# Patient Record
Sex: FEMALE | Race: WHITE | NOT HISPANIC OR LATINO | Employment: OTHER | URBAN - METROPOLITAN AREA
[De-identification: names, ages, dates, MRNs, and addresses within clinical notes are randomized per-mention and may not be internally consistent; named-entity substitution may affect disease eponyms.]

---

## 2017-01-11 ENCOUNTER — HOSPITAL ENCOUNTER (OUTPATIENT)
Dept: PHYSICAL THERAPY | Facility: CLINIC | Age: 72
Setting detail: THERAPIES SERIES
End: 2017-01-11
Attending: INTERNAL MEDICINE
Payer: COMMERCIAL

## 2017-01-11 PROCEDURE — 97110 THERAPEUTIC EXERCISES: CPT | Mod: GP | Performed by: PHYSICAL THERAPIST

## 2017-01-11 PROCEDURE — 40000360 ZZHC STATISTIC PT CANCER REHAB VISIT: Performed by: PHYSICAL THERAPIST

## 2017-02-01 ENCOUNTER — HOSPITAL ENCOUNTER (OUTPATIENT)
Dept: PHYSICAL THERAPY | Facility: CLINIC | Age: 72
Setting detail: THERAPIES SERIES
End: 2017-02-01
Attending: INTERNAL MEDICINE
Payer: COMMERCIAL

## 2017-02-01 PROCEDURE — G8979 MOBILITY GOAL STATUS: HCPCS | Mod: GP,CI | Performed by: PHYSICAL THERAPIST

## 2017-02-01 PROCEDURE — G8980 MOBILITY D/C STATUS: HCPCS | Mod: GP,CI | Performed by: PHYSICAL THERAPIST

## 2017-02-01 PROCEDURE — 40000360 ZZHC STATISTIC PT CANCER REHAB VISIT: Performed by: PHYSICAL THERAPIST

## 2017-02-01 PROCEDURE — 97110 THERAPEUTIC EXERCISES: CPT | Mod: GP | Performed by: PHYSICAL THERAPIST

## 2017-02-01 NOTE — PROGRESS NOTES
OUTPATIENT PHYSICAL THERAPY PROGRESS NOTE/ DISCHARGE SUMMARY    02/01/17 1000   Signing Clinician's Name / Credentials   Signing clinician's name / credentials Cintia Desailola, PT [id number]   Session Number   Session Number 4 MC/ medica   PT Medicare Only G-code   G-code Mobility: Walking & Moving Around   Mobility: Walking & Moving Around   Mobility Goal,  (eval/re-eval, every progress note, & discharge) CI: 1-19% impairment   Mobility Discharge Status,  (discharge) CI: 1-19% impairment   Discharge Mobility Modifier Rationale professional judgement based on increased overall walking tolerance but needs occasional breaks   Ortho Goal 1   Goal Description 1.  Pt FACIT score will improve to 32.   2/1/17 44 MET   Target Date 01/23/17   Ortho Goal 2   Goal Description 2.  Pt will be able to walk X 30 min w/ minimal fatigue.  2/1/17  mild fatigue  MET   Target Date 01/23/17   Ortho Goal 3   Goal Description 3.  Independent and consistent w/HEP.  2/1/17 less consistent w/ vacation otherwise has been MET   Target Date 01/23/17   Subjective Report   Subjective Report Pt states her energy is improving but not back to normal levels.  Pt is getting done what she needs to get done just may need to take more breaks.  Pt recently on vacation--walked about [place] mile [place] the beach.   Intermittent chest pain    Objective Measures   Objective Measures Objective Measure 2   Objective Measures   Objective Measure Vitals before ex:   BP   160/84,  HR 68,.  End of session:  143/ 83,  HR 66.    Details AROM L shoulder  flex 156*,  *, ER 66*   Objective Measure FACIT : 44   Therapeutic Procedure/exercise   Patient Response improving endurance   Treatment Detail Scifit seat 3 elevated L2 2'fwd/ 2' retro.  Wall flex and abd  X 5.  Pec stretch in doorframe.    Sit to stand X 15   (HR after 75).  Standing marching 2# X 20 B w/o UE support, hip abd R LE 2# X 15,  L 1# X 15 w/ UE support ( R LE fatigues w/ kicking L LE).    hip ext R  2# X 15, L 1# X 15 w/ UE support.         2# biceps X 20 B.  Shoulder ext 2# X 15 B .   1# triceps x 15 B.  shoulder flex 1/2# X 25 B.  1/2# scaption X 20 B.  Pt completed FACIT   Plan   Home program ex as above.    Plan  Pt to cont on own w/ HEP.  Discharge from physical therapy.   Comments   Comments  Pt has met goals     RECERTIFICATION    Chika Hurd  1945    Session Number: 4 / medica since start of care.    Reasons for Continuing Treatment:   To finalize home program    Frequency/Duration  1visit today.    Recertification Period  1/23/17 - 2/1/17    Physician Signature:    Date:    X_______________________________________________________    Physician Name: Dr. Arreola     I certify the need for these services furnished under this plan of treatment and while under my care. Physician co-signature of this document indicates review and certification of the therapy plan.  This signature may be written on paper, or electronically signed within EPIC.

## 2017-02-28 DIAGNOSIS — D05.12 DUCTAL CARCINOMA IN SITU (DCIS) OF LEFT BREAST: ICD-10-CM

## 2017-02-28 LAB
ALBUMIN SERPL-MCNC: 3.4 G/DL (ref 3.4–5)
ALP SERPL-CCNC: 88 U/L (ref 40–150)
ALT SERPL W P-5'-P-CCNC: 31 U/L (ref 0–50)
AST SERPL W P-5'-P-CCNC: 18 U/L (ref 0–45)
BILIRUB DIRECT SERPL-MCNC: <0.1 MG/DL (ref 0–0.2)
BILIRUB SERPL-MCNC: 0.4 MG/DL (ref 0.2–1.3)
PROT SERPL-MCNC: 7 G/DL (ref 6.8–8.8)

## 2017-02-28 PROCEDURE — 36415 COLL VENOUS BLD VENIPUNCTURE: CPT | Performed by: INTERNAL MEDICINE

## 2017-02-28 PROCEDURE — 80076 HEPATIC FUNCTION PANEL: CPT | Performed by: INTERNAL MEDICINE

## 2017-03-02 ENCOUNTER — ONCOLOGY VISIT (OUTPATIENT)
Dept: ONCOLOGY | Facility: CLINIC | Age: 72
End: 2017-03-02
Attending: INTERNAL MEDICINE
Payer: COMMERCIAL

## 2017-03-02 VITALS
SYSTOLIC BLOOD PRESSURE: 155 MMHG | WEIGHT: 164.7 LBS | TEMPERATURE: 98.3 F | RESPIRATION RATE: 16 BRPM | DIASTOLIC BLOOD PRESSURE: 78 MMHG | BODY MASS INDEX: 31.1 KG/M2 | HEART RATE: 60 BPM | OXYGEN SATURATION: 98 % | HEIGHT: 61 IN

## 2017-03-02 DIAGNOSIS — N64.4 BREAST PAIN, LEFT: Primary | ICD-10-CM

## 2017-03-02 DIAGNOSIS — D05.12 DUCTAL CARCINOMA IN SITU (DCIS) OF LEFT BREAST: ICD-10-CM

## 2017-03-02 DIAGNOSIS — R06.02 SOB (SHORTNESS OF BREATH): ICD-10-CM

## 2017-03-02 PROCEDURE — 99211 OFF/OP EST MAY X REQ PHY/QHP: CPT

## 2017-03-02 PROCEDURE — 99214 OFFICE O/P EST MOD 30 MIN: CPT | Performed by: INTERNAL MEDICINE

## 2017-03-02 RX ORDER — ANASTROZOLE 1 MG/1
1 TABLET ORAL DAILY
Qty: 90 TABLET | Refills: 3 | Status: SHIPPED | OUTPATIENT
Start: 2017-03-02 | End: 2017-11-01

## 2017-03-02 RX ORDER — NEOMYCIN SULFATE, POLYMYXIN B SULFATE, AND DEXAMETHASONE 3.5; 10000; 1 MG/G; [USP'U]/G; MG/G
OINTMENT OPHTHALMIC
COMMUNITY
Start: 2017-01-02 | End: 2017-09-11

## 2017-03-02 ASSESSMENT — PAIN SCALES - GENERAL: PAINLEVEL: SEVERE PAIN (7)

## 2017-03-02 NOTE — PATIENT INSTRUCTIONS
We would like to see you back in 6 months with labs prior. Your mammogram is also due in July. When you are in need of a refill, please call your pharmacy and they will send us a request.  Copy of appointments, and after visit summary (AVS) given to patient.  If you have any questions please call Cintia Rojas RN, BSN, OCN Oncology Hematology  [facility] [phone]. For questions after business hours, or on holidays/weekends, please call our after hours Nurse Triage line [phone]. Thank you.

## 2017-03-02 NOTE — PROGRESS NOTES
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      HISTORY OF PRESENT ILLNESS:  Chika Hurd is a 71-year-old female that has been compliant to routine mammogram in 07/2016.  Identified new microcalcification 0.4 cm area on the left breast 12-1 o'clock position 1.7 cm from the nipple.  This is a change from her prior 2015 mammogram.      Stereotactic biopsy was done 07/22/2016, identified high grade DCIS with comedonecrosis and microcalcifications and intermediate grade with solid and cribriform pattern.  No evidence of invasive carcinoma.  No evidence of angiolymphatic invasion.  % positive, DE 60% to 70% positive.      She bruised significantly after the biopsy with decreased range of motion of left shoulder.  She gradually recovered but still has significant amount of bruising left in the inferior part of the left breast.    She had lumpectomy with Dr. Murry 9/2016 found 1.4 cm grade II DCIS, had also fibrocystic changes.     She had RT finished in 11/2016. She started Arimidex after.      PAST MEDICAL HISTORY:     1.  Back pain.     2.  GERD.     3.  Cataracts.   4.  Senile hearing loss.     5.  Hyperlipidemia.      MEDICATIONS:  Reviewed in Epic system.      SOCIAL HISTORY:  She lives in [place].  Denies smoking, denies alcohol abuse.      FAMILY HISTORY:  Positive for maternal aunt who had breast cancer in her 50s.  She lived up to 90s and 2 maternal cousins also had breast cancer.  They are doing well with that.  No other family history of cancer.      REVIEW OF SYSTEMS:   Still has episodic left breast pain, and sob when not exerting herself.        PHYSICAL EXAMINATION:     VITALS:  Blood pressure 155/78, pulse (P) 60, temperature 98.3  F (36.8  C), temperature source Tympanic, resp. rate 16, height 1.543 m (5' 0.75\"), weight 74.7 kg (164 lb 11.2 oz), SpO2 98 %, not currently " breastfeeding.    GENERAL APPEARANCE:  Elderly lady, looks younger than her stated age, not in acute distress.   HEENT: The patient is normocephalic, atraumatic. Pupils are equally reactive to light.  Sclerae are anicteric.  Moist oral mucosa.  Negative pharynx.  No oral thrush.   NECK:  Supple.  No jugular venous distention.  Thyroid is not palpable.   LYMPH NODES:  Superficial lymphadenopathy is not appreciable in the bilateral cervical, supraclavicular, axillary or inguinal areas.   CARDIOVASCULAR:  S1, S2 regular with no murmurs or gallops.  No carotid or abdominal bruits.   PULMONARY:  Lungs are clear to auscultation and percussion bilaterally.  There is no wheezing or rhonchi.   GASTROINTESTINAL:  Abdomen is soft, nontender.  No hepatosplenomegaly.  No signs of ascites.  No mass appreciable.   MUSCULOSKELETAL/EXTREMITIES:  No edema.  No cyanotic changes.  No signs of joint deformity.  No lymphedema.   NEUROLOGIC:  Cranial nerves II-XII are grossly intact.  Sensation intact.  Muscle strength and muscle tone symmetrical, 5/5 throughout.   BACK:  No spinal or paraspinal tenderness.  No CVA tenderness.   SKIN:  No petechiae.  No rash.  No signs of cellulitis.   BREASTS:  Bilateral breast exam review:  Well bruised left medial and lower part of the left breast.  No sensation defect.  No mass appreciable.         CURRENT LABORATORY DATA:   2/2017 LFT nl.     Current Image  Dexa 12/2016: no osteopenia    OLD DATA REVIEW WITH SUMMARY:    lumpectomy 9/2016 found 1.4 cm grade II DCIS, had also fibrocystic changes.     Left breast stereotactic biopsy 7/2016 from 12-1 o'clock position, is 1.7 cm from the nipple, 0.4 cm area of microcalcification -   high grade DCIS with comedonecrosis and microcalcifications and intermediate grade with solid and cribriform pattern. No evidence of invasive carcinoma.  No evidence of angiolymphatic invasion.  % positive, OK 60% to 70% positive.      Summer 2016:  INR/PTT/fibrinogen/PFS: all nl.     mammogram in 07/2016.  Identified new microcalcification 0.4 cm area on the left breast 12-1 o'clock position 1.7 cm from the nipple.  This is a change from her prior 2015 mammogram.         ASSESSMENT AND PLAN:     1. 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   She had lumpectomy with Dr. Murry 9/2016 found 1.4 cm grade II DCIS, had also fibrocystic changes.   She had RT finished in 11/2016. She is started on Arimidex after.     She is tolerating it ok. MA due in July, 6 months f/u with lab.    2. Left breast pain. Advice to try primrose oil.     3. Episodic resting SOB. This does not happen when she exert herself. Could be related to her relative low HR. She needs to record her HR when she has the episode.

## 2017-03-02 NOTE — NURSING NOTE
"Chika Hurd is a 71 year old female who presents for:  Chief Complaint   Patient presents with     Oncology Clinic Visit     3 month recheck Breast CA, review labs        Initial Vitals:  /78 (BP Location: Right arm, Patient Position: Chair, Cuff Size: Adult Large)  Pulse 69  Temp 98.3  F (36.8  C) (Tympanic)  Resp 16  Ht 1.543 m (5' 0.75\")  Wt 74.7 kg (164 lb 11.2 oz)  SpO2 98%  Breastfeeding? No  BMI 31.38 kg/m2 Estimated body mass index is 31.38 kg/(m^2) as calculated from the following:    Height as of this encounter: 1.543 m (5' 0.75\").    Weight as of this encounter: 74.7 kg (164 lb 11.2 oz).. Body surface area is 1.79 meters squared. BP completed using cuff size: regular  Severe Pain (7) No LMP recorded. Patient is postmenopausal. Allergies and medications reviewed.     Medications: Medication refills  needed today.  Pharmacy name entered into [facility]: [id number] [place] [address]    Comments:3 month recheck Breast CA, review labs.     7  minutes for nursing intake (face to face time)   Rosario Burch CMA        "

## 2017-03-02 NOTE — MR AVS SNAPSHOT
After Visit Summary   3/2/2017    Chika Hurd    MRN: [id number]           Patient Information     Date Of Birth          1945        Visit Information        Provider Department      3/2/2017 8:30 AM Katie Arreola MD [facility]        Today's Diagnoses     Breast pain, left    -  1    Ductal carcinoma in situ (DCIS) of left breast        SOB (shortness of breath)           Follow-ups after your visit        Your next 10 appointments already scheduled     Sep 06, 2017  8:00 AM CDT   LAB with CL LAB   [facility] ([facility])    [address] [facility] [address]   [phone]           Patient must bring picture ID.  Patient should be prepared to give a urine specimen  Please do not eat 10-12 hours before your appointment if you are coming in fasting for labs on lipids, cholesterol, or glucose (sugar).  Pregnant women should follow their Care Team instructions. Water with medications is okay. Do not drink coffee or other fluids.   If you have concerns about taking  your medications, please ask at office or if scheduling via [employer], send a message by clicking on Secure Messaging, Message Your Care Team.            Sep 11, 2017 10:00 AM CDT   Return Visit with Katie Arreola MD   [facility] ([facility])    [facility]  [address] [address] [phone]              Future tests that were ordered for you today     Open Future Orders        Priority Expected Expires Ordered    Hepatic panel Routine 9/1/2017 3/2/2018 3/2/2017    MA Screen Bilateral w/Richi Routine 7/1/2017 3/2/2018 3/2/2017            Who to contact     If you have questions or need follow up information about today's clinic visit or your schedule please contact [facility] directly at [phone].  Normal or non-critical lab and imaging results will be communicated to you by MyChart, letter or  "phone within 4 business days after the clinic has received the results. If you do not hear from us within 7 days, please contact the clinic through Hasht[employer] or phone. If you have a critical or abnormal lab result, we will notify you by phone as soon as possible.  Submit refill requests through Hashtrack or call your pharmacy and they will forward the refill request to us. Please allow 3 business days for your refill to be completed.          Additional Information About Your Visit        [employer] Information     [employer] gives you secure access to your electronic health record. If you see a primary care provider, you can also send messages to your care team and make appointments. If you have questions, please call your primary care clinic.  If you do not have a primary care provider, please call [phone] and they will assist you.        Care EveryWhere ID     This is your Care EveryWhere ID. This could be used by other organizations to access your [place] medical records  [id number]        Your Vitals Were     Pulse Temperature Respirations Height Pulse Oximetry Breastfeeding?    60 98.3  F (36.8  C) (Tympanic) 16 1.543 m (5' 0.75\") 98% No    BMI (Body Mass Index)                   31.38 kg/m2            Blood Pressure from Last 3 Encounters:   03/02/17 155/78   12/30/16 130/84   11/29/16 149/84    Weight from Last 3 Encounters:   03/02/17 74.7 kg (164 lb 11.2 oz)   11/29/16 73.8 kg (162 lb 9.6 oz)   11/23/16 73.8 kg (162 lb 9.6 oz)                 Where to get your medicines      These medications were sent to [facility] [id number] [place] [facility] - [address] [address]     Phone:  [phone]     anastrozole 1 MG tablet          Primary Care Provider Office Phone # Fax #    Chana Copeland [phone]       [facility] [address] [facility] [address]        Thank you!     Thank you for choosing [facility]  for your care. " Our goal is always to provide you with excellent care. Hearing back from our patients is one way we can continue to improve our services. Please take a few minutes to complete the written survey that you may receive in the mail after your visit with us. Thank you!             Your Updated Medication List - Protect others around you: Learn how to safely use, store and throw away your medicines at www.disposemymeds.org.          This list is accurate as of: 3/2/17  9:17 AM.  Always use your most recent med list.                   Brand Name Dispense Instructions for use    ADVIL 200 MG capsule   Generic drug:  ibuprofen      Take 2 tablets by mouth every 8 hours as needed       anastrozole 1 MG tablet    ARIMIDEX    90 tablet    Take 1 tablet (1 mg) by mouth daily       ASPIRIN PO      Take 81 mg by mouth       atorvastatin 10 MG tablet    LIPITOR    90 tablet    Take 1 tablet (10 mg) by mouth daily       CALCIUM + D PO      1 TABLET DAILY       co-enzyme Q-10 100 MG Caps capsule      Take 2 capsules by mouth daily. Takes a total of 200 mg daily.       famotidine 20 MG tablet    PEPCID    180 tablet    Take 1 tablet (20 mg) by mouth 2 times daily       MULTIVITAMIN PO      1 daily       neomycin-polymyxin-dexamethasone [id number] Oint ophthalmic ointment    MAXITROL     Reported on 3/2/2017       OMEGA-3 FISH OIL PO          REFRESH DRY EYE THERAPY OP      Apply 1-2 drops to eye as needed       VITAMIN C PO      Take 500 mg by mouth daily

## 2017-03-30 DIAGNOSIS — K21.9 GASTROESOPHAGEAL REFLUX DISEASE WITHOUT ESOPHAGITIS: ICD-10-CM

## 2017-03-30 RX ORDER — FAMOTIDINE 20 MG/1
20 TABLET, FILM COATED ORAL 2 TIMES DAILY
Qty: 180 TABLET | Refills: 1 | Status: SHIPPED | OUTPATIENT
Start: 2017-03-30 | End: 2017-09-23

## 2017-03-30 NOTE — TELEPHONE ENCOUNTER
Famotidine      Last Written Prescription Date: 10/11/2016  Last Fill Quantity: 180,  # refills: 1   Last Office Visit with G, [facility] or [facility] prescribing provider: 09/12/2016    Aric RUSSO)

## 2017-04-24 ENCOUNTER — OFFICE VISIT (OUTPATIENT)
Dept: FAMILY MEDICINE | Facility: CLINIC | Age: 72
End: 2017-04-24
Payer: COMMERCIAL

## 2017-04-24 VITALS
OXYGEN SATURATION: 98 % | SYSTOLIC BLOOD PRESSURE: 150 MMHG | DIASTOLIC BLOOD PRESSURE: 76 MMHG | BODY MASS INDEX: 31.13 KG/M2 | WEIGHT: 163.4 LBS | HEART RATE: 74 BPM

## 2017-04-24 DIAGNOSIS — L57.0 ACTINIC KERATOSIS: ICD-10-CM

## 2017-04-24 DIAGNOSIS — G56.22 LESION OF LEFT ULNAR NERVE: Primary | ICD-10-CM

## 2017-04-24 PROCEDURE — 17110 DESTRUCTION B9 LES UP TO 14: CPT | Performed by: FAMILY MEDICINE

## 2017-04-24 PROCEDURE — 99213 OFFICE O/P EST LOW 20 MIN: CPT | Mod: 25 | Performed by: FAMILY MEDICINE

## 2017-04-24 NOTE — NURSING NOTE
"Chief Complaint   Patient presents with     Lesion     hard bump on left side of face/hairline that won't go away.      Bradycardia     oncologist advised her to come in to the clinic due to low pulse.      Cold Extremity     cold/numbness/tingling started last week in left arm. History: she had left breast procedure last september.        Initial /81 (BP Location: Right arm, Patient Position: Chair, Cuff Size: Adult Regular)  Pulse 74  Wt 163 lb 6.4 oz (74.1 kg)  SpO2 98%  BMI 31.13 kg/m2 Estimated body mass index is 31.13 kg/(m^2) as calculated from the following:    Height as of 3/2/17: 5' 0.75\" (1.543 m).    Weight as of this encounter: 163 lb 6.4 oz (74.1 kg).  Medication Reconciliation: complete   Michelle Abbott CMA    "

## 2017-04-24 NOTE — PROGRESS NOTES
SUBJECTIVE:                                                    Chika Hurd is a 71 year old female who presents to clinic today for the following health issues:    Chief Complaint   Patient presents with     Lesion     hard bump on left side of face/hairline that won't go away.      Bradycardia     oncologist advised her to come in to the clinic due to low pulse.      Cold Extremity     cold/numbness/tingling started last week in left arm. History: she had left breast procedure last september.      When Chika saw her oncologist in March, her pulse was in the 60-69 range, and Dr. Arreola recommended she see her PCP. At the time she reported episodic resting shortness of breath but denied dyspena on exertion. She denies weakness or lightheadedness. Her general exam with Dr. Arreola was unremarkable.    She is concerned over a rough skin lesion on her left temple at the hairline.    She also reports one week of numbness and tingling in her left arm, sometimes pain, with paresthesias extending to her little finger. She has not noted any arm weakness.  She does admit that she leans on her left elbow when reading in bed.    OBJECTIVE: /76 (BP Location: Right arm, Patient Position: Chair, Cuff Size: Adult Regular)  Pulse 74  Wt 163 lb 6.4 oz (74.1 kg)  SpO2 98%  BMI 31.13 kg/m2    Vital Signs Pulse   10/12/2016 62   10/18/2016 64   11/2/2016 70   11/9/2016 76   11/16/2016 69   11/23/2016 67   11/29/2016 69   11/29/2016 96   12/30/2016 64   3/2/2017 60   4/24/2017 74     Pulse over the past six months averages in the mid-60s.  She is generally asymptomatic with this. No treatment or further evaluation indicated.    There is a small rough lesion on the left temple with a small scab that Chika states will slough off and then reform. There has been no bleeding. This appears to be a keratosis, though I am not sure if it is actinic or keratotic.  She agrees to a trial of liquid nitrogen, and this was treated with a  freeze/thaw/freeze. She was told to anticipate blistering, scabbing, and sloughing of the scab.  If  The lesion persists, then she will see a dermatologist.    Examination of the left arm shows normal flexion and extension of wrist and elbow.   strength is equal; she has full flexion and extension of the fingers.  She does have reduced sensation to light touch and monofilament on the little finger; this is aggravated by direct pressure over the ulnar nerve at the elbow.    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 She thinks her symptoms are starting to get better, and generally over time this will be self-resolving if she can avoid pressure on the nerve. I also recommended a bulky wrapping over the elbow at night to prevent full flexion and stretching for any extended time.  Recheck if not continuing to improve.    Chana Copeland md

## 2017-04-24 NOTE — PATIENT INSTRUCTIONS
Thank you for choosing [facility].  You may be receiving a survey in the mail from [facility] regarding your visit today.  Please take a few minutes to complete and return the survey to let us know how we are doing.  Our Clinic hours are:  Mondays    7:20 am - 7 pm  Tues -  Fri  7:20 am - 5 pm  Clinic Phone: [phone]  The clinic lab opens at 7:30 am Mon - Fri and appointments are required.  [facility]. [phone]  Monday-Thursday 8 am - 7pm  Tues/Wed/Fri 8 am - 5:30 pm

## 2017-04-24 NOTE — MR AVS SNAPSHOT
After Visit Summary   4/24/2017    Chika Hurd    MRN: [id number]           Patient Information     Date Of Birth          1945        Visit Information        Provider Department      4/24/2017 8:20 AM Chana Copeland MD [facility]        Today's Diagnoses     Actinic keratosis    -  1    Lesion of left ulnar nerve          Care Instructions    Thank you for choosing [facility].  You may be receiving a survey in the mail from [facility] regarding your visit today.  Please take a few minutes to complete and return the survey to let us know how we are doing.  Our Clinic hours are:  Mondays    7:20 am - 7 pm  Tues -  Fri  7:20 am - 5 pm  Clinic Phone: [phone]  The clinic lab opens at 7:30 am Mon - Fri and appointments are required.  [facility]  Ph. [phone]  Monday-Thursday 8 am - 7pm  Tues/Wed/Fri 8 am - 5:30 pm           Follow-ups after your visit        Your next 10 appointments already scheduled     Jul 20, 2017 10:30 AM CDT   MA SCREENING BILATERAL W/ ARTEMIO with WYMA2   [facility] ([facility])    [address] [address] [phone]           Do not use any powder, lotion or deodorant under your arms or on your breast. If you do, we will ask you to remove it before your exam.  Wear comfortable, two-piece clothing.  If you have any allergies, tell your care team.  Bring any previous mammograms from other facilities or have them mailed to the breast center.            Sep 06, 2017  8:00 AM CDT   LAB with CL LAB   [facility]    [address]  [facility] [address]   [phone]           Patient must bring picture ID.  Patient should be prepared to give a urine specimen  Please do not eat 10-12 hours before your appointment if you are coming in fasting for labs on lipids, cholesterol, or glucose (sugar).  Pregnant women  should follow their Care Team instructions. Water with medications is okay. Do not drink coffee or other fluids.   If you have concerns about taking  your medications, please ask at office or if scheduling via [facility], send a message by clicking on Secure Messaging, Message Your Care Team.            Sep 11, 2017 10:00 AM CDT   Return Visit with Katie Arreola MD   [facility] ([facility])    [facility]  [address]  [facility] [address] [phone]              Who to contact     If you have questions or need follow up information about today's clinic visit or your schedule please contact [facility] directly at [phone].  Normal or non-critical lab and imaging results will be communicated to you by [employer], letter or phone within 4 business days after the clinic has received the results. If you do not hear from us within 7 days, please contact the clinic through [employer] or phone. If you have a critical or abnormal lab result, we will notify you by phone as soon as possible.  Submit refill requests through [facility] or call your pharmacy and they will forward the refill request to us. Please allow 3 business days for your refill to be completed.          Additional Information About Your Visit        [facility] Information     [facility] gives you secure access to your electronic health record. If you see a primary care provider, you can also send messages to your care team and make appointments. If you have questions, please call your primary care clinic.  If you do not have a primary care provider, please call [phone] and they will assist you.        Care EveryWhere ID     This is your Care EveryWhere ID. This could be used by other organizations to access your [place] medical records  [id number]        Your Vitals Were     Pulse Pulse Oximetry BMI (Body Mass Index)             74 98% 31.13 kg/m2          Blood Pressure from Last 3  Encounters:   04/24/17 150/76   03/02/17 155/78   12/30/16 130/84    Weight from Last 3 Encounters:   04/24/17 163 lb 6.4 oz (74.1 kg)   03/02/17 164 lb 11.2 oz (74.7 kg)   11/29/16 162 lb 9.6 oz (73.8 kg)              Today, you had the following     No orders found for display       Primary Care Provider Office Phone # Fax #    Chana Angela Cpoeland [phone]       [facility] [address] [facility] [address]        Thank you!     Thank you for choosing [facility]  for your care. Our goal is always to provide you with excellent care. Hearing back from our patients is one way we can continue to improve our services. Please take a few minutes to complete the written survey that you may receive in the mail after your visit with us. Thank you!             Your Updated Medication List - Protect others around you: Learn how to safely use, store and throw away your medicines at www.disposemymeds.org.          This list is accurate as of: 4/24/17  9:31 AM.  Always use your most recent med list.                   Brand Name Dispense Instructions for use    ADVIL 200 MG capsule   Generic drug:  ibuprofen      Take 2 tablets by mouth every 8 hours as needed       anastrozole 1 MG tablet    ARIMIDEX    90 tablet    Take 1 tablet (1 mg) by mouth daily       ASPIRIN PO      Take 81 mg by mouth       atorvastatin 10 MG tablet    LIPITOR    90 tablet    Take 1 tablet (10 mg) by mouth daily       CALCIUM + D PO      1 TABLET DAILY       co-enzyme Q-10 100 MG Caps capsule      Take 2 capsules by mouth daily. Takes a total of 200 mg daily.       famotidine 20 MG tablet    PEPCID    180 tablet    Take 1 tablet (20 mg) by mouth 2 times daily       MULTIVITAMIN PO      1 daily       neomycin-polymyxin-dexamethasone 3.5-05258-4.1 Oint ophthalmic ointment    MAXITROL     Reported on 3/2/2017       OMEGA-3 FISH OIL PO          REFRESH DRY EYE THERAPY OP      Apply 1-2 drops to eye as  needed       VITAMIN C PO      Take 500 mg by mouth daily

## 2017-05-03 ENCOUNTER — OFFICE VISIT (OUTPATIENT)
Dept: AUDIOLOGY | Facility: CLINIC | Age: 72
End: 2017-05-03
Payer: COMMERCIAL

## 2017-05-03 DIAGNOSIS — H90.3 SENSORINEURAL HEARING LOSS, BILATERAL: Primary | ICD-10-CM

## 2017-05-03 PROCEDURE — V5267 HEARING AID SUP/ACCESS/DEV: HCPCS | Performed by: AUDIOLOGIST

## 2017-05-03 NOTE — PROGRESS NOTES
72 year old female comes in with her 2007 [employer] Indigo Moxi 13 left hearing aid reporting it is not working. She reports she was using the hearing aids part time.    Replaced left #2xS  and medium dome. Verified hearing aid functionality.  See chart in the hearing aid room.     Long discussion on age of hearing aids and that they are too old for repair.     Recommend return to clinic for hearing assessment and hearing aid consultation.     Stephanie RATLIFF-MELINDA, #[id number]

## 2017-05-03 NOTE — MR AVS SNAPSHOT
After Visit Summary   5/3/2017    Chika Hurd    MRN: [id number]           Patient Information     Date Of Birth          1945        Visit Information        Provider Department      5/3/2017 10:30 AM Stephanie Pitts, [facility]        Today's Diagnoses     SENSONRL HEAR LOSS,BILAT    -  1       Follow-ups after your visit        Your next 10 appointments already scheduled     Jul 20, 2017 10:30 AM CDT   MA SCREENING BILATERAL W/ ARTEMIO with [address] [facility] ([facility])    [address]  [facility] [address]   [phone]           Do not use any powder, lotion or deodorant under your arms or on your breast. If you do, we will ask you to remove it before your exam.  Wear comfortable, two-piece clothing.  If you have any allergies, tell your care team.  Bring any previous mammograms from other facilities or have them mailed to the breast center.            Sep 06, 2017  8:00 AM CDT   LAB with CL LAB   [facility]    [address] [facility] [address]   [phone]           Patient must bring picture ID.  Patient should be prepared to give a urine specimen  Please do not eat 10-12 hours before your appointment if you are coming in fasting for labs on lipids, cholesterol, or glucose (sugar).  Pregnant women should follow their Care Team instructions. Water with medications is okay. Do not drink coffee or other fluids.   If you have concerns about taking  your medications, please ask at office or if scheduling via [employer], send a message by clicking on Secure Messaging, Message Your Care Team.            Sep 11, 2017 10:00 AM CDT   Return Visit with Katie Arreola MD   [facility] ([facility])    [facility]  [address]  [facility] [address]   [phone]              Who to contact     If you have questions or need  follow up information about today's clinic visit or your schedule please contact [facility] directly at [phone].  Normal or non-critical lab and imaging results will be communicated to you by MyChart, letter or phone within 4 business days after the clinic has received the results. If you do not hear from us within 7 days, please contact the clinic through [employer] or phone. If you have a critical or abnormal lab result, we will notify you by phone as soon as possible.  Submit refill requests through [employer] or call your pharmacy and they will forward the refill request to us. Please allow 3 business days for your refill to be completed.          Additional Information About Your Visit        [employer] Information     [employer] gives you secure access to your electronic health record. If you see a primary care provider, you can also send messages to your care team and make appointments. If you have questions, please call your primary care clinic.  If you do not have a primary care provider, please call [phone] and they will assist you.        Care EveryWhere ID     This is your Care EveryWhere ID. This could be used by other organizations to access your [place] medical records  [id number]         Blood Pressure from Last 3 Encounters:   04/24/17 150/76   03/02/17 155/78   12/30/16 130/84    Weight from Last 3 Encounters:   04/24/17 163 lb 6.4 oz (74.1 kg)   03/02/17 164 lb 11.2 oz (74.7 kg)   11/29/16 162 lb 9.6 oz (73.8 kg)              We Performed the Following     HEARING AID SUPPLY        Primary Care Provider Office Phone # Fax #    Chana Copeland [phone]       [facility] [address] [address]        Thank you!     Thank you for choosing [facility]  for your care. Our goal is always to provide you with excellent care. Hearing back from our patients is one way we can continue to improve our services. Please take a few  minutes to complete the written survey that you may receive in the mail after your visit with us. Thank you!             Your Updated Medication List - Protect others around you: Learn how to safely use, store and throw away your medicines at www.disposemymeds.org.          This list is accurate as of: 5/3/17 11:36 AM.  Always use your most recent med list.                   Brand Name Dispense Instructions for use    ADVIL 200 MG capsule   Generic drug:  ibuprofen      Take 2 tablets by mouth every 8 hours as needed       anastrozole 1 MG tablet    ARIMIDEX    90 tablet    Take 1 tablet (1 mg) by mouth daily       ASPIRIN PO      Take 81 mg by mouth       atorvastatin 10 MG tablet    LIPITOR    90 tablet    Take 1 tablet (10 mg) by mouth daily       CALCIUM + D PO      1 TABLET DAILY       co-enzyme Q-10 100 MG Caps capsule      Take 2 capsules by mouth daily. Takes a total of 200 mg daily.       famotidine 20 MG tablet    PEPCID    180 tablet    Take 1 tablet (20 mg) by mouth 2 times daily       MULTIVITAMIN PO      1 daily       neomycin-polymyxin-dexamethasone 3.5-34347-6.1 Oint ophthalmic ointment    MAXITROL     Reported on 3/2/2017       OMEGA-3 FISH OIL PO          REFRESH DRY EYE THERAPY OP      Apply 1-2 drops to eye as needed       VITAMIN C PO      Take 500 mg by mouth daily

## 2017-05-19 ENCOUNTER — TELEPHONE (OUTPATIENT)
Dept: ONCOLOGY | Facility: CLINIC | Age: 72
End: 2017-05-19

## 2017-05-19 NOTE — TELEPHONE ENCOUNTER
Chika called in with the following symptoms more noticeable in the past couple of weeks.   Joint Pain, bilateral leg pain, back aches- Mothers Day and yesterday she had right eye brow swelling, She is having some visual disturbance in her right eye. She described it as having a flash bulb go off leaving her the vision blurred/clouding over. She had a recent eye exam and was told she had cataracts which she will be getting removed in July.   She is not sleeping well- but exhausted, feels like when she is driving she can fall asleep. Also she has had sharp shooting pains going up the back of her neck into her head.   Had an episode this past week of confusion lasting several minutes where she could not recall what she was doing.   She was in to see her primary on 4/24/2017 as recommended to have her pulse evaluated and provider felt everything was fine.   She feels this is related to taking her anastrozole and would like to stop it to see if symptoms resolve.   I advised her I would reach out to  to see what her recommendations are and call her back at [phone].     Susan

## 2017-05-19 NOTE — TELEPHONE ENCOUNTER
Patient notified to hold anastrozole for  2 weeks and call with update per Dr Arreola's recommendation.   She verbalizes understanding and will call accordingly.     Susan

## 2017-05-22 ENCOUNTER — DOCUMENTATION ONLY (OUTPATIENT)
Dept: OTHER | Facility: CLINIC | Age: 72
End: 2017-05-22

## 2017-05-22 NOTE — PROGRESS NOTES
OPHTHALMOLOGY CONSULT NOTE    Date:  May 22, 2017    Patient:  Chika Hurd  :  1945    Referring Provider:    Chana Copeland Paul    Reason for Consult:    Sudden vision loss OD.  Patient reports that about 10 days ago, she had sudden loss of vision OD which lasted for 1/2 hour and then cleared.  She did not seek medical attention then.  Four days ago patient noticed sudden vision loss OD.  The peripheral vision was effected more than central.  This time symptoms did not clear.  Patient still did not seek medical attention until today.  Over the past 3 days, patient has noted that the inferior visual field has improved.  She denies any eye pain.  No ocular trauma.    Exam:  Documented in medical record.  Visual acuity 20/40 OD and 20/30 OS.  Quiet anterior segment bilaterally with early NS cataract ou.  Fundus examination on the left is normal.  The fundus examination shows retinal ischemia / edema along superior and inferior areas.  The cilioretinal area is spared with no cystoid macular edema present.  There is a plaque located in the first bifurcation of the central retinal artery.    Impression:    1. 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  Patient is fortunate to have cilioretinal artery sparing, limiting her macular edema and preserving her central visual acuity.  There is marked peripheral retinal ischemia inferior greater than superior.    Plan:    1. No treatment options at this time.  Prognosis guarded.  Likely will remain stable with permanent impairment of peripheral visual fields OD.  Possible that some improvement may occur over next 3 months.  2. Letter to primary clinic.  Stroke prophylaxis is critical issue.  Reasonable to evaluate for carotid / cardiac embolic sources.  3. [facility] follow up in 1 month.      Thank you for allowing me to participate in the care of your patient.        Michael Zuleta MD  [facility]  [phone]

## 2017-05-22 NOTE — TELEPHONE ENCOUNTER
"Call received from [facility], Dr. Zuleta's office with an update.  Patient was evaluated today by Dr. Zuleta and pt was found to have Central Retina Artery Occlusion.  It is recommended patient be seen this week by Dr. Arreola and patient's PCP Dr. Copeland.  Patient is declining to be seen by Dr. Arreola this week \"she's already aware I'm having issues\".  Patient is scheduled to be seen by Dr. Ibanez tomorrow 05.23.17 as Dr. Copeland is out of office until next week.  "

## 2017-05-23 ENCOUNTER — OFFICE VISIT (OUTPATIENT)
Dept: FAMILY MEDICINE | Facility: CLINIC | Age: 72
End: 2017-05-23
Payer: COMMERCIAL

## 2017-05-23 VITALS
HEART RATE: 76 BPM | DIASTOLIC BLOOD PRESSURE: 72 MMHG | BODY MASS INDEX: 30.81 KG/M2 | WEIGHT: 163.2 LBS | SYSTOLIC BLOOD PRESSURE: 126 MMHG | HEIGHT: 61 IN

## 2017-05-23 DIAGNOSIS — H34.11 CENTRAL RETINAL ARTERY OCCLUSION, RIGHT: Primary | ICD-10-CM

## 2017-05-23 PROCEDURE — 99214 OFFICE O/P EST MOD 30 MIN: CPT | Performed by: FAMILY MEDICINE

## 2017-05-23 NOTE — PROGRESS NOTES
"Subjective:  Chika Hurd is a 72 year old female   Chief Complaint   Patient presents with     Patient Request     pt. is here today for follow up on right eye per Dr. Zuleta, ophthalmology (blood clot) behind eye. pt. can't see out of the right eye. pt. had seen Dr. Lynn and then referred to see surgeon.      Dr Zuleta had sent me a copy of his visit note. He diagnosed her with a central retinal artery occlusion which fortunately has spared some of her central vision. He wanted her to see me for evaluation of her stroke risk and minimizing risk factors, also to look for possible sources of emboli. She has never had any cardiac murmurs or known heart lesions. She states that over 10 years ago she had a carotid artery screening at one of the screenings done at [facility] and was told she had mild disease.        Encounter Diagnosis   Name Primary?     Central retinal artery occlusion, right Yes       ROS:other than noted above, general, HEENT, respiratory, cardiac, gastrointestinal systems are negative    Medical, surgical, social, and family histories, medications and allergies reviewed and updated.    Objective:  Exam:/72 (BP Location: Right arm, Patient Position: Chair, Cuff Size: Adult Regular)  Pulse 76  Ht 5' 0.5\" (1.537 m)  Wt 163 lb 3.2 oz (74 kg)  BMI 31.35 kg/m2     GENERAL APPEARANCE ADULT: Alert, no acute distress  EYES: PERRL, EOM normal, conjunctiva and lids normal  NECK: No adenopathy,masses or thyromegaly, no carotid bruits  RESP: lungs clear to auscultation   CV: normal rate, regular rhythm, no murmur or gallop  MS: extremities normal, no peripheral edema      ASSESSMENT:  1. Central retinal artery occlusion, right      Her blood pressure is well controlled as well as her lipids and she is taking an aspirin daily as well as a statin. Will look for possible embolic sources with the studies indicated below    PLAN:  Orders Placed This Encounter     US Carotid Bilateral   Cardiac echo " to look for cardiac sources of emboli

## 2017-05-23 NOTE — NURSING NOTE
"Chief Complaint   Patient presents with     Patient Request     pt. is here today for follow up on right eye per surgeron  (blood clot) behind eye. pt. can't see out of the right eye. pt. had seen Dr. Lynn and then referred to see surgeon.        Initial /72 (BP Location: Right arm, Patient Position: Chair, Cuff Size: Adult Regular)  Pulse 76  Ht 5' 0.5\" (1.537 m)  Wt 163 lb 3.2 oz (74 kg)  BMI 31.35 kg/m2 Estimated body mass index is 31.35 kg/(m^2) as calculated from the following:    Height as of this encounter: 5' 0.5\" (1.537 m).    Weight as of this encounter: 163 lb 3.2 oz (74 kg).  Medication Reconciliation: complete     Jaelyn Wisdom, CMA      "

## 2017-05-23 NOTE — MR AVS SNAPSHOT
After Visit Summary   5/23/2017    Chika Hurd    MRN: [id number]           Patient Information     Date Of Birth          1945        Visit Information        Provider Department      5/23/2017 2:00 PM Post, SYED Doe MD [facility]        Today's Diagnoses     Central retinal artery occlusion, right    -  1       Follow-ups after your visit        Your next 10 appointments already scheduled     Jul 20, 2017 10:30 AM CDT   MA SCREENING BILATERAL W/ ARTEMIO with WYMA2   [facility] ([facility])    [address] [place]  [facility] [address]   [phone]           Do not use any powder, lotion or deodorant under your arms or on your breast. If you do, we will ask you to remove it before your exam.  Wear comfortable, two-piece clothing.  If you have any allergies, tell your care team.  Bring any previous mammograms from other facilities or have them mailed to the breast center.            Sep 06, 2017  8:00 AM CDT   LAB with CL LAB   [facility] ([facility])    [address]  [facility] [address]   [phone]           Patient must bring picture ID.  Patient should be prepared to give a urine specimen  Please do not eat 10-12 hours before your appointment if you are coming in fasting for labs on lipids, cholesterol, or glucose (sugar).  Pregnant women should follow their Care Team instructions. Water with medications is okay. Do not drink coffee or other fluids.   If you have concerns about taking  your medications, please ask at office or if scheduling via Literablyt, send a message by clicking on Secure Messaging, Message Your Care Team.            Sep 11, 2017 10:00 AM CDT   Return Visit with Katie Arreola MD   [facility] ([facility])    [facility]  [address]  [facility] [address]   [phone]              Future tests that were ordered for  "you today     Open Future Orders        Priority Expected Expires Ordered    Echocardiogram Complete Routine  5/23/2018 5/23/2017    US Carotid Bilateral Routine  5/23/2018 5/23/2017            Who to contact     If you have questions or need follow up information about today's clinic visit or your schedule please contact [facility] directly at [phone].  Normal or non-critical lab and imaging results will be communicated to you by MyChart, letter or phone within 4 business days after the clinic has received the results. If you do not hear from us within 7 days, please contact the clinic through Qwilrhart or phone. If you have a critical or abnormal lab result, we will notify you by phone as soon as possible.  Submit refill requests through [employer] or call your pharmacy and they will forward the refill request to us. Please allow 3 business days for your refill to be completed.          Additional Information About Your Visit        Qwilrhart Information     [employer] gives you secure access to your electronic health record. If you see a primary care provider, you can also send messages to your care team and make appointments. If you have questions, please call your primary care clinic.  If you do not have a primary care provider, please call [phone] and they will assist you.        Care EveryWhere ID     This is your Care EveryWhere ID. This could be used by other organizations to access your [place] medical records  [id number]        Your Vitals Were     Pulse Height BMI (Body Mass Index)             76 5' 0.5\" (1.537 m) 31.35 kg/m2          Blood Pressure from Last 3 Encounters:   05/23/17 126/72   04/24/17 150/76   03/02/17 155/78    Weight from Last 3 Encounters:   05/23/17 163 lb 3.2 oz (74 kg)   04/24/17 163 lb 6.4 oz (74.1 kg)   03/02/17 164 lb 11.2 oz (74.7 kg)               Primary Care Provider Office Phone # Fax #    Chana Copeland [phone]       [place] [facility] [address]  [facility] [address]        Thank you!     Thank you for choosing [facility]  for your care. Our goal is always to provide you with excellent care. Hearing back from our patients is one way we can continue to improve our services. Please take a few minutes to complete the written survey that you may receive in the mail after your visit with us. Thank you!             Your Updated Medication List - Protect others around you: Learn how to safely use, store and throw away your medicines at www.disposemymeds.org.          This list is accurate as of: 5/23/17  2:40 PM.  Always use your most recent med list.                   Brand Name Dispense Instructions for use    ADVIL 200 MG capsule   Generic drug:  ibuprofen      Take 2 tablets by mouth every 8 hours as needed       anastrozole 1 MG tablet    ARIMIDEX    90 tablet    Take 1 tablet (1 mg) by mouth daily       ASPIRIN PO      Take 81 mg by mouth       atorvastatin 10 MG tablet    LIPITOR    90 tablet    Take 1 tablet (10 mg) by mouth daily       CALCIUM + D PO      1 TABLET DAILY       co-enzyme Q-10 100 MG Caps capsule      Take 2 capsules by mouth daily. Takes a total of 200 mg daily.       famotidine 20 MG tablet    PEPCID    180 tablet    Take 1 tablet (20 mg) by mouth 2 times daily       MULTIVITAMIN PO      1 daily       neomycin-polymyxin-dexamethasone 3.5-20363-3.1 Oint ophthalmic ointment    MAXITROL     Reported on 5/23/2017       OMEGA-3 FISH OIL PO          REFRESH DRY EYE THERAPY OP      Apply 1-2 drops to eye as needed Reported on 5/23/2017       VITAMIN C PO      Take 500 mg by mouth daily

## 2017-05-30 ENCOUNTER — HOSPITAL ENCOUNTER (OUTPATIENT)
Dept: CARDIOLOGY | Facility: CLINIC | Age: 72
Discharge: HOME | End: 2017-05-30
Attending: FAMILY MEDICINE | Admitting: FAMILY MEDICINE
Payer: COMMERCIAL

## 2017-05-30 ENCOUNTER — HOSPITAL ENCOUNTER (OUTPATIENT)
Dept: ULTRASOUND IMAGING | Facility: CLINIC | Age: 72
End: 2017-05-30
Attending: FAMILY MEDICINE
Payer: COMMERCIAL

## 2017-05-30 DIAGNOSIS — H34.11 CENTRAL RETINAL ARTERY OCCLUSION, RIGHT: ICD-10-CM

## 2017-05-30 PROCEDURE — 93880 EXTRACRANIAL BILAT STUDY: CPT

## 2017-05-30 PROCEDURE — 25500064 ZZH RX 255 OP 636: Performed by: FAMILY MEDICINE

## 2017-05-30 PROCEDURE — 93306 TTE W/DOPPLER COMPLETE: CPT | Mod: 26 | Performed by: INTERNAL MEDICINE

## 2017-05-30 PROCEDURE — 40000264 ECHO BUBBLE STUDY W/LUMASON

## 2017-05-30 RX ADMIN — SULFUR HEXAFLUORIDE 5 ML: KIT at 09:37

## 2017-05-30 NOTE — PROGRESS NOTES
Chika,  The carotid Doppler study did not show severe narrowing. However on the right side there was a moderate heterogeneous plaque in the carotid artery, and the radiologist felt that this could likely be a source for your retinal artery occlusion. I put in a referral for you to see a vascular surgeon about this and you should be getting a call from the vascular surgery  office within a day or 2. I would like you to get an opinion on whether this is something that should be treated surgically      Nader Ibanez MD

## 2017-05-30 NOTE — PROGRESS NOTES
Chika,  The cardiac echo test did not indicate any source of embolism from the heart that may have caused your retina problem.      Nader Ibanez MD

## 2017-05-31 ENCOUNTER — TELEPHONE (OUTPATIENT)
Dept: OTHER | Facility: CLINIC | Age: 72
End: 2017-05-31

## 2017-05-31 DIAGNOSIS — H34.9 RETINAL ARTERY OCCLUSION: ICD-10-CM

## 2017-05-31 DIAGNOSIS — I65.29 CAROTID ARTERY STENOSIS: Primary | ICD-10-CM

## 2017-05-31 NOTE — TELEPHONE ENCOUNTER
Pt referred to [facility] by  for moderate heterogenous plaque in the carotid artery and retinal artery occlusion.    5/30/17 carotid artery US:  IMPRESSION:    1. Less than 50% diameter stenosis of the right ICA relative to the  distal ICA diameter. There is moderate heterogeneous plaque in the  proximal right internal carotid artery. This is most likely the source  of the central retinal artery occlusion.  2. Less than 50% diameter stenosis of the left ICA relative to the  distal ICA diameter.    Pt needs to be scheduled for CTA head/neck and consult with Vascular Surgery.  Will route to scheduling to coordinate an appointment ASAP.    Rachel Aguilar RN BSN

## 2017-06-01 ENCOUNTER — HOSPITAL ENCOUNTER (OUTPATIENT)
Dept: CT IMAGING | Facility: CLINIC | Age: 72
Discharge: HOME | End: 2017-06-01
Attending: SURGERY | Admitting: SURGERY
Payer: COMMERCIAL

## 2017-06-01 DIAGNOSIS — H34.9 RETINAL ARTERY OCCLUSION: ICD-10-CM

## 2017-06-01 DIAGNOSIS — I65.29 CAROTID ARTERY STENOSIS: ICD-10-CM

## 2017-06-01 LAB
CREAT BLD-MCNC: 0.7 MG/DL (ref 0.52–1.04)
GFR SERPL CREATININE-BSD FRML MDRD: 83 ML/MIN/1.7M2

## 2017-06-01 PROCEDURE — 82565 ASSAY OF CREATININE: CPT

## 2017-06-01 PROCEDURE — 25000125 ZZHC RX 250: Performed by: SURGERY

## 2017-06-01 PROCEDURE — 70498 CT ANGIOGRAPHY NECK: CPT

## 2017-06-01 PROCEDURE — 25000128 H RX IP 250 OP 636: Performed by: SURGERY

## 2017-06-01 RX ORDER — IOPAMIDOL 755 MG/ML
70 INJECTION, SOLUTION INTRAVASCULAR ONCE
Status: COMPLETED | OUTPATIENT
Start: 2017-06-01 | End: 2017-06-01

## 2017-06-01 RX ADMIN — SODIUM CHLORIDE 100 ML: 9 INJECTION, SOLUTION INTRAVENOUS at 09:20

## 2017-06-01 RX ADMIN — IOPAMIDOL 70 ML: 755 INJECTION, SOLUTION INTRAVENOUS at 09:20

## 2017-06-02 ENCOUNTER — TELEPHONE (OUTPATIENT)
Dept: OTHER | Facility: CLINIC | Age: 72
End: 2017-06-02

## 2017-06-02 ENCOUNTER — OFFICE VISIT (OUTPATIENT)
Dept: OTHER | Facility: CLINIC | Age: 72
End: 2017-06-02
Attending: SURGERY
Payer: COMMERCIAL

## 2017-06-02 VITALS
SYSTOLIC BLOOD PRESSURE: 160 MMHG | WEIGHT: 159 LBS | HEART RATE: 65 BPM | DIASTOLIC BLOOD PRESSURE: 82 MMHG | HEIGHT: 62 IN | BODY MASS INDEX: 29.26 KG/M2

## 2017-06-02 DIAGNOSIS — H34.11 CENTRAL RETINAL ARTERY OCCLUSION OF RIGHT EYE: Primary | ICD-10-CM

## 2017-06-02 DIAGNOSIS — H34.11 CENTRAL ARTERY OCCLUSION OF RETINA, RIGHT: Primary | ICD-10-CM

## 2017-06-02 PROCEDURE — 99211 OFF/OP EST MAY X REQ PHY/QHP: CPT

## 2017-06-02 PROCEDURE — 99244 OFF/OP CNSLTJ NEW/EST MOD 40: CPT | Mod: ZP | Performed by: SURGERY

## 2017-06-02 RX ORDER — ATORVASTATIN CALCIUM 10 MG/1
40 TABLET, FILM COATED ORAL DAILY
Qty: 90 TABLET | Refills: 3 | Status: SHIPPED | OUTPATIENT
Start: 2017-06-02 | End: 2017-06-02

## 2017-06-02 RX ORDER — CLOPIDOGREL BISULFATE 75 MG/1
75 TABLET ORAL DAILY
Qty: 90 TABLET | Refills: 3 | Status: SHIPPED | OUTPATIENT
Start: 2017-06-02 | End: 2018-05-17

## 2017-06-02 RX ORDER — ATORVASTATIN CALCIUM 40 MG/1
40 TABLET, FILM COATED ORAL DAILY
Qty: 30 TABLET | Refills: 3 | Status: SHIPPED | OUTPATIENT
Start: 2017-06-02 | End: 2017-12-28

## 2017-06-02 NOTE — LETTER
[facility] [place] [address]  [place] [address]  Phone: [phone]  Fax: [phone]      2017    RE:  Chika Hurd-:  [birth year]    Presenting complaint: Right central retinal artery occlusion.     History of presenting illness: Mrs. Hurd is a right-hand dominant 72-year-old female.  She had recently suffered from right central retinal artery occlusion.  She underwent further workup.  She underwent carotid duplex sonography as well as cardiac echo.  She was found to have less than 50% stenosis of the right internal carotid artery.  However there was heterogenous plaque at the carotid bulb.     Past medical history: She has hyper lipidemia, gastroesophageal reflux disease and a history of breast cancer.     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  She has also undergone a tubal ligation.  Reportedly she had pinning of the right hip in the late s.     Social history: She quit smoking in .     Review of systems: As noted in history of presenting illness.  She has regained most of the function of the right eye.  Otherwise his review of systems is negative.     On examination: She appears comfortable.  She does not appear to be in any acute distress.  She is pleasant and cooperative.  Vital signs are reviewed.  Her blood pressure is somewhat elevated.  She tells me this is due to the anxiety of her clinic visit.  HEENT: Head is atraumatic, normocephalic and mucosa are pink.  Eyes: Extraocular motions are intact.  Sclerae are anicteric.  Mental: She is alert and oriented.  Her judgment and insight are excellent.  Lymphatic: No supraclavicular or cervical adenopathy is noted.  Cardiac: S1 plus S2 +0.  Regular rate and rhythm.  Respiratory: Clear to auscultation.  No accessory muscles are being used.  Abdomen: Abdomen is soft and nontender.  No hepatosplenomegaly is appreciated.  Vascular: No carotid bruits  are noted.  Radial pulses are 2+.  Extremities: No clubbing, cyanosis, or edema or deformity is noted.     Imaging data: I have reviewed the imaging studies myself including the pictures.  By ultrasonography there is no evidence of any significant stenosis in either carotid arteries.  On CT angiogram there is soft plaque at the right carotid bulb.  The degree of stenosis is less than 50%.  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  This is 5.7 mm in maximum length.     Diagnoses: Central retinal artery occlusion, right.     Plan: I explained the findings to Mrs. Hurd.  She has less than 50% stenosis of the right carotid bulb.  I would not recommend surgical intervention at this time.  However we can escalate her medical management.  We will increase the atorvastatin to 40 mg daily.  We'll also start Plavix 75 mg daily.  I will see her back in 3 months to see if there have been any recurrent episodes.  If, with intense medical management there are continued symptoms then we will have to proceed with surgery for plaque excision.    Leeroy Pascal MD

## 2017-06-02 NOTE — MR AVS SNAPSHOT
After Visit Summary   6/2/2017    Chika Hurd    MRN: [id number]           Patient Information     Date Of Birth          1945        Visit Information        Provider Department      6/2/2017 9:15 AM Leeroy Pascal MD [facility]      Today's Diagnoses     Central artery occlusion of retina, right    -  1       Follow-ups after your visit        Your next 10 appointments already scheduled     Jul 20, 2017 10:30 AM CDT   MA SCREENING BILATERAL W/ ARTEMIO with WYMA2   [facility] ([facility])    [address] [facility] [address]   [phone]           Do not use any powder, lotion or deodorant under your arms or on your breast. If you do, we will ask you to remove it before your exam.  Wear comfortable, two-piece clothing.  If you have any allergies, tell your care team.  Bring any previous mammograms from other facilities or have them mailed to the breast center.            Sep 05, 2017  9:15 AM CDT   Return Visit with Leeroy Pascal MD   [facility] ([facility])    [address] [facility] [address]   [phone]            Sep 06, 2017  8:00 AM CDT   LAB with CL LAB   [facility] ([facility])    [address]  [facility] [address]   [phone]           Patient must bring picture ID.  Patient should be prepared to give a urine specimen  Please do not eat 10-12 hours before your appointment if you are coming in fasting for labs on lipids, cholesterol, or glucose (sugar).  Pregnant women should follow their Care Team instructions. Water with medications is okay. Do not drink coffee or other fluids.   If you have concerns about taking  your medications, please ask at office or if scheduling via [employer], send a message by clicking on Secure Messaging, Message Your Care Team.            Sep  "11, 2017 10:00 AM CDT   Return Visit with Katie Arreola MD   [facility] ([facility])    [facility]  [address]  [facility] [address] [phone]              Who to contact     If you have questions or need follow up information about today's clinic visit or your schedule please contact [facility] [place] directly at [phone].  Normal or non-critical lab and imaging results will be communicated to you by Talaentiahart, letter or phone within 4 business days after the clinic has received the results. If you do not hear from us within 7 days, please contact the clinic through Beem[employer] or phone. If you have a critical or abnormal lab result, we will notify you by phone as soon as possible.  Submit refill requests through [employer] or call your pharmacy and they will forward the refill request to us. Please allow 3 business days for your refill to be completed.          Additional Information About Your Visit        [employer] Information     [employer] gives you secure access to your electronic health record. If you see a primary care provider, you can also send messages to your care team and make appointments. If you have questions, please call your primary care clinic.  If you do not have a primary care provider, please call [phone] and they will assist you.        Care EveryWhere ID     This is your Care EveryWhere ID. This could be used by other organizations to access your [place] medical records  [id number]        Your Vitals Were     Pulse Height Breastfeeding? BMI (Body Mass Index)          65 5' 1.5\" (1.562 m) No 29.56 kg/m2         Blood Pressure from Last 3 Encounters:   06/02/17 160/82   05/23/17 126/72   04/24/17 150/76    Weight from Last 3 Encounters:   06/02/17 159 lb (72.1 kg)   05/23/17 163 lb 3.2 oz (74 kg)   04/24/17 163 lb 6.4 oz (74.1 kg)              Today, you had the following     No orders found for display       "   Today's Medication Changes          These changes are accurate as of: 6/2/17 11:59 PM.  If you have any questions, ask your nurse or doctor.               Start taking these medicines.        Dose/Directions    clopidogrel 75 MG tablet   Commonly known as:  PLAVIX   Used for:  Central artery occlusion of retina, right   Started by:  Leeroy Pascal MD        Dose:  75 mg   Take 1 tablet (75 mg) by mouth daily   Quantity:  90 tablet   Refills:  3         These medicines have changed or have updated prescriptions.        Dose/Directions    * atorvastatin 10 MG tablet   Commonly known as:  LIPITOR   This may have changed:  Another medication with the same name was added. Make sure you understand how and when to take each.   Used for:  Hyperlipidemia LDL goal <130   Changed by:  Chana Copeland MD        Dose:  10 mg   Take 1 tablet (10 mg) by mouth daily   Quantity:  90 tablet   Refills:  3       * atorvastatin 40 MG tablet   Commonly known as:  LIPITOR   This may have changed:  You were already taking a medication with the same name, and this prescription was added. Make sure you understand how and when to take each.   Used for:  Central retinal artery occlusion of right eye   Changed by:  Leeroy Pascal MD        Dose:  40 mg   Take 1 tablet (40 mg) by mouth daily   Quantity:  30 tablet   Refills:  3       * Notice:  This list has 2 medication(s) that are the same as other medications prescribed for you. Read the directions carefully, and ask your doctor or other care provider to review them with you.         Where to get your medicines      These medications were sent to [place] [address] [place] [facility] [address] [address]     Phone:  [phone]     atorvastatin 40 MG tablet    clopidogrel 75 MG tablet                Primary Care Provider Office Phone # Fax #    Chana Copeland [phone]       [place] [address]  [address]  [facility] [address]        Thank you!     Thank you for choosing [facility] [place]  for your care. Our goal is always to provide you with excellent care. Hearing back from our patients is one way we can continue to improve our services. Please take a few minutes to complete the written survey that you may receive in the mail after your visit with us. Thank you!             Your Updated Medication List - Protect others around you: Learn how to safely use, store and throw away your medicines at www.disposemymeds.org.          This list is accurate as of: 6/2/17 11:59 PM.  Always use your most recent med list.                   Brand Name Dispense Instructions for use    ADVIL 200 MG capsule   Generic drug:  ibuprofen      Take 2 tablets by mouth every 8 hours as needed       anastrozole 1 MG tablet    ARIMIDEX    90 tablet    Take 1 tablet (1 mg) by mouth daily       ASPIRIN PO      Take 81 mg by mouth       * atorvastatin 10 MG tablet    LIPITOR    90 tablet    Take 1 tablet (10 mg) by mouth daily       * atorvastatin 40 MG tablet    LIPITOR    30 tablet    Take 1 tablet (40 mg) by mouth daily       CALCIUM + D PO      1 TABLET DAILY       clopidogrel 75 MG tablet    PLAVIX    90 tablet    Take 1 tablet (75 mg) by mouth daily       co-enzyme Q-10 100 MG Caps capsule      Take 2 capsules by mouth daily. Takes a total of 200 mg daily.       famotidine 20 MG tablet    PEPCID    180 tablet    Take 1 tablet (20 mg) by mouth 2 times daily       MULTIVITAMIN PO      1 daily       neomycin-polymyxin-dexamethasone 3.5-36654-0.1 Oint ophthalmic ointment    MAXITROL     Reported on 5/23/2017       OMEGA-3 FISH OIL PO          REFRESH DRY EYE THERAPY OP      Apply 1-2 drops to eye as needed Reported on 5/23/2017       VITAMIN C PO      Take 500 mg by mouth daily       * Notice:  This list has 2 medication(s) that are the same as other medications prescribed for you. Read the directions carefully,  and ask your doctor or other care provider to review them with you.

## 2017-06-02 NOTE — TELEPHONE ENCOUNTER
[facility] called and stated that pt has an order for Atorvastatin 10mg Take 4 tabs (40mg) by mouth daily and the insurance will not cover the meds. They were wondering if it could be changed to Atorvastatin 40mg Take 1 tab by mouth daily.     Shruthi Kessler RN, BSN.

## 2017-06-02 NOTE — NURSING NOTE
"Chief Complaint   Patient presents with     Consult     New consult for heterogenous plaque right carotid artery, retinal artery occlusion. Med recs and CTA head/neck in EPIC. PVP complete       Initial /67 (BP Location: Left arm, Patient Position: Chair, Cuff Size: Adult Large)  Pulse 65  Ht 5' 1.5\" (1.562 m)  Wt 159 lb (72.1 kg)  Breastfeeding? No  BMI 29.56 kg/m2 Estimated body mass index is 29.56 kg/(m^2) as calculated from the following:    Height as of this encounter: 5' 1.5\" (1.562 m).    Weight as of this encounter: 159 lb (72.1 kg).  Medication Reconciliation: complete     Face to face nursing time: 8 minutes    Elsy Hudson MA      "

## 2017-06-05 NOTE — TELEPHONE ENCOUNTER
Received a call from Pt stating that she has decided to stop taking her arimdex based on her recent dx of right eye occlusion. Pt states that she is aware that this arimdex may not be related at all to this issue but states that she is just not comfortable taking it in case it could somehow be related. Pt wondering if she should have her mammograms twice per year instead since she'd decided not to take the AI.     Informed pt that there are other AI's that she could try in place of the arimidex and informed her that typically insurance companies will not cover more then a yearly mammogram but that sometimes Dr. Arreola will alternate a bilateral breast MRI with the annual Mammogram if indicated. Pt states that she is not interested in pursing any anti-hormonal medications at this time and that she is scheduled for her yearly mammogram in July with a f/u with Dr. Arreola in September.     Per Pt she will call for her results if she has not heard from us in July and will wait to further discuss imaging recommendations with Dr. Arreola at her follow up appt since she has decided not to continue the arimidex.

## 2017-06-05 NOTE — PROGRESS NOTES
Presenting complaint: Right central retinal artery occlusion.    History of presenting illness: Mrs. Hurd is a right-hand dominant 72-year-old female.  She had recently suffered from right central retinal artery occlusion.  She underwent further workup.  She underwent carotid duplex sonography as well as cardiac echo.  She was found to have less than 50% stenosis of the right internal carotid artery.  However there was heterogenous plaque at the carotid bulb.    Past medical history: She has hyper lipidemia, gastroesophageal reflux disease and a history of breast cancer.    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  She has also undergone a tubal ligation.  Reportedly she had pinning of the right hip in the late 1950s.    Social history: She quit smoking in 2005.    Review of systems: As noted in history of presenting illness.  She has regained most of the function of the right eye.  Otherwise his review of systems is negative.    On examination: She appears comfortable.  She does not appear to be in any acute distress.  She is pleasant and cooperative.  Vital signs are reviewed.  Her blood pressure is somewhat elevated.  She tells me this is due to the anxiety of her clinic visit.  HEENT: Head is atraumatic, normocephalic and mucosa are pink.  Eyes: Extraocular motions are intact.  Sclerae are anicteric.  Mental: She is alert and oriented.  Her judgment and insight are excellent.  Lymphatic: No supraclavicular or cervical adenopathy is noted.  Cardiac: S1 plus S2 +0.  Regular rate and rhythm.  Respiratory: Clear to auscultation.  No accessory muscles are being used.  Abdomen: Abdomen is soft and nontender.  No hepatosplenomegaly is appreciated.  Vascular: No carotid bruits are noted.  Radial pulses are 2+.  Extremities: No clubbing, cyanosis, or edema or deformity is noted.    Imaging data: I have reviewed the imaging studies myself including the pictures.  By  ultrasonography there is no evidence of any significant stenosis in either carotid arteries.  On CT angiogram there is soft plaque at the right carotid bulb.  The degree of stenosis is less than 50%.  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  This is 5.7 mm in maximum length.    Diagnoses: Central retinal artery occlusion, right.    Plan: I explained the findings to Mrs. Hurd.  She has less than 50% stenosis of the right carotid bulb.  I would not recommend surgical intervention at this time.  However we can escalate her medical management.  We will increase the atorvastatin to 40 mg daily.  We'll also start Plavix 75 mg daily.  I will see her back in 3 months to see if there have been any recurrent episodes.  If, with intense medical management there are continued symptoms then we will have to proceed with surgery for plaque excision.

## 2017-07-20 ENCOUNTER — HOSPITAL ENCOUNTER (OUTPATIENT)
Dept: MAMMOGRAPHY | Facility: CLINIC | Age: 72
Discharge: HOME | End: 2017-07-20
Attending: INTERNAL MEDICINE | Admitting: INTERNAL MEDICINE
Payer: COMMERCIAL

## 2017-07-20 DIAGNOSIS — D05.12 DUCTAL CARCINOMA IN SITU (DCIS) OF LEFT BREAST: ICD-10-CM

## 2017-07-20 PROCEDURE — G0202 SCR MAMMO BI INCL CAD: HCPCS

## 2017-09-05 ENCOUNTER — OFFICE VISIT (OUTPATIENT)
Dept: VASCULAR SURGERY | Facility: CLINIC | Age: 72
End: 2017-09-05
Payer: COMMERCIAL

## 2017-09-05 VITALS — SYSTOLIC BLOOD PRESSURE: 162 MMHG | RESPIRATION RATE: 16 BRPM | DIASTOLIC BLOOD PRESSURE: 80 MMHG | HEART RATE: 69 BPM

## 2017-09-05 DIAGNOSIS — H34.11 CENTRAL ARTERY OCCLUSION OF RETINA, RIGHT: Primary | ICD-10-CM

## 2017-09-05 PROCEDURE — 99212 OFFICE O/P EST SF 10 MIN: CPT | Performed by: SURGERY

## 2017-09-05 RX ORDER — ATORVASTATIN CALCIUM 10 MG/1
40 TABLET, FILM COATED ORAL DAILY
Qty: 90 TABLET | Refills: 3 | Status: SHIPPED | OUTPATIENT
Start: 2017-09-05 | End: 2017-09-11 | Stop reason: DRUGHIGH

## 2017-09-05 NOTE — PROGRESS NOTES
I have seen Mrs. Hurd previously for right central retinal artery occlusion and CTA of head and neck that showed 40-50% stenosis of the right carotid bulb. I had opted for optimization of medical therapy. Mrs. Hurd has not had any more visual disturbance episodes. No TIAs or amaurosis fugax. She has been diagnosed with cataracts and is planning on surgery for the same. She has been on Plavix 75 mg daily and Lipitor 40 mg daily. She is having some muscles aches with the increased dose of lipitor. I have asked her to start taking 30 mg lipitor daily. We will continue with medical therapy and I will see her back in one year for follow up.

## 2017-09-05 NOTE — LETTER
[facility] [place] [address]  [place] [address]  Phone: [phone]  Fax: [phone]      2017    RE:  Chika Hurd-:  49    I have seen Mrs. Hurd previously for right central retinal artery occlusion and CTA of head and neck that showed 40-50% stenosis of the right carotid bulb. I had opted for optimization of medical therapy. Mrs. Hurd has not had any more visual disturbance episodes. No TIAs or amaurosis fugax. She has been diagnosed with cataracts and is planning on surgery for the same. She has been on Plavix 75 mg daily and Lipitor 40 mg daily. She is having some muscles aches with the increased dose of lipitor. I have asked her to start taking 30 mg lipitor daily. We will continue with medical therapy and I will see her back in one year for follow up.     Leeroy Pascal MD

## 2017-09-05 NOTE — NURSING NOTE
"Chief Complaint   Patient presents with     RECHECK       Initial /80 (BP Location: Right arm, Patient Position: Chair, Cuff Size: Adult Regular)  Pulse 69  Resp 16 Estimated body mass index is 29.56 kg/(m^2) as calculated from the following:    Height as of 6/2/17: 1.562 m (5' 1.5\").    Weight as of 6/2/17: 72.1 kg (159 lb).  Medication Reconciliation: complete.   michael early LPN      "

## 2017-09-05 NOTE — MR AVS SNAPSHOT
After Visit Summary   9/5/2017    Chika Hurd    MRN: [id number]           Patient Information     Date Of Birth          1945        Visit Information        Provider Department      9/5/2017 9:15 AM Leeroy Pascal MD [facility]      Today's Diagnoses     Central artery occlusion of retina, right    -  1       Follow-ups after your visit        Your next 10 appointments already scheduled     Sep 06, 2017  8:00 AM CDT   LAB with [facility] LAB   [facility]    [address]  [facility] [address]   [phone]           Patient must bring picture ID. Patient should be prepared to give a urine specimen  Please do not eat 10-12 hours before your appointment if you are coming in fasting for labs on lipids, cholesterol, or glucose (sugar). Pregnant women should follow their Care Team instructions. Water with medications is okay. Do not drink coffee or other fluids. If you have concerns about taking  your medications, please ask at office or if scheduling via Boutir, send a message by clicking on Secure Messaging, Message Your Care Team.            Sep 11, 2017 10:00 AM CDT   Return Visit with Eliot Crane MD   [facility])    [facility]  [address]  [facility] [id number] [phone]              Who to contact     If you have questions or need follow up information about today's clinic visit or your schedule please contact [facility] directly at [phone].  Normal or non-critical lab and imaging results will be communicated to you by MyChart, letter or phone within 4 business days after the clinic has received the results. If you do not hear from us within 7 days, please contact the clinic through MyChart or phone. If you have a critical or abnormal lab result, we will notify you by  phone as soon as possible.  Submit refill requests through [employer] or call your pharmacy and they will forward the refill request to us. Please allow 3 business days for your refill to be completed.          Additional Information About Your Visit        [email or web address] Information     [employer] gives you secure access to your electronic health record. If you see a primary care provider, you can also send messages to your care team and make appointments. If you have questions, please call your primary care clinic.  If you do not have a primary care provider, please call [phone] and they will assist you.        Care EveryWhere ID     This is your Care EveryWhere ID. This could be used by other organizations to access your [facility] medical records  [id number]        Your Vitals Were     Pulse Respirations                69 16           Blood Pressure from Last 3 Encounters:   09/05/17 162/80   06/02/17 160/82   05/23/17 126/72    Weight from Last 3 Encounters:   06/02/17 159 lb (72.1 kg)   05/23/17 163 lb 3.2 oz (74 kg)   04/24/17 163 lb 6.4 oz (74.1 kg)              Today, you had the following     No orders found for display         Today's Medication Changes          These changes are accurate as of: 9/5/17 10:11 AM.  If you have any questions, ask your nurse or doctor.               These medicines have changed or have updated prescriptions.        Dose/Directions    * atorvastatin 10 MG tablet   Commonly known as:  LIPITOR   This may have changed:  Another medication with the same name was added. Make sure you understand how and when to take each.   Used for:  Hyperlipidemia LDL goal <130   Changed by:  Chana Copeland MD        Dose:  10 mg   Take 1 tablet (10 mg) by mouth daily   Quantity:  90 tablet   Refills:  3       * atorvastatin 40 MG tablet   Commonly known as:  LIPITOR   This may have changed:  Another medication with the same name was added. Make sure you understand how and when to take each.   Used  for:  Central retinal artery occlusion of right eye   Changed by:  Leeroy Pascal MD        Dose:  40 mg   Take 1 tablet (40 mg) by mouth daily   Quantity:  30 tablet   Refills:  3       * atorvastatin 10 MG tablet   Commonly known as:  LIPITOR   This may have changed:  You were already taking a medication with the same name, and this prescription was added. Make sure you understand how and when to take each.   Used for:  Central artery occlusion of retina, right   Changed by:  Leeroy Pascal MD        Dose:  40 mg   Take 4 tablets (40 mg) by mouth daily   Quantity:  90 tablet   Refills:  3       * Notice:  This list has 3 medication(s) that are the same as other medications prescribed for you. Read the directions carefully, and ask your doctor or other care provider to review them with you.         Where to get your medicines      These medications were sent to [place] [address] [place] [facility] [address] [address]     Phone:  [phone]     atorvastatin 10 MG tablet                Primary Care Provider Office Phone # Fax #    Chana Angela Copeland [phone] [address] [facility] [address]        Equal Access to Services     BROOKE VAN AH: Hadii nalini martinezo Solizet, waaxda luqadaha, qaybta kaalmada ademilo, jay real. So [facility] [phone].    ATENCIÓN: Si habla español, tiene a beckett disposición servicios gratuitos de asistencia lingüística. Dejah al [phone].    We comply with applicable federal civil rights laws and [place] laws. We do not discriminate on the basis of race, color, national origin, age, disability sex, sexual orientation or gender identity.            Thank you!     Thank you for choosing [facility]  for your care. Our goal is always to provide you with excellent care. Hearing back from our patients is one way we can continue to improve our services.  Please take a few minutes to complete the written survey that you may receive in the mail after your visit with us. Thank you!             Your Updated Medication List - Protect others around you: Learn how to safely use, store and throw away your medicines at www.disposemymeds.org.          This list is accurate as of: 9/5/17 10:11 AM.  Always use your most recent med list.                   Brand Name Dispense Instructions for use Diagnosis    ADVIL 200 MG capsule   Generic drug:  ibuprofen      Take 2 tablets by mouth every 8 hours as needed        anastrozole 1 MG tablet    ARIMIDEX    90 tablet    Take 1 tablet (1 mg) by mouth daily    Ductal carcinoma in situ (DCIS) of left breast       ASPIRIN PO      Take 81 mg by mouth        * atorvastatin 10 MG tablet    LIPITOR    90 tablet    Take 1 tablet (10 mg) by mouth daily    Hyperlipidemia LDL goal <130       * atorvastatin 40 MG tablet    LIPITOR    30 tablet    Take 1 tablet (40 mg) by mouth daily    Central retinal artery occlusion of right eye       * atorvastatin 10 MG tablet    LIPITOR    90 tablet    Take 4 tablets (40 mg) by mouth daily    Central artery occlusion of retina, right       CALCIUM + D PO      1 TABLET DAILY        clopidogrel 75 MG tablet    PLAVIX    90 tablet    Take 1 tablet (75 mg) by mouth daily    Central artery occlusion of retina, right       co-enzyme Q-10 100 MG Caps capsule      Take 2 capsules by mouth daily. Takes a total of 200 mg daily.        famotidine 20 MG tablet    PEPCID    180 tablet    Take 1 tablet (20 mg) by mouth 2 times daily    Gastroesophageal reflux disease without esophagitis       MULTIVITAMIN PO      1 daily        neomycin-polymyxin-dexamethasone 3.5-17906-4.1 Oint ophthalmic ointment    MAXITROL     Reported on 5/23/2017        OMEGA-3 FISH OIL PO           REFRESH DRY EYE THERAPY OP      Apply 1-2 drops to eye as needed Reported on 5/23/2017        VITAMIN C PO      Take 500 mg by mouth daily        *  Notice:  This list has 3 medication(s) that are the same as other medications prescribed for you. Read the directions carefully, and ask your doctor or other care provider to review them with you.

## 2017-09-06 DIAGNOSIS — D05.12 DUCTAL CARCINOMA IN SITU (DCIS) OF LEFT BREAST: ICD-10-CM

## 2017-09-06 DIAGNOSIS — E78.5 HYPERLIPIDEMIA LDL GOAL <130: ICD-10-CM

## 2017-09-06 LAB
ALBUMIN SERPL-MCNC: 3.6 G/DL (ref 3.4–5)
ALP SERPL-CCNC: 107 U/L (ref 40–150)
ALT SERPL W P-5'-P-CCNC: 36 U/L (ref 0–50)
AST SERPL W P-5'-P-CCNC: 21 U/L (ref 0–45)
BILIRUB DIRECT SERPL-MCNC: 0.1 MG/DL (ref 0–0.2)
BILIRUB SERPL-MCNC: 0.4 MG/DL (ref 0.2–1.3)
PROT SERPL-MCNC: 7 G/DL (ref 6.8–8.8)

## 2017-09-06 PROCEDURE — 80061 LIPID PANEL: CPT | Performed by: SURGERY

## 2017-09-06 PROCEDURE — 80076 HEPATIC FUNCTION PANEL: CPT | Performed by: INTERNAL MEDICINE

## 2017-09-06 PROCEDURE — 36415 COLL VENOUS BLD VENIPUNCTURE: CPT | Performed by: INTERNAL MEDICINE

## 2017-09-07 DIAGNOSIS — E78.5 HYPERLIPIDEMIA LDL GOAL <130: Primary | ICD-10-CM

## 2017-09-07 LAB
CHOLEST SERPL-MCNC: 138 MG/DL
HDLC SERPL-MCNC: 51 MG/DL
LDLC SERPL CALC-MCNC: 76 MG/DL
NONHDLC SERPL-MCNC: 87 MG/DL
TRIGL SERPL-MCNC: 56 MG/DL

## 2017-09-11 ENCOUNTER — ONCOLOGY VISIT (OUTPATIENT)
Dept: ONCOLOGY | Facility: CLINIC | Age: 72
End: 2017-09-11
Attending: INTERNAL MEDICINE
Payer: COMMERCIAL

## 2017-09-11 VITALS
WEIGHT: 162 LBS | DIASTOLIC BLOOD PRESSURE: 72 MMHG | HEART RATE: 74 BPM | TEMPERATURE: 98.9 F | HEIGHT: 60 IN | OXYGEN SATURATION: 96 % | SYSTOLIC BLOOD PRESSURE: 142 MMHG | RESPIRATION RATE: 18 BRPM | BODY MASS INDEX: 31.8 KG/M2

## 2017-09-11 DIAGNOSIS — K21.9 GASTROESOPHAGEAL REFLUX DISEASE WITHOUT ESOPHAGITIS: ICD-10-CM

## 2017-09-11 DIAGNOSIS — D05.12 DUCTAL CARCINOMA IN SITU (DCIS) OF LEFT BREAST: Primary | ICD-10-CM

## 2017-09-11 DIAGNOSIS — E78.5 HYPERLIPIDEMIA LDL GOAL <130: ICD-10-CM

## 2017-09-11 PROCEDURE — 99212 OFFICE O/P EST SF 10 MIN: CPT

## 2017-09-11 PROCEDURE — 99214 OFFICE O/P EST MOD 30 MIN: CPT | Performed by: INTERNAL MEDICINE

## 2017-09-11 ASSESSMENT — PAIN SCALES - GENERAL: PAINLEVEL: EXTREME PAIN (9)

## 2017-09-11 NOTE — PROGRESS NOTES
Hematology/ Oncology Follow-up Visit:  Sep 11, 2017    Reason for Visit:   Chief Complaint   Patient presents with     Oncology Clinic Visit     6 month recheck Breast CA, review labs       Oncologic History:    Chika Hurd has been compliant to routine mammogram in 07/2016.  Identified new microcalcification 0.4 cm area on the left breast 12-1 o'clock position 1.7 cm from the nipple.  This is a change from her prior 2015 mammogram.  Stereotactic biopsy was done 07/22/2016, identified high grade DCIS with comedonecrosis and microcalcifications and intermediate grade with solid and cribriform pattern.  No evidence of invasive carcinoma.  No evidence of angiolymphatic invasion.  % positive, CO 60% to 70% positive.  She bruised significantly after the biopsy with decreased range of motion of left shoulder.  She gradually recovered but still has significant amount of bruising left in the inferior part of the left breast.  She had lumpectomy with Dr. Murry 9/2016 found 1.4 cm grade II DCIS, had also fibrocystic changes. She had RT finished in 11/2016. She started Arimidex after.     Interval History:  Patient is here today for follow-up. In May 2017 she stopped Arimidex after she had what seems like central retinal artery occlusion. She has a workup including Doppler of carotid arteries. She also started on Plavix and aspirin as well as Lipitor was increased to 40 mg daily. She was getting increasing muscle and joint aches and pains are from Arimidex. The most recent mammogram in July shows no abnormalities.    Review Of Systems:  Constitutional: Negative for fever, chills, and night sweats.  Skin: negative.  Eyes: negative.  Ears/Nose/Throat: negative.  Respiratory: No shortness of breath, dyspnea on exertion, cough, or hemoptysis.  Cardiovascular: negative.  Gastrointestinal: negative.  Genitourinary: negative.  Musculoskeletal: negative.  Neurologic: negative.  Psychiatric:  "negative.  Hematologic/Lymphatic/Immunologic: negative.  Endocrine: negative.    All other ROS negative unless mentioned in interval history.    Past medical, social, surgical, and family histories reviewed.    Allergies:  Allergies as of 09/11/2017 - Bertin as Reviewed 09/11/2017   Allergen Reaction Noted     Cortisone Swelling 02/02/2006       Current Medications:  Current Outpatient Prescriptions   Medication Sig Dispense Refill     atorvastatin (LIPITOR) 10 MG tablet Take 4 tablets (40 mg) by mouth daily 90 tablet 3     clopidogrel (PLAVIX) 75 MG tablet Take 1 tablet (75 mg) by mouth daily 90 tablet 3     atorvastatin (LIPITOR) 40 MG tablet Take 1 tablet (40 mg) by mouth daily 30 tablet 3     famotidine (PEPCID) 20 MG tablet Take 1 tablet (20 mg) by mouth 2 times daily 180 tablet 1     neomycin-polymyxin-dexamethasone (MAXITROL) 3.5-28509-4.1 OINT ophthalmic ointment Reported on 5/23/2017       anastrozole (ARIMIDEX) 1 MG tablet Take 1 tablet (1 mg) by mouth daily (Patient not taking: Reported on 9/5/2017) 90 tablet 3     Glycerin-Polysorbate 80 (REFRESH DRY EYE THERAPY OP) Apply 1-2 drops to eye as needed Reported on 5/23/2017       ibuprofen (ADVIL) 200 MG capsule Take 2 tablets by mouth every 8 hours as needed       ASPIRIN PO Take 81 mg by mouth       Omega-3 Fatty Acids (OMEGA-3 FISH OIL PO)        atorvastatin (LIPITOR) 10 MG tablet Take 1 tablet (10 mg) by mouth daily (Patient not taking: Reported on 9/5/2017) 90 tablet 3     Ascorbic Acid (VITAMIN C PO) Take 500 mg by mouth daily       co-enzyme Q-10 (CO Q10) 100 MG CAPS Take 2 capsules by mouth daily. Takes a total of 200 mg daily.  0     MULTIVITAMIN OR 1 daily       CALCIUM + D OR 1 TABLET DAILY          Physical Exam:  /72 (BP Location: Right arm, Patient Position: Sitting, Cuff Size: Adult Regular)  Pulse 74  Temp 98.9  F (37.2  C) (Tympanic)  Resp 18  Ht 1.53 m (5' 0.25\")  Wt 73.5 kg (162 lb)  SpO2 96%  Breastfeeding? No  BMI 31.38 " "kg/m2  Wt Readings from Last 12 Encounters:   09/11/17 73.5 kg (162 lb)   06/02/17 72.1 kg (159 lb)   05/23/17 74 kg (163 lb 3.2 oz)   04/24/17 74.1 kg (163 lb 6.4 oz)   03/02/17 74.7 kg (164 lb 11.2 oz)   11/29/16 73.8 kg (162 lb 9.6 oz)   11/23/16 73.8 kg (162 lb 9.6 oz)   11/16/16 73.6 kg (162 lb 3.2 oz)   11/09/16 73.8 kg (162 lb 9.6 oz)   11/02/16 73.5 kg (162 lb)   10/18/16 73.9 kg (163 lb)   10/12/16 73.9 kg (163 lb)     ECOG performance status: 0  GENERAL APPEARANCE: Healthy, alert and in no acute distress.  HEENT: Sclerae anicteric. PERRLA. Oropharynx without ulcers, lesions, or thrush.  NECK: Supple. No asymmetry or masses.  LYMPHATICS: No palpable cervical, supraclavicular, axillary, or inguinal lymphadenopathy.  RESP: Lungs clear to auscultation bilaterally without rales, rhonchi or wheezes.  BREAST: There is no dominant masses or axillary adenopathy bilaterally. \"CARDIOVASCULAR: Regular rate and rhythm. Normal S1, S2; no S3 or S4. No murmur, gallop, or rub.  ABDOMEN: Soft, nontender. Bowel sounds normal. No palpable organomegaly or masses.  MUSCULOSKELETAL: Extremities without gross deformities noted. No edema of bilateral lower extremities.  SKIN: No suspicious lesions or rashes.  NEURO: Alert and oriented x 3. Cranial nerves II-XII grossly intact.  PSYCHIATRIC: Mentation and affect appear normal.    Laboratory/Imaging Studies:  Orders Only on 09/06/2017   Component Date Value Ref Range Status     Bilirubin Direct 09/06/2017 0.1  0.0 - 0.2 mg/dL Final     Bilirubin Total 09/06/2017 0.4  0.2 - 1.3 mg/dL Final     Albumin 09/06/2017 3.6  3.4 - 5.0 g/dL Final     Protein Total 09/06/2017 7.0  6.8 - 8.8 g/dL Final     Alkaline Phosphatase 09/06/2017 107  40 - 150 U/L Final     ALT 09/06/2017 36  0 - 50 U/L Final     AST 09/06/2017 21  0 - 45 U/L Final     Cholesterol 09/06/2017 138  <200 mg/dL Final     Triglycerides 09/06/2017 56  <150 mg/dL Final     HDL Cholesterol 09/06/2017 51  >49 mg/dL Final     LDL " Cholesterol Calculated 09/06/2017 76  <100 mg/dL Final    Desirable:       <100 mg/dl     Non HDL Cholesterol 09/06/2017 87  <130 mg/dL Final          Assessment and plan:    (D05.12) Ductal carcinoma in situ (DCIS) of left breast  (primary encounter diagnosis)  We had a long discussion today about benefits and risk off adjuvant hormonal therapy in DCIS. I agree that the risk of thromboembolic events could be linked to endocrine therapy. After a long discussion, we decided not to continue with Arimidex. We discussed follow-up plan with mammograms. She is due for her next mammogram in July 2018. The patient will return back in 6 months for follow-up visit.    (E78.5) Hyperlipidemia LDL goal <130  Patient will continue on Lipitor 40 mg daily.    (K21.9) Gastroesophageal reflux disease without esophagitis  Patient currently on Pepcid 20 mg daily.    Central retinal artery occlusion.   The patient currently on Plavix and aspirin.    The patient is ready to learn, no apparent learning barriers were identified.  Diagnosis and treatment plans were explained to the patient. The patient expressed understanding of the content. The patient asked appropriate questions. The patient questions were answered to her satisfaction.    Chart documentation with Dragon Voice recognition Software. Although reviewed after completion, some words and grammatical errors may remain.

## 2017-09-11 NOTE — NURSING NOTE
"Oncology Rooming Note    September 11, 2017 10:14 AM   Chika Hurd is a 72 year old female who presents for:    Chief Complaint   Patient presents with     Oncology Clinic Visit     6 month recheck Breast CA, review labs     Initial Vitals: /72 (BP Location: Right arm, Patient Position: Sitting, Cuff Size: Adult Regular)  Pulse 74  Temp 98.9  F (37.2  C) (Tympanic)  Resp 18  Ht 1.53 m (5' 0.25\")  Wt 73.5 kg (162 lb)  SpO2 96%  Breastfeeding? No  BMI 31.38 kg/m2 Estimated body mass index is 31.38 kg/(m^2) as calculated from the following:    Height as of this encounter: 1.53 m (5' 0.25\").    Weight as of this encounter: 73.5 kg (162 lb). Body surface area is 1.77 meters squared.  Extreme Pain (9) Comment: LEGS, LOW BACK    No LMP recorded. Patient is postmenopausal.  Allergies reviewed: Yes  Medications reviewed: Yes    Medications: Medication refills not needed today.  Pharmacy name entered into [facility]: [facility] [id number] [place] [address]    Clinical concerns: 6 month recheck Breast CA, review labs.,    1. Patient reports she discontinued he Anastrozole in May 2017 due to muscle pain.     16 minutes for nursing intake (face to face time)     Rosario Burch CMA              "

## 2017-09-11 NOTE — PATIENT INSTRUCTIONS
We would like to see you back in 6 months for a follow up appointment with labs prior. When you are in need of a refill, please call your pharmacy and they will send us a request.  Copy of appointments, and after visit summary (AVS) given to patient.  If you have any questions please call Mayte Dugan RN, BSN Oncology Hematology  Formerly named [facility] [phone]. For questions after business hours, or on holidays/weekends, please call our after hours Nurse Triage line [phone]. Thank you.

## 2017-09-11 NOTE — MR AVS SNAPSHOT
After Visit Summary   9/11/2017    Chika Hurd    MRN: [id number]           Patient Information     Date Of Birth          1945        Visit Information        Provider Department      9/11/2017 10:00 AM Eliot Crane MD [facility] ONCOLOGY      Today's Diagnoses     Ductal carcinoma in situ (DCIS) of left breast    -  1    Hyperlipidemia LDL goal <130        Gastroesophageal reflux disease without esophagitis          Care Instructions    We would like to see you back in 6 months for a follow up appointment with labs prior. When you are in need of a refill, please call your pharmacy and they will send us a request.  Copy of appointments, and after visit summary (AVS) given to patient.  If you have any questions please call Mayte Dugan RN, BSN Oncology Hematology  [facility] [phone]. For questions after business hours, or on holidays/weekends, please call our after hours Nurse Triage line [phone]. Thank you.             Follow-ups after your visit        Follow-up notes from your care team     Return in about 6 months (around 3/11/2018) for Blood work before next appointment.      Your next 10 appointments already scheduled     Mar 06, 2018  8:00 AM CST   LAB with CL LAB   [facility] ([facility])    [address] [address] [phone]           Patient must bring picture ID. Patient should be prepared to give a urine specimen  Please do not eat 10-12 hours before your appointment if you are coming in fasting for labs on lipids, cholesterol, or glucose (sugar). Pregnant women should follow their Care Team instructions. Water with medications is okay. Do not drink coffee or other fluids. If you have concerns about taking  your medications, please ask at office or if scheduling via Gymtrack, send a message by clicking on Secure Messaging, Message Your Care Team.     "        Mar 08, 2018  9:45 AM CST   Return Visit with Katie Arreola MD   [facility] ([facility])    [facility]  [address]  [facility] [address] [phone]              Future tests that were ordered for you today     Open Future Orders        Priority Expected Expires Ordered    CBC with platelets differential Routine 2/11/2018 9/11/2018 9/11/2017    Comprehensive metabolic panel Routine 2/11/2018 9/11/2018 9/11/2017            Who to contact     If you have questions or need follow up information about today's clinic visit or your schedule please contact [facility] directly at [phone].  Normal or non-critical lab and imaging results will be communicated to you by Daiohart, letter or phone within 4 business days after the clinic has received the results. If you do not hear from us within 7 days, please contact the clinic through GamyTecht or phone. If you have a critical or abnormal lab result, we will notify you by phone as soon as possible.  Submit refill requests through Kontagent or call your pharmacy and they will forward the refill request to us. Please allow 3 business days for your refill to be completed.          Additional Information About Your Visit        DaiohariGen6 Information     Kontagent gives you secure access to your electronic health record. If you see a primary care provider, you can also send messages to your care team and make appointments. If you have questions, please call your primary care clinic.  If you do not have a primary care provider, please call [phone] and they will assist you.        Care EveryWhere ID     This is your Care EveryWhere ID. This could be used by other organizations to access your [facility] medical records  [id number]        Your Vitals Were     Pulse Temperature Respirations Height Pulse Oximetry Breastfeeding?    74 98.9  F (37.2  C) (Tympanic) 18 1.53 m (5' 0.25\") 96% No    BMI (Body Mass " Index)                   31.38 kg/m2            Blood Pressure from Last 3 Encounters:   09/11/17 142/72   09/05/17 162/80   06/02/17 160/82    Weight from Last 3 Encounters:   09/11/17 73.5 kg (162 lb)   06/02/17 72.1 kg (159 lb)   05/23/17 74 kg (163 lb 3.2 oz)                 Today's Medication Changes          These changes are accurate as of: 9/11/17 10:39 AM.  If you have any questions, ask your nurse or doctor.               These medicines have changed or have updated prescriptions.        Dose/Directions    atorvastatin 40 MG tablet   Commonly known as:  LIPITOR   This may have changed:  Another medication with the same name was removed. Continue taking this medication, and follow the directions you see here.   Used for:  Central retinal artery occlusion of right eye   Changed by:  Leeroy Pascal MD        Dose:  40 mg   Take 1 tablet (40 mg) by mouth daily   Quantity:  30 tablet   Refills:  3                Primary Care Provider Office Phone # Fax #    Chana Angela Copeland [phone] [address] [address]        Equal Access to Services     [facility] AH: Hadii nalini pittman Solizet, waaxda lubozena, qaybta kaaljennifer biggs, jay packer . So [facility] [place] [phone].    ATENCIÓN: Si habla español, tiene a beckett disposición servicios gratuitos de asistencia lingüística. [facility] [phone].    We comply with applicable federal civil rights laws and [place] laws. We do not discriminate on the basis of race, color, national origin, age, disability sex, sexual orientation or gender identity.            Thank you!     Thank you for choosing [facility]  for your care. Our goal is always to provide you with excellent care. Hearing back from our patients is one way we can continue to improve our services. Please take a few minutes to complete the written survey that you may receive in the mail after your visit with us. Thank  you!             Your Updated Medication List - Protect others around you: Learn how to safely use, store and throw away your medicines at www.disposemymeds.org.          This list is accurate as of: 9/11/17 10:39 AM.  Always use your most recent med list.                   Brand Name Dispense Instructions for use Diagnosis    anastrozole 1 MG tablet    ARIMIDEX    90 tablet    Take 1 tablet (1 mg) by mouth daily    Ductal carcinoma in situ (DCIS) of left breast       ASPIRIN PO      Take 81 mg by mouth        atorvastatin 40 MG tablet    LIPITOR    30 tablet    Take 1 tablet (40 mg) by mouth daily    Central retinal artery occlusion of right eye       CALCIUM + D PO      1 TABLET DAILY        clopidogrel 75 MG tablet    PLAVIX    90 tablet    Take 1 tablet (75 mg) by mouth daily    Central artery occlusion of retina, right       co-enzyme Q-10 100 MG Caps capsule      Take 2 capsules by mouth daily. Takes a total of 200 mg daily.        famotidine 20 MG tablet    PEPCID    180 tablet    Take 1 tablet (20 mg) by mouth 2 times daily    Gastroesophageal reflux disease without esophagitis       MULTIVITAMIN PO      1 daily        OMEGA-3 FISH OIL PO           REFRESH DRY EYE THERAPY OP      Apply 1-2 drops to eye as needed Reported on 5/23/2017        TYLENOL PO      Take 325 mg by mouth every 4 hours as needed for mild pain or fever        VITAMIN C PO      Take 500 mg by mouth daily

## 2017-09-15 DIAGNOSIS — H34.11 CENTRAL RETINAL ARTERY OCCLUSION OF RIGHT EYE: ICD-10-CM

## 2017-09-15 RX ORDER — ATORVASTATIN CALCIUM 40 MG/1
40 TABLET, FILM COATED ORAL DAILY
Qty: 30 TABLET | Refills: 3 | Status: CANCELLED | OUTPATIENT
Start: 2017-09-15

## 2017-09-23 DIAGNOSIS — K21.9 GASTROESOPHAGEAL REFLUX DISEASE WITHOUT ESOPHAGITIS: ICD-10-CM

## 2017-09-24 NOTE — TELEPHONE ENCOUNTER
Famotidine      Last Written Prescription Date: 06/24/17  Last Fill Quantity: 180,  # refills: 1   Last Office Visit with [facility], [facility] or [facility] prescribing provider: 05/23/17

## 2017-09-25 RX ORDER — FAMOTIDINE 20 MG/1
20 TABLET, FILM COATED ORAL 2 TIMES DAILY
Qty: 180 TABLET | Refills: 1 | Status: SHIPPED | OUTPATIENT
Start: 2017-09-25 | End: 2018-03-22

## 2017-10-02 ENCOUNTER — TELEPHONE (OUTPATIENT)
Dept: PEDIATRICS | Facility: CLINIC | Age: 72
End: 2017-10-02

## 2017-10-02 ENCOUNTER — HOSPITAL ENCOUNTER (EMERGENCY)
Facility: CLINIC | Age: 72
Discharge: HOME | End: 2017-10-02
Attending: NURSE PRACTITIONER | Admitting: NURSE PRACTITIONER
Payer: COMMERCIAL

## 2017-10-02 VITALS — RESPIRATION RATE: 18 BRPM | HEART RATE: 94 BPM | OXYGEN SATURATION: 99 % | TEMPERATURE: 97.8 F

## 2017-10-02 DIAGNOSIS — S61.211A LACERATION OF LEFT INDEX FINGER WITHOUT FOREIGN BODY WITHOUT DAMAGE TO NAIL, INITIAL ENCOUNTER: ICD-10-CM

## 2017-10-02 PROCEDURE — 99213 OFFICE O/P EST LOW 20 MIN: CPT

## 2017-10-02 PROCEDURE — 12001 RPR S/N/AX/GEN/TRNK 2.5CM/<: CPT | Performed by: NURSE PRACTITIONER

## 2017-10-02 PROCEDURE — 12001 RPR S/N/AX/GEN/TRNK 2.5CM/<: CPT

## 2017-10-02 PROCEDURE — 27210282 ZZH ADHESIVE DERMABOND SKIN

## 2017-10-02 PROCEDURE — 99213 OFFICE O/P EST LOW 20 MIN: CPT | Mod: 25 | Performed by: NURSE PRACTITIONER

## 2017-10-02 NOTE — ED PROVIDER NOTES
History     Chief Complaint   Patient presents with     Laceration     left iundex finger is on a blood thinner onset yesterday     HPI  Chika Hurd is a 72 year old female who preseents with left index finger laceration.  Pt states was cutting squash and cut her finger yesterday.  She states she is on blood thinners and has been unable to stop the bleeding.  She has replaced the bandaid three times since yesterday since the laceration occurred.  Pt states she has minimal pain.  Pt is on Plavix.  Last tetanus update was 2015.    I have reviewed the Medications, Allergies, Past Medical and Surgical History, and Social History in the Epic system.    Patient Active Problem List   Diagnosis     Hyperlipidemia LDL goal <130     SENSONRL HEAR LOSS,BILAT     GERD (gastroesophageal reflux disease)     Cataract right eye     Advanced directives, counseling/discussion     Ductal carcinoma in situ (DCIS) of left breast     Central retinal artery occlusion, right     Past Surgical History:   Procedure Laterality Date     ENT SURGERY      dental implants 03/12 started     FLEXIBLE SIGMOIDOSCOPY  04/03     LUMPECTOMY BREAST WITH SEED LOCALIZATION Left 9/21/2016    Procedure: LUMPECTOMY BREAST WITH SEED LOCALIZATION;  Surgeon: Russel Murry MD;  Location: [facility] OR     SURGICAL HISTORY OF -   1959    Right Hip Pinning     SURGICAL HISTORY OF -   1983    Tubal Ligation       Review of Systems  10 point ROS of systems including Constitutional, Eyes, Respiratory, Cardiovascular, Gastroenterology, Genitourinary, Integumentary, Muscularskeletal, Psychiatric were all negative except for pertinent positives noted in my HPI.    Physical Exam   Pulse: 94  Heart Rate: 94  Temp: 97.8  F (36.6  C)  Resp: 18  SpO2: 99 %  Physical Exam   Constitutional: She appears well-developed and well-nourished. No distress.   Cardiovascular: Normal rate, regular rhythm and normal heart sounds.  Exam reveals no gallop and no friction rub.    No murmur  heard.  Pulmonary/Chest: Effort normal and breath sounds normal. No respiratory distress. She has no wheezes. She has no rales. She exhibits no tenderness.   Skin: Skin is warm. Laceration (left index finger palmar laceration from DIP joint to tip there is a small 0.8 cm laceration with  the shape of the number  7 upside down.  Edges well approximated without tendon involvement, bony involvement and is neurovascularly intact.  ) noted. She is not diaphoretic.   Nursing note and vitals reviewed.      ED Course     ED Course     Procedures   [facility] Procedure Note        Laceration Repair:    Performed by: Alayna Diaz  Authorized by: Alayna Diaz  Consent given by: Patient who states understanding of the procedure being performed after discussing the risks, benefits and alternatives.    Preparation: Patient was prepped and draped in usual sterile fashion.  Irrigation solution: saline    Body area:left index finger  Laceration length: 0.8 cm  Contamination: The wound is not contaminated.  Foreign bodies:none  Tendon involvement: none  Anesthesia: none  Local anesthetic: none  Anesthetic total: NA    Debridement: none  Skin closure: Closed with Wound adhesive  Technique: Wound adhesive  Approximation: close  Approximation difficulty: simple    Patient tolerance: Patient tolerated the procedure well with no immediate complications.    Labs Ordered and Resulted from Time of ED Arrival Up to the Time of Departure from the ED - No data to display    Assessments & Plan (with Medical Decision Making)     I have reviewed the nursing notes.    I have reviewed the findings, diagnosis, plan and need for follow up with the patient.  Chika Hurd is a 72 year old female who preseents with left index finger laceration.  Pt states was cutting squash and cut her finger yesterday.  She states she is on blood thinners and has been unable to stop the bleeding.  She has replaced the bandaid three times since yesterday  since the laceration occurred.  Pt states she has minimal pain.  Pt is on Plavix.  Last tetanus update was 2015.  Wound adhesive applied without difficulty.  Reviewed symptoms of infection and reviewed wound adhesive and healing and instructions.  Pt denies questions and will return if there are concerns.  DDX: laceration with debris, tendon involvement, complicated, simple  Discharge Medication List as of 10/2/2017 12:48 PM          Final diagnoses:   Laceration of left index finger without foreign body without damage to nail, initial encounter       10/2/2017   [facility] EMERGENCY DEPARTMENT     Alayna Diaz, APRN CNP  10/02/17 0234

## 2017-10-02 NOTE — DISCHARGE INSTRUCTIONS
Return if evidence of infection  Laceration, Extremity: Skin Glue  A laceration is a cut through the skin. You have a laceration that has been closed with skin glue. This is used on cuts that have smooth edges and are not infected.   You may need a tetanus shot. This is given if you have no record of a shot, and the object that caused the cut may lead to tetanus.  Home Care    Your healthcare provider may prescribe an antibiotic. This is to help prevent infection. Follow all instructions for taking this medicine. Take the medicine every day until it is gone or you are told to stop. You should not have any left over.    The healthcare provider may prescribe medicines for pain. Follow instructions for taking them.    Follow the healthcare provider s instructions on how to care for the cut.    No bandage is needed. Skin glue peels off on its own within 5 to 10 days. Most skin wounds heal within 10 days.    Keep the wound clean and dry. You may shower or bathe as usual, but do not use soaps, lotions, or ointments on the wound area. Do not scrub the wound. After bathing, pat the wound dry with a soft towel.    Do not scratch, rub, or pick at the film. Do not place tape directly over the film.    Do not apply liquids (such as peroxide), ointments, or creams to the wound while the skin glue is in place.    Avoid activities that may reinjure your wound.    Avoid activities that cause heavy sweating. Protect the wound from sunlight.    Most skin wounds heal without problems. However, an infection sometimes occurs despite proper treatment. Therefore, watch for the signs of infection listed below.  Follow-up care  Follow up as directed with your healthcare provider or our staff.  When to seek medical advice  Call your health care provider right away if any of these occur:    Wound bleeding not controlled by direct pressure    Signs of infection, including increasing pain in the wound, increasing wound redness or swelling, or  pus or bad odor coming from the wound    Fever of 100.4 F (38. C) or higher or as directed by your healthcare provider    Wound edges re-open    Wound changes colors    Numbness around the wound     Decreased movement around the injured area  Date Last Reviewed: 6/14/2015 2000-2017 [employer]. [address] [place] [address]. All rights reserved. This information is not intended as a substitute for professional medical care. Always follow your healthcare professional's instructions.

## 2017-10-02 NOTE — ED AVS SNAPSHOT
[facility] Emergency Department    [address]    [facility] [address]    Phone:  [phone]    Fax:  [phone]                                       Chika Hurd   MRN: [id number]    Department:  [facility] Emergency Department   Date of Visit:  10/2/2017           Patient Information     Date Of Birth          1945        Your diagnoses for this visit were:     Laceration of left index finger without foreign body without damage to nail, initial encounter        You were seen by Alayna Diaz APRN CNP.      Follow-up Information     Follow up with Chana Copeland MD.    Specialty:  Family Practice    Why:  If symptoms worsen, As needed    Contact information:    [address] [facility] [address]  [phone]          Discharge Instructions         Return if evidence of infection  Laceration, Extremity: Skin Glue  A laceration is a cut through the skin. You have a laceration that has been closed with skin glue. This is used on cuts that have smooth edges and are not infected.   You may need a tetanus shot. This is given if you have no record of a shot, and the object that caused the cut may lead to tetanus.  Home Care    Your healthcare provider may prescribe an antibiotic. This is to help prevent infection. Follow all instructions for taking this medicine. Take the medicine every day until it is gone or you are told to stop. You should not have any left over.    The healthcare provider may prescribe medicines for pain. Follow instructions for taking them.    Follow the healthcare provider s instructions on how to care for the cut.    No bandage is needed. Skin glue peels off on its own within 5 to 10 days. Most skin wounds heal within 10 days.    Keep the wound clean and dry. You may shower or bathe as usual, but do not use soaps, lotions, or ointments on the wound area. Do not scrub the wound. After bathing, pat the wound dry with a soft towel.    Do not scratch,  rub, or pick at the film. Do not place tape directly over the film.    Do not apply liquids (such as peroxide), ointments, or creams to the wound while the skin glue is in place.    Avoid activities that may reinjure your wound.    Avoid activities that cause heavy sweating. Protect the wound from sunlight.    Most skin wounds heal without problems. However, an infection sometimes occurs despite proper treatment. Therefore, watch for the signs of infection listed below.  Follow-up care  Follow up as directed with your healthcare provider or our staff.  When to seek medical advice  Call your health care provider right away if any of these occur:    Wound bleeding not controlled by direct pressure    Signs of infection, including increasing pain in the wound, increasing wound redness or swelling, or pus or bad odor coming from the wound    Fever of 100.4 F (38. C) or higher or as directed by your healthcare provider    Wound edges re-open    Wound changes colors    Numbness around the wound     Decreased movement around the injured area  Date Last Reviewed: 6/14/2015 2000-2017 [employer]. [address] [place] [address]. All rights reserved. This information is not intended as a substitute for professional medical care. Always follow your healthcare professional's instructions.          Future Appointments        Provider Department Dept Phone Center    3/6/2018 8:00 AM [facility] [phone] [facility]    3/9/2018 11:00 AM Eliot Crane MD [facility] [phone] [facility]      24 Hour Appointment Hotline       To make an appointment at any [facility], call [phone] ([phone]). If you don't have a family doctor or clinic, we will help you find one. [facility] are conveniently located to serve the needs of you and your family.             Review of your medicines      Our records show that you are taking the medicines listed  below. If these are incorrect, please call your family doctor or clinic.        Dose / Directions Last dose taken    anastrozole 1 MG tablet   Commonly known as:  ARIMIDEX   Dose:  1 mg   Quantity:  90 tablet        Take 1 tablet (1 mg) by mouth daily   Refills:  3        ASPIRIN PO   Dose:  81 mg        Take 81 mg by mouth   Refills:  0        atorvastatin 40 MG tablet   Commonly known as:  LIPITOR   Dose:  40 mg   Quantity:  30 tablet        Take 1 tablet (40 mg) by mouth daily   Refills:  3        CALCIUM + D PO        1 TABLET DAILY   Refills:  0        clopidogrel 75 MG tablet   Commonly known as:  PLAVIX   Dose:  75 mg   Quantity:  90 tablet        Take 1 tablet (75 mg) by mouth daily   Refills:  3        co-enzyme Q-10 100 MG Caps capsule   Dose:  200 mg        Take 2 capsules by mouth daily. Takes a total of 200 mg daily.   Refills:  0        famotidine 20 MG tablet   Commonly known as:  PEPCID   Dose:  20 mg   Quantity:  180 tablet        Take 1 tablet (20 mg) by mouth 2 times daily Establish Care with new PCP.   Refills:  1        MULTIVITAMIN PO        1 daily   Refills:  0        OMEGA-3 FISH OIL PO        Refills:  0        REFRESH DRY EYE THERAPY OP   Dose:  1-2 drop        Apply 1-2 drops to eye as needed Reported on 5/23/2017   Refills:  0        TYLENOL PO   Dose:  325 mg        Take 325 mg by mouth every 4 hours as needed for mild pain or fever   Refills:  0        VITAMIN C PO   Dose:  500 mg        Take 500 mg by mouth daily   Refills:  0                Orders Needing Specimen Collection     None      Pending Results     No orders found from 9/30/2017 to 10/3/2017.            Pending Culture Results     No orders found from 9/30/2017 to 10/3/2017.            Pending Results Instructions     If you had any lab results that were not finalized at the time of your Discharge, you can call the ED Lab Result RN at [phone]. You will be contacted by this team for any positive Lab results or changes  in treatment. The nurses are available 7 days a week from 10A to 6:30P.  You can leave a message 24 hours per day and they will return your call.        Test Results From Your Hospital Stay               Thank you for choosing [facility]       Thank you for choosing [facility] for your care. Our goal is always to provide you with excellent care. Hearing back from our patients is one way we can continue to improve our services. Please take a few minutes to complete the written survey that you may receive in the mail after you visit with us. Thank you!        PhillyharSequenom Information     [email or web address] gives you secure access to your electronic health record. If you see a primary care provider, you can also send messages to your care team and make appointments. If you have questions, please call your primary care clinic.  If you do not have a primary care provider, please call [phone] and they will assist you.        Care EveryWhere ID     This is your Care EveryWhere ID. This could be used by other organizations to access your [facility] medical records  [id number]        Equal Access to Services     JASSI VAN : Sunday Reddy, wamartin saab, qaelissa biggs, jay real. So [facility] [phone].    ATENCIÓN: Si habla español, tiene a beckett disposición servicios gratuitos de asistencia lingüística. Llame al [phone].    We comply with applicable federal civil rights laws and [place] laws. We do not discriminate on the basis of race, color, national origin, age, disability, sex, sexual orientation, or gender identity.            After Visit Summary       This is your record. Keep this with you and show to your community pharmacist(s) and doctor(s) at your next visit.

## 2017-10-02 NOTE — TELEPHONE ENCOUNTER
Patient called stating that she cut finger yesterday at 3pm; she states that she can not stop the bleeding, she is concern about blood thinners.        [facility]  Station

## 2017-10-02 NOTE — TELEPHONE ENCOUNTER
S-(situation): finger lac    B-(background): cut her finger yesterday while cutting squash    A-(assessment): cut finger, on blood thinner. Can't get the bleeding to stop. Put band-aid on it. But looked at it around 6 pm and it was still bleeding so bandaged it again for over night. index finger in middle of the finger pad. She hasn't looked to see if it is still bleeding but suspects it is.     R-(recommendations): advised she needs to go to the ED for possible suture repair. Advised we don't repair wounds in the clinic. She agrees with plan

## 2017-10-31 NOTE — H&P
[facility]  TOTAL EYE CARE  [address]  [facility] [address]  [phone]  Dept: [phone]    OPHTHALMOLOGY PRE-OPERATIVE  HISTORY AND PHYSICAL    DATE OF H/P:  10/31/2017    DATE OF SURGERY:  2017  PROCEDURE:  Procedure(s):  PHACOEMULSIFICATION WITH STANDARD INTRAOCULAR LENS IMPLANT, Right Eye  LENS IMPLANT:  ZCB00 +21.0  REFRACTIVE GOAL:  PL Sph  SURGEON:  Michael Zuleta MD    ANESTHESIA:  TOPICAL / MAC    OR CASE REQUIREMENTS:    DEMOGRAPHICS:  Demographic Information on Chika Hurd:    Chika Hurd  Gender: female  : 1945  [address]  [facility] [address]  [phone] (home) [phone] (work)    Medical Record: [id number]  Social Security Number: [id number]  Pharmacy: [facility] [address] [place] [address]  Primary Care Provider: Benny Castillo    Parent's names are: Data Unavailable (mother) and Data Unavailable (father).    Insurance: Payor: MEDICA / Plan: MEDICA CHOICE / Product Type: Indemnity /     OCULAR HISTORY:  Cataracts, CRAO OD with scotoma.    HISTORIES:  Past Medical History:   Diagnosis Date     DCIS (ductal carcinoma in situ) of breast      GERD (gastroesophageal reflux disease)      Hyperlipidemia        Past Surgical History:   Procedure Laterality Date     ENT SURGERY      dental implants  started     FLEXIBLE SIGMOIDOSCOPY       LUMPECTOMY BREAST WITH SEED LOCALIZATION Left 2016    Procedure: LUMPECTOMY BREAST WITH SEED LOCALIZATION;  Surgeon: Russel Murry MD;  Location:  OR     SURGICAL HISTORY OF -       Right Hip Pinning     SURGICAL HISTORY OF -       Tubal Ligation       Family History   Problem Relation Age of Onset     HEART DISEASE Mother      HEART DISEASE Father      Circulatory Father      main artery aneursym     HEART DISEASE Sister      sleep problems     Breast Cancer Maternal Aunt      diagnosed in 60's, surviving in 's       Social History   Substance  Use Topics     Smoking status: Former Smoker     Types: Cigarettes     Quit date: 1/12/2005     Smokeless tobacco: Never Used     Alcohol use Yes      Comment: rarely       MEDICATIONS:  No current facility-administered medications for this encounter.      Current Outpatient Prescriptions   Medication Sig     famotidine (PEPCID) 20 MG tablet Take 1 tablet (20 mg) by mouth 2 times daily Establish Care with new PCP.     Acetaminophen (TYLENOL PO) Take 325 mg by mouth every 4 hours as needed for mild pain or fever     clopidogrel (PLAVIX) 75 MG tablet Take 1 tablet (75 mg) by mouth daily     atorvastatin (LIPITOR) 40 MG tablet Take 1 tablet (40 mg) by mouth daily     anastrozole (ARIMIDEX) 1 MG tablet Take 1 tablet (1 mg) by mouth daily (Patient not taking: Reported on 9/5/2017)     Glycerin-Polysorbate 80 (REFRESH DRY EYE THERAPY OP) Apply 1-2 drops to eye as needed Reported on 5/23/2017     ASPIRIN PO Take 81 mg by mouth     Omega-3 Fatty Acids (OMEGA-3 FISH OIL PO)      Ascorbic Acid (VITAMIN C PO) Take 500 mg by mouth daily     co-enzyme Q-10 (CO Q10) 100 MG CAPS Take 2 capsules by mouth daily. Takes a total of 200 mg daily.     MULTIVITAMIN OR 1 daily     CALCIUM + D OR 1 TABLET DAILY       ALLERGIES:     Allergies   Allergen Reactions     Cortisone Swelling     Cortisone Cream       PERTINENT SYSTEMS REVIEW:    1. Yes: CRAO OD - on plavix - Do you have a history of heart attack, stroke, stent, bypass or surgery on an artery in the head, neck, heart or legs?  2. No - Do you ever have any pain or discomfort in your chest?  3. No - Do you have a history of  Heart Failure?  4. No - Are you troubled by shortness of breath when walking: On the level, up a slight hill or at night?  5. No - Do you currently have a cold, bronchitis or other respiratory infection?  6. No - Do you have a cough, shortness of breath or wheezing?  7. No - Do you sometimes get pains in the calves of your legs when you walk?  8. No - Do you or  anyone in your family have previous history of blood clots?  9. No - Do you or does anyone in your family have a serious bleeding problem such as prolonged bleeding following surgeries or cuts?  10. No - Have you ever had problems with anemia or been told to take iron pills?  11. No - Have you had any abnormal blood loss such as black, tarry or bloody stools, or abnormal vaginal bleeding?  12. No - Have you ever had a blood transfusion?  13. No - Have you or any of your relatives ever had problems with anesthesia?  14. No - Do you have sleep apnea, excessive snoring or daytime drowsiness?  15. No - Do you have any prosthetic heart valves?  16. No - Do you have prosthetic joints?    EXAMINATION:  Vitals were reviewed                       Vison:  Va, right - 20/40; left - 20/40   BAT, right - 20/100, left - 20/80  HEENT:  Cataract, otherwise unremarkable.  LUNGS:  Clear  CV:  Regular rate and rhythm without murmur  ABD:  Soft and nontender  NEURO:  Alert and nonfocal    IMPRESSION:  Patient cleared for ophthalmic surgery.  Low risk with monitored, light sedation.  I have assessed the patient's DVT risk, and no additional orders necessary.    PLAN:  Procedure(s):  PHACOEMULSIFICATION WITH STANDARD INTRAOCULAR LENS IMPLANT, Right Eye      Michael Zuleta MD

## 2017-11-02 ENCOUNTER — ANESTHESIA EVENT (OUTPATIENT)
Dept: SURGERY | Facility: CLINIC | Age: 72
End: 2017-11-02
Payer: COMMERCIAL

## 2017-11-02 ASSESSMENT — LIFESTYLE VARIABLES: TOBACCO_USE: 1

## 2017-11-06 ENCOUNTER — ANESTHESIA (OUTPATIENT)
Dept: SURGERY | Facility: CLINIC | Age: 72
End: 2017-11-06
Payer: COMMERCIAL

## 2017-11-06 ENCOUNTER — SURGERY (OUTPATIENT)
Age: 72
End: 2017-11-06

## 2017-11-06 ENCOUNTER — HOSPITAL ENCOUNTER (OUTPATIENT)
Facility: CLINIC | Age: 72
Discharge: HOME | End: 2017-11-06
Attending: OPHTHALMOLOGY | Admitting: OPHTHALMOLOGY
Payer: COMMERCIAL

## 2017-11-06 VITALS
OXYGEN SATURATION: 99 % | RESPIRATION RATE: 16 BRPM | SYSTOLIC BLOOD PRESSURE: 144 MMHG | BODY MASS INDEX: 31.8 KG/M2 | DIASTOLIC BLOOD PRESSURE: 54 MMHG | WEIGHT: 162 LBS | TEMPERATURE: 98.1 F | HEIGHT: 60 IN

## 2017-11-06 PROCEDURE — 25000128 H RX IP 250 OP 636: Performed by: OPHTHALMOLOGY

## 2017-11-06 PROCEDURE — 25000128 H RX IP 250 OP 636: Performed by: NURSE ANESTHETIST, CERTIFIED REGISTERED

## 2017-11-06 PROCEDURE — 37000012 ZZH ANESTHESIA CATARACT PACKAGE: Performed by: OPHTHALMOLOGY

## 2017-11-06 PROCEDURE — 25000125 ZZHC RX 250: Performed by: OPHTHALMOLOGY

## 2017-11-06 PROCEDURE — V2632 POST CHMBR INTRAOCULAR LENS: HCPCS | Performed by: OPHTHALMOLOGY

## 2017-11-06 PROCEDURE — 36000025 ZZH CATARACT SURGICAL PACKAGE: Performed by: OPHTHALMOLOGY

## 2017-11-06 PROCEDURE — 71000022 ZZH RECOVERY CATRACT PACKAGE: Performed by: OPHTHALMOLOGY

## 2017-11-06 DEVICE — EYE IMP IOL AMO PCL TECNIS ZCB00 21.0: Type: IMPLANTABLE DEVICE | Site: EYE | Status: FUNCTIONAL

## 2017-11-06 RX ORDER — CYCLOPENTOLATE HYDROCHLORIDE 10 MG/ML
1 SOLUTION/ DROPS OPHTHALMIC
Status: COMPLETED | OUTPATIENT
Start: 2017-11-06 | End: 2017-11-06

## 2017-11-06 RX ORDER — ONDANSETRON 2 MG/ML
4 INJECTION INTRAMUSCULAR; INTRAVENOUS EVERY 30 MIN PRN
Status: DISCONTINUED | OUTPATIENT
Start: 2017-11-06 | End: 2017-11-06 | Stop reason: HOSPADM

## 2017-11-06 RX ORDER — TROPICAMIDE 10 MG/ML
1 SOLUTION/ DROPS OPHTHALMIC
Status: COMPLETED | OUTPATIENT
Start: 2017-11-06 | End: 2017-11-06

## 2017-11-06 RX ORDER — ONDANSETRON 4 MG/1
4 TABLET, ORALLY DISINTEGRATING ORAL EVERY 30 MIN PRN
Status: DISCONTINUED | OUTPATIENT
Start: 2017-11-06 | End: 2017-11-06 | Stop reason: HOSPADM

## 2017-11-06 RX ORDER — PROPARACAINE HYDROCHLORIDE 5 MG/ML
SOLUTION/ DROPS OPHTHALMIC PRN
Status: DISCONTINUED | OUTPATIENT
Start: 2017-11-06 | End: 2017-11-06 | Stop reason: HOSPADM

## 2017-11-06 RX ORDER — LIDOCAINE 40 MG/G
CREAM TOPICAL
Status: DISCONTINUED | OUTPATIENT
Start: 2017-11-06 | End: 2017-11-06 | Stop reason: HOSPADM

## 2017-11-06 RX ORDER — SODIUM CHLORIDE, SODIUM LACTATE, POTASSIUM CHLORIDE, CALCIUM CHLORIDE 600; 310; 30; 20 MG/100ML; MG/100ML; MG/100ML; MG/100ML
INJECTION, SOLUTION INTRAVENOUS CONTINUOUS
Status: DISCONTINUED | OUTPATIENT
Start: 2017-11-06 | End: 2017-11-06 | Stop reason: HOSPADM

## 2017-11-06 RX ORDER — PHENYLEPHRINE HYDROCHLORIDE 25 MG/ML
1 SOLUTION/ DROPS OPHTHALMIC
Status: COMPLETED | OUTPATIENT
Start: 2017-11-06 | End: 2017-11-06

## 2017-11-06 RX ORDER — BALANCED SALT SOLUTION 6.4; .75; .48; .3; 3.9; 1.7 MG/ML; MG/ML; MG/ML; MG/ML; MG/ML; MG/ML
SOLUTION OPHTHALMIC PRN
Status: DISCONTINUED | OUTPATIENT
Start: 2017-11-06 | End: 2017-11-06 | Stop reason: HOSPADM

## 2017-11-06 RX ORDER — FENTANYL CITRATE 50 UG/ML
25-50 INJECTION, SOLUTION INTRAMUSCULAR; INTRAVENOUS
Status: DISCONTINUED | OUTPATIENT
Start: 2017-11-06 | End: 2017-11-06 | Stop reason: HOSPADM

## 2017-11-06 RX ORDER — ALBUTEROL SULFATE 0.83 MG/ML
2.5 SOLUTION RESPIRATORY (INHALATION) EVERY 4 HOURS PRN
Status: CANCELLED | OUTPATIENT
Start: 2017-11-06

## 2017-11-06 RX ORDER — FENTANYL CITRATE 50 UG/ML
25-50 INJECTION, SOLUTION INTRAMUSCULAR; INTRAVENOUS
Status: CANCELLED | OUTPATIENT
Start: 2017-11-06

## 2017-11-06 RX ORDER — HYDROMORPHONE HYDROCHLORIDE 1 MG/ML
.3-.5 INJECTION, SOLUTION INTRAMUSCULAR; INTRAVENOUS; SUBCUTANEOUS EVERY 10 MIN PRN
Status: DISCONTINUED | OUTPATIENT
Start: 2017-11-06 | End: 2017-11-06 | Stop reason: HOSPADM

## 2017-11-06 RX ORDER — HYDRALAZINE HYDROCHLORIDE 20 MG/ML
2.5-5 INJECTION INTRAMUSCULAR; INTRAVENOUS EVERY 10 MIN PRN
Status: CANCELLED | OUTPATIENT
Start: 2017-11-06

## 2017-11-06 RX ORDER — NALOXONE HYDROCHLORIDE 0.4 MG/ML
.1-.4 INJECTION, SOLUTION INTRAMUSCULAR; INTRAVENOUS; SUBCUTANEOUS
Status: DISCONTINUED | OUTPATIENT
Start: 2017-11-06 | End: 2017-11-06 | Stop reason: HOSPADM

## 2017-11-06 RX ORDER — MEPERIDINE HYDROCHLORIDE 25 MG/ML
12.5 INJECTION INTRAMUSCULAR; INTRAVENOUS; SUBCUTANEOUS
Status: DISCONTINUED | OUTPATIENT
Start: 2017-11-06 | End: 2017-11-06 | Stop reason: HOSPADM

## 2017-11-06 RX ADMIN — PROPARACAINE HYDROCHLORIDE 2 DROP: 5 SOLUTION/ DROPS OPHTHALMIC at 09:29

## 2017-11-06 RX ADMIN — TROPICAMIDE 1 DROP: 10 SOLUTION/ DROPS OPHTHALMIC at 08:02

## 2017-11-06 RX ADMIN — CYCLOPENTOLATE HYDROCHLORIDE 1 DROP: 10 SOLUTION/ DROPS OPHTHALMIC at 08:01

## 2017-11-06 RX ADMIN — TROPICAMIDE 1 DROP: 10 SOLUTION/ DROPS OPHTHALMIC at 08:18

## 2017-11-06 RX ADMIN — PHENYLEPHRINE HYDROCHLORIDE 1 DROP: 25 SOLUTION/ DROPS OPHTHALMIC at 08:00

## 2017-11-06 RX ADMIN — LIDOCAINE HYDROCHLORIDE 1 TUBE: 20 JELLY TOPICAL at 09:29

## 2017-11-06 RX ADMIN — BALANCED SALT SOLUTION 19 ML: 6.4; .75; .48; .3; 3.9; 1.7 SOLUTION OPHTHALMIC at 09:28

## 2017-11-06 RX ADMIN — CYCLOPENTOLATE HYDROCHLORIDE 1 DROP: 10 SOLUTION/ DROPS OPHTHALMIC at 08:12

## 2017-11-06 RX ADMIN — SODIUM CHLORIDE, POTASSIUM CHLORIDE, SODIUM LACTATE AND CALCIUM CHLORIDE: 600; 310; 30; 20 INJECTION, SOLUTION INTRAVENOUS at 08:17

## 2017-11-06 RX ADMIN — EPINEPHRINE 0.5 ML: 1 INJECTION, SOLUTION INTRAMUSCULAR; SUBCUTANEOUS at 09:28

## 2017-11-06 RX ADMIN — CYCLOPENTOLATE HYDROCHLORIDE 1 DROP: 10 SOLUTION/ DROPS OPHTHALMIC at 08:18

## 2017-11-06 RX ADMIN — TROPICAMIDE 1 DROP: 10 SOLUTION/ DROPS OPHTHALMIC at 08:13

## 2017-11-06 RX ADMIN — PHENYLEPHRINE HYDROCHLORIDE 1 DROP: 25 SOLUTION/ DROPS OPHTHALMIC at 08:18

## 2017-11-06 RX ADMIN — MIDAZOLAM HYDROCHLORIDE 2 MG: 1 INJECTION, SOLUTION INTRAMUSCULAR; INTRAVENOUS at 09:19

## 2017-11-06 RX ADMIN — PHENYLEPHRINE HYDROCHLORIDE 1 DROP: 25 SOLUTION/ DROPS OPHTHALMIC at 08:11

## 2017-11-06 NOTE — ANESTHESIA POSTPROCEDURE EVALUATION
Patient: Chika Hurd    Procedure(s):  Right cataract removal with implant - Wound Class: I-Clean    Diagnosis:Right cataract  Diagnosis Additional Information: No value filed.    Anesthesia Type:  MAC    Note:  Anesthesia Post Evaluation    Patient location during evaluation: Bedside  Patient participation: Able to fully participate in evaluation  Pain management: adequate  Respiratory status: acceptable  Hydration status: stable  PONV: none             Last vitals:  Vitals:    11/06/17 0756   BP: 144/61   Resp: 16   Temp: 36.9  C (98.4  F)   SpO2: 95%         Electronically Signed By: IDALIA Kong CRNA  November 6, 2017  9:38 AM

## 2017-11-06 NOTE — ANESTHESIA CARE TRANSFER NOTE
Patient: Chika Hurd    Procedure(s):  Right cataract removal with implant - Wound Class: I-Clean    Diagnosis: Right cataract  Diagnosis Additional Information: No value filed.    Anesthesia Type:   MAC     Note:  Airway :Room Air  Patient transferred to:Phase II  Handoff Report: Identifed the Patient, Identified the Reponsible Provider, Reviewed the pertinent medical history, Discussed the surgical course, Reviewed Intra-OP anesthesia mangement and issues during anesthesia, Set expectations for post-procedure period and Allowed opportunity for questions and acknowledgement of understanding      Vitals: (Last set prior to Anesthesia Care Transfer)    CRNA VITALS  11/6/2017 0906 - 11/6/2017 0937      11/6/2017             Pulse: 59    SpO2: 100 %                Electronically Signed By: IDALIA Kong CRNA  November 6, 2017  9:37 AM

## 2017-11-06 NOTE — OP NOTE
OPHTHALMOLOGY OPERATIVE NOTE    PATIENT: Chika Hurd  DATE OF SURGERY: 11/6/2017  PREOPERATIVE DIAGNOSIS:  Senile Nuclear Cataract, Right eye  POSTOPERATIVE DIAGNOSIS:  Senile Nuclear Cataract, Right eye  OPERATIVE PROCEDURE:  Phacoemulsification with placement of intraocular lens  SURGEON:  Michael Zuleta MD  ANESTHESIA:  Topical / MAC  EBL:  None  SPECIMENS:  None  COMPLICATIONS:  None    PROCEDURE:  The patient was brought to the operating room at [facility].  The right eye was prepped and draped in the usual fashion for cataract surgery.  A wire lid speculum was inserted.  A super sharp blade was used to make a paracentesis at the 11 O'clock position.  The super sharp blade was used to make a partial thickness temporal groove, which was 3 mm in length.  0.8 mL of non-preserved epi-Shugarcaine was injected into the anterior chamber.  Viscoelastic was used to inflate the anterior chamber through a cannula.  A 2.5 mm microkeratome was used to make a temporal clear corneal incision in a two-plane fashion.  A cystotome needle and forceps were used to make a capsulorrhexis.  Hydrodissection and hydrodelineation were performed with Balance Salt Solution.  The lens was then phacoemulsified and removed without complications.  The cortical material was removed with bimanual irrigation and aspiration.  The capsular bag was filled with viscoelastic.  A posterior chamber intraocular lens, preselected and recorded, was folded and inserted into the capsular bag.  The viscoelastic was removed with the irrigation and aspiration tip.  Balanced Salt Solution was used to refill the anterior chamber.  The wounds were checked for water tightness and required no suture.  The wire lid speculum was removed.  The patient's right eye was cleaned and a drop of each post-operative drop was placed, followed by a amato shield.  The patient tolerated the procedure well, and there were no  complications.      Michael Zuleta MD

## 2017-11-21 NOTE — H&P
[facility]  TOTAL EYE CARE  [address]  [facility] [address]  [phone]  Dept: [phone]    OPHTHALMOLOGY PRE-OPERATIVE  HISTORY AND PHYSICAL    DATE OF H/P:  2017    DATE OF SURGERY:  2017  PROCEDURE:  Procedure(s):  PHACOEMULSIFICATION WITH STANDARD INTRAOCULAR LENS IMPLANT, Left Eye  LENS IMPLANT:  ZCB00 +21.0  REFRACTIVE GOAL:  PL Sph  SURGEON:  Michael Zuleta MD    ANESTHESIA:  TOPICAL / MAC    OR CASE REQUIREMENTS:    DEMOGRAPHICS:  Demographic Information on Chika Hurd:    Chika Hurd  Gender: female  : 1945  [address]  [facility] [address]  [phone] (home) [phone] (work)    Medical Record: [id number]  Social Security Number: [id number]  Pharmacy: [facility] [address] [place] [address]  Primary Care Provider: Benny Castillo    Parent's names are: Data Unavailable (mother) and Data Unavailable (father).    Insurance: Payor: MEDICA / Plan: MEDICA CHOICE / Product Type: Indemnity /     OCULAR HISTORY:  Cataracts, s/p IOL OD, CRAO OD.    HISTORIES:  Past Medical History:   Diagnosis Date     DCIS (ductal carcinoma in situ) of breast      GERD (gastroesophageal reflux disease)      Hyperlipidemia        Past Surgical History:   Procedure Laterality Date     ENT SURGERY      dental implants  started     FLEXIBLE SIGMOIDOSCOPY       LUMPECTOMY BREAST WITH SEED LOCALIZATION Left 2016    Procedure: LUMPECTOMY BREAST WITH SEED LOCALIZATION;  Surgeon: Russel Murry MD;  Location:  OR     PHACOEMULSIFICATION WITH STANDARD INTRAOCULAR LENS IMPLANT Right 2017    Procedure: PHACOEMULSIFICATION WITH STANDARD INTRAOCULAR LENS IMPLANT;  Right cataract removal with implant;  Surgeon: Michael Zuleta MD;  Location: [place] OR     SURGICAL HISTORY OF -       Right Hip Pinning     SURGICAL HISTORY OF -       Tubal Ligation       Family History   Problem Relation Age of Onset      HEART DISEASE Mother      HEART DISEASE Father      Circulatory Father      main artery aneursym     HEART DISEASE Sister      sleep problems     Breast Cancer Maternal Aunt      diagnosed in 60's, surviving in 90's       Social History   Substance Use Topics     Smoking status: Former Smoker     Types: Cigarettes     Quit date: 1/12/2005     Smokeless tobacco: Never Used     Alcohol use Yes      Comment: rarely       MEDICATIONS:  No current facility-administered medications for this encounter.      Current Outpatient Prescriptions   Medication Sig     famotidine (PEPCID) 20 MG tablet Take 1 tablet (20 mg) by mouth 2 times daily Establish Care with new PCP.     Acetaminophen (TYLENOL PO) Take 325 mg by mouth every 4 hours as needed for mild pain or fever     clopidogrel (PLAVIX) 75 MG tablet Take 1 tablet (75 mg) by mouth daily     atorvastatin (LIPITOR) 40 MG tablet Take 1 tablet (40 mg) by mouth daily     Glycerin-Polysorbate 80 (REFRESH DRY EYE THERAPY OP) Apply 1-2 drops to eye as needed Reported on 5/23/2017     ASPIRIN PO Take 81 mg by mouth     Omega-3 Fatty Acids (OMEGA-3 FISH OIL PO)      Ascorbic Acid (VITAMIN C PO) Take 500 mg by mouth daily     co-enzyme Q-10 (CO Q10) 100 MG CAPS Take 2 capsules by mouth daily. Takes a total of 200 mg daily.     MULTIVITAMIN OR 1 daily     CALCIUM + D OR 1 TABLET DAILY       ALLERGIES:     Allergies   Allergen Reactions     Cortisone Swelling     Cortisone Cream       PERTINENT SYSTEMS REVIEW:    1. No - Do you have a history of heart attack, stroke, stent, bypass or surgery on an artery in the head, neck, heart or legs?  2. No - Do you ever have any pain or discomfort in your chest?  3. No - Do you have a history of  Heart Failure?  4. No - Are you troubled by shortness of breath when walking: On the level, up a slight hill or at night?  5. No - Do you currently have a cold, bronchitis or other respiratory infection?  6. No - Do you have a cough, shortness of breath or  wheezing?  7. No - Do you sometimes get pains in the calves of your legs when you walk?  8. No - Do you or anyone in your family have previous history of blood clots?  9. No - Do you or does anyone in your family have a serious bleeding problem such as prolonged bleeding following surgeries or cuts?  10. No - Have you ever had problems with anemia or been told to take iron pills?  11. No - Have you had any abnormal blood loss such as black, tarry or bloody stools, or abnormal vaginal bleeding?  12. No - Have you ever had a blood transfusion?  13. No - Have you or any of your relatives ever had problems with anesthesia?  14. No - Do you have sleep apnea, excessive snoring or daytime drowsiness?  15. No - Do you have any prosthetic heart valves?  16. No - Do you have prosthetic joints?    EXAMINATION:  Vitals were reviewed                       Vison:  Va, left - 20/40   BAT, left - 20/80  HEENT:  Cataract, otherwise unremarkable.  LUNGS:  Clear  CV:  Regular rate and rhythm without murmur  ABD:  Soft and nontender  NEURO:  Alert and nonfocal    IMPRESSION:  Patient cleared for ophthalmic surgery.  Low risk with monitored, light sedation.  I have assessed the patient's DVT risk, and no additional orders necessary.    PLAN:  Procedure(s):  PHACOEMULSIFICATION WITH STANDARD INTRAOCULAR LENS IMPLANT, Left Eye      Michael Zuleta MD

## 2017-11-24 ENCOUNTER — ANESTHESIA EVENT (OUTPATIENT)
Dept: SURGERY | Facility: CLINIC | Age: 72
End: 2017-11-24
Payer: COMMERCIAL

## 2017-11-24 ASSESSMENT — LIFESTYLE VARIABLES: TOBACCO_USE: 1

## 2017-11-24 NOTE — ANESTHESIA PREPROCEDURE EVALUATION
Anesthesia Evaluation     . Pt has had prior anesthetic. Type: General and MAC    No history of anesthetic complications          ROS/MED HX    ENT/Pulmonary:     (+)tobacco use, Past use , . .    Neurologic: Comment: Central retinal artery occlusion, right  Bilateral hearing loss - neg neurologic ROS     Cardiovascular:     (+) Dyslipidemia, ----. Taking blood thinners : . . . :. . Previous cardiac testing Echodate:[id number]:Interpretation Summary     Technically difficult imaging.  A cardiac source of embolus was not identiifed.  The left ventricle is normal in structure, function and size.  The visual ejection fraction is estimated at 60-65%.  The right ventricle is normal in structure, function and size.  Doppler interrogation does not demonstrate signifcant stenosis or  insufficiency involving cardiac valves.     No old studies for comparison.date: results:ECG reviewed date:6-27-13 results:Sinus Rhythm   WITHIN NORMAL LIMITS date: results:          METS/Exercise Tolerance:  >4 METS   Hematologic:  - neg hematologic  ROS       Musculoskeletal:         GI/Hepatic:     (+) GERD       Renal/Genitourinary:  - ROS Renal section negative       Endo:  - neg endo ROS       Psychiatric:  - neg psychiatric ROS       Infectious Disease:  - neg infectious disease ROS       Malignancy:   (+) Malignancy History of Breast          Other: Comment: cataract   - neg other ROS                 Physical Exam  Normal systems: cardiovascular, pulmonary and dental    Airway   Mallampati: II  TM distance: >3 FB  Neck ROM: full    Dental     Cardiovascular       Pulmonary                         Anesthesia Plan      History & Physical Review  History and physical reviewed and following examination; no interval change.    ASA Status:  2 .    NPO Status:  > 8 hours    Plan for MAC Reason for MAC:  Procedure to face, neck, head or breast         Postoperative Care  Postoperative pain management:  Oral pain medications.       Consents  Anesthetic plan, risks, benefits and alternatives discussed with:  Patient..                          .

## 2017-11-27 ENCOUNTER — ANESTHESIA (OUTPATIENT)
Dept: SURGERY | Facility: CLINIC | Age: 72
End: 2017-11-27
Payer: COMMERCIAL

## 2017-11-27 ENCOUNTER — SURGERY (OUTPATIENT)
Age: 72
End: 2017-11-27

## 2017-11-27 ENCOUNTER — HOSPITAL ENCOUNTER (OUTPATIENT)
Facility: CLINIC | Age: 72
Discharge: HOME | End: 2017-11-27
Attending: OPHTHALMOLOGY | Admitting: OPHTHALMOLOGY
Payer: COMMERCIAL

## 2017-11-27 VITALS
RESPIRATION RATE: 16 BRPM | SYSTOLIC BLOOD PRESSURE: 130 MMHG | OXYGEN SATURATION: 97 % | DIASTOLIC BLOOD PRESSURE: 58 MMHG | HEIGHT: 60 IN | WEIGHT: 160 LBS | BODY MASS INDEX: 31.41 KG/M2 | TEMPERATURE: 97.9 F

## 2017-11-27 PROCEDURE — V2632 POST CHMBR INTRAOCULAR LENS: HCPCS | Performed by: OPHTHALMOLOGY

## 2017-11-27 PROCEDURE — 36000025 ZZH CATARACT SURGICAL PACKAGE: Performed by: OPHTHALMOLOGY

## 2017-11-27 PROCEDURE — 71000022 ZZH RECOVERY CATRACT PACKAGE: Performed by: OPHTHALMOLOGY

## 2017-11-27 PROCEDURE — 25000125 ZZHC RX 250: Performed by: NURSE ANESTHETIST, CERTIFIED REGISTERED

## 2017-11-27 PROCEDURE — 25000128 H RX IP 250 OP 636: Performed by: NURSE ANESTHETIST, CERTIFIED REGISTERED

## 2017-11-27 PROCEDURE — 25000128 H RX IP 250 OP 636: Performed by: OPHTHALMOLOGY

## 2017-11-27 PROCEDURE — 25000125 ZZHC RX 250: Performed by: OPHTHALMOLOGY

## 2017-11-27 PROCEDURE — 37000012 ZZH ANESTHESIA CATARACT PACKAGE: Performed by: OPHTHALMOLOGY

## 2017-11-27 DEVICE — EYE IMP IOL AMO PCL TECNIS ZCB00 21.0: Type: IMPLANTABLE DEVICE | Site: EYE | Status: FUNCTIONAL

## 2017-11-27 RX ORDER — PROPARACAINE HYDROCHLORIDE 5 MG/ML
SOLUTION/ DROPS OPHTHALMIC PRN
Status: DISCONTINUED | OUTPATIENT
Start: 2017-11-27 | End: 2017-11-27 | Stop reason: HOSPADM

## 2017-11-27 RX ORDER — PHENYLEPHRINE HYDROCHLORIDE 25 MG/ML
1 SOLUTION/ DROPS OPHTHALMIC
Status: COMPLETED | OUTPATIENT
Start: 2017-11-27 | End: 2017-11-27

## 2017-11-27 RX ORDER — SODIUM CHLORIDE, SODIUM LACTATE, POTASSIUM CHLORIDE, CALCIUM CHLORIDE 600; 310; 30; 20 MG/100ML; MG/100ML; MG/100ML; MG/100ML
INJECTION, SOLUTION INTRAVENOUS CONTINUOUS
Status: DISCONTINUED | OUTPATIENT
Start: 2017-11-27 | End: 2017-11-27 | Stop reason: HOSPADM

## 2017-11-27 RX ORDER — LIDOCAINE 40 MG/G
CREAM TOPICAL
Status: DISCONTINUED | OUTPATIENT
Start: 2017-11-27 | End: 2017-11-27 | Stop reason: HOSPADM

## 2017-11-27 RX ORDER — TROPICAMIDE 10 MG/ML
1 SOLUTION/ DROPS OPHTHALMIC
Status: COMPLETED | OUTPATIENT
Start: 2017-11-27 | End: 2017-11-27

## 2017-11-27 RX ORDER — CYCLOPENTOLATE HYDROCHLORIDE 10 MG/ML
1 SOLUTION/ DROPS OPHTHALMIC
Status: COMPLETED | OUTPATIENT
Start: 2017-11-27 | End: 2017-11-27

## 2017-11-27 RX ORDER — BALANCED SALT SOLUTION 6.4; .75; .48; .3; 3.9; 1.7 MG/ML; MG/ML; MG/ML; MG/ML; MG/ML; MG/ML
SOLUTION OPHTHALMIC PRN
Status: DISCONTINUED | OUTPATIENT
Start: 2017-11-27 | End: 2017-11-27 | Stop reason: HOSPADM

## 2017-11-27 RX ADMIN — TROPICAMIDE 1 DROP: 10 SOLUTION/ DROPS OPHTHALMIC at 11:10

## 2017-11-27 RX ADMIN — MIDAZOLAM HYDROCHLORIDE 2 MG: 1 INJECTION, SOLUTION INTRAMUSCULAR; INTRAVENOUS at 11:14

## 2017-11-27 RX ADMIN — LIDOCAINE HYDROCHLORIDE 1 ML: 10 INJECTION, SOLUTION EPIDURAL; INFILTRATION; INTRACAUDAL; PERINEURAL at 10:56

## 2017-11-27 RX ADMIN — TROPICAMIDE 1 DROP: 10 SOLUTION/ DROPS OPHTHALMIC at 10:57

## 2017-11-27 RX ADMIN — CYCLOPENTOLATE HYDROCHLORIDE 1 DROP: 10 SOLUTION OPHTHALMIC at 11:09

## 2017-11-27 RX ADMIN — BALANCED SALT SOLUTION 250 ML: 6.4; .75; .48; .3; 3.9; 1.7 SOLUTION OPHTHALMIC at 11:28

## 2017-11-27 RX ADMIN — LIDOCAINE HYDROCHLORIDE 1 TUBE: 20 JELLY TOPICAL at 11:28

## 2017-11-27 RX ADMIN — Medication 1.4 ML GIVEN: at 11:29

## 2017-11-27 RX ADMIN — PROPARACAINE HYDROCHLORIDE 2 DROP: 5 SOLUTION/ DROPS OPHTHALMIC at 11:28

## 2017-11-27 RX ADMIN — EPINEPHRINE 0.9 ML: 1 INJECTION, SOLUTION INTRAMUSCULAR; SUBCUTANEOUS at 11:28

## 2017-11-27 RX ADMIN — SODIUM CHLORIDE, POTASSIUM CHLORIDE, SODIUM LACTATE AND CALCIUM CHLORIDE: 600; 310; 30; 20 INJECTION, SOLUTION INTRAVENOUS at 10:55

## 2017-11-27 RX ADMIN — TROPICAMIDE 1 DROP: 10 SOLUTION/ DROPS OPHTHALMIC at 11:04

## 2017-11-27 RX ADMIN — CYCLOPENTOLATE HYDROCHLORIDE 1 DROP: 10 SOLUTION OPHTHALMIC at 11:03

## 2017-11-27 RX ADMIN — PHENYLEPHRINE HYDROCHLORIDE 1 DROP: 25 SOLUTION/ DROPS OPHTHALMIC at 11:09

## 2017-11-27 RX ADMIN — CYCLOPENTOLATE HYDROCHLORIDE 1 DROP: 10 SOLUTION OPHTHALMIC at 10:56

## 2017-11-27 RX ADMIN — PHENYLEPHRINE HYDROCHLORIDE 1 DROP: 25 SOLUTION/ DROPS OPHTHALMIC at 10:57

## 2017-11-27 RX ADMIN — PHENYLEPHRINE HYDROCHLORIDE 1 DROP: 25 SOLUTION/ DROPS OPHTHALMIC at 11:03

## 2017-11-27 NOTE — ANESTHESIA CARE TRANSFER NOTE
Patient: Chkia Hurd    Procedure(s):  Left cataract removal with implant - Wound Class: I-Clean    Diagnosis: Left cataract  Diagnosis Additional Information: No value filed.    Anesthesia Type:   MAC     Note:  Airway :Room Air  Patient transferred to:Phase II  Handoff Report: Identifed the Patient, Identified the Reponsible Provider, Reviewed the pertinent medical history, Discussed the surgical course, Reviewed Intra-OP anesthesia mangement and issues during anesthesia, Set expectations for post-procedure period and Allowed opportunity for questions and acknowledgement of understanding      Vitals: (Last set prior to Anesthesia Care Transfer)    CRNA VITALS  11/27/2017 1100 - 11/27/2017 1132      11/27/2017             Pulse: 67    SpO2: 97 %                Electronically Signed By: IDALIA Guillermo CRNA  November 27, 2017  11:32 AM

## 2017-11-27 NOTE — ANESTHESIA POSTPROCEDURE EVALUATION
Patient: Chika Hurd    Procedure(s):  Left cataract removal with implant - Wound Class: I-Clean    Diagnosis:Left cataract  Diagnosis Additional Information: No value filed.    Anesthesia Type:  MAC    Note:  Anesthesia Post Evaluation    Patient location during evaluation: Bedside  Patient participation: Able to fully participate in evaluation  Level of consciousness: awake and alert  Pain management: adequate  Airway patency: patent  Cardiovascular status: acceptable  Respiratory status: acceptable  Hydration status: acceptable  PONV: none     Anesthetic complications: None          Last vitals:  Vitals:    11/27/17 1040   Resp: (P) 18   Temp: (P) 36.6  C (97.9  F)   SpO2: 98%         Electronically Signed By: IDALIA Guillermo CRNA  November 27, 2017  11:33 AM

## 2017-11-27 NOTE — OP NOTE
CATARACT OPERATIVE NOTE    PATIENT: Chika Hurd  DATE OF SURGERY: 11/27/2017  PREOPERATIVE DIAGNOSIS:  Senile Nuclear Cataract, Left eye  POSTOPERATIVE DIAGNOSIS:  Senile Nuclear Cataract, Left eye  OPERATIVE PROCEDURE:  Phacoemulsification and placement of intraocular lens  SURGEON:  Michael Zuleta MD  ANESTHESIA:  Topical / MAC  EBL:  None  SPECIMENS:  None  COMPLICATIONS:  None    PROCEDURE:  The patient was brought to the operating room at [facility].  The left eye was prepped and draped in the usual fashion for cataract surgery.  A wire lid speculum was inserted.  A super sharp blade was used to make a paracentesis at the 5 O'clock position.  The super sharp blade was used to make a partial thickness temporal groove, which was 3 mm in length.  0.8 mL of non-preserved epi-Shugarcaine was injected into the anterior chamber.  Viscoelastic was used to inflate the anterior chamber through a cannula.  A 2.5 mm microkeratome was used to make a temporal clear corneal incision in a two-plane fashion.  A cystotome needle and forceps were used to make a capsulorrhexis.  Hydrodissection and hydrodelineation were performed with Balance Salt Solution.  The lens was then phacoemulsified and removed without complications.  The cortical material was removed with bimanual irrigation and aspiration.  The capsular bag was filled with viscoelastic.  A posterior chamber intraocular lens, preselected and recorded, was folded and inserted into the capsular bag.  The viscoelastic was removed with the irrigation and aspiration tip.  Balanced Salt Solution was used to refill the anterior chamber.  The wounds were checked for water tightness and required no suture.  The wire lid speculum was removed.  The patient's left eye was cleaned and a drop of each post-operative drop was placed, followed by a amato shield.  The patient tolerated the procedure well, and there were no complications.      Michael HOPPER  MD [place]

## 2017-12-22 NOTE — TELEPHONE ENCOUNTER
Received refill request from [facility] pharmacy for pt's atorvastatin.  Faxed request back to pharmacy asking that they route to pt's PCP.    Rachel Aguilar, MEKAN, RN

## 2017-12-28 ENCOUNTER — TELEPHONE (OUTPATIENT)
Dept: FAMILY MEDICINE | Facility: CLINIC | Age: 72
End: 2017-12-28

## 2017-12-28 DIAGNOSIS — H34.11 CENTRAL RETINAL ARTERY OCCLUSION OF RIGHT EYE: ICD-10-CM

## 2017-12-28 RX ORDER — ATORVASTATIN CALCIUM 40 MG/1
40 TABLET, FILM COATED ORAL DAILY
Qty: 30 TABLET | Refills: 0 | Status: SHIPPED | OUTPATIENT
Start: 2017-12-28 | End: 2018-01-23

## 2017-12-28 NOTE — TELEPHONE ENCOUNTER
Reason for Call:  Medication or medication refill:    Do you use a [facility]?  Name of the pharmacy and phone number for the current request:  [facility] [phone]    Name of the medication requested: Atorvastatin - Pt is hard to follow.  She states that she needs a refill and she got the last Rx from Cardiology.  She wants her new PCP, Dr. Castillo to refill this med, however she's never seen him.  I encouraged her to make an appt with Dr. Castillo, to establish care and get the refill and she declined to make an appt, stating that she is leaving [facility] for 2 weeks tomorrow and wants us to refill med.  Please call patient and advise.        Other request:     Can we leave a detailed message on this number? YES    Phone number patient can be reached at: Home number on file [phone] (home)    Best Time: any    Call taken on 12/28/2017 at 10:10 AM by Vaishnavi Zhou

## 2017-12-28 NOTE — TELEPHONE ENCOUNTER
Patient requested atorvastatin refill from cardiology - they referred her to her PCP:        Received refill request from [facility] pharmacy for pt's atorvastatin.  Faxed request back to pharmacy asking that they route to pt's PCP.     Rachel Aguilar, MEKAN, RN        Patient's PCP was Dr. Copeland.  She plans to establish care with Dr. Castillo, although has not been seen by him yet.  Dr. Ibanez - you have seen her in May for central retinal artery inclusion.  Would you be willing to do #30 and then have her schedule appt to establish care with Dr. Castillo?    Med and pharm ready.    Routing to provider.  Donna HERNANDEZ RN

## 2018-01-23 DIAGNOSIS — H34.11 CENTRAL RETINAL ARTERY OCCLUSION OF RIGHT EYE: ICD-10-CM

## 2018-01-23 NOTE — TELEPHONE ENCOUNTER
"Requested Prescriptions   Pending Prescriptions Disp Refills     atorvastatin (LIPITOR) 40 MG tablet [Pharmacy Med Name: ATORVASTATIN 40 MG TABLET]  Last Written Prescription Date:  12/28/2017  Last Fill Quantity: 30,  # refills: 0   Last Office Visit with PHILIPPE, KARLENE or [facility] prescribing provider:  05/23/2017   Future Office Visit:    Next 5 appointments (look out 90 days)     Mar 09, 2018 11:00 AM CST   Return Visit with Eliot Crane MD   [facility] ([facility])    [facility]  [address]  [facility] [address]   [phone]                  30 tablet 0     Sig: TAKE 1 TABLET (40 MG) BY MOUTH DAILY    Statins Protocol Passed    1/23/2018  9:38 AM       Passed - LDL on file in past 12 months    Recent Labs   Lab Test  09/06/17   0850   LDL  76            Passed - No abnormal creatine kinase in past 12 months    No lab results found.         Passed - Recent or future visit with authorizing provider    Patient had office visit in the last year or has a visit in the next 30 days with authorizing provider.  See \"Patient Info\" tab in inbasket, or \"Choose Columns\" in Meds & Orders section of the refill encounter.            Passed - Patient is age 18 or older       Passed - No active pregnancy on record       Passed - No positive pregnancy test in past 12 months        Aric LITTLE (R)    "

## 2018-01-29 RX ORDER — ATORVASTATIN CALCIUM 40 MG/1
40 TABLET, FILM COATED ORAL DAILY
Qty: 30 TABLET | Refills: 0 | Status: SHIPPED | OUTPATIENT
Start: 2018-01-29 | End: 2018-02-08

## 2018-02-08 ENCOUNTER — RADIANT APPOINTMENT (OUTPATIENT)
Dept: GENERAL RADIOLOGY | Facility: CLINIC | Age: 73
End: 2018-02-08
Attending: FAMILY MEDICINE
Payer: COMMERCIAL

## 2018-02-08 ENCOUNTER — OFFICE VISIT (OUTPATIENT)
Dept: FAMILY MEDICINE | Facility: CLINIC | Age: 73
End: 2018-02-08
Payer: COMMERCIAL

## 2018-02-08 DIAGNOSIS — H34.11 CENTRAL RETINAL ARTERY OCCLUSION OF RIGHT EYE: ICD-10-CM

## 2018-02-08 DIAGNOSIS — M25.552 HIP PAIN, LEFT: Primary | ICD-10-CM

## 2018-02-08 DIAGNOSIS — Z11.59 NEED FOR HEPATITIS C SCREENING TEST: ICD-10-CM

## 2018-02-08 DIAGNOSIS — M25.552 HIP PAIN, LEFT: ICD-10-CM

## 2018-02-08 PROCEDURE — 99214 OFFICE O/P EST MOD 30 MIN: CPT | Performed by: FAMILY MEDICINE

## 2018-02-08 PROCEDURE — 73502 X-RAY EXAM HIP UNI 2-3 VIEWS: CPT | Mod: FY

## 2018-02-08 RX ORDER — ATORVASTATIN CALCIUM 40 MG/1
TABLET, FILM COATED ORAL
Qty: 90 TABLET | Refills: 3 | Status: SHIPPED | OUTPATIENT
Start: 2018-02-08 | End: 2019-03-20

## 2018-02-08 ASSESSMENT — PAIN SCALES - GENERAL: PAINLEVEL: SEVERE PAIN (7)

## 2018-02-08 NOTE — MR AVS SNAPSHOT
After Visit Summary   2/8/2018    Chika Hurd    MRN: [id number]           Patient Information     Date Of Birth          1945        Visit Information        Provider Department      2/8/2018 8:00 AM Audrey Roy MD [facility]        Today's Diagnoses     Hip pain, left    -  1    Central retinal artery occlusion of right eye          Care Instructions          Thank you for choosing [facility].  You may be receiving a survey in the mail from [email or web address] regarding your visit today.  Please take a few minutes to complete and return the survey to let us know how we are doing.      Our Clinic hours are:  Mondays    7:20 am - 7 pm  Tues -  Fri  7:20 am - 5 pm    Clinic Phone: [phone]    The clinic lab opens at 7:30 am Mon - Fri and appointments are required.    [facility]. [phone]  Monday-Thursday 8 am - 7pm  Tues/Wed/Fri 8 am - 5:30 pm                 Follow-ups after your visit        Additional Services     PHYSICAL THERAPY REFERRAL       *This therapy referral will be filtered to a centralized scheduling office at [facility] and the patient will receive a call to schedule an appointment at a [place] location most convenient for them. *     [facility] provides Physical Therapy evaluation and treatment and many specialty services across the [place] system.  If requesting a specialty program, please choose from the list below.    If you have not heard from the scheduling office within 2 business days, please call [phone] for all locations, with the exception of [place], please call [phone].  Treatment: Evaluation & Treatment  Special Instructions/Modalities:   Special Programs: None    Please be aware that coverage of these services is subject to the terms and limitations of your health insurance plan.  Call member services at your health plan with any benefit or coverage  "questions.      **Note to Provider:  If you are referring outside of [place] for the therapy appointment, please list the name of the location in the \"special instructions\" above, print the referral and give to the patient to schedule the appointment.                  Your next 10 appointments already scheduled     Mar 06, 2018  8:00 AM CST   LAB with CL LAB   [facility] ([facility])    [address]  [facility] [address]   [phone]           Please do not eat 10-12 hours before your appointment if you are coming in fasting for labs on lipids, cholesterol, or glucose (sugar). This does not apply to pregnant women. Water, hot tea and black coffee (with nothing added) are okay. Do not drink other fluids, diet soda or chew gum.            Mar 09, 2018 11:00 AM CST   Return Visit with Eliot Crane MD   [facility] ([facility])    [facility]  [address]  [facility] [address] [phone]              Who to contact     If you have questions or need follow up information about today's clinic visit or your schedule please contact [facility] directly at [phone].  Normal or non-critical lab and imaging results will be communicated to you by [facility], letter or phone within 4 business days after the clinic has received the results. If you do not hear from us within 7 days, please contact the clinic through [facility] or phone. If you have a critical or abnormal lab result, we will notify you by phone as soon as possible.  Submit refill requests through [employer] or call your pharmacy and they will forward the refill request to us. Please allow 3 business days for your refill to be completed.          Additional Information About Your Visit        [employer] Information     [employer] gives you secure access to your electronic health record. If you see a primary care provider, you can also send " "messages to your care team and make appointments. If you have questions, please call your primary care clinic.  If you do not have a primary care provider, please call [phone] and they will assist you.        Care EveryWhere ID     This is your Care EveryWhere ID. This could be used by other organizations to access your [facility] medical records  [id number]        Your Vitals Were     Pulse Temperature Respirations Height Pulse Oximetry BMI (Body Mass Index)    68 97.7  F (36.5  C) (Tympanic) 16 5' 0.5\" (1.537 m) 95% 30.77 kg/m2       Blood Pressure from Last 3 Encounters:   02/08/18 150/80   11/27/17 130/58   11/06/17 144/54    Weight from Last 3 Encounters:   02/08/18 160 lb 3.2 oz (72.7 kg)   11/27/17 160 lb (72.6 kg)   11/06/17 162 lb (73.5 kg)              We Performed the Following     PHYSICAL THERAPY REFERRAL          Today's Medication Changes          These changes are accurate as of 2/8/18  9:08 AM.  If you have any questions, ask your nurse or doctor.               These medicines have changed or have updated prescriptions.        Dose/Directions    atorvastatin 40 MG tablet   Commonly known as:  LIPITOR   This may have changed:    - how much to take  - how to take this  - when to take this  - additional instructions   Used for:  Central retinal artery occlusion of right eye   Changed by:  Audrey Roy MD        Take 40mg one day and 20mg the next alternatingly.   Quantity:  90 tablet   Refills:  3            Where to get your medicines      These medications were sent to [place] [address] [place] [facility] - [address] [address]     Phone:  [phone]     atorvastatin 40 MG tablet                Primary Care Provider Office Phone # Fax #    Audrey Roy [phone] [address] [address]        Equal Access to Services     JASSI VAN AH: farrah Rodriguez, " giselle baer jaretjay pedraza frankyin hayaan adeeg kharash la'aan ah. So [facility] [phone].    ATENCIÓN: Si vivek fairchild, tiene a beckett disposición servicios gratuitos de asistencia lingüística. Dejah al [phone].    We comply with applicable federal civil rights laws and [place] laws. We do not discriminate on the basis of race, color, national origin, age, disability, sex, sexual orientation, or gender identity.            Thank you!     Thank you for choosing [facility]  for your care. Our goal is always to provide you with excellent care. Hearing back from our patients is one way we can continue to improve our services. Please take a few minutes to complete the written survey that you may receive in the mail after your visit with us. Thank you!             Your Updated Medication List - Protect others around you: Learn how to safely use, store and throw away your medicines at www.disposemymeds.org.          This list is accurate as of 2/8/18  9:08 AM.  Always use your most recent med list.                   Brand Name Dispense Instructions for use Diagnosis    ASPIRIN PO      Take 81 mg by mouth        atorvastatin 40 MG tablet    LIPITOR    90 tablet    Take 40mg one day and 20mg the next alternatingly.    Central retinal artery occlusion of right eye       CALCIUM + D PO      1 TABLET DAILY        clopidogrel 75 MG tablet    PLAVIX    90 tablet    Take 1 tablet (75 mg) by mouth daily    Central artery occlusion of retina, right       co-enzyme Q-10 100 MG Caps capsule      Take 2 capsules by mouth daily. Takes a total of 200 mg daily.        famotidine 20 MG tablet    PEPCID    180 tablet    Take 1 tablet (20 mg) by mouth 2 times daily Establish Care with new PCP.    Gastroesophageal reflux disease without esophagitis       MULTIVITAMIN PO      1 daily        OMEGA-3 FISH OIL PO           REFRESH DRY EYE THERAPY OP      Apply 1-2 drops to eye as needed Reported on 5/23/2017         TYLENOL PO      Take 325 mg by mouth every 4 hours as needed for mild pain or fever        VITAMIN C PO      Take 500 mg by mouth daily

## 2018-02-08 NOTE — PATIENT INSTRUCTIONS
Thank you for choosing [facility].  You may be receiving a survey in the mail from [facility] regarding your visit today.  Please take a few minutes to complete and return the survey to let us know how we are doing.      Our Clinic hours are:  Mondays    7:20 am - 7 pm  Tues -  Fri  7:20 am - 5 pm    Clinic Phone: [phone]    The clinic lab opens at 7:30 am Mon - Fri and appointments are required.    [facility]. [phone]  Monday-Thursday 8 am - 7pm  Tues/Wed/Fri 8 am - 5:30 pm

## 2018-02-08 NOTE — PROGRESS NOTES
"  SUBJECTIVE:   Chika Hurd is a 72 year old female who presents to clinic today for the following health issues:      Problem:   Chief Complaint   Patient presents with     Establish Care     medication follow up     Musculoskeletal Problem     Left hip painx 2 1/2 months.   No known injury. Has had physical therapy in the past for LBP with left sciatica but this feels different \"feels like it's in the ball and socket of the joint\".  Worse when she first stands. Walks with a limp that has come and gone depending on how much she does. Stairs are worst.     ADDITIONAL HPI: 72 year old female here for above issue.  She also has a history of central retinal artery occlusion of the right eye.  Is on Lipitor for vascular risk reduction.  Sees ophthalmology regularly.    ROS: 10 point review of systems negative except as per HPI.    PAST MEDICAL HISTORY:  Past Medical History:   Diagnosis Date     DCIS (ductal carcinoma in situ) of breast      GERD (gastroesophageal reflux disease)      Hyperlipidemia         ACTIVE MEDICAL PROBLEMS:  Patient Active Problem List   Diagnosis     Hyperlipidemia LDL goal <130     SENSONRL HEAR LOSS,BILAT     GERD (gastroesophageal reflux disease)     Cataract right eye     Advanced directives, counseling/discussion     Ductal carcinoma in situ (DCIS) of left breast     Central retinal artery occlusion, right        FAMILY HISTORY:  Family History   Problem Relation Age of Onset     HEART DISEASE Mother      HEART DISEASE Father      Circulatory Father      main artery aneursym     HEART DISEASE Sister      sleep problems     Breast Cancer Maternal Aunt      diagnosed in 60's, surviving in 90's       SOCIAL HISTORY:  Social History     Social History     Marital status:      Spouse name: N/A     Number of children: N/A     Years of education: N/A     Occupational History     Not on file.     Social History Main Topics     Smoking status: Former Smoker     Types: Cigarettes     Quit " "date: 1/12/2005     Smokeless tobacco: Never Used     Alcohol use Yes      Comment: rarely     Drug use: No     Sexual activity: No      Comment: refuses to answer     Other Topics Concern     Parent/Sibling W/ Cabg, Mi Or Angioplasty Before 65f 55m? No     Social History Narrative       MEDICATIONS:  Current Outpatient Prescriptions   Medication Sig Dispense Refill     atorvastatin (LIPITOR) 40 MG tablet Take 40mg one day and 20mg the next alternatingly. 90 tablet 3     famotidine (PEPCID) 20 MG tablet Take 1 tablet (20 mg) by mouth 2 times daily Establish Care with new PCP. 180 tablet 1     Acetaminophen (TYLENOL PO) Take 325 mg by mouth every 4 hours as needed for mild pain or fever       clopidogrel (PLAVIX) 75 MG tablet Take 1 tablet (75 mg) by mouth daily 90 tablet 3     Glycerin-Polysorbate 80 (REFRESH DRY EYE THERAPY OP) Apply 1-2 drops to eye as needed Reported on 5/23/2017       ASPIRIN PO Take 81 mg by mouth       Omega-3 Fatty Acids (OMEGA-3 FISH OIL PO)        Ascorbic Acid (VITAMIN C PO) Take 500 mg by mouth daily       co-enzyme Q-10 (CO Q10) 100 MG CAPS Take 2 capsules by mouth daily. Takes a total of 200 mg daily.  0     MULTIVITAMIN OR 1 daily       CALCIUM + D OR 1 TABLET DAILY         ALLERGIES:     Allergies   Allergen Reactions     Cortisone Swelling     Cortisone Cream       Problem list, Medication list, Allergies, and Medical/Social/Surgical histories reviewed in [facility] and updated as appropriate.    OBJECTIVE:                                                    VITALS: /80 (BP Location: Right arm, Cuff Size: Adult Regular)  Pulse 68  Temp 97.7  F (36.5  C) (Tympanic)  Resp 16  Ht 5' 0.5\" (1.537 m)  Wt 160 lb 3.2 oz (72.7 kg)  SpO2 95%  BMI 30.77 kg/m2 Body mass index is 30.77 kg/(m^2).  GENERAL: Pleasant, well appearing female.  HEENT: PERRL, EOMI, oropharynx clear.  NECK: supple, no thyromegaly or thyroid masses, no lymphadenopathy.  CV: RRR, no murmurs, rubs or gallops.  LUNGS: " Clear to auscultation bilaterally, normal effort.  ABDOMEN: Soft, non-distended, non-tender.  No hepatosplenomegaly or palpable masses.    SKIN: warm and dry without obvious rashes.   EXTREMITIES: No edema, normal pulses.   MUSCULOSKELETAL: Pain to palpation over the left greater trochanter.  Also mild pain with hip rom and restricted internal and external range of motion of left hip.    Left hip x-ray obtained. Read and interpreted by me.  Mild- moderate, diffuse degenerative changes.    ASSESSMENT/PLAN:                                                    1. Hip pain, left  We will do a trial of physical therapy. Follow-up if not improving or if worsening.   - XR Hip Left 2-3 Views; Future  - PHYSICAL THERAPY REFERRAL    2. Central retinal artery occlusion of right eye  No evidence of recurrence.  Continue on Lipitor for prevention of vascular disease.  - atorvastatin (LIPITOR) 40 MG tablet; Take 40mg one day and 20mg the next alternatingly.  Dispense: 90 tablet; Refill: 3  - Basic metabolic panel; Future    3. Need for hepatitis C screening test  - **Hepatitis C Screen Reflex to RNA FUTURE anytime; Future        WDL

## 2018-02-14 ENCOUNTER — HOSPITAL ENCOUNTER (OUTPATIENT)
Dept: PHYSICAL THERAPY | Facility: CLINIC | Age: 73
Setting detail: THERAPIES SERIES
End: 2018-02-14
Attending: FAMILY MEDICINE
Payer: COMMERCIAL

## 2018-02-14 PROCEDURE — 40000718 ZZHC STATISTIC PT DEPARTMENT ORTHO VISIT: Performed by: PHYSICAL THERAPIST

## 2018-02-14 PROCEDURE — 97161 PT EVAL LOW COMPLEX 20 MIN: CPT | Mod: GP | Performed by: PHYSICAL THERAPIST

## 2018-02-14 PROCEDURE — 97110 THERAPEUTIC EXERCISES: CPT | Mod: GP | Performed by: PHYSICAL THERAPIST

## 2018-02-14 NOTE — PROGRESS NOTES
02/14/18 0800   General Information   Type of Visit Initial OP Ortho PT Evaluation   Start of Care Date 02/14/18   Referring Physician Audrey Roy    Patient/Family Goals Statement Sleep, HH duties, WAlking, Up stairs > Down    Orders Evaluate and Treat   Date of Order 02/08/18   Insurance Type Other   Insurance Comments/Visits Authorized Auth'd [employer] - Medicare A Only   Medical Diagnosis L Hip Pain    Precautions/Limitations no known precautions/limitations   General Information Comments No meds, no injection. Takes tylenol which helps.    Body Part(s)   Body Part(s) Hip   Presentation and Etiology   Pertinent history of current problem (include personal factors and/or comorbidities that impact the POC) Was doing a lot to go on vacation, doing a lot of stairs and the next day could hardly walk.  Got better and after vacation it got worse again. Started in December 2017,    Impairments A. Pain;B. Decreased WB tolerance;F. Decreased strength and endurance;E. Decreased flexibility;D. Decreased ROM;H. Impaired gait   Functional Limitations perform required work activities;perform desired leisure / sports activities   Symptom Location Lateral L Hip, sometimes it goes down along lateral side of leg to lateral calf.    How/Where did it occur With repetition/overuse;From insidious onset   Onset date of current episode/exacerbation 02/08/18  (MD Visit order date. )   Chronicity Chronic   Pain rating (0-10 point scale) (3-10/10 )   Pain quality A. Sharp;C. Aching;F. Stabbing   Frequency of pain/symptoms C. With activity   Pain/symptoms are: Other   Pain symptoms comment Better in morning.    Pain/symptoms exacerbated by A. Sitting;B. Walking;G. Certain positions;I. Bending;M. Other   Pain exacerbation comment Up > Down stairs    Pain/symptoms eased by A. Sitting;C. Rest;F. Certain positions;G. Heat;I. OTC medication(s)   Progression of symptoms since onset: Improved   Current / Previous Interventions   Diagnostic  Tests: X-ray   X-ray Results Results   X-ray results Mild Deg changes of hip    Prior Level of Function   Functional Level Prior Comment Independent w/ ADL's.    Current Level of Function   Current Community Support Family/friend caregiver   Patient role/employment history A. Employed   Employment Comments Clerical - sitting.    Living environment [facility]   Fall Risk Screen   Fall screen completed by PT   Have you fallen 2 or more times in the past year? No   Have you fallen and had an injury in the past year? No   Timed Up and Go score (seconds) Walked quickly into dept.    Is patient a fall risk? No   System Outcome Measures   Outcome Measures (LEFS  42/80 )   Functional Scales   Activity 1 comment Sleeping 70% of night    Activity 2 comment A lot of difficulty w/ walking short or long distances.    Activity 3 comment P w/going Up>Down stairs.    Hip Objective Findings   Observation No acute distress.    Integumentary  Normal    Posture Normal    Gait/Locomotion B Trendelenberg.    Balance/Proprioception (Single Leg Stance) EO 10 Seconds B.    Pelvic Screen Normal    Hip/Knee Strength Comments P w/L Hip Abd 4+, Hip Flex R 4+, L 4; Knee Ext 4+ B, Hip Add 4+,    Hip Flexibility Comments Sidelying Abd Pful for L Hip Abd's/ Tight Hip Flex's B, ITBand B, Quad Femoris B.    Scour Test Negative    MAGALI Test Negative    FADIR Test Negative    Palpation Tender L Piriformis, L Greater trochanter.    Planned Therapy Interventions   Planned Therapy Interventions Comment Jt mobs, STM, Develop HEP for Flexibility, Strength, ROM, Suggested Oval pool at Y, Elliptical and other machines.    Planned Modality Interventions   Planned Modality Interventions Comments US   Clinical Impression   Criteria for Skilled Therapeutic Interventions Met yes, treatment indicated   PT Diagnosis Hip Weakness, Mm imbalance, Tightness around L Hip    Influenced by the following impairments Pain, Limited Strength, Flexibility, Mobility     Functional limitations due to impairments Mobility, Stairs, WAlking, Sleep disturbed   Clinical Presentation Evolving/Changing   Clinical Presentation Rationale LEFS, MMT, Flexibility, lPalpation    Clinical Decision Making (Complexity) Low complexity   Therapy Frequency 1 time/week   Predicted Duration of Therapy Intervention (days/wks) 6 weeks, then reassess, 7 visits.    Risk & Benefits of therapy have been explained Yes   Patient, Family & other staff in agreement with plan of care Yes   Clinical Impression Comments Hip Weakness, Mm imbalance, Tightness around L Hip    Education Assessment   Preferred Learning Style Listening;Demonstration;Pictures/video   Barriers to Learning Hearing  ([place] )   ORTHO GOALS   PT Ortho Eval Goals 1;2;3;4   Ortho Goal 1   Goal Identifier 1   Goal Description STG: Improve sleep to 90% of night    Target Date 03/23/18   Ortho Goal 2   Goal Identifier 2   Goal Description sTG:Pt will note less P w/ going up stairs and minimal difficulty.    Target Date 03/23/18   Ortho Goal 3   Goal Identifier 3   Goal Description STG: Pt will be able to walk 2 blocks w/ minimal difficulty    Target Date 03/23/18   Ortho Goal 4   Goal Identifier 4   Goal Description LTG: Pt will be independent w/ self cares for L hip, HEP to restore strength and mobility.    Target Date 04/06/18   Total Evaluation Time   Total Evaluation Time Eval x 55 (Eval x 35, Ex x 20)    Therapy Certification   Medical Diagnosis Medicare A Only, [employer]    Selin Vincent, PT, [facility] (#[id number])  [facility]           [phone]  Fax          [phone]  Appt #      [phone]

## 2018-02-15 VITALS
DIASTOLIC BLOOD PRESSURE: 80 MMHG | OXYGEN SATURATION: 95 % | TEMPERATURE: 97.7 F | WEIGHT: 160.2 LBS | BODY MASS INDEX: 30.25 KG/M2 | SYSTOLIC BLOOD PRESSURE: 149 MMHG | RESPIRATION RATE: 16 BRPM | HEART RATE: 68 BPM | HEIGHT: 61 IN

## 2018-03-01 DIAGNOSIS — H34.11 CENTRAL RETINAL ARTERY OCCLUSION OF RIGHT EYE: ICD-10-CM

## 2018-03-01 RX ORDER — ATORVASTATIN CALCIUM 40 MG/1
TABLET, FILM COATED ORAL
Qty: 30 TABLET | Refills: 0 | OUTPATIENT
Start: 2018-03-01

## 2018-03-05 ENCOUNTER — HOSPITAL ENCOUNTER (OUTPATIENT)
Dept: PHYSICAL THERAPY | Facility: CLINIC | Age: 73
Setting detail: THERAPIES SERIES
End: 2018-03-05
Attending: FAMILY MEDICINE
Payer: COMMERCIAL

## 2018-03-05 PROCEDURE — 97035 APP MDLTY 1+ULTRASOUND EA 15: CPT | Mod: GP | Performed by: PHYSICAL THERAPIST

## 2018-03-05 PROCEDURE — 97140 MANUAL THERAPY 1/> REGIONS: CPT | Mod: GP | Performed by: PHYSICAL THERAPIST

## 2018-03-05 PROCEDURE — 40000718 ZZHC STATISTIC PT DEPARTMENT ORTHO VISIT: Performed by: PHYSICAL THERAPIST

## 2018-03-07 ENCOUNTER — DOCUMENTATION ONLY (OUTPATIENT)
Dept: FAMILY MEDICINE | Facility: CLINIC | Age: 73
End: 2018-03-07

## 2018-03-07 DIAGNOSIS — Z11.59 NEED FOR HEPATITIS C SCREENING TEST: ICD-10-CM

## 2018-03-07 DIAGNOSIS — D05.12 DUCTAL CARCINOMA IN SITU (DCIS) OF LEFT BREAST: ICD-10-CM

## 2018-03-07 DIAGNOSIS — H34.11 CENTRAL RETINAL ARTERY OCCLUSION OF RIGHT EYE: ICD-10-CM

## 2018-03-07 LAB
ALBUMIN SERPL-MCNC: 3.6 G/DL (ref 3.4–5)
ALP SERPL-CCNC: 90 U/L (ref 40–150)
ALT SERPL W P-5'-P-CCNC: 35 U/L (ref 0–50)
ANION GAP SERPL CALCULATED.3IONS-SCNC: 5 MMOL/L (ref 3–14)
AST SERPL W P-5'-P-CCNC: 24 U/L (ref 0–45)
BASOPHILS # BLD AUTO: 0.1 10E9/L (ref 0–0.2)
BASOPHILS NFR BLD AUTO: 1 %
BILIRUB SERPL-MCNC: 0.5 MG/DL (ref 0.2–1.3)
BUN SERPL-MCNC: 18 MG/DL (ref 7–30)
CALCIUM SERPL-MCNC: 8.9 MG/DL (ref 8.5–10.1)
CHLORIDE SERPL-SCNC: 104 MMOL/L (ref 94–109)
CO2 SERPL-SCNC: 28 MMOL/L (ref 20–32)
CREAT SERPL-MCNC: 0.62 MG/DL (ref 0.52–1.04)
DIFFERENTIAL METHOD BLD: NORMAL
EOSINOPHIL # BLD AUTO: 0.3 10E9/L (ref 0–0.7)
EOSINOPHIL NFR BLD AUTO: 5.3 %
ERYTHROCYTE [DISTWIDTH] IN BLOOD BY AUTOMATED COUNT: 13.7 % (ref 10–15)
GFR SERPL CREATININE-BSD FRML MDRD: >90 ML/MIN/1.7M2
GLUCOSE SERPL-MCNC: 85 MG/DL (ref 70–99)
HCT VFR BLD AUTO: 42.9 % (ref 35–47)
HGB BLD-MCNC: 13.6 G/DL (ref 11.7–15.7)
LYMPHOCYTES # BLD AUTO: 1.5 10E9/L (ref 0.8–5.3)
LYMPHOCYTES NFR BLD AUTO: 24.5 %
MCH RBC QN AUTO: 29.7 PG (ref 26.5–33)
MCHC RBC AUTO-ENTMCNC: 31.7 G/DL (ref 31.5–36.5)
MCV RBC AUTO: 94 FL (ref 78–100)
MONOCYTES # BLD AUTO: 0.8 10E9/L (ref 0–1.3)
MONOCYTES NFR BLD AUTO: 13.5 %
NEUTROPHILS # BLD AUTO: 3.4 10E9/L (ref 1.6–8.3)
NEUTROPHILS NFR BLD AUTO: 55.7 %
PLATELET # BLD AUTO: 276 10E9/L (ref 150–450)
POTASSIUM SERPL-SCNC: 4.2 MMOL/L (ref 3.4–5.3)
PROT SERPL-MCNC: 7 G/DL (ref 6.8–8.8)
RBC # BLD AUTO: 4.58 10E12/L (ref 3.8–5.2)
SODIUM SERPL-SCNC: 137 MMOL/L (ref 133–144)
WBC # BLD AUTO: 6.2 10E9/L (ref 4–11)

## 2018-03-07 PROCEDURE — 85025 COMPLETE CBC W/AUTO DIFF WBC: CPT | Performed by: INTERNAL MEDICINE

## 2018-03-07 PROCEDURE — 80053 COMPREHEN METABOLIC PANEL: CPT | Performed by: INTERNAL MEDICINE

## 2018-03-07 PROCEDURE — 36415 COLL VENOUS BLD VENIPUNCTURE: CPT | Performed by: INTERNAL MEDICINE

## 2018-03-07 PROCEDURE — 86803 HEPATITIS C AB TEST: CPT | Performed by: INTERNAL MEDICINE

## 2018-03-07 NOTE — PROGRESS NOTES
Pt was in for lab work today.  She thought that she needed a lipid panel.  If ok, please order.    Thank you

## 2018-03-08 ENCOUNTER — HOSPITAL ENCOUNTER (EMERGENCY)
Facility: CLINIC | Age: 73
Discharge: HOME | End: 2018-03-08
Attending: FAMILY MEDICINE | Admitting: FAMILY MEDICINE
Payer: COMMERCIAL

## 2018-03-08 VITALS
RESPIRATION RATE: 18 BRPM | HEART RATE: 72 BPM | DIASTOLIC BLOOD PRESSURE: 69 MMHG | OXYGEN SATURATION: 96 % | TEMPERATURE: 98.2 F | SYSTOLIC BLOOD PRESSURE: 179 MMHG

## 2018-03-08 DIAGNOSIS — N30.00 ACUTE CYSTITIS WITHOUT HEMATURIA: ICD-10-CM

## 2018-03-08 LAB
ALBUMIN UR-MCNC: 100 MG/DL
APPEARANCE UR: ABNORMAL
BACTERIA #/AREA URNS HPF: ABNORMAL /HPF
BILIRUB UR QL STRIP: NEGATIVE
COLOR UR AUTO: ABNORMAL
GLUCOSE UR STRIP-MCNC: NEGATIVE MG/DL
HCV AB SERPL QL IA: NONREACTIVE
HGB UR QL STRIP: ABNORMAL
KETONES UR STRIP-MCNC: NEGATIVE MG/DL
LEUKOCYTE ESTERASE UR QL STRIP: ABNORMAL
MUCOUS THREADS #/AREA URNS LPF: PRESENT /LPF
NITRATE UR QL: NEGATIVE
PH UR STRIP: 7 PH (ref 5–7)
RBC #/AREA URNS AUTO: 4 /HPF (ref 0–2)
SOURCE: ABNORMAL
SP GR UR STRIP: 1 (ref 1–1.03)
SQUAMOUS #/AREA URNS AUTO: <1 /HPF (ref 0–1)
UROBILINOGEN UR STRIP-MCNC: 0 MG/DL (ref 0–2)
WBC #/AREA URNS AUTO: 97 /HPF (ref 0–5)
WBC CLUMPS #/AREA URNS HPF: PRESENT /HPF

## 2018-03-08 PROCEDURE — 99284 EMERGENCY DEPT VISIT MOD MDM: CPT | Mod: Z6 | Performed by: FAMILY MEDICINE

## 2018-03-08 PROCEDURE — 99283 EMERGENCY DEPT VISIT LOW MDM: CPT | Performed by: FAMILY MEDICINE

## 2018-03-08 PROCEDURE — 87088 URINE BACTERIA CULTURE: CPT | Performed by: FAMILY MEDICINE

## 2018-03-08 PROCEDURE — 87086 URINE CULTURE/COLONY COUNT: CPT | Performed by: FAMILY MEDICINE

## 2018-03-08 PROCEDURE — 81001 URINALYSIS AUTO W/SCOPE: CPT | Performed by: FAMILY MEDICINE

## 2018-03-08 PROCEDURE — 87186 SC STD MICRODIL/AGAR DIL: CPT | Performed by: FAMILY MEDICINE

## 2018-03-08 RX ORDER — NITROFURANTOIN 25; 75 MG/1; MG/1
100 CAPSULE ORAL 2 TIMES DAILY
Qty: 14 CAPSULE | Refills: 0 | Status: SHIPPED | OUTPATIENT
Start: 2018-03-08 | End: 2018-09-13

## 2018-03-08 NOTE — ED NOTES
"Pt here with concerns for hematuria and a possible UTI. She has a back ache that started 2 days ago with urinary frequency and hematuria that started this morning. Pt states she passes a clot this morning in her urine as well. Pt takes plavix due to a partially obstructed artery. She has a full feeling in her lower abdomen stating \"I feel bloated all the time\".  "

## 2018-03-08 NOTE — ED AVS SNAPSHOT
[facility] Emergency Department    [address] [address]    Phone:  [phone]    Fax:  [phone]                                       Chika Hurd   MRN: [id number]    Department:  [facility] Emergency Department   Date of Visit:  3/8/2018           After Visit Summary Signature Page     I have received my discharge instructions, and my questions have been answered. I have discussed any challenges I see with this plan with the nurse or doctor.    ..........................................................................................................................................  Patient/Patient Representative Signature      ..........................................................................................................................................  Patient Representative Print Name and Relationship to Patient    ..................................................               ................................................  Date                                            Time    ..........................................................................................................................................  Reviewed by Signature/Title    ...................................................              ..............................................  Date                                                            Time

## 2018-03-08 NOTE — ED AVS SNAPSHOT
[facility] Emergency Department    [address] [address]    Phone:  [phone]    Fax:  [phone]                                       Chika Hurd   MRN: [id number]    Department:  [facility] Emergency Department   Date of Visit:  3/8/2018           Patient Information     Date Of Birth          1945        Your diagnoses for this visit were:     Acute cystitis without hematuria        You were seen by Pablo Vuong MD.        Discharge Instructions       Take Macrodantin 100 mg twice daily for 7 days.  Call Dr. Vuong on Saturday or Sunday for urine culture results, [phone], after 9 AM on Saturday, after 12 on Sunday  Return to be seen if you develop fevers, flank pain, vomiting, significant increase in bleeding, or other new concerning symptoms.      Future Appointments        Provider Department Dept Phone Center    3/9/2018 11:00 AM Eliot Crane MD [facility] [phone] [facility]    3/14/2018 9:30 AM Selin Vincent PT [facility] Physical Therapy [phone] [facility]      24 Hour Appointment Hotline       To make an appointment at any [facility] call [phone] ([phone]). If you don't have a family doctor or clinic, we will help you find one. [facility] are conveniently located to serve the needs of you and your family.             Review of your medicines      START taking        Dose / Directions Last dose taken    nitroFURantoin (macrocrystal-monohydrate) 100 MG capsule   Commonly known as:  MACROBID   Dose:  100 mg   Quantity:  14 capsule        Take 1 capsule (100 mg) by mouth 2 times daily   Refills:  0          Our records show that you are taking the medicines listed below. If these are incorrect, please call your family doctor or clinic.        Dose / Directions Last dose taken    ASPIRIN PO   Dose:  81 mg        Take 81 mg by mouth daily   Refills:  0        atorvastatin 40 MG tablet   Commonly  known as:  LIPITOR   Quantity:  90 tablet        Take 40mg one day and 20mg the next alternatingly.   Refills:  3        CALCIUM + D PO        1 TABLET DAILY   Refills:  0        clopidogrel 75 MG tablet   Commonly known as:  PLAVIX   Dose:  75 mg   Quantity:  90 tablet        Take 1 tablet (75 mg) by mouth daily   Refills:  3        co-enzyme Q-10 100 MG Caps capsule   Dose:  200 mg        Take 2 capsules by mouth daily. Takes a total of 200 mg daily.   Refills:  0        famotidine 20 MG tablet   Commonly known as:  PEPCID   Dose:  20 mg   Quantity:  180 tablet        Take 1 tablet (20 mg) by mouth 2 times daily Establish Care with new PCP.   Refills:  1        MULTIVITAMIN PO        1 daily   Refills:  0        OMEGA-3 FISH OIL PO   Dose:  1 g        Take 1 g by mouth daily   Refills:  0        REFRESH DRY EYE THERAPY OP   Dose:  1-2 drop        Apply 1-2 drops to eye as needed Reported on 5/23/2017   Refills:  0        TYLENOL PO   Dose:  500 mg        Take 500 mg by mouth every 6 hours as needed for mild pain or fever   Refills:  0        VITAMIN C PO   Dose:  500 mg        Take 500 mg by mouth daily   Refills:  0                Prescriptions were sent or printed at these locations (1 Prescription)                   [facility] [place], [place] - [address]   [address] [address]    Telephone:  [phone]   Fax:  [phone]   Hours:                  E-Prescribed (1 of 1)         nitroFURantoin, macrocrystal-monohydrate, (MACROBID) 100 MG capsule                Procedures and tests performed during your visit     UA reflex to Microscopic      Orders Needing Specimen Collection     None      Pending Results     Date and Time Order Name Status Description    3/7/2018 0804 HEPATITIS C SCREEN REFLEX TO HCV RNA QUANT AND GENOTYPE In process             Pending Culture Results     No orders found from 3/6/2018 to 3/9/2018.            Pending Results Instructions     If you had  any lab results that were not finalized at the time of your Discharge, you can call the ED Lab Result RN at [phone]. You will be contacted by this team for any positive Lab results or changes in treatment. The nurses are available 7 days a week from 10A to 6:30P.  You can leave a message 24 hours per day and they will return your call.        Test Results From Your Hospital Stay        3/8/2018 10:01 AM      Component Results     Component Value Ref Range & Units Status    Color Urine Maru  Final    Appearance Urine Slightly Cloudy  Final    Glucose Urine Negative NEG^Negative mg/dL Final    Bilirubin Urine Negative NEG^Negative Final    Ketones Urine Negative NEG^Negative mg/dL Final    Specific Gravity Urine 1.002 (L) 1.003 - 1.035 Final    Blood Urine Large (A) NEG^Negative Final    pH Urine 7.0 5.0 - 7.0 pH Final    Protein Albumin Urine 100 (A) NEG^Negative mg/dL Final    Urobilinogen mg/dL 0.0 0.0 - 2.0 mg/dL Final    Nitrite Urine Negative NEG^Negative Final    Leukocyte Esterase Urine Moderate (A) NEG^Negative Final    Source Midstream Urine  Final    RBC Urine 4 (H) 0 - 2 /HPF Final    WBC Urine 97 (H) 0 - 5 /HPF Final    WBC Clumps Present (A) NEG^Negative /HPF Final    Bacteria Urine Moderate (A) NEG^Negative /HPF Final    Squamous Epithelial /HPF Urine <1 0 - 1 /HPF Final    Mucous Urine Present (A) NEG^Negative /LPF Final                Thank you for choosing [facility]       Thank you for choosing [facility] for your care. Our goal is always to provide you with excellent care. Hearing back from our patients is one way we can continue to improve our services. Please take a few minutes to complete the written survey that you may receive in the mail after you visit with us. Thank you!        [employer] Information     [facility] [employer] gives you secure access to your electronic health record. If you see a primary care provider, you can also send messages to your care team and make appointments. If you have  questions, please call your primary care clinic.  If you do not have a primary care provider, please call [phone] and they will assist you.        Care EveryWhere ID     This is your Care EveryWhere ID. This could be used by other organizations to access your [place] medical records  [id number]        Equal Access to Services     JASSI VAN : Sunday Reddy, farrah saab, jay chaney. So [facility] [phone].    ATENCIÓN: Si habla español, tiene a beckett disposición servicios gratuitos de asistencia lingüística. Llame al [phone].    We comply with applicable federal civil rights laws and [place] laws. We do not discriminate on the basis of race, color, national origin, age, disability, sex, sexual orientation, or gender identity.            After Visit Summary       This is your record. Keep this with you and show to your community pharmacist(s) and doctor(s) at your next visit.

## 2018-03-08 NOTE — DISCHARGE INSTRUCTIONS
Take Macrodantin 100 mg twice daily for 7 days.  Call Dr. Vuong on Saturday or Sunday for urine culture results, [phone], after 9 AM on Saturday, after 12 on Sunday  Return to be seen if you develop fevers, flank pain, vomiting, significant increase in bleeding, or other new concerning symptoms.

## 2018-03-08 NOTE — ED PROVIDER NOTES
History     Chief Complaint   Patient presents with     Rule out Urinary Tract Infection     Hematuria     HPI  Chika Hurd is a 72 year old female, with a history of central retinal artery occlusion, ductal carcinoma in situ and hyperlipidemia, who presents with hematuria and increased urinary urgency since early this morning. She reports that she also passed a clot this morning and noticed blood throughout the whole stream. No burning, foul smelling discharge or dysuria. She has also developed low back pain over the last 2 days that is different from her chronic back and hip pain for which she sees Physical Therapy. She has had UTI's in the past, but not for a few years. She tried drinking cranberry juice and has taken tylenol for the back pain, but no other treatments. No fevers, vomiting, diarrhea, constipation, SOB, or chest pain. No changes in her vision and no trauma or falls. No changes in her mental status and she does not have a chronic indwelling catheter. She does not have a sulfa allergy that she knows of. She is on Plavix 75 mg and Aspirin 81 mg daily for her history of central retinal artery occlusion. She is on Atorvastatin 40 mg daily for hyperlipidemia.  The patient has no other concerns at this time.     Problem List:    Patient Active Problem List    Diagnosis Date Noted     Central retinal artery occlusion, right 05/23/2017     Priority: Medium     Ductal carcinoma in situ (DCIS) of left breast 07/26/2016     Priority: Medium     Advanced directives, counseling/discussion 01/15/2016     Priority: Medium     Patient does not have an Advance/Health Care Directive (HCD), declines information/referral.    Luana Muñiz  January 15, 2016         Cataract right eye 06/04/2015     Priority: Medium     GERD (gastroesophageal reflux disease) 05/19/2009     Priority: Medium     SENSONRL HEAR LOSS,BILAT 05/15/2007     Priority: Medium     Hyperlipidemia LDL goal <130 02/03/2006     Priority:  Medium     Did well with Lipitor but off formulary  Pravastatin caused stomach aches and myalgias  On Crestor until generic Lipitor became available          Past Medical History:    Past Medical History:   Diagnosis Date     DCIS (ductal carcinoma in situ) of breast      GERD (gastroesophageal reflux disease)      Hyperlipidemia        Past Surgical History:    Past Surgical History:   Procedure Laterality Date     ENT SURGERY      dental implants 03/12 started     FLEXIBLE SIGMOIDOSCOPY  04/03     LUMPECTOMY BREAST WITH SEED LOCALIZATION Left 9/21/2016    Procedure: LUMPECTOMY BREAST WITH SEED LOCALIZATION;  Surgeon: Russel Murry MD;  Location: [facility] OR     PHACOEMULSIFICATION WITH STANDARD INTRAOCULAR LENS IMPLANT Right 11/6/2017    Procedure: PHACOEMULSIFICATION WITH STANDARD INTRAOCULAR LENS IMPLANT;  Right cataract removal with implant;  Surgeon: Michael Zuleta MD;  Location: [facility] OR     PHACOEMULSIFICATION WITH STANDARD INTRAOCULAR LENS IMPLANT Left 11/27/2017    Procedure: PHACOEMULSIFICATION WITH STANDARD INTRAOCULAR LENS IMPLANT;  Left cataract removal with implant;  Surgeon: Michael Zuleta MD;  Location: [facility] OR     SURGICAL HISTORY OF -   1959    Right Hip Pinning     SURGICAL HISTORY OF -   1983    Tubal Ligation       Family History:    Family History   Problem Relation Age of Onset     HEART DISEASE Mother      HEART DISEASE Father      Circulatory Father      main artery aneursym     HEART DISEASE Sister      sleep problems     Breast Cancer Maternal Aunt      diagnosed in 60's, surviving in 90's       Social History:  Marital Status:   [2]  Social History   Substance Use Topics     Smoking status: Former Smoker     Types: Cigarettes     Quit date: 1/12/2005     Smokeless tobacco: Never Used     Alcohol use Yes      Comment: rarely        Medications:      nitroFURantoin, macrocrystal-monohydrate, (MACROBID) 100 MG capsule   atorvastatin (LIPITOR) 40 MG tablet   famotidine  (PEPCID) 20 MG tablet   Acetaminophen (TYLENOL PO)   clopidogrel (PLAVIX) 75 MG tablet   Glycerin-Polysorbate 80 (REFRESH DRY EYE THERAPY OP)   ASPIRIN PO   Omega-3 Fatty Acids (OMEGA-3 FISH OIL PO)   Ascorbic Acid (VITAMIN C PO)   co-enzyme Q-10 (CO Q10) 100 MG CAPS   MULTIVITAMIN OR   CALCIUM + D OR         Review of Systems   Constitutional: No fever, chills, malaise, weakness  Eyes: No blurry or change in vision from baseline  ENT: No episaxis  Respiratory: No shortness of breath, cough, hemoptysis  Cardiovascular: No chest pain, palpitations  Gastrointestinal: No nausea, vomiting, diarrhea, constipation, abdominal pain, or melena  Genitourinary: Positive for urinary urgency, increase in frequency, and hematuria. Negative for incontinence, dysuria, or abnormal vaginal discharge  Endocrine: No increased thirst  Skin: No rashes  Musculoskeletal: No muscle weakness or myalgia  Hematologic: No bruising, epistaxis, or hemoptysis. Patient is on Plavix and Aspirin.   Allergic: No known allergies  Neurologic: No headache, dizziness, focal weakness, or falls        Physical Exam   BP: 179/69  Pulse: 72  Temp: 98.2  F (36.8  C)  Resp: 18  SpO2: 96 %      Physical Exam   Constitutional: well appearing well nourished, in no distress, ambulating without difficulty  HEENT: Normocephalic, atraumatic, normal external eyes, conjunctiva clear, PERRLA, normal external ears, moist mucous membranes, no mucosal lesions, mucosa non-inflamed, no adenopathy  Cardiovascular: regular rate and rhythm, normal S1 and S2, no rubs, murmurs or gallops, peripheral pulses strong and equal bilaterally, no peripheral edema  Respiratory: lung sounds clear to auscultation and equal bilaterally, no wheezes, crackles, or rhonchi  GI: soft, non-distended abdomen, non-tender to palpation, normoactive bowel sounds  Skin: no rashes, dry  Musculoskeletal: ROM intact of spine and extremities, normal gait, No CVA tenderness  Neuro: alert and oriented to  person, time, and place, intact recent and remote memory, no focal neurologic abnormalities  Psych: appropriate mood and affect, judgement and insight, cooperative with exam      ED Course     ED Course     Procedures               Critical Care time:  none               Results for orders placed or performed during the hospital encounter of 03/08/18 (from the past 24 hour(s))   UA reflex to Microscopic   Result Value Ref Range    Color Urine Maru     Appearance Urine Slightly Cloudy     Glucose Urine Negative NEG^Negative mg/dL    Bilirubin Urine Negative NEG^Negative    Ketones Urine Negative NEG^Negative mg/dL    Specific Gravity Urine 1.002 (L) 1.003 - 1.035    Blood Urine Large (A) NEG^Negative    pH Urine 7.0 5.0 - 7.0 pH    Protein Albumin Urine 100 (A) NEG^Negative mg/dL    Urobilinogen mg/dL 0.0 0.0 - 2.0 mg/dL    Nitrite Urine Negative NEG^Negative    Leukocyte Esterase Urine Moderate (A) NEG^Negative    Source Midstream Urine     RBC Urine 4 (H) 0 - 2 /HPF    WBC Urine 97 (H) 0 - 5 /HPF    WBC Clumps Present (A) NEG^Negative /HPF    Bacteria Urine Moderate (A) NEG^Negative /HPF    Squamous Epithelial /HPF Urine <1 0 - 1 /HPF    Mucous Urine Present (A) NEG^Negative /LPF         Assessments & Plan (with Medical Decision Making)   Chika Hurd is a 72 year old female, with a history of central retinal artery occlusion, ductal carcinoma in situ and hyperlipidemia, who presents with hematuria and increased urinary urgency since early this morning. She has also had low back pain that started two days ago and feels different than her normal back and hip pain for which she sees PT. UA showed moderate leukocyte esterase, 4 RBC, 97 WBC, negative nitrite, mucus and moderate bacteria. Urinary culture pending. No CVA tenderness, or vomiting. Patient is afebrile with normal pulse. Her symptoms are most likely due to acute cystitis.  I have no concerns for pyelonephritis given lack of systemic symptoms, and flank  pain. Discussed that the urine culture will confirm the diagnosis. Will start Nitrofurantoin, but if her urinary culture is negative  then she will need to follow up with urology for further evaluation, possible cystoscopy. Answered questions to the patient's satisfaction. She is in agreement with the plan.  I advised her to return if she develops other evidence of bleeding including hemoptysis, hematemesis, epistaxis, hematochezia, melena.  I also advised her to return if she develops fevers, flank pain, nausea, vomiting.      I have reviewed the nursing notes.    I have reviewed the findings, diagnosis, plan and need for follow up with the patient.       Discharge Medication List as of 3/8/2018 10:36 AM      START taking these medications    Details   nitroFURantoin, macrocrystal-monohydrate, (MACROBID) 100 MG capsule Take 1 capsule (100 mg) by mouth 2 times daily, Disp-14 capsule, R-0, E-Prescribe             Final diagnoses:   Acute cystitis without hematuria     Brannon Oliver, MS3      3/8/2018   [facility] EMERGENCY DEPARTMENT     Pablo Vuong MD  03/08/18 1200

## 2018-03-09 ENCOUNTER — ONCOLOGY VISIT (OUTPATIENT)
Dept: ONCOLOGY | Facility: CLINIC | Age: 73
End: 2018-03-09
Attending: INTERNAL MEDICINE
Payer: COMMERCIAL

## 2018-03-09 ENCOUNTER — HOSPITAL ENCOUNTER (OUTPATIENT)
Dept: LAB | Facility: CLINIC | Age: 73
Discharge: HOME | End: 2018-03-09
Attending: INTERNAL MEDICINE | Admitting: INTERNAL MEDICINE
Payer: COMMERCIAL

## 2018-03-09 VITALS
HEIGHT: 61 IN | DIASTOLIC BLOOD PRESSURE: 92 MMHG | RESPIRATION RATE: 16 BRPM | TEMPERATURE: 98.7 F | BODY MASS INDEX: 30.72 KG/M2 | OXYGEN SATURATION: 98 % | SYSTOLIC BLOOD PRESSURE: 154 MMHG | WEIGHT: 162.7 LBS | HEART RATE: 69 BPM

## 2018-03-09 DIAGNOSIS — H34.11 CENTRAL RETINAL ARTERY OCCLUSION, RIGHT: ICD-10-CM

## 2018-03-09 DIAGNOSIS — K21.9 GASTROESOPHAGEAL REFLUX DISEASE WITHOUT ESOPHAGITIS: ICD-10-CM

## 2018-03-09 DIAGNOSIS — D05.12 DUCTAL CARCINOMA IN SITU (DCIS) OF LEFT BREAST: ICD-10-CM

## 2018-03-09 DIAGNOSIS — D05.12 DUCTAL CARCINOMA IN SITU (DCIS) OF LEFT BREAST: Primary | ICD-10-CM

## 2018-03-09 LAB — TSH SERPL DL<=0.005 MIU/L-ACNC: 0.51 MU/L (ref 0.4–4)

## 2018-03-09 PROCEDURE — 99214 OFFICE O/P EST MOD 30 MIN: CPT | Performed by: INTERNAL MEDICINE

## 2018-03-09 PROCEDURE — 84443 ASSAY THYROID STIM HORMONE: CPT | Performed by: INTERNAL MEDICINE

## 2018-03-09 PROCEDURE — G0463 HOSPITAL OUTPT CLINIC VISIT: HCPCS

## 2018-03-09 PROCEDURE — 36415 COLL VENOUS BLD VENIPUNCTURE: CPT | Performed by: INTERNAL MEDICINE

## 2018-03-09 ASSESSMENT — PAIN SCALES - GENERAL: PAINLEVEL: MILD PAIN (3)

## 2018-03-09 NOTE — NURSING NOTE
"Oncology Rooming Note    March 9, 2018 11:13 AM   Chika Hurd is a 72 year old female who presents for:    Chief Complaint   Patient presents with     Oncology Clinic Visit     6 month recheck Breast CA, review labs      Initial Vitals: BP (!) 154/92 (BP Location: Right arm, Patient Position: Sitting, Cuff Size: Adult Regular)  Pulse 69  Temp 98.7  F (37.1  C) (Tympanic)  Resp 16  Ht 1.537 m (5' 0.5\")  Wt 73.8 kg (162 lb 11.2 oz)  SpO2 98%  Breastfeeding? No  BMI 31.25 kg/m2 Estimated body mass index is 31.25 kg/(m^2) as calculated from the following:    Height as of this encounter: 1.537 m (5' 0.5\").    Weight as of this encounter: 73.8 kg (162 lb 11.2 oz). Body surface area is 1.77 meters squared.  Mild Pain (3) Comment: Left    No LMP recorded. Patient is postmenopausal.  Allergies reviewed: Yes  Medications reviewed: Yes    Medications: Medication refills not needed today.  Pharmacy name entered into "[employer]": [id number] [place] [address]    Clinical concerns: 6 month recheck Breast CA, review labs.     Patient reports having a UTI. Seen in Urgent Care at [facility] yesterday.     7  minutes for nursing intake (face to face time)     Rosario Burch CMA              "

## 2018-03-09 NOTE — MR AVS SNAPSHOT
After Visit Summary   3/9/2018    Chika Hurd    MRN: [id number]           Patient Information     Date Of Birth          1945        Visit Information        Provider Department      3/9/2018 11:00 AM Eliot Crane MD [facility] ONCOLOGY      Today's Diagnoses     Ductal carcinoma in situ (DCIS) of left breast    -  1      Care Instructions    You will need to have lab (TSH) drawn today.  We will release the results to [facility] once they are available. We would like to see you back in clinic with Dr. Crane in 6 months with mammogram prior. When you are in need of a refill, please call your pharmacy and they will send us a request.  Copy of appointments, and after visit summary (AVS) given to patient.  If you have any questions during business hours (M-F 8 AM- 4PM), please call Sarina Bar RN, BSN, OCN Oncology Hematology /Breast Cancer Navigator at [facility] [phone].   For questions after business hours, or on holidays/weekends, please call our after hours Nurse Triage line [phone]. Thank you.            Follow-ups after your visit        Follow-up notes from your care team     Return in about 6 months (around 9/9/2018).      Your next 10 appointments already scheduled     Mar 14, 2018  9:30 AM CDT   Ortho Treatment with Selin Vincent PT   [facility] ([facility])    [address]  [address]  [place] [place] [facility] [place] [id number] [phone]            Jul 26, 2018 10:00 AM CDT   (Arrive by 9:45 AM)   MA DIAGNOSTIC DIGITAL BILATERAL with [address] [facility] ([facility])    [address]  [place] [address] [phone]           Do not use any powder, lotion or deodorant under your arms or on your breast. If you do, we will ask you to remove it before your exam.  Wear comfortable, two-piece clothing.  If you have any allergies, tell your care team.   "Bring any previous mammograms from other facilities or have them mailed to the breast center.  Three-dimensional (3D) mammograms are available at [place] locations in [facility], [place], [place], [facility], [place], [place], and [place]. [facility] locations include [place] and [facility] in [place]. Benefits of 3D mammograms include: - Improved rate of cancer detection - Decreases your chance of having to go back for more tests, which means fewer: - \"False-positive\" results (This means that there is an abnormal area but it isn't cancer.) - Invasive testing procedures, such as a biopsy or surgery - Can provide clearer images of the breast if you have dense breast tissue. 3D mammography is an optional exam that anyone can have with a 2D mammogram. It doesn't replace or take the place of a 2D mammogram. 2D mammograms remain an effective screening test for all women.  Not all insurance companies cover the cost of a 3D mammogram. Check with your insurance.            Sep 13, 2018  9:30 AM CDT   Return Visit with Eliot Crane MD   [facility] ([facility])    [facility]  [address]  [facility] [address] [phone]              Future tests that were ordered for you today     Open Future Orders        Priority Expected Expires Ordered    MA Diagnostic Digital Bilateral Routine 7/30/2018 9/9/2018 3/9/2018            Who to contact     If you have questions or need follow up information about today's clinic visit or your schedule please contact [facility] directly at [phone].  Normal or non-critical lab and imaging results will be communicated to you by MyChart, letter or phone within 4 business days after the clinic has received the results. If you do not hear from us within 7 days, please contact the clinic through MyChart or phone. If you have a critical or abnormal lab result, we will notify you by " "phone as soon as possible.  Submit refill requests through [employer] or call your pharmacy and they will forward the refill request to us. Please allow 3 business days for your refill to be completed.          Additional Information About Your Visit        [employer] Information     [employer] gives you secure access to your electronic health record. If you see a primary care provider, you can also send messages to your care team and make appointments. If you have questions, please call your primary care clinic.  If you do not have a primary care provider, please call [phone] and they will assist you.        Care EveryWhere ID     This is your Care EveryWhere ID. This could be used by other organizations to access your [facility] medical records  [id number]        Your Vitals Were     Pulse Temperature Respirations Height Pulse Oximetry Breastfeeding?    69 98.7  F (37.1  C) (Tympanic) 16 1.537 m (5' 0.5\") 98% No    BMI (Body Mass Index)                   31.25 kg/m2            Blood Pressure from Last 3 Encounters:   03/09/18 (!) 154/92   03/08/18 179/69   02/08/18 149/80    Weight from Last 3 Encounters:   03/09/18 73.8 kg (162 lb 11.2 oz)   02/08/18 72.7 kg (160 lb 3.2 oz)   11/27/17 72.6 kg (160 lb)               Primary Care Provider Office Phone # Fax #    [facility] [phone] [address] [address]        Equal Access to Services     [facility] AH: Hadii aad ku hadasho Soomaali, waaxda luqadaha, qaybta kaalmada adeegyada, waxay idiin hayjodin connie jacobs la'haley . So [facility] [phone].    ATENCIÓN: Si habla español, tiene a beckett disposición servicios gratuitos de asistencia lingüística. Llame al [phone].    We comply with applicable federal civil rights laws and [place] laws. We do not discriminate on the basis of race, color, national origin, age, disability, sex, sexual orientation, or gender identity.            Thank you!     Thank you for " [facility]  for your care. Our goal is always to provide you with excellent care. Hearing back from our patients is one way we can continue to improve our services. Please take a few minutes to complete the written survey that you may receive in the mail after your visit with us. Thank you!             Your Updated Medication List - Protect others around you: Learn how to safely use, store and throw away your medicines at www.disposemymeds.org.          This list is accurate as of 3/9/18 12:17 PM.  Always use your most recent med list.                   Brand Name Dispense Instructions for use Diagnosis    ASPIRIN PO      Take 81 mg by mouth daily        atorvastatin 40 MG tablet    LIPITOR    90 tablet    Take 40mg one day and 20mg the next alternatingly.    Central retinal artery occlusion of right eye       CALCIUM + D PO      1 TABLET DAILY        clopidogrel 75 MG tablet    PLAVIX    90 tablet    Take 1 tablet (75 mg) by mouth daily    Central artery occlusion of retina, right       co-enzyme Q-10 100 MG Caps capsule      Take 2 capsules by mouth daily. Takes a total of 200 mg daily.        famotidine 20 MG tablet    PEPCID    180 tablet    Take 1 tablet (20 mg) by mouth 2 times daily Establish Care with new PCP.    Gastroesophageal reflux disease without esophagitis       MULTIVITAMIN PO      1 daily        nitroFURantoin (macrocrystal-monohydrate) 100 MG capsule    MACROBID    14 capsule    Take 1 capsule (100 mg) by mouth 2 times daily        OMEGA-3 FISH OIL PO      Take 1 g by mouth daily        REFRESH DRY EYE THERAPY OP      Apply 1-2 drops to eye as needed Reported on 5/23/2017        TYLENOL PO      Take 500 mg by mouth every 6 hours as needed for mild pain or fever        VITAMIN C PO      Take 500 mg by mouth daily

## 2018-03-09 NOTE — LETTER
3/9/2018         RE: Chika Hurd  [address]  [place] [facility] [facility] [address]        Dear Colleague,    Thank you for referring your patient, Chika Hurd, to the [facility]. Please see a copy of my visit note below.    Hematology/ Oncology Follow-up Visit:  Mar 9, 2018    Reason for Visit:   Chief Complaint   Patient presents with     Oncology Clinic Visit     6 month recheck Breast CA, review labs        Oncologic History:    Chika Hurd has been compliant to routine mammogram in 07/2016.  Identified new microcalcification 0.4 cm area on the left breast 12-1 o'clock position 1.7 cm from the nipple.  This is a change from her prior 2015 mammogram.  Stereotactic biopsy was done 07/22/2016, identified high grade DCIS with comedonecrosis and microcalcifications and intermediate grade with solid and cribriform pattern.  No evidence of invasive carcinoma.  No evidence of angiolymphatic invasion.  % positive, [place] 60% to 70% positive.  She bruised significantly after the biopsy with decreased range of motion of left shoulder.  She gradually recovered but still has significant amount of bruising left in the inferior part of the left breast.  She had lumpectomy with Dr. Murry 9/2016 found 1.4 cm grade II DCIS, had also fibrocystic changes. She had RT finished in 11/2016. She started Arimidex after.     Interval History:  Patient was seen today for follow-up.  She has been feeling well without any recent complaints weight loss night sweats fever or chills.  Most recent mammogram shows no abnormalities.  Patient denies any bone aches or pains.    Review Of Systems:  Constitutional: Negative for fever, chills, and night sweats.  Skin: negative.  Eyes: negative.  Ears/Nose/Throat: negative.  Respiratory: No shortness of breath, dyspnea on exertion, cough, or hemoptysis.  Cardiovascular: negative.  Gastrointestinal: negative.  Genitourinary: negative.  Musculoskeletal: negative.  Neurologic:  "negative.  Psychiatric: negative.  Hematologic/Lymphatic/Immunologic: negative.  Endocrine: negative.    All other ROS negative unless mentioned in interval history.    Past medical, social, surgical, and family histories reviewed.    Allergies:  Allergies as of 03/09/2018 - Bertin as Reviewed 03/09/2018   Allergen Reaction Noted     Cortisone Swelling 02/02/2006       Current Medications:  Current Outpatient Prescriptions   Medication Sig Dispense Refill     nitroFURantoin, macrocrystal-monohydrate, (MACROBID) 100 MG capsule Take 1 capsule (100 mg) by mouth 2 times daily 14 capsule 0     atorvastatin (LIPITOR) 40 MG tablet Take 40mg one day and 20mg the next alternatingly. 90 tablet 3     famotidine (PEPCID) 20 MG tablet Take 1 tablet (20 mg) by mouth 2 times daily Establish Care with new PCP. 180 tablet 1     Acetaminophen (TYLENOL PO) Take 500 mg by mouth every 6 hours as needed for mild pain or fever        clopidogrel (PLAVIX) 75 MG tablet Take 1 tablet (75 mg) by mouth daily 90 tablet 3     Glycerin-Polysorbate 80 (REFRESH DRY EYE THERAPY OP) Apply 1-2 drops to eye as needed Reported on 5/23/2017       ASPIRIN PO Take 81 mg by mouth daily        Omega-3 Fatty Acids (OMEGA-3 FISH OIL PO) Take 1 g by mouth daily        Ascorbic Acid (VITAMIN C PO) Take 500 mg by mouth daily       co-enzyme Q-10 (CO Q10) 100 MG CAPS Take 2 capsules by mouth daily. Takes a total of 200 mg daily.  0     MULTIVITAMIN OR 1 daily       CALCIUM + D OR 1 TABLET DAILY          Physical Exam:  BP (!) 154/92 (BP Location: Right arm, Patient Position: Sitting, Cuff Size: Adult Regular)  Pulse 69  Temp 98.7  F (37.1  C) (Tympanic)  Resp 16  Ht 1.537 m (5' 0.5\")  Wt 73.8 kg (162 lb 11.2 oz)  SpO2 98%  Breastfeeding? No  BMI 31.25 kg/m2  Wt Readings from Last 12 Encounters:   03/09/18 73.8 kg (162 lb 11.2 oz)   02/08/18 72.7 kg (160 lb 3.2 oz)   11/27/17 72.6 kg (160 lb)   11/06/17 73.5 kg (162 lb)   09/11/17 73.5 kg (162 lb)   06/02/17 " "72.1 kg (159 lb)   05/23/17 74 kg (163 lb 3.2 oz)   04/24/17 74.1 kg (163 lb 6.4 oz)   03/02/17 74.7 kg (164 lb 11.2 oz)   11/29/16 73.8 kg (162 lb 9.6 oz)   11/23/16 73.8 kg (162 lb 9.6 oz)   11/16/16 73.6 kg (162 lb 3.2 oz)     ECOG performance status: 0  GENERAL APPEARANCE: Healthy, alert and in no acute distress.  HEENT: Sclerae anicteric. PERRLA. Oropharynx without ulcers, lesions, or thrush.  NECK: Supple. No asymmetry or masses.  LYMPHATICS: No palpable cervical, supraclavicular, axillary, or inguinal lymphadenopathy.  RESP: Lungs clear to auscultation bilaterally without rales, rhonchi or wheezes.  BREAST: *There is no dominant masses or axillary adenopathy bilaterally \"CARDIOVASCULAR: Regular rate and rhythm. Normal S1, S2; no S3 or S4. No murmur, gallop, or rub.  ABDOMEN: Soft, nontender. Bowel sounds normal. No palpable organomegaly or masses.  MUSCULOSKELETAL: Extremities without gross deformities noted. No edema of bilateral lower extremities.  SKIN: No suspicious lesions or rashes.  NEURO: Alert and oriented x 3. Cranial nerves II-XII grossly intact.  PSYCHIATRIC: Mentation and affect appear normal.    Laboratory/Imaging Studies:  Orders Only on 03/07/2018   Component Date Value Ref Range Status     WBC 03/07/2018 6.2  4.0 - 11.0 10e9/L Final     RBC Count 03/07/2018 4.58  3.8 - 5.2 10e12/L Final     Hemoglobin 03/07/2018 13.6  11.7 - 15.7 g/dL Final     Hematocrit 03/07/2018 42.9  35.0 - 47.0 % Final     MCV 03/07/2018 94  78 - 100 fl Final     MCH 03/07/2018 29.7  26.5 - 33.0 pg Final     MCHC 03/07/2018 31.7  31.5 - 36.5 g/dL Final     RDW 03/07/2018 13.7  10.0 - 15.0 % Final     Platelet Count 03/07/2018 276  150 - 450 10e9/L Final     Diff Method 03/07/2018 Automated Method   Final     % Neutrophils 03/07/2018 55.7  % Final     % Lymphocytes 03/07/2018 24.5  % Final     % Monocytes 03/07/2018 13.5  % Final     % Eosinophils 03/07/2018 5.3  % Final     % Basophils 03/07/2018 1.0  % Final     " Absolute Neutrophil 03/07/2018 3.4  1.6 - 8.3 10e9/L Final     Absolute Lymphocytes 03/07/2018 1.5  0.8 - 5.3 10e9/L Final     Absolute Monocytes 03/07/2018 0.8  0.0 - 1.3 10e9/L Final     Absolute Eosinophils 03/07/2018 0.3  0.0 - 0.7 10e9/L Final     Absolute Basophils 03/07/2018 0.1  0.0 - 0.2 10e9/L Final     Sodium 03/07/2018 137  133 - 144 mmol/L Final     Potassium 03/07/2018 4.2  3.4 - 5.3 mmol/L Final     Chloride 03/07/2018 104  94 - 109 mmol/L Final     Carbon Dioxide 03/07/2018 28  20 - 32 mmol/L Final     Anion Gap 03/07/2018 5  3 - 14 mmol/L Final     Glucose 03/07/2018 85  70 - 99 mg/dL Final    Fasting specimen     Urea Nitrogen 03/07/2018 18  7 - 30 mg/dL Final     Creatinine 03/07/2018 0.62  0.52 - 1.04 mg/dL Final     GFR Estimate 03/07/2018 >90  >60 mL/min/1.7m2 Final    Non  GFR Calc     GFR Estimate If Black 03/07/2018 >90  >60 mL/min/1.7m2 Final    African American GFR Calc     Calcium 03/07/2018 8.9  8.5 - 10.1 mg/dL Final     Bilirubin Total 03/07/2018 0.5  0.2 - 1.3 mg/dL Final     Albumin 03/07/2018 3.6  3.4 - 5.0 g/dL Final     Protein Total 03/07/2018 7.0  6.8 - 8.8 g/dL Final     Alkaline Phosphatase 03/07/2018 90  40 - 150 U/L Final     ALT 03/07/2018 35  0 - 50 U/L Final     AST 03/07/2018 24  0 - 45 U/L Final     Hepatitis C Antibody 03/07/2018 Nonreactive  NR^Nonreactive Final    Comment: Assay performance characteristics have not been established for newborns,   infants, and children          Recent Results (from the past 744 hour(s))   XR Hip Left 2-3 Views    Narrative    LEFT HIP TWO TO THREE VIEWS   2/8/2018 8:58 AM     HISTORY: Left hip pain.    COMPARISON: None.      Impression    IMPRESSION: No evidence of fracture. Femoral head appears normally  aligned in the acetabulum. There are mild degenerative changes in the  hip with mild loss of the joint space superiorly. There appears to be  enthesopathy near the greater trochanter.    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  There is no clinical evidence of breast cancer recurrence.  I will see the patient again in 6 months or sooner if there are new developments or concerns.    The patient is ready to learn, no apparent learning barriers were identified.  Diagnosis and treatment plans were explained to the patient. The patient expressed understanding of the content. The patient asked appropriate questions. The patient questions were answered to her satisfaction.    Chart documentation with Dragon Voice recognition Software. Although reviewed after completion, some words and grammatical errors may remain.    Again, thank you for allowing me to participate in the care of your patient.        Sincerely,        Eliot Crane MD

## 2018-03-09 NOTE — PROGRESS NOTES
Hematology/ Oncology Follow-up Visit:  Mar 9, 2018    Reason for Visit:   Chief Complaint   Patient presents with     Oncology Clinic Visit     6 month recheck Breast CA, review labs        Oncologic History:    Chika Hurd has been compliant to routine mammogram in 07/2016.  Identified new microcalcification 0.4 cm area on the left breast 12-1 o'clock position 1.7 cm from the nipple.  This is a change from her prior 2015 mammogram.  Stereotactic biopsy was done 07/22/2016, identified high grade DCIS with comedonecrosis and microcalcifications and intermediate grade with solid and cribriform pattern.  No evidence of invasive carcinoma.  No evidence of angiolymphatic invasion.  % positive, GA 60% to 70% positive.  She bruised significantly after the biopsy with decreased range of motion of left shoulder.  She gradually recovered but still has significant amount of bruising left in the inferior part of the left breast.  She had lumpectomy with Dr. Murry 9/2016 found 1.4 cm grade II DCIS, had also fibrocystic changes. She had RT finished in 11/2016. She started Arimidex after.     Interval History:  Patient was seen today for follow-up.  She has been feeling well without any recent complaints weight loss night sweats fever or chills.  Most recent mammogram shows no abnormalities.  Patient denies any bone aches or pains.    Review Of Systems:  Constitutional: Negative for fever, chills, and night sweats.  Skin: negative.  Eyes: negative.  Ears/Nose/Throat: negative.  Respiratory: No shortness of breath, dyspnea on exertion, cough, or hemoptysis.  Cardiovascular: negative.  Gastrointestinal: negative.  Genitourinary: negative.  Musculoskeletal: negative.  Neurologic: negative.  Psychiatric: negative.  Hematologic/Lymphatic/Immunologic: negative.  Endocrine: negative.    All other ROS negative unless mentioned in interval history.    Past medical, social, surgical, and family histories  "reviewed.    Allergies:  Allergies as of 03/09/2018 - Bertin as Reviewed 03/09/2018   Allergen Reaction Noted     Cortisone Swelling 02/02/2006       Current Medications:  Current Outpatient Prescriptions   Medication Sig Dispense Refill     nitroFURantoin, macrocrystal-monohydrate, (MACROBID) 100 MG capsule Take 1 capsule (100 mg) by mouth 2 times daily 14 capsule 0     atorvastatin (LIPITOR) 40 MG tablet Take 40mg one day and 20mg the next alternatingly. 90 tablet 3     famotidine (PEPCID) 20 MG tablet Take 1 tablet (20 mg) by mouth 2 times daily Establish Care with new PCP. 180 tablet 1     Acetaminophen (TYLENOL PO) Take 500 mg by mouth every 6 hours as needed for mild pain or fever        clopidogrel (PLAVIX) 75 MG tablet Take 1 tablet (75 mg) by mouth daily 90 tablet 3     Glycerin-Polysorbate 80 (REFRESH DRY EYE THERAPY OP) Apply 1-2 drops to eye as needed Reported on 5/23/2017       ASPIRIN PO Take 81 mg by mouth daily        Omega-3 Fatty Acids (OMEGA-3 FISH OIL PO) Take 1 g by mouth daily        Ascorbic Acid (VITAMIN C PO) Take 500 mg by mouth daily       co-enzyme Q-10 (CO Q10) 100 MG CAPS Take 2 capsules by mouth daily. Takes a total of 200 mg daily.  0     MULTIVITAMIN OR 1 daily       CALCIUM + D OR 1 TABLET DAILY          Physical Exam:  BP (!) 154/92 (BP Location: Right arm, Patient Position: Sitting, Cuff Size: Adult Regular)  Pulse 69  Temp 98.7  F (37.1  C) (Tympanic)  Resp 16  Ht 1.537 m (5' 0.5\")  Wt 73.8 kg (162 lb 11.2 oz)  SpO2 98%  Breastfeeding? No  BMI 31.25 kg/m2  Wt Readings from Last 12 Encounters:   03/09/18 73.8 kg (162 lb 11.2 oz)   02/08/18 72.7 kg (160 lb 3.2 oz)   11/27/17 72.6 kg (160 lb)   11/06/17 73.5 kg (162 lb)   09/11/17 73.5 kg (162 lb)   06/02/17 72.1 kg (159 lb)   05/23/17 74 kg (163 lb 3.2 oz)   04/24/17 74.1 kg (163 lb 6.4 oz)   03/02/17 74.7 kg (164 lb 11.2 oz)   11/29/16 73.8 kg (162 lb 9.6 oz)   11/23/16 73.8 kg (162 lb 9.6 oz)   11/16/16 73.6 kg (162 lb 3.2 " "oz)     ECOG performance status: 0  GENERAL APPEARANCE: Healthy, alert and in no acute distress.  HEENT: Sclerae anicteric. PERRLA. Oropharynx without ulcers, lesions, or thrush.  NECK: Supple. No asymmetry or masses.  LYMPHATICS: No palpable cervical, supraclavicular, axillary, or inguinal lymphadenopathy.  RESP: Lungs clear to auscultation bilaterally without rales, rhonchi or wheezes.  BREAST: *There is no dominant masses or axillary adenopathy bilaterally \"CARDIOVASCULAR: Regular rate and rhythm. Normal S1, S2; no S3 or S4. No murmur, gallop, or rub.  ABDOMEN: Soft, nontender. Bowel sounds normal. No palpable organomegaly or masses.  MUSCULOSKELETAL: Extremities without gross deformities noted. No edema of bilateral lower extremities.  SKIN: No suspicious lesions or rashes.  NEURO: Alert and oriented x 3. Cranial nerves II-XII grossly intact.  PSYCHIATRIC: Mentation and affect appear normal.    Laboratory/Imaging Studies:  Orders Only on 03/07/2018   Component Date Value Ref Range Status     WBC 03/07/2018 6.2  4.0 - 11.0 10e9/L Final     RBC Count 03/07/2018 4.58  3.8 - 5.2 10e12/L Final     Hemoglobin 03/07/2018 13.6  11.7 - 15.7 g/dL Final     Hematocrit 03/07/2018 42.9  35.0 - 47.0 % Final     MCV 03/07/2018 94  78 - 100 fl Final     MCH 03/07/2018 29.7  26.5 - 33.0 pg Final     MCHC 03/07/2018 31.7  31.5 - 36.5 g/dL Final     RDW 03/07/2018 13.7  10.0 - 15.0 % Final     Platelet Count 03/07/2018 276  150 - 450 10e9/L Final     Diff Method 03/07/2018 Automated Method   Final     % Neutrophils 03/07/2018 55.7  % Final     % Lymphocytes 03/07/2018 24.5  % Final     % Monocytes 03/07/2018 13.5  % Final     % Eosinophils 03/07/2018 5.3  % Final     % Basophils 03/07/2018 1.0  % Final     Absolute Neutrophil 03/07/2018 3.4  1.6 - 8.3 10e9/L Final     Absolute Lymphocytes 03/07/2018 1.5  0.8 - 5.3 10e9/L Final     Absolute Monocytes 03/07/2018 0.8  0.0 - 1.3 10e9/L Final     Absolute Eosinophils 03/07/2018 0.3  " 0.0 - 0.7 10e9/L Final     Absolute Basophils 03/07/2018 0.1  0.0 - 0.2 10e9/L Final     Sodium 03/07/2018 137  133 - 144 mmol/L Final     Potassium 03/07/2018 4.2  3.4 - 5.3 mmol/L Final     Chloride 03/07/2018 104  94 - 109 mmol/L Final     Carbon Dioxide 03/07/2018 28  20 - 32 mmol/L Final     Anion Gap 03/07/2018 5  3 - 14 mmol/L Final     Glucose 03/07/2018 85  70 - 99 mg/dL Final    Fasting specimen     Urea Nitrogen 03/07/2018 18  7 - 30 mg/dL Final     Creatinine 03/07/2018 0.62  0.52 - 1.04 mg/dL Final     GFR Estimate 03/07/2018 >90  >60 mL/min/1.7m2 Final    Non  GFR Calc     GFR Estimate If Black 03/07/2018 >90  >60 mL/min/1.7m2 Final    African American GFR Calc     Calcium 03/07/2018 8.9  8.5 - 10.1 mg/dL Final     Bilirubin Total 03/07/2018 0.5  0.2 - 1.3 mg/dL Final     Albumin 03/07/2018 3.6  3.4 - 5.0 g/dL Final     Protein Total 03/07/2018 7.0  6.8 - 8.8 g/dL Final     Alkaline Phosphatase 03/07/2018 90  40 - 150 U/L Final     ALT 03/07/2018 35  0 - 50 U/L Final     AST 03/07/2018 24  0 - 45 U/L Final     Hepatitis C Antibody 03/07/2018 Nonreactive  NR^Nonreactive Final    Comment: Assay performance characteristics have not been established for newborns,   infants, and children          Recent Results (from the past 744 hour(s))   XR Hip Left 2-3 Views    Narrative    LEFT HIP TWO TO THREE VIEWS   2/8/2018 8:58 AM     HISTORY: Left hip pain.    COMPARISON: None.      Impression    IMPRESSION: No evidence of fracture. Femoral head appears normally  aligned in the acetabulum. There are mild degenerative changes in the  hip with mild loss of the joint space superiorly. There appears to be  enthesopathy near the greater trochanter.    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  There is no clinical evidence of  breast cancer recurrence.  I will see the patient again in 6 months or sooner if there are new developments or concerns.    The patient is ready to learn, no apparent learning barriers were identified.  Diagnosis and treatment plans were explained to the patient. The patient expressed understanding of the content. The patient asked appropriate questions. The patient questions were answered to her satisfaction.    Chart documentation with Dragon Voice recognition Software. Although reviewed after completion, some words and grammatical errors may remain.

## 2018-03-09 NOTE — PATIENT INSTRUCTIONS
You will need to have lab (TSH) drawn today.  We will [facility] once they are available. We would like to see you back in clinic with Dr. Crane in 6 months with mammogram prior. When you are in need of a refill, please call your pharmacy and they will send us a request.  Copy of appointments, and after visit summary (AVS) given to patient.  If you have any questions during business hours (M-F 8 AM- 4PM), please call Sarina Bar RN, BSN, OCN Oncology Hematology /Breast Cancer Navigator at [facility] [phone].   For questions after business hours, or on holidays/weekends, please call our after hours Nurse Triage line [phone]. Thank you.

## 2018-03-10 LAB
BACTERIA SPEC CULT: ABNORMAL
Lab: ABNORMAL
SPECIMEN SOURCE: ABNORMAL

## 2018-03-12 NOTE — ASSESSMENT & PLAN NOTE
Chika Hurd has been compliant to routine mammogram in 07/2016.  Identified new microcalcification 0.4 cm area on the left breast 12-1 o'clock position 1.7 cm from the nipple.  This is a change from her prior 2015 mammogram.  Stereotactic biopsy was done 07/22/2016, identified high grade DCIS with comedonecrosis and microcalcifications and intermediate grade with solid and cribriform pattern.  No evidence of invasive carcinoma.  No evidence of angiolymphatic invasion.  % positive, MT 60% to 70% positive.  She bruised significantly after the biopsy with decreased range of motion of left shoulder.  She gradually recovered but still has significant amount of bruising left in the inferior part of the left breast.  She had lumpectomy with Dr. Murry 9/2016 found 1.4 cm grade II DCIS, had also fibrocystic changes. She had RT finished in 11/2016. She started Arimidex after.

## 2018-03-14 ENCOUNTER — HOSPITAL ENCOUNTER (OUTPATIENT)
Dept: PHYSICAL THERAPY | Facility: CLINIC | Age: 73
Setting detail: THERAPIES SERIES
End: 2018-03-14
Attending: FAMILY MEDICINE
Payer: COMMERCIAL

## 2018-03-14 PROCEDURE — 40000718 ZZHC STATISTIC PT DEPARTMENT ORTHO VISIT: Performed by: PHYSICAL THERAPIST

## 2018-03-14 PROCEDURE — 97035 APP MDLTY 1+ULTRASOUND EA 15: CPT | Mod: GP | Performed by: PHYSICAL THERAPIST

## 2018-03-14 PROCEDURE — 97110 THERAPEUTIC EXERCISES: CPT | Mod: GP | Performed by: PHYSICAL THERAPIST

## 2018-03-19 ENCOUNTER — TELEPHONE (OUTPATIENT)
Dept: NUTRITION | Facility: CLINIC | Age: 73
End: 2018-03-19

## 2018-03-19 NOTE — TELEPHONE ENCOUNTER
Patient was contacted by phone due to reporting concerns about unintended weight loss or current weight  on the Oncology Distress Screening tool. A voicemail message was left and the number to use to contact dietitian.     Key Casillas RD,LD  Clinical Dietitian

## 2018-03-21 ENCOUNTER — HOSPITAL ENCOUNTER (OUTPATIENT)
Dept: PHYSICAL THERAPY | Facility: CLINIC | Age: 73
Setting detail: THERAPIES SERIES
End: 2018-03-21
Attending: FAMILY MEDICINE
Payer: COMMERCIAL

## 2018-03-21 PROCEDURE — 97140 MANUAL THERAPY 1/> REGIONS: CPT | Mod: GP | Performed by: PHYSICAL THERAPIST

## 2018-03-21 PROCEDURE — 40000718 ZZHC STATISTIC PT DEPARTMENT ORTHO VISIT: Performed by: PHYSICAL THERAPIST

## 2018-03-21 PROCEDURE — 97035 APP MDLTY 1+ULTRASOUND EA 15: CPT | Mod: GP | Performed by: PHYSICAL THERAPIST

## 2018-03-22 DIAGNOSIS — K21.9 GASTROESOPHAGEAL REFLUX DISEASE WITHOUT ESOPHAGITIS: ICD-10-CM

## 2018-03-22 RX ORDER — FAMOTIDINE 20 MG/1
20 TABLET, FILM COATED ORAL 2 TIMES DAILY
Qty: 180 TABLET | Refills: 1 | Status: SHIPPED | OUTPATIENT
Start: 2018-03-22 | End: 2018-09-15

## 2018-03-22 NOTE — TELEPHONE ENCOUNTER
"Requested Prescriptions   Pending Prescriptions Disp Refills     famotidine (PEPCID) 20 MG tablet [Pharmacy Med Name: FAMOTIDINE 20 MG TABLET]    Last Written Prescription Date:  09/25/17  Last Fill Quantity: 180,  # refills: 1   Last office visit: 2/8/2018 with prescribing provider:  02/08/18   Future Office Visit:     180 tablet 1     Sig: TAKE 1 TABLET (20 MG) BY MOUTH 2 TIMES DAILY ESTABLISH CARE WITH NEW PCP.    H2 Blockers Protocol Passed    3/22/2018  1:50 AM       Passed - Patient is age 12 or older       Passed - Recent (12 mo) or future (30 days) visit within the authorizing provider's specialty    Patient had office visit in the last 12 months or has a visit in the next 30 days with authorizing provider or within the authorizing provider's specialty.  See \"Patient Info\" tab in inbasket, or \"Choose Columns\" in Meds & Orders section of the refill encounter.              "

## 2018-03-28 ENCOUNTER — HOSPITAL ENCOUNTER (OUTPATIENT)
Dept: PHYSICAL THERAPY | Facility: CLINIC | Age: 73
Setting detail: THERAPIES SERIES
End: 2018-03-28
Attending: FAMILY MEDICINE
Payer: COMMERCIAL

## 2018-03-28 PROCEDURE — 97035 APP MDLTY 1+ULTRASOUND EA 15: CPT | Mod: GP | Performed by: PHYSICAL THERAPIST

## 2018-03-28 PROCEDURE — 40000718 ZZHC STATISTIC PT DEPARTMENT ORTHO VISIT: Performed by: PHYSICAL THERAPIST

## 2018-03-28 NOTE — PROGRESS NOTES
Outpatient Physical Therapy Progress Note     Patient: Chika Hurd  : 1945    Beginning/End Dates of Reporting Period:  18 to 3/28/2018.  Total # of Rx sessions: 7    Referring Provider: Audrey Roy Diagnosis: L Hip Pain      Client Self Report: A lot better now than initially.  Still trouble sleeping some nights.  Ex's really helping.  Hurt after last Rx session, but after that it was better.  P Scale: 4/10.     Objective Measurements:  Objective Measure: LEFS   Details: 3/28/18 Score 50/80.   INITIALLY: 42/80     Objective Measure: MMT:   Details: 3/28/18  No P w/ MMT, L Hip Ab 5, Add 5, Knee Ext L 4+, R 5-,   INITIALLY: P w/L Hip Abd 4+, Hip Flex R 4+, L 4; Knee Ext 4+ B, Hip Add 4+    Objective Measure: Flexibility  Details: INITIALLY: Sidelying Abd Pful for L Hip Abd's/ Tight Hip Flex's B, ITBand B, Quad Femoris B.     Objective Measure: Alignment.  Details: 3/21/18 Pelvis and Back alignment normal.      Goals:  Goal Identifier 1   Goal Description STG: Improve sleep to 90% of night 3/28/18 Still difficulty sleeping . NOT MET    Target Date 18   Date Met      Progress:     Goal Identifier 2   Goal Description STG:Pt will note less P w/ going up stairs and minimal difficulty.  3/28/18 MET    Target Date 18   Date Met  18   Progress:     Goal Identifier 3   Goal Description STG: Pt will be able to walk 2 blocks w/ minimal difficulty  3/28/18 MET    Target Date 18   Date Met  18   Progress:     Goal Identifier 4   Goal Description  LTG: Pt will be independent w/ self cares for L hip, HEP to restore strength and mobility.    Target Date 18   Date Met      Progress:     Progress Toward Goals:   Progress this reporting period: Pt has met 2/3 STG's.     Plan:  Continue therapy per current plan of care.  - Recheck in 2 weeks.     Discharge:  No  Selin Vincent, PT, MTC (#[id number])  [facility]           [phone]  Fax           [phone]  Appt #      [phone]

## 2018-04-18 ENCOUNTER — HOSPITAL ENCOUNTER (OUTPATIENT)
Dept: PHYSICAL THERAPY | Facility: CLINIC | Age: 73
Setting detail: THERAPIES SERIES
End: 2018-04-18
Attending: FAMILY MEDICINE
Payer: COMMERCIAL

## 2018-04-18 PROCEDURE — 40000718 ZZHC STATISTIC PT DEPARTMENT ORTHO VISIT: Performed by: PHYSICAL THERAPIST

## 2018-04-18 PROCEDURE — 97140 MANUAL THERAPY 1/> REGIONS: CPT | Mod: GP | Performed by: PHYSICAL THERAPIST

## 2018-04-18 PROCEDURE — 97035 APP MDLTY 1+ULTRASOUND EA 15: CPT | Mod: GP | Performed by: PHYSICAL THERAPIST

## 2018-05-02 ENCOUNTER — HOSPITAL ENCOUNTER (OUTPATIENT)
Dept: PHYSICAL THERAPY | Facility: CLINIC | Age: 73
Setting detail: THERAPIES SERIES
End: 2018-05-02
Attending: FAMILY MEDICINE
Payer: COMMERCIAL

## 2018-05-02 PROCEDURE — 97140 MANUAL THERAPY 1/> REGIONS: CPT | Mod: GP | Performed by: PHYSICAL THERAPIST

## 2018-05-02 PROCEDURE — 40000718 ZZHC STATISTIC PT DEPARTMENT ORTHO VISIT: Performed by: PHYSICAL THERAPIST

## 2018-05-02 PROCEDURE — 97035 APP MDLTY 1+ULTRASOUND EA 15: CPT | Mod: GP | Performed by: PHYSICAL THERAPIST

## 2018-05-02 NOTE — PROGRESS NOTES
Outpatient Physical Therapy Progress Note     Patient: Chika Hurd  : 1945    Beginning/End Dates of Reporting Period:  3/28/18 to 2018. Total # of Rx sessions: 7    Referring Provider: Audrey Roy Diagnosis:  L Hip Pain     Client Self Report: Took tape off after 24 hours, didn't work, P Scale: 2/10. Achey P with sitting. Unable to lay on L hip at night. Sleeping 80% of night. [facility].     Objective Measurements:  Objective Measure: LEFS   Details: 18 Score 48/80.    3/28/18 Score 50/80.   INITIALLY: 42/80     Objective Measure: MMT:   Details: 18  No P w/ MMT, L Hip Ab 5, Add 5, Knee Ext L 5, R 5,   INITIALLY: P w/L Hip Abd 4+, Hip Flex R 4+, L 4; Knee Ext 4+ B, Hip Add 4+    Objective Measure: Flexibility  Details: 18 Resisted sidelying Abd no longer Pful.   INITIALLY: Sidelying Abd Pful for L Hip Abd's/ Tight Hip Flex's B, ITBand B, Quad Femoris B  .   Objective Measure: Alignment.  Details: 3/21/18 Pelvis and Back alignment normal.      Goals:  Goal Identifier 1   Goal Description STG: Improve sleep to 90% of night 18 Sleeping 80% of night d/t hip P.  NOT MET    Target Date 18   Date Met      Progress:     Goal Identifier 2   Goal Description STG:Pt will note less P w/ going up stairs and minimal difficulty.  3/28/18 MET    Target Date 18   Date Met  18   Progress:     Goal Identifier 3   Goal Description STG: Pt will be able to walk 2 blocks w/ minimal difficulty  3/28/18 MET    Target Date 18   Date Met  18   Progress:     Goal Identifier 4   Goal Description  LTG: Pt will be independent w/ self cares for L hip, HEP to restore strength and mobility.  18 MET. [facility]eds to [facility] though.    Target Date 18   Date Met      Progress:     Progress Toward Goals:   Progress this reporting period: Met 2/3 STG's.   Progress limited due to Continued P at night.     Plan:  Continue  therapy per current plan of care.  Changes to therapy plan of care: Hold chart x 1 month.     Discharge:  No    Selin Vincent PT, MTC (#[id number])  [facility]           [phone]  Fax          [phone]  Appt #      [phone]

## 2018-05-17 ENCOUNTER — OFFICE VISIT (OUTPATIENT)
Dept: AUDIOLOGY | Facility: CLINIC | Age: 73
End: 2018-05-17

## 2018-05-17 DIAGNOSIS — H34.11 CENTRAL ARTERY OCCLUSION OF RETINA, RIGHT: ICD-10-CM

## 2018-05-17 DIAGNOSIS — H90.3 SENSORINEURAL HEARING LOSS, BILATERAL: Primary | ICD-10-CM

## 2018-05-17 PROCEDURE — V5299 HEARING SERVICE: HCPCS | Performed by: AUDIOLOGIST

## 2018-05-17 PROCEDURE — 99207 ZZC NO CHARGE LOS: CPT | Performed by: AUDIOLOGIST

## 2018-05-17 RX ORDER — CLOPIDOGREL BISULFATE 75 MG/1
75 TABLET ORAL DAILY
Qty: 90 TABLET | Refills: 3 | Status: SHIPPED | OUTPATIENT
Start: 2018-05-17 | End: 2019-02-08

## 2018-05-17 NOTE — TELEPHONE ENCOUNTER
"LOV 9-5-17 Dr. Pascal noted \"She has been on Plavix 75 mg daily and Lipitor 40 mg daily. She is having some muscles aches with the increased dose of lipitor. I have asked her to start taking 30 mg lipitor daily. We will continue with medical therapy and I will see her back in one year for follow up.\"    Will refill per RN protocol.    LI Concepcion, RN    "

## 2018-05-17 NOTE — TELEPHONE ENCOUNTER
Requested Prescriptions   Pending Prescriptions Disp Refills     clopidogrel (PLAVIX) 75 MG tablet 90 tablet 3     Sig: Take 1 tablet (75 mg) by mouth daily    There is no refill protocol information for this order

## 2018-05-17 NOTE — PROGRESS NOTES
73 year old comes in for a hearing aid check.  She reports her right 2007 [employer] Moxi 16 hearing aid is not working.     Replaced plugged wax guard. Verified hearing aid functionality.      Again reviewed need for new hearing aids. Discussed process and appointments needed.     See chart in the hearing aid room.     Stephanie DIXON, #[id number]

## 2018-05-17 NOTE — MR AVS SNAPSHOT
"              After Visit Summary   5/17/2018    Chika uHrd    MRN: [id number]           Patient Information     Date Of Birth          1945        Visit Information        Provider Department      5/17/2018 10:00 AM Stephanie Pitts AuD [facility]        Today's Diagnoses     Sensorineural hearing loss, bilateral    -  1       Follow-ups after your visit        Your next 10 appointments already scheduled     Jul 26, 2018 10:00 AM CDT   [place] DIAGNOSTIC DIGITAL BILATERAL with [id number]   [facility] ([facility])    [address] [address]   [phone]           Do not use any powder, lotion or deodorant under your arms or on your breast. If you do, we will ask you to remove it before your exam.  Wear comfortable, two-piece clothing.  If you have any allergies, tell your care team.  Bring any previous mammograms from other facilities or have them mailed to the breast center.  Three-dimensional (3D) mammograms are available at [place] locations in [facility], [facility], [facility], [place], [place], and [place]. [facility] locations include [place] and [facility] in [place]. Benefits of 3D mammograms include: - Improved rate of cancer detection - Decreases your chance of having to go back for more tests, which means fewer: - \"False-positive\" results (This means that there is an abnormal area but it isn't cancer.) - Invasive testing procedures, such as a biopsy or surgery - Can provide clearer images of the breast if you have dense breast tissue. 3D mammography is an optional exam that anyone can have with a 2D mammogram. It doesn't replace or take the place of a 2D mammogram. 2D mammograms remain an effective screening test for all women.  Not all insurance companies cover the cost of a 3D mammogram. Check with your insurance.            Sep 13, 2018  9:30 AM CDT   Return Visit with Eliot Crane MD "   [facility] ([facility])    [facility]  [address] [place] [address]  [facility] [address] [phone]              Who to contact     If you have questions or need follow up information about today's clinic visit or your schedule please contact [facility] directly at [phone].  Normal or non-critical lab and imaging results will be communicated to you by MyChart, letter or phone within 4 business days after the clinic has received the results. If you do not hear from us within 7 days, please contact the clinic through MyChart or phone. If you have a critical or abnormal lab result, we will notify you by phone as soon as possible.  Submit refill requests through [employer] or call your pharmacy and they will forward the refill request to us. Please allow 3 business days for your refill to be completed.          Additional Information About Your Visit        [employer] Information     [employer] gives you secure access to your electronic health record. If you see a primary care provider, you can also send messages to your care team and make appointments. If you have questions, please call your primary care clinic.  If you do not have a primary care provider, please call [phone] and they will assist you.        Care EveryWhere ID     This is your Care EveryWhere ID. This could be used by other organizations to access your [place] medical records  [id number]         Blood Pressure from Last 3 Encounters:   03/09/18 (!) 154/92   03/08/18 179/69   02/08/18 149/80    Weight from Last 3 Encounters:   03/09/18 162 lb 11.2 oz (73.8 kg)   02/08/18 160 lb 3.2 oz (72.7 kg)   11/27/17 160 lb (72.6 kg)              We Performed the Following     HEARING AID CLEANING          Where to get your medicines      These medications were sent to [facility] [address] [place] [address] [facility] [address]     Phone:  [phone]      clopidogrel 75 MG tablet          Primary Care Provider Office Phone # Fax #    Audrey [place] Roy [phone] [address] [facility] [address]        Equal Access to Services     DANAYBROOKE CARLOTA : Hadii nalini ku juano Sosusuali, waaxda luqadaha, qaybta kaalmada adeegyada, waxkathy idiin alexandran connie jacobs laValentehaley real. So [facility] [phone].    ATENCIÓN: Si habla español, tiene a beckett disposición servicios gratuitos de asistencia lingüística. Llame al [phone].    We comply with applicable federal civil rights laws and [place] laws. We do not discriminate on the basis of race, color, national origin, age, disability, sex, sexual orientation, or gender identity.            Thank you!     Thank you for choosing [facility]  for your care. Our goal is always to provide you with excellent care. Hearing back from our patients is one way we can continue to improve our services. Please take a few minutes to complete the written survey that you may receive in the mail after your visit with us. Thank you!             Your Updated Medication List - Protect others around you: Learn how to safely use, store and throw away your medicines at www.disposemymeds.org.          This list is accurate as of 5/17/18 12:06 PM.  Always use your most recent med list.                   Brand Name Dispense Instructions for use Diagnosis    ASPIRIN PO      Take 81 mg by mouth daily        atorvastatin 40 MG tablet    LIPITOR    90 tablet    Take 40mg one day and 20mg the next alternatingly.    Central retinal artery occlusion of right eye       CALCIUM + D PO      1 TABLET DAILY        clopidogrel 75 MG tablet    PLAVIX    90 tablet    Take 1 tablet (75 mg) by mouth daily    Central artery occlusion of retina, right       co-enzyme Q-10 100 MG Caps capsule      Take 2 capsules by mouth daily. Takes a total of 200 mg daily.        famotidine 20 MG tablet    PEPCID    180 tablet    Take 1 tablet (20  mg) by mouth 2 times daily    Gastroesophageal reflux disease without esophagitis       MULTIVITAMIN PO      1 daily        nitroFURantoin (macrocrystal-monohydrate) 100 MG capsule    MACROBID    14 capsule    Take 1 capsule (100 mg) by mouth 2 times daily        OMEGA-3 FISH OIL PO      Take 1 g by mouth daily        REFRESH DRY EYE THERAPY OP      Apply 1-2 drops to eye as needed Reported on 5/23/2017        TYLENOL PO      Take 500 mg by mouth every 6 hours as needed for mild pain or fever        VITAMIN C PO      Take 500 mg by mouth daily

## 2018-06-20 NOTE — ADDENDUM NOTE
Encounter addended by: Selin Vincent, PT on: 6/20/2018  9:26 AM<BR>     Actions taken: Flowsheet accepted, Sign clinical note, Episode resolved

## 2018-06-20 NOTE — PROGRESS NOTES
Outpatient Physical Therapy Discharge Note     Patient: Chika Hurd  : 1945    Beginning/End Dates of Reporting Period:  18  to 18 when last seen. D/C written on 2018.  Total # of Rx sessions:     Referring Provider: Audrey Roy Diagnosis: L Hip Pain     Client Self Report: Took tape off after 24 hours, didn't work, P Scale: 2/10. Achey P with sitting. Unable to lay on L hip at night. Sleeping 80% of night.     Objective Measurements:  Objective Measure: LEFS   Details: 18 Score 48/80.    3/28/18 Score 50/80.   INITIALLY: 42/80     Objective Measure: MMT:   Details: 18  No P w/ MMT, L Hip Ab 5, Add 5, Knee Ext L 5, R 5,   INITIALLY: P w/L Hip Abd 4+, Hip Flex R 4+, L 4; Knee Ext 4+ B, Hip Add 4+    Objective Measure: Flexibility  Details: 18 Resisted sidelying Abd no longer Pful.   INITIALLY: Sidelying Abd Pful for L Hip Abd's/ Tight Hip Flex's B, ITBand B, Quad Femoris B.     Objective Measure: Alignment.  Details: 3/21/18 Pelvis and Back alignment normal.      Goals:  Goal Identifier 1   Goal Description STG: Improve sleep to 90% of night 18 Sleeping 80% of night d/t hip P.  NOT MET    Target Date 18   Date Met      Progress:     Goal Identifier 2   Goal Description STG:Pt will note less P w/ going up stairs and minimal difficulty.  3/28/18 MET    Target Date 18   Date Met  18   Progress:     Goal Identifier 3   Goal Description STG: Pt will be able to walk 2 blocks w/ minimal difficulty  3/28/18 MET    Target Date 18   Date Met  18   Progress:     Goal Identifier 4   Goal Description  LTG: Pt will be independent w/ self cares for L hip, HEP to restore strength and mobility.  18 MET. [facility]    Target Date 18   Date Met  18   Progress:     Progress Toward Goals:   Progress this reporting period: Pt met 3/4 Goals.     Plan:  Discharge from therapy.    Discharge:    Reason for  Discharge: No further expectation of progress.  Patient chooses to discontinue therapy.  Patient has failed to schedule further appointments.    Equipment Issued:      Discharge Plan: Patient to continue home program.  Pt to follow up w/MD as appropriate.   Selin Vincent, PT, [facility] (#[id number])  [facility]           [phone]  Fax          [phone]  Appt #      [phone]

## 2018-07-26 ENCOUNTER — HOSPITAL ENCOUNTER (OUTPATIENT)
Dept: MAMMOGRAPHY | Facility: CLINIC | Age: 73
Discharge: HOME | End: 2018-07-26
Attending: INTERNAL MEDICINE | Admitting: INTERNAL MEDICINE
Payer: COMMERCIAL

## 2018-07-26 DIAGNOSIS — D05.12 DUCTAL CARCINOMA IN SITU (DCIS) OF LEFT BREAST: ICD-10-CM

## 2018-07-26 PROCEDURE — 77066 DX MAMMO INCL CAD BI: CPT

## 2018-09-13 ENCOUNTER — ONCOLOGY VISIT (OUTPATIENT)
Dept: ONCOLOGY | Facility: CLINIC | Age: 73
End: 2018-09-13
Attending: INTERNAL MEDICINE
Payer: COMMERCIAL

## 2018-09-13 VITALS
DIASTOLIC BLOOD PRESSURE: 62 MMHG | HEART RATE: 66 BPM | OXYGEN SATURATION: 95 % | HEIGHT: 61 IN | RESPIRATION RATE: 20 BRPM | SYSTOLIC BLOOD PRESSURE: 154 MMHG | BODY MASS INDEX: 31.34 KG/M2 | TEMPERATURE: 98.1 F | WEIGHT: 166 LBS

## 2018-09-13 DIAGNOSIS — H34.11 CENTRAL RETINAL ARTERY OCCLUSION, RIGHT: ICD-10-CM

## 2018-09-13 DIAGNOSIS — D05.12 DUCTAL CARCINOMA IN SITU (DCIS) OF LEFT BREAST: Primary | ICD-10-CM

## 2018-09-13 DIAGNOSIS — E78.5 HYPERLIPIDEMIA LDL GOAL <130: ICD-10-CM

## 2018-09-13 DIAGNOSIS — K21.9 GASTROESOPHAGEAL REFLUX DISEASE WITHOUT ESOPHAGITIS: ICD-10-CM

## 2018-09-13 PROCEDURE — G0463 HOSPITAL OUTPT CLINIC VISIT: HCPCS

## 2018-09-13 PROCEDURE — 99214 OFFICE O/P EST MOD 30 MIN: CPT | Performed by: INTERNAL MEDICINE

## 2018-09-13 ASSESSMENT — PAIN SCALES - GENERAL: PAINLEVEL: SEVERE PAIN (6)

## 2018-09-13 NOTE — PATIENT INSTRUCTIONS
Dr. Crane would like to see you back in 6 months for a follow up appointment.      When you are in need of a refill, please call your pharmacy and they will send us a request.      Copy of appointments, and after visit summary (AVS) given to patient.      If you have any questions during business hours (M-F 8 AM- 4PM), please call Lynnette Lopez RN Oncology Hematology  at [facility] [phone].       For questions after business hours, or on holidays/weekends, please call our after hours Nurse Triage line [phone]. Thank you.

## 2018-09-13 NOTE — ASSESSMENT & PLAN NOTE
Chika Hurd has been compliant to routine mammogram in 07/2016.  Identified new microcalcification 0.4 cm area on the left breast 12-1 o'clock position 1.7 cm from the nipple.  This is a change from her prior 2015 mammogram.  Stereotactic biopsy was done 07/22/2016, identified high grade DCIS with comedonecrosis and microcalcifications and intermediate grade with solid and cribriform pattern.  No evidence of invasive carcinoma.  No evidence of angiolymphatic invasion.  % positive, TN 60% to 70% positive.  She bruised significantly after the biopsy with decreased range of motion of left shoulder.  She gradually recovered but still has significant amount of bruising left in the inferior part of the left breast.  She had lumpectomy with Dr. Murry 9/2016 found 1.4 cm grade II DCIS, had also fibrocystic changes. She had RT finished in 11/2016. She started Arimidex after.

## 2018-09-13 NOTE — MR AVS SNAPSHOT
After Visit Summary   9/13/2018    Chika Hurd    MRN: [id number]           Patient Information     Date Of Birth          1945        Visit Information        Provider Department      9/13/2018 9:30 AM Eliot Crane MD [facility]        Today's Diagnoses     Ductal carcinoma in situ (DCIS) of left breast    -  1    Gastroesophageal reflux disease without esophagitis        Hyperlipidemia LDL goal <130        Central retinal artery occlusion, right          Care Instructions    Dr. Crane would like to see you back in 6 months for a follow up appointment.      When you are in need of a refill, please call your pharmacy and they will send us a request.      Copy of appointments, and after visit summary (AVS) given to patient.      If you have any questions during business hours (M-F 8 AM- 4PM), please call Lynnette Lopez RN Oncology Hematology  at [facility] [phone].       For questions after business hours, or on holidays/weekends, please call our after hours Nurse Triage line [phone]. Thank you.            Follow-ups after your visit        Follow-up notes from your care team     Return in about 6 months (around 3/13/2019).      Your next 10 appointments already scheduled     Mar 14, 2019 10:30 AM CDT   Return Visit with Eliot Crane MD   [facility] ([facility])    [facility]  [address]  [place] [address] [phone]              Who to contact     If you have questions or need follow up information about today's clinic visit or your schedule please contact [facility] directly at [phone].  Normal or non-critical lab and imaging results will be communicated to you by MyChart, letter or phone within 4 business days after the clinic has received the results. If you do not hear from us within 7 days, please contact the clinic through [employer]  "or phone. If you have a critical or abnormal lab result, we will notify you by phone as soon as possible.  Submit refill requests through [employer] or call your pharmacy and they will forward the refill request to us. Please allow 3 business days for your refill to be completed.          Additional Information About Your Visit        [employer] Information     [employer] gives you secure access to your electronic health record. If you see a primary care provider, you can also send messages to your care team and make appointments. If you have questions, please call your primary care clinic.  If you do not have a primary care provider, please call [phone] and they will assist you.        Care EveryWhere ID     This is your Care EveryWhere ID. This could be used by other organizations to access your [facility] medical records  [id number]        Your Vitals Were     Pulse Temperature Respirations Height Pulse Oximetry Breastfeeding?    66 98.1  F (36.7  C) (Tympanic) 20 1.537 m (5' 0.5\") 95% No    BMI (Body Mass Index)                   31.89 kg/m2            Blood Pressure from Last 3 Encounters:   09/13/18 154/62   03/09/18 (!) 154/92   03/08/18 179/69    Weight from Last 3 Encounters:   09/13/18 75.3 kg (166 lb)   03/09/18 73.8 kg (162 lb 11.2 oz)   02/08/18 72.7 kg (160 lb 3.2 oz)              Today, you had the following     No orders found for display       Primary Care Provider Office Phone # Fax #    Audrey [place] Roy [phone] [address] [address] [address]        Equal Access to Services     [facility] : Hadii nalini nunez hadashchandni Solizet, waaxda luqadaha, qaybta kaalmajay hackett. So [facility] [phone].    ATENCIÓN: Si habla español, tiene a beckett disposición servicios gratuitos de asistencia lingüística. Llame al [phone].    We comply with applicable federal civil rights laws and [place] laws. We do not discriminate on the " basis of race, color, national origin, age, disability, sex, sexual orientation, or gender identity.            Thank you!     Thank you for choosing [facility]  for your care. Our goal is always to provide you with excellent care. Hearing back from our patients is one way we can continue to improve our services. Please take a few minutes to complete the written survey that you may receive in the mail after your visit with us. Thank you!             Your Updated Medication List - Protect others around you: Learn how to safely use, store and throw away your medicines at www.disposemymeds.org.          This list is accurate as of 9/13/18 10:14 AM.  Always use your most recent med list.                   Brand Name Dispense Instructions for use Diagnosis    ASPIRIN PO      Take 81 mg by mouth daily        atorvastatin 40 MG tablet    LIPITOR    90 tablet    Take 40mg one day and 20mg the next alternatingly.    Central retinal artery occlusion of right eye       CALCIUM + D PO      1 TABLET DAILY        clopidogrel 75 MG tablet    PLAVIX    90 tablet    Take 1 tablet (75 mg) by mouth daily    Central artery occlusion of retina, right       co-enzyme Q-10 100 MG Caps capsule      Take 2 capsules by mouth daily. Takes a total of 200 mg daily.        famotidine 20 MG tablet    PEPCID    180 tablet    Take 1 tablet (20 mg) by mouth 2 times daily    Gastroesophageal reflux disease without esophagitis       MULTIVITAMIN PO      1 daily        OMEGA-3 FISH OIL PO      Take 1 g by mouth daily        REFRESH DRY EYE THERAPY OP      Apply 1-2 drops to eye as needed Reported on 5/23/2017        TYLENOL PO      Take 500 mg by mouth every 6 hours as needed for mild pain or fever        VITAMIN C PO      Take 500 mg by mouth daily

## 2018-09-13 NOTE — PROGRESS NOTES
Hematology/ Oncology Follow-up Visit:  Sep 13, 2018    Reason for Visit:   Chief Complaint   Patient presents with     Oncology Clinic Visit     6 month recheck Breast CA, no labs review July Mammogram       Oncologic History:  Ductal carcinoma in situ (DCIS) of left breast  Chika Hurd has been compliant to routine mammogram in 07/2016.  Identified new microcalcification 0.4 cm area on the left breast 12-1 o'clock position 1.7 cm from the nipple.  This is a change from her prior 2015 mammogram.  Stereotactic biopsy was done 07/22/2016, identified high grade DCIS with comedonecrosis and microcalcifications and intermediate grade with solid and cribriform pattern.  No evidence of invasive carcinoma.  No evidence of angiolymphatic invasion.  % positive, MD 60% to 70% positive.  She bruised significantly after the biopsy with decreased range of motion of left shoulder.  She gradually recovered but still has significant amount of bruising left in the inferior part of the left breast.  She had lumpectomy with Dr. Murry 9/2016 found 1.4 cm grade II DCIS, had also fibrocystic changes. She had RT finished in 11/2016. She started Arimidex after.       Interval History:  The patient is here today for follow-up.  She has been feeling well without any recent complaints weight loss night sweats fever or chills.  She denies any nausea vomiting or diarrhea    Review Of Systems:  Constitutional: Negative for fever, chills, and night sweats.  Skin: negative.  Eyes: negative.  Ears/Nose/Throat: negative.  Respiratory: No shortness of breath, dyspnea on exertion, cough, or hemoptysis.  Cardiovascular: negative.  Gastrointestinal: negative.  Genitourinary: negative.  Musculoskeletal: negative.  Neurologic: negative.  Psychiatric: negative.  Hematologic/Lymphatic/Immunologic: negative.  Endocrine: negative.    All other ROS negative unless mentioned in interval history.    Past medical, social, surgical, and family histories  "reviewed.    Allergies:  Allergies as of 09/13/2018 - Bertin as Reviewed 09/13/2018   Allergen Reaction Noted     Cortisone Swelling 02/02/2006       Current Medications:  Current Outpatient Prescriptions   Medication Sig Dispense Refill     Acetaminophen (TYLENOL PO) Take 500 mg by mouth every 6 hours as needed for mild pain or fever        Ascorbic Acid (VITAMIN C PO) Take 500 mg by mouth daily       ASPIRIN PO Take 81 mg by mouth daily        atorvastatin (LIPITOR) 40 MG tablet Take 40mg one day and 20mg the next alternatingly. 90 tablet 3     CALCIUM + D OR 1 TABLET DAILY       clopidogrel (PLAVIX) 75 MG tablet Take 1 tablet (75 mg) by mouth daily 90 tablet 3     co-enzyme Q-10 (CO Q10) 100 MG CAPS Take 2 capsules by mouth daily. Takes a total of 200 mg daily.  0     famotidine (PEPCID) 20 MG tablet Take 1 tablet (20 mg) by mouth 2 times daily 180 tablet 1     Glycerin-Polysorbate 80 (REFRESH DRY EYE THERAPY OP) Apply 1-2 drops to eye as needed Reported on 5/23/2017       MULTIVITAMIN OR 1 daily       Omega-3 Fatty Acids (OMEGA-3 FISH OIL PO) Take 1 g by mouth daily           Physical Exam:  /62 (BP Location: Right arm, Patient Position: Sitting, Cuff Size: Adult Regular)  Pulse 66  Temp 98.1  F (36.7  C) (Tympanic)  Resp 20  Ht 1.537 m (5' 0.5\")  Wt 75.3 kg (166 lb)  SpO2 95%  Breastfeeding? No  BMI 31.89 kg/m2  Wt Readings from Last 12 Encounters:   09/13/18 75.3 kg (166 lb)   03/09/18 73.8 kg (162 lb 11.2 oz)   02/08/18 72.7 kg (160 lb 3.2 oz)   11/27/17 72.6 kg (160 lb)   11/06/17 73.5 kg (162 lb)   09/11/17 73.5 kg (162 lb)   06/02/17 72.1 kg (159 lb)   05/23/17 74 kg (163 lb 3.2 oz)   04/24/17 74.1 kg (163 lb 6.4 oz)   03/02/17 74.7 kg (164 lb 11.2 oz)   11/29/16 73.8 kg (162 lb 9.6 oz)   11/23/16 73.8 kg (162 lb 9.6 oz)     ECOG performance status: 0  GENERAL APPEARANCE: Healthy, alert and in no acute distress.  HEENT: Sclerae anicteric. PERRLA. Oropharynx without ulcers, lesions, or " "thrush.  NECK: Supple. No asymmetry or masses.  LYMPHATICS: No palpable cervical, supraclavicular, axillary, or inguinal lymphadenopathy.  RESP: Lungs clear to auscultation bilaterally without rales, rhonchi or wheezes.\",  BREAST: Right- No mass, nipple discharge or axillary adenopathy. Left- No mass, nipple discharge or axillary adenopathy \"CARDIOVASCULAR: Regular rate and rhythm. Normal S1, S2; no S3 or S4. No murmur, gallop, or rub.  ABDOMEN: Soft, nontender. Bowel sounds normal. No palpable organomegaly or masses.  MUSCULOSKELETAL: Extremities without gross deformities noted. No edema of bilateral lower extremities.  SKIN: No suspicious lesions or rashes.  NEURO: Alert and oriented x 3. Cranial nerves II-XII grossly intact.  PSYCHIATRIC: Mentation and affect appear normal.    Laboratory/Imaging Studies:  No visits with results within 2 Week(s) from this visit.  Latest known visit with results is:    Orders Only on 03/09/2018   Component Date Value Ref Range Status     TSH 03/09/2018 0.51  0.40 - 4.00 mU/L Final        Assessment and plan:    (D05.12) Ductal carcinoma in situ (DCIS) of left breast  (primary encounter diagnosis)  There is no clinical evidence of breast cancer recurrence.  We will continue to monitor the patient's symptoms.  I will see the patient again in 6 months time following her annual mammogram or sooner if there are new developments or concerns.    (K21.9) Gastroesophageal reflux disease without esophagitis  Currently on Pepcid 20 mg orally daily    (E78.5) Hyperlipidemia LDL goal <130  Patient currently on 40 mg orally daily.    (H34.11) Central retinal artery occlusion, right  Continue on Plavix and aspirin.    The patient is ready to learn, no apparent learning barriers were identified.  Diagnosis and treatment plans were explained to the patient. The patient expressed understanding of the content. The patient asked appropriate questions. The patient questions were answered to her " satisfaction.    Chart documentation with Dragon Voice recognition Software. Although reviewed after completion, some words and grammatical errors may remain.

## 2018-09-13 NOTE — LETTER
9/13/2018         RE: Chika Hurd  [address]  [place] [address]        Dear Colleague,    Thank you for referring your patient, Chika Hurd, to the [facility]. Please see a copy of my visit note below.    Hematology/ Oncology Follow-up Visit:  Sep 13, 2018    Reason for Visit:   Chief Complaint   Patient presents with     Oncology Clinic Visit     6 month recheck Breast CA, no labs review July Mammogram       Oncologic History:  Ductal carcinoma in situ (DCIS) of left breast  Chika Hurd has been compliant to routine mammogram in 07/2016.  Identified new microcalcification 0.4 cm area on the left breast 12-1 o'clock position 1.7 cm from the nipple.  This is a change from her prior 2015 mammogram.  Stereotactic biopsy was done 07/22/2016, identified high grade DCIS with comedonecrosis and microcalcifications and intermediate grade with solid and cribriform pattern.  No evidence of invasive carcinoma.  No evidence of angiolymphatic invasion.  % positive, MD 60% to 70% positive.  She bruised significantly after the biopsy with decreased range of motion of left shoulder.  She gradually recovered but still has significant amount of bruising left in the inferior part of the left breast.  She had lumpectomy with Dr. Murry 9/2016 found 1.4 cm grade II DCIS, had also fibrocystic changes. She had RT finished in 11/2016. She started Arimidex after.       Interval History:  The patient is here today for follow-up.  She has been feeling well without any recent complaints weight loss night sweats fever or chills.  She denies any nausea vomiting or diarrhea    Review Of Systems:  Constitutional: Negative for fever, chills, and night sweats.  Skin: negative.  Eyes: negative.  Ears/Nose/Throat: negative.  Respiratory: No shortness of breath, dyspnea on exertion, cough, or hemoptysis.  Cardiovascular: negative.  Gastrointestinal: negative.  Genitourinary: negative.  Musculoskeletal:  "negative.  Neurologic: negative.  Psychiatric: negative.  Hematologic/Lymphatic/Immunologic: negative.  Endocrine: negative.    All other ROS negative unless mentioned in interval history.    Past medical, social, surgical, and family histories reviewed.    Allergies:  Allergies as of 09/13/2018 - Bertin as Reviewed 09/13/2018   Allergen Reaction Noted     Cortisone Swelling 02/02/2006       Current Medications:  Current Outpatient Prescriptions   Medication Sig Dispense Refill     Acetaminophen (TYLENOL PO) Take 500 mg by mouth every 6 hours as needed for mild pain or fever        Ascorbic Acid (VITAMIN C PO) Take 500 mg by mouth daily       ASPIRIN PO Take 81 mg by mouth daily        atorvastatin (LIPITOR) 40 MG tablet Take 40mg one day and 20mg the next alternatingly. 90 tablet 3     CALCIUM + D OR 1 TABLET DAILY       clopidogrel (PLAVIX) 75 MG tablet Take 1 tablet (75 mg) by mouth daily 90 tablet 3     co-enzyme Q-10 (CO Q10) 100 MG CAPS Take 2 capsules by mouth daily. Takes a total of 200 mg daily.  0     famotidine (PEPCID) 20 MG tablet Take 1 tablet (20 mg) by mouth 2 times daily 180 tablet 1     Glycerin-Polysorbate 80 (REFRESH DRY EYE THERAPY OP) Apply 1-2 drops to eye as needed Reported on 5/23/2017       MULTIVITAMIN OR 1 daily       Omega-3 Fatty Acids (OMEGA-3 FISH OIL PO) Take 1 g by mouth daily           Physical Exam:  /62 (BP Location: Right arm, Patient Position: Sitting, Cuff Size: Adult Regular)  Pulse 66  Temp 98.1  F (36.7  C) (Tympanic)  Resp 20  Ht 1.537 m (5' 0.5\")  Wt 75.3 kg (166 lb)  SpO2 95%  Breastfeeding? No  BMI 31.89 kg/m2  Wt Readings from Last 12 Encounters:   09/13/18 75.3 kg (166 lb)   03/09/18 73.8 kg (162 lb 11.2 oz)   02/08/18 72.7 kg (160 lb 3.2 oz)   11/27/17 72.6 kg (160 lb)   11/06/17 73.5 kg (162 lb)   09/11/17 73.5 kg (162 lb)   06/02/17 72.1 kg (159 lb)   05/23/17 74 kg (163 lb 3.2 oz)   04/24/17 74.1 kg (163 lb 6.4 oz)   03/02/17 74.7 kg (164 lb 11.2 oz) " "  11/29/16 73.8 kg (162 lb 9.6 oz)   11/23/16 73.8 kg (162 lb 9.6 oz)     ECOG performance status: 0  GENERAL APPEARANCE: Healthy, alert and in no acute distress.  HEENT: Sclerae anicteric. PERRLA. Oropharynx without ulcers, lesions, or thrush.  NECK: Supple. No asymmetry or masses.  LYMPHATICS: No palpable cervical, supraclavicular, axillary, or inguinal lymphadenopathy.  RESP: Lungs clear to auscultation bilaterally without rales, rhonchi or wheezes.\",  BREAST: Right- No mass, nipple discharge or axillary adenopathy. Left- No mass, nipple discharge or axillary adenopathy \"CARDIOVASCULAR: Regular rate and rhythm. Normal S1, S2; no S3 or S4. No murmur, gallop, or rub.  ABDOMEN: Soft, nontender. Bowel sounds normal. No palpable organomegaly or masses.  MUSCULOSKELETAL: Extremities without gross deformities noted. No edema of bilateral lower extremities.  SKIN: No suspicious lesions or rashes.  NEURO: Alert and oriented x 3. Cranial nerves II-XII grossly intact.  PSYCHIATRIC: Mentation and affect appear normal.    Laboratory/Imaging Studies:  No visits with results within 2 Week(s) from this visit.  Latest known visit with results is:    Orders Only on 03/09/2018   Component Date Value Ref Range Status     TSH 03/09/2018 0.51  0.40 - 4.00 mU/L Final        Assessment and plan:    (D05.12) Ductal carcinoma in situ (DCIS) of left breast  (primary encounter diagnosis)  There is no clinical evidence of breast cancer recurrence.  We will continue to monitor the patient's symptoms.  I will see the patient again in 6 months time following her annual mammogram or sooner if there are new developments or concerns.    (K21.9) Gastroesophageal reflux disease without esophagitis  Currently on Pepcid 20 mg orally daily    (E78.5) Hyperlipidemia LDL goal <130  Patient currently on 40 mg orally daily.    (H34.11) Central retinal artery occlusion, right  Continue on Plavix and aspirin.    The patient is ready to learn, no apparent " learning barriers were identified.  Diagnosis and treatment plans were explained to the patient. The patient expressed understanding of the content. The patient asked appropriate questions. The patient questions were answered to her satisfaction.    Chart documentation with Dragon Voice recognition Software. Although reviewed after completion, some words and grammatical errors may remain.    Again, thank you for allowing me to participate in the care of your patient.        Sincerely,        Eliot Crane MD

## 2018-09-13 NOTE — NURSING NOTE
"Oncology Rooming Note    September 13, 2018 9:32 AM   Chika Hurd is a 73 year old female who presents for:    Chief Complaint   Patient presents with     Oncology Clinic Visit     6 month recheck Breast CA, no labs review July Mammogram     Initial Vitals: /62 (BP Location: Right arm, Patient Position: Sitting, Cuff Size: Adult Regular)  Pulse 66  Temp 98.1  F (36.7  C) (Tympanic)  Resp 20  Ht 1.537 m (5' 0.5\")  Wt 75.3 kg (166 lb)  SpO2 95%  Breastfeeding? No  BMI 31.89 kg/m2 Estimated body mass index is 31.89 kg/(m^2) as calculated from the following:    Height as of this encounter: 1.537 m (5' 0.5\").    Weight as of this encounter: 75.3 kg (166 lb). Body surface area is 1.79 meters squared.  Severe Pain (6) Comment: Bilateral Left side is worse   No LMP recorded. Patient is postmenopausal.  Allergies reviewed: Yes  Medications reviewed: Yes    Medications: Medication refills not needed today.  Pharmacy name entered into [facility]: [id number] [place] [address]    Clinical concerns 6 month recheck Breast CA, no labs review July Mammogram.   Chika forgot to wear her hearing aids today. But she says she can hear if you face her.     7 minutes for nursing intake (face to face time)     Rosario Burch CMA              "

## 2018-09-15 DIAGNOSIS — K21.9 GASTROESOPHAGEAL REFLUX DISEASE WITHOUT ESOPHAGITIS: ICD-10-CM

## 2018-09-17 RX ORDER — FAMOTIDINE 20 MG/1
TABLET, FILM COATED ORAL
Qty: 180 TABLET | Refills: 1 | Status: SHIPPED | OUTPATIENT
Start: 2018-09-17 | End: 2019-03-17

## 2018-09-17 NOTE — TELEPHONE ENCOUNTER
"Requested Prescriptions   Pending Prescriptions Disp Refills     famotidine (PEPCID) 20 MG tablet [Pharmacy Med Name: FAMOTIDINE 20 MG TABLET] 180 tablet 1     Sig: TAKE 1 TABLET (20 MG) BY MOUTH 2 TIMES DAILY    H2 Blockers Protocol Passed    9/15/2018  1:44 AM       Passed - Patient is age 12 or older       Passed - Recent (12 mo) or future (30 days) visit within the authorizing provider's specialty    Patient had office visit in the last 12 months or has a visit in the next 30 days with authorizing provider or within the authorizing provider's specialty.  See \"Patient Info\" tab in inbasket, or \"Choose Columns\" in Meds & Orders section of the refill encounter.            Prescription approved per [facility].  Shireen FERRO RN    "

## 2018-12-20 ENCOUNTER — APPOINTMENT (OUTPATIENT)
Dept: GENERAL RADIOLOGY | Facility: CLINIC | Age: 73
End: 2018-12-20
Attending: EMERGENCY MEDICINE
Payer: COMMERCIAL

## 2018-12-20 ENCOUNTER — HOSPITAL ENCOUNTER (EMERGENCY)
Facility: CLINIC | Age: 73
Discharge: HOME | End: 2018-12-20
Attending: EMERGENCY MEDICINE | Admitting: EMERGENCY MEDICINE
Payer: COMMERCIAL

## 2018-12-20 ENCOUNTER — APPOINTMENT (OUTPATIENT)
Dept: CT IMAGING | Facility: CLINIC | Age: 73
End: 2018-12-20
Attending: EMERGENCY MEDICINE
Payer: COMMERCIAL

## 2018-12-20 VITALS
WEIGHT: 164 LBS | DIASTOLIC BLOOD PRESSURE: 75 MMHG | RESPIRATION RATE: 18 BRPM | TEMPERATURE: 98.8 F | HEART RATE: 72 BPM | OXYGEN SATURATION: 96 % | SYSTOLIC BLOOD PRESSURE: 185 MMHG | HEIGHT: 61 IN | BODY MASS INDEX: 30.96 KG/M2

## 2018-12-20 DIAGNOSIS — M25.552 HIP PAIN, LEFT: ICD-10-CM

## 2018-12-20 LAB
ALBUMIN SERPL-MCNC: 3.5 G/DL (ref 3.4–5)
ALBUMIN UR-MCNC: NEGATIVE MG/DL
ALP SERPL-CCNC: 81 U/L (ref 40–150)
ALT SERPL W P-5'-P-CCNC: 25 U/L (ref 0–50)
ANION GAP SERPL CALCULATED.3IONS-SCNC: 6 MMOL/L (ref 3–14)
APPEARANCE UR: CLEAR
AST SERPL W P-5'-P-CCNC: 29 U/L (ref 0–45)
BASOPHILS # BLD AUTO: 0.1 10E9/L (ref 0–0.2)
BASOPHILS NFR BLD AUTO: 0.5 %
BILIRUB SERPL-MCNC: 0.8 MG/DL (ref 0.2–1.3)
BILIRUB UR QL STRIP: NEGATIVE
BUN SERPL-MCNC: 10 MG/DL (ref 7–30)
CALCIUM SERPL-MCNC: 8.5 MG/DL (ref 8.5–10.1)
CHLORIDE SERPL-SCNC: 102 MMOL/L (ref 94–109)
CO2 SERPL-SCNC: 27 MMOL/L (ref 20–32)
COLOR UR AUTO: YELLOW
CREAT SERPL-MCNC: 0.56 MG/DL (ref 0.52–1.04)
CRP SERPL-MCNC: 7.6 MG/L (ref 0–8)
DIFFERENTIAL METHOD BLD: ABNORMAL
EOSINOPHIL # BLD AUTO: 0.1 10E9/L (ref 0–0.7)
EOSINOPHIL NFR BLD AUTO: 0.8 %
ERYTHROCYTE [DISTWIDTH] IN BLOOD BY AUTOMATED COUNT: 12.8 % (ref 10–15)
ERYTHROCYTE [SEDIMENTATION RATE] IN BLOOD BY WESTERGREN METHOD: 13 MM/H (ref 0–30)
GFR SERPL CREATININE-BSD FRML MDRD: >90 ML/MIN/{1.73_M2}
GLUCOSE SERPL-MCNC: 99 MG/DL (ref 70–99)
GLUCOSE UR STRIP-MCNC: NEGATIVE MG/DL
HCT VFR BLD AUTO: 40.7 % (ref 35–47)
HGB BLD-MCNC: 13.4 G/DL (ref 11.7–15.7)
HGB UR QL STRIP: NEGATIVE
IMM GRANULOCYTES # BLD: 0 10E9/L (ref 0–0.4)
IMM GRANULOCYTES NFR BLD: 0.2 %
KETONES UR STRIP-MCNC: 5 MG/DL
LEUKOCYTE ESTERASE UR QL STRIP: NEGATIVE
LYMPHOCYTES # BLD AUTO: 1.4 10E9/L (ref 0.8–5.3)
LYMPHOCYTES NFR BLD AUTO: 11.3 %
MCH RBC QN AUTO: 30.7 PG (ref 26.5–33)
MCHC RBC AUTO-ENTMCNC: 32.9 G/DL (ref 31.5–36.5)
MCV RBC AUTO: 93 FL (ref 78–100)
MONOCYTES # BLD AUTO: 1.1 10E9/L (ref 0–1.3)
MONOCYTES NFR BLD AUTO: 9.1 %
NEUTROPHILS # BLD AUTO: 9.7 10E9/L (ref 1.6–8.3)
NEUTROPHILS NFR BLD AUTO: 78.1 %
NITRATE UR QL: NEGATIVE
NRBC # BLD AUTO: 0 10*3/UL
NRBC BLD AUTO-RTO: 0 /100
PH UR STRIP: 6 PH (ref 5–7)
PLATELET # BLD AUTO: 269 10E9/L (ref 150–450)
POTASSIUM SERPL-SCNC: 4.5 MMOL/L (ref 3.4–5.3)
PROT SERPL-MCNC: 7.1 G/DL (ref 6.8–8.8)
RBC # BLD AUTO: 4.36 10E12/L (ref 3.8–5.2)
SODIUM SERPL-SCNC: 135 MMOL/L (ref 133–144)
SOURCE: ABNORMAL
SP GR UR STRIP: 1.01 (ref 1–1.03)
UROBILINOGEN UR STRIP-MCNC: 0 MG/DL (ref 0–2)
WBC # BLD AUTO: 12.5 10E9/L (ref 4–11)

## 2018-12-20 PROCEDURE — 72100 X-RAY EXAM L-S SPINE 2/3 VWS: CPT

## 2018-12-20 PROCEDURE — 93005 ELECTROCARDIOGRAM TRACING: CPT | Performed by: EMERGENCY MEDICINE

## 2018-12-20 PROCEDURE — 93010 ELECTROCARDIOGRAM REPORT: CPT | Mod: Z6 | Performed by: EMERGENCY MEDICINE

## 2018-12-20 PROCEDURE — 73502 X-RAY EXAM HIP UNI 2-3 VIEWS: CPT

## 2018-12-20 PROCEDURE — 85652 RBC SED RATE AUTOMATED: CPT | Performed by: EMERGENCY MEDICINE

## 2018-12-20 PROCEDURE — 80053 COMPREHEN METABOLIC PANEL: CPT | Performed by: EMERGENCY MEDICINE

## 2018-12-20 PROCEDURE — 25000128 H RX IP 250 OP 636: Performed by: EMERGENCY MEDICINE

## 2018-12-20 PROCEDURE — 25000125 ZZHC RX 250: Performed by: EMERGENCY MEDICINE

## 2018-12-20 PROCEDURE — 81003 URINALYSIS AUTO W/O SCOPE: CPT | Performed by: EMERGENCY MEDICINE

## 2018-12-20 PROCEDURE — 99285 EMERGENCY DEPT VISIT HI MDM: CPT | Mod: 25 | Performed by: EMERGENCY MEDICINE

## 2018-12-20 PROCEDURE — 72193 CT PELVIS W/DYE: CPT

## 2018-12-20 PROCEDURE — 25000132 ZZH RX MED GY IP 250 OP 250 PS 637: Performed by: EMERGENCY MEDICINE

## 2018-12-20 PROCEDURE — 85025 COMPLETE CBC W/AUTO DIFF WBC: CPT | Performed by: EMERGENCY MEDICINE

## 2018-12-20 PROCEDURE — 96374 THER/PROPH/DIAG INJ IV PUSH: CPT | Mod: 59 | Performed by: EMERGENCY MEDICINE

## 2018-12-20 PROCEDURE — 86140 C-REACTIVE PROTEIN: CPT | Performed by: EMERGENCY MEDICINE

## 2018-12-20 RX ORDER — METHOCARBAMOL 750 MG/1
750 TABLET, FILM COATED ORAL ONCE
Status: COMPLETED | OUTPATIENT
Start: 2018-12-20 | End: 2018-12-20

## 2018-12-20 RX ORDER — METHOCARBAMOL 500 MG/1
500-1000 TABLET, FILM COATED ORAL 4 TIMES DAILY PRN
Qty: 30 TABLET | Refills: 0 | Status: SHIPPED | OUTPATIENT
Start: 2018-12-20 | End: 2019-01-11

## 2018-12-20 RX ORDER — ACETAMINOPHEN 500 MG
1000 TABLET ORAL ONCE
Status: COMPLETED | OUTPATIENT
Start: 2018-12-20 | End: 2018-12-20

## 2018-12-20 RX ORDER — ACETAMINOPHEN 500 MG
1000 TABLET ORAL EVERY 8 HOURS PRN
Qty: 30 TABLET | Refills: 0 | COMMUNITY
Start: 2018-12-20 | End: 2019-08-29

## 2018-12-20 RX ORDER — IOPAMIDOL 755 MG/ML
80 INJECTION, SOLUTION INTRAVASCULAR ONCE
Status: COMPLETED | OUTPATIENT
Start: 2018-12-20 | End: 2018-12-20

## 2018-12-20 RX ORDER — MORPHINE SULFATE 2 MG/ML
2 INJECTION, SOLUTION INTRAMUSCULAR; INTRAVENOUS ONCE
Status: COMPLETED | OUTPATIENT
Start: 2018-12-20 | End: 2018-12-20

## 2018-12-20 RX ORDER — IBUPROFEN 400 MG/1
400 TABLET, FILM COATED ORAL ONCE
Status: DISCONTINUED | OUTPATIENT
Start: 2018-12-20 | End: 2018-12-20 | Stop reason: CLARIF

## 2018-12-20 RX ADMIN — IOPAMIDOL 80 ML: 755 INJECTION, SOLUTION INTRAVENOUS at 18:06

## 2018-12-20 RX ADMIN — SODIUM CHLORIDE 60 ML: 9 INJECTION, SOLUTION INTRAVENOUS at 18:06

## 2018-12-20 RX ADMIN — MORPHINE SULFATE 2 MG: 2 INJECTION, SOLUTION INTRAMUSCULAR; INTRAVENOUS at 17:32

## 2018-12-20 RX ADMIN — ACETAMINOPHEN 1000 MG: 500 TABLET ORAL at 15:29

## 2018-12-20 RX ADMIN — METHOCARBAMOL TABLETS 750 MG: 750 TABLET, COATED ORAL at 15:30

## 2018-12-20 ASSESSMENT — ENCOUNTER SYMPTOMS
SHORTNESS OF BREATH: 0
WEAKNESS: 0
NECK PAIN: 0
CONSTIPATION: 0
NECK STIFFNESS: 0
DIARRHEA: 0
LIGHT-HEADEDNESS: 0
VOMITING: 0
BACK PAIN: 0
ARTHRALGIAS: 1
WOUND: 0
FEVER: 0
BLOOD IN STOOL: 0
HEADACHES: 0
NUMBNESS: 0
FATIGUE: 0
APPETITE CHANGE: 0
ABDOMINAL DISTENTION: 0
NAUSEA: 0
CHEST TIGHTNESS: 0

## 2018-12-20 ASSESSMENT — MIFFLIN-ST. JEOR: SCORE: 1196.28

## 2018-12-20 NOTE — ED AVS SNAPSHOT
[facility] Emergency Department  [address] [address]  Phone:  [phone]  Fax:  [phone]                                    Chika Hurd   MRN: [id number]    Department:  [facility] Emergency Department   Date of Visit:  12/20/2018           After Visit Summary Signature Page    I have received my discharge instructions, and my questions have been answered. I have discussed any challenges I see with this plan with the nurse or doctor.    ..........................................................................................................................................  Patient/Patient Representative Signature      ..........................................................................................................................................  Patient Representative Print Name and Relationship to Patient    ..................................................               ................................................  Date                                   Time    ..........................................................................................................................................  Reviewed by Signature/Title    ...................................................              ..............................................  Date                                               Time          22EPIC Rev 08/18

## 2018-12-20 NOTE — ED NOTES
Pt presents to ED for complaint of groin pain for the past few days. Pt reports pain has been present for a few days, but got worse overnight. Was unable to control pain overnight and could not sleep. Pt report taking one tylenol w/o relief. Pain is described as an intense ache and sharp with movement.

## 2018-12-20 NOTE — ED NOTES
Pt assisted up to the restroo St assist via wheelchair. Pt reports pain is slightly improved but still very painful with movement.

## 2018-12-20 NOTE — ED PROVIDER NOTES
History     Chief Complaint   Patient presents with     Groin Pain     started yesterday and getting worse      HPI  Cihka Hurd is a 73 year old female with a history of GERD, hyperlipidemia, and previous low back pain presenting for evaluation of several days of left hip and groin pain.  Patient reports symptoms began a few days ago with no clear triggering cause.  She reports achy and sharp pain in the left hip which is worse with movement.  Patient has been managing the pain although it has limited her mobility.  Denies any significant low back pain currently.  Denies any radiation of the pain down her leg to her foot.  Denies any numbness or tingling in extremities.  No bowel or bladder symptoms including no incontinence.  Denies fever or chills.  Denies fall or injury to the hip.  Patient reports pain has been getting worse especially at night and with sitting prompting evaluation today.    Problem List:    Patient Active Problem List    Diagnosis Date Noted     Central retinal artery occlusion, right 05/23/2017     Priority: Medium     Ductal carcinoma in situ (DCIS) of left breast 07/26/2016     Priority: Medium     Advanced directives, counseling/discussion 01/15/2016     Priority: Medium     Patient does not have an Advance/Health Care Directive (HCD), declines information/referral.    Luana Muñiz  January 15, 2016         Cataract right eye 06/04/2015     Priority: Medium     GERD (gastroesophageal reflux disease) 05/19/2009     Priority: Medium     SENSONRL HEAR LOSS,BILAT 05/15/2007     Priority: Medium     Hyperlipidemia LDL goal <130 02/03/2006     Priority: Medium     Did well with Lipitor but off formulary  Pravastatin caused stomach aches and myalgias  On Crestor until generic Lipitor became available          Past Medical History:    Past Medical History:   Diagnosis Date     DCIS (ductal carcinoma in situ) of breast      GERD (gastroesophageal reflux disease)      Hyperlipidemia         Past Surgical History:    Past Surgical History:   Procedure Laterality Date     ENT SURGERY      dental implants  started     FLEXIBLE SIGMOIDOSCOPY       LUMPECTOMY BREAST WITH SEED LOCALIZATION Left 2016    Procedure: LUMPECTOMY BREAST WITH SEED LOCALIZATION;  Surgeon: Russel Murry MD;  Location: [facility] OR     PHACOEMULSIFICATION WITH STANDARD INTRAOCULAR LENS IMPLANT Right 2017    Procedure: PHACOEMULSIFICATION WITH STANDARD INTRAOCULAR LENS IMPLANT;  Right cataract removal with implant;  Surgeon: Michael Zuleta MD;  Location: [facility] OR     PHACOEMULSIFICATION WITH STANDARD INTRAOCULAR LENS IMPLANT Left 2017    Procedure: PHACOEMULSIFICATION WITH STANDARD INTRAOCULAR LENS IMPLANT;  Left cataract removal with implant;  Surgeon: Michael Zuleta MD;  Location: [facility] OR     SURGICAL HISTORY OF -       Right Hip Pinning     SURGICAL HISTORY OF -       Tubal Ligation       Family History:    Family History   Problem Relation Age of Onset     Heart Disease Mother      Heart Disease Father      Circulatory Father         main artery aneursym     Heart Disease Sister         sleep problems     Breast Cancer Maternal Aunt         diagnosed in 60's, surviving in 90's       Social History:  Marital Status:   [2]  Social History     Tobacco Use     Smoking status: Former Smoker     Types: Cigarettes     Last attempt to quit: 2005     Years since quittin.9     Smokeless tobacco: Never Used   Substance Use Topics     Alcohol use: Yes     Comment: rarely     Drug use: No        Medications:      acetaminophen (TYLENOL) 500 MG tablet   methocarbamol (ROBAXIN) 500 MG tablet   Ascorbic Acid (VITAMIN C PO)   ASPIRIN PO   atorvastatin (LIPITOR) 40 MG tablet   CALCIUM + D OR   clopidogrel (PLAVIX) 75 MG tablet   co-enzyme Q-10 (CO Q10) 100 MG CAPS   famotidine (PEPCID) 20 MG tablet   Glycerin-Polysorbate 80 (REFRESH DRY EYE THERAPY OP)   MULTIVITAMIN OR   Omega-3  "Fatty Acids (OMEGA-3 FISH OIL PO)         Review of Systems   Constitutional: Negative for appetite change, fatigue and fever.   HENT: Negative for congestion.    Respiratory: Negative for chest tightness and shortness of breath.    Cardiovascular: Negative for chest pain.   Gastrointestinal: Negative for abdominal distention, blood in stool, constipation, diarrhea, nausea and vomiting.   Genitourinary: Negative for decreased urine volume, vaginal bleeding, vaginal discharge and vaginal pain.   Musculoskeletal: Positive for arthralgias (left hip pain). Negative for back pain, neck pain and neck stiffness.   Skin: Negative for rash and wound.   Neurological: Negative for weakness, light-headedness, numbness and headaches.   All other systems reviewed and are negative.      Physical Exam   BP: 164/85  Pulse: 72  Heart Rate: 78  Temp: 98.8  F (37.1  C)  Height: 154.9 cm (5' 1\")  Weight: 74.4 kg (164 lb)  SpO2: 97 %      Physical Exam   Constitutional: She is oriented to person, place, and time. She appears well-developed and well-nourished. No distress.   HENT:   Head: Normocephalic and atraumatic.   Cardiovascular: Normal rate.   Pulmonary/Chest: Effort normal.   Abdominal: Soft. There is no tenderness. There is no guarding.   Musculoskeletal:        Left hip: She exhibits tenderness (mild). She exhibits normal strength, no bony tenderness, no swelling, no crepitus and no deformity.        Legs:  Neurological: She is alert and oriented to person, place, and time.   Skin: Skin is warm and dry. Capillary refill takes less than 2 seconds.   Psychiatric: She has a normal mood and affect.   Nursing note and vitals reviewed.      ED Course        Procedures         EKG Interpretation:      Interpreted by Devon Moseley  Time reviewed:1812   Symptoms at time of EKG: chest pressure   Rhythm: normal sinus   Rate: normal  Axis: NORMAL  Ectopy: none  Conduction: normal  ST Segments/ T Waves: No ST-T wave changes  Q " Waves: none  Comparison to prior: Similar to previous EKG dated 6/27/2013    Clinical Impression: normal EKG           Results for orders placed or performed during the hospital encounter of 12/20/18 (from the past 24 hour(s))   UA reflex to Microscopic   Result Value Ref Range    Color Urine Yellow     Appearance Urine Clear     Glucose Urine Negative NEG^Negative mg/dL    Bilirubin Urine Negative NEG^Negative    Ketones Urine 5 (A) NEG^Negative mg/dL    Specific Gravity Urine 1.009 1.003 - 1.035    Blood Urine Negative NEG^Negative    pH Urine 6.0 5.0 - 7.0 pH    Protein Albumin Urine Negative NEG^Negative mg/dL    Urobilinogen mg/dL 0.0 0.0 - 2.0 mg/dL    Nitrite Urine Negative NEG^Negative    Leukocyte Esterase Urine Negative NEG^Negative    Source Midstream Urine    Lumbar spine XR, 2-3 views    Narrative    12/20/2018 3:28 PM    HISTORY: Left hip pain.    COMPARISON: Left hip views dated 2/8/2018.    AP PELVIS AND LEFT LATERAL HIP: No fracture or malalignment. Mild  degenerative change in the left hip is again seen. Calcification at  the superior margin of the greater trochanter, probably related to  tendinous calcification, is unchanged.    THREE-VIEW LUMBOSACRAL SPINE: No acute osseous finding is seen.  Multilevel degenerative disc disease is present, most notably at L3-L4  and L5-S1. Multilevel facet joint degenerative change is also seen.  There is a 0.5-0.6 cm of anterolisthesis of L4 on L5, likely related  to degenerative changes at this level.    TANIKA TOTH MD   Pelvis XR w/ unilateral hip left    Narrative    12/20/2018 3:28 PM    HISTORY: Left hip pain.    COMPARISON: Left hip views dated 2/8/2018.    AP PELVIS AND LEFT LATERAL HIP: No fracture or malalignment. Mild  degenerative change in the left hip is again seen. Calcification at  the superior margin of the greater trochanter, probably related to  tendinous calcification, is unchanged.    THREE-VIEW LUMBOSACRAL SPINE: No acute osseous finding is  seen.  Multilevel degenerative disc disease is present, most notably at L3-L4  and L5-S1. Multilevel facet joint degenerative change is also seen.  There is a 0.5-0.6 cm of anterolisthesis of L4 on L5, likely related  to degenerative changes at this level.    TANIKA TOTH MD   CBC with platelets differential   Result Value Ref Range    WBC 12.5 (H) 4.0 - 11.0 10e9/L    RBC Count 4.36 3.8 - 5.2 10e12/L    Hemoglobin 13.4 11.7 - 15.7 g/dL    Hematocrit 40.7 35.0 - 47.0 %    MCV 93 78 - 100 fl    MCH 30.7 26.5 - 33.0 pg    MCHC 32.9 31.5 - 36.5 g/dL    RDW 12.8 10.0 - 15.0 %    Platelet Count 269 150 - 450 10e9/L    Diff Method Automated Method     % Neutrophils 78.1 %    % Lymphocytes 11.3 %    % Monocytes 9.1 %    % Eosinophils 0.8 %    % Basophils 0.5 %    % Immature Granulocytes 0.2 %    Nucleated RBCs 0 0 /100    Absolute Neutrophil 9.7 (H) 1.6 - 8.3 10e9/L    Absolute Lymphocytes 1.4 0.8 - 5.3 10e9/L    Absolute Monocytes 1.1 0.0 - 1.3 10e9/L    Absolute Eosinophils 0.1 0.0 - 0.7 10e9/L    Absolute Basophils 0.1 0.0 - 0.2 10e9/L    Abs Immature Granulocytes 0.0 0 - 0.4 10e9/L    Absolute Nucleated RBC 0.0    Comprehensive metabolic panel   Result Value Ref Range    Sodium 135 133 - 144 mmol/L    Potassium 4.5 3.4 - 5.3 mmol/L    Chloride 102 94 - 109 mmol/L    Carbon Dioxide 27 20 - 32 mmol/L    Anion Gap 6 3 - 14 mmol/L    Glucose 99 70 - 99 mg/dL    Urea Nitrogen 10 7 - 30 mg/dL    Creatinine 0.56 0.52 - 1.04 mg/dL    GFR Estimate >90 >60 mL/min/[1.73_m2]    GFR Estimate If Black >90 >60 mL/min/[1.73_m2]    Calcium 8.5 8.5 - 10.1 mg/dL    Bilirubin Total 0.8 0.2 - 1.3 mg/dL    Albumin 3.5 3.4 - 5.0 g/dL    Protein Total 7.1 6.8 - 8.8 g/dL    Alkaline Phosphatase 81 40 - 150 U/L    ALT 25 0 - 50 U/L    AST 29 0 - 45 U/L   CRP Inflammation   Result Value Ref Range    CRP Inflammation 7.6 0.0 - 8.0 mg/L   Erythrocyte sedimentation rate auto   Result Value Ref Range    Sed Rate 13 0 - 30 mm/h   CT Pelvis Soft  Tissue w Contrast    Narrative    CT PELVIS SOFT TISSUE WITH CONTRAST  12/20/2018 6:28 PM      HISTORY: Left anterior pelvic pain. Inguinal pain.    TECHNIQUE: CT pelvis with intravenous contrast. Radiation dose for  this scan was reduced using automated exposure control, adjustment of  the mA and/or kV according to patient size, or iterative  reconstruction technique. 80 mL Isovue-370     COMPARISON:  None.    FINDINGS: There is orthopedic hardware in the right hip. There is  atherosclerotic calcification of the aorta and its branches. No  aneurysm. There is no pelvic lymph node enlargement. There are a few  small uterine calcifications. The uterus is overall normal in size. No  adnexal mass. There are sigmoid diverticula without acute  diverticulitis. No bowel obstruction or inflammation. No free fluid.  There is degenerative disease in the lower lumbar spine. Small  sclerotic lesion in the posterior right ilium is likely a bone island.  Mild osteoarthritis in the left hip. No abdominal wall hernia.      Impression    IMPRESSION: No acute abnormality. No cause of pain is evident.       Medications   acetaminophen (TYLENOL) tablet 1,000 mg (1,000 mg Oral Given 12/20/18 1529)   methocarbamol (ROBAXIN) tablet 750 mg (750 mg Oral Given 12/20/18 1530)   morphine (PF) injection 2 mg (2 mg Intravenous Given 12/20/18 1732)   iopamidol (ISOVUE-370) solution 80 mL (80 mLs Intravenous Given 12/20/18 1806)   Saline Flush (60 mLs Intravenous Given 12/20/18 1806)     4:40 PM: Patient reassessed.  Still having significant pain although somewhat improved after the Tylenol and methocarbamol.  Still with notable worsening pain with rotation of the hip.  She does now report some recent chills but no fevers.  Afebrile here with no visible signs of external erythema or warmth to suggest localized infection.  No history of hernias although inguinal hernia is a possibility.  Could also be inflammatory process with hip pelvis.   Recommended adding on blood work and CT imaging to further delineate the cause of her pain.    6:15 PM: PT re-assessed.  I was called over to CT due to the acute onset of chest pressure when preparing for CT.  She reports diffuse right lateral squeezing pressure underneath her bra strap radiating around her chest.  No associated sweating, nausea, or dyspnea.  She reports burping up some gas with some relief of discomfort.  Patient was sat up in the bed with again some relief of discomfort.  EKG was obtained.  No acute ischemic abnormalities.  Patient was ambulated to the bathroom to urinate and had some mild persistent pressure.  Patient reports she has had similar symptoms in the past which have resolved after a brief period of standing or walking. Pain is non-reproducible and wraps around her lower rib cage.  Pain is not in the midline in the back.  Denies any ripping or tearing nor sharp sensation only pressure.    6:33 PM: Pressure now reported to have subsided entirely.     7:05 PM: Pt re-assessed.  Overall feeling much better.  Still with mild hip pain but reports she is much more comfortable than she was when she arrived.    Assessments & Plan (with Medical Decision Making)\  73-year-old female with history of hyperlipidemia, GERD, and ductal carcinoma of the left breast in remission presenting for evaluation of left hip pain.  She reports achy pain in the left hip over the past several days without specific injury.  Pain significantly worse with movement.  Pain is leading to difficulty with sleep and difficulty with mobility due to the pain.  No associated known injury.  On exam, she does not have significant localized tenderness but does have significant worsening pain with epilation of the hip.  No sciatica symptoms.  X-rays showed some mild degenerative changes which appear chronic.  Treated symptomatically with acetaminophen and methocarbamol with some improvement however she still remained notably  uncomfortable with difficulty with mobility.  She was afebrile but given her age she has increased risk for more dangerous underlying causes such as hernia, occult fracture, septic arthritis, or other cause.  For this reason, I subsequently obtained blood work and CT imaging to further screen.  Blood work is reassuring with a normal ESR and CRP.  Mildly elevated white count is nonspecific.  No evidence of UTI on UA.  CT showed no acute pathology to account for her pain.  She was given a single dose of IV morphine 2 mg with notable improvement in mobility.  She also had a brief episode of chest pain while waiting CT which resolved.  Screening EKG normal.  Symptoms associated with burping gas and had no other associated symptoms such as diaphoresis, nausea, dyspnea.  Unlikely to represent ACS.  Ultimately discharged home with a plan to continue symptomatic treatment for presumed musculoskeletal left hip pain.  Offered oral opiates in addition to acetaminophen and methocarbamol but patient declined.  Recommended close follow-up with primary care in the next week to reassess if symptoms not improving or return to the ED if symptoms worsen.     I have reviewed the nursing notes.    I have reviewed the findings, diagnosis, plan and need for follow up with the patient.          Medication List      Started    methocarbamol 500 MG tablet  Commonly known as:  ROBAXIN  500-1,000 mg, Oral, 4 TIMES DAILY PRN        Modified    acetaminophen 500 MG tablet  Commonly known as:  TYLENOL  1,000 mg, Oral, EVERY 8 HOURS PRN  What changed:      medication strength    how much to take    when to take this    reasons to take this            Final diagnoses:   Hip pain, left       12/20/2018   [facility] EMERGENCY DEPARTMENT     Moseley, Devon Pierce MD  12/20/18 [id number]

## 2019-01-11 ENCOUNTER — OFFICE VISIT (OUTPATIENT)
Dept: FAMILY MEDICINE | Facility: CLINIC | Age: 74
End: 2019-01-11
Payer: COMMERCIAL

## 2019-01-11 VITALS
RESPIRATION RATE: 16 BRPM | BODY MASS INDEX: 31.26 KG/M2 | TEMPERATURE: 98.1 F | DIASTOLIC BLOOD PRESSURE: 70 MMHG | HEIGHT: 61 IN | OXYGEN SATURATION: 97 % | WEIGHT: 165.6 LBS | SYSTOLIC BLOOD PRESSURE: 130 MMHG | HEART RATE: 72 BPM

## 2019-01-11 DIAGNOSIS — M54.16 LUMBAR RADICULOPATHY: Primary | ICD-10-CM

## 2019-01-11 DIAGNOSIS — H34.11 CENTRAL ARTERY OCCLUSION OF RETINA, RIGHT: ICD-10-CM

## 2019-01-11 DIAGNOSIS — H34.11 CENTRAL RETINAL ARTERY OCCLUSION OF RIGHT EYE: ICD-10-CM

## 2019-01-11 DIAGNOSIS — K21.9 GASTROESOPHAGEAL REFLUX DISEASE WITHOUT ESOPHAGITIS: ICD-10-CM

## 2019-01-11 PROCEDURE — 99214 OFFICE O/P EST MOD 30 MIN: CPT | Performed by: FAMILY MEDICINE

## 2019-01-11 ASSESSMENT — MIFFLIN-ST. JEOR: SCORE: 1203.54

## 2019-01-11 NOTE — PROGRESS NOTES
SUBJECTIVE:   Chika Hurd is a 73 year old female who presents to clinic today for the following health issues:      ED/UC Followup:    Facility:  [facility]  Date of visit: 12/20/2018  Reason for visit: left hip pain   Current Status: radiates across left anterior thigh.  Some numbness and tingling.     ADDITIONAL HPI: 73 year old female here for above issue.  No weakness or giving way. No loss of bowel of bladder function.     ROS: 10 point review of systems negative except as per HPI.    PAST MEDICAL HISTORY:  Past Medical History:   Diagnosis Date     DCIS (ductal carcinoma in situ) of breast      GERD (gastroesophageal reflux disease)      Hyperlipidemia         ACTIVE MEDICAL PROBLEMS:  Patient Active Problem List   Diagnosis     Hyperlipidemia LDL goal <130     SENSONRL HEAR LOSS,BILAT     GERD (gastroesophageal reflux disease)     Cataract right eye     Advanced directives, counseling/discussion     Ductal carcinoma in situ (DCIS) of left breast     Central retinal artery occlusion, right        FAMILY HISTORY:  Family History   Problem Relation Age of Onset     Heart Disease Mother      Heart Disease Father      Circulatory Father         main artery aneursym     Heart Disease Sister         sleep problems     Breast Cancer Maternal Aunt         diagnosed in 60's, surviving in 90's       SOCIAL HISTORY:  Social History     Socioeconomic History     Marital status:      Spouse name: Not on file     Number of children: Not on file     Years of education: Not on file     Highest education level: Not on file   Social Needs     Financial resource strain: Not on file     Food insecurity - worry: Not on file     Food insecurity - inability: Not on file     Transportation needs - medical: Not on file     Transportation needs - non-medical: Not on file   Occupational History     Not on file   Tobacco Use     Smoking status: Former Smoker     Types: Cigarettes     Last attempt to quit: 1/12/2005     Years  "since quittin.0     Smokeless tobacco: Never Used   Substance and Sexual Activity     Alcohol use: Yes     Comment: rarely     Drug use: No     Sexual activity: No     Comment: refuses to answer   Other Topics Concern     Parent/sibling w/ CABG, MI or angioplasty before 65F 55M? No   Social History Narrative     Not on file       MEDICATIONS:  Current Outpatient Medications   Medication Sig Dispense Refill     Ascorbic Acid (VITAMIN C PO) Take 500 mg by mouth daily       ASPIRIN PO Take 81 mg by mouth daily        atorvastatin (LIPITOR) 40 MG tablet Take 40mg one day and 20mg the next alternatingly. 90 tablet 3     CALCIUM + D OR 1 TABLET DAILY       clopidogrel (PLAVIX) 75 MG tablet Take 1 tablet (75 mg) by mouth daily 90 tablet 3     co-enzyme Q-10 (CO Q10) 100 MG CAPS Take 2 capsules by mouth daily. Takes a total of 200 mg daily.  0     famotidine (PEPCID) 20 MG tablet TAKE 1 TABLET (20 MG) BY MOUTH 2 TIMES DAILY 180 tablet 1     Glycerin-Polysorbate 80 (REFRESH DRY EYE THERAPY OP) Apply 1-2 drops to eye as needed Reported on 2017       MULTIVITAMIN OR 1 daily       Omega-3 Fatty Acids (OMEGA-3 FISH OIL PO) Take 1 g by mouth daily          ALLERGIES:     Allergies   Allergen Reactions     Cortisone Swelling     Cortisone Cream       Problem list, Medication list, Allergies, and Medical/Social/Surgical histories reviewed in [facility] and updated as appropriate.    OBJECTIVE:                                                    VITALS: /70   Pulse 72   Temp 98.1  F (36.7  C) (Tympanic)   Resp 16   Ht 1.549 m (5' 1\")   Wt 75.1 kg (165 lb 9.6 oz)   SpO2 97%   BMI 31.29 kg/m   Body mass index is 31.29 kg/m .  GENERAL: Pleasant, well appearing female.  HEENT: PERRL, EOMI, oropharynx clear.  NECK: supple, no thyromegaly or thyroid masses, no lymphadenopathy.  CV: RRR, no murmurs, rubs or gallops.  LUNGS: Clear to auscultation bilaterally, normal effort.  ABDOMEN: Soft, non-distended, non-tender.  No " hepatosplenomegaly or palpable masses.    SKIN: warm and dry without obvious rashes.   EXTREMITIES: No edema, normal pulses.   MUSCULOSKELETAL:  No midline vertebral tenderness to palpation. Has bilateral paravertebral tenderness and tightness. Straight leg raise is positive for left radicular symptoms. Strength is 5/5 and DTR 2+ and symmetric throughout lower extremities.     ASSESSMENT/PLAN:                                                    1. Lumbar radiculopathy  Recommend trial of physical therapy.  - PHYSICAL THERAPY REFERRAL; Future    2. Central retinal artery occlusion of right eye  Stable on plavix.     3. Central artery occlusion of retina, right  Stable on plavix.    4. Gastroesophageal reflux disease without esophagitis  Stable on pepcid.      Patient will call and let me know what mail order pharmacy - then fax 1 yr of Plavix, Lipitor and Pepcid.

## 2019-01-28 ENCOUNTER — HOSPITAL ENCOUNTER (OUTPATIENT)
Dept: PHYSICAL THERAPY | Facility: CLINIC | Age: 74
Setting detail: THERAPIES SERIES
End: 2019-01-28
Attending: FAMILY MEDICINE
Payer: COMMERCIAL

## 2019-01-28 DIAGNOSIS — M54.16 LUMBAR RADICULOPATHY: ICD-10-CM

## 2019-01-28 PROCEDURE — 97140 MANUAL THERAPY 1/> REGIONS: CPT | Mod: GP | Performed by: PHYSICAL THERAPIST

## 2019-01-28 PROCEDURE — 97161 PT EVAL LOW COMPLEX 20 MIN: CPT | Mod: GP | Performed by: PHYSICAL THERAPIST

## 2019-01-28 PROCEDURE — 97110 THERAPEUTIC EXERCISES: CPT | Mod: GP | Performed by: PHYSICAL THERAPIST

## 2019-01-28 NOTE — PROGRESS NOTES
Chika Randolphmason  1945 Physical Therapy Initial Evaluation  01/28/19 1100   General Information   Type of Visit Initial OP Ortho PT Evaluation   Start of Care Date 01/28/19   Referring Physician Audrey Roy   Patient/Family Goals Statement Pain with sitting and moving to standing   Orders Evaluate and Treat   Date of Order 01/11/19   Insurance Type Other   Insurance Comments/Visits Authorized [id number]   Medical Diagnosis Lumbar radiculopathy   Surgical/Medical history reviewed Yes   Precautions/Limitations no known precautions/limitations   Body Part(s)   Body Part(s) Lumbar Spine/SI   Presentation and Etiology   Pertinent history of current problem (include personal factors and/or comorbidities that impact the POC) Before José Miguel was in ER because of back. Was given a muscle relaxant and that helped. Not having pain today. When was having pain it was in the anterior thigh on left leg. Hasn't had pain in a couple of weeks but would like exercises to work on flexibility and strength and prevent pain from coming back. / Comorbidities - Sensorineural hearing loss bilateral     Impairments A. Pain;B. Decreased WB tolerance;F. Decreased strength and endurance   Functional Limitations perform required work activities;perform activities of daily living   Symptom Location Left anterior thigh   How/Where did it occur From insidious onset   Onset date of current episode/exacerbation 01/11/19  (Date of Order)   Chronicity New   Pain rating (0-10 point scale) Best (/10);Worst (/10)   Best (/10) 3   Worst (/10) 10   Pain quality A. Sharp;C. Aching   Frequency of pain/symptoms B. Intermittent   Pain/symptoms exacerbated by A. Sitting;C. Lifting;D. Carrying   Pain/symptoms eased by I. OTC medication(s)   Prior Level of Function   Functional Level Prior Comment Ind   Current Level of Function   Patient role/employment history A. Employed   Employment Comments Accounting - computer work   [facility]    Home/community accessibility Split entry - with rails   Current equipment-Gait/Locomotion None   Fall Risk Screen   Fall screen completed by PT   Have you fallen 2 or more times in the past year? No   Have you fallen and had an injury in the past year? Yes   Timed Up and Go score (seconds) 10   Is patient a fall risk? No   Hip Objective Findings   Scour Test Negative   MAGALI Test Negative   FADIR Test Negative   Lumbar Spine/SI Objective Findings   Gait/Locomotion No deficits noted   Flexion ROM To floor   Extension ROM 14   Right Side Bending ROM To knee - no pain   Left Side Bending ROM To knee - no pain   Repeated Extension-Standing ROM No change in symptoms   Repeated Flexion-Standing ROM No change in symptoms   Pelvic Screen Positive sacral thrust / Negative for SI gapping, SI compression, Thigh thrust   Hip Flexion (L2) Strength 4/5 B   Hip Abduction Strength 4-/5 B   Hip Extension Strength 4-/5 B   Knee Extension (L3) Strength 5/5 B   Ankle Dorsiflexion (L4) Strength 4/5 B   Great Toe Extension (L5) Strength 4/5 B   Ankle Plantar Flexion (S1) Strength 5/5 B   Hamstring Flexibility Not restricted   Hip Flexor Flexibility Moderately restricted B   Piriformis Flexibility Mildly restricted   SLR Negative B   Slump Test Negative B   Sensation Testing No deficits noted   Patellar Tendon Reflexes  2+ B   Palpation Tender to palpation over Lumbar paraspinals B L3-L5 and Piriformis B   Planned Therapy Interventions   Planned Therapy Interventions joint mobilization;manual therapy;ROM;strengthening;stretching;neuromuscular re-education   Planned Modality Interventions   Planned Modality Interventions Hot packs;TENS;Cryotherapy;Traction   Clinical Impression   Criteria for Skilled Therapeutic Interventions Met yes, treatment indicated   PT Diagnosis Low back and anterior hip pain likely due to radiculopathy   Influenced by the following impairments Pain, Strength deficits, Flexibility deficits   Functional limitations  due to impairments Difficulty transferring from sit to stand   Clinical Presentation Stable/Uncomplicated   Clinical Presentation Rationale 1 comorbidity impacting PT / 1 body system / Stable   Clinical Decision Making (Complexity) Low complexity   Therapy Frequency 1 time/week   Predicted Duration of Therapy Intervention (days/wks) 8 weeks   Risk & Benefits of therapy have been explained Yes   Patient, Family & other staff in agreement with plan of care Yes   Education Assessment   Preferred Learning Style Listening;Reading;Demonstration;Pictures/video   Barriers to Learning No barriers   ORTHO GOALS   PT Ortho Eval Goals 1;2;3;4   Ortho Goal 1   Goal Identifier HEP   Goal Description Pt will be independent in HEP in order to achieve and maintain long term treatment goals.   Target Date 02/28/19   Ortho Goal 2   Goal Identifier Sit to stand   Goal Description Pt will be able to sit for 30 minutes and stand with a minimal increase in anterior thigh pain 1-2/10.   Target Date 03/25/19   Total Evaluation Time   PT Eval, Low Complexity Minutes (84984) 20     Brannon Saleh, PT, DPT

## 2019-02-08 DIAGNOSIS — H34.11 CENTRAL ARTERY OCCLUSION OF RETINA, RIGHT: ICD-10-CM

## 2019-02-08 NOTE — TELEPHONE ENCOUNTER
Pt due for 1 year f/u in 5/2019 with Dr. Pascal. Pt needs to schedule appointment prior to refill of Plavix.   Debra Apple, BSN, RN

## 2019-02-11 ENCOUNTER — TELEPHONE (OUTPATIENT)
Dept: OTHER | Facility: CLINIC | Age: 74
End: 2019-02-11

## 2019-02-11 RX ORDER — CLOPIDOGREL BISULFATE 75 MG/1
75 TABLET ORAL DAILY
Qty: 90 TABLET | Refills: 0 | Status: SHIPPED | OUTPATIENT
Start: 2019-02-11 | End: 2019-03-28

## 2019-02-11 NOTE — TELEPHONE ENCOUNTER
Left voice mail for patient to call [phone]. Patient needs to schedule appointment with Dr. Pascal.  Cecilia Patel MA

## 2019-02-11 NOTE — TELEPHONE ENCOUNTER
Will refill to May 2019, then pt needs OV prior to further refills. OV due 5/2019 with Dr. Pascal.     Debra Apple, BSN, RN

## 2019-02-27 ENCOUNTER — DOCUMENTATION ONLY (OUTPATIENT)
Dept: PHYSICAL THERAPY | Facility: CLINIC | Age: 74
End: 2019-02-27

## 2019-02-27 NOTE — PROGRESS NOTES
Physical Therapy Discharge Note    Patient Name: Chika Hurd  MR#: [id number]  : 1945  MD name: Audrey Roy  Diagnosis: Lumbar radiculopathy  Eval date: 2019  Reporting period : 2019  Number of visits: 1    Subjective:  Patient attended eval only and did not return.  Pain scale and function unknown due to patient didn't return to PT    Objective:  Current objective measures unknown due to patient didn't return.  Treatment has consisted of Stretching and strengthening exercise education / Soft tissue mobilization / Joint mobilization / Pt education regarding diagnosis    Assessment:  Goals not met due to patient didn't return.     Plan:  Discharge with HEP.  Therapist: Brannon Saleh, PT, DPT

## 2019-03-05 ENCOUNTER — OFFICE VISIT (OUTPATIENT)
Dept: VASCULAR SURGERY | Facility: CLINIC | Age: 74
End: 2019-03-05
Payer: COMMERCIAL

## 2019-03-05 ENCOUNTER — TELEPHONE (OUTPATIENT)
Dept: VASCULAR SURGERY | Facility: CLINIC | Age: 74
End: 2019-03-05

## 2019-03-05 VITALS — DIASTOLIC BLOOD PRESSURE: 86 MMHG | HEART RATE: 70 BPM | RESPIRATION RATE: 16 BRPM | SYSTOLIC BLOOD PRESSURE: 153 MMHG

## 2019-03-05 DIAGNOSIS — H34.11 CENTRAL RETINAL ARTERY OCCLUSION OF RIGHT EYE: Primary | ICD-10-CM

## 2019-03-05 PROCEDURE — 99213 OFFICE O/P EST LOW 20 MIN: CPT | Performed by: SURGERY

## 2019-03-05 NOTE — PROGRESS NOTES
Mrs. Hurd is an exceedingly pleasant 73 year old  Female who had seen in September 2017 for right central retinal artery occlusion.  He is right-hand dominant.  She had undergone carotid duplex sonography which showed less than 50% stenosis of the right internal carotid artery.  She has subsequently undergone a CT angiogram of the head and neck.  She was on a lower dose of Lipitor which was increased and now she takes alternating dose of 40 mg and 20 mg every other day.  We had also placed her on baby aspirin and Plavix.    She has recently undergone cataract surgery which has improved her vision but the area of the central retinal artery occlusion persists as a visual field defect.  She has not had any new episodes.    I think it is reasonable to stop the Plavix now.  I have asked him to start taking a adult dose aspirin daily.  We will also check a lipid profile.  If her LDL is less than 70 then we can put her on 20 mg of Lipitor daily.    One day after she has given her blood for the fasting lipid profile she will call the office.  If the LDL is less than 70 then we will go with full dose aspirin indefinitely and 20 mg of Lipitor indefinitely.  If the LDL cholesterol is greater than 70 mg/dL then she should continue with alternating doses of 20 mg and 40 mg of Lipitor every other day.    We will give her 1 year refill on the Lipitor and subsequent to that I would request her primary care provider to take over that prescription.  Unless there are any changes or recurrent episodes she does not need further follow-up with vascular surgery.

## 2019-03-05 NOTE — NURSING NOTE
"Initial /86 (BP Location: Left arm, Patient Position: Chair, Cuff Size: Adult Regular)   Pulse 70   Resp 16  Estimated body mass index is 31.29 kg/m  as calculated from the following:    Height as of 1/11/19: 1.549 m (5' 1\").    Weight as of 1/11/19: 75.1 kg (165 lb 9.6 oz). .    Patient is here for a recheck of meds.  michael early LPN    "

## 2019-03-05 NOTE — TELEPHONE ENCOUNTER
Patient did not present to lab for testing today.  The BLIP order has been cancelled as NO SHOW and reordered as FUTURE. Please notify patient to return to lab for testing.    Thank  You for your attention to this matter.    OP Lab Staff

## 2019-03-08 DIAGNOSIS — H34.11 CENTRAL RETINAL ARTERY OCCLUSION OF RIGHT EYE: ICD-10-CM

## 2019-03-08 LAB
CHOLEST SERPL-MCNC: 139 MG/DL
HDLC SERPL-MCNC: 61 MG/DL
LDLC SERPL CALC-MCNC: 63 MG/DL
NONHDLC SERPL-MCNC: 78 MG/DL
TRIGL SERPL-MCNC: 75 MG/DL

## 2019-03-08 PROCEDURE — 36415 COLL VENOUS BLD VENIPUNCTURE: CPT | Performed by: SURGERY

## 2019-03-08 PROCEDURE — 80061 LIPID PANEL: CPT | Performed by: SURGERY

## 2019-03-17 DIAGNOSIS — K21.9 GASTROESOPHAGEAL REFLUX DISEASE WITHOUT ESOPHAGITIS: ICD-10-CM

## 2019-03-18 RX ORDER — FAMOTIDINE 20 MG/1
TABLET, FILM COATED ORAL
Qty: 180 TABLET | Refills: 1 | Status: SHIPPED | OUTPATIENT
Start: 2019-03-18 | End: 2019-03-20

## 2019-03-20 ENCOUNTER — TELEPHONE (OUTPATIENT)
Dept: FAMILY MEDICINE | Facility: CLINIC | Age: 74
End: 2019-03-20

## 2019-03-20 DIAGNOSIS — K21.9 GASTROESOPHAGEAL REFLUX DISEASE WITHOUT ESOPHAGITIS: ICD-10-CM

## 2019-03-20 DIAGNOSIS — H34.11 CENTRAL RETINAL ARTERY OCCLUSION OF RIGHT EYE: ICD-10-CM

## 2019-03-20 RX ORDER — ATORVASTATIN CALCIUM 40 MG/1
TABLET, FILM COATED ORAL
Qty: 90 TABLET | Refills: 1 | Status: SHIPPED | OUTPATIENT
Start: 2019-03-20 | End: 2019-11-25

## 2019-03-20 RX ORDER — FAMOTIDINE 20 MG/1
TABLET, FILM COATED ORAL
Qty: 180 TABLET | Refills: 1 | Status: ON HOLD | OUTPATIENT
Start: 2019-03-20

## 2019-03-20 NOTE — TELEPHONE ENCOUNTER
Last Written Prescription Date:  Lipitor 2/8/2018  Last Fill Quantity: 90,  # refills: 3   Last office visit: 1/11/2019 with prescribing provider:  Kirill  Future Office Visit:   Next 5 appointments (look out 90 days)    Mar 28, 2019  9:00 AM CDT  Return Visit with Eliot Crane MD  [facility] ([facility]) [address]  [facility] [address]  [phone]         Last Written Prescription Date:  Famotidine   Last Fill Quantity: 180,  # refills: 1   Last office visit: 1/11/2019 with prescribing provider:  Kirill  Future Office Visit:   Next 5 appointments (look out 90 days)    Mar 28, 2019  9:00 AM CDT  Return Visit with Eliot Crane MD  [facility] ([facility]) [address] [address]  [phone]         Patient has a new Mail Order Pharmacy. [facility] and would like these sent there. Also the Famotidine she only has a few days left, so not sure how long the medications will take thru the mail.  Maria De Jesus Colin  [facility]  [facility]  Please call to advise, when sent to new Pharmacy.

## 2019-03-28 ENCOUNTER — ONCOLOGY VISIT (OUTPATIENT)
Dept: ONCOLOGY | Facility: CLINIC | Age: 74
End: 2019-03-28
Attending: INTERNAL MEDICINE
Payer: COMMERCIAL

## 2019-03-28 VITALS
SYSTOLIC BLOOD PRESSURE: 142 MMHG | RESPIRATION RATE: 18 BRPM | OXYGEN SATURATION: 96 % | HEART RATE: 69 BPM | HEIGHT: 61 IN | BODY MASS INDEX: 31.74 KG/M2 | DIASTOLIC BLOOD PRESSURE: 60 MMHG | TEMPERATURE: 97.6 F | WEIGHT: 168.1 LBS

## 2019-03-28 DIAGNOSIS — E78.5 HYPERLIPIDEMIA LDL GOAL <130: ICD-10-CM

## 2019-03-28 DIAGNOSIS — D05.12 DUCTAL CARCINOMA IN SITU (DCIS) OF LEFT BREAST: Primary | ICD-10-CM

## 2019-03-28 DIAGNOSIS — K21.9 GASTROESOPHAGEAL REFLUX DISEASE WITHOUT ESOPHAGITIS: ICD-10-CM

## 2019-03-28 PROCEDURE — G0463 HOSPITAL OUTPT CLINIC VISIT: HCPCS

## 2019-03-28 PROCEDURE — 99214 OFFICE O/P EST MOD 30 MIN: CPT | Performed by: INTERNAL MEDICINE

## 2019-03-28 ASSESSMENT — MIFFLIN-ST. JEOR: SCORE: 1206.94

## 2019-03-28 ASSESSMENT — PAIN SCALES - GENERAL: PAINLEVEL: SEVERE PAIN (6)

## 2019-03-28 NOTE — PROGRESS NOTES
Hematology/ Oncology Follow-up Visit:  Mar 28, 2019    Reason for Visit:   Chief Complaint   Patient presents with     Oncology Clinic Visit     6 month follow up Ductal carcinoma in situ (DCIS) of left breast, no labs ordered        Oncologic History:  Ductal carcinoma in situ (DCIS) of left breast  Chika Hurd has been compliant to routine mammogram in 07/2016.  Identified new microcalcification 0.4 cm area on the left breast 12-1 o'clock position 1.7 cm from the nipple.  This is a change from her prior 2015 mammogram.  Stereotactic biopsy was done 07/22/2016, identified high grade DCIS with comedonecrosis and microcalcifications and intermediate grade with solid and cribriform pattern.  No evidence of invasive carcinoma.  No evidence of angiolymphatic invasion.  % positive, OH 60% to 70% positive.  She bruised significantly after the biopsy with decreased range of motion of left shoulder.  She gradually recovered but still has significant amount of bruising left in the inferior part of the left breast.  She had lumpectomy with Dr. Murry 9/2016 found 1.4 cm grade II DCIS, had also fibrocystic changes. She had RT finished in 11/2016. She started Arimidex after.       Interval History:  It is here today for follow-up.  She has been feeling well without recent complaints weight loss night sweats fever chills patient denies any nausea vomiting or diarrhea.  Patient denies any shortness of breath or cough or wheezing.    Review Of Systems:  Constitutional: Negative for fever, chills, and night sweats.  Skin: negative.  Eyes: negative.  Ears/Nose/Throat: negative.  Respiratory: No shortness of breath, dyspnea on exertion, cough, or hemoptysis.  Cardiovascular: negative.  Gastrointestinal: negative.  Genitourinary: negative.  Musculoskeletal: negative.  Neurologic: negative.  Psychiatric: negative.  Hematologic/Lymphatic/Immunologic: negative.  Endocrine: negative.    All other ROS negative unless mentioned in  "interval history.    Past medical, social, surgical, and family histories reviewed.    Allergies:  Allergies as of 03/28/2019 - Reviewed 03/28/2019   Allergen Reaction Noted     Cortisone Swelling 02/02/2006       Current Medications:  Current Outpatient Medications   Medication Sig Dispense Refill     acetaminophen (TYLENOL) 500 MG tablet Take 2 tablets (1,000 mg) by mouth every 8 hours as needed for mild pain 30 tablet 0     Ascorbic Acid (VITAMIN C PO) Take 500 mg by mouth daily       ASPIRIN PO Take 325 mg by mouth daily        atorvastatin (LIPITOR) 40 MG tablet Take 40mg one day and 20mg the next alternatingly. 90 tablet 1     CALCIUM + D OR 1 TABLET DAILY       co-enzyme Q-10 (CO Q10) 100 MG CAPS Take 2 capsules by mouth daily. Takes a total of 200 mg daily.  0     famotidine (PEPCID) 20 MG tablet TAKE 1 TABLET (20 MG) BY MOUTH 2 TIMES DAILY 180 tablet 1     Glycerin-Polysorbate 80 (REFRESH DRY EYE THERAPY OP) Apply 1-2 drops to eye as needed Reported on 5/23/2017       MULTIVITAMIN OR 1 daily       Omega-3 Fatty Acids (OMEGA-3 FISH OIL PO) Take 1 g by mouth daily           Physical Exam:  /60 (BP Location: Right arm, Patient Position: Sitting, Cuff Size: Adult Large)   Pulse 69   Temp 97.6  F (36.4  C) (Tympanic)   Resp 18   Ht 1.537 m (5' 0.5\")   Wt 76.2 kg (168 lb 1.6 oz)   SpO2 96%   Breastfeeding? No   BMI 32.29 kg/m    Wt Readings from Last 12 Encounters:   03/28/19 76.2 kg (168 lb 1.6 oz)   01/11/19 75.1 kg (165 lb 9.6 oz)   12/20/18 74.4 kg (164 lb)   09/13/18 75.3 kg (166 lb)   03/09/18 73.8 kg (162 lb 11.2 oz)   02/08/18 72.7 kg (160 lb 3.2 oz)   11/27/17 72.6 kg (160 lb)   11/06/17 73.5 kg (162 lb)   09/11/17 73.5 kg (162 lb)   06/02/17 72.1 kg (159 lb)   05/23/17 74 kg (163 lb 3.2 oz)   04/24/17 74.1 kg (163 lb 6.4 oz)     GENERAL APPEARANCE: Healthy, alert and in no acute distress.  HEENT: Sclerae anicteric. PERRLA. Oropharynx without ulcers, lesions, or thrush.  NECK: Supple. No " "asymmetry or masses.  LYMPHATICS: No palpable cervical, supraclavicular, axillary, or inguinal lymphadenopathy.  RESP: Lungs clear to auscultation bilaterally without rales, rhonchi or wheezes.\",BREAST: Right- No mass, nipple discharge or axillary adenopathy. Left- No mass, nipple discharge or axillary adenopathy \"CARDIOVASCULAR: Regular rate and rhythm. Normal S1, S2; no S3 or S4. No murmur, gallop, or rub.  ABDOMEN: Soft, nontender. Bowel sounds normal. No palpable organomegaly or masses.  MUSCULOSKELETAL: Extremities without gross deformities noted. No edema of bilateral lower extremities.  SKIN: No suspicious lesions or rashes.  NEURO: Alert and oriented x 3. Cranial nerves II-XII grossly intact.  PSYCHIATRIC: Mentation and affect appear normal.    Laboratory/Imaging Studies:  No visits with results within 2 Week(s) from this visit.   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  There is no clinical evidence of breast cancer recurrence.  I will see the patient again in 1 year time or sooner if there are new developments or concerns.    (K21.9) Gastroesophageal reflux disease without esophagitis  Patient currently on Pepcid 20 mg orally daily.    (E78.5) Hyperlipidemia LDL goal <130  Patient currently on Lipitor 40 mg orally daily.    The patient is ready to learn, no apparent learning barriers were identified.  Diagnosis and treatment plans " were explained to the patient. The patient expressed understanding of the content. The patient asked appropriate questions. The patient questions were answered to her satisfaction.    Chart documentation with Dragon Voice recognition Software. Although reviewed after completion, some words and grammatical errors may remain.

## 2019-03-28 NOTE — LETTER
"    3/28/2019         RE: Chika Hurd  [id number]  [facility] [place] [facility] [address]        Dear Colleague,    Thank you for referring your patient, Chika Hurd, to the [facility]. Please see a copy of my visit note below.    Oncology Rooming Note    March 28, 2019 9:11 AM   Chika Hurd is a 73 year old female who presents for:    Chief Complaint   Patient presents with     Oncology Clinic Visit     6 month follow up Ductal carcinoma in situ (DCIS) of left breast, no labs ordered      Initial Vitals: /60 (BP Location: Right arm, Patient Position: Sitting, Cuff Size: Adult Large)   Pulse 69   Temp 97.6  F (36.4  C) (Tympanic)   Resp 18   Ht 1.537 m (5' 0.5\")   Wt 76.2 kg (168 lb 1.6 oz)   SpO2 96%   Breastfeeding? No   BMI 32.29 kg/m    Estimated body mass index is 32.29 kg/m  as calculated from the following:    Height as of this encounter: 1.537 m (5' 0.5\").    Weight as of this encounter: 76.2 kg (168 lb 1.6 oz). Body surface area is 1.8 meters squared.  Severe Pain (6) Comment: Data Unavailable   No LMP recorded. Patient is postmenopausal.  Allergies reviewed: Yes  Medications reviewed: Yes    Medications: Medication refills not needed today.  Pharmacy name entered into [facility]: [facility] - [address]    Clinical concerns: 6 month follow up Ductal carcinoma in situ (DCIS) of left breast, no labs ordered. C/o 5/10 bilateral breast discomfort located on her sides. Questions today about sun exposure.       Christine Downey [facility]              Hematology/ Oncology Follow-up Visit:  Mar 28, 2019    Reason for Visit:   Chief Complaint   Patient presents with     Oncology Clinic Visit     6 month follow up Ductal carcinoma in situ (DCIS) of left breast, no labs ordered        Oncologic History:  Ductal carcinoma in situ (DCIS) of left breast  Chika Hurd has been compliant to routine mammogram in 07/2016.  Identified new microcalcification 0.4 cm " area on the left breast 12-1 o'clock position 1.7 cm from the nipple.  This is a change from her prior 2015 mammogram.  Stereotactic biopsy was done 07/22/2016, identified high grade DCIS with comedonecrosis and microcalcifications and intermediate grade with solid and cribriform pattern.  No evidence of invasive carcinoma.  No evidence of angiolymphatic invasion.  % positive, [place] 60% to 70% positive.  She bruised significantly after the biopsy with decreased range of motion of left shoulder.  She gradually recovered but still has significant amount of bruising left in the inferior part of the left breast.  She had lumpectomy with Dr. Murry 9/2016 found 1.4 cm grade II DCIS, had also fibrocystic changes. She had RT finished in 11/2016. She started Arimidex after.       Interval History:  It is here today for follow-up.  She has been feeling well without recent complaints weight loss night sweats fever chills patient denies any nausea vomiting or diarrhea.  Patient denies any shortness of breath or cough or wheezing.    Review Of Systems:  Constitutional: Negative for fever, chills, and night sweats.  Skin: negative.  Eyes: negative.  Ears/Nose/Throat: negative.  Respiratory: No shortness of breath, dyspnea on exertion, cough, or hemoptysis.  Cardiovascular: negative.  Gastrointestinal: negative.  Genitourinary: negative.  Musculoskeletal: negative.  Neurologic: negative.  Psychiatric: negative.  Hematologic/Lymphatic/Immunologic: negative.  Endocrine: negative.    All other ROS negative unless mentioned in interval history.    Past medical, social, surgical, and family histories reviewed.    Allergies:  Allergies as of 03/28/2019 - Reviewed 03/28/2019   Allergen Reaction Noted     Cortisone Swelling 02/02/2006       Current Medications:  Current Outpatient Medications   Medication Sig Dispense Refill     acetaminophen (TYLENOL) 500 MG tablet Take 2 tablets (1,000 mg) by mouth every 8 hours as needed for mild  "pain 30 tablet 0     Ascorbic Acid (VITAMIN C PO) Take 500 mg by mouth daily       ASPIRIN PO Take 325 mg by mouth daily        atorvastatin (LIPITOR) 40 MG tablet Take 40mg one day and 20mg the next alternatingly. 90 tablet 1     CALCIUM + D OR 1 TABLET DAILY       co-enzyme Q-10 (CO Q10) 100 MG CAPS Take 2 capsules by mouth daily. Takes a total of 200 mg daily.  0     famotidine (PEPCID) 20 MG tablet TAKE 1 TABLET (20 MG) BY MOUTH 2 TIMES DAILY 180 tablet 1     Glycerin-Polysorbate 80 (REFRESH DRY EYE THERAPY OP) Apply 1-2 drops to eye as needed Reported on 5/23/2017       MULTIVITAMIN OR 1 daily       Omega-3 Fatty Acids (OMEGA-3 FISH OIL PO) Take 1 g by mouth daily           Physical Exam:  /60 (BP Location: Right arm, Patient Position: Sitting, Cuff Size: Adult Large)   Pulse 69   Temp 97.6  F (36.4  C) (Tympanic)   Resp 18   Ht 1.537 m (5' 0.5\")   Wt 76.2 kg (168 lb 1.6 oz)   SpO2 96%   Breastfeeding? No   BMI 32.29 kg/m     Wt Readings from Last 12 Encounters:   03/28/19 76.2 kg (168 lb 1.6 oz)   01/11/19 75.1 kg (165 lb 9.6 oz)   12/20/18 74.4 kg (164 lb)   09/13/18 75.3 kg (166 lb)   03/09/18 73.8 kg (162 lb 11.2 oz)   02/08/18 72.7 kg (160 lb 3.2 oz)   11/27/17 72.6 kg (160 lb)   11/06/17 73.5 kg (162 lb)   09/11/17 73.5 kg (162 lb)   06/02/17 72.1 kg (159 lb)   05/23/17 74 kg (163 lb 3.2 oz)   04/24/17 74.1 kg (163 lb 6.4 oz)     GENERAL APPEARANCE: Healthy, alert and in no acute distress.  HEENT: Sclerae anicteric. PERRLA. Oropharynx without ulcers, lesions, or thrush.  NECK: Supple. No asymmetry or masses.  LYMPHATICS: No palpable cervical, supraclavicular, axillary, or inguinal lymphadenopathy.  RESP: Lungs clear to auscultation bilaterally without rales, rhonchi or wheezes.\",BREAST: Right- No mass, nipple discharge or axillary adenopathy. Left- No mass, nipple discharge or axillary adenopathy \"CARDIOVASCULAR: Regular rate and rhythm. Normal S1, S2; no S3 or S4. No murmur, gallop, or " rub.  ABDOMEN: Soft, nontender. Bowel sounds normal. No palpable organomegaly or masses.  MUSCULOSKELETAL: Extremities without gross deformities noted. No edema of bilateral lower extremities.  SKIN: No suspicious lesions or rashes.  NEURO: Alert and oriented x 3. Cranial nerves II-XII grossly intact.  PSYCHIATRIC: Mentation and affect appear normal.    Laboratory/Imaging Studies:  No visits with results within 2 Week(s) from this visit.   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  There is no clinical evidence of breast cancer recurrence.  I will see the patient again in 1 year time or sooner if there are new developments or concerns.    (K21.9) Gastroesophageal reflux disease without esophagitis  Patient currently on Pepcid 20 mg orally daily.    (E78.5) Hyperlipidemia LDL goal <130  Patient currently on Lipitor 40 mg orally daily.    The patient is ready to learn, no apparent learning barriers were identified.  Diagnosis and treatment plans were explained to the patient. The patient expressed understanding of the content. The patient asked appropriate questions. The patient questions were answered to her satisfaction.    Chart documentation with Dragon Voice recognition Software. Although reviewed after completion, some words and grammatical errors may remain.    Again, thank you for allowing me to participate in the care of your patient.         Sincerely,        Eliot Crane MD

## 2019-03-28 NOTE — PROGRESS NOTES
"Oncology Rooming Note    March 28, 2019 9:11 AM   Chika Hurd is a 73 year old female who presents for:    Chief Complaint   Patient presents with     Oncology Clinic Visit     6 month follow up Ductal carcinoma in situ (DCIS) of left breast, no labs ordered      Initial Vitals: /60 (BP Location: Right arm, Patient Position: Sitting, Cuff Size: Adult Large)   Pulse 69   Temp 97.6  F (36.4  C) (Tympanic)   Resp 18   Ht 1.537 m (5' 0.5\")   Wt 76.2 kg (168 lb 1.6 oz)   SpO2 96%   Breastfeeding? No   BMI 32.29 kg/m   Estimated body mass index is 32.29 kg/m  as calculated from the following:    Height as of this encounter: 1.537 m (5' 0.5\").    Weight as of this encounter: 76.2 kg (168 lb 1.6 oz). Body surface area is 1.8 meters squared.  Severe Pain (6) Comment: Data Unavailable   No LMP recorded. Patient is postmenopausal.  Allergies reviewed: Yes  Medications reviewed: Yes    Medications: Medication refills not needed today.  Pharmacy name entered into [employer]: [facility] - [address]    Clinical concerns: 6 month follow up Ductal carcinoma in situ (DCIS) of left breast, no labs ordered. C/o 5/10 bilateral breast discomfort located on her sides. Questions today about sun exposure.       Christine Downey, SORAYA            "

## 2019-04-03 ENCOUNTER — TELEPHONE (OUTPATIENT)
Dept: FAMILY MEDICINE | Facility: CLINIC | Age: 74
End: 2019-04-03

## 2019-04-04 NOTE — TELEPHONE ENCOUNTER
Patient leaving for Easter vacation today, able to schedule with the nurse on 4-24-19.    NIMESH Maradiaga

## 2019-04-24 ENCOUNTER — ALLIED HEALTH/NURSE VISIT (OUTPATIENT)
Dept: FAMILY MEDICINE | Facility: CLINIC | Age: 74
End: 2019-04-24
Payer: COMMERCIAL

## 2019-04-24 VITALS — DIASTOLIC BLOOD PRESSURE: 82 MMHG | SYSTOLIC BLOOD PRESSURE: 156 MMHG | HEART RATE: 72 BPM

## 2019-04-24 DIAGNOSIS — Z01.30 BP CHECK: Primary | ICD-10-CM

## 2019-04-24 PROCEDURE — 99207 ZZC NO CHARGE NURSE ONLY: CPT

## 2019-04-24 NOTE — PROGRESS NOTES
Vitals:    04/24/19 1026   BP: 164/82   BP Location: Right arm   Patient Position: Sitting   Cuff Size: Adult Regular   Pulse: 72       B/P check.  B/P has been running some high over the last few months.  Pt is on no B/P meds.  Scheduled appt to f/u on B/P in June.  Pt will f/u sooner if has concerns.  [email or web address]

## 2019-05-14 ENCOUNTER — HOSPITAL ENCOUNTER (OUTPATIENT)
Dept: OUTPATIENT PROCEDURES | Facility: CLINIC | Age: 74
Discharge: HOME | End: 2019-05-14
Attending: OPHTHALMOLOGY | Admitting: OPHTHALMOLOGY
Payer: COMMERCIAL

## 2019-05-14 PROCEDURE — 66821 AFTER CATARACT LASER SURGERY: CPT | Mod: 50 | Performed by: OPHTHALMOLOGY

## 2019-05-16 ENCOUNTER — OFFICE VISIT (OUTPATIENT)
Dept: FAMILY MEDICINE | Facility: CLINIC | Age: 74
End: 2019-05-16
Payer: COMMERCIAL

## 2019-05-16 VITALS
TEMPERATURE: 97.2 F | SYSTOLIC BLOOD PRESSURE: 138 MMHG | BODY MASS INDEX: 28.29 KG/M2 | RESPIRATION RATE: 16 BRPM | DIASTOLIC BLOOD PRESSURE: 82 MMHG | OXYGEN SATURATION: 95 % | HEART RATE: 65 BPM | HEIGHT: 65 IN | WEIGHT: 169.8 LBS

## 2019-05-16 DIAGNOSIS — Z17.0 CARCINOMA OF LEFT BREAST IN FEMALE, ESTROGEN RECEPTOR POSITIVE, UNSPECIFIED SITE OF BREAST (H): ICD-10-CM

## 2019-05-16 DIAGNOSIS — C50.912 CARCINOMA OF LEFT BREAST IN FEMALE, ESTROGEN RECEPTOR POSITIVE, UNSPECIFIED SITE OF BREAST (H): ICD-10-CM

## 2019-05-16 DIAGNOSIS — Z12.11 SPECIAL SCREENING FOR MALIGNANT NEOPLASMS, COLON: ICD-10-CM

## 2019-05-16 DIAGNOSIS — N63.0 BREAST MASS: Primary | ICD-10-CM

## 2019-05-16 PROCEDURE — 99214 OFFICE O/P EST MOD 30 MIN: CPT | Performed by: FAMILY MEDICINE

## 2019-05-16 ASSESSMENT — PAIN SCALES - GENERAL: PAINLEVEL: SEVERE PAIN (7)

## 2019-05-16 ASSESSMENT — MIFFLIN-ST. JEOR: SCORE: 1281.09

## 2019-05-16 NOTE — PATIENT INSTRUCTIONS
Our Clinic hours are:  Mondays    7:20 am - 7 pm  Tues -  Fri  7:20 am - 5 pm    Clinic Phone: [phone]    The clinic lab opens at 7:30 am Mon - Fri and appointments are required.    [facility]. [phone]  Monday  8 am - 7pm  Tues - Fri 8 am - 5:30 pm

## 2019-05-16 NOTE — PROGRESS NOTES
SUBJECTIVE:   Chika Hurd is a 74 year old female who presents to clinic today for the following health issues:      HPI  Problem:   Chief Complaint   Patient presents with     Mass     lump on left side of breast / under arm. x 1 week,   having arm pain and numbness in figures,  States she had a lumpectomy on left side about 2 years ago.     ADDITIONAL HPI: 74 year old female here for above issue.  Also has had left hand numbness of 3-5th fingers and lateral hand. No injury. Has been present for the last 2 months but improving significantly.  No weakness.    Regarding breast symptoms she has had the above symptoms. She has a history on 7/22/2016 of high grade DCIS with comedonecrosis and microcalcifications and intermediate grade with solid and cribriform pattern.  No evidence of invasive carcinoma.  No evidence of angiolymphatic invasion.  % positive, PA 60% to 70% positive. She had lumpectomy with Dr. Murry 9/2016 found 1.4 cm grade II DCIS, had also fibrocystic changes. She had RT finished in 11/2016. She started Arimidex after. She last saw her oncologist 3/29/2019. Has a routine follow-up  mammogram scheduled for July.       ROS: 10 point review of systems negative except as per HPI.    PAST MEDICAL HISTORY:  Past Medical History:   Diagnosis Date     DCIS (ductal carcinoma in situ) of breast      GERD (gastroesophageal reflux disease)      Hyperlipidemia         ACTIVE MEDICAL PROBLEMS:  Patient Active Problem List   Diagnosis     Hyperlipidemia LDL goal <130     SENSONRL HEAR LOSS,BILAT     GERD (gastroesophageal reflux disease)     Cataract right eye     Advanced directives, counseling/discussion     Ductal carcinoma in situ (DCIS) of left breast     Central retinal artery occlusion, right        FAMILY HISTORY:  Family History   Problem Relation Age of Onset     Heart Disease Mother      Heart Disease Father      Circulatory Father         main artery aneursym     Heart Disease Sister          sleep problems     Breast Cancer Maternal Aunt         diagnosed in 60's, surviving in 90's       SOCIAL HISTORY:  Social History     Socioeconomic History     Marital status:      Spouse name: Not on file     Number of children: Not on file     Years of education: Not on file     Highest education level: Not on file   Occupational History     Not on file   Social Needs     Financial resource strain: Not on file     Food insecurity:     Worry: Not on file     Inability: Not on file     Transportation needs:     Medical: Not on file     Non-medical: Not on file   Tobacco Use     Smoking status: Former Smoker     Types: Cigarettes     Last attempt to quit: 2005     Years since quittin.3     Smokeless tobacco: Never Used   Substance and Sexual Activity     Alcohol use: Yes     Comment: rarely     Drug use: No     Sexual activity: Never     Comment: refuses to answer   Lifestyle     Physical activity:     Days per week: Not on file     Minutes per session: Not on file     Stress: Not on file   Relationships     Social connections:     Talks on phone: Not on file     Gets together: Not on file     Attends Religion service: Not on file     Active member of club or organization: Not on file     Attends meetings of clubs or organizations: Not on file     Relationship status: Not on file     Intimate partner violence:     Fear of current or ex partner: Not on file     Emotionally abused: Not on file     Physically abused: Not on file     Forced sexual activity: Not on file   Other Topics Concern     Parent/sibling w/ CABG, MI or angioplasty before 65F 55M? No   Social History Narrative     Not on file       MEDICATIONS:  Current Outpatient Medications   Medication Sig Dispense Refill     acetaminophen (TYLENOL) 500 MG tablet Take 2 tablets (1,000 mg) by mouth every 8 hours as needed for mild pain 30 tablet 0     Ascorbic Acid (VITAMIN C PO) Take 500 mg by mouth daily       ASPIRIN PO Take 325 mg by mouth  "daily        atorvastatin (LIPITOR) 40 MG tablet Take 40mg one day and 20mg the next alternatingly. 90 tablet 1     CALCIUM + D OR 1 TABLET DAILY       co-enzyme Q-10 (CO Q10) 100 MG CAPS Take 2 capsules by mouth daily. Takes a total of 200 mg daily.  0     famotidine (PEPCID) 20 MG tablet TAKE 1 TABLET (20 MG) BY MOUTH 2 TIMES DAILY 180 tablet 1     Glycerin-Polysorbate 80 (REFRESH DRY EYE THERAPY OP) Apply 1-2 drops to eye as needed Reported on 5/23/2017       MULTIVITAMIN OR 1 daily       Omega-3 Fatty Acids (OMEGA-3 FISH OIL PO) Take 1 g by mouth daily          ALLERGIES:     Allergies   Allergen Reactions     Cortisone Swelling     Cortisone Cream       Problem list, Medication list, Allergies, and Medical/Social/Surgical histories reviewed in [facility] and updated as appropriate.    OBJECTIVE:                                                    VITALS: /82 (BP Location: Right arm, Cuff Size: Adult Large)   Pulse 65   Temp 97.2  F (36.2  C) (Oral)   Resp 16   Ht 1.651 m (5' 5\")   Wt 77 kg (169 lb 12.8 oz)   SpO2 95%   Breastfeeding? No   BMI 28.26 kg/m   Body mass index is 28.26 kg/m .  GENERAL: Pleasant, well appearing female.  BREAST: left breast lumpectomy scar noted. She is markedly tenderness to palpation left lower quadrant of her left breast.  There is an almond sized area of rubbery density otherwise no skin changes, nipple discharge, palpable masses or axillary adenopathy.     ASSESSMENT/PLAN:                                                    1. Breast mass  Given her breast cancer history will obtain additional diagnostic imaging at this time.  - MA Diagnostic Digital Bilateral; Future  - US Breast Bilateral Limited 1-3 Quadrants; Future    2. Carcinoma of left breast in female, estrogen receptor positive, unspecified site of breast (H)  - MA Diagnostic Digital Bilateral; Future  - US Breast Bilateral Limited 1-3 Quadrants; Future    3. Special screening for malignant neoplasms, colon  - " Fecal colorectal cancer screen (FIT); Future

## 2019-05-16 NOTE — Clinical Note
Wanted to send you an FYI since she came to see me today with new breast pain and a palpable mass. I ordered diagnostic imaging but wanted to make you aware as well.

## 2019-05-30 ENCOUNTER — HOSPITAL ENCOUNTER (OUTPATIENT)
Dept: ULTRASOUND IMAGING | Facility: CLINIC | Age: 74
End: 2019-05-30
Attending: FAMILY MEDICINE
Payer: COMMERCIAL

## 2019-05-30 ENCOUNTER — HOSPITAL ENCOUNTER (OUTPATIENT)
Dept: MAMMOGRAPHY | Facility: CLINIC | Age: 74
Discharge: HOME | End: 2019-05-30
Attending: FAMILY MEDICINE | Admitting: FAMILY MEDICINE
Payer: COMMERCIAL

## 2019-05-30 DIAGNOSIS — Z17.0 CARCINOMA OF LEFT BREAST IN FEMALE, ESTROGEN RECEPTOR POSITIVE, UNSPECIFIED SITE OF BREAST (H): ICD-10-CM

## 2019-05-30 DIAGNOSIS — N63.0 BREAST MASS: ICD-10-CM

## 2019-05-30 DIAGNOSIS — C50.912 CARCINOMA OF LEFT BREAST IN FEMALE, ESTROGEN RECEPTOR POSITIVE, UNSPECIFIED SITE OF BREAST (H): ICD-10-CM

## 2019-05-30 PROCEDURE — G0279 TOMOSYNTHESIS, MAMMO: HCPCS

## 2019-05-30 PROCEDURE — 76642 ULTRASOUND BREAST LIMITED: CPT | Mod: 50

## 2019-05-30 PROCEDURE — 77066 DX MAMMO INCL CAD BI: CPT

## 2019-06-06 ENCOUNTER — OFFICE VISIT (OUTPATIENT)
Dept: FAMILY MEDICINE | Facility: CLINIC | Age: 74
End: 2019-06-06
Payer: COMMERCIAL

## 2019-06-06 VITALS
OXYGEN SATURATION: 94 % | RESPIRATION RATE: 16 BRPM | SYSTOLIC BLOOD PRESSURE: 134 MMHG | HEART RATE: 73 BPM | HEIGHT: 61 IN | DIASTOLIC BLOOD PRESSURE: 80 MMHG | WEIGHT: 168.6 LBS | BODY MASS INDEX: 31.83 KG/M2 | TEMPERATURE: 98.9 F

## 2019-06-06 DIAGNOSIS — R03.0 ELEVATED BLOOD PRESSURE READING WITHOUT DIAGNOSIS OF HYPERTENSION: Primary | ICD-10-CM

## 2019-06-06 DIAGNOSIS — N64.4 BREAST PAIN: ICD-10-CM

## 2019-06-06 PROCEDURE — 99214 OFFICE O/P EST MOD 30 MIN: CPT | Performed by: FAMILY MEDICINE

## 2019-06-06 ASSESSMENT — MIFFLIN-ST. JEOR: SCORE: 1204.2

## 2019-06-06 ASSESSMENT — PAIN SCALES - GENERAL: PAINLEVEL: NO PAIN (0)

## 2019-06-06 NOTE — PROGRESS NOTES
Subjective     Chkia Hurd is a 74 year old female who presents to clinic today for the following health issues:    HPI   Problem:   Chief Complaint   Patient presents with     Hypertension     blood pressures having been flucuating     RECHECK     follow up on Mammogram that was done on 5/30/19       ADDITIONAL HPI: 74 year old female here for above issue.      ROS: 10 point review of systems negative except as per HPI.    PAST MEDICAL HISTORY:  Past Medical History:   Diagnosis Date     DCIS (ductal carcinoma in situ) of breast      GERD (gastroesophageal reflux disease)      Hyperlipidemia         ACTIVE MEDICAL PROBLEMS:  Patient Active Problem List   Diagnosis     Hyperlipidemia LDL goal <130     SENSONRL HEAR LOSS,BILAT     GERD (gastroesophageal reflux disease)     Cataract right eye     Advanced directives, counseling/discussion     Ductal carcinoma in situ (DCIS) of left breast     Central retinal artery occlusion, right     Carcinoma of left breast in female, estrogen receptor positive, unspecified site of breast (H)        FAMILY HISTORY:  Family History   Problem Relation Age of Onset     Heart Disease Mother      Heart Disease Father      Circulatory Father         main artery aneursym     Heart Disease Sister         sleep problems     Breast Cancer Maternal Aunt         diagnosed in 60's, surviving in 90's       SOCIAL HISTORY:  Social History     Socioeconomic History     Marital status:      Spouse name: Not on file     Number of children: Not on file     Years of education: Not on file     Highest education level: Not on file   Occupational History     Not on file   Social Needs     Financial resource strain: Not on file     Food insecurity:     Worry: Not on file     Inability: Not on file     Transportation needs:     Medical: Not on file     Non-medical: Not on file   Tobacco Use     Smoking status: Former Smoker     Types: Cigarettes     Last attempt to quit: 1/12/2005     Years  since quittin.4     Smokeless tobacco: Never Used   Substance and Sexual Activity     Alcohol use: Yes     Comment: rarely     Drug use: No     Sexual activity: Never     Comment: refuses to answer   Lifestyle     Physical activity:     Days per week: Not on file     Minutes per session: Not on file     Stress: Not on file   Relationships     Social connections:     Talks on phone: Not on file     Gets together: Not on file     Attends Orthodoxy service: Not on file     Active member of club or organization: Not on file     Attends meetings of clubs or organizations: Not on file     Relationship status: Not on file     Intimate partner violence:     Fear of current or ex partner: Not on file     Emotionally abused: Not on file     Physically abused: Not on file     Forced sexual activity: Not on file   Other Topics Concern     Parent/sibling w/ CABG, MI or angioplasty before 65F 55M? No   Social History Narrative     Not on file       MEDICATIONS:  Current Outpatient Medications   Medication Sig Dispense Refill     Ascorbic Acid (VITAMIN C PO) Take 500 mg by mouth daily       ASPIRIN PO Take 325 mg by mouth daily        atorvastatin (LIPITOR) 40 MG tablet Take 40mg one day and 20mg the next alternatingly. 90 tablet 1     CALCIUM + D OR 1 TABLET DAILY       co-enzyme Q-10 (CO Q10) 100 MG CAPS Take 2 capsules by mouth daily. Takes a total of 200 mg daily.  0     famotidine (PEPCID) 20 MG tablet TAKE 1 TABLET (20 MG) BY MOUTH 2 TIMES DAILY 180 tablet 1     Glycerin-Polysorbate 80 (REFRESH DRY EYE THERAPY OP) Apply 1-2 drops to eye as needed Reported on 2017       MULTIVITAMIN OR 1 daily       Omega-3 Fatty Acids (OMEGA-3 FISH OIL PO) Take 1 g by mouth daily          ALLERGIES:     Allergies   Allergen Reactions     Cortisone Swelling     Cortisone Cream       Problem list, Medication list, Allergies, and Medical/Social/Surgical histories reviewed in [facility] and updated as appropriate.    OBJECTIVE:                "                                     VITALS: /80 (BP Location: Right arm, Cuff Size: Adult Regular)   Pulse 73   Temp 98.9  F (37.2  C) (Tympanic)   Resp 16   Ht 1.537 m (5' 0.5\")   Wt 76.5 kg (168 lb 9.6 oz)   SpO2 94%   BMI 32.39 kg/m   Body mass index is 32.39 kg/m .  GENERAL: Pleasant, well appearing female.  HEENT: PERRL, EOMI, oropharynx clear.  NECK: supple, no thyromegaly or thyroid masses, no lymphadenopathy.  CV: RRR, no murmurs, rubs or gallops.  LUNGS: Clear to auscultation bilaterally, normal effort.  ABDOMEN: Soft, non-distended, non-tender.  No hepatosplenomegaly or palpable masses.    SKIN: warm and dry without obvious rashes.   EXTREMITIES: No edema, normal pulses.   MA DIAGNOSTIC BILATERAL W/ ARTEMIO, US BREAST BILATERAL LIMITED 1-3  QUADRANTS 5/30/2019 9:11 AM     HISTORY:  Palpable lump in axillary tail of left breast.     COMPARISON:  7/26/2018.     TECHNIQUE:  Bilateral digital mammography with CAD is performed as  well as DBT. Directed left breast ultrasound is also done.     BREAST DENSITY: Scattered fibroglandular densities.     BILATERAL MAMMOGRAM: Stable. No new or suspicious findings of  malignancy.     LEFT BREAST ULTRASOUND: Directed sonography to the site of the  patient's palpable complaint in the upper-outer left breast shows no  focal finding. Any clinically significant palpable finding should be  treated on its own merit.    ASSESSMENT/PLAN:                                                    1. Elevated blood pressure reading without diagnosis of hypertension  Discussed dietary and lifestyle modifications for blood pressure control. Monitor home blood pressure and Follow-up for RN blood pressure re-check in 1-2 months.  If persistently elevated discussed likely start ,medication.     2. Breast pain  Discussed given ongoing symptoms we could have her see the breast surgeon for further evaluation and management.  She would like to monitor and if worsens or doesn's resolve " with follow-up.

## 2019-06-06 NOTE — PATIENT INSTRUCTIONS
Our Clinic hours are:  Mondays    7:20 am - 7 pm  Tues -  Fri  7:20 am - 5 pm    Clinic Phone: [phone]    The clinic lab opens at 7:30 am Mon - Fri and appointments are required.    [facility]. [phone]  Monday  8 am - 7pm  Tues - Fri 8 am - 5:30 pm

## 2019-06-06 NOTE — Clinical Note
Additional info to previously sent note: Kishorery - she is not scheduled with you in July - July was to be her routine follow-up mammogram.  She had one now due to symptoms.

## 2019-06-06 NOTE — Clinical Note
HARINI: Karma saw me for left breast pain. Mammogram and U/S were ok but she's still having pain and that area feels more dense to me but hard to say what her baseline was.  Today's follow-up visit she says pain is better but not gone.  Just wanted to make you aware if you would want to do anything else now or wait until her follow-up with you in July.

## 2019-06-14 PROBLEM — R03.0 ELEVATED BLOOD PRESSURE READING WITHOUT DIAGNOSIS OF HYPERTENSION: Status: ACTIVE | Noted: 2019-06-14

## 2019-06-20 ENCOUNTER — PATIENT OUTREACH (OUTPATIENT)
Dept: ONCOLOGY | Facility: CLINIC | Age: 74
End: 2019-06-20

## 2019-06-20 NOTE — PROGRESS NOTES
Patient called to report that she felt a lump on the left breast above the surgical site from her lumpectomy. She had a mammogram on 5.30.19 which was negative. She will be following up with her primary. She wanted to keep Dr. Crane in the loop.  Lynnette Lopez RN

## 2019-07-31 ENCOUNTER — OFFICE VISIT (OUTPATIENT)
Dept: FAMILY MEDICINE | Facility: CLINIC | Age: 74
End: 2019-07-31
Payer: COMMERCIAL

## 2019-07-31 VITALS
BODY MASS INDEX: 27.59 KG/M2 | HEART RATE: 63 BPM | RESPIRATION RATE: 16 BRPM | TEMPERATURE: 97.2 F | WEIGHT: 165.6 LBS | HEIGHT: 65 IN | SYSTOLIC BLOOD PRESSURE: 138 MMHG | OXYGEN SATURATION: 95 % | DIASTOLIC BLOOD PRESSURE: 80 MMHG

## 2019-07-31 DIAGNOSIS — Z17.0 CARCINOMA OF LEFT BREAST IN FEMALE, ESTROGEN RECEPTOR POSITIVE, UNSPECIFIED SITE OF BREAST (H): Primary | ICD-10-CM

## 2019-07-31 DIAGNOSIS — C50.912 CARCINOMA OF LEFT BREAST IN FEMALE, ESTROGEN RECEPTOR POSITIVE, UNSPECIFIED SITE OF BREAST (H): Primary | ICD-10-CM

## 2019-07-31 PROCEDURE — 99214 OFFICE O/P EST MOD 30 MIN: CPT | Performed by: FAMILY MEDICINE

## 2019-07-31 ASSESSMENT — PAIN SCALES - GENERAL: PAINLEVEL: NO PAIN (0)

## 2019-07-31 ASSESSMENT — MIFFLIN-ST. JEOR: SCORE: 1262.04

## 2019-07-31 NOTE — PATIENT INSTRUCTIONS
Jose Morataya                       Appt Monday 9/16/19 5:00 pm   2nd floor   [address] [facility], [place]     [phone]    Our Clinic hours are:  Mondays    7:20 am - 7 pm  Tues -  Fri  7:20 am - 5 pm    Clinic Phone: [phone]    The clinic lab opens at 7:30 am Mon - Fri and appointments are required.    [facility]  Ph. [phone]  Monday  8 am - 7pm  Tues - Fri 8 am - 5:30 pm

## 2019-07-31 NOTE — TELEPHONE ENCOUNTER
ONCOLOGY INTAKE: Records Information      APPT INFORMATION:  Referring provider:  Dr. DEANNE Roy  Referring provider s clinic:  [facility]  Reason for visit/diagnosis:  left breast lump  Has patient been notified of appointment date and time?: yes    RECORDS INFORMATION:  Were the records received with the referral (via [employer])? no    Has patient been seen for any external appt for this diagnosis? no    Records are in epic

## 2019-07-31 NOTE — PROGRESS NOTES
Subjective     Chika Hurd is a 74 year old female who presents to clinic today for the following health issues:    HPI     Chief Complaint   Patient presents with     Hypertension     recheck     Mass     on left side of chest x 1 month.        Hypertension Follow-up      Do you check your blood pressure regularly outside of the clinic? No     Are you following a low salt diet? No    Are your blood pressures ever more than 140 on the top number (systolic) OR more   than 90 on the bottom number (diastolic), for example 140/90? Yes    Amount of exercise or physical activity: 2-3 days/week for an average of 15-30 minutes    Problems taking medications regularly: No    Medication side effects: none    Diet: regular (no restrictions)    ADDITIONAL HPI: 74 year old female here for above issue.      ROS: 10 point review of systems negative except as per HPI.    PAST MEDICAL HISTORY:  Past Medical History:   Diagnosis Date     DCIS (ductal carcinoma in situ) of breast      GERD (gastroesophageal reflux disease)      Hyperlipidemia         ACTIVE MEDICAL PROBLEMS:  Patient Active Problem List   Diagnosis     Hyperlipidemia LDL goal <130     SENSONRL HEAR LOSS,BILAT     GERD (gastroesophageal reflux disease)     Cataract right eye     Advanced directives, counseling/discussion     Ductal carcinoma in situ (DCIS) of left breast     Central retinal artery occlusion, right     Carcinoma of left breast in female, estrogen receptor positive, unspecified site of breast (H)     Elevated blood pressure reading without diagnosis of hypertension        FAMILY HISTORY:  Family History   Problem Relation Age of Onset     Heart Disease Mother      Heart Disease Father      Circulatory Father         main artery aneursym     Heart Disease Sister         sleep problems     Breast Cancer Maternal Aunt         diagnosed in 60's, surviving in 90's       SOCIAL HISTORY:  Social History     Socioeconomic History     Marital status:       Spouse name: Not on file     Number of children: Not on file     Years of education: Not on file     Highest education level: Not on file   Occupational History     Not on file   Social Needs     Financial resource strain: Not on file     Food insecurity:     Worry: Not on file     Inability: Not on file     Transportation needs:     Medical: Not on file     Non-medical: Not on file   Tobacco Use     Smoking status: Former Smoker     Types: Cigarettes     Last attempt to quit: 2005     Years since quittin.5     Smokeless tobacco: Never Used   Substance and Sexual Activity     Alcohol use: Yes     Comment: rarely     Drug use: No     Sexual activity: Never     Comment: refuses to answer   Lifestyle     Physical activity:     Days per week: Not on file     Minutes per session: Not on file     Stress: Not on file   Relationships     Social connections:     Talks on phone: Not on file     Gets together: Not on file     Attends Restorationism service: Not on file     Active member of club or organization: Not on file     Attends meetings of clubs or organizations: Not on file     Relationship status: Not on file     Intimate partner violence:     Fear of current or ex partner: Not on file     Emotionally abused: Not on file     Physically abused: Not on file     Forced sexual activity: Not on file   Other Topics Concern     Parent/sibling w/ CABG, MI or angioplasty before 65F 55M? No   Social History Narrative     Not on file       MEDICATIONS:  Current Outpatient Medications   Medication Sig Dispense Refill     Ascorbic Acid (VITAMIN C PO) Take 500 mg by mouth daily       ASPIRIN PO Take 325 mg by mouth daily        atorvastatin (LIPITOR) 40 MG tablet Take 40mg one day and 20mg the next alternatingly. 90 tablet 1     CALCIUM + D OR 1 TABLET DAILY       co-enzyme Q-10 (CO Q10) 100 MG CAPS Take 2 capsules by mouth daily. Takes a total of 200 mg daily.  0     famotidine (PEPCID) 20 MG tablet TAKE 1 TABLET (20 MG)  "BY MOUTH 2 TIMES DAILY 180 tablet 1     Glycerin-Polysorbate 80 (REFRESH DRY EYE THERAPY OP) Apply 1-2 drops to eye as needed Reported on 5/23/2017       MULTIVITAMIN OR 1 daily       Omega-3 Fatty Acids (OMEGA-3 FISH OIL PO) Take 1 g by mouth daily          ALLERGIES:     Allergies   Allergen Reactions     Cortisone Swelling     Cortisone Cream       Problem list, Medication list, Allergies, and Medical/Social/Surgical histories reviewed in [facility] and updated as appropriate.    OBJECTIVE:                                                    VITALS: /80 (BP Location: Right arm, Cuff Size: Adult Large)   Pulse 63   Temp 97.2  F (36.2  C) (Oral)   Resp 16   Ht 1.651 m (5' 5\")   Wt 75.1 kg (165 lb 9.6 oz)   SpO2 95%   Breastfeeding? No   BMI 27.56 kg/m   Body mass index is 27.56 kg/m .  GENERAL: Pleasant, well appearing female.  BREAST: No skin changes, there is a palpable mass approximately  1 cm in diameter at the 12 o'clock position in the periphery of the left breast, mobile, rubbery. No obvious axillary adenopathy. She still has tenderness to palpation of upper outer quadrant of her left breast.    ASSESSMENT/PLAN:                                                    1. Carcinoma of left breast in female, estrogen receptor positive, unspecified site of breast (H)  Diagnostic imaging was normal but given persistent symptoms and new lump will have her follow-up with breast center for further evaluation and management and possible biopsy/excision if deemed necessary.  - BREAST CENTER REFERRAL       "

## 2019-08-12 ENCOUNTER — TELEPHONE (OUTPATIENT)
Dept: ONCOLOGY | Facility: CLINIC | Age: 74
End: 2019-08-12

## 2019-08-12 NOTE — TELEPHONE ENCOUNTER
Margaux Magana let Intake know that Karma could see Dr. Murry at the [place] location on 8/29/19.  Left message for patient to call back to reschedule her appointment.

## 2019-08-15 ENCOUNTER — TELEPHONE (OUTPATIENT)
Dept: ONCOLOGY | Facility: CLINIC | Age: 74
End: 2019-08-15

## 2019-08-15 NOTE — TELEPHONE ENCOUNTER
Spoke with patient today to inform her that her appointment time with Dr. Murry on Thursday August 29th has been changed to 10:30 am instead of 10:00 am because he has a surgery that morning. Patient verbalizes understanding.

## 2019-08-27 ENCOUNTER — PRE VISIT (OUTPATIENT)
Dept: ONCOLOGY | Facility: CLINIC | Age: 74
End: 2019-08-27

## 2019-08-29 ENCOUNTER — OFFICE VISIT (OUTPATIENT)
Dept: RADIATION THERAPY | Facility: OUTPATIENT CENTER | Age: 74
End: 2019-08-29
Attending: FAMILY MEDICINE
Payer: COMMERCIAL

## 2019-08-29 ENCOUNTER — PRE VISIT (OUTPATIENT)
Dept: RADIATION THERAPY | Facility: OUTPATIENT CENTER | Age: 74
End: 2019-08-29

## 2019-08-29 VITALS
RESPIRATION RATE: 16 BRPM | HEART RATE: 65 BPM | DIASTOLIC BLOOD PRESSURE: 79 MMHG | OXYGEN SATURATION: 94 % | WEIGHT: 169.4 LBS | SYSTOLIC BLOOD PRESSURE: 179 MMHG | BODY MASS INDEX: 28.19 KG/M2

## 2019-08-29 DIAGNOSIS — D05.12 DUCTAL CARCINOMA IN SITU (DCIS) OF LEFT BREAST: Primary | ICD-10-CM

## 2019-08-29 RX ORDER — ACETAMINOPHEN 500 MG
2 TABLET ORAL 2 TIMES DAILY PRN
Status: ON HOLD | COMMUNITY

## 2019-08-29 ASSESSMENT — PAIN SCALES - GENERAL: PAINLEVEL: NO PAIN (0)

## 2019-08-29 NOTE — NURSING NOTE
"Oncology Rooming Note    August 29, 2019 11:05 AM   Chika Hurd is a 74 year old female who presents for:    Chief Complaint   Patient presents with     Oncology Clinic Visit     new palpable lump left breast, former patient of Dr. Murry     Initial Vitals: BP (!) 179/79 (BP Location: Right arm, Patient Position: Sitting, Cuff Size: Adult Regular)   Pulse 65   Resp 16   Wt 76.8 kg (169 lb 6.4 oz)   SpO2 94%   BMI 28.19 kg/m   Estimated body mass index is 28.19 kg/m  as calculated from the following:    Height as of 7/31/19: 1.651 m (5' 5\").    Weight as of this encounter: 76.8 kg (169 lb 6.4 oz). Body surface area is 1.88 meters squared.  No Pain (0) Comment: Data Unavailable   No LMP recorded. Patient is postmenopausal.  Allergies reviewed: Yes  Medications reviewed: Yes    Medications: Medication refills not needed today.  Pharmacy name entered into [employer]: [facility] - [address]    Clinical concerns: new palpable lump left breast Dr. Murry was notified.      Anette Lobato RN              "

## 2019-08-29 NOTE — LETTER
8/29/2019      RE: Chika Hurd  [address]  [facility] [address]       Chika Hurd is a 74-year-old woman who is here for a followup evaluation.  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  She received postoperative radiation therapy and was on Arimidex for a while but has been off of that.  She presented with a palpable lump in her left breast in the lateral aspect in May of this year.  She had a mammogram and ultrasound, which was negative.  Then in June, she noticed a new lump at 11- to 12-o'clock position of the left breast and she is here to see me.  She has had no additional imaging since 05/30.  She denies any pain or discomfort in this area.      PHYSICAL EXAMINATION:  She has a healing periareolar incision in her left breast.  She has perhaps some mild thickening at the 11-o'clock position of her left breast below the clavicle.  Otherwise, I do not appreciate any other suspicious finding.  Her right breast examination reveals no dominant masses, skin changes, nipple changes or axillary lymphadenopathy.      IMPRESSION:  Probable fibrocystic changes in the left breast.      PLAN:  We will obtain an ultrasound of this area just to make sure there is no underlying malignancy.      TT:  20 minutes.  CT:  15 minutes.         Russel Murry MD

## 2019-08-29 NOTE — PROGRESS NOTES
Chika Hurd is a 74-year-old woman who is here for a followup evaluation.  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  She received postoperative radiation therapy and was on Arimidex for a while but has been off of that.  She presented with a palpable lump in her left breast in the lateral aspect in May of this year.  She had a mammogram and ultrasound, which was negative.  Then in June, she noticed a new lump at 11- to 12-o'clock position of the left breast and she is here to see me.  She has had no additional imaging since 05/30.  She denies any pain or discomfort in this area.      PHYSICAL EXAMINATION:  She has a healing periareolar incision in her left breast.  She has perhaps some mild thickening at the 11-o'clock position of her left breast below the clavicle.  Otherwise, I do not appreciate any other suspicious finding.  Her right breast examination reveals no dominant masses, skin changes, nipple changes or axillary lymphadenopathy.      IMPRESSION:  Probable fibrocystic changes in the left breast.      PLAN:  We will obtain an ultrasound of this area just to make sure there is no underlying malignancy.      TT:  20 minutes.  CT:  15 minutes.

## 2019-08-30 ENCOUNTER — HOSPITAL ENCOUNTER (OUTPATIENT)
Dept: ULTRASOUND IMAGING | Facility: CLINIC | Age: 74
Discharge: HOME | End: 2019-08-30
Attending: SURGERY | Admitting: SURGERY
Payer: COMMERCIAL

## 2019-08-30 DIAGNOSIS — D05.12 DUCTAL CARCINOMA IN SITU (DCIS) OF LEFT BREAST: ICD-10-CM

## 2019-08-30 PROCEDURE — 76642 ULTRASOUND BREAST LIMITED: CPT | Mod: LT

## 2019-09-05 ENCOUNTER — TELEPHONE (OUTPATIENT)
Dept: ONCOLOGY | Facility: CLINIC | Age: 74
End: 2019-09-05

## 2019-09-05 NOTE — TELEPHONE ENCOUNTER
Left message for patient today regarding her ultrasound Dr. Murry ordered last week. He would recommend follow up ultrasound in 3 months as recommended by Radiology. Informed patient this RN will follow up with her next week on coordinating with her PCP about scheduling her next ultrasound.

## 2019-09-06 ENCOUNTER — TELEPHONE (OUTPATIENT)
Dept: FAMILY MEDICINE | Facility: CLINIC | Age: 74
End: 2019-09-06

## 2019-09-06 DIAGNOSIS — N60.02 BREAST CYST, LEFT: Primary | ICD-10-CM

## 2019-09-06 NOTE — TELEPHONE ENCOUNTER
----- Message from Anette Lobato RN sent at 9/5/2019  9:41 AM CDT -----  Regarding: Breast Ultrasound  Hi Dr. Roy,    Dr. Murry saw Karma in clinic last week and ordered an ultrasound of her breast. The breast lump was determined to be cystic and radiology recommends follow up ultrasound in 3 months. Dr. Murry will not be following her again unless something else comes up. I wanted to pass this information along and thank you for referring her. I see that she follows with Med/Onc also, but won't be seeing them until March. Would you be willing to order a 3 month ultrasound per recommendation.     Thank you,  Anette Lobato  Surgical Oncology

## 2019-09-06 NOTE — TELEPHONE ENCOUNTER
Patient notified, gave the number for scheduling this to patient, verbalized understanding.    NIMESH Maradiaga

## 2019-09-16 ENCOUNTER — PRE VISIT (OUTPATIENT)
Dept: ONCOLOGY | Facility: CLINIC | Age: 74
End: 2019-09-16

## 2019-11-05 ENCOUNTER — HEALTH MAINTENANCE LETTER (OUTPATIENT)
Age: 74
End: 2019-11-05

## 2019-11-25 DIAGNOSIS — H34.11 CENTRAL RETINAL ARTERY OCCLUSION OF RIGHT EYE: ICD-10-CM

## 2019-11-25 NOTE — TELEPHONE ENCOUNTER
"Requested Prescriptions   Pending Prescriptions Disp Refills     atorvastatin (LIPITOR) 40 MG tablet [Pharmacy Med Name: ATORVASTATIN  40MG  TAB] 68 tablet      Sig: TAKE 1 TABLET BY MOUTH  EVERY OTHER DAY ALTERNATING WITH 1/2 TABLET EVERY OTHER DAY       Statins Protocol Passed - 11/25/2019  5:43 AM        Passed - LDL on file in past 12 months     Recent Labs   Lab Test 03/08/19  0813   LDL 63             Passed - No abnormal creatine kinase in past 12 months     No lab results found.             Passed - Recent (12 mo) or future (30 days) visit within the authorizing provider's specialty     Patient has had an office visit with the authorizing provider or a provider within the authorizing providers department within the previous 12 mos or has a future within next 30 days. See \"Patient Info\" tab in inbasket, or \"Choose Columns\" in Meds & Orders section of the refill encounter.              Passed - Medication is active on med list        Passed - Patient is age 18 or older        Passed - No active pregnancy on record        Passed - No positive pregnancy test in past 12 months        Last Written Prescription Date:  3/29/2019  Last Fill Quantity: 90,  # refills: 1   Last office visit: 7/31/2019 with prescribing provider:  Kirill   Future Office Visit:      "

## 2019-11-27 RX ORDER — ATORVASTATIN CALCIUM 40 MG/1
TABLET, FILM COATED ORAL
Qty: 68 TABLET | Refills: 0 | Status: SHIPPED | OUTPATIENT
Start: 2019-11-27 | End: 2020-02-19

## 2019-11-27 NOTE — TELEPHONE ENCOUNTER
Prescription approved per [facility] Refill Protocol.  Refill x 3 month given to cover until appt due in March 2020.  KPavelRN

## 2019-12-02 ENCOUNTER — HOSPITAL ENCOUNTER (OUTPATIENT)
Dept: ULTRASOUND IMAGING | Facility: CLINIC | Age: 74
Discharge: HOME | End: 2019-12-02
Attending: FAMILY MEDICINE | Admitting: FAMILY MEDICINE
Payer: COMMERCIAL

## 2019-12-02 DIAGNOSIS — N60.02 BREAST CYST, LEFT: ICD-10-CM

## 2019-12-02 PROCEDURE — 76642 ULTRASOUND BREAST LIMITED: CPT | Mod: LT

## 2019-12-13 ENCOUNTER — TELEPHONE (OUTPATIENT)
Dept: FAMILY MEDICINE | Facility: CLINIC | Age: 74
End: 2019-12-13

## 2019-12-13 DIAGNOSIS — Z12.11 SPECIAL SCREENING FOR MALIGNANT NEOPLASMS, COLON: Primary | ICD-10-CM

## 2019-12-13 NOTE — TELEPHONE ENCOUNTER
Panel Management Review      Patient has the following on her problem list: None      Composite cancer screening  Chart review shows that this patient is due/due soon for the following Fecal Colorectal (FIT)  Summary:    Patient is due/failing the following:   FIT    Action needed:   Pt to complete FIT    Type of outreach:    Phone, spoke to patient.  states she will come in to  FIT 12/16/19    Questions for provider review:    None                                                                                                                                    Luana Adame MA

## 2020-02-16 ENCOUNTER — HEALTH MAINTENANCE LETTER (OUTPATIENT)
Age: 75
End: 2020-02-16

## 2020-02-18 DIAGNOSIS — H34.11 CENTRAL RETINAL ARTERY OCCLUSION OF RIGHT EYE: ICD-10-CM

## 2020-02-18 NOTE — LETTER
[facility]  [address]  [facility] [address]  Phone: [phone]        February 19, 2020      Chika Hurd                                                                                                                                [address]  [facility] [address]            Dear Ms. Hurd,    We are concerned about your health care.  We recently provided you with a medication refill.  Many medications require routine follow-up with your Doctor.      At this time we ask that: You come to your clinic for routine labs for medication monitoring.  (Any necessary labs will be done at your office visit)    Your prescription: Has been refilled x 1 so you may have time for the above noted follow-up.      Thank you,      Audrey Roy MD/ ramakrishna

## 2020-02-18 NOTE — TELEPHONE ENCOUNTER
"Requested Prescriptions   Pending Prescriptions Disp Refills     atorvastatin (LIPITOR) 40 MG tablet [Pharmacy Med Name: ATORVASTATIN  40MG  TAB] 68 tablet 0     Sig: TAKE 1 TABLET BY MOUTH  EVERY OTHER DAY ALTERNATING WITH 1/2 TABLET EVERY OTHER DAY       Statins Protocol Passed - 2/18/2020  4:57 AM        Passed - LDL on file in past 12 months     Recent Labs   Lab Test 03/08/19  0813   LDL 63             Passed - No abnormal creatine kinase in past 12 months     No lab results found.             Passed - Recent (12 mo) or future (30 days) visit within the authorizing provider's specialty     Patient has had an office visit with the authorizing provider or a provider within the authorizing providers department within the previous 12 mos or has a future within next 30 days. See \"Patient Info\" tab in inbasket, or \"Choose Columns\" in Meds & Orders section of the refill encounter.              Passed - Medication is active on med list        Passed - Patient is age 18 or older        Passed - No active pregnancy on record        Passed - No positive pregnancy test in past 12 months        Last Written Prescription Date:  11/27/2019  Last Fill Quantity: 68,  # refills: 0   Last office visit: 7/31/2019 with prescribing provider:  Kirill   Future Office Visit:   Next 5 appointments (look out 90 days)    Mar 25, 2020 10:45 AM CDT  Return Visit with Eliot Crane MD  [facility] ([facility]) [address]  [facility] [address] [phone]           "

## 2020-02-19 RX ORDER — ATORVASTATIN CALCIUM 40 MG/1
TABLET, FILM COATED ORAL
Qty: 68 TABLET | Refills: 0 | Status: SHIPPED | OUTPATIENT
Start: 2020-02-19 | End: 2020-04-06

## 2020-02-19 NOTE — TELEPHONE ENCOUNTER
Medication is being filled for 1 time refill only due to:  Patient needs to be seen because needs labs/physical.  Unable to reach by phone.  Letter sent.    Brandy Ardon RN

## 2020-04-06 ENCOUNTER — TELEPHONE (OUTPATIENT)
Dept: FAMILY MEDICINE | Facility: CLINIC | Age: 75
End: 2020-04-06

## 2020-04-06 DIAGNOSIS — H34.11 CENTRAL RETINAL ARTERY OCCLUSION OF RIGHT EYE: ICD-10-CM

## 2020-04-06 RX ORDER — ATORVASTATIN CALCIUM 40 MG/1
TABLET, FILM COATED ORAL
Qty: 68 TABLET | Refills: 0 | Status: SHIPPED | OUTPATIENT
Start: 2020-04-06 | End: 2020-07-01

## 2020-04-06 RX ORDER — ATORVASTATIN CALCIUM 40 MG/1
TABLET, FILM COATED ORAL
Qty: 68 TABLET | Refills: 0 | Status: CANCELLED | OUTPATIENT
Start: 2020-04-06

## 2020-04-06 NOTE — TELEPHONE ENCOUNTER
Last Written Prescription Date:  Atorvistatin 2/19/2020  Last Fill Quantity: 68,  # refills: 0   Last office visit: 7/31/2019 with prescribing provider:  JOSUE Roy   Future Office Visit:   Next 5 appointments (look out 90 days)    Jun 26, 2020  9:00 AM CDT  Return Visit with Eliot Crane MD  [facility] ([facility]) [address]  [facility] [address]  [phone]         Patient does not feel comfortable to come to the clinic with all that is going on. Please send refill.  Maria De Jesus Gaxiola  [facility] [place]

## 2020-04-06 NOTE — TELEPHONE ENCOUNTER
Prescription approved per [facility] Refill Protocol.  Due to Covid-19    Thank you    Bridget HUNTER RN

## 2020-06-26 ENCOUNTER — TELEPHONE (OUTPATIENT)
Dept: FAMILY MEDICINE | Facility: CLINIC | Age: 75
End: 2020-06-26

## 2020-06-26 DIAGNOSIS — H34.11 CENTRAL RETINAL ARTERY OCCLUSION OF RIGHT EYE: ICD-10-CM

## 2020-06-26 RX ORDER — ATORVASTATIN CALCIUM 40 MG/1
TABLET, FILM COATED ORAL
Qty: 68 TABLET | Refills: 0 | OUTPATIENT
Start: 2020-06-26

## 2020-06-26 NOTE — TELEPHONE ENCOUNTER
Routing refill request to provider for review/approval because:  Labs not current:    LDL Cholesterol Calculated   Date Value Ref Range Status   03/08/2019 63 <100 mg/dL Final     Comment:     Desirable:       <100 mg/dl     Brandy Ardon RN

## 2020-07-01 ENCOUNTER — OFFICE VISIT (OUTPATIENT)
Dept: FAMILY MEDICINE | Facility: CLINIC | Age: 75
End: 2020-07-01
Payer: MEDICARE

## 2020-07-01 ENCOUNTER — ONCOLOGY VISIT (OUTPATIENT)
Dept: ONCOLOGY | Facility: CLINIC | Age: 75
End: 2020-07-01
Attending: INTERNAL MEDICINE
Payer: MEDICARE

## 2020-07-01 VITALS
OXYGEN SATURATION: 98 % | RESPIRATION RATE: 20 BRPM | SYSTOLIC BLOOD PRESSURE: 153 MMHG | HEIGHT: 61 IN | WEIGHT: 170.2 LBS | DIASTOLIC BLOOD PRESSURE: 66 MMHG | TEMPERATURE: 98.1 F | BODY MASS INDEX: 32.13 KG/M2 | HEART RATE: 75 BPM

## 2020-07-01 VITALS
BODY MASS INDEX: 28.32 KG/M2 | TEMPERATURE: 98.6 F | SYSTOLIC BLOOD PRESSURE: 138 MMHG | HEART RATE: 74 BPM | HEIGHT: 65 IN | DIASTOLIC BLOOD PRESSURE: 80 MMHG | RESPIRATION RATE: 16 BRPM | OXYGEN SATURATION: 98 % | WEIGHT: 170 LBS

## 2020-07-01 DIAGNOSIS — Z13.228 SCREENING FOR ENDOCRINE, METABOLIC AND IMMUNITY DISORDER: ICD-10-CM

## 2020-07-01 DIAGNOSIS — H34.11 CENTRAL RETINAL ARTERY OCCLUSION OF RIGHT EYE: Primary | ICD-10-CM

## 2020-07-01 DIAGNOSIS — C50.912 CARCINOMA OF LEFT BREAST IN FEMALE, ESTROGEN RECEPTOR POSITIVE, UNSPECIFIED SITE OF BREAST (H): ICD-10-CM

## 2020-07-01 DIAGNOSIS — Z12.31 VISIT FOR SCREENING MAMMOGRAM: ICD-10-CM

## 2020-07-01 DIAGNOSIS — Z17.0 CARCINOMA OF LEFT BREAST IN FEMALE, ESTROGEN RECEPTOR POSITIVE, UNSPECIFIED SITE OF BREAST (H): ICD-10-CM

## 2020-07-01 DIAGNOSIS — Z13.29 SCREENING FOR ENDOCRINE, METABOLIC AND IMMUNITY DISORDER: ICD-10-CM

## 2020-07-01 DIAGNOSIS — Z13.0 SCREENING FOR ENDOCRINE, METABOLIC AND IMMUNITY DISORDER: ICD-10-CM

## 2020-07-01 DIAGNOSIS — E78.5 HYPERLIPIDEMIA LDL GOAL <130: ICD-10-CM

## 2020-07-01 DIAGNOSIS — K21.9 GASTROESOPHAGEAL REFLUX DISEASE WITHOUT ESOPHAGITIS: ICD-10-CM

## 2020-07-01 DIAGNOSIS — Z12.11 COLON CANCER SCREENING: ICD-10-CM

## 2020-07-01 DIAGNOSIS — D05.12 DUCTAL CARCINOMA IN SITU (DCIS) OF LEFT BREAST: Primary | ICD-10-CM

## 2020-07-01 LAB
ANION GAP SERPL CALCULATED.3IONS-SCNC: 8 MMOL/L (ref 3–14)
BUN SERPL-MCNC: 16 MG/DL (ref 7–30)
CALCIUM SERPL-MCNC: 9.5 MG/DL (ref 8.5–10.1)
CHLORIDE SERPL-SCNC: 105 MMOL/L (ref 94–109)
CHOLEST SERPL-MCNC: 133 MG/DL
CO2 SERPL-SCNC: 27 MMOL/L (ref 20–32)
CREAT SERPL-MCNC: 0.6 MG/DL (ref 0.52–1.04)
GFR SERPL CREATININE-BSD FRML MDRD: >90 ML/MIN/{1.73_M2}
GLUCOSE SERPL-MCNC: 89 MG/DL (ref 70–99)
HDLC SERPL-MCNC: 61 MG/DL
LDLC SERPL CALC-MCNC: 51 MG/DL
NONHDLC SERPL-MCNC: 72 MG/DL
POTASSIUM SERPL-SCNC: 4.8 MMOL/L (ref 3.4–5.3)
SODIUM SERPL-SCNC: 140 MMOL/L (ref 133–144)
TRIGL SERPL-MCNC: 106 MG/DL

## 2020-07-01 PROCEDURE — 99214 OFFICE O/P EST MOD 30 MIN: CPT | Performed by: FAMILY MEDICINE

## 2020-07-01 PROCEDURE — 80061 LIPID PANEL: CPT | Performed by: FAMILY MEDICINE

## 2020-07-01 PROCEDURE — 99214 OFFICE O/P EST MOD 30 MIN: CPT | Performed by: INTERNAL MEDICINE

## 2020-07-01 PROCEDURE — 36415 COLL VENOUS BLD VENIPUNCTURE: CPT | Performed by: FAMILY MEDICINE

## 2020-07-01 PROCEDURE — 80048 BASIC METABOLIC PNL TOTAL CA: CPT | Performed by: FAMILY MEDICINE

## 2020-07-01 PROCEDURE — G0463 HOSPITAL OUTPT CLINIC VISIT: HCPCS

## 2020-07-01 RX ORDER — ATORVASTATIN CALCIUM 40 MG/1
TABLET, FILM COATED ORAL
Qty: 68 TABLET | Refills: 4 | Status: SHIPPED | OUTPATIENT
Start: 2020-07-01 | End: 2021-10-15

## 2020-07-01 ASSESSMENT — PAIN SCALES - GENERAL
PAINLEVEL: NO PAIN (0)
PAINLEVEL: NO PAIN (0)

## 2020-07-01 ASSESSMENT — MIFFLIN-ST. JEOR
SCORE: 1206.46
SCORE: 1276.99

## 2020-07-01 NOTE — PROGRESS NOTES
"Oncology Rooming Note    July 1, 2020 9:32 AM   Chika Hurd is a 75 year old female who presents for:    Chief Complaint   Patient presents with     Oncology Clinic Visit     1 year recheck Ductal carcinoma in situ (DCIS) of left breast, no labs      Initial Vitals: BP (!) 153/66 (BP Location: Left arm, Patient Position: Sitting, Cuff Size: Adult Regular)   Pulse 75   Temp 98.1  F (36.7  C) (Tympanic)   Resp 20   Ht 1.537 m (5' 0.5\")   Wt 77.2 kg (170 lb 3.2 oz)   SpO2 98%   BMI 32.69 kg/m   Estimated body mass index is 32.69 kg/m  as calculated from the following:    Height as of this encounter: 1.537 m (5' 0.5\").    Weight as of this encounter: 77.2 kg (170 lb 3.2 oz). Body surface area is 1.82 meters squared.  No Pain (0) Comment: Data Unavailable   No LMP recorded. Patient is postmenopausal.  Allergies reviewed: Yes  Medications reviewed: Yes    Medications: Medication refills not needed today.  Pharmacy name entered into [employer]: [facility] [id number] [place] [address]    Clinical concerns: 1 year recheck Ductal carcinoma in situ (DCIS) of left breast, no labs.       Rosario Burch CMA              "

## 2020-07-01 NOTE — PATIENT INSTRUCTIONS
QUICK START PATIENT GUIDE TO LCHF/IF  What is this diet and how does it work?  o Insulin is a hormone made by your body that allows you to use sugar (glucose) from carbohydrates in the food you eat for energy or to store glucose (as fat) for future use   o Insulin levels need to be lowered in order to utilize our stored energy (fat)  o Many struggling with obesity are insulin resistant and have high levels of insulin  o This diet works to lower your insulin in two ways  o Fasting - allows your insulin levels to naturally decrease   o Avoiding carbohydrates - carbs trigger increase in insulin  Low Carb Healthy Fat (LCHF)  o Get a free herbie for your phone, such as [employer], to help you track your macronutrients (carbs/protein/fats) and to track your weight and body measurements to see your progress  o Set your goal for around 10% carbs/20% protein/70% fat  o A good starting goal for amount of net carbs per day is 50 grams (some will aim for 20 grams)  o  Net carbs  refers to total grams of carbs minus grams of fiber (as fiber is not typically absorbed). For example, if a food has 5g total carb and 3g fiber, that would be 2g net carbs  o Increase healthy fats - eg. olive oil, eggs, nuts, avocado, cheese, butter, coconut, meats, fish  o Avoid high carb foods - eg. bread, pasta, potatoes, rice, cookies, soda, juice, anything sugary  o Buy full-fat ingredients (avoid low-fat versions, which often have more sugar)  o No need to count calories, but pay close attention to grams of carbs on labels  Intermittent Fasting (IF)  o  Fasting  is going a period of time without eating - it helps to stay busy and well-hydrated  o Purpose of fasting is to allow insulin levels to drop as low as possible, allowing your body to switch into fat-burning mode  o With this diet there are many approaches to fasting, but 16:8 and 24hr fasts are commonly used  o 16:8 fast, usually 5-7 days a week - Fasting for 16 hours of the day, then eating  all meals for the day over course of 8 hours.  o 24 hour fast, usually 1-3 days a week - Typically eating one meal a day, then fasting until the next day. Plenty of fluids (and some salt to help you hold onto fluids) are recommended during longer fasts.   o During fasts certain beverages are still acceptable - water, sparkling water, bone broth, black tea or coffee, or tea/coffee with small amount of heavy whipping cream  Special note for those on diabetic medications  o Discuss your medications with your physician. You may need to hold your medication or adjust to only taking when eating. Diabetics should keep close track of their blood sugars when making any changes to diet/meds, to ensure they are staying within normal limits  For more info about LCHF/IF  o Watch  Therapeutic Fasting - Solving the Two-Compartment Problem  video by Dr. Sulaiman Shaw on [employer] (https://www.[employer].com/watch?v=tIuj-oMN-Fk) for a great intro to these concepts  o Read  The Obesity Code  and/or  The Complete Guide to Fasting  by Dr. Sulaiman Shaw  o Go to www.dietdoctor.Matcha for explanations, recipes, and infographics about foods to eat/avoid  EXAMPLES TO GET STARTED  Fasting Beverages  -water (can add   tsp Pink Himalayan salt once or twice a day to help stay hydrated for longer fasts)  -Sparkling water (such as [place] or similar; avoid any with artificial sweeteners)   -Bone broth (multiple recipes available online or can buy pre-made)  -Tea or Coffee (Adding heavy whipping cream or coconut oil to your tea or coffee can be helpful if you find yourself getting too hungry during the fasts. Can also add cinnamon for flavor. Or  bulletproof coffee. )  Low Carb Healthy Fat  Breakfast (if not fasting)  -eggs in butter or olive oil with avocado  -omelet with veggies and cheese    Lunch  -hamburger with cheese and avocado wrapped in lettuce (no bun, no ketchup)  -meat and cheese wrapped in lettuce (can dip in mustard or olive  oil/vinegar/jones)  -salad with meat/cheese/nuts and higher fat dressing (vinaigrette or Ranch, etc)  -tuna salad lettuce wrap  -taco meat with cheese, sour cream, guacamole, cheese over lettuce    Dinner  -steak with herb butter or Béarnaise sauce  - [place]head  pizza (uses cheese and almond flour for the dough - several recipes available online)  -roasted or grilled chicken with skin on, with low carb sauce (buffalo, garlic butter, peace, pesto, etc)  -baked salmon with lemon butter  -chicken peace with zucchini noodles  -[place]arian butter chicken with low carb garlic naan  -egg roll in a bowl    Side Dishes  -mashed cauliflower (homemade or available in freezer section)  -roast vegetables (green veggies that grow above ground rather than root veggies) with butter or cheese  -Caprese salad (fresh mozzarella, tomato and basil with olive oil)  -homemade low-carb coleslaw    Snacks/Desserts (try to avoid unnecessary snacking and sweets in general)  -celery or cucumber dipped in guacamole or sour cream dip  -cheese and meat slices   -raspberries with whipped cream (can make homemade with no sugar added)    AVOID - sugar, diet/regular soda, potatoes, breads, rice, pasta, candy, cookies, cakes, muffins, juice, high carb fruit (bananas, grapes), beer, ketchup, barbeque and other sweet sauces    Our Clinic hours are:  Mondays    7:20 am - 7 pm  Tues -  Fri  7:20 am - 5 pm    Clinic Phone: [phone]    The clinic lab opens at 7:30 am Mon - Fri and appointments are required.    [facility]. [phone]  Monday  8 am - 7pm  Tues - Fri 8 am - 5:30 pm

## 2020-07-01 NOTE — LETTER
"    7/1/2020         RE: Chika Hurd  [id number]  [facility] [address]        Dear Colleague,    Thank you for referring your patient, Chika Hurd, to the [facility] [place] [facility]. Please see a copy of my visit note below.    Oncology Rooming Note    July 1, 2020 9:32 AM   Chika Hurd is a 75 year old female who presents for:    Chief Complaint   Patient presents with     Oncology Clinic Visit     1 year recheck Ductal carcinoma in situ (DCIS) of left breast, no labs      Initial Vitals: BP (!) 153/66 (BP Location: Left arm, Patient Position: Sitting, Cuff Size: Adult Regular)   Pulse 75   Temp 98.1  F (36.7  C) (Tympanic)   Resp 20   Ht 1.537 m (5' 0.5\")   Wt 77.2 kg (170 lb 3.2 oz)   SpO2 98%   BMI 32.69 kg/m   Estimated body mass index is 32.69 kg/m  as calculated from the following:    Height as of this encounter: 1.537 m (5' 0.5\").    Weight as of this encounter: 77.2 kg (170 lb 3.2 oz). Body surface area is 1.82 meters squared.  No Pain (0) Comment: Data Unavailable   No LMP recorded. Patient is postmenopausal.  Allergies reviewed: Yes  Medications reviewed: Yes    Medications: Medication refills not needed today.  Pharmacy name entered into [employer]: [id number] [place] [address]    Clinical concerns: 1 year recheck Ductal carcinoma in situ (DCIS) of left breast, no labs.       Rosario Burch CMA                Most recent ultrasound of the left breast was in December shows no hematology/ Oncology Follow-up Visit:  Jul 1, 2020    Reason for Visit:   Chief Complaint   Patient presents with     Oncology Clinic Visit     1 year recheck Ductal carcinoma in situ (DCIS) of left breast, no labs        Oncologic History:    Ductal carcinoma in situ (DCIS) of left breast  Chika Hurd has been compliant to routine mammogram in 07/2016.  Identified new microcalcification 0.4 cm area on the left breast 12-1 o'clock position 1.7 cm from the nipple.  This is a " change from her prior 2015 mammogram.  Stereotactic biopsy was done 07/22/2016, identified high grade DCIS with comedonecrosis and microcalcifications and intermediate grade with solid and cribriform pattern.  No evidence of invasive carcinoma.  No evidence of angiolymphatic invasion.  % positive, MN 60% to 70% positive.  She bruised significantly after the biopsy with decreased range of motion of left shoulder.  She gradually recovered but still has significant amount of bruising left in the inferior part of the left breast.  She had lumpectomy with Dr. Murry 9/2016 found 1.4 cm grade II DCIS, had also fibrocystic changes. She had RT finished in 11/2016. She started Arimidex after.       Interval History:  Patient has been feeling well without any recent complaints of shortness of breath or cough or wheezing.  She denies any bone aches or pains.  She denies any breast symptoms currently.  Most recent ultrasound of the left breast revealed no suspicious lesions.    Review Of Systems:  Constitutional: Negative for fever, chills, and night sweats.  Skin: negative.  Eyes: negative.  Ears/Nose/Throat: negative.  Respiratory: No shortness of breath, dyspnea on exertion, cough, or hemoptysis.  Cardiovascular: negative.  Gastrointestinal: negative.  Genitourinary: negative.  Musculoskeletal: negative.  Neurologic: negative.  Psychiatric: negative.  Hematologic/Lymphatic/Immunologic: negative.  Endocrine: negative.    All other ROS negative unless mentioned in interval history.    Past medical, social, surgical, and family histories reviewed.    Allergies:  Allergies as of 07/01/2020 - Reviewed 07/01/2020   Allergen Reaction Noted     Cortisone Swelling 02/02/2006       Current Medications:  Current Outpatient Medications   Medication Sig Dispense Refill     acetaminophen (TYLENOL) 500 MG tablet Take 500-1,000 mg by mouth 2 times daily as needed for mild pain       Ascorbic Acid (VITAMIN C PO) Take 500 mg by mouth  "daily       ASPIRIN PO Take 325 mg by mouth daily        atorvastatin (LIPITOR) 40 MG tablet TAKE 1 TABLET BY MOUTH  EVERY OTHER DAY ALTERNATING WITH 1/2 TABLET EVERY OTHER DAY 68 tablet 4     CALCIUM + D OR 1 TABLET DAILY       co-enzyme Q-10 (CO Q10) 100 MG CAPS Take 2 capsules by mouth daily. Takes a total of 200 mg daily.  0     famotidine (PEPCID) 20 MG tablet TAKE 1 TABLET (20 MG) BY MOUTH 2 TIMES DAILY 180 tablet 1     Glycerin-Polysorbate 80 (REFRESH DRY EYE THERAPY OP) Apply 1-2 drops to eye as needed Reported on 5/23/2017       MULTIVITAMIN OR 1 daily       Omega-3 Fatty Acids (OMEGA-3 FISH OIL PO) Take 1 g by mouth daily           Physical Exam:  BP (!) 153/66 (BP Location: Left arm, Patient Position: Sitting, Cuff Size: Adult Regular)   Pulse 75   Temp 98.1  F (36.7  C) (Tympanic)   Resp 20   Ht 1.537 m (5' 0.5\")   Wt 77.2 kg (170 lb 3.2 oz)   SpO2 98%   BMI 32.69 kg/m    Wt Readings from Last 12 Encounters:   07/01/20 77.2 kg (170 lb 3.2 oz)   07/01/20 77.1 kg (170 lb)   08/29/19 76.8 kg (169 lb 6.4 oz)   07/31/19 75.1 kg (165 lb 9.6 oz)   06/06/19 76.5 kg (168 lb 9.6 oz)   05/16/19 77 kg (169 lb 12.8 oz)   03/28/19 76.2 kg (168 lb 1.6 oz)   01/11/19 75.1 kg (165 lb 9.6 oz)   12/20/18 74.4 kg (164 lb)   09/13/18 75.3 kg (166 lb)   03/09/18 73.8 kg (162 lb 11.2 oz)   02/08/18 72.7 kg (160 lb 3.2 oz)       GENERAL APPEARANCE: Healthy, alert and in no acute distress.  HEENT: Sclerae anicteric. PERRLA. Oropharynx without ulcers, lesions, or thrush.  NECK: Supple. No asymmetry or masses.  LYMPHATICS: No palpable cervical, supraclavicular, axillary, or inguinal lymphadenopathy.  RESP: Lungs clear to auscultation bilaterally without rales, rhonchi or wheezes.\",BREAST: Right- No mass, nipple discharge or axillary adenopathy. Left- No mass, nipple discharge or axillary adenopathy \"CARDIOVASCULAR: Regular rate and rhythm. Normal S1, S2; no S3 or S4. No murmur, gallop, or rub.  ABDOMEN: Soft, nontender. " Bowel sounds normal. No palpable organomegaly or masses.  MUSCULOSKELETAL: Extremities without gross deformities noted. No edema of bilateral lower extremities.  SKIN: No suspicious lesions or rashes.  NEURO: Alert and oriented x 3. Cranial nerves II-XII grossly intact.  PSYCHIATRIC: Mentation and affect appear normal.    Laboratory/Imaging Studies:  No visits with results within 2 Week(s) from this visit.   Latest known visit with results is:   Orders Only on 03/08/2019   Component Date Value Ref Range Status     Cholesterol 03/08/2019 139  <200 mg/dL Final     Triglycerides 03/08/2019 75  <150 mg/dL Final    Fasting specimen     HDL Cholesterol 03/08/2019 61  >49 mg/dL Final     LDL Cholesterol Calculated 03/08/2019 63  <100 mg/dL Final    Desirable:       <100 mg/dl     Non HDL Cholesterol 03/08/2019 78  <130 mg/dL Final            Assessment and plan:    (D05.12) Ductal carcinoma in situ (DCIS) of left breast  (primary encounter diagnosis)  I will arrange for annual mammography.  There is no clear evidence of breast cancer recurrence.  I will see the patient again on an annual basis following her annual mammogram.    (E78.5) Hyperlipidemia LDL goal <130  Patient currently on atorvastatin 40 mg orally daily.    (K21.9) Gastroesophageal reflux disease without esophagitis  Patient currently on Nexium 20 mg orally daily.    The patient is ready to learn, no apparent learning barriers were identified.  Diagnosis and treatment plans were explained to the patient. The patient expressed understanding of the content. The patient asked appropriate questions. The patient questions were answered to her satisfaction.    Chart documentation with Dragon Voice recognition Software. Although reviewed after completion, some words and grammatical errors may remain.    Again, thank you for allowing me to participate in the care of your patient.        Sincerely,        Eliot rCane MD

## 2020-07-01 NOTE — PATIENT INSTRUCTIONS
Arrange for annual mammogram  Follow-up in 1 year following annual mammogram and laboratory tests.

## 2020-07-01 NOTE — PROGRESS NOTES
Most recent ultrasound of the left breast was in December shows no hematology/ Oncology Follow-up Visit:  Jul 1, 2020    Reason for Visit:   Chief Complaint   Patient presents with     Oncology Clinic Visit     1 year recheck Ductal carcinoma in situ (DCIS) of left breast, no labs        Oncologic History:    Ductal carcinoma in situ (DCIS) of left breast  Chika Hurd has been compliant to routine mammogram in 07/2016.  Identified new microcalcification 0.4 cm area on the left breast 12-1 o'clock position 1.7 cm from the nipple.  This is a change from her prior 2015 mammogram.  Stereotactic biopsy was done 07/22/2016, identified high grade DCIS with comedonecrosis and microcalcifications and intermediate grade with solid and cribriform pattern.  No evidence of invasive carcinoma.  No evidence of angiolymphatic invasion.  % positive, MS 60% to 70% positive.  She bruised significantly after the biopsy with decreased range of motion of left shoulder.  She gradually recovered but still has significant amount of bruising left in the inferior part of the left breast.  She had lumpectomy with Dr. Murry 9/2016 found 1.4 cm grade II DCIS, had also fibrocystic changes. She had RT finished in 11/2016. She started Arimidex after.       Interval History:  Patient has been feeling well without any recent complaints of shortness of breath or cough or wheezing.  She denies any bone aches or pains.  She denies any breast symptoms currently.  Most recent ultrasound of the left breast revealed no suspicious lesions.    Review Of Systems:  Constitutional: Negative for fever, chills, and night sweats.  Skin: negative.  Eyes: negative.  Ears/Nose/Throat: negative.  Respiratory: No shortness of breath, dyspnea on exertion, cough, or hemoptysis.  Cardiovascular: negative.  Gastrointestinal: negative.  Genitourinary: negative.  Musculoskeletal: negative.  Neurologic: negative.  Psychiatric:  "negative.  Hematologic/Lymphatic/Immunologic: negative.  Endocrine: negative.    All other ROS negative unless mentioned in interval history.    Past medical, social, surgical, and family histories reviewed.    Allergies:  Allergies as of 07/01/2020 - Reviewed 07/01/2020   Allergen Reaction Noted     Cortisone Swelling 02/02/2006       Current Medications:  Current Outpatient Medications   Medication Sig Dispense Refill     acetaminophen (TYLENOL) 500 MG tablet Take 500-1,000 mg by mouth 2 times daily as needed for mild pain       Ascorbic Acid (VITAMIN C PO) Take 500 mg by mouth daily       ASPIRIN PO Take 325 mg by mouth daily        atorvastatin (LIPITOR) 40 MG tablet TAKE 1 TABLET BY MOUTH  EVERY OTHER DAY ALTERNATING WITH 1/2 TABLET EVERY OTHER DAY 68 tablet 4     CALCIUM + D OR 1 TABLET DAILY       co-enzyme Q-10 (CO Q10) 100 MG CAPS Take 2 capsules by mouth daily. Takes a total of 200 mg daily.  0     famotidine (PEPCID) 20 MG tablet TAKE 1 TABLET (20 MG) BY MOUTH 2 TIMES DAILY 180 tablet 1     Glycerin-Polysorbate 80 (REFRESH DRY EYE THERAPY OP) Apply 1-2 drops to eye as needed Reported on 5/23/2017       MULTIVITAMIN OR 1 daily       Omega-3 Fatty Acids (OMEGA-3 FISH OIL PO) Take 1 g by mouth daily           Physical Exam:  BP (!) 153/66 (BP Location: Left arm, Patient Position: Sitting, Cuff Size: Adult Regular)   Pulse 75   Temp 98.1  F (36.7  C) (Tympanic)   Resp 20   Ht 1.537 m (5' 0.5\")   Wt 77.2 kg (170 lb 3.2 oz)   SpO2 98%   BMI 32.69 kg/m    Wt Readings from Last 12 Encounters:   07/01/20 77.2 kg (170 lb 3.2 oz)   07/01/20 77.1 kg (170 lb)   08/29/19 76.8 kg (169 lb 6.4 oz)   07/31/19 75.1 kg (165 lb 9.6 oz)   06/06/19 76.5 kg (168 lb 9.6 oz)   05/16/19 77 kg (169 lb 12.8 oz)   03/28/19 76.2 kg (168 lb 1.6 oz)   01/11/19 75.1 kg (165 lb 9.6 oz)   12/20/18 74.4 kg (164 lb)   09/13/18 75.3 kg (166 lb)   03/09/18 73.8 kg (162 lb 11.2 oz)   02/08/18 72.7 kg (160 lb 3.2 oz)       GENERAL " "APPEARANCE: Healthy, alert and in no acute distress.  HEENT: Sclerae anicteric. PERRLA. Oropharynx without ulcers, lesions, or thrush.  NECK: Supple. No asymmetry or masses.  LYMPHATICS: No palpable cervical, supraclavicular, axillary, or inguinal lymphadenopathy.  RESP: Lungs clear to auscultation bilaterally without rales, rhonchi or wheezes.\",BREAST: Right- No mass, nipple discharge or axillary adenopathy. Left- No mass, nipple discharge or axillary adenopathy \"CARDIOVASCULAR: Regular rate and rhythm. Normal S1, S2; no S3 or S4. No murmur, gallop, or rub.  ABDOMEN: Soft, nontender. Bowel sounds normal. No palpable organomegaly or masses.  MUSCULOSKELETAL: Extremities without gross deformities noted. No edema of bilateral lower extremities.  SKIN: No suspicious lesions or rashes.  NEURO: Alert and oriented x 3. Cranial nerves II-XII grossly intact.  PSYCHIATRIC: Mentation and affect appear normal.    Laboratory/Imaging Studies:  No visits with results within 2 Week(s) from this visit.   Latest known visit with results is:   Orders Only on 03/08/2019   Component Date Value Ref Range Status     Cholesterol 03/08/2019 139  <200 mg/dL Final     Triglycerides 03/08/2019 75  <150 mg/dL Final    Fasting specimen     HDL Cholesterol 03/08/2019 61  >49 mg/dL Final     LDL Cholesterol Calculated 03/08/2019 63  <100 mg/dL Final    Desirable:       <100 mg/dl     Non HDL Cholesterol 03/08/2019 78  <130 mg/dL Final            Assessment and plan:    (D05.12) Ductal carcinoma in situ (DCIS) of left breast  (primary encounter diagnosis)  I will arrange for annual mammography.  There is no clear evidence of breast cancer recurrence.  I will see the patient again on an annual basis following her annual mammogram.    (E78.5) Hyperlipidemia LDL goal <130  Patient currently on atorvastatin 40 mg orally daily.    (K21.9) Gastroesophageal reflux disease without esophagitis  Patient currently on Nexium 20 mg orally daily.    The patient " is ready to learn, no apparent learning barriers were identified.  Diagnosis and treatment plans were explained to the patient. The patient expressed understanding of the content. The patient asked appropriate questions. The patient questions were answered to her satisfaction.    Chart documentation with Dragon Voice recognition Software. Although reviewed after completion, some words and grammatical errors may remain.

## 2020-07-01 NOTE — ASSESSMENT & PLAN NOTE
Chika Hurd has been compliant to routine mammogram in 07/2016.  Identified new microcalcification 0.4 cm area on the left breast 12-1 o'clock position 1.7 cm from the nipple.  This is a change from her prior 2015 mammogram.  Stereotactic biopsy was done 07/22/2016, identified high grade DCIS with comedonecrosis and microcalcifications and intermediate grade with solid and cribriform pattern.  No evidence of invasive carcinoma.  No evidence of angiolymphatic invasion.  % positive, [place] 60% to 70% positive.  She bruised significantly after the biopsy with decreased range of motion of left shoulder.  She gradually recovered but still has significant amount of bruising left in the inferior part of the left breast.  She had lumpectomy with Dr. Murry 9/2016 found 1.4 cm grade II DCIS, had also fibrocystic changes. She had RT finished in 11/2016. She started Arimidex after.

## 2020-07-01 NOTE — PROGRESS NOTES
"Subjective     Chika Hurd is a 75 year old female who presents to clinic today for the following health issues:    HPI       Hyperlipidemia Follow-Up      Are you regularly taking any medication or supplement to lower your cholesterol?   Yes- .    Are you having muscle aches or other side effects that you think could be caused by your cholesterol lowering medication?  No      How many servings of fruits and vegetables do you eat daily?  0-1    On average, how many sweetened beverages do you drink each day (Examples: soda, juice, sweet tea, etc.  Do NOT count diet or artificially sweetened beverages)?   0    How many days per week do you exercise enough to make your heart beat faster? 3 or less    How many minutes a day do you exercise enough to make your heart beat faster? 10 - 19    How many days per week do you miss taking your medication? 0    Annual Wellness Visit    Are you in the first 12 months of your Medicare Part B coverage?  No    Physical Health:    In general, how would you rate your overall physical health? good    Outside of work, how many days during the week do you exercise?2-3 days/week    Outside of work, approximately how many minutes a day do you exercise?15-30 minutes    If you drink alcohol do you typically have >3 drinks per day or >7 drinks per week? No    Do you usually eat at least 4 servings of fruit and vegetables a day, include whole grains & fiber and avoid regularly eating high fat or \"junk\" foods? NO    Do you have any problems taking medications regularly? No    Do you have any side effects from medications? none    Needs assistance for the following daily activities: no assistance needed    Which of the following safety concerns are present in your home?  none identified     Hearing impairment: Yes, Difficulty following a conversation in a noisy restaurant or crowded room.    Feel that people are mumbling or not speaking clearly.    Need to ask people to speak up or repeat " themselves.    In the past 6 months, have you been bothered by leaking of urine? yes    Mental Health:    In general, how would you rate your overall mental or emotional health? good  PHQ-2 Score:      Do you feel safe in your environment? Yes    Have you ever done Advance Care Planning? (For example, a Health Directive, POLST, or a discussion with a medical provider or your loved ones about your wishes)? No, advance care planning information given to patient to review.  Patient plans to discuss their wishes with loved ones or provider.      Fall risk:  Fallen 2 or more times in the past year?: Yes  Any fall with injury in the past year?: No  Timed Up and Go Test (>13.5 is fall risk; contact physician) : 8    Cognitive Screenin) Repeat 3 items (Leader, Season, Table)    2) Clock draw: NORMAL  3) 3 item recall: Recalls 3 objects  Results: 3 items recalled: COGNITIVE IMPAIRMENT LESS LIKELY    Mini-CogTM Copyright S Joby. Licensed by the author for use in [facility]; reprinted with permission (za@[facility]). All rights reserved.      Do you have sleep apnea, excessive snoring or daytime drowsiness?: yes Excessive snoring    Current providers sharing in care for this patient include:   Patient Care Team:  Audrey Roy MD as PCP - General (Family Practice)  Benjamin Chen MD as MD (Radiation Oncology)  Leeroy Pascal MD as MD (Vascular Surgery)  Michael Zuleta MD as MD (Ophthalmology)  Audrey Roy MD as Assigned PCP            Patient Active Problem List   Diagnosis     Hyperlipidemia LDL goal <130     SENSONRL HEAR LOSS,BILAT     GERD (gastroesophageal reflux disease)     Cataract right eye     Advanced directives, counseling/discussion     Ductal carcinoma in situ (DCIS) of left breast     Central retinal artery occlusion, right     Carcinoma of left breast in female, estrogen receptor positive, unspecified site of breast (H)     Elevated blood pressure  reading without diagnosis of hypertension     Past Surgical History:   Procedure Laterality Date     ENT SURGERY      dental implants 03/12 started     FLEXIBLE SIGMOIDOSCOPY  04/03     LUMPECTOMY BREAST WITH SEED LOCALIZATION Left 9/21/2016    Procedure: LUMPECTOMY BREAST WITH SEED LOCALIZATION;  Surgeon: Russel Murry MD;  Location: [facility] OR     PHACOEMULSIFICATION WITH STANDARD INTRAOCULAR LENS IMPLANT Right 11/6/2017    Procedure: PHACOEMULSIFICATION WITH STANDARD INTRAOCULAR LENS IMPLANT;  Right cataract removal with implant;  Surgeon: Michael Zuleta MD;  Location: [facility] OR     PHACOEMULSIFICATION WITH STANDARD INTRAOCULAR LENS IMPLANT Left 11/27/2017    Procedure: PHACOEMULSIFICATION WITH STANDARD INTRAOCULAR LENS IMPLANT;  Left cataract removal with implant;  Surgeon: Michael Zuleta MD;  Location: [facility] OR     SURGICAL HISTORY OF -   1959    Right Hip Pinning     SURGICAL HISTORY OF -   1983    Tubal Ligation       Social History     Tobacco Use     Smoking status: Former Smoker     Types: Cigarettes     Last attempt to quit: 1/12/2005     Years since quitting: 15.4     Smokeless tobacco: Never Used   Substance Use Topics     Alcohol use: Yes     Comment: rarely     Family History   Problem Relation Age of Onset     Heart Disease Mother      Heart Disease Father      Circulatory Father         main artery aneursym     Heart Disease Sister         sleep problems     Breast Cancer Maternal Aunt         diagnosed in 60's, surviving in 90's     Breast Cancer Cousin      Breast Cancer Cousin          Current Outpatient Medications   Medication Sig Dispense Refill     acetaminophen (TYLENOL) 500 MG tablet Take 500-1,000 mg by mouth 2 times daily as needed for mild pain       Ascorbic Acid (VITAMIN C PO) Take 500 mg by mouth daily       ASPIRIN PO Take 325 mg by mouth daily        atorvastatin (LIPITOR) 40 MG tablet TAKE 1 TABLET BY MOUTH  EVERY OTHER DAY ALTERNATING WITH 1/2 TABLET EVERY OTHER DAY 68  "tablet 4     CALCIUM + D OR 1 TABLET DAILY       co-enzyme Q-10 (CO Q10) 100 MG CAPS Take 2 capsules by mouth daily. Takes a total of 200 mg daily.  0     famotidine (PEPCID) 20 MG tablet TAKE 1 TABLET (20 MG) BY MOUTH 2 TIMES DAILY 180 tablet 1     Glycerin-Polysorbate 80 (REFRESH DRY EYE THERAPY OP) Apply 1-2 drops to eye as needed Reported on 5/23/2017       MULTIVITAMIN OR 1 daily       Omega-3 Fatty Acids (OMEGA-3 FISH OIL PO) Take 1 g by mouth daily        Allergies   Allergen Reactions     Cortisone Swelling     Cortisone Cream       Reviewed and updated as needed this visit by Provider         Review of Systems   Constitutional, neuro, ENT, endocrine, pulmonary, cardiac, gastrointestinal, genitourinary, musculoskeletal, integument and psychiatric systems are negative, except as otherwise noted.       Objective    /80   Pulse 74   Temp 98.6  F (37  C) (Tympanic)   Resp 16   Ht 1.651 m (5' 5\")   Wt 77.1 kg (170 lb)   SpO2 98%   Breastfeeding No   BMI 28.29 kg/m    Body mass index is 28.29 kg/m .  Physical Exam   GENERAL: healthy, alert and no distress  EYES: Eyes grossly normal to inspection, PERRL and conjunctivae and sclerae normal  NECK: no adenopathy, no asymmetry, masses, or scars and thyroid normal to palpation  RESP: lungs clear to auscultation - no rales, rhonchi or wheezes  CV: regular rate and rhythm, normal S1 S2, no S3 or S4, no murmur, click or rub, no peripheral edema and peripheral pulses strong  MS: no gross musculoskeletal defects noted, no edema  SKIN: no suspicious lesions or rashes  NEURO: Normal strength and tone, mentation intact and speech normal  PSYCH: mentation appears normal, affect normal/bright          Assessment & Plan     1. Central retinal artery occlusion of right eye  On ASA and lipitor as per vascular. Due for repeat lipid panel.  - atorvastatin (LIPITOR) 40 MG tablet; TAKE 1 TABLET BY MOUTH  EVERY OTHER DAY ALTERNATING WITH 1/2 TABLET EVERY OTHER DAY  " "Dispense: 68 tablet; Refill: 4  - Lipid panel reflex to direct LDL Fasting    2. Carcinoma of left breast in female, estrogen receptor positive, unspecified site of breast (H)  Following with oncology. In remission.    3. Colon cancer screening  - Fecal colorectal cancer screen (FIT); Future    4. Screening for endocrine, metabolic and immunity disorder  - Basic metabolic panel     BMI:   Estimated body mass index is 28.29 kg/m  as calculated from the following:    Height as of this encounter: 1.651 m (5' 5\").    Weight as of this encounter: 77.1 kg (170 lb).   Weight management plan: Discussed healthy diet and exercise guidelines            No follow-ups on file.    Audrey Roy MD  [facility]    "

## 2020-07-02 DIAGNOSIS — Z12.11 SPECIAL SCREENING FOR MALIGNANT NEOPLASMS, COLON: ICD-10-CM

## 2020-07-02 PROCEDURE — 82274 ASSAY TEST FOR BLOOD FECAL: CPT | Performed by: FAMILY MEDICINE

## 2020-07-06 LAB — HEMOCCULT STL QL IA: NEGATIVE

## 2020-07-07 NOTE — RESULT ENCOUNTER NOTE
Notified via [employer]: This is a normal result.  If you continue with FIT testing for colon cancer screening you need to repeat this test yearly.  You can opt for colonoscopy screening at any point which would be every 10 years if normal and no family history of colon cancer.

## 2020-07-08 ENCOUNTER — HOSPITAL ENCOUNTER (OUTPATIENT)
Dept: MAMMOGRAPHY | Facility: CLINIC | Age: 75
Discharge: HOME | End: 2020-07-08
Attending: INTERNAL MEDICINE | Admitting: INTERNAL MEDICINE
Payer: MEDICARE

## 2020-07-08 DIAGNOSIS — Z12.31 VISIT FOR SCREENING MAMMOGRAM: ICD-10-CM

## 2020-07-08 DIAGNOSIS — H34.11 CENTRAL RETINAL ARTERY OCCLUSION OF RIGHT EYE: ICD-10-CM

## 2020-07-08 PROCEDURE — 77063 BREAST TOMOSYNTHESIS BI: CPT

## 2020-07-08 RX ORDER — ATORVASTATIN CALCIUM 40 MG/1
TABLET, FILM COATED ORAL
Qty: 68 TABLET | Refills: 0 | OUTPATIENT
Start: 2020-07-08

## 2020-07-08 NOTE — TELEPHONE ENCOUNTER
"Refilled 7-1-20.  Donna HERNANDEZ RN BSN      Requested Prescriptions   Pending Prescriptions Disp Refills     atorvastatin (LIPITOR) 40 MG tablet [Pharmacy Med Name: ATORVASTATIN 40 MG TABLET] 68 tablet 0     Sig: TAKE 1 TABLET BY MOUTH EVERY OTHER DAY ALTERNATING WITH 1/2 TABLET EVERY OTHER DAY       Statins Protocol Passed - 7/8/2020  7:26 AM        Passed - LDL on file in past 12 months     Recent Labs   Lab Test 07/01/20  0910   LDL 51             Passed - No abnormal creatine kinase in past 12 months     No lab results found.             Passed - Recent (12 mo) or future (30 days) visit within the authorizing provider's specialty     Patient has had an office visit with the authorizing provider or a provider within the authorizing providers department within the previous 12 mos or has a future within next 30 days. See \"Patient Info\" tab in inbasket, or \"Choose Columns\" in Meds & Orders section of the refill encounter.              Passed - Medication is active on med list        Passed - Patient is age 18 or older        Passed - No active pregnancy on record        Passed - No positive pregnancy test in past 12 months             "

## 2020-07-10 ENCOUNTER — PATIENT OUTREACH (OUTPATIENT)
Dept: ONCOLOGY | Facility: CLINIC | Age: 75
End: 2020-07-10

## 2020-07-10 NOTE — PROGRESS NOTES
Called patient with Negative mammo results from yesterday. Patient verbalized understanding. Miryam Forbes RN on 7/10/2020 at 11:25 AM

## 2021-03-03 ENCOUNTER — TELEPHONE (OUTPATIENT)
Dept: OTHER | Facility: CLINIC | Age: 76
End: 2021-03-03

## 2021-03-03 NOTE — TELEPHONE ENCOUNTER
LOV with Dr. Pascal was 3/5/19 at which time he provided patient with 1 years of Lipitor and advised her PCP to take over future management of the medication.    I called Karma and discussed that there has been no advisement against taking Lipitor and receiving the COVID-19 vaccine. There currently is limited research about the vaccine and long term affects and the best resource for information in regards to the vaccine is the [facility] website and to also contact her PCP with concerns about the vaccine. She verbalized understanding.    Laureen ACKERMANN, RN    [facility]  Office: [phone]  Fax: [phone]

## 2021-03-03 NOTE — TELEPHONE ENCOUNTER
Patient wondering if atorvastatin (LIPITOR) 40 MG tablet that Dr. Pascal prescribe to her years ago will have any issues with Covid Vaccine.     Informed patient will send message for nurse to call back.       Chana TSAI

## 2021-03-08 ENCOUNTER — MYC MEDICAL ADVICE (OUTPATIENT)
Dept: FAMILY MEDICINE | Facility: CLINIC | Age: 76
End: 2021-03-08

## 2021-05-07 ENCOUNTER — OFFICE VISIT (OUTPATIENT)
Dept: FAMILY MEDICINE | Facility: CLINIC | Age: 76
End: 2021-05-07
Payer: MEDICARE

## 2021-05-07 ENCOUNTER — ANCILLARY PROCEDURE (OUTPATIENT)
Dept: GENERAL RADIOLOGY | Facility: CLINIC | Age: 76
End: 2021-05-07
Attending: FAMILY MEDICINE
Payer: MEDICARE

## 2021-05-07 VITALS
DIASTOLIC BLOOD PRESSURE: 72 MMHG | OXYGEN SATURATION: 96 % | HEART RATE: 71 BPM | HEIGHT: 65 IN | TEMPERATURE: 96.4 F | WEIGHT: 180 LBS | RESPIRATION RATE: 16 BRPM | BODY MASS INDEX: 29.99 KG/M2 | SYSTOLIC BLOOD PRESSURE: 134 MMHG

## 2021-05-07 DIAGNOSIS — C50.912 CARCINOMA OF LEFT BREAST IN FEMALE, ESTROGEN RECEPTOR POSITIVE, UNSPECIFIED SITE OF BREAST (H): ICD-10-CM

## 2021-05-07 DIAGNOSIS — M79.672 LEFT FOOT PAIN: Primary | ICD-10-CM

## 2021-05-07 DIAGNOSIS — M79.672 LEFT FOOT PAIN: ICD-10-CM

## 2021-05-07 DIAGNOSIS — Z17.0 CARCINOMA OF LEFT BREAST IN FEMALE, ESTROGEN RECEPTOR POSITIVE, UNSPECIFIED SITE OF BREAST (H): ICD-10-CM

## 2021-05-07 PROCEDURE — 73630 X-RAY EXAM OF FOOT: CPT | Mod: LT | Performed by: RADIOLOGY

## 2021-05-07 PROCEDURE — 99213 OFFICE O/P EST LOW 20 MIN: CPT | Performed by: FAMILY MEDICINE

## 2021-05-07 ASSESSMENT — MIFFLIN-ST. JEOR: SCORE: 1317.35

## 2021-05-07 NOTE — PATIENT INSTRUCTIONS
Your BMI is Body mass index is Body mass index is 29.95 kg/m .     A BMI of 18.5 to 24.9 is in the healthy range. A person with a BMI of 25 to 29.9 is considered overweight, and someone with a BMI of 30 or greater is considered obese. More than two-thirds of American adults are considered overweight or obese.  Weight management is a personal decision.  If you are interested in exploring weight loss strategies, the following discussion covers the approaches that may be successful. Body mass index (BMI) is one way to tell whether you are at a healthy weight, overweight, or obese. It measures your weight in relation to your height.  Being overweight or obese increases the risk for further weight gain. Excess weight may lead to heart disease and diabetes.  Creating and following plans for healthy eating and physical activity may help you improve your health.  Weight control is part of healthy lifestyle and includes exercise, emotional health, and healthy eating habits. Careful eating habits lifelong are the mainstay of weight control. Though there are significant health benefits from weight loss, long-term weight loss with diet alone may be very difficult to achieve- studies show long-term success with dietary management alone in less than 10% of people. Attaining a healthy weight may be especially difficult to achieve in those with severe obesity. In some cases, medications, devices and surgical management might be considered.  What can you do?    Keep a food journal to help with mindful eating and finding ways to modify your diet.      Reduce the amount of processed food in your diet. Focus on adding vegetables, and lean proteins.    Reduce dietary carbohydrates. Limiting to  gm of carbohydrates per day has been shows to help boost weight loss.  If you have diabetes or are on diabetic medications do not do this without talking to your physician or healthcare provider.    Diet combined with exercise helps  maintain muscle while optimizing fat loss. Strength training is particularly important for building and maintaining muscle mass. Exercise helps reduce stress, increase energy, and improves fitness. Increasing exercise without diet control, however, may not burn enough calories to loose weight.         Start walking three days a week 10-20 minutes at a time    Work towards walking thirty minutes five days a week      Eventually, increase the speed of your walking for 1-2 minutes at time    In addition, we recommend that you review healthy lifestyles and methods for weight loss available through the National Institutes of Health patient information sites:  http://win.niddk.nih.gov/publications/index.htm    Also look into health and wellness programs that may be available through your health insurance provider, employer, local community center, or [employer].

## 2021-05-07 NOTE — NURSING NOTE
"Initial /72   Pulse 71   Temp 96.4  F (35.8  C) (Tympanic)   Resp 16   Ht 1.651 m (5' 5\")   Wt 81.6 kg (180 lb)   SpO2 96%   BMI 29.95 kg/m   Estimated body mass index is 29.95 kg/m  as calculated from the following:    Height as of this encounter: 1.651 m (5' 5\").    Weight as of this encounter: 81.6 kg (180 lb). .      "

## 2021-05-07 NOTE — PROGRESS NOTES
"    Assessment & Plan     Left foot pain  Trialed herd soled shoe for comfort. She was unable to bear weight in this so cam walker provided. Ice, NSAIDs, Follow-up if not improving or if worsening.   - XR Foot Left G/E 3 Views; Future    [facility] chart reconciliation:    Carcinoma of left breast in female, estrogen receptor positive, unspecified site of breast (H)   in remission - follows with oncology annually .               BMI:   Estimated body mass index is 29.95 kg/m  as calculated from the following:    Height as of this encounter: 1.651 m (5' 5\").    Weight as of this encounter: 81.6 kg (180 lb).           Return in about 2 weeks (around 5/21/2021) for if not improved or sooner if worsening.    Audrey Roy MD  [facility]    Anatoliy Parada is a 76 year old who presents for the following health issues     HPI     Musculoskeletal problem/pain  Onset/Duration: 5 days   Description  Location: foot - left  Joint Swelling: Foot swelling  Redness: no  Pain: YES  Warmth: no  Intensity:  moderate, severe  Progression of Symptoms:  improving and constant  Accompanying signs and symptoms:   Fevers: no  Numbness/tingling/weakness: no  History  Trauma to the area: no  Recent illness:  no  Previous similar problem: no  Previous evaluation:  no  Precipitating or alleviating factors:  Aggravating factors include: standing, walking, climbing stairs and overuse  Therapies tried and outcome: foot brace helps        Review of Systems   Constitutional, neuro, ENT, endocrine, pulmonary, cardiac, gastrointestinal, genitourinary, musculoskeletal, integument and psychiatric systems are negative, except as otherwise noted.       Objective    /72   Pulse 71   Temp 96.4  F (35.8  C) (Tympanic)   Resp 16   Ht 1.651 m (5' 5\")   Wt 81.6 kg (180 lb)   SpO2 96%   BMI 29.95 kg/m    Body mass index is 29.95 kg/m .  Physical Exam   GENERAL: Pleasant, well appearing female.  MUSCULOSKELETAL:  " Tenderness to palpation over lateral midfoot with overlying diffuse soft tissue swelling. Unable to comfortably bear weight.    Recent Results (from the past 744 hour(s))   XR Foot Left G/E 3 Views    Narrative    XR LEFT FOOT THREE OR MORE VIEWS   5/7/2021 3:17 PM     HISTORY:  Left foot pain      Impression    IMPRESSION: Mild plantar calcaneal spurring with mild adjacent chronic  soft tissue calcification. Mild calcification at the distal Achilles  tendon region. Mild-moderate degenerative arthrosis at the second TMT  joint. No fracture identified.     ELBA REYNOLDS MD

## 2021-05-19 ENCOUNTER — MYC MEDICAL ADVICE (OUTPATIENT)
Dept: FAMILY MEDICINE | Facility: CLINIC | Age: 76
End: 2021-05-19

## 2021-05-20 NOTE — TELEPHONE ENCOUNTER
Radiology called this arthritic changes rather than fracture but with the acute onset I still think fracture more likely. She can weight bear as tolerated. So if she's not having pain with walking she can go without the boot.  If she's having pain then she should use either the boot or shoe and/or cane.  Really her guide to activity should be her level of pain. So if walking long distances causes significant exacerbation of pain she should aoid this. Ok to walk short distances like to the bathroom at night without any kind of brace. If still having pain after she return from [place] we should see her back to re-examine.  Possibly even consider MRI.

## 2021-05-24 ENCOUNTER — RECORDS - HEALTHEAST (OUTPATIENT)
Dept: ADMINISTRATIVE | Facility: CLINIC | Age: 76
End: 2021-05-24

## 2021-05-25 ENCOUNTER — RECORDS - HEALTHEAST (OUTPATIENT)
Dept: ADMINISTRATIVE | Facility: CLINIC | Age: 76
End: 2021-05-25

## 2021-06-25 ENCOUNTER — OFFICE VISIT (OUTPATIENT)
Dept: FAMILY MEDICINE | Facility: CLINIC | Age: 76
End: 2021-06-25
Payer: MEDICARE

## 2021-06-25 VITALS
HEIGHT: 61 IN | RESPIRATION RATE: 16 BRPM | BODY MASS INDEX: 33.46 KG/M2 | HEART RATE: 78 BPM | DIASTOLIC BLOOD PRESSURE: 76 MMHG | WEIGHT: 177.2 LBS | OXYGEN SATURATION: 96 % | TEMPERATURE: 97.6 F | SYSTOLIC BLOOD PRESSURE: 124 MMHG

## 2021-06-25 DIAGNOSIS — M79.672 LEFT FOOT PAIN: Primary | ICD-10-CM

## 2021-06-25 PROCEDURE — 99214 OFFICE O/P EST MOD 30 MIN: CPT | Performed by: FAMILY MEDICINE

## 2021-06-25 ASSESSMENT — MIFFLIN-ST. JEOR: SCORE: 1233.21

## 2021-06-25 NOTE — PATIENT INSTRUCTIONS
Pharyngitis  Salt water gargles  Follow up with PCP in 3-5 days  Proceed to  ER if symptoms worsen  Pharyngitis in Children   WHAT YOU NEED TO KNOW:   Pharyngitis, or sore throat, is inflammation of the tissues and structures in your child's pharynx (throat)  Pharyngitis may be caused by a bacterial or viral infection  DISCHARGE INSTRUCTIONS:   Seek care immediately if:   · Your child suddenly has trouble breathing or turns blue  · Your child has swelling or pain in his or her jaw  · Your child has voice changes, or it is hard to understand his or her speech  · Your child has a stiff neck  · Your child is urinating less than usual or has fewer diapers than usual      · Your child has increased weakness or fatigue  · Your child has pain on one side of the throat that is much worse than the other side  Contact your child's healthcare provider if:   · Your child's symptoms return or his symptoms do not get better or get worse  · Your child has a rash  He or she may also have reddish cheeks and a red, swollen tongue  · Your child has new ear pain, headaches, or pain around his or her eyes  · Your child pauses in breathing when he or she sleeps  · You have questions or concerns about your child's condition or care  Medicines: Your child may need any of the following:  · Acetaminophen  decreases pain  It is available without a doctor's order  Ask how much to give your child and how often to give it  Follow directions  Acetaminophen can cause liver damage if not taken correctly  · NSAIDs , such as ibuprofen, help decrease swelling, pain, and fever  This medicine is available with or without a doctor's order  NSAIDs can cause stomach bleeding or kidney problems in certain people  If your child takes blood thinner medicine, always ask if NSAIDs are safe for him  Always read the medicine label and follow directions   Do not give these medicines to children under 10months of age without Your BMI is Body mass index is Body mass index is 34.04 kg/m .     A BMI of 18.5 to 24.9 is in the healthy range. A person with a BMI of 25 to 29.9 is considered overweight, and someone with a BMI of 30 or greater is considered obese. More than two-thirds of American adults are considered overweight or obese.  Weight management is a personal decision.  If you are interested in exploring weight loss strategies, the following discussion covers the approaches that may be successful. Body mass index (BMI) is one way to tell whether you are at a healthy weight, overweight, or obese. It measures your weight in relation to your height.  Being overweight or obese increases the risk for further weight gain. Excess weight may lead to heart disease and diabetes.  Creating and following plans for healthy eating and physical activity may help you improve your health.  Weight control is part of healthy lifestyle and includes exercise, emotional health, and healthy eating habits. Careful eating habits lifelong are the mainstay of weight control. Though there are significant health benefits from weight loss, long-term weight loss with diet alone may be very difficult to achieve- studies show long-term success with dietary management alone in less than 10% of people. Attaining a healthy weight may be especially difficult to achieve in those with severe obesity. In some cases, medications, devices and surgical management might be considered.  What can you do?    Keep a food journal to help with mindful eating and finding ways to modify your diet.      Reduce the amount of processed food in your diet. Focus on adding vegetables, and lean proteins.    Reduce dietary carbohydrates. Limiting to  gm of carbohydrates per day has been shows to help boost weight loss.  If you have diabetes or are on diabetic medications do not do this without talking to your physician or healthcare provider.    Diet combined with exercise helps  direction from your child's healthcare provider  · Antibiotics  treat a bacterial infection  · Do not give aspirin to children under 25years of age  Your child could develop Reye syndrome if he takes aspirin  Reye syndrome can cause life-threatening brain and liver damage  Check your child's medicine labels for aspirin, salicylates, or oil of wintergreen  · Give your child's medicine as directed  Contact your child's healthcare provider if you think the medicine is not working as expected  Tell him or her if your child is allergic to any medicine  Keep a current list of the medicines, vitamins, and herbs your child takes  Include the amounts, and when, how, and why they are taken  Bring the list or the medicines in their containers to follow-up visits  Carry your child's medicine list with you in case of an emergency  Manage your child's pharyngitis:   · Have your child rest  as much as possible  · Give your child plenty of liquids  so he or she does not get dehydrated  Give your child liquids that are easy to swallow and will soothe his or her throat  · Soothe your child's throat  If your child can gargle, give him or her ¼ of a teaspoon of salt mixed with 1 cup of warm water to gargle  If your child is 12 years or older, give him or her throat lozenges to help decrease throat pain  · Use a cool mist humidifier  to increase air moisture in your home  This may make it easier for your child to breathe and help decrease his or her cough  Help prevent the spread of pharyngitis:  Wash your hands and your child's hands often  Keep your child away from other people while he or she is still contagious  Ask your child's healthcare provider how long your child is contagious  Do not let your child share food or drinks  Do not let your child share toys or pacifiers  Wash these items with soap and hot water  When to return to school or :   Your child may return to  or school when his maintain muscle while optimizing fat loss. Strength training is particularly important for building and maintaining muscle mass. Exercise helps reduce stress, increase energy, and improves fitness. Increasing exercise without diet control, however, may not burn enough calories to loose weight.         Start walking three days a week 10-20 minutes at a time    Work towards walking thirty minutes five days a week      Eventually, increase the speed of your walking for 1-2 minutes at time    In addition, we recommend that you review healthy lifestyles and methods for weight loss available through the National Institutes of Health patient information sites:  http://win.niddk.nih.gov/publications/index.htm    Also look into health and wellness programs that may be available through your health insurance provider, employer, local community center, or [employer].   or her symptoms go away  Follow up with your child's healthcare provider as directed:  Write down your questions so you remember to ask them during your child's visits  © 2017 [employer] Information is for End User's use only and may not be sold, redistributed or otherwise used for commercial purposes  All illustrations and images included in CareNotes® are the copyrighted property of A D A M , Inc  or [employer]  The above information is an  only  It is not intended as medical advice for individual conditions or treatments  Talk to your doctor, nurse or pharmacist before following any medical regimen to see if it is safe and effective for you

## 2021-06-25 NOTE — NURSING NOTE
"Initial /76   Pulse 78   Temp 97.6  F (36.4  C) (Tympanic)   Resp 16   Ht 1.537 m (5' 0.5\")   Wt 80.4 kg (177 lb 3.2 oz)   SpO2 96%   BMI 34.04 kg/m   Estimated body mass index is 34.04 kg/m  as calculated from the following:    Height as of this encounter: 1.537 m (5' 0.5\").    Weight as of this encounter: 80.4 kg (177 lb 3.2 oz). .      "

## 2021-06-25 NOTE — PROGRESS NOTES
"    Assessment & Plan     Left foot pain  Given persistence of symptoms will obtain MRI for further evaluation and management.  - MR Foot Left w/o Contrast; Future             BMI:   Estimated body mass index is 34.04 kg/m  as calculated from the following:    Height as of this encounter: 1.537 m (5' 0.5\").    Weight as of this encounter: 80.4 kg (177 lb 3.2 oz).           No follow-ups on file.    Audrey Roy MD  [facility]    Anatoliy Parada is a 76 year old who presents for the following health issues     HPI     Chief Complaint   Patient presents with     Musculoskeletal Problem     Patient was seen for foot back in May.  She reports that pain improved.  She switched from wearing boot to shoe about 1 week ago.  2 days ago pain came back.           Review of Systems   Constitutional, neuro, ENT, endocrine, pulmonary, cardiac, gastrointestinal, genitourinary, musculoskeletal, integument and psychiatric systems are negative, except as otherwise noted.       Objective    There were no vitals taken for this visit.  There is no height or weight on file to calculate BMI.  Physical Exam   GENERAL: Pleasant, well appearing female.  MUSCULOSKELETAL:  Pain to palpation of medial and lateral calcaneus. No pain at base of 5th metatarsal or insertion of plantar fascia. No ligamentous laxity.                "

## 2021-06-28 ENCOUNTER — HOSPITAL ENCOUNTER (OUTPATIENT)
Dept: MRI IMAGING | Facility: CLINIC | Age: 76
Discharge: HOME | End: 2021-06-28
Attending: FAMILY MEDICINE | Admitting: FAMILY MEDICINE
Payer: MEDICARE

## 2021-06-28 DIAGNOSIS — M79.672 LEFT FOOT PAIN: ICD-10-CM

## 2021-06-28 PROCEDURE — G1004 CDSM NDSC: HCPCS | Performed by: RADIOLOGY

## 2021-06-28 PROCEDURE — 73718 MRI LOWER EXTREMITY W/O DYE: CPT | Mod: 26 | Performed by: RADIOLOGY

## 2021-06-28 PROCEDURE — 73718 MRI LOWER EXTREMITY W/O DYE: CPT | Mod: LT,ME

## 2021-07-02 NOTE — RESULT ENCOUNTER NOTE
Notified via MyChart: MRI shows osteoarthritis and bursitis. No occult fracture or ligament tears.  You could try over the counter topical Voltaren gel and trying to stay off that foot and rest it as much as possible for the next week or two.  If pain is persistent that let me know and I can put in a referral to podiatry.

## 2021-07-09 ENCOUNTER — HOSPITAL ENCOUNTER (OUTPATIENT)
Dept: MAMMOGRAPHY | Facility: CLINIC | Age: 76
End: 2021-07-09
Attending: INTERNAL MEDICINE
Payer: MEDICARE

## 2021-07-09 ENCOUNTER — HOSPITAL ENCOUNTER (OUTPATIENT)
Dept: LAB | Facility: CLINIC | Age: 76
End: 2021-07-09
Attending: INTERNAL MEDICINE
Payer: MEDICARE

## 2021-07-09 DIAGNOSIS — D05.12 DUCTAL CARCINOMA IN SITU (DCIS) OF LEFT BREAST: ICD-10-CM

## 2021-07-09 DIAGNOSIS — Z12.31 VISIT FOR SCREENING MAMMOGRAM: ICD-10-CM

## 2021-07-09 LAB
ALBUMIN SERPL-MCNC: 3.7 G/DL (ref 3.4–5)
ALP SERPL-CCNC: 91 U/L (ref 40–150)
ALT SERPL W P-5'-P-CCNC: 33 U/L (ref 0–50)
ANION GAP SERPL CALCULATED.3IONS-SCNC: 4 MMOL/L (ref 3–14)
AST SERPL W P-5'-P-CCNC: 24 U/L (ref 0–45)
BASOPHILS # BLD AUTO: 0.1 10E9/L (ref 0–0.2)
BASOPHILS NFR BLD AUTO: 1.3 %
BILIRUB SERPL-MCNC: 0.7 MG/DL (ref 0.2–1.3)
BUN SERPL-MCNC: 13 MG/DL (ref 7–30)
CALCIUM SERPL-MCNC: 9.5 MG/DL (ref 8.5–10.1)
CHLORIDE SERPL-SCNC: 105 MMOL/L (ref 94–109)
CO2 SERPL-SCNC: 29 MMOL/L (ref 20–32)
CREAT SERPL-MCNC: 0.77 MG/DL (ref 0.52–1.04)
DIFFERENTIAL METHOD BLD: NORMAL
EOSINOPHIL # BLD AUTO: 0.6 10E9/L (ref 0–0.7)
EOSINOPHIL NFR BLD AUTO: 6.6 %
ERYTHROCYTE [DISTWIDTH] IN BLOOD BY AUTOMATED COUNT: 13.3 % (ref 10–15)
GFR SERPL CREATININE-BSD FRML MDRD: 76 ML/MIN/{1.73_M2}
GLUCOSE SERPL-MCNC: 97 MG/DL (ref 70–99)
HCT VFR BLD AUTO: 45.3 % (ref 35–47)
HGB BLD-MCNC: 14.4 G/DL (ref 11.7–15.7)
IMM GRANULOCYTES # BLD: 0 10E9/L (ref 0–0.4)
IMM GRANULOCYTES NFR BLD: 0.2 %
LYMPHOCYTES # BLD AUTO: 1.7 10E9/L (ref 0.8–5.3)
LYMPHOCYTES NFR BLD AUTO: 20.9 %
MCH RBC QN AUTO: 29.4 PG (ref 26.5–33)
MCHC RBC AUTO-ENTMCNC: 31.8 G/DL (ref 31.5–36.5)
MCV RBC AUTO: 93 FL (ref 78–100)
MONOCYTES # BLD AUTO: 0.9 10E9/L (ref 0–1.3)
MONOCYTES NFR BLD AUTO: 11.4 %
NEUTROPHILS # BLD AUTO: 4.9 10E9/L (ref 1.6–8.3)
NEUTROPHILS NFR BLD AUTO: 59.6 %
NRBC # BLD AUTO: 0 10*3/UL
NRBC BLD AUTO-RTO: 0 /100
PLATELET # BLD AUTO: 301 10E9/L (ref 150–450)
POTASSIUM SERPL-SCNC: 4.7 MMOL/L (ref 3.4–5.3)
PROT SERPL-MCNC: 7.4 G/DL (ref 6.8–8.8)
RBC # BLD AUTO: 4.89 10E12/L (ref 3.8–5.2)
SODIUM SERPL-SCNC: 138 MMOL/L (ref 133–144)
WBC # BLD AUTO: 8.3 10E9/L (ref 4–11)

## 2021-07-09 PROCEDURE — 36415 COLL VENOUS BLD VENIPUNCTURE: CPT | Performed by: INTERNAL MEDICINE

## 2021-07-09 PROCEDURE — 85025 COMPLETE CBC W/AUTO DIFF WBC: CPT | Performed by: INTERNAL MEDICINE

## 2021-07-09 PROCEDURE — 77063 BREAST TOMOSYNTHESIS BI: CPT

## 2021-07-09 PROCEDURE — 80053 COMPREHEN METABOLIC PANEL: CPT | Performed by: INTERNAL MEDICINE

## 2021-07-13 ENCOUNTER — ONCOLOGY VISIT (OUTPATIENT)
Dept: ONCOLOGY | Facility: CLINIC | Age: 76
End: 2021-07-13
Attending: INTERNAL MEDICINE
Payer: MEDICARE

## 2021-07-13 VITALS
HEART RATE: 74 BPM | OXYGEN SATURATION: 97 % | BODY MASS INDEX: 34.91 KG/M2 | SYSTOLIC BLOOD PRESSURE: 163 MMHG | TEMPERATURE: 96.9 F | HEIGHT: 60 IN | RESPIRATION RATE: 22 BRPM | DIASTOLIC BLOOD PRESSURE: 67 MMHG | WEIGHT: 177.8 LBS

## 2021-07-13 DIAGNOSIS — D05.12 DUCTAL CARCINOMA IN SITU (DCIS) OF LEFT BREAST: ICD-10-CM

## 2021-07-13 DIAGNOSIS — Z12.31 ENCOUNTER FOR SCREENING MAMMOGRAM FOR MALIGNANT NEOPLASM OF BREAST: ICD-10-CM

## 2021-07-13 DIAGNOSIS — E78.5 HYPERLIPIDEMIA LDL GOAL <130: Primary | ICD-10-CM

## 2021-07-13 DIAGNOSIS — Z12.31 VISIT FOR SCREENING MAMMOGRAM: ICD-10-CM

## 2021-07-13 DIAGNOSIS — K21.9 GASTROESOPHAGEAL REFLUX DISEASE WITHOUT ESOPHAGITIS: ICD-10-CM

## 2021-07-13 PROCEDURE — G0463 HOSPITAL OUTPT CLINIC VISIT: HCPCS

## 2021-07-13 PROCEDURE — 99214 OFFICE O/P EST MOD 30 MIN: CPT | Performed by: INTERNAL MEDICINE

## 2021-07-13 ASSESSMENT — PAIN SCALES - GENERAL: PAINLEVEL: MODERATE PAIN (5)

## 2021-07-13 ASSESSMENT — MIFFLIN-ST. JEOR: SCORE: 1228

## 2021-07-13 NOTE — ASSESSMENT & PLAN NOTE
Chika Hurd has been compliant to routine mammogram in 07/2016.  Identified new microcalcification 0.4 cm area on the left breast 12-1 o'clock position 1.7 cm from the nipple.  This is a change from her prior 2015 mammogram.  Stereotactic biopsy was done 07/22/2016, identified high grade DCIS with comedonecrosis and microcalcifications and intermediate grade with solid and cribriform pattern.  No evidence of invasive carcinoma.  No evidence of angiolymphatic invasion.  % positive, NM 60% to 70% positive.  She had lumpectomy with Dr. Murry 9/2016 found 1.4 cm grade II DCIS, had also fibrocystic changes. She had RT finished in 11/2016. She started Arimidex after.

## 2021-07-13 NOTE — PROGRESS NOTES
Oncology Rooming Note    July 13, 2021 11:38 AM   Chika Hurd is a 76 year old female who presents for:    Chief Complaint   Patient presents with     Oncology Clinic Visit     1 year follow up breast cancer.      Initial Vitals: BP (!) 163/67 (BP Location: Right arm, Patient Position: Sitting, Cuff Size: Adult Large)   Pulse 74   Temp 96.9  F (36.1  C) (Oral)   Resp 22   Ht 1.524 m (5')   Wt 80.6 kg (177 lb 12.8 oz)   SpO2 97%   Breastfeeding No   BMI 34.72 kg/m   Estimated body mass index is 34.72 kg/m  as calculated from the following:    Height as of this encounter: 1.524 m (5').    Weight as of this encounter: 80.6 kg (177 lb 12.8 oz). Body surface area is 1.85 meters squared.  Moderate Pain (5) Comment: Data Unavailable   No LMP recorded. Patient is postmenopausal.  Allergies reviewed: Yes  Medications reviewed: Yes    Medications: Medication refills not needed today.  Pharmacy name entered into [employer]: [id number] [place] [address]    Clinical concerns: 1 year follow up breast cancer.       Christine Downey, CMA

## 2021-07-13 NOTE — LETTER
7/13/2021         RE: Chika Hurd  [id number]  [facility] [address]        Dear Colleague,    Thank you for referring your patient, Chika Hurd, to the [facility]. Please see a copy of my visit note below.    Hematology/ Oncology Follow-up Visit:  Jul 13, 2021    Reason for Visit:   Chief Complaint   Patient presents with     Oncology Clinic Visit     1 year follow up breast cancer.        Oncologic History:    Ductal carcinoma in situ (DCIS) of left breast  Chika Hurd has been compliant to routine mammogram in 07/2016.  Identified new microcalcification 0.4 cm area on the left breast 12-1 o'clock position 1.7 cm from the nipple.  This is a change from her prior 2015 mammogram.  Stereotactic biopsy was done 07/22/2016, identified high grade DCIS with comedonecrosis and microcalcifications and intermediate grade with solid and cribriform pattern.  No evidence of invasive carcinoma.  No evidence of angiolymphatic invasion.  % positive, LA 60% to 70% positive.  She had lumpectomy with Dr. Murry 9/2016 found 1.4 cm grade II DCIS, had also fibrocystic changes. She had RT finished in 11/2016. She started Arimidex after.       Interval History:  Patient for follow-up visit.  She has been feeling well without any recent complaints of weight loss or night sweats fever or chills.  She denies any nausea vomiting or diarrhea.  Her most recent mammogram shows no abnormalities.  She has been having left knee pain related to arthritis.  Her pain is well controlled with Tylenol.    Review Of Systems:  All other ROS negative unless mentioned in interval history.    Past medical, social, surgical, and family histories reviewed.    Allergies:  Allergies as of 07/13/2021 - Reviewed 07/13/2021   Allergen Reaction Noted     Cortisone Swelling 02/02/2006       Current Medications:  Current Outpatient Medications   Medication Sig Dispense Refill     acetaminophen  "(TYLENOL) 500 MG tablet Take 500-1,000 mg by mouth 2 times daily as needed for mild pain       Ascorbic Acid (VITAMIN C PO) Take 500 mg by mouth daily       ASPIRIN PO Take 325 mg by mouth daily        atorvastatin (LIPITOR) 40 MG tablet TAKE 1 TABLET BY MOUTH  EVERY OTHER DAY ALTERNATING WITH 1/2 TABLET EVERY OTHER DAY 68 tablet 4     CALCIUM + D OR 1 TABLET DAILY       co-enzyme Q-10 (CO Q10) 100 MG CAPS Take 2 capsules by mouth daily. Takes a total of 200 mg daily.  0     famotidine (PEPCID) 20 MG tablet TAKE 1 TABLET (20 MG) BY MOUTH 2 TIMES DAILY 180 tablet 1     Glycerin-Polysorbate 80 (REFRESH DRY EYE THERAPY OP) Apply 1-2 drops to eye as needed Reported on 5/23/2017       MULTIVITAMIN OR 1 daily       Omega-3 Fatty Acids (OMEGA-3 FISH OIL PO) Take 1 g by mouth daily           Physical Exam:  BP (!) 163/67 (BP Location: Right arm, Patient Position: Sitting, Cuff Size: Adult Large)   Pulse 74   Temp 96.9  F (36.1  C) (Oral)   Resp 22   Ht 1.524 m (5')   Wt 80.6 kg (177 lb 12.8 oz)   SpO2 97%   Breastfeeding No   BMI 34.72 kg/m    Wt Readings from Last 12 Encounters:   07/13/21 80.6 kg (177 lb 12.8 oz)   06/25/21 80.4 kg (177 lb 3.2 oz)   05/07/21 81.6 kg (180 lb)   07/01/20 77.2 kg (170 lb 3.2 oz)   07/01/20 77.1 kg (170 lb)   08/29/19 76.8 kg (169 lb 6.4 oz)   07/31/19 75.1 kg (165 lb 9.6 oz)   06/06/19 76.5 kg (168 lb 9.6 oz)   05/16/19 77 kg (169 lb 12.8 oz)   03/28/19 76.2 kg (168 lb 1.6 oz)   01/11/19 75.1 kg (165 lb 9.6 oz)   12/20/18 74.4 kg (164 lb)       GENERAL APPEARANCE: Healthy, alert and in no acute distress.  HEENT: Sclerae anicteric. PERRLA. Oropharynx without ulcers, lesions, or thrush.  NECK: Supple. No asymmetry or masses.  LYMPHATICS: No palpable cervical, supraclavicular, axillary, or inguinal lymphadenopathy.  RESP: Lungs clear to auscultation bilaterally without rales, rhonchi or wheezes.\",  BREAST: Right- No mass, nipple discharge or axillary adenopathy. Left- No mass, nipple " "discharge or axillary adenopathy \"CARDIOVASCULAR: Regular rate and rhythm. Normal S1, S2; no S3 or S4. No murmur, gallop, or rub.  ABDOMEN: Soft, nontender. Bowel sounds normal. No palpable organomegaly or masses.  MUSCULOSKELETAL: Extremities without gross deformities noted. No edema of bilateral lower extremities.  SKIN: No suspicious lesions or rashes.  NEURO: Alert and oriented x 3. Cranial nerves II-XII grossly intact.  PSYCHIATRIC: Mentation and affect appear normal.    Laboratory/Imaging Studies:  No visits with results within 2 Week(s) from this visit.   Latest known visit with results is:   Orders Only on 07/02/2020   Component Date Value Ref Range Status     Occult Blood Scn FIT 07/02/2020 Negative  NEG^Negative Final        Recent Results (from the past 744 hour(s))   MR Foot Left w/o Contrast    Narrative    Exam: MRI of the left foot dated 6/28/2021.    COMPARISON: Radiographs dated 5/7/2021.    CLINICAL HISTORY: Evaluate for stress fracture.    TECHNIQUE: Multiplanar, multisequence MR imaging of the left foot was  obtained using standard sequences in 3 orthogonal planes without the  use of intravenous or intra-articular gadolinium contrast.    FINDINGS:    Subchondral bone marrow edema and cystic changes at the bases of the  second, third, and fourth metatarsals, as well and in the middle and  lateral cuneiforms. Additionally mild subchondral cystic changes and  bone marrow edema at the navicular medial cuneiform joint. Findings  consistent with osteoarthrosis. No marrow signal changes to suggest  fracture.    The Lisfranc ligament is intact.    Small amount of fluid in the first, second, and third intermetatarsal  space.    The musculature is intact without atrophy or soft tissue edema. Mild  subcutaneous soft tissue edema along the dorsal aspect of the foot,  greatest laterally. The tendinous structures about the foot are  intact.    Mild spurring along the distal first metatarsal, centered at " the  metatarsophalangeal joint, consistent with osteoarthrosis.      Impression    IMPRESSION:  1. Osteoarthrosis in the left foot, greatest at the second and third  tarsometatarsal joints, with milder degenerative osteoarthrosis  additionally noted at the fourth tarsometatarsal joint, navicular  cuneiform joint, and distal first metatarsophalangeal joint.  2. No marrow signal changes to suggest fracture.  3. Nonspecific fluid in the first, second, and third intermetatarsal  bursa, correlate clinically for intermetatarsal bursitis.    ANGIE VALDES MD   MA Screen Bilateral w/Artemio    Narrative    MA SCREENING BILATERAL W/ ARTEMIO 7/9/2021 11:02 AM    HISTORY:  Screening.  No new breast complaints.    COMPARISON:  7/8/2020,  5/30/2019, 7/26/2018, 7/20/2017    TECHNIQUE:  Digital mammography with CAD is performed as well as DBT.    BREAST DENSITY: Scattered fibroglandular densities.    COMMENTS: No findings of suspicion for malignancy.       Impression    IMPRESSION: BI-RADS CATEGORY: 1 -  NEGATIVE.    RECOMMENDED FOLLOW-UP: Annual Mammography.    TANIKA TOTH MD         SYSTEM ID:  [id number]       Assessment and plan:    (D05.12) Ductal carcinoma in situ (DCIS) of left breast  I reviewed with the patient today most recent laboratory test and mammographic images.  There is no clinical evidence of breast cancer recurrence.  We will continue to see the patient annually.    (K21.9) Gastroesophageal reflux disease without esophagitis  Patient currently on Pepcid 20 mg twice daily    (E78.5) Hyperlipidemia LDL goal <130    Patient currently on atorvastatin 40 mg daily.    The patient is ready to learn, no apparent learning barriers were identified.  Diagnosis and treatment plans were explained to the patient. The patient expressed understanding of the content. The patient asked appropriate questions. The patient questions were answered to her satisfaction.    Chart documentation with Dragon Voice recognition Software.  Although reviewed after completion, some words and grammatical errors may remain.    Oncology Rooming Note    July 13, 2021 11:38 AM   Chika Hurd is a 76 year old female who presents for:    Chief Complaint   Patient presents with     Oncology Clinic Visit     1 year follow up breast cancer.      Initial Vitals: BP (!) 163/67 (BP Location: Right arm, Patient Position: Sitting, Cuff Size: Adult Large)   Pulse 74   Temp 96.9  F (36.1  C) (Oral)   Resp 22   Ht 1.524 m (5')   Wt 80.6 kg (177 lb 12.8 oz)   SpO2 97%   Breastfeeding No   BMI 34.72 kg/m   Estimated body mass index is 34.72 kg/m  as calculated from the following:    Height as of this encounter: 1.524 m (5').    Weight as of this encounter: 80.6 kg (177 lb 12.8 oz). Body surface area is 1.85 meters squared.  Moderate Pain (5) Comment: Data Unavailable   No LMP recorded. Patient is postmenopausal.  Allergies reviewed: Yes  Medications reviewed: Yes    Medications: Medication refills not needed today.  Pharmacy name entered into [facility]: [id number] [place] [address]    Clinical concerns: 1 year follow up breast cancer.       Christine Downey CMA                Again, thank you for allowing me to participate in the care of your patient.        Sincerely,        Eliot Crane MD

## 2021-07-13 NOTE — PROGRESS NOTES
Hematology/ Oncology Follow-up Visit:  Jul 13, 2021    Reason for Visit:   Chief Complaint   Patient presents with     Oncology Clinic Visit     1 year follow up breast cancer.        Oncologic History:    Ductal carcinoma in situ (DCIS) of left breast  Chika Hurd has been compliant to routine mammogram in 07/2016.  Identified new microcalcification 0.4 cm area on the left breast 12-1 o'clock position 1.7 cm from the nipple.  This is a change from her prior 2015 mammogram.  Stereotactic biopsy was done 07/22/2016, identified high grade DCIS with comedonecrosis and microcalcifications and intermediate grade with solid and cribriform pattern.  No evidence of invasive carcinoma.  No evidence of angiolymphatic invasion.  % positive, PA 60% to 70% positive.  She had lumpectomy with Dr. Murry 9/2016 found 1.4 cm grade II DCIS, had also fibrocystic changes. She had RT finished in 11/2016. She started Arimidex after.       Interval History:  Patient for follow-up visit.  She has been feeling well without any recent complaints of weight loss or night sweats fever or chills.  She denies any nausea vomiting or diarrhea.  Her most recent mammogram shows no abnormalities.  She has been having left knee pain related to arthritis.  Her pain is well controlled with Tylenol.    Review Of Systems:  All other ROS negative unless mentioned in interval history.    Past medical, social, surgical, and family histories reviewed.    Allergies:  Allergies as of 07/13/2021 - Reviewed 07/13/2021   Allergen Reaction Noted     Cortisone Swelling 02/02/2006       Current Medications:  Current Outpatient Medications   Medication Sig Dispense Refill     acetaminophen (TYLENOL) 500 MG tablet Take 500-1,000 mg by mouth 2 times daily as needed for mild pain       Ascorbic Acid (VITAMIN C PO) Take 500 mg by mouth daily       ASPIRIN PO Take 325 mg by mouth daily        atorvastatin (LIPITOR) 40 MG tablet TAKE 1 TABLET BY MOUTH  EVERY OTHER  "DAY ALTERNATING WITH 1/2 TABLET EVERY OTHER DAY 68 tablet 4     CALCIUM + D OR 1 TABLET DAILY       co-enzyme Q-10 (CO Q10) 100 MG CAPS Take 2 capsules by mouth daily. Takes a total of 200 mg daily.  0     famotidine (PEPCID) 20 MG tablet TAKE 1 TABLET (20 MG) BY MOUTH 2 TIMES DAILY 180 tablet 1     Glycerin-Polysorbate 80 (REFRESH DRY EYE THERAPY OP) Apply 1-2 drops to eye as needed Reported on 5/23/2017       MULTIVITAMIN OR 1 daily       Omega-3 Fatty Acids (OMEGA-3 FISH OIL PO) Take 1 g by mouth daily           Physical Exam:  BP (!) 163/67 (BP Location: Right arm, Patient Position: Sitting, Cuff Size: Adult Large)   Pulse 74   Temp 96.9  F (36.1  C) (Oral)   Resp 22   Ht 1.524 m (5')   Wt 80.6 kg (177 lb 12.8 oz)   SpO2 97%   Breastfeeding No   BMI 34.72 kg/m    Wt Readings from Last 12 Encounters:   07/13/21 80.6 kg (177 lb 12.8 oz)   06/25/21 80.4 kg (177 lb 3.2 oz)   05/07/21 81.6 kg (180 lb)   07/01/20 77.2 kg (170 lb 3.2 oz)   07/01/20 77.1 kg (170 lb)   08/29/19 76.8 kg (169 lb 6.4 oz)   07/31/19 75.1 kg (165 lb 9.6 oz)   06/06/19 76.5 kg (168 lb 9.6 oz)   05/16/19 77 kg (169 lb 12.8 oz)   03/28/19 76.2 kg (168 lb 1.6 oz)   01/11/19 75.1 kg (165 lb 9.6 oz)   12/20/18 74.4 kg (164 lb)       GENERAL APPEARANCE: Healthy, alert and in no acute distress.  HEENT: Sclerae anicteric. PERRLA. Oropharynx without ulcers, lesions, or thrush.  NECK: Supple. No asymmetry or masses.  LYMPHATICS: No palpable cervical, supraclavicular, axillary, or inguinal lymphadenopathy.  RESP: Lungs clear to auscultation bilaterally without rales, rhonchi or wheezes.\",  BREAST: Right- No mass, nipple discharge or axillary adenopathy. Left- No mass, nipple discharge or axillary adenopathy \"CARDIOVASCULAR: Regular rate and rhythm. Normal S1, S2; no S3 or S4. No murmur, gallop, or rub.  ABDOMEN: Soft, nontender. Bowel sounds normal. No palpable organomegaly or masses.  MUSCULOSKELETAL: Extremities without gross deformities noted. " No edema of bilateral lower extremities.  SKIN: No suspicious lesions or rashes.  NEURO: Alert and oriented x 3. Cranial nerves II-XII grossly intact.  PSYCHIATRIC: Mentation and affect appear normal.    Laboratory/Imaging Studies:  No visits with results within 2 Week(s) from this visit.   Latest known visit with results is:   Orders Only on 07/02/2020   Component Date Value Ref Range Status     Occult Blood Scn FIT 07/02/2020 Negative  NEG^Negative Final        Recent Results (from the past 744 hour(s))   MR Foot Left w/o Contrast    Narrative    Exam: MRI of the left foot dated 6/28/2021.    COMPARISON: Radiographs dated 5/7/2021.    CLINICAL HISTORY: Evaluate for stress fracture.    TECHNIQUE: Multiplanar, multisequence MR imaging of the left foot was  obtained using standard sequences in 3 orthogonal planes without the  use of intravenous or intra-articular gadolinium contrast.    FINDINGS:    Subchondral bone marrow edema and cystic changes at the bases of the  second, third, and fourth metatarsals, as well and in the middle and  lateral cuneiforms. Additionally mild subchondral cystic changes and  bone marrow edema at the navicular medial cuneiform joint. Findings  consistent with osteoarthrosis. No marrow signal changes to suggest  fracture.    The Lisfranc ligament is intact.    Small amount of fluid in the first, second, and third intermetatarsal  space.    The musculature is intact without atrophy or soft tissue edema. Mild  subcutaneous soft tissue edema along the dorsal aspect of the foot,  greatest laterally. The tendinous structures about the foot are  intact.    Mild spurring along the distal first metatarsal, centered at the  metatarsophalangeal joint, consistent with osteoarthrosis.      Impression    IMPRESSION:  1. Osteoarthrosis in the left foot, greatest at the second and third  tarsometatarsal joints, with milder degenerative osteoarthrosis  additionally noted at the fourth tarsometatarsal  joint, navicular  cuneiform joint, and distal first metatarsophalangeal joint.  2. No marrow signal changes to suggest fracture.  3. Nonspecific fluid in the first, second, and third intermetatarsal  bursa, correlate clinically for intermetatarsal bursitis.    ANGIE VALDES MD   MA Screen Bilateral w/Richi    Cheryl FREEMAN SCREENING BILATERAL W/ RICHI 7/9/2021 11:02 AM    HISTORY:  Screening.  No new breast complaints.    COMPARISON:  7/8/2020,  5/30/2019, 7/26/2018, 7/20/2017    TECHNIQUE:  Digital mammography with CAD is performed as well as DBT.    BREAST DENSITY: Scattered fibroglandular densities.    COMMENTS: No findings of suspicion for malignancy.       Impression    IMPRESSION: BI-RADS CATEGORY: 1 -  NEGATIVE.    RECOMMENDED FOLLOW-UP: Annual Mammography.    TANIKA TOTH MD         SYSTEM ID:  [id number]       Assessment and plan:    (D05.12) Ductal carcinoma in situ (DCIS) of left breast  I reviewed with the patient today most recent laboratory test and mammographic images.  There is no clinical evidence of breast cancer recurrence.  We will continue to see the patient annually.    (K21.9) Gastroesophageal reflux disease without esophagitis  Patient currently on Pepcid 20 mg twice daily    (E78.5) Hyperlipidemia LDL goal <130    Patient currently on atorvastatin 40 mg daily.    The patient is ready to learn, no apparent learning barriers were identified.  Diagnosis and treatment plans were explained to the patient. The patient expressed understanding of the content. The patient asked appropriate questions. The patient questions were answered to her satisfaction.    Chart documentation with Dragon Voice recognition Software. Although reviewed after completion, some words and grammatical errors may remain.

## 2021-08-13 ENCOUNTER — MYC MEDICAL ADVICE (OUTPATIENT)
Dept: FAMILY MEDICINE | Facility: CLINIC | Age: 76
End: 2021-08-13

## 2021-09-19 ENCOUNTER — HEALTH MAINTENANCE LETTER (OUTPATIENT)
Age: 76
End: 2021-09-19

## 2021-10-08 ENCOUNTER — OFFICE VISIT (OUTPATIENT)
Dept: FAMILY MEDICINE | Facility: CLINIC | Age: 76
End: 2021-10-08
Payer: MEDICARE

## 2021-10-08 VITALS
SYSTOLIC BLOOD PRESSURE: 138 MMHG | WEIGHT: 180 LBS | HEIGHT: 60 IN | OXYGEN SATURATION: 94 % | TEMPERATURE: 97.7 F | HEART RATE: 84 BPM | BODY MASS INDEX: 35.34 KG/M2 | DIASTOLIC BLOOD PRESSURE: 86 MMHG | RESPIRATION RATE: 16 BRPM

## 2021-10-08 DIAGNOSIS — M79.672 LEFT FOOT PAIN: ICD-10-CM

## 2021-10-08 DIAGNOSIS — Z13.228 SCREENING FOR ENDOCRINE, METABOLIC AND IMMUNITY DISORDER: ICD-10-CM

## 2021-10-08 DIAGNOSIS — E66.01 SEVERE OBESITY (BMI 35.0-39.9) WITH COMORBIDITY (H): ICD-10-CM

## 2021-10-08 DIAGNOSIS — Z13.29 SCREENING FOR ENDOCRINE, METABOLIC AND IMMUNITY DISORDER: ICD-10-CM

## 2021-10-08 DIAGNOSIS — Z13.220 LIPID SCREENING: ICD-10-CM

## 2021-10-08 DIAGNOSIS — Z12.11 COLON CANCER SCREENING: ICD-10-CM

## 2021-10-08 DIAGNOSIS — Z13.0 SCREENING FOR ENDOCRINE, METABOLIC AND IMMUNITY DISORDER: ICD-10-CM

## 2021-10-08 DIAGNOSIS — L98.9 SKIN LESION: ICD-10-CM

## 2021-10-08 DIAGNOSIS — R35.1 NOCTURIA: Primary | ICD-10-CM

## 2021-10-08 LAB
ANION GAP SERPL CALCULATED.3IONS-SCNC: 3 MMOL/L (ref 3–14)
BUN SERPL-MCNC: 18 MG/DL (ref 7–30)
CALCIUM SERPL-MCNC: 9 MG/DL (ref 8.5–10.1)
CHLORIDE BLD-SCNC: 106 MMOL/L (ref 94–109)
CHOLEST SERPL-MCNC: 122 MG/DL
CO2 SERPL-SCNC: 30 MMOL/L (ref 20–32)
CREAT SERPL-MCNC: 0.7 MG/DL (ref 0.52–1.04)
FASTING STATUS PATIENT QL REPORTED: NORMAL
GFR SERPL CREATININE-BSD FRML MDRD: 86 ML/MIN/1.73M2
GLUCOSE BLD-MCNC: 97 MG/DL (ref 70–99)
HDLC SERPL-MCNC: 58 MG/DL
LDLC SERPL CALC-MCNC: 47 MG/DL
NONHDLC SERPL-MCNC: 64 MG/DL
POTASSIUM BLD-SCNC: 4.3 MMOL/L (ref 3.4–5.3)
SODIUM SERPL-SCNC: 139 MMOL/L (ref 133–144)
TRIGL SERPL-MCNC: 87 MG/DL

## 2021-10-08 PROCEDURE — 80048 BASIC METABOLIC PNL TOTAL CA: CPT | Performed by: FAMILY MEDICINE

## 2021-10-08 PROCEDURE — 36415 COLL VENOUS BLD VENIPUNCTURE: CPT | Performed by: FAMILY MEDICINE

## 2021-10-08 PROCEDURE — 80061 LIPID PANEL: CPT | Performed by: FAMILY MEDICINE

## 2021-10-08 PROCEDURE — 99214 OFFICE O/P EST MOD 30 MIN: CPT | Performed by: FAMILY MEDICINE

## 2021-10-08 RX ORDER — OXYBUTYNIN CHLORIDE 5 MG/1
5 TABLET, EXTENDED RELEASE ORAL DAILY
Qty: 30 TABLET | Refills: 1 | Status: SHIPPED | OUTPATIENT
Start: 2021-10-08 | End: 2021-11-01

## 2021-10-08 ASSESSMENT — MIFFLIN-ST. JEOR: SCORE: 1237.97

## 2021-10-08 NOTE — PROGRESS NOTES
Assessment & Plan     Nocturia  Trial ditropan. Follow-up if not improving or if worsening.  - oxybutynin ER (DITROPAN-XL) 5 MG 24 hr tablet; Take 1 tablet (5 mg) by mouth daily    Skin lesion  Sebaceous cyst keratoses of torso. Recommend follow-up with derm for facial lesion.  - Adult Dermatology Referral; Future    Left foot pain  Follow-up with podiatry.  - Orthopedic  Referral; Future    (BMI 35.0-39.9) with comorbidity (H)  See AVS    Lipid screening    - Lipid panel reflex to direct LDL Fasting; Future  - Lipid panel reflex to direct LDL Fasting    Screening for endocrine, metabolic and immunity disorder  - Basic metabolic panel; Future  - Basic metabolic panel    Colon cancer screening  - PANTERA([employer])             BMI:   Estimated body mass index is 35.15 kg/m  as calculated from the following:    Height as of this encounter: 1.524 m (5').    Weight as of this encounter: 81.6 kg (180 lb).   Weight management plan: See AVS        No follow-ups on file.    Audrey Roy MD  [facility]   Karma is a 76 year old who presents for the following health issues     HPI     Skin Lesion  Onset/Duration: years ago  Description  Location: #1- right side of torso near bra line, #2- left side of upper abdominal area, #3- right cheek  Color: brown  Border description: #3-irregular border, irregularly pigmented, raised, scaly  Character: all are round, raised  Itching: mild  Bleeding:  no  Intensity:  moderate  Progression of Symptoms:  same  Accompanying signs and symptoms:   Bleeding: no  Scaling: no  Excessive sun exposure/tanning: no  Sunscreen used: YES  History:           Any previous history of skin cancer: no  Any family history of melanoma: no  Previous episodes of similar lesion: no  Precipitating or alleviating factors: unknonwn  Therapies tried and outcome: none    Patient also has a concern with sleep issues.  Sh reports that she is  waking frequently at night to use the bathroom    Review of Systems   Constitutional, neuro, ENT, endocrine, pulmonary, cardiac, gastrointestinal, genitourinary, musculoskeletal, integument and psychiatric systems are negative, except as otherwise noted.       Objective    /86   Pulse 84   Temp 97.7  F (36.5  C) (Tympanic)   Resp 16   Ht 1.524 m (5')   Wt 81.6 kg (180 lb)   SpO2 94%   BMI 35.15 kg/m    Body mass index is 35.15 kg/m .  Physical Exam   GENERAL: Pleasant, well appearing female.  SKIN:  7mm ovoid skin lesion right cheek with rolled edges and irregular light tan color.  Seborrheic keratosis of torso.

## 2021-10-08 NOTE — PATIENT INSTRUCTIONS
Your BMI is Body mass index is Body mass index is 35.15 kg/m .     A BMI of 18.5 to 24.9 is in the healthy range. A person with a BMI of 25 to 29.9 is considered overweight, and someone with a BMI of 30 or greater is considered obese. More than two-thirds of American adults are considered overweight or obese.  Weight management is a personal decision.  If you are interested in exploring weight loss strategies, the following discussion covers the approaches that may be successful. Body mass index (BMI) is one way to tell whether you are at a healthy weight, overweight, or obese. It measures your weight in relation to your height.  Being overweight or obese increases the risk for further weight gain. Excess weight may lead to heart disease and diabetes.  Creating and following plans for healthy eating and physical activity may help you improve your health.  Weight control is part of healthy lifestyle and includes exercise, emotional health, and healthy eating habits. Careful eating habits lifelong are the mainstay of weight control. Though there are significant health benefits from weight loss, long-term weight loss with diet alone may be very difficult to achieve- studies show long-term success with dietary management alone in less than 10% of people. Attaining a healthy weight may be especially difficult to achieve in those with severe obesity. In some cases, medications, devices and surgical management might be considered.  What can you do?    Keep a food journal to help with mindful eating and finding ways to modify your diet.      Reduce the amount of processed food in your diet. Focus on adding vegetables, and lean proteins.    Reduce dietary carbohydrates. Limiting to  gm of carbohydrates per day has been shows to help boost weight loss.  If you have diabetes or are on diabetic medications do not do this without talking to your physician or healthcare provider.    Diet combined with exercise helps  maintain muscle while optimizing fat loss. Strength training is particularly important for building and maintaining muscle mass. Exercise helps reduce stress, increase energy, and improves fitness. Increasing exercise without diet control, however, may not burn enough calories to loose weight.         Start walking three days a week 10-20 minutes at a time    Work towards walking thirty minutes five days a week      Eventually, increase the speed of your walking for 1-2 minutes at time    In addition, we recommend that you review healthy lifestyles and methods for weight loss available through the National Institutes of Health patient information sites:  http://win.niddk.nih.gov/publications/index.htm    Also look into health and wellness programs that may be available through your health insurance provider, employer, local community center, or [employer].

## 2021-10-11 ENCOUNTER — OFFICE VISIT (OUTPATIENT)
Dept: PODIATRY | Facility: CLINIC | Age: 76
End: 2021-10-11
Payer: MEDICARE

## 2021-10-11 VITALS
WEIGHT: 180 LBS | HEIGHT: 60 IN | BODY MASS INDEX: 35.34 KG/M2 | HEART RATE: 80 BPM | DIASTOLIC BLOOD PRESSURE: 78 MMHG | SYSTOLIC BLOOD PRESSURE: 133 MMHG

## 2021-10-11 DIAGNOSIS — M79.672 LEFT FOOT PAIN: ICD-10-CM

## 2021-10-11 DIAGNOSIS — G89.29 CHRONIC FOOT PAIN, LEFT: Primary | ICD-10-CM

## 2021-10-11 DIAGNOSIS — M20.12 HALLUX VALGUS, LEFT: ICD-10-CM

## 2021-10-11 DIAGNOSIS — M19.072 OSTEOARTHRITIS OF MIDTARSAL JOINT OF LEFT FOOT: ICD-10-CM

## 2021-10-11 DIAGNOSIS — M79.672 CHRONIC FOOT PAIN, LEFT: Primary | ICD-10-CM

## 2021-10-11 PROCEDURE — 99203 OFFICE O/P NEW LOW 30 MIN: CPT | Performed by: PODIATRIST

## 2021-10-11 ASSESSMENT — PAIN SCALES - GENERAL: PAINLEVEL: SEVERE PAIN (7)

## 2021-10-11 ASSESSMENT — MIFFLIN-ST. JEOR: SCORE: 1237.97

## 2021-10-11 NOTE — NURSING NOTE
Chief Complaint   Patient presents with     Consult     left foot pain, XR taken 06/28/21       Initial /78   Pulse 80   Ht 1.524 m (5')   Wt 81.6 kg (180 lb)   BMI 35.15 kg/m   Estimated body mass index is 35.15 kg/m  as calculated from the following:    Height as of this encounter: 1.524 m (5').    Weight as of this encounter: 81.6 kg (180 lb).  Medications and allergies reviewed.      Karen RAUSCH MA

## 2021-10-11 NOTE — LETTER
10/11/2021         RE: Chika Hurd  [id number]  [facility] [address]        Dear Colleague,    Thank you for referring your patient, Chika Hurd, to the [facility] [place]. Please see a copy of my visit note below.    PATIENT HISTORY:  Chika Hurd is a 76 year old female who presents to clinic in consultation at the request of  Audrey Roy M.D. with a chief complaint of left foot pain.  The patient is seen by themselves.  The patient relates the pain is primarily located around the started in the arch of foot. Moved into the top of foot with on and off pain.  Reports insidious onset without acute precipitating event. that has been going on for 5 month(s).  The patient has previously tried cast which helped with relief.  Denies any prior history of similar issues..  The patient is retired.  Any previous notes and studies that pertain to the patient's condition were reviewed.    Pertinent medical, surgical and family history was reviewed in Epic chart and include gastroesophageal reflux disease, breast cancer    Medications:   Current Outpatient Medications:      acetaminophen (TYLENOL) 500 MG tablet, Take 500-1,000 mg by mouth 2 times daily as needed for mild pain, Disp: , Rfl:      Ascorbic Acid (VITAMIN C PO), Take 500 mg by mouth daily, Disp: , Rfl:      ASPIRIN PO, Take 325 mg by mouth daily , Disp: , Rfl:      CALCIUM + D OR, 1 TABLET DAILY, Disp: , Rfl:      co-enzyme Q-10 (CO Q10) 100 MG CAPS, Take 2 capsules by mouth daily. Takes a total of 200 mg daily., Disp: , Rfl: 0     famotidine (PEPCID) 20 MG tablet, TAKE 1 TABLET (20 MG) BY MOUTH 2 TIMES DAILY, Disp: 180 tablet, Rfl: 1     Glycerin-Polysorbate 80 (REFRESH DRY EYE THERAPY OP), Apply 1-2 drops to eye as needed Reported on 5/23/2017, Disp: , Rfl:      MULTIVITAMIN OR, 1 daily, Disp: , Rfl:      Omega-3 Fatty Acids (OMEGA-3 FISH OIL PO), Take 1 g by mouth daily , Disp: , Rfl:       oxybutynin ER (DITROPAN-XL) 5 MG 24 hr tablet, Take 1 tablet (5 mg) by mouth daily, Disp: 30 tablet, Rfl: 1     atorvastatin (LIPITOR) 40 MG tablet, TAKE 1 TABLET EVERY OTHER DAY ALTERNATING WITH 1/2 TABLET EVERY OTHER DAY, Disp: 68 tablet, Rfl: 1     Allergies:    Allergies   Allergen Reactions     Cortisone Swelling     Cortisone Cream       Vitals: /78   Pulse 80   Ht 1.524 m (5')   Wt 81.6 kg (180 lb)   BMI 35.15 kg/m    BMI= Body mass index is 35.15 kg/m .    LOWER EXTREMITY PHYSICAL EXAM    Dermatologic: Skin is intact to left lower extremity without significant lesions, rash or abrasion.        Vascular: DP & PT pulses are intact & regular on the left.   CFT and skin temperature is normal to the left lower extremity.     Neurologic: Lower extremity sensation is intact to light touch.  No evidence of weakness in the left lower extremity.        Musculoskeletal: Patient is ambulatory without assistive device or brace.  No gross ankle deformity noted.  No foot or ankle joint effusion is noted.  Noted moderate bunion deformity on the left.  Noted pain on palpation over the second tarsometatarsal joint on the left foot.    Diagnostics:  Radiographs included three views of the left foot demonstrating mild bunion deformity with degenerative changes noted to the second tarsometatarsal joint.  Calcaneal spurring noted.  No cortical erosions or periosteal elevation.  All joint margins appear stable.  There is no apparent fracture or tumor formation noted.  There is no evidence of foreign body.  The images were independently reviewed by myself along with the patient explaining the findings.      ASSESSMENT / PLAN:     ICD-10-CM    1. Chronic foot pain, left  M79.672     G89.29    2. Left foot pain  M79.672 Orthopedic  Referral   3. Hallux valgus, left  M20.12 Orthotics [facility] Prosthetics DME   4. 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  We discussed the underlying contributing factors to the condition as well as treatment options along with expected length of recovery.  At this time, the patient was educated on the importance of offloading supportive shoes and other devices.  The patient was prescribed custom orthotics that will aid in offloading the tension forces to the soft tissues and prevent further inflammation.   The patient will return in four weeks for reevaluation if the symptoms do not resolve.      Chika verbalized agreement with and understanding of the rational for the diagnosis and treatment plan.  All questions were answered to best of my ability and the patient's satisfaction. The patient was advised to contact the clinic with any questions that may arise after the clinic visit.      Disclaimer: This note consists of symbols derived from keyboarding, dictation and/or voice recognition software. As a result, there may be errors in the script that have gone undetected. Please consider this when interpreting information found in this chart.       KAYLI Chadwick D.P.M., F.A.C.F.A.S.        Again, thank you for allowing me to participate in the care of your patient.        Sincerely,        Hill Chadwick DPM

## 2021-10-11 NOTE — PROGRESS NOTES
PATIENT HISTORY:  Chika Hurd is a 76 year old female who presents to clinic in consultation at the request of  Audrey Roy M.D. with a chief complaint of left foot pain.  The patient is seen by themselves.  The patient relates the pain is primarily located around the started in the arch of foot. Moved into the top of foot with on and off pain.  Reports insidious onset without acute precipitating event. that has been going on for 5 month(s).  The patient has previously tried cast which helped with relief.  Denies any prior history of similar issues..  The patient is retired.  Any previous notes and studies that pertain to the patient's condition were reviewed.    Pertinent medical, surgical and family history was reviewed in Epic chart and include gastroesophageal reflux disease, breast cancer    Medications:   Current Outpatient Medications:      acetaminophen (TYLENOL) 500 MG tablet, Take 500-1,000 mg by mouth 2 times daily as needed for mild pain, Disp: , Rfl:      Ascorbic Acid (VITAMIN C PO), Take 500 mg by mouth daily, Disp: , Rfl:      ASPIRIN PO, Take 325 mg by mouth daily , Disp: , Rfl:      CALCIUM + D OR, 1 TABLET DAILY, Disp: , Rfl:      co-enzyme Q-10 (CO Q10) 100 MG CAPS, Take 2 capsules by mouth daily. Takes a total of 200 mg daily., Disp: , Rfl: 0     famotidine (PEPCID) 20 MG tablet, TAKE 1 TABLET (20 MG) BY MOUTH 2 TIMES DAILY, Disp: 180 tablet, Rfl: 1     Glycerin-Polysorbate 80 (REFRESH DRY EYE THERAPY OP), Apply 1-2 drops to eye as needed Reported on 5/23/2017, Disp: , Rfl:      MULTIVITAMIN OR, 1 daily, Disp: , Rfl:      Omega-3 Fatty Acids (OMEGA-3 FISH OIL PO), Take 1 g by mouth daily , Disp: , Rfl:      oxybutynin ER (DITROPAN-XL) 5 MG 24 hr tablet, Take 1 tablet (5 mg) by mouth daily, Disp: 30 tablet, Rfl: 1     atorvastatin (LIPITOR) 40 MG tablet, TAKE 1 TABLET EVERY OTHER DAY ALTERNATING WITH 1/2 TABLET EVERY OTHER DAY, Disp: 68 tablet, Rfl: 1     Allergies:     Allergies   Allergen Reactions     Cortisone Swelling     Cortisone Cream       Vitals: /78   Pulse 80   Ht 1.524 m (5')   Wt 81.6 kg (180 lb)   BMI 35.15 kg/m    BMI= Body mass index is 35.15 kg/m .    LOWER EXTREMITY PHYSICAL EXAM    Dermatologic: Skin is intact to left lower extremity without significant lesions, rash or abrasion.        Vascular: DP & PT pulses are intact & regular on the left.   CFT and skin temperature is normal to the left lower extremity.     Neurologic: Lower extremity sensation is intact to light touch.  No evidence of weakness in the left lower extremity.        Musculoskeletal: Patient is ambulatory without assistive device or brace.  No gross ankle deformity noted.  No foot or ankle joint effusion is noted.  Noted moderate bunion deformity on the left.  Noted pain on palpation over the second tarsometatarsal joint on the left foot.    Diagnostics:  Radiographs included three views of the left foot demonstrating mild bunion deformity with degenerative changes noted to the second tarsometatarsal joint.  Calcaneal spurring noted.  No cortical erosions or periosteal elevation.  All joint margins appear stable.  There is no apparent fracture or tumor formation noted.  There is no evidence of foreign body.  The images were independently reviewed by myself along with the patient explaining the findings.      ASSESSMENT / PLAN:     ICD-10-CM    1. Chronic foot pain, left  M79.672     G89.29    2. Left foot pain  M79.672 Orthopedic  Referral   3. Hallux valgus, left  M20.12 Orthotics [facility] Prosthetics DME   4. 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  We discussed the underlying contributing factors to the condition as well as treatment options along with expected length of recovery.  At this time, the patient was educated on the importance of offloading supportive shoes and  other devices.  The patient was prescribed custom orthotics that will aid in offloading the tension forces to the soft tissues and prevent further inflammation.   The patient will return in four weeks for reevaluation if the symptoms do not resolve.      Chika verbalized agreement with and understanding of the rational for the diagnosis and treatment plan.  All questions were answered to best of my ability and the patient's satisfaction. The patient was advised to contact the clinic with any questions that may arise after the clinic visit.      Disclaimer: This note consists of symbols derived from keyboarding, dictation and/or voice recognition software. As a result, there may be errors in the script that have gone undetected. Please consider this when interpreting information found in this chart.       KAYLI Chadwick D.P.M., F.A.C.F.A.S.

## 2021-10-11 NOTE — PATIENT INSTRUCTIONS
Next Steps:      1. Support:  a. Wear supportive shoes, sandals, boots and/or inserts that have a rigid supportive sole.    i. This will offload the majority of tension forces that travel through your feet every step you take.    1. Skechers Max Cushioning Elite/Premier   2. Skechers Relax Fit D'Lux Walker  3. Reebok Walk Ultra 7 DMX MAX   4. Hoka Bondi walking shoes  5. Superfeet inserts ([email or web address].com)  b. It is important that you also wear supportive shoe wear in the house to continue providing support to your feet.    c. You may always use a cushioned liner for your shoes if that makes your feet feel better.  2. Stretching  a. Calf stretching is essential to offload the tension forces that travel through your feet every step you take  b. Preferred calf stretch is the Runner's Stretch  i. Place one foot behind the other foot, flat against the ground (it is important to keep the heel on the ground).  The back leg is the one that will be stretched.  1. Start with the knee straight and lean your hips into the wall, counter or whatever you are leaning into - count to ten.  2. Next, bend the knee.  You should feel the stretch lower in the calf muscle - count to ten.  c. Repeat this stretch once an hour to start off with.  When symptoms subside, I recommend performing the stretch 3 times daily to prevent any future problems.                3. Tissue Massage  a. It is important that you physically loosen the inflammation tissue to help your body heal the injured tissue.  b. I recommend soaking your foot in warm water to increase the microcirculation to the soft tissues.  You may add Epson salt to the water if you prefer.  c. You may apply an over-the-counter muscle rub, such as Icy Hot roll-on, and deeply massage the injured tissue.  4. Reduce Inflammation  a. You can ice the injured tissue with an ice pack with a light cloth covering or soaking in ice water 20 minutes to reduce any acute inflammation, typically at  the end of the day.  b. If tolerated, you may take a Non-Steroidal Antiinflammatory medication (NSAID), such as Advil or Aleve, to help reduce the inflammation tissue.  This can help the overall healing of the injured tissue.  i. It is important to take food with any NSAID medication as the most common side effect is stomach irritation.  If you encounter any problems when taking NSAID, it is recommended that you stop taking the medication and notify your provider.    It is important to understand that most problems that develop in the foot and ankle are caused by excessive tension that cause microinjury to the soft tissues and inflammation in the foot and ankle.  By addressing the underlying causes with support and stretching as well as treating the current inflammatory conditions with tissue massage and anti-inflammatory treatments, most foot and ankle musculoskeletal conditions will resolve.  This may take time to heal.  However, if symptoms persist past 4 weeks you should return to the office for reevaluation to determine further treatment options.

## 2021-10-14 DIAGNOSIS — H34.11 CENTRAL RETINAL ARTERY OCCLUSION OF RIGHT EYE: ICD-10-CM

## 2021-10-15 RX ORDER — ATORVASTATIN CALCIUM 40 MG/1
TABLET, FILM COATED ORAL
Qty: 68 TABLET | Refills: 1 | Status: SHIPPED | OUTPATIENT
Start: 2021-10-15 | End: 2022-04-19

## 2021-10-21 PROBLEM — E66.01 MORBID OBESITY (H): Status: ACTIVE | Noted: 2021-10-21

## 2021-10-28 LAB — COLOGUARD-ABSTRACT: POSITIVE

## 2021-11-12 ENCOUNTER — TELEPHONE (OUTPATIENT)
Dept: OTHER | Facility: CLINIC | Age: 76
End: 2021-11-12
Payer: MEDICARE

## 2021-11-12 DIAGNOSIS — R19.5 POSITIVE COLORECTAL CANCER SCREENING USING COLOGUARD TEST: Primary | ICD-10-CM

## 2021-11-12 DIAGNOSIS — Z11.59 ENCOUNTER FOR SCREENING FOR OTHER VIRAL DISEASES: ICD-10-CM

## 2021-11-12 NOTE — TELEPHONE ENCOUNTER
[facility]    Who is the name of the provider?:  Naida      What is the location you see this provider at?: [facility]    Reason for call:  Scheduled for colonoscopy 12/8.  Gastro wants her to hold aspirin, needs to know if ok and for how long.     Can we leave a detailed message on this number?  YES

## 2021-11-12 NOTE — RESULT ENCOUNTER NOTE
Please call the patient with the results. Notify that cologuard is positive. Needs colonoscopy for further evaluation and management. Order placed. Otherwise labs look good.

## 2021-11-18 NOTE — TELEPHONE ENCOUNTER
"Per LOV 3/5/19 note:  \"I think it is reasonable to stop the Plavix now. I have asked pt to start taking a adult dose aspirin daily.\"  \"Unless there are any changes or recurrent episodes she does not need further follow-up with vascular surgery.\"    LI Concepcion, RN  [facility]  Office:  [phone] Fax: [phone]           "

## 2021-11-23 NOTE — TELEPHONE ENCOUNTER
Called pt back, left vm, explained okay for aspirin hold for colonoscopy.     LI Concepcion, RN  [facility]  Office:  [phone] Fax: [phone]

## 2021-11-29 ENCOUNTER — MYC MEDICAL ADVICE (OUTPATIENT)
Dept: OTHER | Facility: CLINIC | Age: 76
End: 2021-11-29
Payer: MEDICARE

## 2021-11-30 ENCOUNTER — MYC MEDICAL ADVICE (OUTPATIENT)
Dept: FAMILY MEDICINE | Facility: CLINIC | Age: 76
End: 2021-11-30
Payer: MEDICARE

## 2021-12-01 ENCOUNTER — IMMUNIZATION (OUTPATIENT)
Dept: FAMILY MEDICINE | Facility: CLINIC | Age: 76
End: 2021-12-01
Payer: MEDICARE

## 2021-12-01 PROCEDURE — 91306 COVID-19,PF,MODERNA (18+ YRS BOOSTER .25ML): CPT

## 2021-12-01 PROCEDURE — 0064A COVID-19,PF,MODERNA (18+ YRS BOOSTER .25ML): CPT

## 2021-12-02 ENCOUNTER — OFFICE VISIT (OUTPATIENT)
Dept: DERMATOLOGY | Facility: CLINIC | Age: 76
End: 2021-12-02
Attending: FAMILY MEDICINE
Payer: MEDICARE

## 2021-12-02 VITALS — OXYGEN SATURATION: 98 % | HEART RATE: 84 BPM | DIASTOLIC BLOOD PRESSURE: 80 MMHG | SYSTOLIC BLOOD PRESSURE: 178 MMHG

## 2021-12-02 DIAGNOSIS — L82.1 SEBORRHEIC KERATOSIS: Primary | ICD-10-CM

## 2021-12-02 PROCEDURE — 99202 OFFICE O/P NEW SF 15 MIN: CPT | Performed by: PHYSICIAN ASSISTANT

## 2021-12-02 NOTE — LETTER
12/2/2021         RE: Chika Hurd  [address]  [facility] [address]        Dear Colleague,    Thank you for referring your patient, Chika Hurd, to the [facility]. Please see a copy of my visit note below.    HPI:   Chief complaints: Chika Hurd is a pleasant 76 year old female who presents for evaluation of a spot on the right cheek. The spot has been present for years but recently it has changed. No history of skin CA      PHYSICAL EXAM:    BP (!) 178/80 (BP Location: Right arm, Patient Position: Sitting, Cuff Size: Adult Large)   Pulse 84   SpO2 98%   Skin exam performed as follows: Type 2 skin. Mood appropriate  Alert and Oriented X 3. Well developed, well nourished in no distress.  General appearance: Normal  Head including face: Normal  Eyes: conjunctiva and lids: Normal  Mouth: Lips, teeth, gums: Normal  Neck: Normal  Skin: Scalp and body hair: See below    Keratotic plaque on the right cheek    ASSESSMENT/PLAN:     1. Seborrheic keratosis on the right cheek - advised benign no treatment needed  2. Scattered SKs on the face, neck - no treatment needed.   3. Karma to follow up with Primary Care provider regarding elevated blood pressure.        Follow-up: PRN  CC:   Scribed By: Victoria Wakefield MS, PATANIA          Again, thank you for allowing me to participate in the care of your patient.        Sincerely,        Victoria Wakefield PA-C

## 2021-12-02 NOTE — NURSING NOTE
Chief Complaint   Patient presents with     Derm Problem     spot check of right cheek       Vitals:    12/02/21 1033   BP: (!) 178/80   BP Location: Right arm   Patient Position: Sitting   Cuff Size: Adult Large   Pulse: 84   SpO2: 98%     Wt Readings from Last 1 Encounters:   10/11/21 81.6 kg (180 lb)       Mayte Padilla LPN .................12/2/2021

## 2021-12-02 NOTE — PROGRESS NOTES
HPI:   Chief complaints: Chika Hurd is a pleasant 76 year old female who presents for evaluation of a spot on the right cheek. The spot has been present for years but recently it has changed. No history of skin CA      PHYSICAL EXAM:    BP (!) 178/80 (BP Location: Right arm, Patient Position: Sitting, Cuff Size: Adult Large)   Pulse 84   SpO2 98%   Skin exam performed as follows: Type 2 skin. Mood appropriate  Alert and Oriented X 3. Well developed, well nourished in no distress.  General appearance: Normal  Head including face: Normal  Eyes: conjunctiva and lids: Normal  Mouth: Lips, teeth, gums: Normal  Neck: Normal  Skin: Scalp and body hair: See below    Keratotic plaque on the right cheek    ASSESSMENT/PLAN:     1. Seborrheic keratosis on the right cheek - advised benign no treatment needed  2. Scattered SKs on the face, neck - no treatment needed.   3. Karma to follow up with Primary Care provider regarding elevated blood pressure.        Follow-up: PRN  CC:   Scribed By: Victoria Wakefield MS, PATANIA

## 2021-12-06 ENCOUNTER — LAB (OUTPATIENT)
Dept: LAB | Facility: CLINIC | Age: 76
End: 2021-12-06
Payer: MEDICARE

## 2021-12-06 DIAGNOSIS — Z11.59 ENCOUNTER FOR SCREENING FOR OTHER VIRAL DISEASES: ICD-10-CM

## 2021-12-06 LAB — SARS-COV-2 RNA RESP QL NAA+PROBE: NEGATIVE

## 2021-12-06 PROCEDURE — U0003 INFECTIOUS AGENT DETECTION BY NUCLEIC ACID (DNA OR RNA); SEVERE ACUTE RESPIRATORY SYNDROME CORONAVIRUS 2 (SARS-COV-2) (CORONAVIRUS DISEASE [COVID-19]), AMPLIFIED PROBE TECHNIQUE, MAKING USE OF HIGH THROUGHPUT TECHNOLOGIES AS DESCRIBED BY CMS-2020-01-R: HCPCS

## 2021-12-06 PROCEDURE — U0005 INFEC AGEN DETEC AMPLI PROBE: HCPCS

## 2021-12-07 ENCOUNTER — ANESTHESIA EVENT (OUTPATIENT)
Dept: GASTROENTEROLOGY | Facility: CLINIC | Age: 76
End: 2021-12-07
Payer: MEDICARE

## 2021-12-07 ASSESSMENT — LIFESTYLE VARIABLES: TOBACCO_USE: 1

## 2021-12-08 ENCOUNTER — HOSPITAL ENCOUNTER (OUTPATIENT)
Facility: CLINIC | Age: 76
Discharge: HOME | End: 2021-12-08
Attending: SURGERY | Admitting: SURGERY
Payer: MEDICARE

## 2021-12-08 ENCOUNTER — ANESTHESIA (OUTPATIENT)
Dept: GASTROENTEROLOGY | Facility: CLINIC | Age: 76
End: 2021-12-08
Payer: MEDICARE

## 2021-12-08 VITALS
HEART RATE: 68 BPM | WEIGHT: 180 LBS | SYSTOLIC BLOOD PRESSURE: 178 MMHG | BODY MASS INDEX: 35.34 KG/M2 | RESPIRATION RATE: 15 BRPM | DIASTOLIC BLOOD PRESSURE: 79 MMHG | HEIGHT: 60 IN | OXYGEN SATURATION: 95 % | TEMPERATURE: 98 F

## 2021-12-08 LAB — COLONOSCOPY: NORMAL

## 2021-12-08 PROCEDURE — 88305 TISSUE EXAM BY PATHOLOGIST: CPT | Mod: TC | Performed by: SURGERY

## 2021-12-08 PROCEDURE — 250N000011 HC RX IP 250 OP 636: Performed by: NURSE ANESTHETIST, CERTIFIED REGISTERED

## 2021-12-08 PROCEDURE — 45385 COLONOSCOPY W/LESION REMOVAL: CPT | Performed by: SURGERY

## 2021-12-08 PROCEDURE — 258N000003 HC RX IP 258 OP 636: Performed by: SURGERY

## 2021-12-08 PROCEDURE — 250N000009 HC RX 250: Performed by: NURSE ANESTHETIST, CERTIFIED REGISTERED

## 2021-12-08 PROCEDURE — 370N000017 HC ANESTHESIA TECHNICAL FEE, PER MIN: Performed by: SURGERY

## 2021-12-08 PROCEDURE — 250N000009 HC RX 250: Performed by: SURGERY

## 2021-12-08 PROCEDURE — 45380 COLONOSCOPY AND BIOPSY: CPT | Performed by: SURGERY

## 2021-12-08 RX ORDER — NALOXONE HYDROCHLORIDE 0.4 MG/ML
0.4 INJECTION, SOLUTION INTRAMUSCULAR; INTRAVENOUS; SUBCUTANEOUS
Status: CANCELLED | OUTPATIENT
Start: 2021-12-08

## 2021-12-08 RX ORDER — PROPOFOL 10 MG/ML
INJECTION, EMULSION INTRAVENOUS CONTINUOUS PRN
Status: DISCONTINUED | OUTPATIENT
Start: 2021-12-08 | End: 2021-12-08

## 2021-12-08 RX ORDER — GLYCOPYRROLATE 0.2 MG/ML
INJECTION, SOLUTION INTRAMUSCULAR; INTRAVENOUS PRN
Status: DISCONTINUED | OUTPATIENT
Start: 2021-12-08 | End: 2021-12-08

## 2021-12-08 RX ORDER — LIDOCAINE HYDROCHLORIDE 10 MG/ML
INJECTION, SOLUTION INFILTRATION; PERINEURAL PRN
Status: DISCONTINUED | OUTPATIENT
Start: 2021-12-08 | End: 2021-12-08

## 2021-12-08 RX ORDER — SODIUM CHLORIDE, SODIUM LACTATE, POTASSIUM CHLORIDE, CALCIUM CHLORIDE 600; 310; 30; 20 MG/100ML; MG/100ML; MG/100ML; MG/100ML
INJECTION, SOLUTION INTRAVENOUS CONTINUOUS
Status: DISCONTINUED | OUTPATIENT
Start: 2021-12-08 | End: 2021-12-08 | Stop reason: HOSPADM

## 2021-12-08 RX ORDER — LIDOCAINE 40 MG/G
CREAM TOPICAL
Status: DISCONTINUED | OUTPATIENT
Start: 2021-12-08 | End: 2021-12-08 | Stop reason: HOSPADM

## 2021-12-08 RX ORDER — NALOXONE HYDROCHLORIDE 0.4 MG/ML
0.2 INJECTION, SOLUTION INTRAMUSCULAR; INTRAVENOUS; SUBCUTANEOUS
Status: CANCELLED | OUTPATIENT
Start: 2021-12-08

## 2021-12-08 RX ORDER — PROPOFOL 10 MG/ML
INJECTION, EMULSION INTRAVENOUS PRN
Status: DISCONTINUED | OUTPATIENT
Start: 2021-12-08 | End: 2021-12-08

## 2021-12-08 RX ORDER — ONDANSETRON 2 MG/ML
4 INJECTION INTRAMUSCULAR; INTRAVENOUS
Status: DISCONTINUED | OUTPATIENT
Start: 2021-12-08 | End: 2021-12-08 | Stop reason: HOSPADM

## 2021-12-08 RX ORDER — FLUMAZENIL 0.1 MG/ML
0.2 INJECTION, SOLUTION INTRAVENOUS
Status: CANCELLED | OUTPATIENT
Start: 2021-12-08 | End: 2021-12-09

## 2021-12-08 RX ADMIN — GLYCOPYRROLATE 0.1 MG: 0.2 INJECTION, SOLUTION INTRAMUSCULAR; INTRAVENOUS at 14:27

## 2021-12-08 RX ADMIN — LIDOCAINE HYDROCHLORIDE 0.1 ML: 10 INJECTION, SOLUTION EPIDURAL; INFILTRATION; INTRACAUDAL; PERINEURAL at 12:38

## 2021-12-08 RX ADMIN — SODIUM CHLORIDE, POTASSIUM CHLORIDE, SODIUM LACTATE AND CALCIUM CHLORIDE 1000 ML: 600; 310; 30; 20 INJECTION, SOLUTION INTRAVENOUS at 12:37

## 2021-12-08 RX ADMIN — LIDOCAINE HYDROCHLORIDE 50 MG: 10 INJECTION, SOLUTION INFILTRATION; PERINEURAL at 14:27

## 2021-12-08 RX ADMIN — PROPOFOL 30 MG: 10 INJECTION, EMULSION INTRAVENOUS at 14:29

## 2021-12-08 RX ADMIN — PROPOFOL 200 MCG/KG/MIN: 10 INJECTION, EMULSION INTRAVENOUS at 14:27

## 2021-12-08 RX ADMIN — GLYCOPYRROLATE 0.1 MG: 0.2 INJECTION, SOLUTION INTRAMUSCULAR; INTRAVENOUS at 14:25

## 2021-12-08 ASSESSMENT — MIFFLIN-ST. JEOR
SCORE: 1237.97
SCORE: 1237.97

## 2021-12-08 NOTE — ANESTHESIA CARE TRANSFER NOTE
Patient: Chika Hurd    Procedure: Procedure(s):  COLONOSCOPY, WITH POLYPECTOMY AND BIOPSY       Diagnosis: Positive colorectal cancer screening using Cologuard test [R19.5]  Diagnosis Additional Information: No value filed.    Anesthesia Type:   General     Note:    Oropharynx: oropharynx clear of all foreign objects and spontaneously breathing  Level of Consciousness: awake  Oxygen Supplementation: room air    Independent Airway: airway patency satisfactory and stable  Dentition: dentition unchanged  Vital Signs Stable: post-procedure vital signs reviewed and stable  Report to RN Given: handoff report given  Patient transferred to: Phase II    Handoff Report: Identifed the Patient, Identified the Reponsible Provider, Reviewed the pertinent medical history, Discussed the surgical course, Reviewed Intra-OP anesthesia mangement and issues during anesthesia, Set expectations for post-procedure period and Allowed opportunity for questions and acknowledgement of understanding      Vitals:  Vitals Value Taken Time   BP     Temp     Pulse     Resp     SpO2 98 % 12/08/21 1452   Vitals shown include unvalidated device data.    Electronically Signed By: IDALIA Ortiz CRNA  December 8, 2021  2:53 PM

## 2021-12-08 NOTE — ANESTHESIA POSTPROCEDURE EVALUATION
Patient: Chika Hurd    Procedure: Procedure(s):  COLONOSCOPY, WITH POLYPECTOMY AND BIOPSY       Diagnosis:Positive colorectal cancer screening using Cologuard test [R19.5]  Diagnosis Additional Information: No value filed.    Anesthesia Type:  General    Note:  Disposition: Outpatient   Postop Pain Control: Uneventful            Sign Out: Well controlled pain   PONV: No   Neuro/Psych: Uneventful            Sign Out: Acceptable/Baseline neuro status   Airway/Respiratory: Uneventful            Sign Out: Acceptable/Baseline resp. status   CV/Hemodynamics: Uneventful            Sign Out: Acceptable CV status; No obvious hypovolemia; No obvious fluid overload   Other NRE: NONE   DID A NON-ROUTINE EVENT OCCUR? No           Last vitals:  Vitals Value Taken Time   BP     Temp     Pulse     Resp     SpO2 100 % 12/08/21 1501   Vitals shown include unvalidated device data.    Electronically Signed By: IDALIA Ortiz CRNA  December 8, 2021  3:03 PM

## 2021-12-08 NOTE — H&P
76 year old year old female here for colonoscopy for positive cologarud.  Last colonoscopy was 2009, negative.  Patient denies blood in stool or change in stool caliber.  There is no known family history of colon cancer or polyps.    Patient Active Problem List   Diagnosis     Hyperlipidemia LDL goal <130     SENSONRL HEAR LOSS,BILAT     GERD (gastroesophageal reflux disease)     Cataract right eye     Advanced directives, counseling/discussion     Ductal carcinoma in situ (DCIS) of left breast     Central retinal artery occlusion, right     Carcinoma of left breast in female, estrogen receptor positive, unspecified site of breast (H)     Elevated blood pressure reading without diagnosis of hypertension     Morbid obesity (H)       Past Medical History:   Diagnosis Date     Central retinal artery occlusion, right      DCIS (ductal carcinoma in situ) of breast      GERD (gastroesophageal reflux disease)      Hyperlipidemia        Past Surgical History:   Procedure Laterality Date     ENT SURGERY      dental implants 03/12 started     FLEXIBLE SIGMOIDOSCOPY  04/03     LUMPECTOMY BREAST WITH SEED LOCALIZATION Left 9/21/2016    Procedure: LUMPECTOMY BREAST WITH SEED LOCALIZATION;  Surgeon: Russel Murry MD;  Location: [facility] OR     PHACOEMULSIFICATION WITH STANDARD INTRAOCULAR LENS IMPLANT Right 11/6/2017    Procedure: PHACOEMULSIFICATION WITH STANDARD INTRAOCULAR LENS IMPLANT;  Right cataract removal with implant;  Surgeon: Michael Zuleta MD;  Location: [facility]     PHACOEMULSIFICATION WITH STANDARD INTRAOCULAR LENS IMPLANT Left 11/27/2017    Procedure: PHACOEMULSIFICATION WITH STANDARD INTRAOCULAR LENS IMPLANT;  Left cataract removal with implant;  Surgeon: Michael Zuleta MD;  Location: [facility] OR     SURGICAL HISTORY OF -   1959    Right Hip Pinning     SURGICAL HISTORY OF -   1983    Tubal Ligation       Family History   Problem Relation Age of Onset     Heart Disease Mother      Heart Disease Father       Circulatory Father         main artery aneursym     Heart Disease Sister         sleep problems     Breast Cancer Maternal Aunt         diagnosed in 60's, surviving in 90's     Breast Cancer Cousin      Breast Cancer Cousin      Skin Cancer No family hx of        No current outpatient medications on file.       Allergies   Allergen Reactions     Cortisone Swelling     Cortisone Cream       Pt reports that she quit smoking about 16 years ago. Her smoking use included cigarettes. She has never used smokeless tobacco. She reports current alcohol use. She reports that she does not use drugs.    Exam:  BP (!) 140/68   Temp 98  F (36.7  C) (Oral)   Resp 16   Ht 1.524 m (5')   Wt 81.6 kg (180 lb)   SpO2 95%   BMI 35.15 kg/m      Awake, Alert OX3  Lungs - CTA bilaterally  CV - RRR, no murmurs, distal pulses intact  Abd - soft, non-distended, non-tender, +BS  Extr - No cyanosis or edema    A/P 76 year old year old female in need of colonoscopy for positive cologuard. Risks, benefits, alternatives, and complications were discussed including the possibility of perforation, bleeding, missed lesion and the patient agreed to proceed    Lawrence Ortega DO on 12/8/2021 at 1:37 PM

## 2021-12-08 NOTE — ANESTHESIA PREPROCEDURE EVALUATION
Anesthesia Pre-Procedure Evaluation    Patient: Chika Hurd   MRN: [id number] : 1945        Preoperative Diagnosis: Positive colorectal cancer screening using Cologuard test [R19.5]    Procedure : Procedure(s):  COLONOSCOPY          Past Medical History:   Diagnosis Date     Central retinal artery occlusion, right      DCIS (ductal carcinoma in situ) of breast      GERD (gastroesophageal reflux disease)      Hyperlipidemia       Past Surgical History:   Procedure Laterality Date     ENT SURGERY      dental implants  started     FLEXIBLE SIGMOIDOSCOPY       LUMPECTOMY BREAST WITH SEED LOCALIZATION Left 2016    Procedure: LUMPECTOMY BREAST WITH SEED LOCALIZATION;  Surgeon: Russel Murry MD;  Location: [facility] OR     PHACOEMULSIFICATION WITH STANDARD INTRAOCULAR LENS IMPLANT Right 2017    Procedure: PHACOEMULSIFICATION WITH STANDARD INTRAOCULAR LENS IMPLANT;  Right cataract removal with implant;  Surgeon: Michael Zuleta MD;  Location: [facility]     PHACOEMULSIFICATION WITH STANDARD INTRAOCULAR LENS IMPLANT Left 2017    Procedure: PHACOEMULSIFICATION WITH STANDARD INTRAOCULAR LENS IMPLANT;  Left cataract removal with implant;  Surgeon: Michael Zuleta MD;  Location: [facility] OR     SURGICAL HISTORY OF -       Right Hip Pinning     SURGICAL HISTORY OF -       Tubal Ligation      Allergies   Allergen Reactions     Cortisone Swelling     Cortisone Cream      Social History     Tobacco Use     Smoking status: Former Smoker     Types: Cigarettes     Quit date: 2005     Years since quittin.9     Smokeless tobacco: Never Used   Substance Use Topics     Alcohol use: Yes     Comment: rarely      Wt Readings from Last 1 Encounters:   10/11/21 81.6 kg (180 lb)        Anesthesia Evaluation   Pt has had prior anesthetic. Type: General and MAC.    No history of anesthetic complications       ROS/MED HX  ENT/Pulmonary: Comment: [place]  R retinal artery occlusion    (+) JAMES  risk factors, hypertension, obese, tobacco use, Current use,     Neurologic:  - neg neurologic ROS     Cardiovascular:     (+) Dyslipidemia hypertension-----    METS/Exercise Tolerance:     Hematologic:  - neg hematologic  ROS     Musculoskeletal:  - neg musculoskeletal ROS     GI/Hepatic:     (+) GERD, Asymptomatic on medication, bowel prep,     Renal/Genitourinary:  - neg Renal ROS     Endo: Comment: Morbid obesity    (+) Obesity,     Psychiatric/Substance Use:  - neg psychiatric ROS     Infectious Disease:  - neg infectious disease ROS     Malignancy:   (+) Malignancy, History of Breast.Breast CA status post Surgery.        Other:  - neg other ROS          Physical Exam    Airway        Mallampati: II   TM distance: > 3 FB   Neck ROM: full   Mouth opening: > 3 cm    Respiratory Devices and Support         Dental  no notable dental history         Cardiovascular   cardiovascular exam normal          Pulmonary   pulmonary exam normal                OUTSIDE LABS:  CBC:   Lab Results   Component Value Date    WBC 8.3 07/09/2021    WBC 12.5 (H) 12/20/2018    HGB 14.4 07/09/2021    HGB 13.4 12/20/2018    HCT 45.3 07/09/2021    HCT 40.7 12/20/2018     07/09/2021     12/20/2018     BMP:   Lab Results   Component Value Date     10/08/2021     07/09/2021    POTASSIUM 4.3 10/08/2021    POTASSIUM 4.7 07/09/2021    CHLORIDE 106 10/08/2021    CHLORIDE 105 07/09/2021    CO2 30 10/08/2021    CO2 29 07/09/2021    BUN 18 10/08/2021    BUN 13 07/09/2021    CR 0.70 10/08/2021    CR 0.77 07/09/2021    GLC 97 10/08/2021    GLC 97 07/09/2021     COAGS:   Lab Results   Component Value Date    PTT 29 07/28/2016    INR 0.94 07/28/2016    FIBR 440 (H) 07/28/2016     POC: No results found for: BGM, HCG, HCGS  HEPATIC:   Lab Results   Component Value Date    ALBUMIN 3.7 07/09/2021    PROTTOTAL 7.4 07/09/2021    ALT 33 07/09/2021    AST 24 07/09/2021    ALKPHOS 91 07/09/2021    BILITOTAL 0.7 07/09/2021     OTHER:    Lab Results   Component Value Date    A1C 5.4 09/12/2016    KAR 9.0 10/08/2021    MAG 2.4 (H) 06/02/2011    TSH 0.51 03/09/2018    T4 1.05 04/09/2004    CRP 7.6 12/20/2018    SED 13 12/20/2018       Anesthesia Plan    ASA Status:  3   NPO Status:  NPO Appropriate    Anesthesia Type: General.     - Airway: Native airway      Maintenance: Balanced.        Consents    Anesthesia Plan(s) and associated risks, benefits, and realistic alternatives discussed. Questions answered and patient/representative(s) expressed understanding.    - Discussed:     - Discussed with:  Patient         Postoperative Care            Comments:                Alie Kim, CRNA, APRN CRNA

## 2021-12-10 LAB
PATH REPORT.COMMENTS IMP SPEC: NORMAL
PATH REPORT.COMMENTS IMP SPEC: NORMAL
PATH REPORT.FINAL DX SPEC: NORMAL
PATH REPORT.GROSS SPEC: NORMAL
PATH REPORT.MICROSCOPIC SPEC OTHER STN: NORMAL
PATH REPORT.RELEVANT HX SPEC: NORMAL
PHOTO IMAGE: NORMAL

## 2021-12-10 PROCEDURE — 88305 TISSUE EXAM BY PATHOLOGIST: CPT | Mod: 26 | Performed by: PATHOLOGY

## 2022-03-04 DIAGNOSIS — R35.1 NOCTURIA: ICD-10-CM

## 2022-03-07 RX ORDER — OXYBUTYNIN CHLORIDE 5 MG/1
TABLET, EXTENDED RELEASE ORAL
Qty: 90 TABLET | Refills: 1 | Status: SHIPPED | OUTPATIENT
Start: 2022-03-07 | End: 2022-09-09

## 2022-04-18 DIAGNOSIS — H34.11 CENTRAL RETINAL ARTERY OCCLUSION OF RIGHT EYE: ICD-10-CM

## 2022-04-19 RX ORDER — ATORVASTATIN CALCIUM 40 MG/1
TABLET, FILM COATED ORAL
Qty: 68 TABLET | Refills: 1 | Status: SHIPPED | OUTPATIENT
Start: 2022-04-19 | End: 2022-10-25

## 2022-07-13 ENCOUNTER — LAB (OUTPATIENT)
Dept: LAB | Facility: CLINIC | Age: 77
End: 2022-07-13
Attending: INTERNAL MEDICINE
Payer: MEDICARE

## 2022-07-13 ENCOUNTER — HOSPITAL ENCOUNTER (OUTPATIENT)
Dept: MAMMOGRAPHY | Facility: CLINIC | Age: 77
Discharge: HOME | End: 2022-07-13
Attending: INTERNAL MEDICINE
Payer: MEDICARE

## 2022-07-13 ENCOUNTER — PATIENT OUTREACH (OUTPATIENT)
Dept: ONCOLOGY | Facility: CLINIC | Age: 77
End: 2022-07-13

## 2022-07-13 DIAGNOSIS — D05.12 DUCTAL CARCINOMA IN SITU (DCIS) OF LEFT BREAST: ICD-10-CM

## 2022-07-13 DIAGNOSIS — D05.12 DUCTAL CARCINOMA IN SITU (DCIS) OF LEFT BREAST: Primary | ICD-10-CM

## 2022-07-13 DIAGNOSIS — Z12.31 ENCOUNTER FOR SCREENING MAMMOGRAM FOR MALIGNANT NEOPLASM OF BREAST: ICD-10-CM

## 2022-07-13 LAB
ALBUMIN SERPL-MCNC: 3.6 G/DL (ref 3.4–5)
ALP SERPL-CCNC: 88 U/L (ref 40–150)
ALT SERPL W P-5'-P-CCNC: 29 U/L (ref 0–50)
ANION GAP SERPL CALCULATED.3IONS-SCNC: 4 MMOL/L (ref 3–14)
AST SERPL W P-5'-P-CCNC: 21 U/L (ref 0–45)
BASOPHILS # BLD AUTO: 0.1 10E3/UL (ref 0–0.2)
BASOPHILS NFR BLD AUTO: 1 %
BILIRUB SERPL-MCNC: 0.5 MG/DL (ref 0.2–1.3)
BUN SERPL-MCNC: 12 MG/DL (ref 7–30)
CALCIUM SERPL-MCNC: 9.4 MG/DL (ref 8.5–10.1)
CHLORIDE BLD-SCNC: 106 MMOL/L (ref 94–109)
CO2 SERPL-SCNC: 29 MMOL/L (ref 20–32)
CREAT SERPL-MCNC: 0.6 MG/DL (ref 0.52–1.04)
EOSINOPHIL # BLD AUTO: 0.3 10E3/UL (ref 0–0.7)
EOSINOPHIL NFR BLD AUTO: 3 %
ERYTHROCYTE [DISTWIDTH] IN BLOOD BY AUTOMATED COUNT: 13.7 % (ref 10–15)
GFR SERPL CREATININE-BSD FRML MDRD: >90 ML/MIN/1.73M2
GLUCOSE BLD-MCNC: 90 MG/DL (ref 70–99)
HCT VFR BLD AUTO: 40.3 % (ref 35–47)
HGB BLD-MCNC: 13 G/DL (ref 11.7–15.7)
IMM GRANULOCYTES # BLD: 0 10E3/UL
IMM GRANULOCYTES NFR BLD: 0 %
LYMPHOCYTES # BLD AUTO: 1.7 10E3/UL (ref 0.8–5.3)
LYMPHOCYTES NFR BLD AUTO: 16 %
MCH RBC QN AUTO: 30.1 PG (ref 26.5–33)
MCHC RBC AUTO-ENTMCNC: 32.3 G/DL (ref 31.5–36.5)
MCV RBC AUTO: 93 FL (ref 78–100)
MONOCYTES # BLD AUTO: 1.1 10E3/UL (ref 0–1.3)
MONOCYTES NFR BLD AUTO: 10 %
NEUTROPHILS # BLD AUTO: 7.5 10E3/UL (ref 1.6–8.3)
NEUTROPHILS NFR BLD AUTO: 70 %
NRBC # BLD AUTO: 0 10E3/UL
NRBC BLD AUTO-RTO: 0 /100
PLATELET # BLD AUTO: 305 10E3/UL (ref 150–450)
POTASSIUM BLD-SCNC: 4.8 MMOL/L (ref 3.4–5.3)
PROT SERPL-MCNC: 7.4 G/DL (ref 6.8–8.8)
RBC # BLD AUTO: 4.32 10E6/UL (ref 3.8–5.2)
SODIUM SERPL-SCNC: 139 MMOL/L (ref 133–144)
WBC # BLD AUTO: 10.7 10E3/UL (ref 4–11)

## 2022-07-13 PROCEDURE — 77067 SCR MAMMO BI INCL CAD: CPT

## 2022-07-13 PROCEDURE — 85025 COMPLETE CBC W/AUTO DIFF WBC: CPT

## 2022-07-13 PROCEDURE — 36415 COLL VENOUS BLD VENIPUNCTURE: CPT

## 2022-07-13 PROCEDURE — 80053 COMPREHEN METABOLIC PANEL: CPT

## 2022-07-18 ENCOUNTER — ONCOLOGY VISIT (OUTPATIENT)
Dept: ONCOLOGY | Facility: CLINIC | Age: 77
End: 2022-07-18
Attending: FAMILY MEDICINE
Payer: MEDICARE

## 2022-07-18 VITALS — TEMPERATURE: 98.2 F | DIASTOLIC BLOOD PRESSURE: 82 MMHG | SYSTOLIC BLOOD PRESSURE: 188 MMHG

## 2022-07-18 DIAGNOSIS — D05.12 DUCTAL CARCINOMA IN SITU (DCIS) OF LEFT BREAST: Primary | ICD-10-CM

## 2022-07-18 DIAGNOSIS — R03.0 ELEVATED BLOOD PRESSURE READING WITHOUT DIAGNOSIS OF HYPERTENSION: ICD-10-CM

## 2022-07-18 PROCEDURE — G0463 HOSPITAL OUTPT CLINIC VISIT: HCPCS

## 2022-07-18 PROCEDURE — 99214 OFFICE O/P EST MOD 30 MIN: CPT | Performed by: NURSE PRACTITIONER

## 2022-07-18 NOTE — PROGRESS NOTES
Oncology Rooming Note    July 18, 2022 10:53 AM   Chika Hurd is a 77 year old female who presents for:    No chief complaint on file.    Initial Vitals: There were no vitals taken for this visit. Estimated body mass index is 35.15 kg/m  as calculated from the following:    Height as of 12/8/21: 1.524 m (5').    Weight as of 12/8/21: 81.6 kg (180 lb). There is no height or weight on file to calculate BSA.  Data Unavailable Comment: Data Unavailable   No LMP recorded. Patient is postmenopausal.  Allergies reviewed: Yes  Medications reviewed: Yes    Medications: Medication refills not needed today.  Pharmacy name entered into [id number] [place] [address]    Clinical concerns: Fell going up the stairs 2-3 weeks ago, hit the tile floor and right shoulder, hip and knee painful, right ankle and foot swell, trouble going up steps due to pain.Ana Zepeda was notified.      Sandi iSmon RN

## 2022-07-18 NOTE — PATIENT INSTRUCTIONS
Check BP at home 2-3 days/week. F/up on BP with PCP in a few weeks    Does not require ongoing follow-up in Oncology. Graduate routine follow-up  Continue annual mammograms and breast exam through PCP

## 2022-07-18 NOTE — LETTER
7/18/2022         RE: Chika Hurd  [address]  [facility] [id number]        Dear Colleague,    Thank you for referring your patient, Chika Hurd, to the [facility] [place]. Please see a copy of my visit note below.    [facility] Hematology and Oncology Outpatient Progress Note    Patient: Chika Hurd  MRN: [id number]  Date of Service: Jul 18, 2022          Reason for Visit    1. 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 She opted against endocrine treatment.  Recent mammogram negative. Clinically, no breast concerns on exam. Stable superior medial left breast nodule stabel and previously assessed as a benign cyst in 2019.    Plan:  -Continue annual screening mammograms and clinical breast exams. This can be done with PCP now that she is >5yrs from her diagnosis/treatment    2.  Elevated BP  BP was high in office today on a few different checks (188/82 after resting). Appears it was high in December, 2021 as well. No prior diagnosis of HTN. No symptoms.     Plan:  -Check BP at home a few days/week and update PCP    3.  Right hip pain s/p fall  Tripped and fell on her right side a few weeks ago. Did not seek medical attention, but symptoms are improving with time.  ______________________________________________________________________________    History of Present Illness/ Interval History    Ms. Chika Hurd  is a 77 year old treated for a small ER/[place]+ DCIS in left breast via lumpectomy and adjuvant RT 6 years ago. She opted against endocrine therapy after weighing risks/benefits.  Returns for annual visit. Is up to date on annual mammograms.    No new breast concerns. She has had a chronic/stable left upper medial breast nodule. This was assessed with serial breast US in 2019 and concluded to be a benign cyst. Chronic bilateral generalized breast tenderness.    She tripped on  her carpet a few weeks ago, fell on her right side. This resulted in right hip and leg pain. Pain is improving with time, she's ambulatory on it.    ECOG Performance    0        Oncology History/Treatment  Diagnosis/Stage:   7/2016: Left breast DCIS (gr II , ER/[place]+)  -routine mammogram: 0.4 cm microcalcification L breast 12-1:00 position.   -stereotactic biopsy: high-grade DCIS with comedonecrosis and microcalcifications, indeterminate grade with solid and cribriform pattern. No invasive carcinoma. No ALI. %, [place] 60-70%.   -lumpectomy surgical path: 1.4 cm grade II DCIS with fibrocystic changes    Treatment:  9/2016: lumpectomy (left)    11/2016: adjuvant RT    Opted against adjuvant AI       Physical Exam    GENERAL: Alert and oriented to time place and person. Seated comfortably. In no distress. BP recheck 188/82  HEAD: Atraumatic and normocephalic. No alopecia.  EYES: WESLEY, EOMI. No erythema. No icterus.  LYMPH NODES: No palpable supraclavicular, cervical, axillary lymphadenopathy.  BREASTS: Right breast without abnormality. 1 cm left upper superior breast (inferior to collar bone) nodule palpate when in seated position (cannot located in supine position). Changes of lumpectomy and RT fibrosis in left breast.  CHEST: clear to auscultation bilaterally. Resonant to percussion throughout bilaterally. Symmetrical breath movements bilaterally.  CVS: RRR  ABDOMEN: Soft. Not tender. Not distended. No palpable hepatomegaly or splenomegaly. No other mass palpable. Bowel sounds present.  EXTREMITIES: Warm. No peripheral edema.  SKIN: no rash, or bruising or purpura.   NEURO: No gross deficit noted. Non-antalgic gait.      Lab Results    Recent Results (from the past 168 hour(s))   Comprehensive metabolic panel   Result Value Ref Range    Sodium 139 133 - 144 mmol/L    Potassium 4.8 3.4 - 5.3 mmol/L    Chloride 106 94 - 109 mmol/L    Carbon Dioxide (CO2) 29 20 - 32 mmol/L    Anion Gap 4 3 - 14 mmol/L    Urea Nitrogen  12 7 - 30 mg/dL    Creatinine 0.60 0.52 - 1.04 mg/dL    Calcium 9.4 8.5 - 10.1 mg/dL    Glucose 90 70 - 99 mg/dL    Alkaline Phosphatase 88 40 - 150 U/L    AST 21 0 - 45 U/L    ALT 29 0 - 50 U/L    Protein Total 7.4 6.8 - 8.8 g/dL    Albumin 3.6 3.4 - 5.0 g/dL    Bilirubin Total 0.5 0.2 - 1.3 mg/dL    GFR Estimate >90 >60 mL/min/1.73m2   CBC with platelets and differential   Result Value Ref Range    WBC Count 10.7 4.0 - 11.0 10e3/uL    RBC Count 4.32 3.80 - 5.20 10e6/uL    Hemoglobin 13.0 11.7 - 15.7 g/dL    Hematocrit 40.3 35.0 - 47.0 %    MCV 93 78 - 100 fL    MCH 30.1 26.5 - 33.0 pg    MCHC 32.3 31.5 - 36.5 g/dL    RDW 13.7 10.0 - 15.0 %    Platelet Count 305 150 - 450 10e3/uL    % Neutrophils 70 %    % Lymphocytes 16 %    % Monocytes 10 %    % Eosinophils 3 %    % Basophils 1 %    % Immature Granulocytes 0 %    NRBCs per 100 WBC 0 <1 /100    Absolute Neutrophils 7.5 1.6 - 8.3 10e3/uL    Absolute Lymphocytes 1.7 0.8 - 5.3 10e3/uL    Absolute Monocytes 1.1 0.0 - 1.3 10e3/uL    Absolute Eosinophils 0.3 0.0 - 0.7 10e3/uL    Absolute Basophils 0.1 0.0 - 0.2 10e3/uL    Absolute Immature Granulocytes 0.0 <=0.4 10e3/uL    Absolute NRBCs 0.0 10e3/uL       Imaging    MA Screen Bilateral w/Richi    Result Date: 7/13/2022  BILATERAL FULL FIELD DIGITAL SCREENING MAMMOGRAM WITH TOMOSYNTHESIS Performed on: 7/13/22 Compared to: 07/09/2021, 07/08/2020, 05/30/2019, and 07/26/2018 Technique:  This study was evaluated with the assistance of Computer-Aided Detection.  Breast Tomosynthesis was used in interpretation. Findings: The breasts have scattered areas of fibroglandular density.  There are breast conservation changes in the left breast. There is no radiographic evidence of malignancy. IMPRESSION: ACR BI-RADS Category 2: Benign RECOMMENDED FOLLOW-UP: Annual routine screening mammogram The results and recommendations of this examination will be communicated to the patient.       Billing  Total time 30 minutes, to include face to  face visit, review of EMR, ordering, documentation and coordination of care on date of service    Signed by: Ana Zepeda NP    Oncology Rooming Note    July 18, 2022 10:53 AM   Chika Hurd is a 77 year old female who presents for:    No chief complaint on file.    Initial Vitals: There were no vitals taken for this visit. Estimated body mass index is 35.15 kg/m  as calculated from the following:    Height as of 12/8/21: 1.524 m (5').    Weight as of 12/8/21: 81.6 kg (180 lb). There is no height or weight on file to calculate BSA.  Data Unavailable Comment: Data Unavailable   No LMP recorded. Patient is postmenopausal.  Allergies reviewed: Yes  Medications reviewed: Yes    Medications: Medication refills not needed today.  Pharmacy [facility] into [id number] [place] [address]    Clinical concerns: Fell going up the stairs 2-3 weeks ago, hit the tile floor and right shoulder, hip and knee painful, right ankle and foot swell, trouble going up steps due to pain.Ana Zepeda was notified.      Sandi Simon RN                    Again, thank you for allowing me to participate in the care of your patient.        Sincerely,        Ana Zepeda NP

## 2022-07-18 NOTE — PROGRESS NOTES
[facility] Hematology and Oncology Outpatient Progress Note    Patient: Chika Hurd  MRN: [id number]  Date of Service: Jul 18, 2022          Reason for Visit    1. 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 She opted against endocrine treatment.  Recent mammogram negative. Clinically, no breast concerns on exam. Stable superior medial left breast nodule stabel and previously assessed as a benign cyst in 2019.    Plan:  -Continue annual screening mammograms and clinical breast exams. This can be done with PCP now that she is >5yrs from her diagnosis/treatment    2.  Elevated BP  BP was high in office today on a few different checks (188/82 after resting). Appears it was high in December, 2021 as well. No prior diagnosis of HTN. No symptoms.     Plan:  -Check BP at home a few days/week and update PCP    3.  Right hip pain s/p fall  Tripped and fell on her right side a few weeks ago. Did not seek medical attention, but symptoms are improving with time.  ______________________________________________________________________________    History of Present Illness/ Interval History    Ms. Chika Hurd  is a 77 year old treated for a small ER/[place]+ DCIS in left breast via lumpectomy and adjuvant RT 6 years ago. She opted against endocrine therapy after weighing risks/benefits.  Returns for annual visit. Is up to date on annual mammograms.    No new breast concerns. She has had a chronic/stable left upper medial breast nodule. This was assessed with serial breast US in 2019 and concluded to be a benign cyst. Chronic bilateral generalized breast tenderness.    She tripped on her carpet a few weeks ago, fell on her right side. This resulted in right hip and leg pain. Pain is improving with time, she's ambulatory on it.    ECOG Performance    0        Oncology History/Treatment  Diagnosis/Stage:   7/2016: Left breast DCIS (gr II ,  ER/[place]+)  -routine mammogram: 0.4 cm microcalcification L breast 12-1:00 position.   -stereotactic biopsy: high-grade DCIS with comedonecrosis and microcalcifications, indeterminate grade with solid and cribriform pattern. No invasive carcinoma. No ALI. %, [place] 60-70%.   -lumpectomy surgical path: 1.4 cm grade II DCIS with fibrocystic changes    Treatment:  9/2016: lumpectomy (left)    11/2016: adjuvant RT    Opted against adjuvant AI       Physical Exam    GENERAL: Alert and oriented to time place and person. Seated comfortably. In no distress. BP recheck 188/82  HEAD: Atraumatic and normocephalic. No alopecia.  EYES: WESLEY, EOMI. No erythema. No icterus.  LYMPH NODES: No palpable supraclavicular, cervical, axillary lymphadenopathy.  BREASTS: Right breast without abnormality. 1 cm left upper superior breast (inferior to collar bone) nodule palpate when in seated position (cannot located in supine position). Changes of lumpectomy and RT fibrosis in left breast.  CHEST: clear to auscultation bilaterally. Resonant to percussion throughout bilaterally. Symmetrical breath movements bilaterally.  CVS: RRR  ABDOMEN: Soft. Not tender. Not distended. No palpable hepatomegaly or splenomegaly. No other mass palpable. Bowel sounds present.  EXTREMITIES: Warm. No peripheral edema.  SKIN: no rash, or bruising or purpura.   NEURO: No gross deficit noted. Non-antalgic gait.      Lab Results    Recent Results (from the past 168 hour(s))   Comprehensive metabolic panel   Result Value Ref Range    Sodium 139 133 - 144 mmol/L    Potassium 4.8 3.4 - 5.3 mmol/L    Chloride 106 94 - 109 mmol/L    Carbon Dioxide (CO2) 29 20 - 32 mmol/L    Anion Gap 4 3 - 14 mmol/L    Urea Nitrogen 12 7 - 30 mg/dL    Creatinine 0.60 0.52 - 1.04 mg/dL    Calcium 9.4 8.5 - 10.1 mg/dL    Glucose 90 70 - 99 mg/dL    Alkaline Phosphatase 88 40 - 150 U/L    AST 21 0 - 45 U/L    ALT 29 0 - 50 U/L    Protein Total 7.4 6.8 - 8.8 g/dL    Albumin 3.6 3.4 - 5.0  g/dL    Bilirubin Total 0.5 0.2 - 1.3 mg/dL    GFR Estimate >90 >60 mL/min/1.73m2   CBC with platelets and differential   Result Value Ref Range    WBC Count 10.7 4.0 - 11.0 10e3/uL    RBC Count 4.32 3.80 - 5.20 10e6/uL    Hemoglobin 13.0 11.7 - 15.7 g/dL    Hematocrit 40.3 35.0 - 47.0 %    MCV 93 78 - 100 fL    MCH 30.1 26.5 - 33.0 pg    MCHC 32.3 31.5 - 36.5 g/dL    RDW 13.7 10.0 - 15.0 %    Platelet Count 305 150 - 450 10e3/uL    % Neutrophils 70 %    % Lymphocytes 16 %    % Monocytes 10 %    % Eosinophils 3 %    % Basophils 1 %    % Immature Granulocytes 0 %    NRBCs per 100 WBC 0 <1 /100    Absolute Neutrophils 7.5 1.6 - 8.3 10e3/uL    Absolute Lymphocytes 1.7 0.8 - 5.3 10e3/uL    Absolute Monocytes 1.1 0.0 - 1.3 10e3/uL    Absolute Eosinophils 0.3 0.0 - 0.7 10e3/uL    Absolute Basophils 0.1 0.0 - 0.2 10e3/uL    Absolute Immature Granulocytes 0.0 <=0.4 10e3/uL    Absolute NRBCs 0.0 10e3/uL       Imaging    MA Screen Bilateral w/Richi    Result Date: 7/13/2022  BILATERAL FULL FIELD DIGITAL SCREENING MAMMOGRAM WITH TOMOSYNTHESIS Performed on: 7/13/22 Compared to: 07/09/2021, 07/08/2020, 05/30/2019, and 07/26/2018 Technique:  This study was evaluated with the assistance of Computer-Aided Detection.  Breast Tomosynthesis was used in interpretation. Findings: The breasts have scattered areas of fibroglandular density.  There are breast conservation changes in the left breast. There is no radiographic evidence of malignancy. IMPRESSION: ACR BI-RADS Category 2: Benign RECOMMENDED FOLLOW-UP: Annual routine screening mammogram The results and recommendations of this examination will be communicated to the patient.       Billing  Total time 30 minutes, to include face to face visit, review of EMR, ordering, documentation and coordination of care on date of service    Signed by: Ana Zepeda NP

## 2022-07-26 ENCOUNTER — OFFICE VISIT (OUTPATIENT)
Dept: FAMILY MEDICINE | Facility: CLINIC | Age: 77
End: 2022-07-26
Payer: MEDICARE

## 2022-07-26 VITALS
SYSTOLIC BLOOD PRESSURE: 138 MMHG | TEMPERATURE: 99 F | RESPIRATION RATE: 16 BRPM | OXYGEN SATURATION: 95 % | BODY MASS INDEX: 35.34 KG/M2 | HEIGHT: 60 IN | DIASTOLIC BLOOD PRESSURE: 90 MMHG | WEIGHT: 180 LBS | HEART RATE: 70 BPM

## 2022-07-26 DIAGNOSIS — Z17.0 CARCINOMA OF LEFT BREAST IN FEMALE, ESTROGEN RECEPTOR POSITIVE, UNSPECIFIED SITE OF BREAST (H): ICD-10-CM

## 2022-07-26 DIAGNOSIS — I10 BENIGN ESSENTIAL HYPERTENSION: Primary | ICD-10-CM

## 2022-07-26 DIAGNOSIS — C50.912 CARCINOMA OF LEFT BREAST IN FEMALE, ESTROGEN RECEPTOR POSITIVE, UNSPECIFIED SITE OF BREAST (H): ICD-10-CM

## 2022-07-26 PROBLEM — E66.01 MORBID OBESITY (H): Status: RESOLVED | Noted: 2021-10-21 | Resolved: 2022-07-26

## 2022-07-26 LAB — TSH SERPL DL<=0.005 MIU/L-ACNC: 0.72 MU/L (ref 0.4–4)

## 2022-07-26 PROCEDURE — 84443 ASSAY THYROID STIM HORMONE: CPT | Performed by: FAMILY MEDICINE

## 2022-07-26 PROCEDURE — 99214 OFFICE O/P EST MOD 30 MIN: CPT | Performed by: FAMILY MEDICINE

## 2022-07-26 PROCEDURE — 36415 COLL VENOUS BLD VENIPUNCTURE: CPT | Performed by: FAMILY MEDICINE

## 2022-07-26 RX ORDER — LISINOPRIL 10 MG/1
10 TABLET ORAL DAILY
Qty: 90 TABLET | Refills: 3 | Status: SHIPPED | OUTPATIENT
Start: 2022-07-26 | End: 2022-08-17

## 2022-07-26 ASSESSMENT — PATIENT HEALTH QUESTIONNAIRE - PHQ9
SUM OF ALL RESPONSES TO PHQ QUESTIONS 1-9: 4
10. IF YOU CHECKED OFF ANY PROBLEMS, HOW DIFFICULT HAVE THESE PROBLEMS MADE IT FOR YOU TO DO YOUR WORK, TAKE CARE OF THINGS AT HOME, OR GET ALONG WITH OTHER PEOPLE: NOT DIFFICULT AT ALL
SUM OF ALL RESPONSES TO PHQ QUESTIONS 1-9: 4

## 2022-07-26 ASSESSMENT — PAIN SCALES - GENERAL: PAINLEVEL: NO PAIN (0)

## 2022-07-26 NOTE — PATIENT INSTRUCTIONS
Start on Lisinopril 10 mg daily.   Follow up in 2-3 weeks for nurse blood pressure check and lab check in 2 weeks.     Try and follow low salt diet       HOW TO CHECK YOUR BLOOD PRESSURE AT HOME:      Avoid eating, smoking, and exercising for at least 30 minutes before taking a reading.     Be sure you have taken your BP medication at least 2-3 hours before you check it.      Sit quietly for 10 minutes before a reading.      Sit in a chair with your feet flat on the floor. Rest your  arm on a table so that the arm cuff is at the same level as your heart.     Remain still during the reading.     Record your blood pressure and pulse in a log and bring to your next appointment.

## 2022-07-26 NOTE — PROGRESS NOTES
Assessment & Plan     Benign essential hypertension  BP elevated 138/90. Currently asymptomatic. BP elevated in the past as well. Will check TSH, BMP. Start on Lisinopril 10 mg daily. Check BP at home. Low salt diet, weight loss, exercise. Follow up in 2-3 weeks for nurse BP check and repeat BMP.   - TSH with free T4 reflex  - lisinopril (ZESTRIL) 10 MG tablet  Dispense: 90 tablet; Refill: 3  - Basic metabolic panel  (Ca, Cl, CO2, Creat, Gluc, K, Na, BUN)    Carcinoma of left breast in female, estrogen receptor positive, unspecified site of breast (H)  Known issue that I take into account for their medical decisions; no current exacerbations or new concerns.     The risks, benefits and treatment options of prescribed medications or other treatments have been discussed with the patient. The patient verbalized their understanding and should call or follow up if no improvement or if they develop further problems.    Follow up 2-3 weeks for nurse BP check and labs.       Siddhartha Lara [facility]    Anatoliy Parada is a 77 year old, presenting for the following health issues:  Hypertension      History of Present Illness       Reason for visit:  Blood pressure high  Symptom onset:  More than a month  Symptoms include:  Is having some dental work done including sedation for tooth implant  Symptom intensity:  Moderate  Symptom progression:  Worsening  Had these symptoms before:  Yes  Has tried/received treatment for these symptoms:  No  What makes it worse:  Stress  What makes it better:  None    She eats 2-3 servings of fruits and vegetables daily.She consumes 0 sweetened beverage(s) daily.She exercises with enough effort to increase her heart rate 9 or less minutes per day.  She exercises with enough effort to increase her heart rate 3 or less days per week.   She is taking medications regularly.    Today's PHQ-9         PHQ-9 Total Score: 4    PHQ-9 Q9 Thoughts of better off  "dead/self-harm past 2 weeks :   Not at all    How difficult have these problems made it for you to do your work, take care of things at home, or get along with other people: Not difficult at all     77 year old female who presents to clinic for blood pressure concerns.   Hypertension Follow-up      Do you check your blood pressure regularly outside of the clinic? Yes     Are you following a low salt diet? No    Are your blood pressures ever more than 140 on the top number (systolic) OR more   than 90 on the bottom number (diastolic), for example 140/90? Yes    No prior blood pressure medications.   No active chest pain or shortness of breath.   No exertional chest pain.   No vision changes  No headaches  Does not add salt to things.   Caffeine: coffee 4-6 cups per day  Alcohol: Rare   Tobacco: No tobacco use.       Review of Systems   Constitutional, HEENT, cardiovascular, pulmonary, gi and gu systems are negative, except as otherwise noted.      Objective    BP (!) 138/90 (BP Location: Left arm, Patient Position: Chair, Cuff Size: Adult Regular)   Pulse 70   Temp 99  F (37.2  C) (Tympanic)   Resp 16   Ht 1.533 m (5' 0.34\")   Wt 81.6 kg (180 lb)   LMP  (LMP Unknown)   SpO2 95%   BMI 34.76 kg/m    Body mass index is 34.76 kg/m .  Physical Exam   General: alert, cooperative, no acute distress   CV: RRR, no murmur  Resp: non-labored breathing, clear to auscultation, no wheezing or rales   Abdomen: Soft, non-tender, no guarding.   Extremities: No peripheral edema, calves non-tender.         .  ..  "

## 2022-08-17 ENCOUNTER — ALLIED HEALTH/NURSE VISIT (OUTPATIENT)
Dept: FAMILY MEDICINE | Facility: CLINIC | Age: 77
End: 2022-08-17

## 2022-08-17 ENCOUNTER — TELEPHONE (OUTPATIENT)
Dept: FAMILY MEDICINE | Facility: CLINIC | Age: 77
End: 2022-08-17

## 2022-08-17 ENCOUNTER — LAB (OUTPATIENT)
Dept: LAB | Facility: CLINIC | Age: 77
End: 2022-08-17
Payer: MEDICARE

## 2022-08-17 VITALS — DIASTOLIC BLOOD PRESSURE: 80 MMHG | HEART RATE: 77 BPM | SYSTOLIC BLOOD PRESSURE: 142 MMHG

## 2022-08-17 DIAGNOSIS — I10 BENIGN ESSENTIAL HYPERTENSION: ICD-10-CM

## 2022-08-17 DIAGNOSIS — Z01.30 BLOOD PRESSURE CHECK: Primary | ICD-10-CM

## 2022-08-17 LAB
ANION GAP SERPL CALCULATED.3IONS-SCNC: 5 MMOL/L (ref 3–14)
BUN SERPL-MCNC: 15 MG/DL (ref 7–30)
CALCIUM SERPL-MCNC: 9.2 MG/DL (ref 8.5–10.1)
CHLORIDE BLD-SCNC: 103 MMOL/L (ref 94–109)
CO2 SERPL-SCNC: 30 MMOL/L (ref 20–32)
CREAT SERPL-MCNC: 0.61 MG/DL (ref 0.52–1.04)
GFR SERPL CREATININE-BSD FRML MDRD: >90 ML/MIN/1.73M2
GLUCOSE BLD-MCNC: 94 MG/DL (ref 70–99)
POTASSIUM BLD-SCNC: 4.1 MMOL/L (ref 3.4–5.3)
SODIUM SERPL-SCNC: 138 MMOL/L (ref 133–144)

## 2022-08-17 PROCEDURE — 99207 PR NO CHARGE NURSE ONLY: CPT

## 2022-08-17 PROCEDURE — 80048 BASIC METABOLIC PNL TOTAL CA: CPT

## 2022-08-17 PROCEDURE — 36415 COLL VENOUS BLD VENIPUNCTURE: CPT

## 2022-08-17 RX ORDER — LISINOPRIL 10 MG/1
20 TABLET ORAL DAILY
Qty: 90 TABLET | Refills: 3
Start: 2022-08-17 | End: 2022-09-06

## 2022-08-17 NOTE — NURSING NOTE
Karma presents to clinic for a blood pressure check. Today's first reading is 142/80 with pulse of 77 and second reading is 149/79 with pulse of 82. Her home readings she states have been 140's/70's. She is taking her medication as prescribed and is feeling just fine on them including the added lisinopril 10 mg.  I will forward this to Dr Lara for his review.    Mendy Farias RN

## 2022-08-17 NOTE — TELEPHONE ENCOUNTER
BP elevated. Please call patient and and have her increase lisinopril to 20 mg daily and follow-up in clinic with myself in 2 to 3 weeks.

## 2022-09-06 ENCOUNTER — OFFICE VISIT (OUTPATIENT)
Dept: FAMILY MEDICINE | Facility: CLINIC | Age: 77
End: 2022-09-06
Payer: MEDICARE

## 2022-09-06 VITALS
HEART RATE: 77 BPM | DIASTOLIC BLOOD PRESSURE: 68 MMHG | OXYGEN SATURATION: 94 % | WEIGHT: 177 LBS | BODY MASS INDEX: 34.75 KG/M2 | HEIGHT: 60 IN | RESPIRATION RATE: 20 BRPM | SYSTOLIC BLOOD PRESSURE: 128 MMHG | TEMPERATURE: 99.7 F

## 2022-09-06 DIAGNOSIS — G89.29 CHRONIC RIGHT SHOULDER PAIN: ICD-10-CM

## 2022-09-06 DIAGNOSIS — M67.911 TENDINOPATHY OF RIGHT ROTATOR CUFF: ICD-10-CM

## 2022-09-06 DIAGNOSIS — M25.511 CHRONIC RIGHT SHOULDER PAIN: ICD-10-CM

## 2022-09-06 DIAGNOSIS — I10 BENIGN ESSENTIAL HYPERTENSION: Primary | ICD-10-CM

## 2022-09-06 PROBLEM — R03.0 ELEVATED BLOOD PRESSURE READING WITHOUT DIAGNOSIS OF HYPERTENSION: Status: RESOLVED | Noted: 2019-06-14 | Resolved: 2022-09-06

## 2022-09-06 LAB
ANION GAP SERPL CALCULATED.3IONS-SCNC: 8 MMOL/L (ref 7–15)
BUN SERPL-MCNC: 13.8 MG/DL (ref 8–23)
CALCIUM SERPL-MCNC: 9.6 MG/DL (ref 8.8–10.2)
CHLORIDE SERPL-SCNC: 100 MMOL/L (ref 98–107)
CREAT SERPL-MCNC: 0.74 MG/DL (ref 0.51–0.95)
DEPRECATED HCO3 PLAS-SCNC: 29 MMOL/L (ref 22–29)
GFR SERPL CREATININE-BSD FRML MDRD: 84 ML/MIN/1.73M2
GLUCOSE SERPL-MCNC: 78 MG/DL (ref 70–99)
POTASSIUM SERPL-SCNC: 5.3 MMOL/L (ref 3.4–5.3)
SODIUM SERPL-SCNC: 137 MMOL/L (ref 136–145)

## 2022-09-06 PROCEDURE — 90662 IIV NO PRSV INCREASED AG IM: CPT | Performed by: FAMILY MEDICINE

## 2022-09-06 PROCEDURE — 99214 OFFICE O/P EST MOD 30 MIN: CPT | Mod: 25 | Performed by: FAMILY MEDICINE

## 2022-09-06 PROCEDURE — G0008 ADMIN INFLUENZA VIRUS VAC: HCPCS | Performed by: FAMILY MEDICINE

## 2022-09-06 PROCEDURE — 36415 COLL VENOUS BLD VENIPUNCTURE: CPT | Performed by: FAMILY MEDICINE

## 2022-09-06 PROCEDURE — 80048 BASIC METABOLIC PNL TOTAL CA: CPT | Performed by: FAMILY MEDICINE

## 2022-09-06 RX ORDER — LISINOPRIL 20 MG/1
20 TABLET ORAL DAILY
Qty: 90 TABLET | Refills: 3 | Status: SHIPPED | OUTPATIENT
Start: 2022-09-06 | End: 2023-02-16

## 2022-09-06 ASSESSMENT — PAIN SCALES - GENERAL: PAINLEVEL: NO PAIN (0)

## 2022-09-06 NOTE — PATIENT INSTRUCTIONS
Continue on Lisinopril 20 mg daily. Lab work today.     Continue with Tylenol for shoulder discomfort. Advised to use voltaren gel.     Physical therapy for shoulder discomfort.     Follow up if not improving or worsening.

## 2022-09-06 NOTE — PROGRESS NOTES
Assessment & Plan     Benign essential hypertension  BP improved. Tolerating Lisinopril 20 mg daily. Occasional lightheadedness from sitting to standing but minimal and improving. Check BMP today.   - Basic metabolic panel  (Ca, Cl, CO2, Creat, Gluc, K, Na, BUN)  - lisinopril (ZESTRIL) 20 MG tablet  Dispense: 90 tablet; Refill: 3    Chronic right shoulder pain  Tendinopathy of right rotator cuff  No new trauma or injury. Exam consistent with rotator cuff tendinopathy.  pain manageable with tylenol. Advised utilizing voltaren gel. Referral to physical therapy for home stretching and exercises. Advised to follow up if not improving or worsening.   - Physical Therapy Referral    The risks, benefits and treatment options of prescribed medications or other treatments have been discussed with the patient. The patient verbalized their understanding and should call or follow up if no improvement or if they develop further problems.      Siddhartha Lara, [facility]    Anatoliy Parada is a 77 year old, presenting for the following health issues:  Hypertension      HPI     Hypertension Follow-up      Do you check your blood pressure regularly outside of the clinic? Yes     Are you following a low salt diet? Yes    Are your blood pressures ever more than 140 on the top number (systolic) OR more   than 90 on the bottom number (diastolic), for example 140/90? No      Started on lisinopril 10 mg on 7/26.  Repeat nurse blood pressure check on 8/17 showed that blood pressure remained elevated.  Lisinopril was increased to 20 mg daily.  BMP on 8/17 satisfactory.    Occasionally will get slightly lightheaded going from a sitting to a standing blood pressure.   This has been improving, not real bothersome to patient.   She has been checking blood pressure at home. Typically in the 120-130 systolic range, diastolic in the 70-80 range.   No shortness or chest pain.   No cough.       How many servings of  "fruits and vegetables do you eat daily?  2-3    On average, how many sweetened beverages do you drink each day (Examples: soda, juice, sweet tea, etc.  Do NOT count diet or artificially sweetened beverages)?   0-1    How many days per week do you exercise enough to make your heart beat faster? 3 or less    How many minutes a day do you exercise enough to make your heart beat faster? 9 or less    How many days per week do you miss taking your medication? 0      Shoulder discomfort  Shoulder and upper discomfort bilatearlly, Right >Left.  Ongoing for last 3 months.   No trauma or injury.  Achy pain.   Pain worsened by: reaching forward, and reaching back and hooking her bra  Pain improved by: rest, Tylenol.  Has been using Tylenol which is helpful.   No numbness or tingling. No shooting pains down the arms.       Review of Systems   Constitutional, HEENT, cardiovascular, pulmonary, gi and gu systems are negative, except as otherwise noted.      Objective    /68 (BP Location: Left arm, Patient Position: Chair, Cuff Size: Adult Regular)   Pulse 77   Temp 99.7  F (37.6  C) (Tympanic)   Resp 20   Ht 1.533 m (5' 0.34\")   Wt 80.3 kg (177 lb)   LMP  (LMP Unknown)   SpO2 94%   BMI 34.19 kg/m    Body mass index is 34.19 kg/m .  Physical Exam   General: alert, cooperative, no acute distress   CV: RRR, no murmur  Resp: non-labored breathing, clear to auscultation, no wheezing or rales   Abdomen: Soft, non-tender, no guarding.   Extremities: No peripheral edema, calves non-tender.   MSK shoulder right: No swelling, erythema, atrophy or deformity.  Tender palpation over the anterior, lateral and superior shoulder.  Range of motion full in flexion and abduction.  External rotation limited due to discomfort.  Internal rotation to lower back.  Pain with resisted external rotation.  Empty can testing positive for pain.  Mathis and Neer's testing negative.  Bicep strength full.  Bicep strength full.   strength " strong.

## 2022-09-08 ENCOUNTER — TELEPHONE (OUTPATIENT)
Dept: FAMILY MEDICINE | Facility: CLINIC | Age: 77
End: 2022-09-08

## 2022-09-08 DIAGNOSIS — R35.1 NOCTURIA: ICD-10-CM

## 2022-09-09 RX ORDER — OXYBUTYNIN CHLORIDE 5 MG/1
TABLET, EXTENDED RELEASE ORAL
Qty: 90 TABLET | Refills: 1 | Status: SHIPPED | OUTPATIENT
Start: 2022-09-09 | End: 2022-12-13

## 2022-09-14 NOTE — TELEPHONE ENCOUNTER
I last saw her 10/2021. #90 with 1 refill sent by RN as per refill protocol. Ok for #90 but please contact pharmacy to cancel the additional refill as she needs to be seen for annual wellness exam and medication check. No appointment scheduled.

## 2022-09-14 NOTE — TELEPHONE ENCOUNTER
Writer called [facility] and cancelled the refill.    Thien BEAVERS [facility]

## 2022-11-20 ENCOUNTER — HEALTH MAINTENANCE LETTER (OUTPATIENT)
Age: 77
End: 2022-11-20

## 2022-12-09 DIAGNOSIS — R35.1 NOCTURIA: ICD-10-CM

## 2022-12-12 NOTE — TELEPHONE ENCOUNTER
"Requested Prescriptions   Pending Prescriptions Disp Refills    oxybutynin ER (DITROPAN XL) 5 MG 24 hr tablet [Pharmacy Med Name: OXYBUTYNIN CL ER 5 MG TABLET] 90 tablet 1     Sig: TAKE 1 TABLET BY MOUTH EVERY DAY       Muscarinic Antagonists (Urinary Incontinence Agents) Passed - 12/9/2022  1:11 AM        Passed - Recent (12 mo) or future (30 days) visit within the authorizing provider's specialty     Patient has had an office visit with the authorizing provider or a provider within the authorizing providers department within the previous 12 mos or has a future within next 30 days. See \"Patient Info\" tab in inbasket, or \"Choose Columns\" in Meds & Orders section of the refill encounter.              Passed - Medication is Oxybutynin and patient is 5 years of age or older        Passed - Patient does not have a diagnosis of glaucoma on the problem list     If glaucoma diagnosis is new, refer refill to physician.          Passed - Medication is active on med list        Passed - Patient is 18 years of age or older             "

## 2022-12-13 RX ORDER — OXYBUTYNIN CHLORIDE 5 MG/1
TABLET, EXTENDED RELEASE ORAL
Qty: 90 TABLET | Refills: 1 | Status: SHIPPED | OUTPATIENT
Start: 2022-12-13 | End: 2023-02-16

## 2023-02-16 ENCOUNTER — OFFICE VISIT (OUTPATIENT)
Dept: FAMILY MEDICINE | Facility: CLINIC | Age: 78
End: 2023-02-16
Payer: MEDICARE

## 2023-02-16 VITALS
HEART RATE: 64 BPM | HEIGHT: 60 IN | TEMPERATURE: 97.3 F | RESPIRATION RATE: 16 BRPM | OXYGEN SATURATION: 96 % | BODY MASS INDEX: 34.75 KG/M2 | SYSTOLIC BLOOD PRESSURE: 162 MMHG | DIASTOLIC BLOOD PRESSURE: 88 MMHG | WEIGHT: 177 LBS

## 2023-02-16 DIAGNOSIS — H34.11 CENTRAL RETINAL ARTERY OCCLUSION OF RIGHT EYE: ICD-10-CM

## 2023-02-16 DIAGNOSIS — Z12.31 ENCOUNTER FOR SCREENING MAMMOGRAM FOR MALIGNANT NEOPLASM OF BREAST: ICD-10-CM

## 2023-02-16 DIAGNOSIS — C50.912 CARCINOMA OF LEFT BREAST IN FEMALE, ESTROGEN RECEPTOR POSITIVE, UNSPECIFIED SITE OF BREAST (H): ICD-10-CM

## 2023-02-16 DIAGNOSIS — I10 BENIGN ESSENTIAL HYPERTENSION: ICD-10-CM

## 2023-02-16 DIAGNOSIS — Z17.0 CARCINOMA OF LEFT BREAST IN FEMALE, ESTROGEN RECEPTOR POSITIVE, UNSPECIFIED SITE OF BREAST (H): ICD-10-CM

## 2023-02-16 DIAGNOSIS — R35.1 NOCTURIA: ICD-10-CM

## 2023-02-16 DIAGNOSIS — Z00.00 ENCOUNTER FOR MEDICARE ANNUAL WELLNESS EXAM: Primary | ICD-10-CM

## 2023-02-16 DIAGNOSIS — R53.83 OTHER FATIGUE: ICD-10-CM

## 2023-02-16 LAB
ALBUMIN SERPL BCG-MCNC: 4.2 G/DL (ref 3.5–5.2)
ALP SERPL-CCNC: 99 U/L (ref 35–104)
ALT SERPL W P-5'-P-CCNC: 32 U/L (ref 10–35)
ANION GAP SERPL CALCULATED.3IONS-SCNC: 10 MMOL/L (ref 7–15)
AST SERPL W P-5'-P-CCNC: 30 U/L (ref 10–35)
BASOPHILS # BLD AUTO: 0.1 10E3/UL (ref 0–0.2)
BASOPHILS NFR BLD AUTO: 1 %
BILIRUB SERPL-MCNC: 0.4 MG/DL
BUN SERPL-MCNC: 10.5 MG/DL (ref 8–23)
CALCIUM SERPL-MCNC: 10 MG/DL (ref 8.8–10.2)
CHLORIDE SERPL-SCNC: 102 MMOL/L (ref 98–107)
CHOLEST SERPL-MCNC: 133 MG/DL
CREAT SERPL-MCNC: 0.67 MG/DL (ref 0.51–0.95)
DEPRECATED HCO3 PLAS-SCNC: 28 MMOL/L (ref 22–29)
EOSINOPHIL # BLD AUTO: 0.3 10E3/UL (ref 0–0.7)
EOSINOPHIL NFR BLD AUTO: 3 %
ERYTHROCYTE [DISTWIDTH] IN BLOOD BY AUTOMATED COUNT: 12.9 % (ref 10–15)
GFR SERPL CREATININE-BSD FRML MDRD: >90 ML/MIN/1.73M2
GLUCOSE SERPL-MCNC: 97 MG/DL (ref 70–99)
HCT VFR BLD AUTO: 43.1 % (ref 35–47)
HDLC SERPL-MCNC: 49 MG/DL
HGB BLD-MCNC: 13.4 G/DL (ref 11.7–15.7)
IMM GRANULOCYTES # BLD: 0 10E3/UL
IMM GRANULOCYTES NFR BLD: 0 %
LDLC SERPL CALC-MCNC: 64 MG/DL
LYMPHOCYTES # BLD AUTO: 1.7 10E3/UL (ref 0.8–5.3)
LYMPHOCYTES NFR BLD AUTO: 16 %
MCH RBC QN AUTO: 29.2 PG (ref 26.5–33)
MCHC RBC AUTO-ENTMCNC: 31.1 G/DL (ref 31.5–36.5)
MCV RBC AUTO: 94 FL (ref 78–100)
MONOCYTES # BLD AUTO: 1.1 10E3/UL (ref 0–1.3)
MONOCYTES NFR BLD AUTO: 10 %
NEUTROPHILS # BLD AUTO: 7.4 10E3/UL (ref 1.6–8.3)
NEUTROPHILS NFR BLD AUTO: 70 %
NONHDLC SERPL-MCNC: 84 MG/DL
PLATELET # BLD AUTO: 408 10E3/UL (ref 150–450)
POTASSIUM SERPL-SCNC: 5.2 MMOL/L (ref 3.4–5.3)
PROT SERPL-MCNC: 7.7 G/DL (ref 6.4–8.3)
RBC # BLD AUTO: 4.59 10E6/UL (ref 3.8–5.2)
SODIUM SERPL-SCNC: 140 MMOL/L (ref 136–145)
TRIGL SERPL-MCNC: 102 MG/DL
TSH SERPL DL<=0.005 MIU/L-ACNC: 0.71 UIU/ML (ref 0.3–4.2)
VIT B12 SERPL-MCNC: 819 PG/ML (ref 232–1245)
WBC # BLD AUTO: 10.6 10E3/UL (ref 4–11)

## 2023-02-16 PROCEDURE — 99397 PER PM REEVAL EST PAT 65+ YR: CPT | Performed by: FAMILY MEDICINE

## 2023-02-16 PROCEDURE — 82607 VITAMIN B-12: CPT | Performed by: FAMILY MEDICINE

## 2023-02-16 PROCEDURE — 80061 LIPID PANEL: CPT | Performed by: FAMILY MEDICINE

## 2023-02-16 PROCEDURE — 99214 OFFICE O/P EST MOD 30 MIN: CPT | Mod: 25 | Performed by: FAMILY MEDICINE

## 2023-02-16 PROCEDURE — 80050 GENERAL HEALTH PANEL: CPT | Performed by: FAMILY MEDICINE

## 2023-02-16 PROCEDURE — 36415 COLL VENOUS BLD VENIPUNCTURE: CPT | Performed by: FAMILY MEDICINE

## 2023-02-16 RX ORDER — LISINOPRIL 40 MG/1
40 TABLET ORAL DAILY
Qty: 90 TABLET | Refills: 4 | Status: SHIPPED | OUTPATIENT
Start: 2023-02-16 | End: 2023-09-27

## 2023-02-16 RX ORDER — OXYBUTYNIN CHLORIDE 10 MG/1
10 TABLET, EXTENDED RELEASE ORAL DAILY
Qty: 90 TABLET | Refills: 4 | Status: SHIPPED | OUTPATIENT
Start: 2023-02-16 | End: 2023-09-15

## 2023-02-16 RX ORDER — ATORVASTATIN CALCIUM 40 MG/1
40 TABLET, FILM COATED ORAL DAILY
Qty: 90 TABLET | Refills: 4 | Status: SHIPPED | OUTPATIENT
Start: 2023-02-16 | End: 2024-01-29

## 2023-02-16 ASSESSMENT — PATIENT HEALTH QUESTIONNAIRE - PHQ9
10. IF YOU CHECKED OFF ANY PROBLEMS, HOW DIFFICULT HAVE THESE PROBLEMS MADE IT FOR YOU TO DO YOUR WORK, TAKE CARE OF THINGS AT HOME, OR GET ALONG WITH OTHER PEOPLE: NOT DIFFICULT AT ALL
SUM OF ALL RESPONSES TO PHQ QUESTIONS 1-9: 0
SUM OF ALL RESPONSES TO PHQ QUESTIONS 1-9: 0

## 2023-02-16 ASSESSMENT — PAIN SCALES - GENERAL: PAINLEVEL: NO PAIN (0)

## 2023-02-16 ASSESSMENT — ACTIVITIES OF DAILY LIVING (ADL): CURRENT_FUNCTION: NO ASSISTANCE NEEDED

## 2023-02-16 NOTE — PROGRESS NOTES
"    The patient was provided with suggestions to help her develop a healthy physical lifestyle.  She is at risk for lack of exercise and has been provided with information to increase physical activity for the benefit of her well-being.  The patient was provided with written information regarding signs of hearing loss.  Information on urinary incontinence and treatment options given to patient.  The patient was provided with suggestions to help her develop a healthy emotional lifestyle.  She is at risk for falling and has been provided with information to reduce the risk of falling at home.  Answers for HPI/ROS submitted by the patient on 2/16/2023  If you checked off any problems, how difficult have these problems made it for you to do your work, take care of things at home, or get along with other people?: Not difficult at all  PHQ9 TOTAL SCORE: 0  In general, how would you rate your overall physical health?: fair  Frequency of exercise:: None  Do you usually eat at least 4 servings of fruit and vegetables a day, include whole grains & fiber, and avoid regularly eating high fat or \"junk\" foods? : Yes  Taking medications regularly:: Yes  Medication side effects:: None  Activities of Daily Living: no assistance needed  Home safety: no safety concerns identified  Hearing Impairment:: need to ask people to speak up or repeat themselves  In the past 6 months, have you been bothered by leaking of urine?: Yes  In general, how would you rate your overall mental or emotional health?: fair  Additional concerns today:: No      "

## 2023-02-16 NOTE — PROGRESS NOTES
"SUBJECTIVE:   Karma is a 77 year old who presents for Preventive Visit.  Patient has been advised of split billing requirements and indicates understanding: Yes  Are you in the first 12 months of your Medicare coverage?  No    Healthy Habits:     In general, how would you rate your overall health?  Fair    Frequency of exercise:  None    Do you usually eat at least 4 servings of fruit and vegetables a day, include whole grains    & fiber and avoid regularly eating high fat or \"junk\" foods?  Yes    Taking medications regularly:  Yes    Medication side effects:  None    Ability to successfully perform activities of daily living:  No assistance needed    Home Safety:  No safety concerns identified    Hearing Impairment:  Need to ask people to speak up or repeat themselves    In the past 6 months, have you been bothered by leaking of urine? Yes    In general, how would you rate your overall mental or emotional health?  Fair      PHQ-2 Total Score: 0    Additional concerns today:  No      Have you ever done Advance Care Planning? (For example, a Health Directive, POLST, or a discussion with a medical provider or your loved ones about your wishes): Yes, advance care planning is on file.       Fall risk  Fallen 2 or more times in the past year?: No  Any fall with injury in the past year?: Yes    Cognitive Screening   1) Repeat 3 items (Leader, Season, Table)    2) Clock draw: NORMAL  3) 3 item recall: Recalls 2 objects   Results: NORMAL clock, 1-2 items recalled: COGNITIVE IMPAIRMENT LESS LIKELY    Mini-CogTM Copyright DEANNE Hemphill. Licensed by the author for use in [facility]; reprinted with permission (za@.[facility]). All rights reserved.      Do you have sleep apnea, excessive snoring or daytime drowsiness?: no    Reviewed and updated as needed this visit by clinical staff   Tobacco  Allergies  Meds  Problems  Med Hx  Surg Hx  Fam Hx          Reviewed and updated as needed this visit by Provider   Tobacco "  Allergies  Meds  Problems  Med Hx  Surg Hx  Fam Hx         Social History     Tobacco Use     Smoking status: Former     Years: 35.00     Types: Cigarettes     Quit date: 2005     Years since quittin.1     Smokeless tobacco: Never   Substance Use Topics     Alcohol use: Yes     Comment: rarely         Alcohol Use 2023   Prescreen: >3 drinks/day or >7 drinks/week? No               Current providers sharing in care for this patient include:   Patient Care Team:  Audrey Roy MD as PCP - General (Family Practice)  Benjamin Chen MD as MD (Radiation Oncology)  Leeroy Pascal MD as MD (Vascular Surgery)  Michael Zuleta MD as MD (Ophthalmology)  Victoria Wakefield PA-C as Physician Assistant (Dermatology)  Audrey Roy MD as Referring Physician (Family Medicine)  Victoria Wakefield PA-C as Assigned Surgical Provider  Ana Zepeda NP as Assigned Cancer Care Provider  Siddhartha Lara DO as Assigned PCP    The following health maintenance items are reviewed in Epic and correct as of today:  Health Maintenance   Topic Date Due     ZOSTER IMMUNIZATION (2 of 3) 2015     MEDICARE ANNUAL WELLNESS VISIT  2016     COVID-19 Vaccine (4 - Booster for Moderna series) 2022     ANNUAL REVIEW OF HM ORDERS  2023     PHQ-9  2023     FALL RISK ASSESSMENT  2024     MAMMO SCREENING  2024     DTAP/TDAP/TD IMMUNIZATION (3 - Td or Tdap) 2025     LIPID  2028     ADVANCE CARE PLANNING  2028     DEXA  2031     HEPATITIS C SCREENING  Completed     PHQ-2 (once per calendar year)  Completed     INFLUENZA VACCINE  Completed     Pneumococcal Vaccine: 65+ Years  Completed     IPV IMMUNIZATION  Aged Out     MENINGITIS IMMUNIZATION  Aged Out     COLORECTAL CANCER SCREENING  Discontinued     Labs reviewed in EPIC  Patient Active Problem List   Diagnosis     Hyperlipidemia LDL goal <130     SENSONRL HEAR LOSS,BILAT     GERD  (gastroesophageal reflux disease)     Cataract right eye     Advanced directives, counseling/discussion     Ductal carcinoma in situ (DCIS) of left breast     Central retinal artery occlusion of right eye     Carcinoma of left breast in female, estrogen receptor positive, unspecified site of breast (H)     Benign essential hypertension     Past Surgical History:   Procedure Laterality Date     COLONOSCOPY N/A 2021    Procedure: COLONOSCOPY, WITH POLYPECTOMY AND BIOPSY;  Surgeon: Lawrence Ortega DO;  Location: [facility]     ENT SURGERY      dental implants  started     FLEXIBLE SIGMOIDOSCOPY       LUMPECTOMY BREAST WITH SEED LOCALIZATION Left 2016    Procedure: LUMPECTOMY BREAST WITH SEED LOCALIZATION;  Surgeon: Russel Murry MD;  Location:  OR     PHACOEMULSIFICATION WITH STANDARD INTRAOCULAR LENS IMPLANT Right 2017    Procedure: PHACOEMULSIFICATION WITH STANDARD INTRAOCULAR LENS IMPLANT;  Right cataract removal with implant;  Surgeon: Michael Zuleta MD;  Location: [facility]     PHACOEMULSIFICATION WITH STANDARD INTRAOCULAR LENS IMPLANT Left 2017    Procedure: PHACOEMULSIFICATION WITH STANDARD INTRAOCULAR LENS IMPLANT;  Left cataract removal with implant;  Surgeon: Michael Zuleta MD;  Location: [facility]     SURGICAL HISTORY OF -       Right Hip Pinning     SURGICAL HISTORY OF -       Tubal Ligation       Social History     Tobacco Use     Smoking status: Former     Years: 35.00     Types: Cigarettes     Quit date: 2005     Years since quittin.1     Smokeless tobacco: Never   Substance Use Topics     Alcohol use: Yes     Comment: rarely     Family History   Problem Relation Age of Onset     Heart Disease Mother      Heart Disease Father      Circulatory Father         main artery aneursym     Heart Disease Sister         sleep problems     Breast Cancer Maternal Aunt         diagnosed in 60's, surviving in 's     Breast Cancer Cousin      Breast  Cancer Cousin      Skin Cancer No family hx of          Current Outpatient Medications   Medication Sig Dispense Refill     acetaminophen (TYLENOL) 500 MG tablet Take 500-1,000 mg by mouth 2 times daily as needed for mild pain       Ascorbic Acid (VITAMIN C PO) Take 500 mg by mouth daily       ASPIRIN PO Take 325 mg by mouth daily        atorvastatin (LIPITOR) 40 MG tablet Take 1 tablet (40 mg) by mouth daily 90 tablet 4     CALCIUM + D OR 1 TABLET DAILY       co-enzyme Q-10 100 MG CAPS capsule Take 2 capsules by mouth daily. Takes a total of 200 mg daily.  0     famotidine (PEPCID) 20 MG tablet TAKE 1 TABLET (20 MG) BY MOUTH 2 TIMES DAILY 180 tablet 1     Glycerin-Polysorbate 80 (REFRESH DRY EYE THERAPY OP) Apply 1-2 drops to eye as needed Reported on 5/23/2017       lisinopril (ZESTRIL) 40 MG tablet Take 1 tablet (40 mg) by mouth daily 90 tablet 4     MULTIVITAMIN OR 1 daily       Omega-3 Fatty Acids (OMEGA-3 FISH OIL PO) Take 1 g by mouth daily        oxybutynin ER (DITROPAN XL) 10 MG 24 hr tablet Take 1 tablet (10 mg) by mouth daily 90 tablet 4     Allergies   Allergen Reactions     Cortisone Swelling     Cortisone Cream     Mammogram Screening: Mammogram Screening - Alternate mammogram schedule due to breast cancer history Follows w oncology.      Pertinent mammograms are reviewed under the imaging tab.    Review of Systems  Constitutional, neuro, ENT, endocrine, pulmonary, cardiac, gastrointestinal, genitourinary, musculoskeletal, integument and psychiatric systems are negative, except as otherwise noted.     OBJECTIVE:   BP (!) 162/88   Pulse 64   Temp 97.3  F (36.3  C) (Tympanic)   Resp 16   Ht 1.524 m (5')   Wt 80.3 kg (177 lb)   LMP  (LMP Unknown)   SpO2 96%   BMI 34.57 kg/m   Estimated body mass index is 34.57 kg/m  as calculated from the following:    Height as of this encounter: 1.524 m (5').    Weight as of this encounter: 80.3 kg (177 lb).  Physical Exam  GENERAL APPEARANCE: healthy, alert and  no distress  EYES: Eyes grossly normal to inspection, PERRL and conjunctivae and sclerae normal  HENT: ear canals and TM's normal, nose and mouth without ulcers or lesions, oropharynx clear and oral mucous membranes moist  NECK: no adenopathy, no asymmetry, masses, or scars and thyroid normal to palpation  RESP: lungs clear to auscultation - no rales, rhonchi or wheezes  BREAST: normal without masses, tenderness or nipple discharge, no palpable axillary masses or adenopathy and left breast lumpectomy changes  CV: regular rate and rhythm, normal S1 S2, no S3 or S4, no murmur, click or rub, no peripheral edema and peripheral pulses strong  ABDOMEN: soft, nontender, no hepatosplenomegaly, no masses and bowel sounds normal  MS: no musculoskeletal defects are noted and gait is age appropriate without ataxia  SKIN: no suspicious lesions or rashes  NEURO: Normal strength and tone, sensory exam grossly normal, mentation intact and speech normal  PSYCH: mentation appears normal and affect normal/bright    Diagnostic Test Results:  Labs reviewed in Epic    ASSESSMENT / PLAN:   Encounter for Medicare annual wellness exam    Carcinoma of left breast in female, estrogen receptor positive, unspecified site of breast (H)  Stable follows with oncology.  - MA Screen Bilateral w/Richi; Future    Nocturia  Uncontrolled. Increase ditropan to 10mg daily.  - oxybutynin ER (DITROPAN XL) 10 MG 24 hr tablet; Take 1 tablet (10 mg) by mouth daily    Central retinal artery occlusion of right eye  Stable. Refilled medication.    - Lipid panel reflex to direct LDL Fasting; Future  - atorvastatin (LIPITOR) 40 MG tablet; Take 1 tablet (40 mg) by mouth daily  - Lipid panel reflex to direct LDL Fasting    Benign essential hypertension  Uncontrolled. Increase lisinopril to 40mg daily.   - lisinopril (ZESTRIL) 40 MG tablet; Take 1 tablet (40 mg) by mouth daily    Other fatigue  Discussed often multi-factorial etiology of fatigue. Will initiate further  work-up with labs as below. Further work-up and management as dictated by results.  - Comprehensive metabolic panel; Future  - CBC with Platelets & Differential; Future  - TSH with free T4 reflex; Future  - Vitamin B12; Future  - Comprehensive metabolic panel  - CBC with Platelets & Differential  - TSH with free T4 reflex  - Vitamin B12    Encounter for screening mammogram for malignant neoplasm of breast  - MA Screen Bilateral w/Richi; Future        Patient has been advised of split billing requirements and indicates understanding: Yes      COUNSELING:  Reviewed preventive health counseling, as reflected in patient instructions      BMI:   Estimated body mass index is 34.57 kg/m  as calculated from the following:    Height as of this encounter: 1.524 m (5').    Weight as of this encounter: 80.3 kg (177 lb).         She reports that she quit smoking about 18 years ago. Her smoking use included cigarettes. She has never used smokeless tobacco.      Appropriate preventive services were discussed with this patient, including applicable screening as appropriate for cardiovascular disease, diabetes, osteopenia/osteoporosis, and glaucoma.  As appropriate for age/gender, discussed screening for colorectal cancer, prostate cancer, breast cancer, and cervical cancer. Checklist reviewing preventive services available has been given to the patient.    Reviewed patients plan of care and provided an AVS. The Intermediate Care Plan ( asthma action plan, low back pain action plan, and migraine action plan) for Chika meets the Care Plan requirement. This Care Plan has been established and reviewed with the Patient.      Audrey Roy MD  [facility]    Identified Health Risks:  Answers for HPI/ROS submitted by the patient on 2/16/2023  If you checked off any problems, how difficult have these problems made it for you to do your work, take care of things at home, or get along with other  people?: Not difficult at all  PHQ9 TOTAL SCORE: 0      Answers for HPI/ROS submitted by the patient on 2/16/2023  If you checked off any problems, how difficult have these problems made it for you to do your work, take care of things at home, or get along with other people?: Not difficult at all  PHQ9 TOTAL SCORE: 0

## 2023-02-16 NOTE — PATIENT INSTRUCTIONS
Increase oxybutynin to 10mg daily (bladder). I faxed a prescription for this higher dose to your pharmacy.    Lisinopril 30mg daily x 1 week then increase to 40mg.  I faxed a prescription for the higher dose to your pharmacy.    Call [phone] or toll free [phone] to schedule you mammogram.   [facility] and [place]                  Patient Education   Personalized Prevention Plan  You are due for the preventive services outlined below.  Your care team is available to assist you in scheduling these services.  If you have already completed any of these items, please share that information with your care team to update in your medical record.  Health Maintenance Due   Topic Date Due    Zoster (Shingles) Vaccine (2 of 3) 07/30/2015    Annual Wellness Visit  06/04/2016    COVID-19 Vaccine (4 - Booster for Moderna series) 01/26/2022     Your Health Risk Assessment indicates you feel you are not in good health    A healthy lifestyle helps keep the body fit and the mind alert. It helps protect you from disease, helps you fight disease, and helps prevent chronic disease (disease that doesn't go away) from getting worse. This is important as you get older and begin to notice twinges in muscles and joints and a decline in the strength and stamina you once took for granted. A healthy lifestyle includes good healthcare, good nutrition, weight control, recreation, and regular exercise. Avoid harmful substances and do what you can to keep safe. Another part of a healthy lifestyle is stay mentally active and socially involved.    Good healthcare   Have a wellness visit every year.   If you have new symptoms, let us know right away. Don't wait until the next checkup.   Take medicines exactly as prescribed and keep your medicines in a safe place. Tell us if your medicine causes problems.   Healthy diet and weight control   Eat 3 or 4 small, nutritious, low-fat, high-fiber meals a day. Include a variety of fruits,  vegetables, and whole-grain foods.   Make sure you get enough calcium in your diet. Calcium, vitamin D, and exercise help prevent osteoporosis (bone thinning).   If you live alone, try eating with others when you can. That way you get a good meal and have company while you eat it.   Try to keep a healthy weight. If you eat more calories than your body uses for energy, it will be stored as fat and you will gain weight.     Recreation   Recreation is not limited to sports and team events. It includes any activity that provides relaxation, interest, enjoyment, and exercise. Recreation provides an outlet for physical, mental, and social energy. It can give a sense of worth and achievement. It can help you stay healthy.    Mental Exercise and Social Involvement  Mental and emotional health is as important as physical health. Keep in touch with friends and family. Stay as active as possible. Continue to learn and challenge yourself.   Things you can do to stay mentally active are:  Learn something new, like a foreign language or musical instrument.   Play SCRABBLE or do crossword puzzles. If you cannot find people to play these games with you at home, you can play them with others on your computer through the Internet.   Join a games club--anything from card games to chess or checkers or lawn bowling.   Start a new hobby.   Go back to school.   Volunteer.   Read.   Keep up with world events.    Exercise for a Healthier Heart  You may wonder how you can improve the health of your heart. If you re thinking about exercise, you re on the right track. You don t need to become an athlete. But you do need a certain amount of brisk exercise to help strengthen your heart. If you have been diagnosed with a heart condition, your healthcare provider may advise exercise to help stabilize your condition. To help make exercise a habit, choose safe, fun activities.      Exercise with a friend. When activity is fun, you're more likely to  stick with it.   Before you start  Check with your healthcare provider before starting an exercise program. This is especially important if you have not been active for a while. It's also important if you have a long-term (chronic) health problem such as heart disease, diabetes, or obesity. Or if you are at high risk for having these problems.   Why exercise?  Exercising regularly offers many healthy rewards. It can help you do all of the following:   Improve your blood cholesterol level to help prevent further heart trouble  Lower your blood pressure to help prevent a stroke or heart attack  Control diabetes, or reduce your risk of getting this disease  Improve your heart and lung function  Reach and stay at a healthy weight  Make your muscles stronger so you can stay active  Prevent falls and fractures by slowing the loss of bone mass (osteoporosis)  Manage stress better  Reduce your blood pressure  Improve your sense of self and your body image  Exercise tips    Ease into your routine. Set small goals. Then build on them. If you are not sure what your activity level should be, talk with your healthcare provider first before starting an exercise routine.  Exercise on most days. Aim for a total of 150 minutes (2 hours and 30 minutes) or more of moderate-intensity aerobic activity each week. Or 75 minutes (1 hour and 15 minutes) or more of vigorous-intensity aerobic activity each week. Or try for a combination of both. Moderate activity means that you breathe heavier and your heart rate increases but you can still talk. Think about doing 40 minutes of moderate exercise, 3 to 4 times a week. For best results, activity should last for about 40 minutes to lower blood pressure and cholesterol. It's OK to work up to the 40-minute period over time. Examples of moderate-intensity activity are walking 1 mile in 15 minutes. Or doing 30 to 45 minutes of yard work.  Step up your daily activity level.  Along with your exercise  program, try being more active the whole day. Walk instead of drive. Or park further away so that you take more steps each day. Do more household tasks or yard work. You may not be able to meet the advised mount of physical activity. But doing some moderate- or vigorous-intensity aerobic activity can help reduce your risk for heart disease. Your healthcare provider can help you figure out what is best for you.  Choose 1 or more activities you enjoy.  Walking is one of the easiest things you can do. You can also try swimming, riding a bike, dancing, or taking an exercise class.    When to call your healthcare provider  Call your healthcare provider if you have any of these:   Chest pain or feel dizzy or lightheaded  Burning, tightness, pressure, or heaviness in your chest, neck, shoulders, back, or arms  Abnormal shortness of breath  More joint or muscle pain  A very fast or irregular heartbeat (palpitations)  [employer] last reviewed this educational content on 7/1/2019 2000-2021 [employer]. All rights reserved. This information is not intended as a substitute for professional medical care. Always follow your healthcare professional's instructions.          Signs of Hearing Loss      Hearing much better with one ear can be a sign of hearing loss.   Hearing loss is a problem shared by many people. In fact, it is one of the most common health problems, particularly as people age. Most people age 65 and older have some hearing loss. By age 80, almost everyone does. Hearing loss often occurs slowly over the years. So you may not realize your hearing has gotten worse.  Have your hearing checked  Call your healthcare provider if you:  Have to strain to hear normal conversation  Have to watch other people s faces very carefully to follow what they re saying  Need to ask people to repeat what they ve said  Often misunderstand what people are saying  Turn the volume of the television or radio up so high that  others complain  Feel that people are mumbling when they re talking to you  Find that the effort to hear leaves you feeling tired and irritated  Notice, when using the phone, that you hear better with one ear than the other  [employer] last reviewed this educational content on 1/1/2020 2000-2021 [employer]. All rights reserved. This information is not intended as a substitute for professional medical care. Always follow your healthcare professional's instructions.          Urinary Incontinence, Female (Adult)   Urinary incontinence means loss of bladder control. This problem affects many women, especially as they get older. If you have incontinence, you may be embarrassed to ask for help. But know that this problem can be treated.   Types of Incontinence  There are different types of incontinence. Two of the main types are described here. You can have more than one type.   Stress incontinence. With this type, urine leaks when pressure (stress) is put on the bladder. This may happen when you cough, sneeze, or laugh. Stress incontinence most often occurs because the pelvic floor muscles that support the bladder and urethra are weak. This can happen after pregnancy and vaginal childbirth or a hysterectomy. It can also be due to excess body weight or hormone changes.  Urge incontinence (also called overactive bladder). With this type, a sudden urge to urinate is felt often. This may happen even though there may not be much urine in the bladder. The need to urinate often during the night is common. Urge incontinence most often occurs because of bladder spasms. This may be due to bladder irritation or infection. Damage to bladder nerves or pelvic muscles, constipation, and certain medicines can also lead to urge incontinence.  Treatment depends on the cause. Further evaluation is needed to find the type you have. This will likely include an exam and certain tests. Based on the results, you and your  healthcare provider can then plan treatment. Until a diagnosis is made, the home care tips below can help ease symptoms.   Home care  Do pelvic floor muscle exercises, if they are prescribed. The pelvic floor muscles help support the bladder and urethra. Many women find that their symptoms improve when doing special exercises that strengthen these muscles. To do the exercises, contract the muscles you would use to stop your stream of urine. But do this when you re not urinating. Hold for 10 seconds, then relax. Repeat 10 to 20 times in a row, at least 3 times a day. Your healthcare provider may give you other instructions for how to do the exercises and how often.  Keep a bladder diary. This helps track how often and how much you urinate over a set period of time. Bring this diary with you to your next visit with the provider. The information can help your provider learn more about your bladder problem.  Lose weight, if advised to by your provider. Extra weight puts pressure on the bladder. Your provider can help you create a weight-loss plan that s right for you. This may include exercising more and making certain diet changes.  Don't have foods and drinks that may irritate the bladder. These can include alcohol and caffeinated drinks.  Quit smoking. Smoking and other tobacco use can lead to a long-term (chronic) cough that strains the pelvic floor muscles. Smoking may also damage the bladder and urethra. Talk with your provider about treatments or methods you can use to quit smoking.  If drinking large amounts of fluid makes you have symptoms, you may be advised to limit your fluid intake. You may also be advised to drink most of your fluids during the day and to limit fluids at night.  If you re worried about urine leakage or accidents, you may wear absorbent pads to catch urine. Change the pads often. This helps reduce discomfort. It may also reduce the risk of skin or bladder infections.    Follow-up  care  Follow up with your healthcare provider, or as directed. It may take some to find the right treatment for your problem. But healthy lifestyle changes can be made right away. These include such things as exercising on a regular basis, eating a healthy diet, losing weight (if needed), and quitting smoking. Your treatment plan may include special therapies or medicines. Certain procedures or surgery may also be options. Talk about any questions you have with your provider.   When to seek medical advice  Call the healthcare provider right away if any of these occur:  Fever of 100.4 F (38 C) or higher, or as directed by your provider  Bladder pain or fullness  Belly swelling  Nausea or vomiting  Back pain  Weakness, dizziness, or fainting  [employer] last reviewed this educational content on 1/1/2020 2000-2021 [employer]. All rights reserved. This information is not intended as a substitute for professional medical care. Always follow your healthcare professional's instructions.        Your Health Risk Assessment indicates you feel you are not in good emotional health.    Recreation   Recreation is not limited to sports and team events. It includes any activity that provides relaxation, interest, enjoyment, and exercise. Recreation provides an outlet for physical, mental, and social energy. It can give a sense of worth and achievement. It can help you stay healthy.    Mental Exercise and Social Involvement  Mental and emotional health is as important as physical health. Keep in touch with friends and family. Stay as active as possible. Continue to learn and challenge yourself.   Things you can do to stay mentally active are:  Learn something new, like a foreign language or musical instrument.   Play SCRABBLE or do crossword puzzles. If you cannot find people to play these games with you at home, you can play them with others on your computer through the Internet.   Join a games club--anything from  card games to chess or checkers or lawn bowling.   Start a new hobby.   Go back to school.   Volunteer.   Read.   Keep up with world events.    Preventing Falls at Home  A person can fall for many reasons. Older adults may fall because reaction time slows down as we age. Your muscles and joints may get stiff, weak, or less flexible because of illness, medicines, or a physical condition.   Other health problems that make falls more likely include:   Arthritis  Dizziness or lightheadedness when you stand up (orthostatic hypotension)  History of a stroke  Dizziness  Anemia  Certain medicines taken for mental illness or to control blood pressure.  Problems with balance or gait  Bladder or urinary problems  History of falling  Changes in vision (vision impairment)  Changes in thinking skills and memory (cognitive impairment)  Injuries from a fall can include serious injuries such as broken bones, dislocated joints, internal bleeding and cuts. Injuries like these can limit your independence.   Prevention tips  To help prevent falls and fall-related injuries, follow the tips below.    Floors  To make floors safer:   Put nonskid pads under area rugs.  Remove small rugs.  Replace worn floor coverings.  Tack carpets firmly to each step on carpeted stairs. Put nonskid strips on the edges of uncarpeted stairs.  Keep floors and stairs free of clutter and cords.  Arrange furniture so there are clear pathways.  Clean up any spills right away.  Bathrooms    To make bathrooms safer:   Install grab bars in the tub or shower.  Apply nonskid strips or put a nonskid rubber mat in the tub or shower.  Sit on a bath chair to bathe.  Use bathmats with nonskid backing.  Lighting  To improve visibility in your home:    Keep a flashlight in each room. Or put a lamp next to the bed within easy reach.  Put nightlights in the bedrooms, hallways, kitchen, and bathrooms.  Make sure all stairways have good lighting.  Take your time when going up and  down stairs.  Put handrails on both sides of stairs and in walkways for more support. To prevent injury to your wrist or arm, don t use handrails to pull yourself up.  Install grab bars to pull yourself up.  Move or rearrange items that you use often. This will make them easier to find or reach.  Look at your home to find any safety hazards. Especially look at doorways, walkways, and the driveway. Remove or repair any safety problems that you find.  Other changes to make  Look around to find any safety hazards. Look closely at doorways, walkways, and the driveway. Remove or repair any safety problems that you find.  Wear shoes that fit well.  Take your time when going up and down stairs.  Put handrails on both sides of stairs and in walkways for more support. To prevent injury to your wrist or arm, don t use handrails to pull yourself up.  Install grab bars wherever needed to pull yourself up.  Arrange items that you use often. This will make them easier to find or reach.    [employer] last reviewed this educational content on 3/1/2020    1594-9587 [employer]. All rights reserved. This information is not intended as a substitute for professional medical care. Always follow your healthcare professional's instructions.

## 2023-02-16 NOTE — NURSING NOTE
Initial BP (!) 162/88   Pulse 64   Temp 97.3  F (36.3  C) (Tympanic)   Resp 16   Ht 1.524 m (5')   Wt 80.3 kg (177 lb)   LMP  (LMP Unknown)   SpO2 96%   BMI 34.57 kg/m   Estimated body mass index is 34.57 kg/m  as calculated from the following:    Height as of this encounter: 1.524 m (5').    Weight as of this encounter: 80.3 kg (177 lb). .

## 2023-03-01 PROBLEM — H34.11 CENTRAL RETINAL ARTERY OCCLUSION OF RIGHT EYE: Status: ACTIVE | Noted: 2017-05-23

## 2023-06-08 NOTE — NURSING NOTE
"June 8, 2023     [address] [place]  [place] [address]  [address] [facility]     Patient: Isabel Lozano   YOB: 1983   Date of Visit: 6/8/2023       Dear Dr Lachelle King: Thank you for referring Isabel Lozano to me for evaluation  Below are my notes for this consultation  If you have questions, please do not hesitate to call me  I look forward to following your patient along with you  Sincerely,        Gina Rinne, MD        CC: No Recipients    Gina Rinne, MD  6/8/2023  9:41 AM  Sign when Signing Visit  [facility]: Ms Carolynn Bradshaw was seen today for nuchal translucency ultrasound  See ultrasound report under \"OB Procedures\" tab  Physical Exam  Constitutional:       General: She is not in acute distress  Appearance: Normal appearance  HENT:      Head: Normocephalic and atraumatic  Eyes:      Extraocular Movements: Extraocular movements intact  Cardiovascular:      Rate and Rhythm: Normal rate  Pulmonary:      Effort: Pulmonary effort is normal  No respiratory distress  Skin:     Findings: No erythema or rash  Neurological:      Mental Status: She is alert and oriented to person, place, and time  Psychiatric:         Mood and Affect: Mood normal          Behavior: Behavior normal          Please don't hesitate to contact our office with any concerns or questions  -Gina Rinne, MD        [facility]  6/8/2023  9:19 AM  Sign when Signing Visit  Patient chose to have [facility] Non-invasive Prenatal Screen with fetal sex (per pt request)  Patient given brochure and is aware [facility] will contact their insurance and coordinate coverage  Patient made aware she will need to respond to text message or e-mail from [employer] within 2 business days or testing will be run through insurance  Patient informed text message will come from area code  \"[id number]\"    Provided [employer] # [phone] and " Initial /86   Pulse 84   Temp 97.7  F (36.5  C) (Tympanic)   Resp 16   Ht 1.524 m (5')   Wt 81.6 kg (180 lb)   SpO2 94%   BMI 35.15 kg/m   Estimated body mass index is 35.15 kg/m  as calculated from the following:    Height as of this encounter: 1.524 m (5').    Weight as of this encounter: 81.6 kg (180 lb). .       "web site : [email or web address]  \"Leeper your test online\" card with barcode and test tube ID provided to patient  Reviewed [employer] web site states 5-7 business days for results via their portal    S[employer] message will be sent to patient when M receives results /provider reviews  2 vials of blood drawn from right arm by Arabella Galindo RN  Patient tolerated blood draw without difficulty  Specimens labeled with patient identifiers (name, date of birth, specimen collection date), order and specimen were verified with patient, packed and sent via [employer]  FED EX  tracking #  [id number]  Copy of lab order scanned to Epic media  [facility]aternal Fetal [facility] will have results in approximately 7-10 business days and will call patient or notify via [address]  Patient aware viewing lab result online will reveal fetal sex if ordered  Patient verbalized understanding of all instructions and no questions at this time      "

## 2023-08-07 ENCOUNTER — TELEPHONE (OUTPATIENT)
Dept: FAMILY MEDICINE | Facility: CLINIC | Age: 78
End: 2023-08-07
Payer: MEDICARE

## 2023-08-07 DIAGNOSIS — Z12.31 ENCOUNTER FOR SCREENING MAMMOGRAM FOR BREAST CANCER: Primary | ICD-10-CM

## 2023-08-07 NOTE — TELEPHONE ENCOUNTER
"----- Message -----   From: Chika Hurd \"Karma\"   Sent: 8/5/2023   4:43 PM CDT   To: [facility] Reception Pool   Subject: mammogram                                          Topic: Non-Medical Question.      I was told in [place] to get the test every year.  I no longer am a patient .  do I need a Doctor to refer me in order for  my medicare to pay for it?          "

## 2023-08-25 ENCOUNTER — OFFICE VISIT (OUTPATIENT)
Dept: FAMILY MEDICINE | Facility: CLINIC | Age: 78
End: 2023-08-25
Payer: MEDICARE

## 2023-08-25 VITALS
TEMPERATURE: 98.1 F | HEART RATE: 66 BPM | RESPIRATION RATE: 16 BRPM | SYSTOLIC BLOOD PRESSURE: 160 MMHG | HEIGHT: 60 IN | BODY MASS INDEX: 34.16 KG/M2 | DIASTOLIC BLOOD PRESSURE: 102 MMHG | OXYGEN SATURATION: 97 % | WEIGHT: 174 LBS

## 2023-08-25 DIAGNOSIS — I67.1 BRAIN ANEURYSM: ICD-10-CM

## 2023-08-25 DIAGNOSIS — R42 VERTIGO: Primary | ICD-10-CM

## 2023-08-25 DIAGNOSIS — I10 BENIGN ESSENTIAL HYPERTENSION: ICD-10-CM

## 2023-08-25 DIAGNOSIS — L82.1 SEBORRHEIC KERATOSES: ICD-10-CM

## 2023-08-25 PROCEDURE — 99214 OFFICE O/P EST MOD 30 MIN: CPT | Mod: 25 | Performed by: FAMILY MEDICINE

## 2023-08-25 PROCEDURE — 17110 DESTRUCTION B9 LES UP TO 14: CPT | Performed by: FAMILY MEDICINE

## 2023-08-25 RX ORDER — HYDROCHLOROTHIAZIDE 25 MG/1
25 TABLET ORAL DAILY
Qty: 30 TABLET | Refills: 1 | Status: SHIPPED | OUTPATIENT
Start: 2023-08-25 | End: 2023-09-18

## 2023-08-25 ASSESSMENT — PATIENT HEALTH QUESTIONNAIRE - PHQ9
SUM OF ALL RESPONSES TO PHQ QUESTIONS 1-9: 7
10. IF YOU CHECKED OFF ANY PROBLEMS, HOW DIFFICULT HAVE THESE PROBLEMS MADE IT FOR YOU TO DO YOUR WORK, TAKE CARE OF THINGS AT HOME, OR GET ALONG WITH OTHER PEOPLE: SOMEWHAT DIFFICULT
SUM OF ALL RESPONSES TO PHQ QUESTIONS 1-9: 7

## 2023-08-25 ASSESSMENT — ENCOUNTER SYMPTOMS: DIZZINESS: 1

## 2023-08-25 ASSESSMENT — PAIN SCALES - GENERAL: PAINLEVEL: NO PAIN (0)

## 2023-08-25 NOTE — NURSING NOTE
Initial BP (!) 160/102   Pulse 66   Temp 98.1  F (36.7  C) (Tympanic)   Resp 16   Ht 1.524 m (5')   Wt 78.9 kg (174 lb)   LMP  (LMP Unknown)   SpO2 97%   BMI 33.98 kg/m   Estimated body mass index is 33.98 kg/m  as calculated from the following:    Height as of this encounter: 1.524 m (5').    Weight as of this encounter: 78.9 kg (174 lb). .

## 2023-08-25 NOTE — PROGRESS NOTES
Assessment & Plan     Vertigo  Wonder if this is related to hypertension.  Will obtain CT/CTA to rule out intracranial pathology. Further work-up and management as dictated by results.  Reviewed stroke symptoms. 911 If any of these occur.  - CT Head w/o Contrast; Future  - CTA Head Neck with Contrast; Future    Benign essential hypertension  Uncontrolled. Start hydrochlorothiazide. Follow-up for re-check in 1 month.     - hydrochlorothiazide (HYDRODIURIL) 25 MG tablet; Take 1 tablet (25 mg) by mouth daily    Seborrheic keratoses  Cryo as below.  - DESTRUCT BENIGN LESION, UP TO 14             BMI:   Estimated body mass index is 33.98 kg/m  as calculated from the following:    Height as of this encounter: 1.524 m (5').    Weight as of this encounter: 78.9 kg (174 lb).           Audrey Roy MD  [facility]        Anatoliy Parada is a 78 year old, presenting for the following health issues:  Dizziness        8/25/2023    10:08 AM   Additional Questions   Roomed by Itzel DE ANDA   Accompanied by self       History of Present Illness       Reason for visit:  Dizziness  Symptom onset:  3-4 weeks ago  Symptoms include:  Dizziness  Symptom intensity:  Severe  Symptom progression:  Worsening  Had these symptoms before:  No  What makes it worse:  Standing/walking  What makes it better:  SittingShe consumes 1 sweetened beverage(s) daily.She exercises with enough effort to increase her heart rate 10 to 19 minutes per day.  She exercises with enough effort to increase her heart rate 7 days per week.   She is taking medications regularly.               Review of Systems   Neurological:  Positive for dizziness.            Objective    BP (!) 160/102   Pulse 66   Temp 98.1  F (36.7  C) (Tympanic)   Resp 16   Ht 1.524 m (5')   Wt 78.9 kg (174 lb)   LMP  (LMP Unknown)   SpO2 97%   BMI 33.98 kg/m    Body mass index is 33.98 kg/m .  Physical Exam   GENERAL: Pleasant, well appearing  female.  HEENT: PERRL, EOMI.  NECK: supple, no thyromegaly or thyroid masses, no lymphadenopathy.  CV: RRR, no murmurs, rubs or gallops. No carotid bruits.   LUNGS: Clear to auscultation bilaterally, normal effort.  SKIN: Seborrheic keratosis x 1  left axilla.  Treated with 2 freeze thaw cycles of liquid nitrogen.  EXTREMITIES: No edema, normal pulses.

## 2023-08-30 ENCOUNTER — HOSPITAL ENCOUNTER (OUTPATIENT)
Dept: CT IMAGING | Facility: CLINIC | Age: 78
Discharge: HOME | End: 2023-08-30
Attending: FAMILY MEDICINE
Payer: MEDICARE

## 2023-08-30 DIAGNOSIS — R42 VERTIGO: ICD-10-CM

## 2023-08-30 LAB
CREAT BLD-MCNC: 0.9 MG/DL (ref 0.5–1)
GFR SERPL CREATININE-BSD FRML MDRD: >60 ML/MIN/1.73M2
RADIOLOGIST FLAGS: ABNORMAL

## 2023-08-30 PROCEDURE — 70496 CT ANGIOGRAPHY HEAD: CPT | Mod: MG

## 2023-08-30 PROCEDURE — 70498 CT ANGIOGRAPHY NECK: CPT | Mod: MG

## 2023-08-30 PROCEDURE — 250N000009 HC RX 250: Performed by: RADIOLOGY

## 2023-08-30 PROCEDURE — G1010 CDSM STANSON: HCPCS

## 2023-08-30 PROCEDURE — 250N000011 HC RX IP 250 OP 636: Performed by: RADIOLOGY

## 2023-08-30 PROCEDURE — 82565 ASSAY OF CREATININE: CPT

## 2023-08-30 RX ORDER — IOPAMIDOL 755 MG/ML
68 INJECTION, SOLUTION INTRAVASCULAR ONCE
Status: COMPLETED | OUTPATIENT
Start: 2023-08-30 | End: 2023-08-30

## 2023-08-30 RX ADMIN — IOPAMIDOL 68 ML: 755 INJECTION, SOLUTION INTRAVENOUS at 12:53

## 2023-08-30 RX ADMIN — SODIUM CHLORIDE 100 ML: 9 INJECTION, SOLUTION INTRAVENOUS at 12:54

## 2023-08-31 ENCOUNTER — HOSPITAL ENCOUNTER (OUTPATIENT)
Dept: MAMMOGRAPHY | Facility: CLINIC | Age: 78
Discharge: HOME | End: 2023-08-31
Attending: FAMILY MEDICINE | Admitting: FAMILY MEDICINE
Payer: MEDICARE

## 2023-08-31 DIAGNOSIS — Z17.0 CARCINOMA OF LEFT BREAST IN FEMALE, ESTROGEN RECEPTOR POSITIVE, UNSPECIFIED SITE OF BREAST (H): ICD-10-CM

## 2023-08-31 DIAGNOSIS — Z12.31 ENCOUNTER FOR SCREENING MAMMOGRAM FOR MALIGNANT NEOPLASM OF BREAST: ICD-10-CM

## 2023-08-31 DIAGNOSIS — C50.912 CARCINOMA OF LEFT BREAST IN FEMALE, ESTROGEN RECEPTOR POSITIVE, UNSPECIFIED SITE OF BREAST (H): ICD-10-CM

## 2023-08-31 PROCEDURE — 77067 SCR MAMMO BI INCL CAD: CPT

## 2023-09-15 ENCOUNTER — LAB (OUTPATIENT)
Dept: LAB | Facility: CLINIC | Age: 78
End: 2023-09-15
Payer: MEDICARE

## 2023-09-15 ENCOUNTER — OFFICE VISIT (OUTPATIENT)
Dept: FAMILY MEDICINE | Facility: CLINIC | Age: 78
End: 2023-09-15
Payer: MEDICARE

## 2023-09-15 VITALS
OXYGEN SATURATION: 98 % | HEIGHT: 60 IN | DIASTOLIC BLOOD PRESSURE: 74 MMHG | SYSTOLIC BLOOD PRESSURE: 110 MMHG | HEART RATE: 86 BPM | RESPIRATION RATE: 16 BRPM | BODY MASS INDEX: 35.53 KG/M2 | TEMPERATURE: 97.2 F | WEIGHT: 181 LBS

## 2023-09-15 DIAGNOSIS — R53.83 OTHER FATIGUE: ICD-10-CM

## 2023-09-15 DIAGNOSIS — I10 BENIGN ESSENTIAL HYPERTENSION: Primary | ICD-10-CM

## 2023-09-15 DIAGNOSIS — E66.812 CLASS 2 SEVERE OBESITY DUE TO EXCESS CALORIES WITH SERIOUS COMORBIDITY AND BODY MASS INDEX (BMI) OF 35.0 TO 35.9 IN ADULT (H): ICD-10-CM

## 2023-09-15 DIAGNOSIS — E66.01 CLASS 2 SEVERE OBESITY DUE TO EXCESS CALORIES WITH SERIOUS COMORBIDITY AND BODY MASS INDEX (BMI) OF 35.0 TO 35.9 IN ADULT (H): ICD-10-CM

## 2023-09-15 DIAGNOSIS — R73.01 IMPAIRED FASTING GLUCOSE: ICD-10-CM

## 2023-09-15 DIAGNOSIS — R73.03 PREDIABETES: ICD-10-CM

## 2023-09-15 LAB
ALBUMIN SERPL BCG-MCNC: 4 G/DL (ref 3.5–5.2)
ALP SERPL-CCNC: 102 U/L (ref 35–104)
ALT SERPL W P-5'-P-CCNC: 25 U/L (ref 0–50)
ANION GAP SERPL CALCULATED.3IONS-SCNC: 8 MMOL/L (ref 7–15)
AST SERPL W P-5'-P-CCNC: 28 U/L (ref 0–45)
BASOPHILS # BLD AUTO: 0.1 10E3/UL (ref 0–0.2)
BASOPHILS NFR BLD AUTO: 1 %
BILIRUB SERPL-MCNC: 0.4 MG/DL
BUN SERPL-MCNC: 18.3 MG/DL (ref 8–23)
CALCIUM SERPL-MCNC: 10.4 MG/DL (ref 8.8–10.2)
CHLORIDE SERPL-SCNC: 100 MMOL/L (ref 98–107)
CREAT SERPL-MCNC: 0.76 MG/DL (ref 0.51–0.95)
DEPRECATED HCO3 PLAS-SCNC: 30 MMOL/L (ref 22–29)
EGFRCR SERPLBLD CKD-EPI 2021: 81 ML/MIN/1.73M2
EOSINOPHIL # BLD AUTO: 0.6 10E3/UL (ref 0–0.7)
EOSINOPHIL NFR BLD AUTO: 5 %
ERYTHROCYTE [DISTWIDTH] IN BLOOD BY AUTOMATED COUNT: 13 % (ref 10–15)
GLUCOSE SERPL-MCNC: 159 MG/DL (ref 70–99)
HCT VFR BLD AUTO: 41.9 % (ref 35–47)
HGB BLD-MCNC: 13.4 G/DL (ref 11.7–15.7)
IMM GRANULOCYTES # BLD: 0 10E3/UL
IMM GRANULOCYTES NFR BLD: 0 %
LYMPHOCYTES # BLD AUTO: 2.2 10E3/UL (ref 0.8–5.3)
LYMPHOCYTES NFR BLD AUTO: 21 %
MCH RBC QN AUTO: 29.9 PG (ref 26.5–33)
MCHC RBC AUTO-ENTMCNC: 32 G/DL (ref 31.5–36.5)
MCV RBC AUTO: 94 FL (ref 78–100)
MONOCYTES # BLD AUTO: 0.9 10E3/UL (ref 0–1.3)
MONOCYTES NFR BLD AUTO: 9 %
NEUTROPHILS # BLD AUTO: 6.4 10E3/UL (ref 1.6–8.3)
NEUTROPHILS NFR BLD AUTO: 63 %
PLATELET # BLD AUTO: 293 10E3/UL (ref 150–450)
POTASSIUM SERPL-SCNC: 4.7 MMOL/L (ref 3.4–5.3)
PROT SERPL-MCNC: 7.1 G/DL (ref 6.4–8.3)
RBC # BLD AUTO: 4.48 10E6/UL (ref 3.8–5.2)
SODIUM SERPL-SCNC: 138 MMOL/L (ref 136–145)
TSH SERPL DL<=0.005 MIU/L-ACNC: 0.5 UIU/ML (ref 0.3–4.2)
WBC # BLD AUTO: 10.2 10E3/UL (ref 4–11)

## 2023-09-15 PROCEDURE — 80053 COMPREHEN METABOLIC PANEL: CPT

## 2023-09-15 PROCEDURE — 84443 ASSAY THYROID STIM HORMONE: CPT

## 2023-09-15 PROCEDURE — 85025 COMPLETE CBC W/AUTO DIFF WBC: CPT

## 2023-09-15 PROCEDURE — 82607 VITAMIN B-12: CPT

## 2023-09-15 PROCEDURE — 36415 COLL VENOUS BLD VENIPUNCTURE: CPT

## 2023-09-15 PROCEDURE — 99214 OFFICE O/P EST MOD 30 MIN: CPT | Performed by: FAMILY MEDICINE

## 2023-09-15 ASSESSMENT — PAIN SCALES - GENERAL: PAINLEVEL: NO PAIN (0)

## 2023-09-15 NOTE — NURSING NOTE
Initial /74   Pulse 86   Temp 97.2  F (36.2  C) (Tympanic)   Resp 16   Ht 1.524 m (5')   Wt 82.1 kg (181 lb)   LMP  (LMP Unknown)   SpO2 98%   BMI 35.35 kg/m   Estimated body mass index is 35.35 kg/m  as calculated from the following:    Height as of this encounter: 1.524 m (5').    Weight as of this encounter: 82.1 kg (181 lb). .

## 2023-09-15 NOTE — PATIENT INSTRUCTIONS
"B12 supplement 1000mcg daily.    * * *    Per federal transparency in medicine laws, lab and imaging results are released \"real time\" into My Chart.  This may mean that you see the results before I have a chance to review them. My Chart will alert you again when I review the lab and enter comments.  Sometimes with imaging or labs there may be serious or unexpected results. Critical results are paged to me or the after hours on-call provider so that they can be reviewed immediately.  This is not true of non-critical abnormal results. Unfortunately, this means that it's possible you may be alerted of a serious finding before I have a chance to review it.  If you ever receive a result that you are concerned about and I have not already contacted you, please feel free to reach out to me or the care team so that you get the answers you need.    Additionally, it is my goal that you understand the care plan discussed at your visit and that any questions you have are answered.  Please feel free to reach out if you need clarification or explanation of any information addressed at your office visit.    "

## 2023-09-15 NOTE — PROGRESS NOTES
Assessment & Plan     Benign essential hypertension  Well controlled. Refilled medication.   She still has refills of hydrochlorothiazide.  - lisinopril (ZESTRIL) 40 MG tablet; Take 1 tablet (40 mg) by mouth daily    Other fatigue  Discussed often multi-factorial etiology of fatigue. Will initiate further work-up with labs as below. Further work-up and management as dictated by results.    - TSH with free T4 reflex; Future  - CBC with Platelets & Differential; Future  - Comprehensive metabolic panel; Future  - Vitamin B12; Future    Impaired fasting glucose  - Hemoglobin A1c; Future  - Albumin Random Urine Quantitative with Creat Ratio; Future  - Comprehensive metabolic panel; Future    Class 2 severe obesity due to excess calories with serious comorbidity and body mass index (BMI) of 35.0 to 35.9 in adult (H)  Working on lifestyle changes.              BMI:   Estimated body mass index is 35.35 kg/m  as calculated from the following:    Height as of this encounter: 1.524 m (5').    Weight as of this encounter: 82.1 kg (181 lb).           Audrey Roy MD  [facility]    Anatoliy Parada is a 78 year old, presenting for the following health issues:  Hypertension      HPI     Hypertension Follow-up    Do you check your blood pressure regularly outside of the clinic? Yes   Are you following a low salt diet? Yes  Are your blood pressures ever more than 140 on the top number (systolic) OR more   than 90 on the bottom number (diastolic), for example 140/90? No  How many servings of fruits and vegetables do you eat daily?  0-1  On average, how many sweetened beverages do you drink each day (Examples: soda, juice, sweet tea, etc.  Do NOT count diet or artificially sweetened beverages)?   0  How many days per week do you exercise enough to make your heart beat faster? 3 or less  How many minutes a day do you exercise enough to make your heart beat faster? 9 or less  How many days per  week do you miss taking your medication? 0        Review of Systems   Constitutional, neuro, ENT, endocrine, pulmonary, cardiac, gastrointestinal, genitourinary, musculoskeletal, integument and psychiatric systems are negative, except as otherwise noted.       Objective    /74   Pulse 86   Temp 97.2  F (36.2  C) (Tympanic)   Resp 16   Ht 1.524 m (5')   Wt 82.1 kg (181 lb)   LMP  (LMP Unknown)   SpO2 98%   BMI 35.35 kg/m    Body mass index is 35.35 kg/m .  Physical Exam   GENERAL: Pleasant, well appearing female.  HEENT: PERRL, EOMI.  NECK: supple, no thyromegaly or thyroid masses, no lymphadenopathy.  CV: RRR, no murmurs, rubs or gallops.  LUNGS: Clear to auscultation bilaterally, normal effort.  ABDOMEN: Soft, non-distended, non-tender.  No hepatosplenomegaly or palpable masses.    SKIN: warm and dry without obvious rashes.   EXTREMITIES: No edema, normal pulses.

## 2023-09-16 ENCOUNTER — TELEPHONE (OUTPATIENT)
Dept: FAMILY MEDICINE | Facility: CLINIC | Age: 78
End: 2023-09-16
Payer: MEDICARE

## 2023-09-16 DIAGNOSIS — I10 BENIGN ESSENTIAL HYPERTENSION: ICD-10-CM

## 2023-09-16 LAB — VIT B12 SERPL-MCNC: 905 PG/ML (ref 232–1245)

## 2023-09-18 RX ORDER — HYDROCHLOROTHIAZIDE 25 MG/1
25 TABLET ORAL DAILY
Qty: 90 TABLET | Refills: 3 | Status: SHIPPED | OUTPATIENT
Start: 2023-09-18 | End: 2024-03-22

## 2023-09-19 NOTE — TELEPHONE ENCOUNTER
Pharmacy notified on their voicemail.   Left a message for Karma to return our call to let her know that she received refills on the hydrochlorothiazide, enough to get her through until she will need her physical in February. Thank you!    Mendy Farias RN

## 2023-09-19 NOTE — TELEPHONE ENCOUNTER
Prescription was refilled for a year per standing order. She is, however due for physical 2/2023. Please contact pharmacy and change refills to 1 so that will just get her through to her physical when medication re-check is due.

## 2023-09-27 RX ORDER — LISINOPRIL 40 MG/1
40 TABLET ORAL DAILY
Qty: 90 TABLET | Refills: 4 | Status: SHIPPED | OUTPATIENT
Start: 2023-09-27 | End: 2024-04-26

## 2023-10-09 ENCOUNTER — LAB (OUTPATIENT)
Dept: LAB | Facility: CLINIC | Age: 78
End: 2023-10-09
Payer: MEDICARE

## 2023-10-09 DIAGNOSIS — H34.11 CENTRAL RETINAL ARTERY OCCLUSION OF RIGHT EYE: ICD-10-CM

## 2023-10-09 DIAGNOSIS — R73.01 IMPAIRED FASTING GLUCOSE: ICD-10-CM

## 2023-10-09 LAB
ALBUMIN SERPL BCG-MCNC: 4.2 G/DL (ref 3.5–5.2)
ALP SERPL-CCNC: 100 U/L (ref 35–104)
ALT SERPL W P-5'-P-CCNC: 24 U/L (ref 0–50)
ANION GAP SERPL CALCULATED.3IONS-SCNC: 11 MMOL/L (ref 7–15)
AST SERPL W P-5'-P-CCNC: 26 U/L (ref 0–45)
BILIRUB SERPL-MCNC: 0.6 MG/DL
BUN SERPL-MCNC: 18.9 MG/DL (ref 8–23)
CALCIUM SERPL-MCNC: 10.2 MG/DL (ref 8.8–10.2)
CHLORIDE SERPL-SCNC: 98 MMOL/L (ref 98–107)
CREAT SERPL-MCNC: 0.85 MG/DL (ref 0.51–0.95)
DEPRECATED HCO3 PLAS-SCNC: 28 MMOL/L (ref 22–29)
EGFRCR SERPLBLD CKD-EPI 2021: 71 ML/MIN/1.73M2
GLUCOSE SERPL-MCNC: 102 MG/DL (ref 70–99)
HBA1C MFR BLD: 6.2 % (ref 0–5.6)
POTASSIUM SERPL-SCNC: 4.9 MMOL/L (ref 3.4–5.3)
PROT SERPL-MCNC: 7.3 G/DL (ref 6.4–8.3)
SODIUM SERPL-SCNC: 137 MMOL/L (ref 135–145)

## 2023-10-09 PROCEDURE — 80053 COMPREHEN METABOLIC PANEL: CPT

## 2023-10-09 PROCEDURE — 36415 COLL VENOUS BLD VENIPUNCTURE: CPT

## 2023-10-09 PROCEDURE — 83036 HEMOGLOBIN GLYCOSYLATED A1C: CPT

## 2023-10-12 ENCOUNTER — APPOINTMENT (OUTPATIENT)
Dept: MRI IMAGING | Facility: CLINIC | Age: 78
End: 2023-10-12
Attending: EMERGENCY MEDICINE
Payer: MEDICARE

## 2023-10-12 ENCOUNTER — APPOINTMENT (OUTPATIENT)
Dept: CT IMAGING | Facility: CLINIC | Age: 78
End: 2023-10-12
Attending: EMERGENCY MEDICINE
Payer: MEDICARE

## 2023-10-12 ENCOUNTER — HOSPITAL ENCOUNTER (INPATIENT)
Facility: HOSPITAL | Age: 78
LOS: 1 days | Discharge: HOME | DRG: 065 | End: 2023-10-13
Attending: INTERNAL MEDICINE | Admitting: STUDENT IN AN ORGANIZED HEALTH CARE EDUCATION/TRAINING PROGRAM
Payer: MEDICARE

## 2023-10-12 ENCOUNTER — HOSPITAL ENCOUNTER (EMERGENCY)
Facility: CLINIC | Age: 78
Discharge: ANOTHER HOSPITAL | End: 2023-10-12
Attending: EMERGENCY MEDICINE | Admitting: EMERGENCY MEDICINE
Payer: MEDICARE

## 2023-10-12 VITALS
RESPIRATION RATE: 17 BRPM | BODY MASS INDEX: 34.55 KG/M2 | WEIGHT: 176 LBS | HEIGHT: 60 IN | DIASTOLIC BLOOD PRESSURE: 66 MMHG | OXYGEN SATURATION: 93 % | TEMPERATURE: 97.7 F | HEART RATE: 85 BPM | SYSTOLIC BLOOD PRESSURE: 133 MMHG

## 2023-10-12 DIAGNOSIS — I63.9 ACUTE ISCHEMIC STROKE (H): ICD-10-CM

## 2023-10-12 DIAGNOSIS — I63.9 CEREBROVASCULAR ACCIDENT (CVA), UNSPECIFIED MECHANISM (H): Primary | ICD-10-CM

## 2023-10-12 DIAGNOSIS — R47.1 DYSARTHRIA: ICD-10-CM

## 2023-10-12 LAB
ANION GAP SERPL CALCULATED.3IONS-SCNC: 7 MMOL/L (ref 7–15)
APTT PPP: 31 SECONDS (ref 22–38)
BASO+EOS+MONOS # BLD AUTO: NORMAL 10*3/UL
BASO+EOS+MONOS NFR BLD AUTO: NORMAL %
BASOPHILS # BLD AUTO: 0.1 10E3/UL (ref 0–0.2)
BASOPHILS NFR BLD AUTO: 1 %
BUN SERPL-MCNC: 16.4 MG/DL (ref 8–23)
CALCIUM SERPL-MCNC: 10.5 MG/DL (ref 8.8–10.2)
CHLORIDE SERPL-SCNC: 98 MMOL/L (ref 98–107)
CREAT SERPL-MCNC: 0.73 MG/DL (ref 0.51–0.95)
DEPRECATED HCO3 PLAS-SCNC: 30 MMOL/L (ref 22–29)
EGFRCR SERPLBLD CKD-EPI 2021: 85 ML/MIN/1.73M2
EOSINOPHIL # BLD AUTO: 0.4 10E3/UL (ref 0–0.7)
EOSINOPHIL NFR BLD AUTO: 4 %
ERYTHROCYTE [DISTWIDTH] IN BLOOD BY AUTOMATED COUNT: 13.3 % (ref 10–15)
GLUCOSE BLDC GLUCOMTR-MCNC: 98 MG/DL (ref 70–99)
GLUCOSE SERPL-MCNC: 92 MG/DL (ref 70–99)
HCT VFR BLD AUTO: 43.5 % (ref 35–47)
HGB BLD-MCNC: 13.9 G/DL (ref 11.7–15.7)
HOLD SPECIMEN: NORMAL
IMM GRANULOCYTES # BLD: 0 10E3/UL
IMM GRANULOCYTES NFR BLD: 0 %
INR PPP: 0.98 (ref 0.85–1.15)
LYMPHOCYTES # BLD AUTO: 2 10E3/UL (ref 0.8–5.3)
LYMPHOCYTES NFR BLD AUTO: 22 %
MCH RBC QN AUTO: 29.9 PG (ref 26.5–33)
MCHC RBC AUTO-ENTMCNC: 32 G/DL (ref 31.5–36.5)
MCV RBC AUTO: 94 FL (ref 78–100)
MONOCYTES # BLD AUTO: 1.2 10E3/UL (ref 0–1.3)
MONOCYTES NFR BLD AUTO: 13 %
NEUTROPHILS # BLD AUTO: 5.4 10E3/UL (ref 1.6–8.3)
NEUTROPHILS NFR BLD AUTO: 60 %
NRBC # BLD AUTO: 0 10E3/UL
NRBC BLD AUTO-RTO: 0 /100
PLATELET # BLD AUTO: 285 10E3/UL (ref 150–450)
POTASSIUM SERPL-SCNC: 4.5 MMOL/L (ref 3.4–5.3)
RBC # BLD AUTO: 4.65 10E6/UL (ref 3.8–5.2)
SODIUM SERPL-SCNC: 135 MMOL/L (ref 135–145)
TROPONIN T SERPL HS-MCNC: 9 NG/L
WBC # BLD AUTO: 9.1 10E3/UL (ref 4–11)

## 2023-10-12 PROCEDURE — 36415 COLL VENOUS BLD VENIPUNCTURE: CPT | Performed by: EMERGENCY MEDICINE

## 2023-10-12 PROCEDURE — 70553 MRI BRAIN STEM W/O & W/DYE: CPT | Mod: MG

## 2023-10-12 PROCEDURE — 70450 CT HEAD/BRAIN W/O DYE: CPT | Mod: MG,XS

## 2023-10-12 PROCEDURE — 82962 GLUCOSE BLOOD TEST: CPT

## 2023-10-12 PROCEDURE — 70498 CT ANGIOGRAPHY NECK: CPT | Mod: MG

## 2023-10-12 PROCEDURE — 120N000001 HC R&B MED SURG/OB

## 2023-10-12 PROCEDURE — 84484 ASSAY OF TROPONIN QUANT: CPT | Performed by: EMERGENCY MEDICINE

## 2023-10-12 PROCEDURE — 255N000002 HC RX 255 OP 636: Performed by: EMERGENCY MEDICINE

## 2023-10-12 PROCEDURE — G1010 CDSM STANSON: HCPCS

## 2023-10-12 PROCEDURE — 250N000011 HC RX IP 250 OP 636: Performed by: EMERGENCY MEDICINE

## 2023-10-12 PROCEDURE — 99222 1ST HOSP IP/OBS MODERATE 55: CPT | Mod: AI | Performed by: STUDENT IN AN ORGANIZED HEALTH CARE EDUCATION/TRAINING PROGRAM

## 2023-10-12 PROCEDURE — 85730 THROMBOPLASTIN TIME PARTIAL: CPT | Performed by: EMERGENCY MEDICINE

## 2023-10-12 PROCEDURE — A9585 GADOBUTROL INJECTION: HCPCS | Performed by: EMERGENCY MEDICINE

## 2023-10-12 PROCEDURE — 85025 COMPLETE CBC W/AUTO DIFF WBC: CPT | Performed by: EMERGENCY MEDICINE

## 2023-10-12 PROCEDURE — 99291 CRITICAL CARE FIRST HOUR: CPT | Mod: 25 | Performed by: EMERGENCY MEDICINE

## 2023-10-12 PROCEDURE — 80048 BASIC METABOLIC PNL TOTAL CA: CPT | Performed by: EMERGENCY MEDICINE

## 2023-10-12 PROCEDURE — 85610 PROTHROMBIN TIME: CPT | Performed by: EMERGENCY MEDICINE

## 2023-10-12 PROCEDURE — 93010 ELECTROCARDIOGRAM REPORT: CPT | Performed by: EMERGENCY MEDICINE

## 2023-10-12 PROCEDURE — 93005 ELECTROCARDIOGRAM TRACING: CPT | Performed by: EMERGENCY MEDICINE

## 2023-10-12 PROCEDURE — 250N000009 HC RX 250: Performed by: EMERGENCY MEDICINE

## 2023-10-12 RX ORDER — GADOBUTROL 604.72 MG/ML
8 INJECTION INTRAVENOUS ONCE
Status: COMPLETED | OUTPATIENT
Start: 2023-10-12 | End: 2023-10-12

## 2023-10-12 RX ORDER — LABETALOL HYDROCHLORIDE 5 MG/ML
10 INJECTION, SOLUTION INTRAVENOUS EVERY 10 MIN PRN
Status: DISCONTINUED | OUTPATIENT
Start: 2023-10-12 | End: 2023-10-12 | Stop reason: HOSPADM

## 2023-10-12 RX ORDER — ACETAMINOPHEN 650 MG/1
650 SUPPOSITORY RECTAL EVERY 6 HOURS PRN
Status: DISCONTINUED | OUTPATIENT
Start: 2023-10-12 | End: 2023-10-13 | Stop reason: HOSPADM

## 2023-10-12 RX ORDER — ASPIRIN 325 MG
325 TABLET, DELAYED RELEASE (ENTERIC COATED) ORAL DAILY
Status: DISCONTINUED | OUTPATIENT
Start: 2023-10-13 | End: 2023-10-13

## 2023-10-12 RX ORDER — LISINOPRIL 20 MG/1
40 TABLET ORAL DAILY
Status: DISCONTINUED | OUTPATIENT
Start: 2023-10-13 | End: 2023-10-13 | Stop reason: HOSPADM

## 2023-10-12 RX ORDER — ONDANSETRON 4 MG/1
4 TABLET, ORALLY DISINTEGRATING ORAL EVERY 6 HOURS PRN
Status: DISCONTINUED | OUTPATIENT
Start: 2023-10-12 | End: 2023-10-13 | Stop reason: HOSPADM

## 2023-10-12 RX ORDER — ONDANSETRON 2 MG/ML
4 INJECTION INTRAMUSCULAR; INTRAVENOUS EVERY 6 HOURS PRN
Status: DISCONTINUED | OUTPATIENT
Start: 2023-10-12 | End: 2023-10-13 | Stop reason: HOSPADM

## 2023-10-12 RX ORDER — PROCHLORPERAZINE MALEATE 5 MG
5 TABLET ORAL EVERY 6 HOURS PRN
Status: DISCONTINUED | OUTPATIENT
Start: 2023-10-12 | End: 2023-10-13 | Stop reason: HOSPADM

## 2023-10-12 RX ORDER — SODIUM CHLORIDE 9 MG/ML
INJECTION, SOLUTION INTRAVENOUS CONTINUOUS PRN
Status: DISCONTINUED | OUTPATIENT
Start: 2023-10-12 | End: 2023-10-12 | Stop reason: HOSPADM

## 2023-10-12 RX ORDER — HYDRALAZINE HYDROCHLORIDE 20 MG/ML
10 INJECTION INTRAMUSCULAR; INTRAVENOUS EVERY 10 MIN PRN
Status: DISCONTINUED | OUTPATIENT
Start: 2023-10-12 | End: 2023-10-12 | Stop reason: HOSPADM

## 2023-10-12 RX ORDER — UBIDECARENONE 200 MG
1 CAPSULE ORAL AT BEDTIME
Status: ON HOLD | COMMUNITY

## 2023-10-12 RX ORDER — ATORVASTATIN CALCIUM 40 MG/1
40 TABLET, FILM COATED ORAL DAILY
Status: DISCONTINUED | OUTPATIENT
Start: 2023-10-13 | End: 2023-10-13 | Stop reason: HOSPADM

## 2023-10-12 RX ORDER — IOPAMIDOL 755 MG/ML
67 INJECTION, SOLUTION INTRAVASCULAR ONCE
Status: COMPLETED | OUTPATIENT
Start: 2023-10-12 | End: 2023-10-12

## 2023-10-12 RX ORDER — PROCHLORPERAZINE 25 MG
12.5 SUPPOSITORY, RECTAL RECTAL EVERY 12 HOURS PRN
Status: DISCONTINUED | OUTPATIENT
Start: 2023-10-12 | End: 2023-10-13 | Stop reason: HOSPADM

## 2023-10-12 RX ORDER — ACETAMINOPHEN 325 MG/1
650 TABLET ORAL EVERY 6 HOURS PRN
Status: DISCONTINUED | OUTPATIENT
Start: 2023-10-12 | End: 2023-10-13 | Stop reason: HOSPADM

## 2023-10-12 RX ORDER — FAMOTIDINE 20 MG/1
20 TABLET, FILM COATED ORAL 2 TIMES DAILY
Status: DISCONTINUED | OUTPATIENT
Start: 2023-10-13 | End: 2023-10-13 | Stop reason: HOSPADM

## 2023-10-12 RX ORDER — ASPIRIN 325 MG
325 TABLET, DELAYED RELEASE (ENTERIC COATED) ORAL DAILY
Status: ON HOLD | COMMUNITY
End: 2023-10-13

## 2023-10-12 RX ADMIN — SODIUM CHLORIDE 100 ML: 9 INJECTION, SOLUTION INTRAVENOUS at 14:16

## 2023-10-12 RX ADMIN — IOPAMIDOL 67 ML: 755 INJECTION, SOLUTION INTRAVENOUS at 14:16

## 2023-10-12 RX ADMIN — GADOBUTROL 8 ML: 604.72 INJECTION INTRAVENOUS at 17:21

## 2023-10-12 ASSESSMENT — VISUAL ACUITY
OU: BASELINE

## 2023-10-12 ASSESSMENT — ACTIVITIES OF DAILY LIVING (ADL)
ADLS_ACUITY_SCORE: 35

## 2023-10-12 NOTE — CONSULTS
"  [facility]    Stroke Telephone Note    I was called by Edmundo Baltazar on 10/12/23 regarding patient Chika Hurd. The patient is a 78 year old female presented with slurred speech since 8 pm, 10/11/23 and continues while being in the ED. No other neurological deficits as per ED.    BP (!) 163/66   Pulse 79   Temp 97.7  F (36.5  C) (Oral)   Resp 13   Ht 1.524 m (5')   Wt 79.8 kg (176 lb)   LMP  (LMP Unknown)   SpO2 98%   BMI 34.37 kg/m       Imaging Findings  CT head: No Hemorrhage  CTA head/neck: No LVO    Impression  #Rule out Acute ischemic stroke    Recommendations  - Recommend MRI Brain w/w/o contrast and if it shows stroke then admit for stroke work up and will be seen by telestroke team.    - Updated recommendation for left frontal infarct    - continue with  mg  - Lipitor 40 mg daily  - Lipid profile , TSH, HbA1C labs  - telemetry  - pt/ot/speech  - TTE    Case discussed with vascular neurology attending [facility]     My recommendations are based on the information provided over the phone by Chika Hurd's in-person providers. They are not intended to replace the clinical judgment of her in-person providers. I was not requested to personally see or examine the patient at this time.    The Stroke Staff is [facility] .    Bindu Tapia MD  Vascular Neurology Fellow    To page me or covering stroke neurology team member, click here: [facility]  Choose \"On Call\" tab at top, then select \"NEUROLOGY/ALL SITES\" from middle drop-down box, press Enter, then look for \"stroke\" or \"telestroke\" for your site.   "

## 2023-10-12 NOTE — ED TRIAGE NOTES
Pt presents with slurred speech since 2000 last night. Also reports problem with equilibrium.      Triage Assessment (Adult)       Row Name 10/12/23 1224          Triage Assessment    Airway WDL WDL        Respiratory WDL    Respiratory WDL WDL        Skin Circulation/Temperature WDL    Skin Circulation/Temperature WDL WDL        Cardiac WDL    Cardiac WDL WDL        Peripheral/Neurovascular WDL    Peripheral Neurovascular WDL WDL        Cognitive/Neuro/Behavioral WDL    Cognitive/Neuro/Behavioral WDL X;speech     Speech slurred        Armando Coma Scale    Best Eye Response 4-->(E4) spontaneous     Best Motor Response 6-->(M6) obeys commands     Best Verbal Response 5-->(V5) oriented     Armando Coma Scale Score 15

## 2023-10-12 NOTE — ED PROVIDER NOTES
History     Chief Complaint   Patient presents with    Slurred Speech     HPI  Chika Hurd is a 78 year old female with history of hyperlipidemia, hypertension, obesity central retinal artery occlusion of right (2017) ductal carcinoma in situ left breast was difficulty with her speech which began at approximately 8 PM last night.  She did not think too much of it and went to bed.  When she spoke to her daughter on the phone this morning her daughter was concerned and she comes to the emergency department at her behest.  No headache, trouble swallowing, change in vision, weakness or sensory change.  Is on aspirin and atorvastatin.     The patient's PMHx, Surgical Hx, Allergies, and Medications were all reviewed with the patient.    Allergies:  Allergies   Allergen Reactions    Cortisone Swelling     Cortisone Cream       Problem List:    Patient Active Problem List    Diagnosis Date Noted    Class 2 severe obesity due to excess calories with serious comorbidity in adult (H) 09/15/2023     Priority: Medium    Benign essential hypertension 07/26/2022     Priority: Medium    Carcinoma of left breast in female, estrogen receptor positive, unspecified site of breast (H) 05/16/2019     Priority: Medium    Central retinal artery occlusion of right eye 05/23/2017     Priority: Medium    Ductal carcinoma in situ (DCIS) of left breast 07/26/2016     Priority: Medium    Advanced directives, counseling/discussion 01/15/2016     Priority: Medium     Patient does not have an Advance/Health Care Directive (HCD), declines information/referral.    Luana Muñiz  January 15, 2016        Cataract right eye 06/04/2015     Priority: Medium    GERD (gastroesophageal reflux disease) 05/19/2009     Priority: Medium    SENSONRL HEAR LOSS,BILAT 05/15/2007     Priority: Medium    Hyperlipidemia LDL goal <130 02/03/2006     Priority: Medium     Did well with Lipitor but off formulary  Pravastatin caused stomach aches and  myalgias  On Crestor until generic Lipitor became available          Past Medical History:    Past Medical History:   Diagnosis Date    Breast cancer (H)     Central retinal artery occlusion, right     DCIS (ductal carcinoma in situ) of breast     GERD (gastroesophageal reflux disease)     Hyperlipidemia        Past Surgical History:    Past Surgical History:   Procedure Laterality Date    COLONOSCOPY N/A 2021    Procedure: COLONOSCOPY, WITH POLYPECTOMY AND BIOPSY;  Surgeon: Lawrence Ortega DO;  Location: [facility]    ENT SURGERY      dental implants  started    FLEXIBLE SIGMOIDOSCOPY      LUMPECTOMY BREAST WITH SEED LOCALIZATION Left 2016    Procedure: LUMPECTOMY BREAST WITH SEED LOCALIZATION;  Surgeon: Russel Murry MD;  Location:  OR    PHACOEMULSIFICATION WITH STANDARD INTRAOCULAR LENS IMPLANT Right 2017    Procedure: PHACOEMULSIFICATION WITH STANDARD INTRAOCULAR LENS IMPLANT;  Right cataract removal with implant;  Surgeon: Michael Zuleta MD;  Location: [facility]    PHACOEMULSIFICATION WITH STANDARD INTRAOCULAR LENS IMPLANT Left 2017    Procedure: PHACOEMULSIFICATION WITH STANDARD INTRAOCULAR LENS IMPLANT;  Left cataract removal with implant;  Surgeon: Michael Zuleta MD;  Location: [facility]    SURGICAL HISTORY OF -       Right Hip Pinning    SURGICAL HISTORY OF -       Tubal Ligation       Family History:    Family History   Problem Relation Age of Onset    Heart Disease Mother     Heart Disease Father     Circulatory Father         main artery aneursym    Heart Disease Sister         sleep problems    Breast Cancer Maternal Aunt         diagnosed in 60's, surviving in 90's    Breast Cancer Cousin     Breast Cancer Cousin     Skin Cancer No family hx of        Social History:  Marital Status:   [2]  Social History     Tobacco Use    Smoking status: Former     Years: 35     Types: Cigarettes     Quit date: 2005     Years since quittin.7     Smokeless tobacco: Never   Vaping Use    Vaping Use: Never used   Substance Use Topics    Alcohol use: Yes     Comment: rarely    Drug use: No        Medications:    acetaminophen (TYLENOL) 500 MG tablet  Ascorbic Acid (VITAMIN C PO)  ASPIRIN PO  atorvastatin (LIPITOR) 40 MG tablet  CALCIUM + D OR  co-enzyme Q-10 100 MG CAPS capsule  famotidine (PEPCID) 20 MG tablet  Glycerin-Polysorbate 80 (REFRESH DRY EYE THERAPY OP)  hydrochlorothiazide (HYDRODIURIL) 25 MG tablet  lisinopril (ZESTRIL) 40 MG tablet  MULTIVITAMIN OR  Omega-3 Fatty Acids (OMEGA-3 FISH OIL PO)          Review of Systems  Pertinent positives and negatives  in HPI    Physical Exam   BP: 138/65  Pulse: 67  Temp: 97.7  F (36.5  C)  Resp: 18  Height: 152.4 cm (5')  Weight: 79.8 kg (176 lb)  SpO2: 96 %    Physical Exam  General: Awake, alert, and cooperative. No acute distress  Mental Status: Oriented x 3.   Head: Normocephalic, atraumatic, symmetric.   Eyes: Conjunctiva normal, no discharge, PERRL 3 mm. No neglect/hemianopsia.  Ears, Nose, Throat: External ears and nares normal.  No oral exudates. Oral mucosa moist.  Neck: Supple. No adenopathy. Non-tender.   Cardiovascular: Regular rate. Regular rhythm. No murmurs. Peripheral pulses strong. Normal capillary refill. No LE edema.   Respiratory: Normal effort. No wheezes, rales, or rhonchi bilaterally.  Chest Wall: Equal rise.  Abdomen: No tenderess, soft, non-distended. No rebound or guarding. No masses palpated  Genitourinary: Normal external genitalia  Musculoskeletal: No gross deformity. Warm and well perfused  Neurologic: Mental status: Alert, awake. Oriented to self, date, and place. Normal speech and language. GCS 15  Cranial Nerves: dysarthric speech.   Motor: Follows commands x 4 extremities. Down going Babinski's. No Clonus.   Upper Extremities:   RUE: 5/5 shoulder abduction. 5/5 elbow flex/ext. 5/5 wrist flex/ext. 5/5 hand .   LUE: 5/5 shoulder abduction. 5/5 elbow flex/ext. 5/5 wrist  flex/ext. 5/5 hand .   Lower Extremities:   RLE: 5/5 hip flexion. 5/5 knee flex/ext. 5/5 ankle plantar-/dorsiflexion. 5/5 EHL.   LLE: 5/5 hip flexion. 5/5 knee flex/ext. 5/5 ankle plantar-/dorsiflexion. 5/5 EHL   Sensory: Sensation intact to light touch in all 4 extremities   Coordination: Finger-nose finger intact. Heel-shin intact. Negative Romberg.  Skin: Warm, dry, no pallor, no erythema, no jaundice or rash.  Psychiatric:  Appropriate affect. Behavior is normal.    ED Course        Procedures         EKG: Interpreted by Edmundo Rodrigues MD sinus rhythm with rate of 66 bpm.  Normal axis.  Normal R wave progression.  Normal intervals.  No ST segment elevations or depressions.  T wave inversion in lead III and Q wave in lead III.  These changes were present on EKG dated 12/20/2018.  Impression sinus rhythm with no acute changes.    Critical Care time:  was 30 minutes for this patient excluding procedures.         The patient has stroke symptoms:         ED Stroke specific documentation           NIHSS PDF     Patient last known well time: 2000 10/11/23  ED Provider first to bedside at: 12:46 PM       Thrombolytics:   Not given due to:   - minor/isolated/quickly resolving symptoms    If treating with thrombolytics: Ensure SBP<180 and DBP<105 prior to treatment with thrombolytics.  Administering thrombolytics after treatment with IV labetalol, hydralazine, or nicardipine is reasonable once BP control is established.    Endovascular Retrieval:  Not initiated due to absence of proximal vessel occlusion    National Institutes of Health Stroke Scale (Baseline)  Time Performed: 12:46 PM       Score    Level of consciousness: (0)   Alert, keenly responsive    LOC questions: (0)   Answers both questions correctly    LOC commands: (0)   Performs both tasks correctly    Best gaze: (0)   Normal    Visual: (0)   No visual loss    Facial palsy: (0)   Normal symmetrical movements    Motor arm (left): (0)   No drift    Motor  arm (right): (0)   No drift    Motor leg (left): (0)   No drift    Motor leg (right): (0)   No drift    Limb ataxia: (0)   Absent    Sensory: (0)   Normal- no sensory loss    Best language: (0)   Normal- no aphasia    Dysarthria: (1)   Mild to moderate dysarthria    Extinction and inattention: (0)   No abnormality        Total Score:  1        Stroke Mimics were considered (including migraine headache, seizure disorder, hypoglycemia (or hyperglycemia), head or spinal trauma, CNS infection, Toxin ingestion and shock state (e.g. sepsis) .                   Results for orders placed or performed during the hospital encounter of 10/12/23 (from the past 24 hour(s))   Glucose by meter   Result Value Ref Range    GLUCOSE BY METER POCT 98 70 - 99 mg/dL   [facility] Draw    Narrative    The following orders were created for panel order [facility].  Procedure                               Abnormality         Status                     ---------                               -----------         ------                     Extra Blue Top Tube[[id number]]                              Final result               Extra Red Top Tube[[id number]]                               Final result               Extra Green Top (Lithium...[[id number]]                      Final result               Extra Purple Top Tube[[id number]]                            Final result                 Please view results for these tests on the individual orders.   Extra Blue Top Tube   Result Value Ref Range    Hold Specimen JIC    Extra Red Top Tube   Result Value Ref Range    Hold Specimen JIC    Extra Green Top (Lithium Heparin) Tube   Result Value Ref Range    Hold Specimen JIC    Extra Purple Top Tube   Result Value Ref Range    Hold Specimen JIC    CBC with Platelets & Differential    Narrative    The following orders were created for panel order CBC with Platelets & Differential.  Procedure                               Abnormality         Status                      ---------                               -----------         ------                     CBC with platelets and d...[[id number]]                      Final result                 Please view results for these tests on the individual orders.   Basic metabolic panel   Result Value Ref Range    Sodium 135 135 - 145 mmol/L    Potassium 4.5 3.4 - 5.3 mmol/L    Chloride 98 98 - 107 mmol/L    Carbon Dioxide (CO2) 30 (H) 22 - 29 mmol/L    Anion Gap 7 7 - 15 mmol/L    Urea Nitrogen 16.4 8.0 - 23.0 mg/dL    Creatinine 0.73 0.51 - 0.95 mg/dL    GFR Estimate 85 >60 mL/min/1.73m2    Calcium 10.5 (H) 8.8 - 10.2 mg/dL    Glucose 92 70 - 99 mg/dL   INR   Result Value Ref Range    INR 0.98 0.85 - 1.15   Partial thromboplastin time   Result Value Ref Range    aPTT 31 22 - 38 Seconds   Troponin T, High Sensitivity   Result Value Ref Range    Troponin T, High Sensitivity 9 <=14 ng/L   CBC with platelets and differential   Result Value Ref Range    WBC Count 9.1 4.0 - 11.0 10e3/uL    RBC Count 4.65 3.80 - 5.20 10e6/uL    Hemoglobin 13.9 11.7 - 15.7 g/dL    Hematocrit 43.5 35.0 - 47.0 %    MCV 94 78 - 100 fL    MCH 29.9 26.5 - 33.0 pg    MCHC 32.0 31.5 - 36.5 g/dL    RDW 13.3 10.0 - 15.0 %    Platelet Count 285 150 - 450 10e3/uL    % Neutrophils 60 %    % Lymphocytes 22 %    % Monocytes 13 %    Mids % (Monos, Eos, Basos)      % Eosinophils 4 %    % Basophils 1 %    % Immature Granulocytes 0 %    NRBCs per 100 WBC 0 <1 /100    Absolute Neutrophils 5.4 1.6 - 8.3 10e3/uL    Absolute Lymphocytes 2.0 0.8 - 5.3 10e3/uL    Absolute Monocytes 1.2 0.0 - 1.3 10e3/uL    Mids Abs (Monos, Eos, Basos)      Absolute Eosinophils 0.4 0.0 - 0.7 10e3/uL    Absolute Basophils 0.1 0.0 - 0.2 10e3/uL    Absolute Immature Granulocytes 0.0 <=0.4 10e3/uL    Absolute NRBCs 0.0 10e3/uL   CT Head w/o Contrast    Narrative    CT SCAN OF THE HEAD WITHOUT CONTRAST   10/12/2023 2:32 PM     HISTORY: Acute neurological deficit, stroke suspected. Dysarthria  onset 8PM  yesterday. 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    TECHNIQUE:  Axial images of the head and coronal reformations without  IV contrast material. Radiation dose for this scan was reduced using  automated exposure control, adjustment of the mA and/or kV according  to patient size, or iterative reconstruction technique.    COMPARISON: 8/30/2023 CT    FINDINGS: There is no evidence of intracranial hemorrhage, mass, acute  infarct or anomaly. The ventricles are normal in size, shape and  configuration. Mild diffuse parenchymal volume loss. Mild patchy  periventricular white matter hypodensities which are nonspecific, but  likely related to chronic microvascular ischemic disease. Chronic left  corona radiata infarct. Bilateral carotid siphon atherosclerotic  calcifications.    The visualized portions of the sinuses and mastoids appear normal. The  bony calvarium and bones of the skull base appear intact.       Impression    IMPRESSION:     1. No evidence of acute territorial infarct.  2. Diffuse parenchymal volume loss and white matter changes likely due  to chronic microvascular ischemic change.      GENEVA ROBERTS MD         SYSTEM ID:  [id number]   CTA Head Neck with Contrast    Narrative    CT ANGIOGRAM OF THE HEAD AND NECK WITH CONTRAST  10/12/2023 2:33 PM     HISTORY: Acute neuro deficit, stroke suspected. Dysarthria onset 8PM  yesterday, history of CRAO on right.    TECHNIQUE:  CT angiography with an injection of 67 mL Isovue 370 IV  with scans through the head and neck. Images were transferred to a  separate 3-D workstation where multiplanar reformations and 3-D images  were created. Estimates of carotid stenoses are made relative to the  distal internal carotid artery diameters except as noted. Radiation  dose for this scan was reduced using automated exposure control,  adjustment of the mA and/or kV according to patient size, or iterative  reconstruction technique.      COMPARISON: 8/30/2023     CT HEAD FINDINGS: No  contrast enhancing lesions. Cerebral blood flow  is grossly normal.     CT ANGIOGRAM HEAD FINDINGS:  Trace bilateral carotid siphon  atherosclerotic calcifications. The major intracranial arteries  including the proximal branches of the anterior cerebral, middle  cerebral, and posterior cerebral arteries appear patent without  vascular cutoff. 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 Left posterior communicating artery  infundibulum. No significant stenosis. Venous circulation is  unremarkable.     CT ANGIOGRAM NECK FINDINGS:  Normal origin of the great vessels from  the aortic arch. Scattered aortic arch and branch vessel  atherosclerotic calcifications.    Right carotid artery: The right common and internal carotid arteries  are patent. Mild atherosclerotic disease at the carotid bifurcation  with less than 50% stenosis.     Left carotid artery: The left common and internal carotid arteries are  patent. Mild atherosclerotic disease at the carotid bifurcation with  less than 50% stenosis.     Vertebral arteries: Dominant left vertebral artery. Vertebral arteries  are patent without evidence of dissection. No significant stenosis.     Other findings: Emphysematous changes throughout both lungs.       Impression    IMPRESSION:   1. No large vessel occlusion of the [place] arteries.  2. Patent major arteries of the neck without significant stenosis or  dissection.  3. Unchanged anterior communicating artery aneurysm.    GENEVA ROBERTS MD         SYSTEM ID:  [id number]       Medications   sodium chloride 0.9 % infusion (has no administration in time range)   labetalol (NORMODYNE/TRANDATE) injection 10 mg (has no administration in time range)     Or   hydrALAZINE (APRESOLINE) injection 10 mg (has no administration in time range)   niCARdipine 40 mg in 200 mL NS (CARDENE) infusion (has no administration in time range)   iopamidol (ISOVUE-370) solution 67 mL (67 mLs  Intravenous $Given 10/12/23 1416)   sodium chloride 0.9 % bag 500mL for CT scan flush use (100 mLs As instructed $Given 10/12/23 1416)   gadobutrol (GADAVIST) injection 8 mL (8 mLs Intravenous $Given 10/12/23 8901)       Assessments & Plan (with Medical Decision Making)   78 year old female with past medical history notable for CRAO, hypertension, hyperlipidemia, with acute onset dysarthria at approximately 8 PM last evening.  In a stroke scale of 1 for dysarthria.  Remainder of exam unremarkable.  EKG was sinus rhythm.  Basic lab work reassuring.  CT head CTA head and neck without acute pathology explain her symptoms. Unchanged ACOM aneurysm. MRI brain w/ concerning findings in left parenchema. Radiology read pending. Did not meet criteria for tier 1 or tier 2 stroke activation based on onset of symptoms and severity. Took full dose aspirin this morning.     On re evaluation, symptoms unchanged. It is the end of my shift and care of patient signed out to Dr. Zazueta for further cares while in the department.      I have reviewed the nursing notes.         New Prescriptions    No medications on file       Final diagnoses:   Dysarthria     Edmundo Rodrigues MD        10/12/2023   [facility] EMERGENCY DEPT    Disclaimer: This note consists of words and symbols derived from keyboarding and dictation using voice recognition software.  As a result, there may be errors that have gone undetected.  Please consider this when interpreting information found in this note.               Edmundo Rodrigues MD  10/16/23 7532

## 2023-10-12 NOTE — ED NOTES
Pt up and ambulatory to BR with standby assist. slighly unstable on her feet, but per her usual states pt and daughter.   Pt and daughter updated on time frame to CT scan.  Pt placed back on full monitors, and updated on all labs results.  PLAN:  Will continue to monitor and await all test results

## 2023-10-12 NOTE — ED PROVIDER NOTES
Emergency Department Patient Sign-out       Brief HPI:  This is a 78 year old female signed out to me by Dr. Rodrigues at the end of his shift at 6:03 PM see initial ED Provider note for details of the presentation.     Significant Events prior to my assuming care: Laboratory evaluation, EKG, CT/CTA head and neck stroke evaluation studies were unremarkable for any acute abnormality.  Stroke Neurology was consulted.  She is currently undergoing MRI brain without and with contrast, with results pending.       Exam:   Patient Vitals for the past 24 hrs:   BP Temp Temp src Pulse Resp SpO2 Height Weight   10/12/23 1845 -- -- -- -- -- 96 % -- --   10/12/23 1830 -- -- -- -- -- 97 % -- --   10/12/23 1815 -- -- -- -- -- 97 % -- --   10/12/23 1752 (!) 149/77 -- -- 72 -- 99 % -- --   10/12/23 1645 -- -- -- 79 13 -- -- --   10/12/23 1630 -- -- -- 73 21 -- -- --   10/12/23 1616 (!) 163/66 -- -- 74 21 98 % -- --   10/12/23 1615 -- -- -- 71 17 98 % -- --   10/12/23 1600 (!) 157/70 -- -- 68 14 97 % -- --   10/12/23 1545 -- -- -- 71 -- -- -- --   10/12/23 1544 (!) 145/61 -- -- 69 19 98 % -- --   10/12/23 1534 136/76 -- -- 72 20 97 % -- --   10/12/23 1530 -- -- -- 69 17 97 % -- --   10/12/23 1515 -- -- -- 70 18 97 % -- --   10/12/23 1514 130/52 -- -- 76 18 98 % -- --   10/12/23 1504 135/53 -- -- 71 19 98 % -- --   10/12/23 1444 136/72 -- -- 72 27 98 % -- --   10/12/23 1400 139/58 -- -- 67 -- 96 % -- --   10/12/23 1345 (!) 143/80 -- -- 71 -- 96 % -- --   10/12/23 1330 136/66 -- -- 65 -- 97 % -- --   10/12/23 1315 (!) 147/62 -- -- 62 17 96 % -- --   10/12/23 1300 (!) 141/85 -- -- 92 (!) 33 96 % -- --   10/12/23 1245 135/59 -- -- 65 16 97 % -- --   10/12/23 1223 138/65 97.7  F (36.5  C) Oral 67 18 96 % 1.524 m (5') 79.8 kg (176 lb)           ED RESULTS:   Results for orders placed or performed during the hospital encounter of 10/12/23 (from the past 24 hour(s))   Glucose by meter     Status: Normal    Collection Time: 10/12/23 12:36  PM   Result Value Ref Range    GLUCOSE BY METER POCT 98 70 - 99 mg/dL   [facility] Draw     Status: None    Collection Time: 10/12/23 12:39 PM    Narrative    The following orders were created for panel order [facility] Draw.  Procedure                               Abnormality         Status                     ---------                               -----------         ------                     Extra Blue Top Tube[[id number]]                              Final result               Extra Red Top Tube[[id number]]                               Final result               Extra Green Top (Lithium...[[id number]]                      Final result               Extra Purple Top Tube[[id number]]                            Final result                 Please view results for these tests on the individual orders.   Extra Blue Top Tube     Status: None    Collection Time: 10/12/23 12:39 PM   Result Value Ref Range    Hold Specimen JIC    Extra Red Top Tube     Status: None    Collection Time: 10/12/23 12:39 PM   Result Value Ref Range    Hold Specimen JIC    Extra Green Top (Lithium Heparin) Tube     Status: None    Collection Time: 10/12/23 12:39 PM   Result Value Ref Range    Hold Specimen JIC    Extra Purple Top Tube     Status: None    Collection Time: 10/12/23 12:39 PM   Result Value Ref Range    Hold Specimen JIC    CBC with Platelets & Differential     Status: None    Collection Time: 10/12/23 12:39 PM    Narrative    The following orders were created for panel order CBC with Platelets & Differential.  Procedure                               Abnormality         Status                     ---------                               -----------         ------                     CBC with platelets and d...[[id number]]                      Final result                 Please view results for these tests on the individual orders.   Basic metabolic panel     Status: Abnormal    Collection Time: 10/12/23 12:39 PM   Result Value Ref Range     Sodium 135 135 - 145 mmol/L    Potassium 4.5 3.4 - 5.3 mmol/L    Chloride 98 98 - 107 mmol/L    Carbon Dioxide (CO2) 30 (H) 22 - 29 mmol/L    Anion Gap 7 7 - 15 mmol/L    Urea Nitrogen 16.4 8.0 - 23.0 mg/dL    Creatinine 0.73 0.51 - 0.95 mg/dL    GFR Estimate 85 >60 mL/min/1.73m2    Calcium 10.5 (H) 8.8 - 10.2 mg/dL    Glucose 92 70 - 99 mg/dL   INR     Status: Normal    Collection Time: 10/12/23 12:39 PM   Result Value Ref Range    INR 0.98 0.85 - 1.15   Partial thromboplastin time     Status: Normal    Collection Time: 10/12/23 12:39 PM   Result Value Ref Range    aPTT 31 22 - 38 Seconds   Troponin T, High Sensitivity     Status: Normal    Collection Time: 10/12/23 12:39 PM   Result Value Ref Range    Troponin T, High Sensitivity 9 <=14 ng/L   CBC with platelets and differential     Status: None    Collection Time: 10/12/23 12:39 PM   Result Value Ref Range    WBC Count 9.1 4.0 - 11.0 10e3/uL    RBC Count 4.65 3.80 - 5.20 10e6/uL    Hemoglobin 13.9 11.7 - 15.7 g/dL    Hematocrit 43.5 35.0 - 47.0 %    MCV 94 78 - 100 fL    MCH 29.9 26.5 - 33.0 pg    MCHC 32.0 31.5 - 36.5 g/dL    RDW 13.3 10.0 - 15.0 %    Platelet Count 285 150 - 450 10e3/uL    % Neutrophils 60 %    % Lymphocytes 22 %    % Monocytes 13 %    Mids % (Monos, Eos, Basos)      % Eosinophils 4 %    % Basophils 1 %    % Immature Granulocytes 0 %    NRBCs per 100 WBC 0 <1 /100    Absolute Neutrophils 5.4 1.6 - 8.3 10e3/uL    Absolute Lymphocytes 2.0 0.8 - 5.3 10e3/uL    Absolute Monocytes 1.2 0.0 - 1.3 10e3/uL    Mids Abs (Monos, Eos, Basos)      Absolute Eosinophils 0.4 0.0 - 0.7 10e3/uL    Absolute Basophils 0.1 0.0 - 0.2 10e3/uL    Absolute Immature Granulocytes 0.0 <=0.4 10e3/uL    Absolute NRBCs 0.0 10e3/uL   CT Head w/o Contrast     Status: None    Collection Time: 10/12/23  2:32 PM    Narrative    CT SCAN OF THE HEAD WITHOUT CONTRAST   10/12/2023 2:32 PM     HISTORY: Acute neurological deficit, stroke suspected. Dysarthria  onset 8PM yesterday.  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    TECHNIQUE:  Axial images of the head and coronal reformations without  IV contrast material. Radiation dose for this scan was reduced using  automated exposure control, adjustment of the mA and/or kV according  to patient size, or iterative reconstruction technique.    COMPARISON: 8/30/2023 CT    FINDINGS: There is no evidence of intracranial hemorrhage, mass, acute  infarct or anomaly. The ventricles are normal in size, shape and  configuration. Mild diffuse parenchymal volume loss. Mild patchy  periventricular white matter hypodensities which are nonspecific, but  likely related to chronic microvascular ischemic disease. Chronic left  corona radiata infarct. Bilateral carotid siphon atherosclerotic  calcifications.    The visualized portions of the sinuses and mastoids appear normal. The  bony calvarium and bones of the skull base appear intact.       Impression    IMPRESSION:     1. No evidence of acute territorial infarct.  2. Diffuse parenchymal volume loss and white matter changes likely due  to chronic microvascular ischemic change.      GENEVA ROBERTS MD         SYSTEM ID:  [id number]   CTA Head Neck with Contrast     Status: None    Collection Time: 10/12/23  2:33 PM    Narrative    CT ANGIOGRAM OF THE HEAD AND NECK WITH CONTRAST  10/12/2023 2:33 PM     HISTORY: Acute neuro deficit, stroke suspected. Dysarthria onset 8PM  yesterday, history of CRAO on right.    TECHNIQUE:  CT angiography with an injection of 67 mL Isovue 370 IV  with scans through the head and neck. Images were transferred to a  separate 3-D workstation where multiplanar reformations and 3-D images  were created. Estimates of carotid stenoses are made relative to the  distal internal carotid artery diameters except as noted. Radiation  dose for this scan was reduced using automated exposure control,  adjustment of the mA and/or kV according to patient size, or iterative  reconstruction technique.       COMPARISON: 8/30/2023     CT HEAD FINDINGS: No contrast enhancing lesions. Cerebral blood flow  is grossly normal.     CT ANGIOGRAM HEAD FINDINGS:  Trace bilateral carotid siphon  atherosclerotic calcifications. The major intracranial arteries  including the proximal branches of the anterior cerebral, middle  cerebral, and posterior cerebral arteries appear patent without  vascular cutoff. 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 Left posterior communicating artery  infundibulum. No significant stenosis. Venous circulation is  unremarkable.     CT ANGIOGRAM NECK FINDINGS:  Normal origin of the great vessels from  the aortic arch. Scattered aortic arch and branch vessel  atherosclerotic calcifications.    Right carotid artery: The right common and internal carotid arteries  are patent. Mild atherosclerotic disease at the carotid bifurcation  with less than 50% stenosis.     Left carotid artery: The left common and internal carotid arteries are  patent. Mild atherosclerotic disease at the carotid bifurcation with  less than 50% stenosis.     Vertebral arteries: Dominant left vertebral artery. Vertebral arteries  are patent without evidence of dissection. No significant stenosis.     Other findings: Emphysematous changes throughout both lungs.       Impression    IMPRESSION:   1. No large vessel occlusion of the [place] arteries.  2. Patent major arteries of the neck without significant stenosis or  dissection.  3. Unchanged anterior communicating artery aneurysm.    GENEVA ROBERTS MD         SYSTEM ID:  [id number]   MR Brain w/o & w Contrast     Status: None    Collection Time: 10/12/23  5:45 PM    Narrative    EXAM: MR BRAIN W/O and W CONTRAST  LOCATION: [facility]  DATE: 10/12/2023    INDICATION: Acute-onset dysarthria in a patient with history of CRAO on right.  COMPARISON: CT/CTA performed earlier today.  CONTRAST: gadavist 8  ml  TECHNIQUE: Routine multiplanar multisequence head MRI without and with intravenous contrast.    FINDINGS:  INTRACRANIAL CONTENTS: A 1.0 cm focus of restricted diffusion with corresponding low ADC values and FLAIR hyperintensity involving the left frontal centrum semiovale (series 3.2 image 24). Immediately inferior to this focus is a 1.0 cm CSF-isointense   focus with surrounding intermediate to high ADC values and FLAIR hyperintensity as well as adjacent focus of signal loss on the T2*weighted images, most consistent with a subacute to chronic small-vessel infarct with hemosiderin deposition. A small   chronic cortical/subcortical infarct involving the right frontal lobe. Chronic lacunar infarcts involving the right basal ganglia, right corona radiata, and bilateral thalami. Punctate chronic infarcts within the bilateral cerebellar hemispheres. A 0.6   cm nonenhancing T2 hyperintense focus within the medial aspect of the right cerebellar hemisphere with a hemosiderin ring and blooming artifact on the T2*weighted images, most consistent with a cavernous malformation; no surrounding vasogenic edema   (series 8 image 8). Several additional punctate foci of signal loss on the T2*weighted images within the left frontal and parietal lobes as well as the left insula, most consistent with chronic microhemorrhages. No mass effect or midline shift. No   intracranial mass or pathologic enhancement. A left frontal lobe developmental venous anomaly. Patchy zones of T2 prolongation within the bilateral cerebral white matter, nonspecific although most likely the sequela of moderate chronic microvascular   ischemia. Mild generalized cerebral parenchymal volume loss. No hydrocephalus. Normal position of the cerebellar tonsils.     OSSEOUS STRUCTURES/SOFT TISSUES: No suspicious bone marrow signal intensity. The major intracranial vascular flow voids are maintained.     ORBITS: Within technique limitations, no significant  abnormality.     SINUSES/MASTOIDS: No significant paranasal sinus or mastoid mucosal disease.        Impression    IMPRESSION:  1.  A 1.0 cm acute/early subacute subcortical left frontal lobe infarct without significant mass effect or acute intracranial hemorrhage.  2.  An additional 1.0 cm subacute to chronic left frontal corona radiata infarct immediately inferior to finding #1.  3.  Additional chronic and incidental findings, as described.       ED MEDICATIONS:   Medications   sodium chloride 0.9 % infusion (has no administration in time range)   labetalol (NORMODYNE/TRANDATE) injection 10 mg (has no administration in time range)     Or   hydrALAZINE (APRESOLINE) injection 10 mg (has no administration in time range)   niCARdipine 40 mg in 200 mL NS (CARDENE) infusion (has no administration in time range)   iopamidol (ISOVUE-370) solution 67 mL (67 mLs Intravenous $Given 10/12/23 1416)   sodium chloride 0.9 % bag 500mL for CT scan flush use (100 mLs As instructed $Given 10/12/23 1416)   gadobutrol (GADAVIST) injection 8 mL (8 mLs Intravenous $Given 10/12/23 1721)         Impression:    ICD-10-CM    1. Dysarthria  R47.1       2. Acute ischemic stroke (H)  I63.9           Plan:    Pending studies: MRI brain without and with contrast.      6:49 PM - MRI shows stroke. I reviewed the MRI results with Dr. Tapia, Stroke Neurologist.  He recommended continuation of a full-strength aspirin daily and deferral of Plavix dual antiplatelet therapy.  Admit for observation and routine stroke evaluation and care.  They will follow the patient after admission.    6:53 PM -no hospital beds available for admission here, will initiate in-network bed search.    8:32 PM -I reviewed case with Stroke Neurology Dr. Jose, and Dr. Chambers at [facility] and Dr. Chambers will be the accepting physician.          Fransico Zazueta MD  10/15/23 2502

## 2023-10-13 ENCOUNTER — APPOINTMENT (OUTPATIENT)
Dept: CARDIOLOGY | Facility: HOSPITAL | Age: 78
DRG: 065 | End: 2023-10-13
Attending: STUDENT IN AN ORGANIZED HEALTH CARE EDUCATION/TRAINING PROGRAM
Payer: MEDICARE

## 2023-10-13 ENCOUNTER — APPOINTMENT (OUTPATIENT)
Dept: PHYSICAL THERAPY | Facility: HOSPITAL | Age: 78
DRG: 065 | End: 2023-10-13
Attending: STUDENT IN AN ORGANIZED HEALTH CARE EDUCATION/TRAINING PROGRAM
Payer: MEDICARE

## 2023-10-13 ENCOUNTER — APPOINTMENT (OUTPATIENT)
Dept: OCCUPATIONAL THERAPY | Facility: HOSPITAL | Age: 78
DRG: 065 | End: 2023-10-13
Attending: STUDENT IN AN ORGANIZED HEALTH CARE EDUCATION/TRAINING PROGRAM
Payer: MEDICARE

## 2023-10-13 ENCOUNTER — APPOINTMENT (OUTPATIENT)
Dept: SPEECH THERAPY | Facility: HOSPITAL | Age: 78
DRG: 065 | End: 2023-10-13
Attending: STUDENT IN AN ORGANIZED HEALTH CARE EDUCATION/TRAINING PROGRAM
Payer: MEDICARE

## 2023-10-13 VITALS
OXYGEN SATURATION: 95 % | TEMPERATURE: 98.4 F | HEART RATE: 80 BPM | DIASTOLIC BLOOD PRESSURE: 85 MMHG | SYSTOLIC BLOOD PRESSURE: 179 MMHG | RESPIRATION RATE: 20 BRPM

## 2023-10-13 LAB
ALBUMIN SERPL BCG-MCNC: 3.9 G/DL (ref 3.5–5.2)
ALP SERPL-CCNC: 92 U/L (ref 35–104)
ALT SERPL W P-5'-P-CCNC: 21 U/L (ref 0–50)
ANION GAP SERPL CALCULATED.3IONS-SCNC: 9 MMOL/L (ref 7–15)
AST SERPL W P-5'-P-CCNC: 21 U/L (ref 0–45)
BILIRUB SERPL-MCNC: 0.8 MG/DL
BUN SERPL-MCNC: 13.6 MG/DL (ref 8–23)
CALCIUM SERPL-MCNC: 9.8 MG/DL (ref 8.8–10.2)
CHLORIDE SERPL-SCNC: 98 MMOL/L (ref 98–107)
CHOLEST SERPL-MCNC: 113 MG/DL
CREAT SERPL-MCNC: 0.68 MG/DL (ref 0.51–0.95)
DEPRECATED HCO3 PLAS-SCNC: 28 MMOL/L (ref 22–29)
EGFRCR SERPLBLD CKD-EPI 2021: 90 ML/MIN/1.73M2
GLUCOSE BLDC GLUCOMTR-MCNC: 100 MG/DL (ref 70–99)
GLUCOSE BLDC GLUCOMTR-MCNC: 144 MG/DL (ref 70–99)
GLUCOSE SERPL-MCNC: 91 MG/DL (ref 70–99)
HDLC SERPL-MCNC: 36 MG/DL
HOLD SPECIMEN: NORMAL
LDLC SERPL CALC-MCNC: 59 MG/DL
LVEF ECHO: NORMAL
MAGNESIUM SERPL-MCNC: 2.2 MG/DL (ref 1.7–2.3)
NONHDLC SERPL-MCNC: 77 MG/DL
POTASSIUM SERPL-SCNC: 4.5 MMOL/L (ref 3.4–5.3)
PROT SERPL-MCNC: 6.7 G/DL (ref 6.4–8.3)
SODIUM SERPL-SCNC: 135 MMOL/L (ref 135–145)
TRIGL SERPL-MCNC: 88 MG/DL
TSH SERPL DL<=0.005 MIU/L-ACNC: 0.74 UIU/ML (ref 0.3–4.2)

## 2023-10-13 PROCEDURE — 97165 OT EVAL LOW COMPLEX 30 MIN: CPT | Mod: GO

## 2023-10-13 PROCEDURE — 93306 TTE W/DOPPLER COMPLETE: CPT | Mod: 26 | Performed by: INTERNAL MEDICINE

## 2023-10-13 PROCEDURE — 92526 ORAL FUNCTION THERAPY: CPT | Mod: GN | Performed by: SPEECH-LANGUAGE PATHOLOGIST

## 2023-10-13 PROCEDURE — 99232 SBSQ HOSP IP/OBS MODERATE 35: CPT | Performed by: STUDENT IN AN ORGANIZED HEALTH CARE EDUCATION/TRAINING PROGRAM

## 2023-10-13 PROCEDURE — 36415 COLL VENOUS BLD VENIPUNCTURE: CPT | Performed by: STUDENT IN AN ORGANIZED HEALTH CARE EDUCATION/TRAINING PROGRAM

## 2023-10-13 PROCEDURE — 250N000011 HC RX IP 250 OP 636: Mod: JZ | Performed by: STUDENT IN AN ORGANIZED HEALTH CARE EDUCATION/TRAINING PROGRAM

## 2023-10-13 PROCEDURE — 250N000013 HC RX MED GY IP 250 OP 250 PS 637: Performed by: STUDENT IN AN ORGANIZED HEALTH CARE EDUCATION/TRAINING PROGRAM

## 2023-10-13 PROCEDURE — 92610 EVALUATE SWALLOWING FUNCTION: CPT | Mod: GN | Performed by: SPEECH-LANGUAGE PATHOLOGIST

## 2023-10-13 PROCEDURE — 80053 COMPREHEN METABOLIC PANEL: CPT | Performed by: STUDENT IN AN ORGANIZED HEALTH CARE EDUCATION/TRAINING PROGRAM

## 2023-10-13 PROCEDURE — 255N000002 HC RX 255 OP 636: Performed by: STUDENT IN AN ORGANIZED HEALTH CARE EDUCATION/TRAINING PROGRAM

## 2023-10-13 PROCEDURE — 250N000013 HC RX MED GY IP 250 OP 250 PS 637: Performed by: PSYCHIATRY & NEUROLOGY

## 2023-10-13 PROCEDURE — 99223 1ST HOSP IP/OBS HIGH 75: CPT | Performed by: PSYCHIATRY & NEUROLOGY

## 2023-10-13 PROCEDURE — 97116 GAIT TRAINING THERAPY: CPT | Mod: GP

## 2023-10-13 PROCEDURE — 97161 PT EVAL LOW COMPLEX 20 MIN: CPT | Mod: GP

## 2023-10-13 PROCEDURE — 92523 SPEECH SOUND LANG COMPREHEN: CPT | Mod: GN | Performed by: SPEECH-LANGUAGE PATHOLOGIST

## 2023-10-13 PROCEDURE — 80061 LIPID PANEL: CPT | Performed by: STUDENT IN AN ORGANIZED HEALTH CARE EDUCATION/TRAINING PROGRAM

## 2023-10-13 PROCEDURE — 97530 THERAPEUTIC ACTIVITIES: CPT | Mod: GP

## 2023-10-13 PROCEDURE — 84443 ASSAY THYROID STIM HORMONE: CPT | Performed by: STUDENT IN AN ORGANIZED HEALTH CARE EDUCATION/TRAINING PROGRAM

## 2023-10-13 PROCEDURE — 83735 ASSAY OF MAGNESIUM: CPT | Performed by: INTERNAL MEDICINE

## 2023-10-13 RX ORDER — CLOPIDOGREL BISULFATE 75 MG/1
75 TABLET ORAL DAILY
Status: DISCONTINUED | OUTPATIENT
Start: 2023-10-13 | End: 2023-10-13 | Stop reason: HOSPADM

## 2023-10-13 RX ORDER — CLOPIDOGREL BISULFATE 75 MG/1
75 TABLET ORAL DAILY
Qty: 21 TABLET | Refills: 0 | Status: SHIPPED | OUTPATIENT
Start: 2023-10-14 | End: 2023-10-13

## 2023-10-13 RX ORDER — ENOXAPARIN SODIUM 100 MG/ML
40 INJECTION SUBCUTANEOUS EVERY 24 HOURS
Status: DISCONTINUED | OUTPATIENT
Start: 2023-10-13 | End: 2023-10-13 | Stop reason: HOSPADM

## 2023-10-13 RX ORDER — CLOPIDOGREL BISULFATE 75 MG/1
75 TABLET ORAL DAILY
Qty: 90 TABLET | Refills: 0 | Status: SHIPPED | OUTPATIENT
Start: 2023-10-14 | End: 2023-10-18

## 2023-10-13 RX ORDER — HYDRALAZINE HYDROCHLORIDE 20 MG/ML
10 INJECTION INTRAMUSCULAR; INTRAVENOUS EVERY 6 HOURS PRN
Status: DISCONTINUED | OUTPATIENT
Start: 2023-10-13 | End: 2023-10-13 | Stop reason: HOSPADM

## 2023-10-13 RX ORDER — ASPIRIN 81 MG/1
81 TABLET ORAL DAILY
Qty: 30 TABLET | Refills: 1 | Status: SHIPPED | OUTPATIENT
Start: 2023-10-13 | End: 2023-12-12

## 2023-10-13 RX ORDER — ASPIRIN 81 MG/1
81 TABLET ORAL DAILY
Status: DISCONTINUED | OUTPATIENT
Start: 2023-10-14 | End: 2023-10-13 | Stop reason: HOSPADM

## 2023-10-13 RX ADMIN — CLOPIDOGREL BISULFATE 75 MG: 75 TABLET ORAL at 10:49

## 2023-10-13 RX ADMIN — ENOXAPARIN SODIUM 40 MG: 40 INJECTION SUBCUTANEOUS at 13:08

## 2023-10-13 RX ADMIN — ATORVASTATIN CALCIUM 40 MG: 40 TABLET, FILM COATED ORAL at 08:20

## 2023-10-13 RX ADMIN — LISINOPRIL 40 MG: 20 TABLET ORAL at 08:21

## 2023-10-13 RX ADMIN — FAMOTIDINE 20 MG: 20 TABLET ORAL at 08:21

## 2023-10-13 RX ADMIN — PERFLUTREN 2 ML: 6.52 INJECTION, SUSPENSION INTRAVENOUS at 09:15

## 2023-10-13 RX ADMIN — FAMOTIDINE 20 MG: 20 TABLET ORAL at 01:10

## 2023-10-13 RX ADMIN — ASPIRIN 325 MG: 325 TABLET ORAL at 09:49

## 2023-10-13 ASSESSMENT — ACTIVITIES OF DAILY LIVING (ADL)
ADLS_ACUITY_SCORE: 35
ADLS_ACUITY_SCORE: 20
ADLS_ACUITY_SCORE: 20
ADLS_ACUITY_SCORE: 24
DEPENDENT_IADLS:: INDEPENDENT
PREVIOUS_RESPONSIBILITIES: DRIVING
ADLS_ACUITY_SCORE: 24
ADLS_ACUITY_SCORE: 24
ADLS_ACUITY_SCORE: 20
ADLS_ACUITY_SCORE: 20
ADLS_ACUITY_SCORE: 24

## 2023-10-13 NOTE — MEDICATION SCRIBE - ADMISSION MEDICATION HISTORY
Medication Scribe Admission Medication History    Admission medication history is complete. The information provided in this note is only as accurate as the sources available at the time of the update.    Information Source(s): Patient via in-person    Pertinent Information:     Changes made to PTA medication list:  Added: None  Deleted: None  Changed: atorvastatin 1 daily to alternating 40 mg, 1 tab one day next day 1/2 tab, CoQ10 2, 100 mg every day to 1, 200 mg QD     Medication Affordability:  Not including over the counter (OTC) medications, was there a time in the past 3 months when you did not take your medications as prescribed because of cost?: No    Allergies reviewed with patient and updates made in EHR: yes    Medication History Completed By: Kassie Garcia 10/12/2023 8:32 PM    PTA Med List   Medication Sig Note Last Dose    acetaminophen (TYLENOL) 500 MG tablet Take 2 tablets by mouth 2 times daily as needed for mild pain  Past Month    Ascorbic Acid (VITAMIN C PO) Take 500 mg by mouth daily  10/12/2023 at am    aspirin (ASA) 325 MG EC tablet Take 325 mg by mouth daily  10/12/2023 at am    atorvastatin (LIPITOR) 40 MG tablet Take 1 tablet (40 mg) by mouth daily 10/12/2023: Pt alternates every other day 1/2 tab one day and 1 tab the other day. Pt took 1/2 this am. 10/12/2023 at am    CALCIUM + D OR 1 TABLET DAILY  10/11/2023 at hs    coenzyme Q-10 200 MG CAPS capsule Take 1 capsule by mouth at bedtime  10/11/2023 at hs    famotidine (PEPCID) 20 MG tablet TAKE 1 TABLET (20 MG) BY MOUTH 2 TIMES DAILY  10/12/2023 at am    Glycerin-Polysorbate 80 (REFRESH DRY EYE THERAPY OP) Place 1 drop into both eyes as needed  10/12/2023 at am    hydrochlorothiazide (HYDRODIURIL) 25 MG tablet TAKE 1 TABLET BY MOUTH EVERY DAY  10/12/2023 at am    lisinopril (ZESTRIL) 40 MG tablet Take 1 tablet (40 mg) by mouth daily  10/12/2023 at am    MULTIVITAMIN OR 1 daily  10/11/2023 at hs    Omega-3 Fatty Acids (OMEGA-3 FISH OIL  PO) Take 1 g by mouth daily   10/12/2023 at am

## 2023-10-13 NOTE — PLAN OF CARE
"Patient alert and oriented. Slightly slurred speech. Stroke packet handout given to patient. NIHSS scores 1,1. Patient denied pain. SCDs on. Passed dysphagia prescreen, MD ordered reg. Diet. Patient on RA, O2 in 90s.   Problem: Adult Inpatient Plan of Care  Goal: Plan of Care Review  Description: The Plan of Care Review/Shift note should be completed every shift.  The Outcome Evaluation is a brief statement about your assessment that the patient is improving, declining, or no change.  This information will be displayed automatically on your shift  note.  Outcome: Progressing  Goal: Patient-Specific Goal (Individualized)  Description: You can add care plan individualizations to a care plan. Examples of Individualization might be:  \"Parent requests to be called daily at 9am for status\", \"I have a hard time hearing out of my right ear\", or \"Do not touch me to wake me up as it startles  me\".  Outcome: Progressing     Problem: Risk for Delirium  Goal: Optimal Coping  Outcome: Progressing   Goal Outcome Evaluation:                        "

## 2023-10-13 NOTE — PROGRESS NOTES
10/13/23 1400   Appointment Info   Signing Clinician's Name / Credentials (PT) Michael Newsome, PT   Living Environment   People in Home spouse   Current Living Arrangements house   Home Accessibility stairs to enter home;stairs within home   Number of Stairs, Main Entrance 8   Stair Railings, Main Entrance railings on both sides of stairs   Number of Stairs, Within Home, Primary seven   Stair Railings, Within Home, Primary railings on both sides of stairs   Living Environment Comments Pt lives in a house with , who has dementia and whom she takes care of. Split entry house.   Self-Care   Usual Activity Tolerance good   Current Activity Tolerance good   Equipment Currently Used at Home none   Fall history within last six months yes   Number of times patient has fallen within last six months 3   Activity/Exercise/Self-Care Comment Pt normally IND at baseline with mobility.   General Information   Onset of Illness/Injury or Date of Surgery 10/12/23   Referring Physician Dr. Lalo Sneed.   Patient/Family Therapy Goals Statement (PT) None stated.   Pertinent History of Current Problem (include personal factors and/or comorbidities that impact the POC) From chart: Chika Hurd is a 78F presented to [facility]) for acute speech changes, found to have acute/subacute infarction; pmhx includes HTN/HLD, CRAO, L breast DCIS, GERD; transferred for acute CVA of L frontal lobe.   Existing Precautions/Restrictions fall   Weight-Bearing Status - LLE full weight-bearing   Weight-Bearing Status - RLE full weight-bearing   Heart Disease Risk Factors Medical history;Age   General Observations Female supine in bed.   Cognition   Affect/Mental Status (Cognition) WFL   Orientation Status (Cognition) oriented x 4   Follows Commands (Cognition) WFL   Cognitive Status Comments Pleasant, cooperative. Difficulty getting words out but reports she can understand me.   Pain Assessment   Patient Currently in Pain No    Posture    Posture Forward head position   Range of Motion (ROM)   ROM Comment Grossly WFL.   Strength (Manual Muscle Testing)   Strength Comments Grossly WFL.   Bed Mobility   Comment, (Bed Mobility) Supine-sit with SBA.   Transfers   Comment, (Transfers) Sit-stand with no AD, CGA.   Gait/Stairs (Locomotion)   Comment, (Gait/Stairs) Ambulated x 10 ft with FWW, CGA. Safe and steady. 10 ft with no AD- slightly less steady but no LOBs.   Balance   Balance Comments Adequate for ambulation with AD. Will assess with FGA tomorrow.   Sensory Examination   Sensory Perception patient reports no sensory changes   Coordination   Coordination no deficits were identified   Muscle Tone   Muscle Tone no deficits were identified   Clinical Impression   Criteria for Skilled Therapeutic Intervention Yes, treatment indicated   PT Diagnosis (PT) Impaired ambulation, possibly impaired balance.   Influenced by the following impairments Medical.   Functional limitations due to impairments Ambulation, stairs, increased falls risk?   Clinical Presentation (PT Evaluation Complexity) stable   Clinical Presentation Rationale Clinician judgment.   Clinical Decision Making (Complexity) low complexity   Planned Therapy Interventions (PT) balance training;gait training;home exercise program;neuromuscular re-education;patient/family education;stair training;strengthening;transfer training   Risk & Benefits of therapy have been explained patient   PT Total Evaluation Time   PT Eval, Low Complexity Minutes (27505) 10   Physical Therapy Goals   PT Frequency Daily   PT Predicted Duration/Target Date for Goal Attainment 10/20/23   PT Goals Transfers;Gait;Stairs;PT Goal 1   PT: Transfers Independent;Sit to/from stand   PT: Gait Modified independent;Assistive device;Greater than 200 feet   PT: Stairs Modified independent;Greater than 10 stairs;Rail on both sides   PT: Goal 1 Pt to score 22/30 or greater on FGA to demonstrate decreased falls risk.    Interventions   Interventions Quick Adds Gait Training;Therapeutic Activity   Therapeutic Activity   Therapeutic Activities: dynamic activities to improve functional performance Minutes (23222) 10   Symptoms Noted During/After Treatment None   Treatment Detail/Skilled Intervention PT: Eval completed, treatment initiated. Pt demos additional sit-stand with no AD, CGA. Transfer from bed-chair with no AD, CGA. Generally safe and steady. Educated about use of call light and chair alarm. Fairly extensive discussion with daughter about home safety and setup- it sounds like daughter is normally there daily, can spend more time with pt if needed. Discussed HHPT recommendation, daughter in agreement, stated pt has been less active recently and would really like her to be more active.   Gait Training   Gait Training Minutes (27264) 9   Symptoms Noted During/After Treatment (Gait Training) fatigue;shortness of breath   Treatment Detail/Skilled Intervention PT: With FWW and CGA, ambulated x 100 additional feet. Safe and steady. Up and down full flight of stairs with L railing, CGA, then B railings on the way down. Slightly less steady. Returned to room, 110 ft, no AD, CGA. A little less steady, wide EL. Discussed assessing balance in depth tomorrow, pt agreeable.   PT Discharge Planning   PT Plan PT: FGA, stairs.   PT Discharge Recommendation (DC Rec) home with assist;home with home care physical therapy   PT Rationale for DC Rec Pt mobilizing well, anticipate safe discharge home. Family frequently checks in. Would benefit from HHPT for home safety eval and progression of mobility and balance.   PT Brief overview of current status PT eval completed. Pt is SBA for bed mobility, CGA for sit-stand with/without FWW, ambulated ~220 ft with FWW, then without AD, CGA. A little more steady with FWW.   Total Session Time   Timed Code Treatment Minutes 19   Total Session Time (sum of timed and untimed services) 29

## 2023-10-13 NOTE — CONSULTS
Care Management Initial Consult    General Information  Assessment completed with: Patient, Family, Karma  Type of CM/SW Visit: Initial Assessment    Primary Care Provider verified and updated as needed: Yes   Readmission within the last 30 days:        Reason for Consult: discharge planning  Advance Care Planning:            Communication Assessment  Patient's communication style: spoken language (English or Bilingual)    Hearing Difficulty or Deaf: no   Wear Glasses or Blind: yes    Cognitive  Cognitive/Neuro/Behavioral: .WDL except, speech  Level of Consciousness: alert  Arousal Level: opens eyes spontaneously  Orientation: oriented x 4  Mood/Behavior: calm, cooperative     Speech: other (see comments) (slight slurred speech)    Living Environment:   People in home: spouse (who has advanced dementia)  Don  Current living Arrangements: house (split level)      Able to return to prior arrangements: yes       Family/Social Support:  Care provided by: self  Provides care for: spouse  Marital Status:   Children          Description of Support System: Supportive, Involved    Support Assessment: Adequate family and caregiver support    Current Resources:   Patient receiving home care services: No     Community Resources: None  Equipment currently used at home: none  Supplies currently used at home: Hearing Aid Batteries    Employment/Financial:  Employment Status:          Financial Concerns: none           Does the patient's insurance plan have a 3 day qualifying hospital stay waiver?  Yes     Which insurance plan 3 day waiver is available? [employer] RE[employer]    Will the waiver be used for post-acute placement? No    Lifestyle & Psychosocial Needs:  Social Determinants of Health     Food Insecurity: Not on file   Depression: Not at risk (8/25/2023)    PHQ-2     PHQ-2 Score: 2   Housing Stability: Not on file   Tobacco Use: Medium Risk (9/27/2023)    Patient History     Smoking Tobacco Use: Former     Smokeless Tobacco  Use: Never     Passive Exposure: Not on file   Financial Resource Strain: Not on file   Alcohol Use: Not on file   Transportation Needs: Not on file   Physical Activity: Not on file   Interpersonal Safety: Not on file   Stress: Not on file   Social Connections: Not on file       Functional Status:  Prior to admission patient needed assistance:   Dependent ADLs:: Independent  Dependent IADLs:: Independent       Mental Health Status:          Chemical Dependency Status:                Values/Beliefs:  Spiritual, Cultural Beliefs, Jehovah's witness Practices, Values that affect care:                 Additional Information:  CM met with patient and patient's sister-in-law in patient's room.  Patient lives at in a split level house with her  who has advanced dementia.  Patient's daughter lives next door and helps out.    At baseline, patient is independent with all I/ADLs and drives.  Patient cares for her  with advanced dementia.   No home care or community resources.  No equipment other than hearing aides.      Family transport.   Therapy recommends home with assist.     CM will continue to monitor medical progression and aide in discharge planning.   Soledad Crum RN

## 2023-10-13 NOTE — PHARMACY-CONSULT NOTE
Pharmacy Consult to evaluate for medication related stroke core measures    Chika Hurd, 78 year old female admitted for slurred speech on 10/12/2023.    Thrombolytic was not given because of rapid improvement    VTE Prophylaxis SCDs /PCDs placed on 10/13/23, as appropriate prior to end of hospital day 2. and Enoxaparin given on 10/13/23 as appropriate prior to end of hospital day 2.    Antithrombotic: aspirin and clopidogrel started on 10/13/23, as appropriate by end of hospital day 2. Continue antithrombotic therapy on discharge to meet quality measures, unless contraindicated.    Anticoagulation if history of A-fib/flutter: Patient does not have history of A-fib/flutter - anticoagulation not required for medication related stroke core measures.     LDL Cholesterol Calculated   Date Value Ref Range Status   10/13/2023 59 <=100 mg/dL Final   07/01/2020 51 <100 mg/dL Final     Comment:     Desirable:       <100 mg/dl       Patient's home statin, Lipitor (atorvastatin) restarted; continue statin on discharge to meet quality measures, unless contraindicated.     Recommendations: None at this time    Thank you for the consult.    Shin Solis RPH 10/13/2023 2:46 PM

## 2023-10-13 NOTE — PHARMACY-ADMISSION MEDICATION HISTORY
Admission medication history completed at [facility]. Please see Medication Scribe Admission Medication History note from 10/12/2023.

## 2023-10-13 NOTE — H&P
[facility]    History and Physical - Hospitalist Service       Date of Admission:  10/12/2023    Assessment & Plan      Chika Hurd is a 78F presented to [facility]) for acute speech changes, found to have acute/subacute infarction; pmhx includes HTN/HLD, CRAO, L breast DCIS, GERD; transferred for acute CVA of L frontal lobe.    #CVA, acute  -telemetry  -A1c 6.2 (10/23)  -lipid panel (2/23)  -TSH  -troponin normal, no additional trend  -TTE  -ASA 325mg PO every day (home med)  -atorvastatin 40mg PO every day  -PT/OT/SLP  -full diet, passed bedside swallow  -Neurology consulted    #prediabetes  A1c 6.2 this month.  -PCP follow up as directed    #HTN  -lisinopril 40mg PO every day    #HLD  -atorvastatin 40mg PO every day    #GERD  -pepcid 20mg PO BID    #hypercalcemia  Borderline elevation.  -CMP trend            Diet: Regular Diet Adult  DVT Prophylaxis: mechanical PCD  Smith Catheter: Not present  Lines: None     Cardiac Monitoring: ACTIVE order. Indication: Stroke, acute (48 hours)  Code Status: Full Code      Clinically Significant Risk Factors Present on Admission           # Hypercalcemia: Highest Ca = 10.5 mg/dL in last 2 days, will monitor as appropriate        # Drug Induced Platelet Defect: home medication list includes an antiplatelet medication     # Hypertension: Noted on problem list      # Obesity: Estimated body mass index is 34.37 kg/m  as calculated from the following:    Height as of an earlier encounter on 10/12/23: 1.524 m (5').    Weight as of an earlier encounter on 10/12/23: 79.8 kg (176 lb).              Disposition Plan      Expected Discharge Date: 10/14/2023                  Andrew Pascual MD  Hospitalist Service  [facility]  Securely message with [employer] (more info)  Text page via [employer] Paging/Directory     ______________________________________________________________________    Chief Complaint   Slurred speech    History is  obtained from the patient    History of Present Illness   Chika Hurd is a 78F presented to [place] ([facility]) for acute speech changes, found to have acute/subacute infarction; pmhx includes HTN/HLD, CRAO, L breast DCIS, GERD; transferred for acute CVA of L frontal lobe.    Was in her usual state of health up until the evening of 10/11 at approximately 9 PM.  Started to have abnormal sensation of the tongue, strange speech.  Did not think much of it and went to sleep, when she woke with similar problems of abnormal sensations on the tongue slurred speech no weakness loss of sensation otherwise.  Was able to drive significant other to work, then was recommended by family members to present to urgent care for evaluation was then sent on to the hospital and was found to have an acute to subacute infarction of the left frontal lobe.      Past Medical History    Past Medical History:   Diagnosis Date    Breast cancer (H)     Central retinal artery occlusion, right     DCIS (ductal carcinoma in situ) of breast     GERD (gastroesophageal reflux disease)     Hyperlipidemia        Past Surgical History   Past Surgical History:   Procedure Laterality Date    COLONOSCOPY N/A 12/8/2021    Procedure: COLONOSCOPY, WITH POLYPECTOMY AND BIOPSY;  Surgeon: Lawrence Ortega DO;  Location: [facility]    ENT SURGERY      dental implants 03/12 started    FLEXIBLE SIGMOIDOSCOPY  04/03    LUMPECTOMY BREAST WITH SEED LOCALIZATION Left 9/21/2016    Procedure: LUMPECTOMY BREAST WITH SEED LOCALIZATION;  Surgeon: Russel Murry MD;  Location:  OR    PHACOEMULSIFICATION WITH STANDARD INTRAOCULAR LENS IMPLANT Right 11/6/2017    Procedure: PHACOEMULSIFICATION WITH STANDARD INTRAOCULAR LENS IMPLANT;  Right cataract removal with implant;  Surgeon: Michael Zuleta MD;  Location: [facility]    PHACOEMULSIFICATION WITH STANDARD INTRAOCULAR LENS IMPLANT Left 11/27/2017    Procedure: PHACOEMULSIFICATION WITH STANDARD INTRAOCULAR LENS  IMPLANT;  Left cataract removal with implant;  Surgeon: Michael Zuleta MD;  Location: [facility] OR    SURGICAL HISTORY OF -       Right Hip Pinning    SURGICAL HISTORY OF -       Tubal Ligation       Prior to Admission Medications   Prior to Admission Medications   Prescriptions Last Dose Informant Patient Reported? Taking?   Ascorbic Acid (VITAMIN C PO)  Self Yes No   Sig: Take 500 mg by mouth daily   CALCIUM + D OR  Self Yes No   Si TABLET DAILY   Glycerin-Polysorbate 80 (REFRESH DRY EYE THERAPY OP)  Self Yes No   Sig: Place 1 drop into both eyes as needed   MULTIVITAMIN OR  Self Yes No   Si daily   Omega-3 Fatty Acids (OMEGA-3 FISH OIL PO)  Self Yes No   Sig: Take 1 g by mouth daily    acetaminophen (TYLENOL) 500 MG tablet  Self Yes No   Sig: Take 2 tablets by mouth 2 times daily as needed for mild pain   aspirin (ASA) 325 MG EC tablet  Self Yes No   Sig: Take 325 mg by mouth daily   atorvastatin (LIPITOR) 40 MG tablet  Self No No   Sig: Take 1 tablet (40 mg) by mouth daily   coenzyme Q-10 200 MG CAPS capsule  Self Yes No   Sig: Take 1 capsule by mouth at bedtime   famotidine (PEPCID) 20 MG tablet  Self No No   Sig: TAKE 1 TABLET (20 MG) BY MOUTH 2 TIMES DAILY   hydrochlorothiazide (HYDRODIURIL) 25 MG tablet  Self No No   Sig: TAKE 1 TABLET BY MOUTH EVERY DAY   lisinopril (ZESTRIL) 40 MG tablet  Self No No   Sig: Take 1 tablet (40 mg) by mouth daily      Facility-Administered Medications: None        Review of Systems    The 10 point Review of Systems is negative other than noted in the HPI or here.      Physical Exam   Vital Signs: Temp: 97.6  F (36.4  C) Temp src: Oral BP: 132/62 Pulse: 80   Resp: 20 SpO2: 94 % O2 Device: None (Room air)    Weight: 0 lbs 0 oz    Constitutional: awake, alert, cooperative, no apparent distress, and appears stated age  Respiratory: CTAB  Cardiovascular: RRR no m/g/r  GI: soft, nontender, nondistended  Musculoskeletal: shoulder/elbow flexion/extension 5/5  bilaterally and symmetric, hip/knee flexion/extension 5/5 bilaterally and symmetric  Neurologic: AOx4, CN II-XII intact--except for mild R lingual deviation and muffled speech; NIHSS 1    Medical Decision Making       45 MINUTES SPENT BY ME on the date of service doing chart review, history, exam, documentation & further activities per the note.  MANAGEMENT DISCUSSED with the following over the past 24 hours: patient, family   NOTE(S)/MEDICAL RECORDS REVIEWED over the past 24 hours: outpatient FM  Tests ORDERED & REVIEWED in the past 24 hours:  - See lab/imaging results included in the data section of the note      Data     I have personally reviewed the following data over the past 24 hrs:    9.1  \   13.9   / 285     135 98 16.4 /  92   4.5 30 (H) 0.73 \     Trop: 9 BNP: N/A     TSH: 0.74 T4: N/A A1C: N/A     INR:  0.98 PTT:  31   D-dimer:  N/A Fibrinogen:  N/A       Imaging results reviewed over the past 24 hrs:   Recent Results (from the past 24 hour(s))   CT Head w/o Contrast    Narrative    CT SCAN OF THE HEAD WITHOUT CONTRAST   10/12/2023 2:32 PM     HISTORY: Acute neurological deficit, stroke suspected. Dysarthria  onset 8PM yesterday. 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    TECHNIQUE:  Axial images of the head and coronal reformations without  IV contrast material. Radiation dose for this scan was reduced using  automated exposure control, adjustment of the mA and/or kV according  to patient size, or iterative reconstruction technique.    COMPARISON: 8/30/2023 CT    FINDINGS: There is no evidence of intracranial hemorrhage, mass, acute  infarct or anomaly. The ventricles are normal in size, shape and  configuration. Mild diffuse parenchymal volume loss. Mild patchy  periventricular white matter hypodensities which are nonspecific, but  likely related to chronic microvascular ischemic disease. Chronic left  corona radiata infarct. Bilateral carotid siphon atherosclerotic  calcifications.    The visualized portions of  the sinuses and mastoids appear normal. The  bony calvarium and bones of the skull base appear intact.       Impression    IMPRESSION:     1. No evidence of acute territorial infarct.  2. Diffuse parenchymal volume loss and white matter changes likely due  to chronic microvascular ischemic change.      GENEVA ROBERTS MD         SYSTEM ID:  [id number]   CTA Head Neck with Contrast    Narrative    CT ANGIOGRAM OF THE HEAD AND NECK WITH CONTRAST  10/12/2023 2:33 PM     HISTORY: Acute neuro deficit, stroke suspected. Dysarthria onset 8PM  yesterday, history of CRAO on right.    TECHNIQUE:  CT angiography with an injection of 67 mL Isovue 370 IV  with scans through the head and neck. Images were transferred to a  separate 3-D workstation where multiplanar reformations and 3-D images  were created. Estimates of carotid stenoses are made relative to the  distal internal carotid artery diameters except as noted. Radiation  dose for this scan was reduced using automated exposure control,  adjustment of the mA and/or kV according to patient size, or iterative  reconstruction technique.      COMPARISON: 8/30/2023     CT HEAD FINDINGS: No contrast enhancing lesions. Cerebral blood flow  is grossly normal.     CT ANGIOGRAM HEAD FINDINGS:  Trace bilateral carotid siphon  atherosclerotic calcifications. The major intracranial arteries  including the proximal branches of the anterior cerebral, middle  cerebral, and posterior cerebral arteries appear patent without  vascular cutoff. 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 Left posterior communicating artery  infundibulum. No significant stenosis. Venous circulation is  unremarkable.     CT ANGIOGRAM NECK FINDINGS:  Normal origin of the great vessels from  the aortic arch. Scattered aortic arch and branch vessel  atherosclerotic calcifications.    Right carotid artery: The right common and internal carotid arteries  are patent. Mild  atherosclerotic disease at the carotid bifurcation  with less than 50% stenosis.     Left carotid artery: The left common and internal carotid arteries are  patent. Mild atherosclerotic disease at the carotid bifurcation with  less than 50% stenosis.     Vertebral arteries: Dominant left vertebral artery. Vertebral arteries  are patent without evidence of dissection. No significant stenosis.     Other findings: Emphysematous changes throughout both lungs.       Impression    IMPRESSION:   1. No large vessel occlusion of the [place] arteries.  2. Patent major arteries of the neck without significant stenosis or  dissection.  3. Unchanged anterior communicating artery aneurysm.    GENEVA ROBERTS MD         SYSTEM ID:  [id number]   MR Brain w/o & w Contrast    Narrative    EXAM: MR BRAIN W/O and W CONTRAST  LOCATION: [facility]  DATE: 10/12/2023    INDICATION: Acute-onset dysarthria in a patient with history of CRAO on right.  COMPARISON: CT/CTA performed earlier today.  CONTRAST: gadavist 8 ml  TECHNIQUE: Routine multiplanar multisequence head MRI without and with intravenous contrast.    FINDINGS:  INTRACRANIAL CONTENTS: A 1.0 cm focus of restricted diffusion with corresponding low ADC values and FLAIR hyperintensity involving the left frontal centrum semiovale (series 3.2 image 24). Immediately inferior to this focus is a 1.0 cm CSF-isointense   focus with surrounding intermediate to high ADC values and FLAIR hyperintensity as well as adjacent focus of signal loss on the T2*weighted images, most consistent with a subacute to chronic small-vessel infarct with hemosiderin deposition. A small   chronic cortical/subcortical infarct involving the right frontal lobe. Chronic lacunar infarcts involving the right basal ganglia, right corona radiata, and bilateral thalami. Punctate chronic infarcts within the bilateral cerebellar hemispheres. A 0.6   cm nonenhancing T2 hyperintense focus  within the medial aspect of the right cerebellar hemisphere with a hemosiderin ring and blooming artifact on the T2*weighted images, most consistent with a cavernous malformation; no surrounding vasogenic edema   (series 8 image 8). Several additional punctate foci of signal loss on the T2*weighted images within the left frontal and parietal lobes as well as the left insula, most consistent with chronic microhemorrhages. No mass effect or midline shift. No   intracranial mass or pathologic enhancement. A left frontal lobe developmental venous anomaly. Patchy zones of T2 prolongation within the bilateral cerebral white matter, nonspecific although most likely the sequela of moderate chronic microvascular   ischemia. Mild generalized cerebral parenchymal volume loss. No hydrocephalus. Normal position of the cerebellar tonsils.     OSSEOUS STRUCTURES/SOFT TISSUES: No suspicious bone marrow signal intensity. The major intracranial vascular flow voids are maintained.     ORBITS: Within technique limitations, no significant abnormality.     SINUSES/MASTOIDS: No significant paranasal sinus or mastoid mucosal disease.        Impression    IMPRESSION:  1.  A 1.0 cm acute/early subacute subcortical left frontal lobe infarct without significant mass effect or acute intracranial hemorrhage.  2.  An additional 1.0 cm subacute to chronic left frontal corona radiata infarct immediately inferior to finding #1.  3.  Additional chronic and incidental findings, as described.

## 2023-10-13 NOTE — PLAN OF CARE
"  Problem: Adult Inpatient Plan of Care  Goal: Plan of Care Review  Description: The Plan of Care Review/Shift note should be completed every shift.  The Outcome Evaluation is a brief statement about your assessment that the patient is improving, declining, or no change.  This information will be displayed automatically on your shift  note.  Outcome: Progressing  Flowsheets (Taken 10/13/2023 0957)  Plan of Care Reviewed With: patient  Goal: Patient-Specific Goal (Individualized)  Description: You can add care plan individualizations to a care plan. Examples of Individualization might be:  \"Parent requests to be called daily at 9am for status\", \"I have a hard time hearing out of my right ear\", or \"Do not touch me to wake me up as it startles  me\".  Outcome: Progressing  Goal: Absence of Hospital-Acquired Illness or Injury  Outcome: Progressing  Intervention: Identify and Manage Fall Risk  Recent Flowsheet Documentation  Taken 10/13/2023 0800 by Phi Rosales RN  Safety Promotion/Fall Prevention:   activity supervised   assistive device/personal items within reach   supervised activity   safety round/check completed  Intervention: Prevent Skin Injury  Recent Flowsheet Documentation  Taken 10/13/2023 0800 by Phi Rosales RN  Body Position: position changed independently  Intervention: Prevent and Manage VTE (Venous Thromboembolism) Risk  Recent Flowsheet Documentation  Taken 10/13/2023 0800 by Phi Rosales RN  VTE Prevention/Management: SCDs (sequential compression devices) on  Goal: Optimal Comfort and Wellbeing  Outcome: Progressing  Goal: Readiness for Transition of Care  Outcome: Progressing     Problem: Risk for Delirium  Goal: Optimal Coping  Outcome: Progressing  Goal: Improved Behavioral Control  Outcome: Progressing  Goal: Improved Attention and Thought Clarity  Outcome: Progressing  Goal: Improved Sleep  Outcome: Progressing     Problem: Stroke, Ischemic (Includes Transient Ischemic Attack)  Goal: " Optimal Coping  Outcome: Progressing  Goal: Effective Bowel Elimination  Outcome: Progressing  Goal: Optimal Cerebral Tissue Perfusion  Outcome: Progressing  Goal: Optimal Cognitive Function  Outcome: Progressing  Goal: Improved Communication Skills  Outcome: Progressing  Goal: Optimal Functional Ability  Outcome: Progressing  Intervention: Optimize Functional Ability  Recent Flowsheet Documentation  Taken 10/13/2023 0800 by Phi Rosales RN  Activity Management: activity adjusted per tolerance  Goal: Optimal Nutrition Intake  Outcome: Progressing  Goal: Effective Oxygenation and Ventilation  Outcome: Progressing  Intervention: Optimize Oxygenation and Ventilation  Recent Flowsheet Documentation  Taken 10/13/2023 0800 by Phi Rosales RN  Head of Bed (HOB) Positioning: HOB at 30-45 degrees  Goal: Improved Sensorimotor Function  Outcome: Progressing  Goal: Safe and Effective Swallow  Outcome: Progressing  Goal: Effective Urinary Elimination  Outcome: Progressing   Goal Outcome Evaluation:      Plan of Care Reviewed With: patient        Pt is alert and oriented x3. Pt is med complaint. Pt denies pain. Pt's v/s is stable. NIHS=2. Pt is up with assist of 1. No complain Continue to monitor pt

## 2023-10-13 NOTE — PLAN OF CARE
Goal Outcome Evaluation:      Plan of Care Reviewed With: patient      Patient and family given discharge instructions, taken out with transport via wheelchair, home with family.

## 2023-10-13 NOTE — PROGRESS NOTES
Care Management Discharge Note    Discharge Date: 10/13/2023       Discharge Disposition: Home    Discharge Services: None    Discharge DME: None    Discharge Transportation: family or friend will provide    Private pay costs discussed: Not applicable    Does the patient's insurance plan have a 3 day qualifying hospital stay waiver?  Yes     Which insurance plan 3 day waiver is available? ACO REACH    Will the waiver be used for post-acute placement? No    PAS Confirmation Code:  NA  Patient/family educated on Medicare website which has current facility and service quality ratings:  NA    Education Provided on the Discharge Plan: Yes (per care team)  Persons Notified of Discharge Plans: per care team  Patient/Family in Agreement with the Plan:      Handoff Referral Completed: Yes    Additional Information:  Recommendation is home with assist.  Patient's daughter lives next door.    Patient discharging home.  No CM need identified.     CM placed referral for [facility] home care speech therapy per speech recommendations.     Soledad Crum RN

## 2023-10-13 NOTE — PROGRESS NOTES
"CLINICAL SWALLOW EVALUATION     10/13/23 9437   Appointment Info   Signing Clinician's Name / Credentials (SLP) [facility]   General Information   Onset of Illness/Injury or Date of Surgery 10/12/23   Referring Physician Andrew Pascual MD   Patient/Family Therapy Goal Statement (SLP) Patient would like to improve speech.   Pertinent History of Current Problem Per provider note: \"Chika Hurd is a 78F presented to [facility] for acute speech changes, found to have acute/subacute infarction; pmhx includes HTN/HLD, CRAO, L breast DCIS, GERD; transferred for acute CVA of L frontal lobe. Started to have abnormal sensation of the tongue, strange speech.  Did not think much of it and went to sleep, when she woke with similar problems of abnormal sensations on the tongue slurred speech no weakness loss of sensation otherwise.\"   General Observations Patient alert, interactive, pleasant. Patient's spouse, daughter and DACIA present. RN and NA reported patient coughing with thin liquids this AM.   Type of Evaluation   Type of Evaluation Swallow Evaluation   Oral Motor   Oral Musculature anomalies present   Structural Abnormalities none present   Mucosal Quality adequate   Dentition (Oral Motor)   Comment, Dentition (Oral Motor) Missing lower right molars; awaiting implants.   Dentition (Oral Motor) natural dentition;some missing teeth   Facial Symmetry (Oral Motor)   Facial Symmetry (Oral Motor) right side impairment   Right Side Facial Asymmetry minimal impairment   Lip Function (Oral Motor)   Lip Range of Motion (Oral Motor) retraction impairment   Lip Strength (Oral Motor) WFL   Retraction, Lip Range of Motion right side;minimal impairment   Tongue Function (Oral Motor)   Tongue Strength (Oral Motor) strength decreased;bilateral;moderate impairment  (moreso on right side)   Tongue Coordination/Speed (Oral Motor) reduced rate   Tongue ROM (Oral Motor) lateralization is impaired   Lateralization, " Tongue ROM Impairment (Oral Motor) minimal impairment;moderate impairment   Jaw Function (Oral Motor)   Jaw Function (Oral Motor) WNL   Cough/Swallow/Gag Reflex (Oral Motor)   Soft Palate/Velum (Oral Motor) unable/difficult to assess   Volitional Throat Clear/Cough (Oral Motor) WNL   Volitional Swallow (Oral Motor) WNL   Vocal Quality/Secretion Management (Oral Motor)   Vocal Quality (Oral Motor) WFL   Secretion Management (Oral Motor) WNL   General Swallowing Observations   Past History of Dysphagia None per EMR review and patient report. Patient reported aspiration sensation every once in awhile but infrequent. She and family reported no recurring pneumonias.   Respiratory Support room air   Current Diet/Method of Nutritional Intake (General Swallowing Observations, NIS) regular diet;thin liquids (level 0)   Swallowing Evaluation Clinical swallow evaluation   Clinical Swallow Evaluation   Feeding Assistance no assistance needed   Clinical Swallow Evaluation Textures Trialed thin liquids;pureed;solid foods   Clinical Swallow Eval: Thin Liquid Texture Trial   Mode of Presentation, Thin Liquids spoon;cup;straw;fed by clinician;self-fed   Volume of Liquid or Food Presented 2 ice chips; 4-5 oz thin liquid   Oral Phase of Swallow delayed AP movement  (with larger bolus size)   Oral Residue right lip drooling  (minimal/mild)   Pharyngeal Phase of Swallow throat clearing  (with use of straw)   Diagnostic Statement No overt aspiration signs occurred with small single sips by cup or spoon.   Clinical Swallow Evaluation: Puree Solid Texture Trial   Mode of Presentation, Puree spoon;self-fed   Volume of Puree Presented 3 bites pudding   Oral Phase, Puree WFL   Pharyngeal Phase, Puree   (no overt aspiration signs)   Clinical Swallow Evaluation: Solid Food Texture Trial   Mode of Presentation self-fed   Volume Presented 1/2 mary cracker square   Oral Phase impaired mastication  (mild-moderately extended mastication)   Oral  Residue   (none)   Pharyngeal Phase   (no overt aspiration signs)   Diagnostic Statement Patient reported sensation of dry cracker sticking in back of mouth/throat x1 but this cleared with sips of thin liquid.   Esophageal Phase of Swallow   Esophageal comments Hx of GERD.   Swallowing Recommendations   Diet Consistency Recommendations regular diet;thin liquids (level 0)  (select softer menu items)   Supervision Level for Intake distant supervision needed   Mode of Delivery Recommendations bolus size, small;no straws;slow rate of intake   Swallowing Maneuver Recommendations alternate food and liquid intake   Monitoring/Assistance Required (Eating/Swallowing) check mouth frequently for oral residue/pocketing;stop eating activities when fatigue is present;monitor for cough or change in vocal quality with intake   Recommended Feeding/Eating Techniques (Swallow Eval) maintain upright posture during/after eating for 30 minutes;minimize distractions during oral intake;set-up and prepare tray   Medication Administration Recommendations, Swallowing (SLP) One at a time   Comment, Swallowing Recommendations May consider instrumental swallow exam pending patient progress. SLP to request order as appropriate.   General Therapy Interventions   Planned Therapy Interventions Dysphagia Treatment   Dysphagia treatment Instruction of safe swallow strategies;Oropharyngeal exercise training;Compensatory strategies for swallowing   Clinical Impression   Criteria for Skilled Therapeutic Interventions Met (SLP Eval) Yes, treatment indicated   SLP Diagnosis Dysphagia   Risks & Benefits of therapy have been explained evaluation/treatment results reviewed;care plan/treatment goals reviewed;risks/benefits reviewed;current/potential barriers reviewed;participants voiced agreement with care plan;participants included;patient;spouse/significant other;daughter   Clinical Impression Comments Mild oropharyngeal dysphagia based on clinical swallow  exam today in setting of left frontal stroke. Oral [facility] exam remarkable for reduced lingugal strength and ROM especially on right side and missing lower right molars (awaiting implants). Note mild-moderately extended mastication of solid texture, min/mild drooling right side with thin liquids and throat clearing with larger sips thin liquid by straw. No overt aspiration signs occurred with small single sips thin liquid by cup or spoon, puree or solid texture. No oral residue remained. Patient reported sensation of dry cracker sticking in back of mouth/throat x1 but this cleared with sips of thin liquid. Patient independent with feeding self using swallow strategies. Recommend continue regular diet and thin liquids given swallow strategies (fully upright position, no straws, small single sips/bites, slow pace, alternate liquids/solids, check for oral pocketing). Please serve pills one at a time. Monitor for increased signs/symptoms of aspiration and hold PO if occur. SLP to follow.   SLP Total Evaluation Time   Eval: oral/pharyngeal swallow function, clinical swallow Minutes (94027) 10   SLP Goals   Therapy Frequency (SLP Eval) daily   SLP Predicted Duration/Target Date for Goal Attainment 11/17/23   SLP Goals Swallow   SLP: Safely tolerate diet without signs/symptoms of aspiration Regular diet;Thin liquids;With use of swallow precautions   Interventions   Interventions Quick Adds Swallowing Dysfunction   Swallowing Dysfunction &/or Oral Function for Feeding   Treatment of Swallowing Dysfunction &/or Oral Function for Feeding Minutes (04092) 8   Symptoms Noted During/After Treatment None   Treatment Detail/Skilled Intervention Provided education about swallow strategies, signs/potential complications of aspiration, speech intelligibility strategies and oral motor exercises. Patient practiced lingual resistance and ROM exercises during session. Patient instructed to continue to slow rate of speech and over-articulate.    SLP Discharge Planning   SLP Plan daily: diet f/u; swallow and speech strategy training; consider VFSS pending progress   SLP Discharge Recommendation home with outpatient therapy services;home with home health   SLP Rationale for DC Rec Continued SLP for dysphagia and dysarthria.   SLP Brief overview of current status  Recommend continue regular diet and thin liquids given swallow strategies (fully upright position, no straws, small single sips/bites, slow pace, alternate liquids/solids, check for oral pocketing). Please serve pills one at a time. Monitor for increased signs/symptoms of aspiration and hold PO if occur. SLP to follow.   Total Session Time   Total Session Time (sum of timed and untimed services) 18

## 2023-10-13 NOTE — PLAN OF CARE
Occupational Therapy Discharge Summary    Reason for therapy discharge:    Pt close baseline no skilled OT needs at this time.    Progress towards therapy goal(s). See goals on Care Plan in [facility] electronic health record for goal details.  Pt close to baseline no skilled OT needs at this time    Therapy recommendation(s):    No further therapy is recommended.

## 2023-10-13 NOTE — CONSULTS
This is a neurological consultation      78-year-old admitted to hospital patient 12/20/2023      Chief complaint  Dysarthria  Left frontal stroke multifocal      HPI  78-year-old presented to [facility] on 10/12/2023 with stroke symptoms  Acute changes and speech    He did have an unusual sensation in her tongue trouble with her talking did not think much of it went to sleep  Woke up with similar problems the slurred speech  Recommended by family members to go to the hospital to be checked out    MRI scan shows subacute left frontal stroke and left corona radiata stroke subacute in nature        Past medical history  Hyperlipidemia  Hypertension  Central retinal artery occlusion on the right  Breast cancer  GERD  Hearing loss      Habits  Past smoker quit 2005  Alcohol rare      Family history  Noncontributory        Work-up  CT scan head 10/12/2023  1. No evidence of acute territorial infarct.  2. Diffuse parenchymal volume loss and white matter changes       likely due to chronic microvascular ischemic change.  CTA head 10/12/2023  1. No large vessel occlusion of the [place] arteries.  2. Patent major arteries of the neck without significant stenosis or  dissection.  3. Unchanged anterior communicating artery aneurysm.  MRI head 10/12/2023  1.  A 1.0 cm acute/early subacute subcortical left frontal lobe infarct without significant mass effect or acute intracranial hemorrhage.  2.  An additional 1.0 cm subacute to chronic left frontal corona radiata infarct immediately inferior to finding #1.  3.  Additional chronic and incidental findings, see official report.  TSH 0.74  HDL/LDL 36/59  Hemoglobin A1c 6.2, (10/9/2023)  B12 905, (9/15/2023)    Labs  Sodium 135 potassium 4.5  BUN 13.6, creatinine 0.68  Glucose 91  WBC 9.1, hemoglobin 13.7, platelets 25,000    Exam  Blood pressure 130/60, pulse 80, temperature 97.6  Blood pressure range 129//66  Pulse range 62-92  Tmax 97.8    Review of  systems  Pertinent positive negatives  No chest pain no headache no shortness of breath no nausea vomiting no diarrhea no fever chills    Significant dysarthria  Can understand well (no aphasia)  No diplopia no dysphagia  No focal weakness  Able to ambulate    General exam per primary MD  Alert orient x3  HEENT no signs of trauma  Lungs clear  Heart rate regular  Abdomen soft  Symmetrical pulses  No edema the feet    Neurologic exam  Alert orient x3  Normal prosody of speech  Normal naming  Normal comprehension  Normal repetition  No aphasia  No neglect  Memory recall okay    Cranial nerves II through XII  No ophthalmoplegia  No nystagmus  No visual field cut  Face symmetrical    Tongue twisters thick very dysarthric speech difficult to enunciate words    Upper extremities  No drift  No tremor    Lower extremities  Distal proximal strength okay    Gait  Able to ambulate          Assessment/plan    Left frontal lobe subacute stroke subcortical        Left frontal corona radiata subacute-chronic infarct    2.   Vascular risk factors        Central retinal artery occlusion on the right/hypertension/hyperlipidemia    Diagnosis  Left frontal stroke stroke subacute  Severe dysarthria    Previously was on aspirin 325 mg once per day    Check echo  Switch to dual antiplatelet medication for 3 months and then can go back to full aspirin    Plavix 75 mg once per day  Aspirin 81 mg once per day  Lipitor 40 mg    TTE pending  Speech therapy will probably need speech therapy as an outpatient    Follow-up with neurology in 3 months  Disposition per primary MD    80 minutes total care time today  Discussed with primary MD face-to-face  Discussed with patient and family members at bedside face-to-face

## 2023-10-13 NOTE — DISCHARGE SUMMARY
[facility]  Hospitalist Discharge Summary      Date of Admission:  10/12/2023  Date of Discharge:  10/13/2023  4:40 PM  Discharging Provider: Lalo Sneed MD  Discharge Service: Hospitalist Service    Discharge Diagnoses   Acute/subacute CVA  Prediabetes  Hypertension  Hyperlipidemia  GERD  Hypercalcemia    Clinically Significant Risk Factors     # Obesity: Estimated body mass index is 34.37 kg/m  as calculated from the following:    Height as of an earlier encounter on 10/12/23: 1.524 m (5').    Weight as of an earlier encounter on 10/12/23: 79.8 kg (176 lb).       Follow-ups Needed After Discharge   Follow-up Appointments     Follow-up and recommended labs and tests       Follow up with primary care provider, Audrey Roy, within 7   days for hospital follow- up.  No follow up labs or test are needed.            Unresulted Labs Ordered in the Past 30 Days of this Admission       No orders found for last 31 day(s).        These results will be followed up by     Discharge Disposition   Discharged to home  Condition at discharge: Stable    Hospital Course   Chika Hurd is a 78F presented to [facility]) for acute speech changes, found to have acute/subacute infarction; pmhx includes HTN/HLD, CRAO, L breast DCIS, GERD; transferred for acute CVA of L frontal lobe.   #CVA, acute  -telemetry  -A1c 6.2 (10/23)  -lipid panel (2/23)  -TSH  -troponin normal, no additional trend  -TTE  -ASA 325mg PO every day (home med)  -atorvastatin 40mg PO every day  -PT/OT/SLP  -full diet, passed bedside swallow  -Neurology consult appreciated.  -Planning for dual antiplatelet therapy for 3 months.  -Lipitor 40 mg oral daily   -Echocardiogram was unremarkable for intracardiac shunt.  #prediabetes  A1c 6.2 this month.  -PCP follow up as directed   #HTN  -lisinopril 40mg PO every day   #HLD  -atorvastatin 40mg PO every day   #GERD  -pepcid 20mg PO BID   #hypercalcemia  Borderline  elevation.  -CMP trend     And remained clinically stable enough for discharge.  Cleared by neurology for discharge.  Patient will follow with her primary care provider and speech therapy for dysarthria as an outpatient.    Consultations This Hospital Stay   NEUROLOGY IP STROKE CONSULT  PHYSICAL THERAPY ADULT IP CONSULT  OCCUPATIONAL THERAPY ADULT IP CONSULT  SPEECH LANGUAGE PATH ADULT IP CONSULT  PHARMACY IP CONSULT  PHARMACY IP CONSULT  PHARMACY IP CONSULT  REHAB ADMISSIONS LIAISON IP CONSULT  CARE MANAGEMENT / SOCIAL WORK IP CONSULT  SPEECH LANGUAGE PATH ADULT IP CONSULT    Code Status   Full Code    Time Spent on this Encounter   I, Lalo Sneed MD, personally saw the patient today and spent greater than 30 minutes discharging this patient.       Lalo Sneed MD  [address] [address]  Phone: [phone]  Fax: [phone]  ______________________________________________________________________    Physical Exam   Vital Signs: Temp: 98.4  F (36.9  C) Temp src: Oral BP: (!) 179/85 (Md was notified) Pulse: 80   Resp: 20 SpO2: 95 % O2 Device: None (Room air)    Weight: 0 lbs 0 oz    Constitutional: awake, alert, cooperative, no apparent distress, and appears stated age  Respiratory: CTAB  Cardiovascular: RRR no m/g/r  GI: soft, nontender, nondistended  Musculoskeletal: shoulder/elbow flexion/extension 5/5 bilaterally and symmetric, hip/knee flexion/extension 5/5 bilaterally and symmetric  Neurologic: AOx4, CN II-XII intact--except for mild R lingual deviation and muffled speech; NIHSS 1       Primary Care Physician   Audrey Roy    Discharge Orders      Reason for your hospital stay    Acute/subacute stroke     Follow-up and recommended labs and tests     Follow up with primary care provider, Audrey Roy, within 7 days for hospital follow- up.  No follow up labs or test are needed.     Activity    Your activity  upon discharge: activity as tolerated     Diet    Follow this diet upon discharge: Orders Placed This Encounter      Regular Diet Adult       Significant Results and Procedures       Discharge Medications   Current Discharge Medication List        START taking these medications    Details   clopidogrel (PLAVIX) 75 MG tablet Take 1 tablet (75 mg) by mouth daily  Qty: 90 tablet, Refills: 0    Associated Diagnoses: Cerebrovascular accident (CVA), unspecified mechanism (H)           CONTINUE these medications which have CHANGED    Details   aspirin 81 MG EC tablet Take 1 tablet (81 mg) by mouth daily for 60 days  Qty: 30 tablet, Refills: 1    Associated Diagnoses: Cerebrovascular accident (CVA), unspecified mechanism (H)           CONTINUE these medications which have NOT CHANGED    Details   acetaminophen (TYLENOL) 500 MG tablet Take 2 tablets by mouth 2 times daily as needed for mild pain      Ascorbic Acid (VITAMIN C PO) Take 500 mg by mouth daily      atorvastatin (LIPITOR) 40 MG tablet Take 1 tablet (40 mg) by mouth daily  Qty: 90 tablet, Refills: 4    Associated Diagnoses: Central retinal artery occlusion of right eye      CALCIUM + D OR 1 TABLET DAILY      coenzyme Q-10 200 MG CAPS capsule Take 1 capsule by mouth at bedtime      famotidine (PEPCID) 20 MG tablet TAKE 1 TABLET (20 MG) BY MOUTH 2 TIMES DAILY  Qty: 180 tablet, Refills: 1    Associated Diagnoses: Gastroesophageal reflux disease without esophagitis      Glycerin-Polysorbate 80 (REFRESH DRY EYE THERAPY OP) Place 1 drop into both eyes as needed      hydrochlorothiazide (HYDRODIURIL) 25 MG tablet TAKE 1 TABLET BY MOUTH EVERY DAY  Qty: 90 tablet, Refills: 3    Associated Diagnoses: Benign essential hypertension      lisinopril (ZESTRIL) 40 MG tablet Take 1 tablet (40 mg) by mouth daily  Qty: 90 tablet, Refills: 4    Associated Diagnoses: Benign essential hypertension      MULTIVITAMIN OR 1 daily      Omega-3 Fatty Acids (OMEGA-3 FISH OIL PO) Take 1 g by  mouth daily            Allergies   Allergies   Allergen Reactions    Cortisone Swelling     Cortisone Cream

## 2023-10-14 NOTE — PROGRESS NOTES
Physical Therapy Discharge Summary    Reason for therapy discharge:    Discharged to home with home therapy.    Progress towards therapy goal(s). See goals on Care Plan in [facility] electronic health record for goal details.  Goals met    Therapy recommendation(s):    Continued therapy is recommended.  Rationale/Recommendations:  To progress mobility, balance, and ambulation.

## 2023-10-15 ENCOUNTER — PATIENT OUTREACH (OUTPATIENT)
Dept: CARE COORDINATION | Facility: CLINIC | Age: 78
End: 2023-10-15
Payer: MEDICARE

## 2023-10-15 ENCOUNTER — TELEPHONE (OUTPATIENT)
Dept: FAMILY MEDICINE | Facility: CLINIC | Age: 78
End: 2023-10-15
Payer: MEDICARE

## 2023-10-15 NOTE — TELEPHONE ENCOUNTER
Reason for Call:  Appointment Request    Patient requesting this type of appt:  Hospital/ED Follow-Up     Requested provider: Audrey Roy    Reason patient unable to be scheduled: Not within requested timeframe    When does patient want to be seen/preferred time: 3-7 days    Comments: Patient instructed to be seen with PCP within 7 days from date of discharge.     hospital f/u- [place] to [facility], Due to Stroke. Admitted 10/12-10/13. Nedra Frances.       Could we send this information to you in [place] or would you prefer to receive a phone call?:   Patient would prefer a phone call   Okay to leave a detailed message?: Yes at Cell number on file:    Telephone Information:   Mobile [phone]       Call taken on 10/15/2023 at 10:17 AM by SIENA DAMON

## 2023-10-15 NOTE — PROGRESS NOTES
Clinic Care Coordination Contact  [facility]: Post-Discharge Note  SITUATION                                                      Admission:    Admission Date: 10/12/23   Reason for Admission: acute speech changes  Discharge:   Discharge Date: 10/13/23  Discharge Diagnosis: Acute/subacute stroke    BACKGROUND                                                      Per hospital discharge summary and inpatient provider notes:    Chika Hurd is a 78F presented to [facility] for acute speech changes, found to have acute/subacute infarction; pmhx includes HTN/HLD, CRAO, L breast DCIS, GERD; transferred for acute CVA of L frontal lobe.     ASSESSMENT           Discharge Assessment  How are you doing now that you are home?: pretty good per patient and daughter via speaker phone  How are your symptoms? (Red Flag symptoms escalate to triage hotline per guidelines): Improved  Do you feel your condition is stable enough to be safe at home until your provider visit?: Yes  Does the patient have their discharge instructions? : Yes  Does the patient have questions regarding their discharge instructions? : No  Were you started on any new medications or were there changes to any of your previous medications? : Yes  Does the patient have all of their medications?: Yes  Do you have questions regarding any of your medications? : No  Discharge follow-up appointment scheduled within 14 calendar days? : No  Is patient agreeable to assistance with scheduling? : No (daughter will schedule)         Post-op (Clinicians Only)  Did the patient have surgery or a procedure: No    Patient with some improvement in speech. Doing home exercises that were provided for her at discharge. Daughter states that home therapy for speech and physical therapy was discussed during hospital stay but patient was discharged prior to orders being placed. Daughter will discuss at PCP appointment. Patient and daughter with no current questions or  concerns. 24/7 [facility] nurse triage/scheduling phone number provided to patient's daughter.       PLAN                                                      Outpatient Plan:  Follow up with primary care provider, Audrey Roy, within 7 days for hospital follow- up. No follow up labs or  test are needed.    No future appointments.      For any urgent concerns, please contact our 24 hour nurse triage line: [phone] ([phone])         Miryam Aguilar RN

## 2023-10-16 ENCOUNTER — TELEPHONE (OUTPATIENT)
Dept: FAMILY MEDICINE | Facility: CLINIC | Age: 78
End: 2023-10-16
Payer: MEDICARE

## 2023-10-16 NOTE — PROGRESS NOTES
Received a message from [facility] that they had accepted patient for home PT, OT and Speech but the orders had not been placed. Writer sent a text message to discharging provider to follow up.    Katerine Gandhi RN

## 2023-10-16 NOTE — TELEPHONE ENCOUNTER
Dr Roy  Pt was in [facility] for CVA. Set her up for f/up appt with you on Wednesday however daughter states pt was not set up for any PT, OT, Speech therapy. Pt has had issues with speech due to right side of tongue weakness and also pt is unsteady and weak.  Wondering if you would like to set up pt for homecare?      Jose Segovia RN

## 2023-10-17 ENCOUNTER — TELEPHONE (OUTPATIENT)
Dept: FAMILY MEDICINE | Facility: CLINIC | Age: 78
End: 2023-10-17

## 2023-10-17 NOTE — TELEPHONE ENCOUNTER
Patient has appointment tomorrow with Dr. Roy.     Patient is okay with this appointment.    Neda Hines RN on 10/17/2023 at 10:09 AM

## 2023-10-17 NOTE — TELEPHONE ENCOUNTER
Home Care is calling regarding an established patient with [facility].    Requesting orders from: Audrey Roy  Provider is following patient: Yes  Is this a 60-day recertification request?  No    Orders Requested    Skilled Nursing  Request for delay in care, service is not able to be provided within same scheduled day.   Jermaine says that pt doesn't want to be seen for initial start of home care until Thursday d/t follow up appointments    Information was gathered and will be sent to provider for review.  RN will contact Home Care with information after provider review.  Confirmed ok to leave a detailed message with call back.  Contact information confirmed and updated as needed.    Maru Oden RN  [facility]

## 2023-10-18 ENCOUNTER — OFFICE VISIT (OUTPATIENT)
Dept: FAMILY MEDICINE | Facility: CLINIC | Age: 78
End: 2023-10-18
Payer: MEDICARE

## 2023-10-18 VITALS
OXYGEN SATURATION: 97 % | DIASTOLIC BLOOD PRESSURE: 82 MMHG | BODY MASS INDEX: 34.75 KG/M2 | RESPIRATION RATE: 16 BRPM | HEART RATE: 75 BPM | SYSTOLIC BLOOD PRESSURE: 136 MMHG | HEIGHT: 60 IN | WEIGHT: 177 LBS | TEMPERATURE: 98.4 F

## 2023-10-18 DIAGNOSIS — M62.81 GENERALIZED MUSCLE WEAKNESS: ICD-10-CM

## 2023-10-18 DIAGNOSIS — I63.9 CEREBROVASCULAR ACCIDENT (CVA), UNSPECIFIED MECHANISM (H): Primary | ICD-10-CM

## 2023-10-18 DIAGNOSIS — R47.1 DYSARTHRIA: ICD-10-CM

## 2023-10-18 PROCEDURE — 99214 OFFICE O/P EST MOD 30 MIN: CPT | Performed by: FAMILY MEDICINE

## 2023-10-18 RX ORDER — RESPIRATORY SYNCYTIAL VIRUS VACCINE 120MCG/0.5
0.5 KIT INTRAMUSCULAR ONCE
Qty: 1 EACH | Refills: 0 | Status: CANCELLED | OUTPATIENT
Start: 2023-10-18 | End: 2023-10-18

## 2023-10-18 RX ORDER — CLOPIDOGREL BISULFATE 75 MG/1
75 TABLET ORAL DAILY
Qty: 90 TABLET | Refills: 4 | Status: SHIPPED | OUTPATIENT
Start: 2023-10-18 | End: 2024-04-26

## 2023-10-18 ASSESSMENT — PAIN SCALES - GENERAL: PAINLEVEL: NO PAIN (0)

## 2023-10-18 NOTE — NURSING NOTE
Initial /82   Pulse 75   Temp 98.4  F (36.9  C) (Tympanic)   Resp 16   Ht 1.524 m (5')   Wt 80.3 kg (177 lb)   LMP  (LMP Unknown)   SpO2 97%   BMI 34.57 kg/m   Estimated body mass index is 34.57 kg/m  as calculated from the following:    Height as of this encounter: 1.524 m (5').    Weight as of this encounter: 80.3 kg (177 lb). .

## 2023-10-18 NOTE — TELEPHONE ENCOUNTER
I saw her in clinic today.  They would prefer to do onsite physical therapy and speech therapy rather than home therapy.  Order for this placed today.

## 2023-10-18 NOTE — TELEPHONE ENCOUNTER
I saw her in clinic today.  We are not going to have her do any home therapy.  She will do outpatient speech and physical therapy.

## 2023-10-19 ENCOUNTER — TELEPHONE (OUTPATIENT)
Dept: FAMILY MEDICINE | Facility: CLINIC | Age: 78
End: 2023-10-19
Payer: MEDICARE

## 2023-10-19 NOTE — TELEPHONE ENCOUNTER
FYI - Status Update    Who is Calling: family member, Daughter Ramila    Update: Ramila tried to make appt for speech therapy and they are booked into December.  She did not make the appt and wants to ask Dr. Roy if it is OK to wait that long?  Please call patient's daughter and advise.      Does caller want a call/response back: Yes     Could we send this information to you in [employer] or would you prefer to receive a phone call?:   Patient would prefer a phone call   Okay to leave a detailed message?: Yes at Cell number on file:    Telephone Information:   Mobile [phone]

## 2023-10-19 NOTE — TELEPHONE ENCOUNTER
I would suggest they check with their insurance and see if other speech therapist in the [facility] might be covered -i.e. through [facility] or [facility].  Not sure if they would have any sooner availability but it may be worth checking if they have coverage there.

## 2023-10-20 NOTE — TELEPHONE ENCOUNTER
Patient Contact    Attempt # 1    Was call answered?  No.  Left message on voicemail with information to call  back.    Left detailed message with the information below.     My chart message sent.     Kaye Bar RN on 10/20/2023 at 11:22 AM

## 2023-10-26 ENCOUNTER — HOSPITAL ENCOUNTER (OUTPATIENT)
Dept: CARDIOLOGY | Facility: CLINIC | Age: 78
Discharge: HOME | End: 2023-10-26
Attending: FAMILY MEDICINE | Admitting: FAMILY MEDICINE
Payer: MEDICARE

## 2023-10-26 ENCOUNTER — THERAPY VISIT (OUTPATIENT)
Dept: SPEECH THERAPY | Facility: CLINIC | Age: 78
End: 2023-10-26
Attending: FAMILY MEDICINE
Payer: MEDICARE

## 2023-10-26 DIAGNOSIS — I63.9 CEREBROVASCULAR ACCIDENT (CVA), UNSPECIFIED MECHANISM (H): ICD-10-CM

## 2023-10-26 PROCEDURE — 93248 EXT ECG>7D<15D REV&INTERPJ: CPT | Performed by: INTERNAL MEDICINE

## 2023-10-26 PROCEDURE — 93242 EXT ECG>48HR<7D RECORDING: CPT

## 2023-10-26 PROCEDURE — 92522 EVALUATE SPEECH PRODUCTION: CPT | Mod: GN | Performed by: SPEECH-LANGUAGE PATHOLOGIST

## 2023-10-26 NOTE — PROGRESS NOTES
SPEECH LANGUAGE PATHOLOGY EVALUATION    See electronic medical record for Abuse and Falls Screening details.    Subjective      Presenting condition or subjective complaint:    Date of onset: 10/12/23    Relevant medical history:   Dysarthria since CVA onset on 10/12/23.  Pt reports speech has improved and 85% intelligible in conversation if she slows herself down but more spontaneous speech is 50% intelligible.    Was discharged from [facility] 10/13/23.  Reports dysphagia resolved if chews thoroughly and eats a regular diet consistency.      Prior diagnostic imaging/testing results:     Bedside eval at [facility] then was discharged prior to follow up.  Prior therapy history for the same diagnosis, illness or injury:    No    Living Environment  Social support:   Lives with spouse, Don.  Spouse has Alheimers and pt is the primary caretaker.  Pt's daughter lives next door and helps.  Help at home:  Independent with help from daughter.      Employment:    Retired  (nurse, office work, accounting)  Hobbies/Interests:  Golf, swimming.     Socializes with sister and DACIA and talking on the phone.    Patient goals for therapy:  To be able to have normal speech intelligibility as it was at baseline.    Pain assessment: Pain denied     Objective     ORAL MOTOR FUNCTION:  generally intact  Oral labial function: WFL  Lingual function: WFL, impaired anterior elevation      MOTOR SPEECH:  Speech Intelligibility:     Word level speech intelligibility: minimal impairment      Phrase/sentence level speech intelligibility: minimal impairment      Conversation level speech intelligibility: moderate impairment, 70% intelligible   Respiration Observations: WNL   Phonation: WNL   Maximum phonation time (seconds): 20  Pitch glide: able, adequate pitch range   Resonance: WNL  Rate/prosody: WNL   Articulation: errors with increasing word length, imprecise articulation, s/z phonemes had more errors.    Articulatory rate of movement (number  of repetitions in 5 seconds): pa, ta, ka x18  Sequential motion rates (number of repetitions in 5 seconds): 8  Repetition Skills: sounds intact  words: minimal impairment   Speech Fluency: WNL    Motor speech level of impairment: mild to moderate impairment     Assessment & Plan   CLINICAL IMPRESSIONS   Medical Diagnosis: Cerebrovascular accident (CVA), unspecified mechanism    Treatment Diagnosis: Dysarthria   Impression/Assessment: Pt is a 78 year old female with speech intelligibility complaints. The following significant findings have been identified: impaired speech intelligibility, characterized by mildly decreased lingual coordination, strength, and ROM and dysarthria. Identified deficits interfere with their ability to communicate within the home or community as compared to previous level of function.    PLAN OF CARE  Treatment Interventions: Speech    Prognosis to achieve stated therapy goals is good   Rehab potential is impacted by: patient motivation, progress made since onset date    Long Term Goals   SLP Goal 1  Goal Identifier: compensatory strategies  Goal Description: Pt will utilize compensatory strategies during speech 90% of the time  (pace, overarticulation)  Rationale: To maximize functional communication within the home or community  Target Date: 11/09/23  SLP Goal 2  Goal Identifier: intelligibility- sentences  Goal Description: Pt will have 90% intelligibility in sentence level material.  Rationale: To maximize functional communication within the home or community  Target Date: 11/09/23  SLP Goal 3  Goal Identifier: intelligibility- conversation  Goal Description: Pt will have 90% intelligibility in conversation level material.  Rationale: To maximize functional communication within the home or community  Target Date: 11/24/23  SLP Goal 4  Goal Identifier: oral motor  Goal Description: Pt will perform lingual strengthening ex via home program (resistive ex) daily x 10 reps with 5 second  hold.  Rationale: To maximize functional communication within the home or community  Target Date: 11/01/23      Frequency of Treatment: 1x/wk  Duration of Treatment: 30 days       Education Assessment:   Learner/Method: Patient  Education Comments: results and care plan recommendations.    Risks and benefits of evaluation/treatment have been explained.   Patient/Family/caregiver agrees with Plan of Care.     Evaluation Time:    Sound production (artic, phonology, apraxia, dysarthria) Minutes (01965): 45      Signing Clinician: LIZA Johnson [facility]                                                                                   OUTPATIENT SPEECH LANGUAGE PATHOLOGY      PLAN OF TREATMENT FOR OUTPATIENT REHABILITATION   Patient's Last Name, First Name, Chika Mai YOB: 1945   Provider's Name   [facility]   Medical Record No.  [id number]     Onset Date: 10/12/23 Start of Care Date: 10/26/23     Medical Diagnosis:  Cerebrovascular accident (CVA), unspecified mechanism      SLP Treatment Diagnosis: Dysarthria  Plan of Treatment  Frequency/Duration: 1x/wk  / 30 days     Certification date from 10/26/23   To 11/24/23          See note for plan of treatment details and functional goals     LIZA Johnson                         I CERTIFY THE NEED FOR THESE SERVICES FURNISHED UNDER        THIS PLAN OF TREATMENT AND WHILE UNDER MY CARE     (Physician attestation of this document indicates review and certification of the therapy plan).                Referring Provider:  Audrey Roy      Initial Assessment  See Epic Evaluation- 10/26/23

## 2023-10-31 ENCOUNTER — THERAPY VISIT (OUTPATIENT)
Dept: SPEECH THERAPY | Facility: CLINIC | Age: 78
End: 2023-10-31
Attending: FAMILY MEDICINE
Payer: MEDICARE

## 2023-10-31 ENCOUNTER — THERAPY VISIT (OUTPATIENT)
Dept: PHYSICAL THERAPY | Facility: CLINIC | Age: 78
End: 2023-10-31
Attending: FAMILY MEDICINE
Payer: MEDICARE

## 2023-10-31 DIAGNOSIS — I63.9 CEREBROVASCULAR ACCIDENT (CVA), UNSPECIFIED MECHANISM (H): Primary | ICD-10-CM

## 2023-10-31 DIAGNOSIS — M62.81 GENERALIZED MUSCLE WEAKNESS: ICD-10-CM

## 2023-10-31 PROCEDURE — 92507 TX SP LANG VOICE COMM INDIV: CPT | Mod: GN | Performed by: SPEECH-LANGUAGE PATHOLOGIST

## 2023-10-31 PROCEDURE — 97161 PT EVAL LOW COMPLEX 20 MIN: CPT | Mod: GP | Performed by: PHYSICAL THERAPIST

## 2023-10-31 PROCEDURE — 97110 THERAPEUTIC EXERCISES: CPT | Mod: GP | Performed by: PHYSICAL THERAPIST

## 2023-10-31 NOTE — PROGRESS NOTES
"PHYSICAL THERAPY EVALUATION  Type of Visit: Evaluation    See electronic medical record for Abuse and Falls Screening details.    Subjective Pt had a CVA on 10/12/23. She is currently being seen in speech at this time. She noted multiple falls in her home on the stairs with different shoes and this occurred prior to stroke. Pt notes she is now unsteady on her feet when rising. She feels her legs are \"noodly\" at this time.      Presenting condition or subjective complaint: unsteady when standing up to walk  Date of onset: 10/12/23    Relevant medical history: Hearing problems; High blood pressure; Overweight; Stroke; Vision problems   Dates & types of surgery:      Prior diagnostic imaging/testing results:       Prior therapy history for the same diagnosis, illness or injury: No      Prior Level of Function  Independent with all mobility    Living Environment  Social support: With a significant other or spouse   Type of home: Multi-level   Stairs to enter the home: Yes       Ramp: No   Stairs inside the home: Yes       Help at home: None  Equipment owned:       Employment: No    Hobbies/Interests:      Patient goals for therapy: move around better    Pain assessment: Pain denied     Objective   GENERAL EVALUATION  POSTURE: slight forward posture  GAIT: decreased L stance time, slight limp on the L side  BALANCE/PROPRIOCEPTION:  narrow EL- 30 sec holds;  modified tandem stance- 30 sec holds, b/l  ROM: AROM WFL  STRENGTH:  hip flex- 3+/5 L, 4-/5 R;  hip abd- 4-/5, b/l;  ER- 5/5;  IR- 4/5;  hip ext- 4-/5 with increased hip flexor tension;  PF- 3+/5;  DF- 5/5;  quad- 3+/5 L, 4/5 R;  HS- 4-/5 L, 4/5 R  FLEXIBILITY: WFL  FUNCTIONAL TESTS:  DGI- 17/24;  TUG- 16.06 sec    Assessment & Plan   CLINICAL IMPRESSIONS  Medical Diagnosis: Generalized muscle weakness (M62.81)    Treatment Diagnosis: generalized weakness   Impression/Assessment: Patient is a 78 year old female with generalized weakness with L > R complaints.  The " following significant findings have been identified: Decreased strength, Impaired balance, Impaired gait, Impaired muscle performance, and Decreased activity tolerance. These impairments interfere with their ability to perform self care tasks, recreational activities, household mobility, and community mobility as compared to previous level of function.     Clinical Decision Making (Complexity):  Clinical Presentation: Stable/Uncomplicated  Clinical Presentation Rationale: based on medical and personal factors listed in PT evaluation  Clinical Decision Making (Complexity): Low complexity    PLAN OF CARE  Treatment Interventions:  Interventions: Gait Training, Manual Therapy, Neuromuscular Re-education, Therapeutic Activity, Therapeutic Exercise    Long Term Goals     PT Goal 1  Goal Identifier: STG  Goal Description: Pt will have an improved TUG time of under 13.5 sec to improve her mobility and balance in 4 weeks.  Target Date: 11/28/23  PT Goal 2  Goal Identifier: LTG  Goal Description: Pt will have an improved DGI score of 20 or more in 10 weeks to decrease her unsteady gait.  Target Date: 01/09/24  PT Goal 3  Goal Identifier: LTG  Goal Description: Pt will be independent in home strengthening program for self managemen of Sx in 10 weeks.  Target Date: 01/09/24      Frequency of Treatment: 1x/wk  Duration of Treatment: 10 weeks      Education Assessment:   Learner/Method: Patient;Listening;Demonstration;No Barriers to Learning    Risks and benefits of evaluation/treatment have been explained.   Patient/Family/caregiver agrees with Plan of Care.     Evaluation Time:     PT Eval, Low Complexity Minutes (60618): 20     Signing Clinician: Anahi Wellington PT      [facility]                                                                                   OUTPATIENT PHYSICAL THERAPY      PLAN OF TREATMENT FOR OUTPATIENT REHABILITATION   Patient's Last Name, First Name,  ELLI HurdChika  GLORIA YOB: 1945   Provider's Name   [facility]   Medical Record No.  [id number]     Onset Date: 10/12/23  Start of Care Date: 10/31/23     Medical Diagnosis:  Generalized muscle weakness (M62.81)      PT Treatment Diagnosis:  generalized weakness Plan of Treatment  Frequency/Duration: 1x/wk/ 10 weeks    Certification date from 10/31/23 to 01/09/24         See note for plan of treatment details and functional goals     Anahi Wellington, PT                         I CERTIFY THE NEED FOR THESE SERVICES FURNISHED UNDER        THIS PLAN OF TREATMENT AND WHILE UNDER MY CARE     (Physician attestation of this document indicates review and certification of the therapy plan).                Referring Provider:  Audrey Roy MD      Initial Assessment  See Epic Evaluation- Start of Care Date: 10/31/23

## 2023-11-07 ENCOUNTER — THERAPY VISIT (OUTPATIENT)
Dept: PHYSICAL THERAPY | Facility: CLINIC | Age: 78
End: 2023-11-07
Attending: FAMILY MEDICINE
Payer: MEDICARE

## 2023-11-07 DIAGNOSIS — M62.81 GENERALIZED MUSCLE WEAKNESS: Primary | ICD-10-CM

## 2023-11-07 PROCEDURE — 97110 THERAPEUTIC EXERCISES: CPT | Mod: GP | Performed by: PHYSICAL THERAPIST

## 2023-11-07 PROCEDURE — 97112 NEUROMUSCULAR REEDUCATION: CPT | Mod: GP | Performed by: PHYSICAL THERAPIST

## 2023-11-13 ENCOUNTER — THERAPY VISIT (OUTPATIENT)
Dept: SPEECH THERAPY | Facility: CLINIC | Age: 78
End: 2023-11-13
Attending: FAMILY MEDICINE
Payer: MEDICARE

## 2023-11-13 DIAGNOSIS — I63.9 CEREBROVASCULAR ACCIDENT (CVA), UNSPECIFIED MECHANISM (H): Primary | ICD-10-CM

## 2023-11-13 PROCEDURE — 92507 TX SP LANG VOICE COMM INDIV: CPT | Mod: GN | Performed by: SPEECH-LANGUAGE PATHOLOGIST

## 2023-11-14 ENCOUNTER — THERAPY VISIT (OUTPATIENT)
Dept: PHYSICAL THERAPY | Facility: CLINIC | Age: 78
End: 2023-11-14
Attending: FAMILY MEDICINE
Payer: MEDICARE

## 2023-11-14 DIAGNOSIS — M62.81 GENERALIZED MUSCLE WEAKNESS: Primary | ICD-10-CM

## 2023-11-14 PROCEDURE — 97110 THERAPEUTIC EXERCISES: CPT | Mod: GP | Performed by: PHYSICAL THERAPIST

## 2023-11-14 PROCEDURE — 97112 NEUROMUSCULAR REEDUCATION: CPT | Mod: GP | Performed by: PHYSICAL THERAPIST

## 2023-11-21 ENCOUNTER — THERAPY VISIT (OUTPATIENT)
Dept: PHYSICAL THERAPY | Facility: CLINIC | Age: 78
End: 2023-11-21
Attending: FAMILY MEDICINE
Payer: MEDICARE

## 2023-11-21 DIAGNOSIS — M62.81 GENERALIZED MUSCLE WEAKNESS: Primary | ICD-10-CM

## 2023-11-21 PROCEDURE — 97110 THERAPEUTIC EXERCISES: CPT | Mod: GP | Performed by: PHYSICAL THERAPIST

## 2023-11-28 ENCOUNTER — THERAPY VISIT (OUTPATIENT)
Dept: PHYSICAL THERAPY | Facility: CLINIC | Age: 78
End: 2023-11-28
Attending: FAMILY MEDICINE
Payer: MEDICARE

## 2023-11-28 DIAGNOSIS — M62.81 GENERALIZED MUSCLE WEAKNESS: Primary | ICD-10-CM

## 2023-11-28 PROCEDURE — 97110 THERAPEUTIC EXERCISES: CPT | Mod: GP | Performed by: PHYSICAL THERAPIST

## 2023-12-05 ENCOUNTER — THERAPY VISIT (OUTPATIENT)
Dept: PHYSICAL THERAPY | Facility: CLINIC | Age: 78
End: 2023-12-05
Attending: FAMILY MEDICINE
Payer: MEDICARE

## 2023-12-05 DIAGNOSIS — M62.81 GENERALIZED MUSCLE WEAKNESS: Primary | ICD-10-CM

## 2023-12-05 PROCEDURE — 97110 THERAPEUTIC EXERCISES: CPT | Mod: GP | Performed by: PHYSICAL THERAPIST

## 2023-12-11 ENCOUNTER — THERAPY VISIT (OUTPATIENT)
Dept: SPEECH THERAPY | Facility: CLINIC | Age: 78
End: 2023-12-11
Attending: FAMILY MEDICINE
Payer: MEDICARE

## 2023-12-11 DIAGNOSIS — I63.9 CEREBROVASCULAR ACCIDENT (CVA), UNSPECIFIED MECHANISM (H): Primary | ICD-10-CM

## 2023-12-11 PROCEDURE — 92507 TX SP LANG VOICE COMM INDIV: CPT | Mod: GN | Performed by: SPEECH-LANGUAGE PATHOLOGIST

## 2023-12-11 NOTE — PROGRESS NOTES
[facility]                                                                                   OUTPATIENT SPEECH LANGUAGE PATHOLOGY    PLAN OF TREATMENT FOR OUTPATIENT REHABILITATION   Patient's Last Name, First Name, Chika Mai YOB: 1945   Provider's Name   [facility]   Medical Record No.  [id number]     Onset Date: 10/12/23 Start of Care Date: 10/26/23     Medical Diagnosis:  Cerebrovascular accident (CVA), unspecified mechanism      SLP Treatment Diagnosis: Dysarthria  Plan of Treatment  Frequency/Duration: 1x/wk  / 30 days     Certification date from 11/14/23   To 12/26/23          See note for plan of treatment details and functional goals     Karoline Abarca, SLP                         I CERTIFY THE NEED FOR THESE SERVICES FURNISHED UNDER        THIS PLAN OF TREATMENT AND WHILE UNDER MY CARE     (Physician attestation of this document indicates review and certification of the therapy plan).              Referring Provider:  Audrey Roy    Initial Assessment  See Epic Evaluation- 10/26/23            PLAN  Continue therapy per current plan of care.    Beginning/End Dates of Progress Note Reporting Period:  10/26/23  to 11/13/2023    Referring Provider:  Audrey Roy    Speech Therapy Progress Note  11/13/23    Appointment Info   Treating Provider Karoline Abarca MA, CCC/SLP   Visits Used 3   Medical Diagnosis Cerebrovascular accident (CVA), unspecified mechanism   SLP Tx Diagnosis Dysarthria   Progress Note/Certification   Start Of Care Date 10/26/23   Onset Of Illness/injury Or Date Of Surgery 10/12/23   Therapy Frequency 1x/wk   Predicted Duration 30 days   Certification date from 11/14/23   Certification date to 12/26/23   Progress Note Due Date 11/13/23   Progress Note Completed Date 10/26/23   Subjective Report   Subjective Report Pt reports she gets feedback from family her speech is improving,  pt  feels she can't talk at normal speed or generate quick verbal responses that are not slurred.  Pt reports drooling out of right side of lips.   SLP Goal 1   Goal Identifier compensatory strategies   Goal Description Pt will utilize compensatory strategies during speech 90% of the time  (pace, overarticulation)   Rationale To maximize functional communication within the home or community   Goal Progress Pt pacing and does well with slower pace of speech   Target Date 11/09/23   Date Met 11/13/23   SLP Goal 2   Goal Identifier intelligibility- sentences   Goal Description Pt will have 90% intelligibility in sentence level material.   Rationale To maximize functional communication within the home or community   Goal Progress Goal met   Target Date 11/09/23   Date Met 10/31/23   SLP Goal 3   Goal Identifier intelligibility- conversation   Goal Description Pt will have 90% intelligibility in conversation level material.   Rationale To maximize functional communication within the home or community   Goal Progress 90% with effort and use of strategies, states feels effortful.   Target Date 11/24/23   SLP Goal 4   Goal Identifier oral motor   Goal Description Pt will perform lingual strengthening ex via home program (resistive ex) daily x 10 reps with 5 second hold.   Rationale To maximize functional communication within the home or community   Goal Progress Added lip exercises due to reports/frustration with drooling out of right.  Has mildly reduced strength.   Target Date 11/01/23   Treatment Speech/Lang/Voice   Treatment of Speech, Language, Voice Communication&/or Auditory Processing (73393) 30 Minutes   Skilled Intervention Provided written and verbal information on.;Facilitated respiratory, laryngeal, oral integration   Patient Response/Progress Pt reports hard to self  intelligibility but long distance family reports speech sounding better on the phone.  Pt frustrated with incrased awareness of right side  celia.   Education   Learner/Method Patient   Education Comments lip strengthening ex   Plan   Home program continue home program.   Updates to plan of care return in 2 weeks.   Plan for next session intelligibility in conversation with use of strategies, lip ex   Total Session Time   Total Treatment Time (sum of timed and untimed services) 30

## 2023-12-19 ENCOUNTER — THERAPY VISIT (OUTPATIENT)
Dept: SPEECH THERAPY | Facility: CLINIC | Age: 78
End: 2023-12-19
Attending: FAMILY MEDICINE
Payer: MEDICARE

## 2023-12-19 ENCOUNTER — THERAPY VISIT (OUTPATIENT)
Dept: PHYSICAL THERAPY | Facility: CLINIC | Age: 78
End: 2023-12-19
Attending: FAMILY MEDICINE
Payer: MEDICARE

## 2023-12-19 DIAGNOSIS — R47.1 DYSARTHRIA: Primary | ICD-10-CM

## 2023-12-19 DIAGNOSIS — M62.81 GENERALIZED MUSCLE WEAKNESS: Primary | ICD-10-CM

## 2023-12-19 PROCEDURE — 92507 TX SP LANG VOICE COMM INDIV: CPT | Mod: GN | Performed by: SPEECH-LANGUAGE PATHOLOGIST

## 2023-12-19 PROCEDURE — 97110 THERAPEUTIC EXERCISES: CPT | Mod: GP | Performed by: PHYSICAL THERAPIST

## 2023-12-19 NOTE — PROGRESS NOTES
DISCHARGE  Reason for Discharge: Patient has met all goals.    Discharge Plan: Patient to continue home program.    Referring Provider:  Audrey Roy      Speech Therapy Discharge Note  12/19/23    Appointment Info   Treating Provider Karoline Abarca MA, CCC/SLP   Visits Used 5   Medical Diagnosis Cerebrovascular accident (CVA), unspecified mechanism   SLP Tx Diagnosis Dysarthria   Progress Note/Certification   Start Of Care Date 10/26/23   Onset Of Illness/injury Or Date Of Surgery 10/12/23   Therapy Frequency 1x/wk   Predicted Duration 30 days   Progress Note Due Date 12/19/23   Progress Note Completed Date 11/13/23   Subjective Report   Subjective Report Pt reports she is feeling more confident with speech intelligibility and now back to talking to friends on the phone.   SLP Goal 1   Goal Identifier compensatory strategies   Goal Description Pt will utilize compensatory strategies during speech 90% of the time  (pace, overarticulation)   Rationale To maximize functional communication within the home or community   Goal Progress Pt pacing and does well with slower pace of speech   Target Date 11/09/23   Date Met 11/13/23   SLP Goal 2   Goal Identifier intelligibility- sentences   Goal Description Pt will have 90% intelligibility in sentence level material.   Rationale To maximize functional communication within the home or community   Goal Progress Goal met   Target Date 11/09/23   Date Met 10/31/23   SLP Goal 3   Goal Identifier intelligibility- conversation   Goal Description Pt will have 90% intelligibility in conversation level material.   Rationale To maximize functional communication within the home or community   Goal Progress 95%.  Pt reports unfamililar people understand her:  ie at the grocery store if asking a question.   Target Date 11/24/23   Date Met 12/19/23   SLP Goal 4   Goal Identifier oral motor   Goal Description Pt will perform lingual strengthening ex via home program (resistive  ex) daily x 10 reps with 5 second hold.   Rationale To maximize functional communication within the home or community   Goal Progress Used straw to facilitate air flow at midline for /s/   Target Date 11/01/23   Date Met 12/19/23   Treatment Speech/Lang/Voice   Treatment of Speech, Language, Voice Communication&/or Auditory Processing (55959) 40 Minutes   Skilled Intervention Provided written and verbal information on.;Facilitated respiratory, laryngeal, oral integration   Patient Response/Progress Pt has met all short term goals and feels her communication is WNL with friends, family, on the phone, and in the community.   Education   Learner/Method Patient   Education Comments continue compenstory strategies when indicated.   Plan   Home program continue home program.   Updates to plan of care Discharge from  today   Total Session Time   Total Treatment Time (sum of timed and untimed services) 40

## 2023-12-25 DIAGNOSIS — H34.11 CENTRAL RETINAL ARTERY OCCLUSION OF RIGHT EYE: ICD-10-CM

## 2023-12-26 ENCOUNTER — THERAPY VISIT (OUTPATIENT)
Dept: PHYSICAL THERAPY | Facility: CLINIC | Age: 78
End: 2023-12-26
Attending: FAMILY MEDICINE
Payer: MEDICARE

## 2023-12-26 DIAGNOSIS — M62.81 GENERALIZED MUSCLE WEAKNESS: Primary | ICD-10-CM

## 2023-12-26 PROCEDURE — 97110 THERAPEUTIC EXERCISES: CPT | Mod: GP | Performed by: PHYSICAL THERAPIST

## 2023-12-26 RX ORDER — ATORVASTATIN CALCIUM 40 MG/1
TABLET, FILM COATED ORAL
Qty: 68 TABLET | Refills: 5 | OUTPATIENT
Start: 2023-12-26

## 2023-12-26 NOTE — PROGRESS NOTES
DISCHARGE  Reason for Discharge: Patient has met all goals.    Equipment Issued: none    Discharge Plan: Patient to continue home program.    Referring Provider:  Audrey Roy     12/26/23 0500   Appointment Info   Signing clinician's name / credentials Anahi Wellington, PT, DPT   Total/Authorized Visits 10 planned   Visits Used 8   Medical Diagnosis Generalized muscle weakness (M62.81)   PT Tx Diagnosis generalized weakness   Quick Adds Certification   Progress Note/Certification   Start of Care Date 10/31/23   Onset of illness/injury or Date of Surgery 10/12/23   Therapy Frequency 1x/wk   Predicted Duration 10 weeks   Certification date from 10/31/23   Certification date to 01/09/24   Progress Note Due Date 01/09/24   Progress Note Completed Date 10/31/23   GOALS   PT Goals 2;3   PT Goal 1   Goal Identifier STG   Goal Description Pt will have an improved TUG time of under 13.5 sec to improve her mobility and balance in 4 weeks.   Goal Progress TUG at 10.35 sec   Target Date 11/28/23   Date Met 12/26/23   PT Goal 2   Goal Identifier LTG   Goal Description Pt will have an improved DGI score of 20 or more in 10 weeks to decrease her unsteady gait.   Goal Progress DGI- 20/24   Target Date 01/09/24   Date Met 12/26/23   PT Goal 3   Goal Identifier LTG   Goal Description Pt will be independent in home strengthening program for self managemen of Sx in 10 weeks.   Target Date 01/09/24   Date Met 12/26/23   Subjective Report   Subjective Report generalized strengthening. CVA on 10/12/23. Pt notes doing well at home during this time. No loss of balance.   Objective Measures   Objective Measures Objective Measure 1   Objective Measure 1   Objective Measure Slight R trendelenburg still present at this time   Treatment Interventions (PT)   Interventions Therapeutic Procedure/Exercise;Neuromuscular Re-education   Therapeutic Procedure/Exercise   Therapeutic Procedures: strength, endurance, ROM, flexibillity minutes  (63485) 23   Ther Proc 1 - Details DGI, TUG, SL hip abd- 5x, b/l;  clam shell- 5x, b/l; myofascial gluteal full arc- 5x, b/l;  SLR-5x;  tandem stance- b/l, 2x;  cone taps   Skilled Intervention used to strengthen and improve balance   Patient Response/Progress slight trendeleburg still present for the pt at this time but a reduction in motion   Education   Learner/Method Patient;Listening;Demonstration;No Barriers to Learning   Plan   Home program papers- standing hip ext, lateral side steps, standing hip abd, heel raises, modified tandem stance, SLR, SL Hip abd, clamshells   Plan for next session discharge   Total Session Time   Timed Code Treatment Minutes 23   Total Treatment Time (sum of timed and untimed services) 23

## 2024-01-17 ENCOUNTER — PATIENT OUTREACH (OUTPATIENT)
Dept: CARE COORDINATION | Facility: CLINIC | Age: 79
End: 2024-01-17
Payer: MEDICARE

## 2024-01-27 DIAGNOSIS — H34.11 CENTRAL RETINAL ARTERY OCCLUSION OF RIGHT EYE: ICD-10-CM

## 2024-01-29 RX ORDER — ATORVASTATIN CALCIUM 40 MG/1
TABLET, FILM COATED ORAL
Qty: 68 TABLET | Refills: 2 | Status: SHIPPED | OUTPATIENT
Start: 2024-01-29 | End: 2024-04-26

## 2024-01-30 NOTE — PROGRESS NOTES
"SPEECH-LANGUAGE EVALUATION     10/13/23 1965   Appointment Info   Signing Clinician's Name / Credentials (SLP) [facility]   General Information   Onset of Illness/Injury or Date of Surgery 10/12/23   Referring Physician Josue King MD   Patient/Family Therapy Goal Statement (SLP) Patient would like to improve speech.   Pertinent History of Current Problem Per provider note: \"Chika Hurd is a 78F presented to [facility] [place] for acute speech changes, found to have acute/subacute infarction; pmhx includes HTN/HLD, CRAO, L breast DCIS, GERD; transferred for acute CVA of L frontal lobe. Started to have abnormal sensation of the tongue, strange speech.  Did not think much of it and went to sleep, when she woke with similar problems of abnormal sensations on the tongue slurred speech no weakness loss of sensation otherwise.\"   General Observations Patient alert, interactive, pleasant. Patient's spouse, daughter and DACIA present.   Type of Evaluation   Type of Evaluation Speech, Language, Cognition   Motor Speech   Vocal Loudness (Motor Speech) reduced loudness   Speech Intelligibility (Motor Speech) conversational level  (80% with careful listening)   Articulation (Motor Speech) imprecise articulation;voicing errors on consonants  (noted x1-2 (e.g. \"puk\" instead of \"cup\"))   Dysarthria differential diagnosis   (Unilateral [facility] dysarthria)   Phonation (motor speech) Loudness (soft)  (mild)   Conversational Level, Speech Intelligibility (Motor Speech) moderate impairment   Auditory Comprehension   Comment, Assessment (Auditory Comprehension) Patient followed conversation and responded to questions appropriately throughout exam.   Verbal Expression   Comment, Assessment (Verbal Expression) Minimal to no word-finding difficulty apparent during exam. Patient reported occasional word-finding difficulty at baseline.   Pragmatic Language   Verbal Skills (Pragmatic Language) WFL   Nonverbal Skills " (Pragmatic Language) WFL   Reading Comprehension   Comment, Assessment (Reading Comprehension) Patient used menu to select lunch items without difficulty.   Cognition   Affect/Mental Status (Cognition) WFL   Follows Commands (Cognition) WFL   General Therapy Interventions   Planned Therapy Interventions Communication   Communication Improve speech intelligibility   Clinical Impression   Criteria for Skilled Therapeutic Interventions Met (SLP Eval) Yes, treatment indicated   SLP Diagnosis Dysarthria   Risks & Benefits of therapy have been explained evaluation/treatment results reviewed;care plan/treatment goals reviewed;risks/benefits reviewed;current/potential barriers reviewed;participants voiced agreement with care plan;participants included;patient;spouse/significant other;daughter   Clinical Impression Comments Moderate [facility] dysarthria characterized by imprecise articulation and occasional consonant sound substitions in conversation. Speech intelligibility judged 80% in conversation with careful listening. Patient implemented strategies of slowing rate and over-articulating given min to no cues during session. Receptive and expressive language function appeared WFL for conversation. Recommend SLP interventions for speech intelligibility strategy training.   SLP Total Evaluation Time   Eval: Sound production with lang comprehension and expression Minutes (28598) 10   SLP Goals   Therapy Frequency (SLP Eval) daily   SLP Predicted Duration/Target Date for Goal Attainment 11/17/23   SLP Goals Communication   SLP: Communicate basic wants and needs verbally;independent   Interventions   Interventions Quick Adds Speech, Language, Voice & Communication   SLP Discharge Planning   SLP Plan daily: diet f/u; swallow and speech strategy training; consider VFSS pending progress   SLP Discharge Recommendation home with outpatient therapy services;home with home health   SLP Rationale for DC Rec Continued SLP for dysphagia and  dysarthria.   SLP Brief overview of current status  Recommend continue regular diet and thin liquids given swallow strategies (fully upright position, no straws, small single sips/bites, slow pace, alternate liquids/solids, check for oral pocketing). Please serve pills one at a time. Monitor for increased signs/symptoms of aspiration and hold PO if occur. SLP to follow for swallowing and communication.   Total Session Time   Total Session Time (sum of timed and untimed services) 10        Aftercare Appointment

## 2024-01-31 ENCOUNTER — PATIENT OUTREACH (OUTPATIENT)
Dept: CARE COORDINATION | Facility: CLINIC | Age: 79
End: 2024-01-31
Payer: MEDICARE

## 2024-03-16 DIAGNOSIS — I10 BENIGN ESSENTIAL HYPERTENSION: ICD-10-CM

## 2024-03-18 RX ORDER — HYDROCHLOROTHIAZIDE 25 MG/1
25 TABLET ORAL DAILY
Qty: 90 TABLET | Refills: 1 | OUTPATIENT
Start: 2024-03-18

## 2024-03-22 RX ORDER — HYDROCHLOROTHIAZIDE 25 MG/1
25 TABLET ORAL DAILY
Qty: 90 TABLET | Refills: 0 | Status: SHIPPED | OUTPATIENT
Start: 2024-03-22 | End: 2024-04-26

## 2024-03-22 NOTE — TELEPHONE ENCOUNTER
Medication Question or Refill      Patient calling stating the pharmacy does not have refills on file for this medication. She is requesting a call back once sent.     What medication are you calling about (include dose and sig)?: hydrochlorothiazide (HYDRODIURIL) 25 MG tablet     Preferred Pharmacy:  [facility] [id number] [place] [address] [address] [place] [address] [facility] [address]  Phone: [phone] Fax: [phone]      Who prescribed the medication?: Audrey Roy I      Do you need a refill? Yes    Patient offered an appointment? Upcoming appt 4/26      Could we send this information to you in [employer] or would you prefer to receive a phone call?:   Patient would prefer a phone call     Okay to leave a detailed message?: Yes at cell number on file [phone].

## 2024-03-22 NOTE — TELEPHONE ENCOUNTER
Called patient and informed her of the refill and that we need to see her on 4/26/24 for any further refills.    Patient expressed understanding.     Neda Hines RN on 3/22/2024 at 2:56 PM

## 2024-03-22 NOTE — TELEPHONE ENCOUNTER
Refilled 1 time only, upcoming appt  Please call to notify pt refill sent  Taylor Norton MSN, RN

## 2024-04-13 ENCOUNTER — HEALTH MAINTENANCE LETTER (OUTPATIENT)
Age: 79
End: 2024-04-13

## 2024-04-26 ENCOUNTER — LAB (OUTPATIENT)
Dept: LAB | Facility: CLINIC | Age: 79
End: 2024-04-26
Payer: MEDICARE

## 2024-04-26 ENCOUNTER — OFFICE VISIT (OUTPATIENT)
Dept: FAMILY MEDICINE | Facility: CLINIC | Age: 79
End: 2024-04-26
Payer: MEDICARE

## 2024-04-26 VITALS
WEIGHT: 180 LBS | SYSTOLIC BLOOD PRESSURE: 130 MMHG | BODY MASS INDEX: 35.34 KG/M2 | OXYGEN SATURATION: 97 % | DIASTOLIC BLOOD PRESSURE: 73 MMHG | TEMPERATURE: 97.7 F | HEIGHT: 60 IN | RESPIRATION RATE: 16 BRPM | HEART RATE: 73 BPM

## 2024-04-26 DIAGNOSIS — I63.9 CEREBROVASCULAR ACCIDENT (CVA), UNSPECIFIED MECHANISM (H): ICD-10-CM

## 2024-04-26 DIAGNOSIS — Z13.29 SCREENING FOR ENDOCRINE, METABOLIC AND IMMUNITY DISORDER: ICD-10-CM

## 2024-04-26 DIAGNOSIS — E66.01 CLASS 2 SEVERE OBESITY DUE TO EXCESS CALORIES WITH SERIOUS COMORBIDITY AND BODY MASS INDEX (BMI) OF 35.0 TO 35.9 IN ADULT (H): ICD-10-CM

## 2024-04-26 DIAGNOSIS — Z13.220 LIPID SCREENING: ICD-10-CM

## 2024-04-26 DIAGNOSIS — H34.11 CENTRAL RETINAL ARTERY OCCLUSION OF RIGHT EYE: ICD-10-CM

## 2024-04-26 DIAGNOSIS — Z13.0 SCREENING FOR ENDOCRINE, METABOLIC AND IMMUNITY DISORDER: ICD-10-CM

## 2024-04-26 DIAGNOSIS — Z13.228 SCREENING FOR ENDOCRINE, METABOLIC AND IMMUNITY DISORDER: ICD-10-CM

## 2024-04-26 DIAGNOSIS — Z00.00 ENCOUNTER FOR MEDICARE ANNUAL WELLNESS EXAM: Primary | ICD-10-CM

## 2024-04-26 DIAGNOSIS — C50.912 CARCINOMA OF LEFT BREAST IN FEMALE, ESTROGEN RECEPTOR POSITIVE, UNSPECIFIED SITE OF BREAST (H): ICD-10-CM

## 2024-04-26 DIAGNOSIS — Z17.0 CARCINOMA OF LEFT BREAST IN FEMALE, ESTROGEN RECEPTOR POSITIVE, UNSPECIFIED SITE OF BREAST (H): ICD-10-CM

## 2024-04-26 DIAGNOSIS — I10 BENIGN ESSENTIAL HYPERTENSION: ICD-10-CM

## 2024-04-26 DIAGNOSIS — E66.812 CLASS 2 SEVERE OBESITY DUE TO EXCESS CALORIES WITH SERIOUS COMORBIDITY AND BODY MASS INDEX (BMI) OF 35.0 TO 35.9 IN ADULT (H): ICD-10-CM

## 2024-04-26 LAB
ANION GAP SERPL CALCULATED.3IONS-SCNC: 11 MMOL/L (ref 7–15)
BUN SERPL-MCNC: 17.2 MG/DL (ref 8–23)
CALCIUM SERPL-MCNC: 9.6 MG/DL (ref 8.8–10.2)
CHLORIDE SERPL-SCNC: 100 MMOL/L (ref 98–107)
CHOLEST SERPL-MCNC: 113 MG/DL
CREAT SERPL-MCNC: 0.77 MG/DL (ref 0.51–0.95)
DEPRECATED HCO3 PLAS-SCNC: 28 MMOL/L (ref 22–29)
EGFRCR SERPLBLD CKD-EPI 2021: 80 ML/MIN/1.73M2
FASTING STATUS PATIENT QL REPORTED: NO
GLUCOSE SERPL-MCNC: 86 MG/DL (ref 70–99)
HDLC SERPL-MCNC: 41 MG/DL
LDLC SERPL CALC-MCNC: 54 MG/DL
NONHDLC SERPL-MCNC: 72 MG/DL
POTASSIUM SERPL-SCNC: 4.8 MMOL/L (ref 3.4–5.3)
SODIUM SERPL-SCNC: 139 MMOL/L (ref 135–145)
TRIGL SERPL-MCNC: 91 MG/DL
TSH SERPL DL<=0.005 MIU/L-ACNC: 0.69 UIU/ML (ref 0.3–4.2)

## 2024-04-26 PROCEDURE — 99214 OFFICE O/P EST MOD 30 MIN: CPT | Mod: 25 | Performed by: FAMILY MEDICINE

## 2024-04-26 PROCEDURE — 80048 BASIC METABOLIC PNL TOTAL CA: CPT

## 2024-04-26 PROCEDURE — 84443 ASSAY THYROID STIM HORMONE: CPT | Mod: GZ

## 2024-04-26 PROCEDURE — 36415 COLL VENOUS BLD VENIPUNCTURE: CPT

## 2024-04-26 PROCEDURE — 80061 LIPID PANEL: CPT

## 2024-04-26 PROCEDURE — G0438 PPPS, INITIAL VISIT: HCPCS | Performed by: FAMILY MEDICINE

## 2024-04-26 RX ORDER — RESPIRATORY SYNCYTIAL VIRUS VACCINE 120MCG/0.5
0.5 KIT INTRAMUSCULAR ONCE
Qty: 1 EACH | Refills: 0 | Status: CANCELLED | OUTPATIENT
Start: 2024-04-26 | End: 2024-04-26

## 2024-04-26 RX ORDER — HYDROCHLOROTHIAZIDE 25 MG/1
25 TABLET ORAL DAILY
Qty: 90 TABLET | Refills: 4 | Status: ON HOLD | OUTPATIENT
Start: 2024-04-26

## 2024-04-26 RX ORDER — CLOPIDOGREL BISULFATE 75 MG/1
75 TABLET ORAL DAILY
Qty: 90 TABLET | Refills: 4 | Status: ON HOLD | OUTPATIENT
Start: 2024-04-26 | End: 2024-09-30

## 2024-04-26 RX ORDER — ATORVASTATIN CALCIUM 40 MG/1
40 TABLET, FILM COATED ORAL DAILY
Qty: 90 TABLET | Refills: 4 | Status: ON HOLD | OUTPATIENT
Start: 2024-04-26

## 2024-04-26 RX ORDER — LISINOPRIL 40 MG/1
40 TABLET ORAL DAILY
Qty: 90 TABLET | Refills: 4 | Status: ON HOLD | OUTPATIENT
Start: 2024-04-26

## 2024-04-26 ASSESSMENT — PAIN SCALES - GENERAL: PAINLEVEL: NO PAIN (0)

## 2024-04-26 ASSESSMENT — PATIENT HEALTH QUESTIONNAIRE - PHQ9
SUM OF ALL RESPONSES TO PHQ QUESTIONS 1-9: 8
SUM OF ALL RESPONSES TO PHQ QUESTIONS 1-9: 8
10. IF YOU CHECKED OFF ANY PROBLEMS, HOW DIFFICULT HAVE THESE PROBLEMS MADE IT FOR YOU TO DO YOUR WORK, TAKE CARE OF THINGS AT HOME, OR GET ALONG WITH OTHER PEOPLE: SOMEWHAT DIFFICULT

## 2024-04-26 NOTE — LETTER
May 8, 2024      Karma Hurd  [id number]  [facility] [address]        Dear ,    We are writing to inform you of your test results.    HDL (good cholesterol) mildly low. Regular exercise, maintaining healthy weight and a diet rich in whole grains and omega-3 fatty acids can help improve HDL levels. Otherwise labs look good.     Resulted Orders   TSH with free T4 reflex   Result Value Ref Range    TSH 0.69 0.30 - 4.20 uIU/mL   Lipid panel reflex to direct LDL Fasting   Result Value Ref Range    Cholesterol 113 <200 mg/dL    Triglycerides 91 <150 mg/dL    Direct Measure HDL 41 (L) >=50 mg/dL    LDL Cholesterol Calculated 54 <=100 mg/dL    Non HDL Cholesterol 72 <130 mg/dL    Patient Fasting > 8hrs? No     Narrative    Cholesterol  Desirable:  <200 mg/dL    Triglycerides  Normal:  Less than 150 mg/dL  Borderline High:  150-199 mg/dL  High:  200-499 mg/dL  Very High:  Greater than or equal to 500 mg/dL    Direct Measure HDL  Female:  Greater than or equal to 50 mg/dL   Male:  Greater than or equal to 40 mg/dL    LDL Cholesterol  Desirable:  <100mg/dL  Above Desirable:  100-129 mg/dL   Borderline High:  130-159 mg/dL   High:  160-189 mg/dL   Very High:  >= 190 mg/dL    Non HDL Cholesterol  Desirable:  130 mg/dL  Above Desirable:  130-159 mg/dL  Borderline High:  160-189 mg/dL  High:  190-219 mg/dL  Very High:  Greater than or equal to 220 mg/dL   Basic metabolic panel   Result Value Ref Range    Sodium 139 135 - 145 mmol/L      Comment:      Reference intervals for this test were updated on 09/26/2023 to more accurately reflect our healthy population. There may be differences in the flagging of prior results with similar values performed with this method. Interpretation of those prior results can be made in the context of the updated reference intervals.     Potassium 4.8 3.4 - 5.3 mmol/L    Chloride 100 98 - 107 mmol/L    Carbon Dioxide (CO2) 28 22 - 29 mmol/L    Anion Gap 11 7 - 15 mmol/L    Urea  Nitrogen 17.2 8.0 - 23.0 mg/dL    Creatinine 0.77 0.51 - 0.95 mg/dL    GFR Estimate 80 >60 mL/min/1.73m2    Calcium 9.6 8.8 - 10.2 mg/dL    Glucose 86 70 - 99 mg/dL       If you have any questions or concerns, please call the clinic at the number listed above.       Sincerely,      Audrey Roy MD

## 2024-04-26 NOTE — PROGRESS NOTES
Preventive Care Visit  [facility]  Audrey Roy MD, Family Medicine  Apr 26, 2024      Assessment & Plan     Encounter for Medicare annual wellness exam    Cerebrovascular accident (CVA), unspecified mechanism (H)  Stable.  Dysarthria has improved.  She did not end up following up with speech therapy.  On Plavix and aspirin, atorvastatin.  Blood pressure well-controlled.  - clopidogrel (PLAVIX) 75 MG tablet; Take 1 tablet (75 mg) by mouth daily    Class 2 severe obesity due to excess calories with serious comorbidity and body mass index (BMI) of 35.0 to 35.9 in adult (H)  Comorbidities of: Hypertension, hyperlipidemia. Weight loss could improve these.     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  Follows with oncology.    Central retinal artery occlusion of right eye  Stable. Refill medication.   - atorvastatin (LIPITOR) 40 MG tablet; Take 1 tablet (40 mg) by mouth daily    Benign essential hypertension  Well controlled. Refilled medication.   Check labs.   - hydrochlorothiazide (HYDRODIURIL) 25 MG tablet; Take 1 tablet (25 mg) by mouth daily  - lisinopril (ZESTRIL) 40 MG tablet; Take 1 tablet (40 mg) by mouth daily    Lipid screening  - Lipid panel reflex to direct LDL Fasting; Future    Screening for endocrine, metabolic and immunity disorder  - TSH with free T4 reflex; Future  - Basic metabolic panel; Future            BMI  Estimated body mass index is 35.15 kg/m  as calculated from the following:    Height as of this encounter: 1.524 m (5').    Weight as of this encounter: 81.6 kg (180 lb).             Anatoliy Parada is a 78 year old, presenting for the following:  Annual Visit        4/26/2024    12:59 PM   Additional Questions   Roomed by Shahida ASENCIO         Health Care Directive  Patient does not have a Health Care Directive or Living Will:     HPI      Hyperlipidemia Follow-Up    Are you regularly taking  any medication or supplement to lower your cholesterol?   Yes-    Are you having muscle aches or other side effects that you think could be caused by your cholesterol lowering medication?  No    Hypertension Follow-up    Do you check your blood pressure regularly outside of the clinic? Yes   Are you following a low salt diet? Yes  Are your blood pressures ever more than 140 on the top number (systolic) OR more   than 90 on the bottom number (diastolic), for example 140/90? No    BP Readings from Last 2 Encounters:   04/26/24 130/73   10/18/23 136/82     Hemoglobin A1C (%)   Date Value   10/09/2023 6.2 (H)   09/12/2016 5.4   06/04/2015 5.9     LDL Cholesterol Calculated (mg/dL)   Date Value   10/13/2023 59   02/16/2023 64   07/01/2020 51   03/08/2019 63     How many servings of fruits and vegetables do you eat daily?  2-3  On average, how many sweetened beverages do you drink each day (Examples: soda, juice, sweet tea, etc.  Do NOT count diet or artificially sweetened beverages)?   1  How many days per week do you exercise enough to make your heart beat faster? 3 or less  How many minutes a day do you exercise enough to make your heart beat faster? 9 or less  How many days per week do you miss taking your medication? 0        4/26/2024   General Health   How would you rate your overall physical health? (!) FAIR   Feel stress (tense, anxious, or unable to sleep) Not at all         4/26/2024   Nutrition   Diet: Regular (no restrictions)         4/26/2024   Exercise   Days per week of moderate/strenous exercise 1 day         4/26/2024   Social Factors   Frequency of gathering with friends or relatives Three times a week         2/16/2023   Activities of Daily Living- Home Safety   Needs help with the following daily activites NO assistance is needed   Safety concerns in the home None of the above         4/26/2024   Dental   Dentist two times every year? Yes         2/16/2023   Hearing Screening   Hearing concerns? Need  to ask people to speak up or repeat themselves            No data to display                   Today's PHQ-9 Score:       2024     1:03 PM   PHQ-9 SCORE   PHQ-9 Total Score MyChart 8 (Mild depression)   PHQ-9 Total Score 8         2024   Substance Use   Alcohol more than 3/day or more than 7/wk No   Do you have a current opioid prescription? No   How severe/bad is pain from 1 to 10? 0/10 (No Pain)   Do you use any other substances recreationally? No     Social History     Tobacco Use    Smoking status: Former     Current packs/day: 0.00     Types: Cigarettes     Start date: 1970     Quit date: 2005     Years since quittin.2    Smokeless tobacco: Never   Vaping Use    Vaping status: Never Used   Substance Use Topics    Alcohol use: Yes     Comment: rarely    Drug use: No           2022   LAST FHS-7 RESULTS   1st degree relative breast or ovarian cancer No   Any relative bilateral breast cancer No   Any male have breast cancer No   Any ONE woman have BOTH breast AND ovarian cancer No   Any woman with breast cancer before 50yrs No   2 or more relatives with breast AND/OR ovarian cancer No   2 or more relatives with breast AND/OR bowel cancer No        Alternate mammogram scheduled secondary to history of breast cancer.  Follows with oncology.    ASCVD Risk   The ASCVD Risk score (Rakesh DK, et al., 2019) failed to calculate for the following reasons:    The patient has a prior MI or stroke diagnosis            Reviewed and updated as needed this visit by Provider                    Past Medical History:   Diagnosis Date    Breast cancer (H)     Central retinal artery occlusion, right     DCIS (ductal carcinoma in situ) of breast     GERD (gastroesophageal reflux disease)     Hyperlipidemia      Past Surgical History:   Procedure Laterality Date    COLONOSCOPY N/A 2021    Procedure: COLONOSCOPY, WITH POLYPECTOMY AND BIOPSY;  Surgeon: Lawrence Ortega DO;  Location: [facility]    ENT SURGERY      dental implants 03/12 started    FLEXIBLE SIGMOIDOSCOPY  04/03    LUMPECTOMY BREAST WITH SEED LOCALIZATION Left 9/21/2016    Procedure: LUMPECTOMY BREAST WITH SEED LOCALIZATION;  Surgeon: Russel Murry MD;  Location:  OR    PHACOEMULSIFICATION WITH STANDARD INTRAOCULAR LENS IMPLANT Right 11/6/2017    Procedure: PHACOEMULSIFICATION WITH STANDARD INTRAOCULAR LENS IMPLANT;  Right cataract removal with implant;  Surgeon: Michael Zuleta MD;  Location: [facility] OR    PHACOEMULSIFICATION WITH STANDARD INTRAOCULAR LENS IMPLANT Left 11/27/2017    Procedure: PHACOEMULSIFICATION WITH STANDARD INTRAOCULAR LENS IMPLANT;  Left cataract removal with implant;  Surgeon: Michael Zuleta MD;  Location: [facility] OR    SURGICAL HISTORY OF -   1959    Right Hip Pinning    SURGICAL HISTORY OF -   1983    Tubal Ligation     Labs reviewed in EPIC  Patient Active Problem List   Diagnosis    Hyperlipidemia LDL goal <130    SENSONRL HEAR LOSS,BILAT    GERD (gastroesophageal reflux disease)    Cataract right eye    Advanced directives, counseling/discussion    Ductal carcinoma in situ (DCIS) of left breast    Central retinal artery occlusion of right eye    Carcinoma of left breast in female, estrogen receptor positive, unspecified site of breast (H)    Benign essential hypertension    Class 2 severe obesity due to excess calories with serious comorbidity in adult (H)    Stroke (H)    Cerebrovascular accident (CVA), unspecified mechanism (H)    Generalized muscle weakness     Past Surgical History:   Procedure Laterality Date    COLONOSCOPY N/A 12/8/2021    Procedure: COLONOSCOPY, WITH POLYPECTOMY AND BIOPSY;  Surgeon: Lawrence Ortega DO;  Location: [facility]    ENT SURGERY      dental implants 03/12 started    FLEXIBLE SIGMOIDOSCOPY  04/03    LUMPECTOMY BREAST WITH SEED LOCALIZATION Left 9/21/2016    Procedure: LUMPECTOMY BREAST WITH SEED LOCALIZATION;  Surgeon: Russel Murry MD;  Location:  OR     PHACOEMULSIFICATION WITH STANDARD INTRAOCULAR LENS IMPLANT Right 2017    Procedure: PHACOEMULSIFICATION WITH STANDARD INTRAOCULAR LENS IMPLANT;  Right cataract removal with implant;  Surgeon: Michael Zuleta MD;  Location: [facility]    PHACOEMULSIFICATION WITH STANDARD INTRAOCULAR LENS IMPLANT Left 2017    Procedure: PHACOEMULSIFICATION WITH STANDARD INTRAOCULAR LENS IMPLANT;  Left cataract removal with implant;  Surgeon: Michael Zuleta MD;  Location: [facility] OR    SURGICAL HISTORY OF -       Right Hip Pinning    SURGICAL HISTORY OF -       Tubal Ligation       Social History     Tobacco Use    Smoking status: Former     Current packs/day: 0.00     Types: Cigarettes     Start date: 1970     Quit date: 2005     Years since quittin.3    Smokeless tobacco: Never   Substance Use Topics    Alcohol use: Yes     Comment: rarely     Family History   Problem Relation Age of Onset    Heart Disease Mother     Heart Disease Father     Circulatory Father         main artery aneursym    Heart Disease Sister         sleep problems    Breast Cancer Maternal Aunt         diagnosed in 60's, surviving in 90's    Breast Cancer Cousin     Breast Cancer Cousin     Skin Cancer No family hx of          Current Outpatient Medications   Medication Sig Dispense Refill    acetaminophen (TYLENOL) 500 MG tablet Take 2 tablets by mouth 2 times daily as needed for mild pain      Ascorbic Acid (VITAMIN C PO) Take 500 mg by mouth daily      atorvastatin (LIPITOR) 40 MG tablet Take 1 tablet (40 mg) by mouth daily 90 tablet 4    clopidogrel (PLAVIX) 75 MG tablet Take 1 tablet (75 mg) by mouth daily 90 tablet 4    coenzyme Q-10 200 MG CAPS capsule Take 1 capsule by mouth at bedtime      famotidine (PEPCID) 20 MG tablet TAKE 1 TABLET (20 MG) BY MOUTH 2 TIMES DAILY 180 tablet 1    Glycerin-Polysorbate 80 (REFRESH DRY EYE THERAPY OP) Place 1 drop into both eyes as needed      hydrochlorothiazide (HYDRODIURIL) 25  MG tablet Take 1 tablet (25 mg) by mouth daily 90 tablet 4    lisinopril (ZESTRIL) 40 MG tablet Take 1 tablet (40 mg) by mouth daily 90 tablet 4    MULTIVITAMIN OR 1 daily      Omega-3 Fatty Acids (OMEGA-3 FISH OIL PO) Take 1 g by mouth daily        Allergies   Allergen Reactions    Cortisone Swelling     Cortisone Cream     Current providers sharing in care for this patient include:  Patient Care Team:  Audrey Roy MD as PCP - General (Family Practice)  Benjamin Chen MD as MD (Radiation Oncology)  Leeroy Pascal MD as MD (Vascular Surgery)  Michael Zuleta MD as MD (Ophthalmology)  Victoria Wakefield PA-C as Physician Assistant (Dermatology)  Audrey Roy MD as Referring Physician (Family Medicine)  Audrey Roy MD as Assigned PCP    The following health maintenance items are reviewed in Epic and correct as of today:  Health Maintenance   Topic Date Due    RSV VACCINE (Pregnancy & 60+) (1 - 1-dose 60+ series) Never done    ZOSTER IMMUNIZATION (2 of 3) 07/30/2015    INFLUENZA VACCINE (1) 09/01/2023    COVID-19 Vaccine (4 - 2023-24 season) 09/01/2023    MEDICARE ANNUAL WELLNESS VISIT  02/16/2024    FALL RISK ASSESSMENT  02/16/2024    ANNUAL REVIEW OF HM ORDERS  08/25/2024    MAMMO SCREENING  08/31/2024    LIPID  10/13/2024    PHQ-9  10/26/2024    DTAP/TDAP/TD IMMUNIZATION (2 - Td or Tdap) 06/04/2025    GLUCOSE  10/13/2026    ADVANCE CARE PLANNING  03/01/2028    DEXA  12/14/2031    HEPATITIS C SCREENING  Completed    PHQ-2 (once per calendar year)  Completed    Pneumococcal Vaccine: 65+ Years  Completed    IPV IMMUNIZATION  Aged Out    HPV IMMUNIZATION  Aged Out    MENINGITIS IMMUNIZATION  Aged Out    RSV MONOCLONAL ANTIBODY  Aged Out    COLORECTAL CANCER SCREENING  Discontinued         Review of Systems  Constitutional, neuro, ENT, endocrine, pulmonary, cardiac, gastrointestinal, genitourinary, musculoskeletal, integument and psychiatric systems are negative, except  as otherwise noted.     Objective    Exam  /73 (BP Location: Right arm, Patient Position: Sitting, Cuff Size: Adult Regular)   Pulse 73   Temp 97.7  F (36.5  C) (Oral)   Resp 16   Ht 1.524 m (5')   Wt 81.6 kg (180 lb)   LMP  (LMP Unknown)   SpO2 97%   BMI 35.15 kg/m     Estimated body mass index is 35.15 kg/m  as calculated from the following:    Height as of this encounter: 1.524 m (5').    Weight as of this encounter: 81.6 kg (180 lb).    Physical Exam  GENERAL: alert and no distress  EYES: Eyes grossly normal to inspection, PERRL and conjunctivae and sclerae normal  HENT: normal cephalic/atraumatic and ear canals and TM's normal  NECK: no adenopathy, no asymmetry, masses, or scars  RESP: lungs clear to auscultation - no rales, rhonchi or wheezes  CV: regular rate and rhythm, normal S1 S2, no S3 or S4, no murmur, click or rub, no peripheral edema  ABDOMEN: soft, nontender, no hepatosplenomegaly, no masses and bowel sounds normal  MS: no gross musculoskeletal defects noted, no edema  SKIN: no suspicious lesions or rashes  NEURO: Normal strength and tone, mentation intact and speech normal  PSYCH: mentation appears normal, affect normal/bright        4/26/2024   Mini Cog   Clock Draw Score 2 Normal   3 Item Recall 3 objects recalled   Mini Cog Total Score 5              Signed Electronically by: Audrey Roy MD

## 2024-05-09 NOTE — PATIENT INSTRUCTIONS

## 2024-08-01 ENCOUNTER — PATIENT OUTREACH (OUTPATIENT)
Dept: CARE COORDINATION | Facility: CLINIC | Age: 79
End: 2024-08-01
Payer: MEDICARE

## 2024-08-14 NOTE — ED AVS SNAPSHOT
[facility] Emergency Department    [address] [address]    Phone:  [phone]    Fax:  [phone]                                       Chika Hurd   MRN: [id number]    Department:  [facility] Emergency Department   Date of Visit:  10/2/2017           After Visit Summary Signature Page     I have received my discharge instructions, and my questions have been answered. I have discussed any challenges I see with this plan with the nurse or doctor.    ..........................................................................................................................................  Patient/Patient Representative Signature      ..........................................................................................................................................  Patient Representative Print Name and Relationship to Patient    ..................................................               ................................................  Date                                            Time    ..........................................................................................................................................  Reviewed by Signature/Title    ...................................................              ..............................................  Date                                                            Time           PHYSICAL THERAPY EVALUATION  Type of Visit: Evaluation    Pt presents with 3wks of R LBP without sciatica after performing pilates without known injury. The pain has occurred a few times over the past 9-10years and is progressive over the preceding few weeks. Admits she has attempted pilates 3 times in the past and has had back, hip, knee pain each time. 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           Fall Risk Screen:  Fall screen completed by: PT  Have you fallen 2 or more times in the past year?: No  Have you fallen and had an injury in the past year?: No  Is patient a fall risk?: No    Subjective       Presenting condition or subjective complaint: back pain  Date of onset: 07/24/24    Relevant medical history: Depression; Osteoarthritis; Tuberculosis   Dates & types of surgery: tubercloses in 2007    Prior diagnostic imaging/testing results: Other     Prior therapy history for the same diagnosis, illness or injury: No      Prior Level of Function  Transfers:   Ambulation:   ADL:   IADL:     Living Environment  Social support: With a significant other or spouse   Type of home: House   Stairs to enter the home:         Ramp: No   Stairs inside the home: Yes 10 Is there a railing: Yes     Help at home: Self Cares (home health aide/personal care attendant, family, etc); Medication and/or finances  Equipment owned:       Employment: Yes   Hobbies/Interests: painting    Patient goals for therapy: long standing,twisting,pilates    Pain assessment: pain in R low back with extension and standing/walking >1hour.     Objective   LUMBAR SPINE EVALUATION  PAIN:   INTEGUMENTARY (edema, incisions):   POSTURE: prefers flexion  GAIT:   Weightbearing Status:   Assistive Device(s):   Gait  Deviations:   BALANCE/PROPRIOCEPTION:   WEIGHTBEARING ALIGNMENT:   NON-WEIGHTBEARING ALIGNMENT:    ROM: AROM lumbar flx painfree to floor but pain with return to upright, painful limited extension, R SB painfree full, L SB painful on R PSIS  PELVIC/SI SCREEN: sacral thrust neg, sacral compression neg, thigh thrust neg, R ASIS inferior, R PSIS superior demonstrating a R anteriorly rotated innominate  STRENGTH: R hip flx, abd, ext 4/5 each, L hip abd and ext 4/5  MYOTOMES:   DTR S:   CORD SIGNS:   DERMATOMES: normal light touch sensation  NEURAL TENSION: slump and SLR tests neg B  FLEXIBILITY: soft tissue tightness in B hamstrings and R>L piriformii  LUMBAR/HIP Special Tests:    PELVIS/SI SPECIAL TESTS:   FUNCTIONAL TESTS:   PALPATION: TTP R PSIS  SPINAL SEGMENTAL CONCLUSIONS: spring testing neg L1-S    Assessment & Plan   CLINICAL IMPRESSIONS  Medical Diagnosis: LBP    Treatment Diagnosis: LBP   Impression/Assessment: Patient is a 46 year old female with R LBP complaints.  The following significant findings have been identified: Pain, Decreased ROM/flexibility, Decreased strength, and Impaired posture. These impairments interfere with their ability to perform self care tasks, work tasks, recreational activities, household chores, household mobility, and community mobility as compared to previous level of function.     Clinical Decision Making (Complexity):  Clinical Presentation: Stable/Uncomplicated  Clinical Presentation Rationale: based on medical and personal factors listed in PT evaluation  Clinical Decision Making (Complexity): Low complexity    PLAN OF CARE  Treatment Interventions:  Modalities: Cryotherapy, Hot Pack  Interventions: Manual Therapy, Neuromuscular Re-education, Therapeutic Activity, Therapeutic Exercise    Long Term Goals     PT Goal 1  Goal Identifier: standing  Goal Description: pt will be able to stand 1hour painfree.  Rationale: to maximize safety and independence within the  community  Target Date: 10/23/24      Frequency of Treatment: 1x/wk  Duration of Treatment: 10wks    Recommended Referrals to Other Professionals:   Education Assessment:   Learner/Method: Patient;Demonstration;Pictures/Video  Education Comments: anatomy and mechanics, rehab progression and expectations, modifications for plane travel    Risks and benefits of evaluation/treatment have been explained.   Patient/Family/caregiver agrees with Plan of Care.     Evaluation Time:     PT Eval, Low Complexity Minutes (99937): 15       Signing Clinician: Emile Zeng PT

## 2024-09-25 ENCOUNTER — HOSPITAL ENCOUNTER (EMERGENCY)
Facility: CLINIC | Age: 79
Discharge: ANOTHER HOSPITAL | DRG: 066 | End: 2024-09-25
Attending: FAMILY MEDICINE | Admitting: FAMILY MEDICINE
Payer: MEDICARE

## 2024-09-25 ENCOUNTER — APPOINTMENT (OUTPATIENT)
Dept: CT IMAGING | Facility: CLINIC | Age: 79
DRG: 066 | End: 2024-09-25
Attending: FAMILY MEDICINE
Payer: MEDICARE

## 2024-09-25 ENCOUNTER — HOSPITAL ENCOUNTER (INPATIENT)
Facility: CLINIC | Age: 79
LOS: 6 days | Discharge: NURSING, REHAB OR LONG-TERM CARE | DRG: 066 | End: 2024-10-01
Attending: INTERNAL MEDICINE | Admitting: HOSPITALIST
Payer: MEDICARE

## 2024-09-25 VITALS
SYSTOLIC BLOOD PRESSURE: 128 MMHG | TEMPERATURE: 98 F | DIASTOLIC BLOOD PRESSURE: 62 MMHG | WEIGHT: 175 LBS | BODY MASS INDEX: 34.18 KG/M2 | RESPIRATION RATE: 17 BRPM | OXYGEN SATURATION: 92 % | HEART RATE: 65 BPM

## 2024-09-25 DIAGNOSIS — H10.30 ACUTE CONJUNCTIVITIS, UNSPECIFIED ACUTE CONJUNCTIVITIS TYPE, UNSPECIFIED LATERALITY: ICD-10-CM

## 2024-09-25 DIAGNOSIS — I61.9 HEMORRHAGIC STROKE (H): Primary | ICD-10-CM

## 2024-09-25 DIAGNOSIS — I63.9 CEREBROVASCULAR ACCIDENT (CVA), UNSPECIFIED MECHANISM (H): ICD-10-CM

## 2024-09-25 DIAGNOSIS — I61.9 HEMORRHAGIC CEREBROVASCULAR ACCIDENT (CVA) (H): ICD-10-CM

## 2024-09-25 LAB
ANION GAP SERPL CALCULATED.3IONS-SCNC: 8 MMOL/L (ref 7–15)
APTT PPP: 29 SECONDS (ref 22–38)
BASOPHILS # BLD AUTO: 0.1 10E3/UL (ref 0–0.2)
BASOPHILS NFR BLD AUTO: 1 %
BUN SERPL-MCNC: 15.9 MG/DL (ref 8–23)
CALCIUM SERPL-MCNC: 9.8 MG/DL (ref 8.8–10.4)
CHLORIDE SERPL-SCNC: 100 MMOL/L (ref 98–107)
CREAT SERPL-MCNC: 0.73 MG/DL (ref 0.51–0.95)
EGFRCR SERPLBLD CKD-EPI 2021: 84 ML/MIN/1.73M2
EOSINOPHIL # BLD AUTO: 0.3 10E3/UL (ref 0–0.7)
EOSINOPHIL NFR BLD AUTO: 4 %
ERYTHROCYTE [DISTWIDTH] IN BLOOD BY AUTOMATED COUNT: 13.8 % (ref 10–15)
GLUCOSE SERPL-MCNC: 127 MG/DL (ref 70–99)
HCO3 SERPL-SCNC: 29 MMOL/L (ref 22–29)
HCT VFR BLD AUTO: 42.3 % (ref 35–47)
HGB BLD-MCNC: 13.8 G/DL (ref 11.7–15.7)
HOLD SPECIMEN: NORMAL
IMM GRANULOCYTES # BLD: 0 10E3/UL
IMM GRANULOCYTES NFR BLD: 0 %
INR PPP: 1.04 (ref 0.85–1.15)
LYMPHOCYTES # BLD AUTO: 1.6 10E3/UL (ref 0.8–5.3)
LYMPHOCYTES NFR BLD AUTO: 19 %
MCH RBC QN AUTO: 29.7 PG (ref 26.5–33)
MCHC RBC AUTO-ENTMCNC: 32.6 G/DL (ref 31.5–36.5)
MCV RBC AUTO: 91 FL (ref 78–100)
MONOCYTES # BLD AUTO: 0.8 10E3/UL (ref 0–1.3)
MONOCYTES NFR BLD AUTO: 9 %
NEUTROPHILS # BLD AUTO: 5.5 10E3/UL (ref 1.6–8.3)
NEUTROPHILS NFR BLD AUTO: 67 %
NRBC # BLD AUTO: 0 10E3/UL
NRBC BLD AUTO-RTO: 0 /100
PLATELET # BLD AUTO: 309 10E3/UL (ref 150–450)
POTASSIUM SERPL-SCNC: 4.2 MMOL/L (ref 3.4–5.3)
RADIOLOGIST FLAGS: ABNORMAL
RBC # BLD AUTO: 4.64 10E6/UL (ref 3.8–5.2)
SODIUM SERPL-SCNC: 137 MMOL/L (ref 135–145)
TROPONIN T SERPL HS-MCNC: 8 NG/L
WBC # BLD AUTO: 8.2 10E3/UL (ref 4–11)

## 2024-09-25 PROCEDURE — 99207 PR APP CREDIT; MD BILLING SHARED VISIT: CPT | Performed by: PHYSICIAN ASSISTANT

## 2024-09-25 PROCEDURE — 99223 1ST HOSP IP/OBS HIGH 75: CPT | Mod: AI | Performed by: HOSPITALIST

## 2024-09-25 PROCEDURE — 36415 COLL VENOUS BLD VENIPUNCTURE: CPT | Performed by: FAMILY MEDICINE

## 2024-09-25 PROCEDURE — 93005 ELECTROCARDIOGRAM TRACING: CPT

## 2024-09-25 PROCEDURE — 250N000011 HC RX IP 250 OP 636: Performed by: HOSPITALIST

## 2024-09-25 PROCEDURE — 200N000001 HC R&B ICU

## 2024-09-25 PROCEDURE — 99207 PR NO BILLABLE SERVICE THIS VISIT: CPT

## 2024-09-25 PROCEDURE — 250N000009 HC RX 250: Performed by: HOSPITALIST

## 2024-09-25 PROCEDURE — 250N000009 HC RX 250: Performed by: FAMILY MEDICINE

## 2024-09-25 PROCEDURE — G1010 CDSM STANSON: HCPCS

## 2024-09-25 PROCEDURE — 70496 CT ANGIOGRAPHY HEAD: CPT | Mod: MG

## 2024-09-25 PROCEDURE — 85025 COMPLETE CBC W/AUTO DIFF WBC: CPT | Performed by: FAMILY MEDICINE

## 2024-09-25 PROCEDURE — 70498 CT ANGIOGRAPHY NECK: CPT | Mod: MG

## 2024-09-25 PROCEDURE — 99291 CRITICAL CARE FIRST HOUR: CPT | Performed by: FAMILY MEDICINE

## 2024-09-25 PROCEDURE — 80048 BASIC METABOLIC PNL TOTAL CA: CPT | Performed by: FAMILY MEDICINE

## 2024-09-25 PROCEDURE — 99291 CRITICAL CARE FIRST HOUR: CPT | Mod: 25

## 2024-09-25 PROCEDURE — 85610 PROTHROMBIN TIME: CPT | Performed by: FAMILY MEDICINE

## 2024-09-25 PROCEDURE — 99223 1ST HOSP IP/OBS HIGH 75: CPT | Mod: FS | Performed by: PHYSICIAN ASSISTANT

## 2024-09-25 PROCEDURE — 93010 ELECTROCARDIOGRAM REPORT: CPT | Performed by: FAMILY MEDICINE

## 2024-09-25 PROCEDURE — 250N000011 HC RX IP 250 OP 636: Performed by: FAMILY MEDICINE

## 2024-09-25 PROCEDURE — 258N000003 HC RX IP 258 OP 636: Performed by: HOSPITALIST

## 2024-09-25 PROCEDURE — 85730 THROMBOPLASTIN TIME PARTIAL: CPT | Performed by: FAMILY MEDICINE

## 2024-09-25 PROCEDURE — 84484 ASSAY OF TROPONIN QUANT: CPT | Performed by: FAMILY MEDICINE

## 2024-09-25 RX ORDER — PROCHLORPERAZINE 25 MG
12.5 SUPPOSITORY, RECTAL RECTAL EVERY 12 HOURS PRN
Status: DISCONTINUED | OUTPATIENT
Start: 2024-09-25 | End: 2024-10-01 | Stop reason: HOSPADM

## 2024-09-25 RX ORDER — AMOXICILLIN 250 MG
1 CAPSULE ORAL 2 TIMES DAILY PRN
Status: DISCONTINUED | OUTPATIENT
Start: 2024-09-25 | End: 2024-10-01 | Stop reason: HOSPADM

## 2024-09-25 RX ORDER — SODIUM CHLORIDE 9 MG/ML
INJECTION, SOLUTION INTRAVENOUS CONTINUOUS
Status: DISCONTINUED | OUTPATIENT
Start: 2024-09-25 | End: 2024-09-29

## 2024-09-25 RX ORDER — PROCHLORPERAZINE MALEATE 5 MG
5 TABLET ORAL EVERY 6 HOURS PRN
Status: DISCONTINUED | OUTPATIENT
Start: 2024-09-25 | End: 2024-10-01 | Stop reason: HOSPADM

## 2024-09-25 RX ORDER — ONDANSETRON 2 MG/ML
4 INJECTION INTRAMUSCULAR; INTRAVENOUS EVERY 6 HOURS PRN
Status: DISCONTINUED | OUTPATIENT
Start: 2024-09-25 | End: 2024-10-01 | Stop reason: HOSPADM

## 2024-09-25 RX ORDER — IOPAMIDOL 755 MG/ML
167 INJECTION, SOLUTION INTRAVASCULAR ONCE
Status: COMPLETED | OUTPATIENT
Start: 2024-09-25 | End: 2024-09-25

## 2024-09-25 RX ORDER — AMOXICILLIN 250 MG
2 CAPSULE ORAL 2 TIMES DAILY PRN
Status: DISCONTINUED | OUTPATIENT
Start: 2024-09-25 | End: 2024-10-01 | Stop reason: HOSPADM

## 2024-09-25 RX ORDER — ACETAMINOPHEN 325 MG/1
650 TABLET ORAL EVERY 4 HOURS PRN
Status: DISCONTINUED | OUTPATIENT
Start: 2024-09-25 | End: 2024-10-01 | Stop reason: HOSPADM

## 2024-09-25 RX ORDER — ONDANSETRON 4 MG/1
4 TABLET, ORALLY DISINTEGRATING ORAL EVERY 6 HOURS PRN
Status: DISCONTINUED | OUTPATIENT
Start: 2024-09-25 | End: 2024-10-01 | Stop reason: HOSPADM

## 2024-09-25 RX ADMIN — PANTOPRAZOLE SODIUM 40 MG: 40 INJECTION, POWDER, FOR SOLUTION INTRAVENOUS at 22:57

## 2024-09-25 RX ADMIN — SODIUM CHLORIDE 100 ML: 9 INJECTION, SOLUTION INTRAVENOUS at 13:55

## 2024-09-25 RX ADMIN — NICARDIPINE HYDROCHLORIDE 2.5 MG/HR: 0.2 INJECTION INTRAVENOUS at 20:32

## 2024-09-25 RX ADMIN — SODIUM CHLORIDE: 9 INJECTION, SOLUTION INTRAVENOUS at 20:57

## 2024-09-25 RX ADMIN — IOPAMIDOL 67 ML: 755 INJECTION, SOLUTION INTRAVENOUS at 13:55

## 2024-09-25 ASSESSMENT — ENCOUNTER SYMPTOMS
DYSURIA: 0
VOMITING: 0
DIZZINESS: 1
WHEEZING: 0
ABDOMINAL PAIN: 0
BLOOD IN STOOL: 0
HEADACHES: 0
DIARRHEA: 0
SHORTNESS OF BREATH: 0
SORE THROAT: 0
SPEECH DIFFICULTY: 1
FREQUENCY: 0
SINUS PRESSURE: 0
NAUSEA: 0
COUGH: 0
CONSTIPATION: 0
DIAPHORESIS: 0
CHILLS: 0
FEVER: 0
PALPITATIONS: 0

## 2024-09-25 ASSESSMENT — COLUMBIA-SUICIDE SEVERITY RATING SCALE - C-SSRS
1. IN THE PAST MONTH, HAVE YOU WISHED YOU WERE DEAD OR WISHED YOU COULD GO TO SLEEP AND NOT WAKE UP?: NO
6. HAVE YOU EVER DONE ANYTHING, STARTED TO DO ANYTHING, OR PREPARED TO DO ANYTHING TO END YOUR LIFE?: NO
2. HAVE YOU ACTUALLY HAD ANY THOUGHTS OF KILLING YOURSELF IN THE PAST MONTH?: NO

## 2024-09-25 ASSESSMENT — ACTIVITIES OF DAILY LIVING (ADL)
ADLS_ACUITY_SCORE: 36
ADLS_ACUITY_SCORE: 36
ADLS_ACUITY_SCORE: 34
ADLS_ACUITY_SCORE: 36

## 2024-09-25 NOTE — CONSULTS
[facility]    Stroke Telephone Note    I was called by Dr. Yazan Eckert on 09/25/24 regarding patient Chika Hurd. The patient is a 79 year old female with a past medical history of hyperlipidemia, history of stroke (left frontal lobe and corona radiata, 10/2023 presented with dysarthria).  History obtained from ED provider.  Last known well was at midnight (last night) when she went to bed.  Karma woke up at 7 AM with unsteady gait/walking offkilter.  She then went back to bed and woke up again/called her daughter at 11 AM and was noted to have new dysarthria.  On ED providers examination, Karma has subtle right facial asymmetry (residual from prior stroke), dysarthria, but no ataxia on finger-to-nose bilaterally. Patient on Plavix 75 mg PTA and Lipitor 40 mg PTA.  Presenting blood pressure was 164/63. PTT: 29. INR: 1.04. PLT: 309K    Vitals  BP: (!) 140/63   Pulse: 66   Resp: 13   Temp: 98  F (36.7  C)   Weight: 79.4 kg (175 lb)    Stroke Code Data (for stroke code without tele)  Stroke code activated 09/25/24  1342   Stroke provider first response 09/25/24  1346   Last known normal 09/25/24  0000      Time of discovery (or onset of symptoms) 09/25/24  0700   Head CT read by Stroke Neuro Provider 09/25/24  1402   Was stroke code de-escalated? No           Imaging Findings  CT head: Acute left basal ganglia intraparenchymal hemorrhage    CTA head:  1. No evidence of active extravasation into the left basal ganglia acute parenchymal hematoma.  2. 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  3. No evidence of large vessel occlusion or high-grade stenosis.     CTA Neck:   1. 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  2. Moderate stenosis at the right vertebral artery origin.  3. Background of scattered atherosclerotic plaque and mild stenoses.     CT Perfusion: Not completed     Intravenous Thrombolysis  Not given due  "to:   - active bleeding  - unclear or unfavorable risk-benefit profile for extended window thrombolysis beyond the conventional 4.5 hour time window    Endovascular Treatment  Not initiated due to absence of proximal vessel occlusion    Impression  Acute left basal ganglia intraparenchymal hemorrhage    Recommendations   Acute Hemorrhagic Stroke Recommendations  - Recommend transfer to [facility] to ICU level care given intraparenchymal hemorrhage  - Neurochecks and Vital Signs every 1 hour  - Systolic BP Goal: < 140   - Recommend nicardipine infusion to maintain goal   - Head of bed elevated > 30 degrees  - Hold anticoagulation/antiplatelet/antithrombotics  - Telemetry, EKG  - Imaging:   - Repeat CTH in 4 hours   - If any new or worsening neurologic deficits recommend stat CT head   - MRI brain with and without contrast, routine  - Bedside Glucose Monitoring  - Nutrition: Given dysarthria and facial droop will need SLP evaluation prior to receiving oral medications  - A1c, Troponin x 3  - PT/INR/PLT  - PT/OT/SLP  - Stroke Education  - Euthermia, Euglycemia  - Recommend STAT neurosurgery consult   - Not on anticoagulation so no need for reversal    Case discussed with vascular neurology attending Dr. Prince.    My recommendations are based on the information provided over the phone by Chika Hurd's in-person providers. They are not intended to replace the clinical judgment of her in-person providers. I was not requested to personally see or examine the patient at this time.     Victoria Peng PA-C  Vascular Neurology    To page me or covering stroke neurology team member, click here: [facility]  Choose \"On Call\" tab at top, then select \"NEUROLOGY/ALL SITES\" from middle drop-down box, press Enter, then look for \"stroke\" or \"telestroke\" for your site.   "

## 2024-09-25 NOTE — ED NOTES
"Pt went to bed around midnight last night.  Pt woke up at 0700 and went to bathroom and back to bed.  Pt then called daughter around 1110 and said she felt \"off\"   pt on blood thinner. Provider in room  "

## 2024-09-25 NOTE — ED TRIAGE NOTES
Pt had stroke last October, pt's daughter noticed today that her speech and thought process was slower and increased weakness on right side. Daughter Ramila states the speech and slowness has somewhat improved. Pt's last known well would have been midnight last night.

## 2024-09-25 NOTE — PROGRESS NOTES
Transfer Type: [facility]  Transfer Triage Note    Date of call: 09/25/24  Time of call: 3:24 PM    Current Patient Location:  [facility] ([place], [place])  Current Level of Care: ED    Vitals: Temp: 98  F (36.7  C) Temp src: Tympanic BP: 135/60 Pulse: 61   Resp: 14 SpO2: 97 %   Weight: 79.4 kg (175 lb)    at   (see EHR)  Diagnosis: acute hemorrhagic stroke  Reason for requested transfer: Further diagnostic work up, management, and consultation for specialized care   Isolation Needs: None    If patient is transferring for specialty care or specific procedure, the specialist required has participated in the transfer call and agreed with need for transfer and anticipated timeline: No    Transfer accepted: Yes  Stability of Patient: Patient is vitally stable, with no critical labs, and will likely remain stable throughout the transfer process  Level of Care Needed: ICU  Telemetry Needed:  Cardiac Telemetry  Expected Time of Arrival for Transfer: 0-8 hours  Arrival Location:  [facility]     Recommendations for Management and Stabilization: Not needed    Additional Comments:   Ms. Hurd is a 79 year old woman with history of ischemic CVA on clopidogrel who presents with intraparenchymal hemorrhage. She reports she was ataxic on waking at 7am, dysarthric when calling daughter at 9am. No symptoms when going to bed last night. Not on full anticoagulation.     Exam has been stable to slightly improved over 2 hours. No seizure, GCS 15; still dysarthric and finger-nose-finger test impairment. Dr. Mendez (Neurosurgery at [facility]) recommended conservative management including BP control for now. Ms. Alex Peng of neurology recommended q1h neurochecks which will require transfer for ICU level monitoring. I spoke with [facility] intensivist Dr. Cramer who agreed ICU would be the appropriate unit with hospitalist primary and neurology consultation.      Doe Neri DO

## 2024-09-25 NOTE — ED PROVIDER NOTES
History     Chief Complaint   Patient presents with    Stroke Symptoms     HPI  Chika Hurd is a 79 year old female who presents with a history of CVA left frontal lobe and seen in October for this.  History of breast cancer Central retinal artery occlusion right eye.  Hypertension.      Presenting with wake-up symptoms at 7 AM after going to bed at midnight.  She noted that she was ataxic while walking to the bathroom this morning at 7 went back to bed and awoke at 9 called her daughter at 11 AM and arrived here around 1 PM.  She was also noted at that point to be dysarthric.  She had no new weakness.  She has a chronic mild weakness right side and some right labial droop from a prior CVA last October and 2023.  There have been no fall or injury.  She is on Plavix chronically  No recent systemic symptoms.  No other acute changes.  No changes in vision.        Allergies:  Allergies   Allergen Reactions    Cortisone Swelling     Cortisone Cream       Problem List:    Patient Active Problem List    Diagnosis Date Noted    Cerebrovascular accident (CVA), unspecified mechanism (H) 10/31/2023     Priority: Medium    Generalized muscle weakness 10/31/2023     Priority: Medium    Stroke (H) 10/12/2023     Priority: Medium    Class 2 severe obesity due to excess calories with serious comorbidity in adult (H) 09/15/2023     Priority: Medium    Benign essential hypertension 07/26/2022     Priority: Medium    Carcinoma of left breast in female, estrogen receptor positive, unspecified site of breast (H) 05/16/2019     Priority: Medium    Central retinal artery occlusion of right eye 05/23/2017     Priority: Medium    Ductal carcinoma in situ (DCIS) of left breast 07/26/2016     Priority: Medium    Cataract right eye 06/04/2015     Priority: Medium    GERD (gastroesophageal reflux disease) 05/19/2009     Priority: Medium    SENSONRL HEAR LOSS,BILAT 05/15/2007     Priority: Medium    Hyperlipidemia LDL goal <130  02/03/2006     Priority: Medium     Did well with Lipitor but off formulary  Pravastatin caused stomach aches and myalgias  On Crestor until generic Lipitor became available          Past Medical History:    Past Medical History:   Diagnosis Date    Breast cancer (H)     Central retinal artery occlusion, right     DCIS (ductal carcinoma in situ) of breast     GERD (gastroesophageal reflux disease)     Hyperlipidemia        Past Surgical History:    Past Surgical History:   Procedure Laterality Date    COLONOSCOPY N/A 12/8/2021    Procedure: COLONOSCOPY, WITH POLYPECTOMY AND BIOPSY;  Surgeon: Lawrence Ortega DO;  Location: [facility]    ENT SURGERY      dental implants 03/12 started    FLEXIBLE SIGMOIDOSCOPY  04/03    LUMPECTOMY BREAST WITH SEED LOCALIZATION Left 9/21/2016    Procedure: LUMPECTOMY BREAST WITH SEED LOCALIZATION;  Surgeon: Russel Murry MD;  Location:  OR    PHACOEMULSIFICATION WITH STANDARD INTRAOCULAR LENS IMPLANT Right 11/6/2017    Procedure: PHACOEMULSIFICATION WITH STANDARD INTRAOCULAR LENS IMPLANT;  Right cataract removal with implant;  Surgeon: Michael Zuleta MD;  Location: [facility]    PHACOEMULSIFICATION WITH STANDARD INTRAOCULAR LENS IMPLANT Left 11/27/2017    Procedure: PHACOEMULSIFICATION WITH STANDARD INTRAOCULAR LENS IMPLANT;  Left cataract removal with implant;  Surgeon: Michael Zuleta MD;  Location: [facility]    SURGICAL HISTORY OF -   1959    Right Hip Pinning    SURGICAL HISTORY OF -   1983    Tubal Ligation       Family History:    Family History   Problem Relation Age of Onset    Heart Disease Mother     Heart Disease Father     Circulatory Father         main artery aneursym    Heart Disease Sister         sleep problems    Breast Cancer Maternal Aunt         diagnosed in 60's, surviving in 90's    Breast Cancer Cousin     Breast Cancer Cousin     Skin Cancer No family hx of        Social History:  Marital Status:   [2]  Social History     Tobacco Use     Smoking status: Former     Current packs/day: 0.00     Types: Cigarettes     Start date: 1970     Quit date: 2005     Years since quittin.7    Smokeless tobacco: Never   Vaping Use    Vaping status: Never Used   Substance Use Topics    Alcohol use: Yes     Comment: rarely    Drug use: No        Medications:    acetaminophen (TYLENOL) 500 MG tablet  Ascorbic Acid (VITAMIN C PO)  atorvastatin (LIPITOR) 40 MG tablet  clopidogrel (PLAVIX) 75 MG tablet  coenzyme Q-10 200 MG CAPS capsule  famotidine (PEPCID) 20 MG tablet  Glycerin-Polysorbate 80 (REFRESH DRY EYE THERAPY OP)  hydrochlorothiazide (HYDRODIURIL) 25 MG tablet  lisinopril (ZESTRIL) 40 MG tablet  MULTIVITAMIN OR  Omega-3 Fatty Acids (OMEGA-3 FISH OIL PO)          Review of Systems   Constitutional:  Negative for chills, diaphoresis and fever.   HENT:  Negative for ear pain, sinus pressure and sore throat.    Eyes:  Negative for visual disturbance.   Respiratory:  Negative for cough, shortness of breath and wheezing.    Cardiovascular:  Negative for chest pain and palpitations.   Gastrointestinal:  Negative for abdominal pain, blood in stool, constipation, diarrhea, nausea and vomiting.   Genitourinary:  Negative for dysuria, frequency and urgency.   Skin:  Negative for rash.   Neurological:  Positive for dizziness and speech difficulty. Negative for headaches.   All other systems reviewed and are negative.      Physical Exam   BP: (!) 164/63  Pulse: 75  Temp: 98  F (36.7  C)  Resp: 16  Weight: 79.4 kg (175 lb)  SpO2: 98 %      Physical Exam  Constitutional:       General: She is in acute distress.      Appearance: She is not diaphoretic.   Eyes:      Conjunctiva/sclera: Conjunctivae normal.   Cardiovascular:      Rate and Rhythm: Normal rate and regular rhythm.      Heart sounds: No murmur heard.  Pulmonary:      Effort: Pulmonary effort is normal. No respiratory distress.      Breath sounds: Normal breath sounds. No stridor. No wheezing or  rhonchi.   Abdominal:      General: Abdomen is flat. There is no distension.      Palpations: Abdomen is soft. There is no mass.      Tenderness: There is no abdominal tenderness. There is no guarding.   Musculoskeletal:      Cervical back: Neck supple.      Right lower leg: No edema.      Left lower leg: No edema.   Skin:     Coloration: Skin is not pale.      Findings: No rash.   Neurological:      General: No focal deficit present.      Mental Status: She is alert and oriented to person, place, and time.      Cranial Nerves: No cranial nerve deficit.      Sensory: No sensory deficit.      Motor: No weakness.      Coordination: Coordination normal.         ED Course        Procedures                EKG Interpretation:      Interpreted by Yazan Eckert MD  ekg Done at 1421 hrs. demonstrates a sinus rhythm at 65 bpm normal axis.  There is no ST change.  No T wave changes.  Normal R progression no Q waves.  Normal intervals.  Normal conduction.  No ectopy.  Impression sinus rhythm 65 bpm no acute change.        Critical Care time:  was 30 minutes for this patient excluding procedures.     The patient has stroke symptoms:         ED Stroke specific documentation           NIHSS PDF     Patient last known well time:0000 9/25/2024    ED Provider first to bedside at: 1336  CT Results received at:1347    Thrombolytics:   CNS bleed    If treating with thrombolytics: Ensure SBP<180 and DBP<105 prior to treatment with thrombolytics.  Administering thrombolytics after treatment with IV labetalol, hydralazine, or nicardipine is reasonable once BP control is established.    Endovascular Retrieval:  Not initiated due to absence of proximal vessel occlusion    National Institutes of Health Stroke Scale (Baseline)  Time Performed: 1336     Score    Level of consciousness: (0)   Alert, keenly responsive    LOC questions: (0)   Answers both questions correctly    LOC commands: (0)   Performs both tasks correctly    Best gaze: (0)    Normal    Visual: (0)   No visual loss    Facial palsy: (0)   Normal symmetrical movements    Motor arm (left): (0)   No drift    Motor arm (right): (0)   No drift    Motor leg (left): (0)   No drift    Motor leg (right): (0)   No drift    Limb ataxia: (1)   Present in one limb - did not note initially - but had reported ataxia. more subtle and variable right finger nose finger.  have not ambulated patient    Sensory: (0)   Normal- no sensory loss    Best language: (0)   Normal- no aphasia    Dysarthria: (1)   Mild to moderate dysarthria    Extinction and inattention: (0)   No abnormality        Total Score:  2        Stroke Mimics were considered (including migraine headache, seizure disorder, hypoglycemia (or hyperglycemia), head or spinal trauma, CNS infection, Toxin ingestion and shock state (e.g. sepsis) .            National Institutes of Health Stroke Scale  Time Performed: serially every 15 min  Total Score: 2  (unchanged from last stroke score)         Results for orders placed or performed during the hospital encounter of 09/25/24 (from the past 24 hour(s))   [place] Draw    Narrative    The following orders were created for panel order [place] Draw.  Procedure                               Abnormality         Status                     ---------                               -----------         ------                     Extra Blue Top Tube[[id number]]                              Final result               Extra Red Top Tube[[id number]]                               Final result               Extra Green Top (Lithium...[[id number]]                      Final result               Extra Purple Top Tube[[id number]]                            Final result                 Please view results for these tests on the individual orders.   Extra Blue Top Tube   Result Value Ref Range    Hold Specimen JIC    Extra Red Top Tube   Result Value Ref Range    Hold Specimen JIC    Extra Green Top (Lithium Heparin) Tube    Result Value Ref Range    Hold Specimen JIC    Extra Purple Top Tube   Result Value Ref Range    Hold Specimen JIC    CBC with Platelets & Differential    Narrative    The following orders were created for panel order CBC with Platelets & Differential.  Procedure                               Abnormality         Status                     ---------                               -----------         ------                     CBC with platelets and d...[[id number]]                      Final result                 Please view results for these tests on the individual orders.   Basic metabolic panel   Result Value Ref Range    Sodium 137 135 - 145 mmol/L    Potassium 4.2 3.4 - 5.3 mmol/L    Chloride 100 98 - 107 mmol/L    Carbon Dioxide (CO2) 29 22 - 29 mmol/L    Anion Gap 8 7 - 15 mmol/L    Urea Nitrogen 15.9 8.0 - 23.0 mg/dL    Creatinine 0.73 0.51 - 0.95 mg/dL    GFR Estimate 84 >60 mL/min/1.73m2    Calcium 9.8 8.8 - 10.4 mg/dL    Glucose 127 (H) 70 - 99 mg/dL   INR   Result Value Ref Range    INR 1.04 0.85 - 1.15   Partial thromboplastin time   Result Value Ref Range    aPTT 29 22 - 38 Seconds   Troponin T, High Sensitivity   Result Value Ref Range    Troponin T, High Sensitivity 8 <=14 ng/L   CBC with platelets and differential   Result Value Ref Range    WBC Count 8.2 4.0 - 11.0 10e3/uL    RBC Count 4.64 3.80 - 5.20 10e6/uL    Hemoglobin 13.8 11.7 - 15.7 g/dL    Hematocrit 42.3 35.0 - 47.0 %    MCV 91 78 - 100 fL    MCH 29.7 26.5 - 33.0 pg    MCHC 32.6 31.5 - 36.5 g/dL    RDW 13.8 10.0 - 15.0 %    Platelet Count 309 150 - 450 10e3/uL    % Neutrophils 67 %    % Lymphocytes 19 %    % Monocytes 9 %    % Eosinophils 4 %    % Basophils 1 %    % Immature Granulocytes 0 %    NRBCs per 100 WBC 0 <1 /100    Absolute Neutrophils 5.5 1.6 - 8.3 10e3/uL    Absolute Lymphocytes 1.6 0.8 - 5.3 10e3/uL    Absolute Monocytes 0.8 0.0 - 1.3 10e3/uL    Absolute Eosinophils 0.3 0.0 - 0.7 10e3/uL    Absolute Basophils 0.1 0.0 - 0.2  10e3/uL    Absolute Immature Granulocytes 0.0 <=0.4 10e3/uL    Absolute NRBCs 0.0 10e3/uL   CT Head w/o Contrast   Result Value Ref Range    Radiologist flags Acute intracranial hemorrhage (AA)     Narrative    CT SCAN OF THE HEAD WITHOUT CONTRAST   9/25/2024 1:58 PM     HISTORY: Code stroke to evaluate for potential thrombolysis and  thrombectomy. Neurological symptoms.    TECHNIQUE:  Axial images of the head and coronal reformations without  IV contrast material. Radiation dose for this scan was reduced using  automated exposure control, adjustment of the mA and/or kV according  to patient size, or iterative reconstruction technique.    COMPARISON: Head MRI 12/12/2023    FINDINGS:  Acute intraparenchymal hematoma centered within the left lateral basal  ganglia measuring about 3.4 x 2.7 x 1.1 cm. Surrounding vasogenic  edema is present. Mild local mass effect with slight rightward bulging  of the septum pellucidum (about 3 mm of left-to-right midline shift).  Basilar cisterns remain patent. No other areas of hemorrhage are  identified.    Background of volume loss and patchy nonspecific white matter  hypoattenuation likely representing chronic small ischemic change.  Scattered old lacunar infarcts. No acute osseous abnormality.      Impression    IMPRESSION:   Acute left basal ganglia intraparenchymal hematoma.     [Critical Result: Acute intracranial hemorrhage]    Finding was identified on 9/25/2024 1:59 PM.     Dr. Eckert was contacted by me on 9/25/2024 2:02 PM and verbalized  understanding of the critical result.     CHRIS YIP MD         SYSTEM ID:  [id number]   CTA Head Neck with Contrast    Narrative    CT ANGIOGRAM OF THE HEAD AND NECK WITH CONTRAST  9/25/2024 2:00 PM     HISTORY: Code Stroke to evaluate for potential thrombolysis and  thrombectomy. Neurological symptoms.    TECHNIQUE:  CT angiography with an injection of 67 mL Isovue 370 IV  with scans through the head and neck. Images were transferred  to a  separate 3-D workstation where multiplanar reformations and 3-D images  were created. Estimates of carotid stenoses are made relative to the  distal internal carotid artery diameters except as noted. Radiation  dose for this scan was reduced using automated exposure control,  adjustment of the mA and/or kV according to patient size, or iterative  reconstruction technique.    COMPARISON: None.     CT ANGIOGRAM HEAD FINDINGS:    No convincing active extravasation associated with the left basal  ganglia intraparenchymal hematoma. A few small areas of vascular  enhancement are present along the margins of the hematoma, presumably  normal-size reactive vasculature. No evidence of large vessel  occlusion, high-grade stenosis, or high flow vascular malformation. A  4 mm saccular aneurysm is present projecting inferiorly off the right  anterior communicating artery at the A1/A2 junction. Scattered  atherosclerotic plaquing with mild stenoses.     CT ANGIOGRAM NECK FINDINGS:   Moderate stenosis at the right vertebral artery origin. At least  moderate and possibly severe stenosis involving the right proximal  brachiocephalic artery. Background of scattered atherosclerotic  plaquing with mild stenoses. No significant stenoses of the bilateral  carotid bifurcations.    INCIDENTAL FINDINGS: Cervical spine degenerative changes. Emphysema  with presumed scattered pulmonary atelectasis. Enlarged right axillary  lymph nodes, nonspecific. Aerated secretions within the left sphenoid  sinus.      Impression    IMPRESSION:   CTA Head:   1. No evidence of active extravasation into the left basal ganglia  acute parenchymal hematoma.  2. Incidental 4 mm saccular aneurysm at the anterior communicating  artery.  3. No evidence of large vessel occlusion or high-grade stenosis.   CTA Neck:   1. 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  2. Moderate stenosis at the right vertebral artery origin.  3.  Background of scattered atherosclerotic plaque and mild stenoses.   4. Incidental findings as detailed.    CHRIS YIP MD         SYSTEM ID:  [id number]   Head CT w/o contrast    Narrative    EXAM: CT HEAD W/O CONTRAST  LOCATION: [facility]  DATE: 9/25/2024    INDICATION: repeat 4 hours after first CT   for head bleed.  timing as requested by neurosurg  COMPARISON: CT head 9/25/2024  TECHNIQUE: Routine CT Head without IV contrast. Multiplanar reformats. Dose reduction techniques were used.    FINDINGS:  INTRACRANIAL CONTENTS:   Left basal ganglia acute infarct measuring 3.6 cm AP by 1.1 cm TR similar in size compared to prior examination.    No new or significant progressive acute intracranial hemorrhages.    Mild right midline shift measuring 2 mm similar compared to prior examination.    No CT evidence of acute infarct. Mild to moderate presumed chronic small vessel ischemic changes. Mild to moderate generalized volume loss. No hydrocephalus. Right caudate head small chronic lacunar infarct. Right inferior frontal lobe punctate   calcification demonstrated.    VISUALIZED ORBITS/SINUSES/MASTOIDS: No intraorbital abnormality.     Left sphenoid sinus air-fluid level. Please correlate for acute sinusitis.     Remaining visualized paranasal sinuses and mastoid air cells are essentially clear.    BONES/SOFT TISSUES: No acute abnormality.      Impression    IMPRESSION:  1.  No significant interval change compared to CT head 9/25/2024.       Medications   iopamidol (ISOVUE-370) solution 167 mL (67 mLs Intravenous $Given 9/25/24 1355)     And   sodium chloride 0.9 % bag for CT scan flush use (100 mLs As instructed $Given 9/25/24 1355)       Assessments & Plan (with Medical Decision Making)     MDM: Chika GLORIA Jamilfarhanmason is a 79 year old female presents with new onset CVA-like symptoms and history of CVA in the past onset with wake-up symptoms at 7 AM went to bed at midnight.  Went back to sleep  after initial ataxia this morning found to both be ataxic and dysarthric by her daughter by phone at 11 AM.  NIH stroke scale 2.  Systolic blood pressures heart rate vital signs in general normal.  No history of trauma on Plavix.  CNS hemorrhage parenchyma left side involving the basal ganglia.  Symptoms have been stable since arrival.  Plan for serial exams and transfer to facility with neurosurgery.  Neurostroke [facility] -  recommends ICU although Dr. Mendez and neurosurgery considers it possible the patient could be on MedSurg as long as they do not require any emergent BP management maintains current development.   BP goal less than 140.      Serial head CT demonstrates no change.  Plan for transfer to [facility].  Discussed with Dr. Neri who accepts for transfer to [facility].    Signed out to Dr. Zazueta who is aware of the patient pending transfer.    No reversal of antiplatelet agents -but no further Plavix.        I have reviewed the nursing notes.    I have reviewed the findings, diagnosis, plan and need for follow up with the patient.           Medical Decision Making  The patient's presentation was of high complexity (an acute health issue posing potential threat to life or bodily function).    The patient's evaluation involved:  ordering and/or review of 3+ test(s) in this encounter (see separate area of note for details)    The patient's management necessitated high risk (a decision regarding hospitalization).        New Prescriptions    No medications on file       Final diagnoses:   Hemorrhagic cerebrovascular accident (CVA) (H)       9/25/2024   [facility] EMERGENCY DEPT       Yazan Eckert MD  09/25/24 1937

## 2024-09-26 ENCOUNTER — APPOINTMENT (OUTPATIENT)
Dept: OCCUPATIONAL THERAPY | Facility: CLINIC | Age: 79
DRG: 066 | End: 2024-09-26
Attending: HOSPITALIST
Payer: MEDICARE

## 2024-09-26 ENCOUNTER — APPOINTMENT (OUTPATIENT)
Dept: SPEECH THERAPY | Facility: CLINIC | Age: 79
DRG: 066 | End: 2024-09-26
Attending: HOSPITALIST
Payer: MEDICARE

## 2024-09-26 ENCOUNTER — APPOINTMENT (OUTPATIENT)
Dept: SPEECH THERAPY | Facility: CLINIC | Age: 79
DRG: 066 | End: 2024-09-26
Attending: INTERNAL MEDICINE
Payer: MEDICARE

## 2024-09-26 ENCOUNTER — APPOINTMENT (OUTPATIENT)
Dept: MRI IMAGING | Facility: CLINIC | Age: 79
DRG: 066 | End: 2024-09-26
Attending: HOSPITALIST
Payer: MEDICARE

## 2024-09-26 ENCOUNTER — APPOINTMENT (OUTPATIENT)
Dept: PHYSICAL THERAPY | Facility: CLINIC | Age: 79
DRG: 066 | End: 2024-09-26
Attending: HOSPITALIST
Payer: MEDICARE

## 2024-09-26 ENCOUNTER — APPOINTMENT (OUTPATIENT)
Dept: GENERAL RADIOLOGY | Facility: CLINIC | Age: 79
DRG: 066 | End: 2024-09-26
Attending: HOSPITALIST
Payer: MEDICARE

## 2024-09-26 LAB
ANION GAP SERPL CALCULATED.3IONS-SCNC: 9 MMOL/L (ref 7–15)
BUN SERPL-MCNC: 13.2 MG/DL (ref 8–23)
CALCIUM SERPL-MCNC: 9.3 MG/DL (ref 8.8–10.4)
CHLORIDE SERPL-SCNC: 100 MMOL/L (ref 98–107)
CREAT SERPL-MCNC: 0.64 MG/DL (ref 0.51–0.95)
EGFRCR SERPLBLD CKD-EPI 2021: >90 ML/MIN/1.73M2
ERYTHROCYTE [DISTWIDTH] IN BLOOD BY AUTOMATED COUNT: 13.9 % (ref 10–15)
GLUCOSE BLDC GLUCOMTR-MCNC: 78 MG/DL (ref 70–99)
GLUCOSE BLDC GLUCOMTR-MCNC: 82 MG/DL (ref 70–99)
GLUCOSE BLDC GLUCOMTR-MCNC: 84 MG/DL (ref 70–99)
GLUCOSE BLDC GLUCOMTR-MCNC: 87 MG/DL (ref 70–99)
GLUCOSE SERPL-MCNC: 92 MG/DL (ref 70–99)
HCO3 SERPL-SCNC: 26 MMOL/L (ref 22–29)
HCT VFR BLD AUTO: 40.6 % (ref 35–47)
HGB BLD-MCNC: 13.6 G/DL (ref 11.7–15.7)
MCH RBC QN AUTO: 30.6 PG (ref 26.5–33)
MCHC RBC AUTO-ENTMCNC: 33.5 G/DL (ref 31.5–36.5)
MCV RBC AUTO: 91 FL (ref 78–100)
PLATELET # BLD AUTO: 292 10E3/UL (ref 150–450)
POTASSIUM SERPL-SCNC: 4.2 MMOL/L (ref 3.4–5.3)
RBC # BLD AUTO: 4.44 10E6/UL (ref 3.8–5.2)
SODIUM SERPL-SCNC: 135 MMOL/L (ref 135–145)
WBC # BLD AUTO: 11.5 10E3/UL (ref 4–11)

## 2024-09-26 PROCEDURE — 80048 BASIC METABOLIC PNL TOTAL CA: CPT | Performed by: HOSPITALIST

## 2024-09-26 PROCEDURE — 250N000013 HC RX MED GY IP 250 OP 250 PS 637: Performed by: HOSPITALIST

## 2024-09-26 PROCEDURE — 74230 X-RAY XM SWLNG FUNCJ C+: CPT

## 2024-09-26 PROCEDURE — 97162 PT EVAL MOD COMPLEX 30 MIN: CPT | Mod: GP

## 2024-09-26 PROCEDURE — 255N000002 HC RX 255 OP 636: Performed by: HOSPITALIST

## 2024-09-26 PROCEDURE — 99221 1ST HOSP IP/OBS SF/LOW 40: CPT | Performed by: STUDENT IN AN ORGANIZED HEALTH CARE EDUCATION/TRAINING PROGRAM

## 2024-09-26 PROCEDURE — 250N000013 HC RX MED GY IP 250 OP 250 PS 637: Performed by: STUDENT IN AN ORGANIZED HEALTH CARE EDUCATION/TRAINING PROGRAM

## 2024-09-26 PROCEDURE — 97166 OT EVAL MOD COMPLEX 45 MIN: CPT | Mod: GO | Performed by: OCCUPATIONAL THERAPIST

## 2024-09-26 PROCEDURE — 99232 SBSQ HOSP IP/OBS MODERATE 35: CPT | Performed by: HOSPITALIST

## 2024-09-26 PROCEDURE — 92526 ORAL FUNCTION THERAPY: CPT | Mod: GN | Performed by: SPEECH-LANGUAGE PATHOLOGIST

## 2024-09-26 PROCEDURE — 250N000011 HC RX IP 250 OP 636: Performed by: STUDENT IN AN ORGANIZED HEALTH CARE EDUCATION/TRAINING PROGRAM

## 2024-09-26 PROCEDURE — 120N000013 HC R&B IMCU

## 2024-09-26 PROCEDURE — 92610 EVALUATE SWALLOWING FUNCTION: CPT | Mod: GN | Performed by: SPEECH-LANGUAGE PATHOLOGIST

## 2024-09-26 PROCEDURE — 97530 THERAPEUTIC ACTIVITIES: CPT | Mod: GP

## 2024-09-26 PROCEDURE — 97535 SELF CARE MNGMENT TRAINING: CPT | Mod: GO | Performed by: OCCUPATIONAL THERAPIST

## 2024-09-26 PROCEDURE — 85027 COMPLETE CBC AUTOMATED: CPT | Performed by: HOSPITALIST

## 2024-09-26 PROCEDURE — 80061 LIPID PANEL: CPT | Performed by: STUDENT IN AN ORGANIZED HEALTH CARE EDUCATION/TRAINING PROGRAM

## 2024-09-26 PROCEDURE — 70553 MRI BRAIN STEM W/O & W/DYE: CPT | Mod: MG

## 2024-09-26 PROCEDURE — 36415 COLL VENOUS BLD VENIPUNCTURE: CPT | Performed by: HOSPITALIST

## 2024-09-26 PROCEDURE — A9585 GADOBUTROL INJECTION: HCPCS | Performed by: HOSPITALIST

## 2024-09-26 PROCEDURE — 97530 THERAPEUTIC ACTIVITIES: CPT | Mod: GO | Performed by: OCCUPATIONAL THERAPIST

## 2024-09-26 PROCEDURE — 250N000009 HC RX 250: Performed by: HOSPITALIST

## 2024-09-26 PROCEDURE — 92611 MOTION FLUOROSCOPY/SWALLOW: CPT | Mod: GN

## 2024-09-26 PROCEDURE — 250N000011 HC RX IP 250 OP 636: Performed by: HOSPITALIST

## 2024-09-26 RX ORDER — BARIUM SULFATE 400 MG/ML
SUSPENSION ORAL ONCE
Status: COMPLETED | OUTPATIENT
Start: 2024-09-26 | End: 2024-09-26

## 2024-09-26 RX ORDER — GADOBUTROL 604.72 MG/ML
8 INJECTION INTRAVENOUS ONCE
Status: COMPLETED | OUTPATIENT
Start: 2024-09-26 | End: 2024-09-26

## 2024-09-26 RX ORDER — HYDROCHLOROTHIAZIDE 25 MG/1
25 TABLET ORAL DAILY
Status: DISCONTINUED | OUTPATIENT
Start: 2024-09-26 | End: 2024-10-01 | Stop reason: HOSPADM

## 2024-09-26 RX ORDER — LABETALOL HYDROCHLORIDE 5 MG/ML
20 INJECTION, SOLUTION INTRAVENOUS EVERY 4 HOURS PRN
Status: DISCONTINUED | OUTPATIENT
Start: 2024-09-26 | End: 2024-10-01 | Stop reason: HOSPADM

## 2024-09-26 RX ORDER — LISINOPRIL 40 MG/1
40 TABLET ORAL DAILY
Status: DISCONTINUED | OUTPATIENT
Start: 2024-09-26 | End: 2024-10-01 | Stop reason: HOSPADM

## 2024-09-26 RX ORDER — ASPIRIN 81 MG/1
81 TABLET ORAL DAILY
Status: ON HOLD | COMMUNITY
End: 2024-09-30

## 2024-09-26 RX ORDER — ATORVASTATIN CALCIUM 40 MG/1
40 TABLET, FILM COATED ORAL DAILY
Status: DISCONTINUED | OUTPATIENT
Start: 2024-09-26 | End: 2024-10-01 | Stop reason: HOSPADM

## 2024-09-26 RX ADMIN — LABETALOL HYDROCHLORIDE 20 MG: 5 INJECTION INTRAVENOUS at 22:15

## 2024-09-26 RX ADMIN — BARIUM SULFATE 20 ML: 400 SUSPENSION ORAL at 13:57

## 2024-09-26 RX ADMIN — GADOBUTROL 8 ML: 604.72 INJECTION INTRAVENOUS at 03:04

## 2024-09-26 RX ADMIN — PANTOPRAZOLE SODIUM 40 MG: 40 INJECTION, POWDER, FOR SOLUTION INTRAVENOUS at 09:30

## 2024-09-26 RX ADMIN — NICARDIPINE HYDROCHLORIDE 5 MG/HR: 0.2 INJECTION INTRAVENOUS at 01:46

## 2024-09-26 RX ADMIN — LISINOPRIL 40 MG: 40 TABLET ORAL at 16:03

## 2024-09-26 RX ADMIN — HYDROCHLOROTHIAZIDE 25 MG: 25 TABLET ORAL at 16:03

## 2024-09-26 RX ADMIN — ATORVASTATIN CALCIUM 40 MG: 40 TABLET, FILM COATED ORAL at 16:03

## 2024-09-26 ASSESSMENT — ACTIVITIES OF DAILY LIVING (ADL)
ADLS_ACUITY_SCORE: 25
ADLS_ACUITY_SCORE: 38
ADLS_ACUITY_SCORE: 25
ADLS_ACUITY_SCORE: 34
ADLS_ACUITY_SCORE: 34
ADLS_ACUITY_SCORE: 25
PREVIOUS_RESPONSIBILITIES: MEAL PREP;HOUSEKEEPING;LAUNDRY;SHOPPING;MEDICATION MANAGEMENT;FINANCES;DRIVING
ADLS_ACUITY_SCORE: 25
ADLS_ACUITY_SCORE: 34
ADLS_ACUITY_SCORE: 25
ADLS_ACUITY_SCORE: 25
ADLS_ACUITY_SCORE: 38

## 2024-09-26 NOTE — CONSULTS
Neurosurgery Consult    Assessment  Left basal ganglia hemorrhage stable on head CT.    Plan:  No operative Neurosurgical intervention indicated.  Plan per Stroke Neurology.  Neurosurgery will sign off.        HPI    Per chart review:    Chkia Hurd is a 79 year old female with PMH significant for hypertension, dyslipidemia, GERD, h/o left breast cancer, h/o ischemic CVA (10/2023) who presented to [facility] ED with unsteady gait, noted with hemorrhagic stroke and transferred to [facility] ICU, admitted inpatient 9/25/24.     She has history of ischemic CVA with mild slurred speech has been on Plavix but presented with unsteady gait and dysarthria, worse than usual slurred speech. Denied any motor weakness.     She was last known normal when she went to bed last night. She woke up in the morning with unsteady gait and also was having difficulty with speech.    Exam  B/P: 136/66, T: 98.5, P: 72  Awake and alert with intermittent slurred speech, follows commands well with left upper extremity pronator drift. PERR.\  GCS 15      Imaging    Head CT 1:58 PM    Acute left basal ganglia intraparenchymal hematoma.     Head CT 6:18 PM    1.  No significant interval change compared to CT head 9/25/2024.    Discussed with Dr. Farah.    ODALYS Byrd  [facility] Neurosurgery  [address] [address]    Tel [phone]  Pager [phone]

## 2024-09-26 NOTE — PROGRESS NOTES
"   09/26/24 0291   Appointment Info   Signing Clinician's Name / Credentials (PT) Erika Zhu, PT, DPT   Living Environment   People in Home spouse   Current Living Arrangements house   Home Accessibility stairs to enter home   Stair Railings, Main Entrance railings safe and in good condition   Transportation Anticipated family or friend will provide   Living Environment Comments Per chart: lives w/  who has dementia. Per chart, pt's dtr, Ramila, lives next door to her parents and assists her mother in caring for her Dad who has dementia who is fully ambulatory. Pt drives and enjoys pontoon rides.   Self-Care   Usual Activity Tolerance good   Current Activity Tolerance fair   Equipment Currently Used at Home walker, rolling   Fall history within last six months no   Activity/Exercise/Self-Care Comment Has a walker to use as needed. Typically independent w/ basic self cares per chart   General Information   Onset of Illness/Injury or Date of Surgery 09/25/24   Referring Physician Phi Ritter MD   Patient/Family Therapy Goals Statement (PT) to get better   Pertinent History of Current Problem (include personal factors and/or comorbidities that impact the POC) \"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)  H/o ischemic CVA (10/2023)  Hypertension  Dyslipidemia  \"   Existing Precautions/Restrictions fall   Weight-Bearing Status - LLE full weight-bearing   Weight-Bearing Status - RLE full weight-bearing   General Observations [place]; per chart has hearing aides, not in right now but pt responds to loud clear voice   Cognition   Follows Commands (Cognition) delayed response/completion;increased processing time needed;initiation impaired   Cognitive Status Comments can follow cuing with time.   Pain Assessment   Patient Currently in Pain No   Integumentary/Edema   Integumentary/Edema no deficits were identifed "   Posture    Posture Forward head position   Range of Motion (ROM)   Range of Motion ROM is WFL   Strength (Manual Muscle Testing)   Strength Comments generally 4/5 in BLE   Bed Mobility   Comment, (Bed Mobility) min-mod assist of 2   Transfers   Comment, (Transfers) min assist of 2 without device, tolerated relatively well. pt surprised at how she feels, that this isn't her baseline/more challenging than usual   Gait/Stairs (Locomotion)   Comment, (Gait/Stairs) min assist bed to chair without device. would likely benefit from using a walker   Balance   Balance Comments relatively steady sitting EOB, unsteady standing needs assist of 1-2   Sensory Examination   Sensory Perception patient reports no sensory changes   Clinical Impression   Criteria for Skilled Therapeutic Intervention Yes, treatment indicated   PT Diagnosis (PT) impaired functional mobility   Influenced by the following impairments decreased strength, balance, and activity tolerance   Functional limitations due to impairments bed mobility, transfers, gait, stairs   Clinical Presentation (PT Evaluation Complexity) evolving   Clinical Presentation Rationale clinical judgement   Clinical Decision Making (Complexity) moderate complexity   Planned Therapy Interventions (PT) balance training;bed mobility training;gait training;home exercise program;neuromuscular re-education;patient/family education;stair training;strengthening;transfer training   Risk & Benefits of therapy have been explained evaluation/treatment results reviewed;care plan/treatment goals reviewed;risks/benefits reviewed;current/potential barriers reviewed;participants voiced agreement with care plan;participants included;patient   PT Total Evaluation Time   PT Eval, Moderate Complexity Minutes (71937) 11   Physical Therapy Goals   PT Frequency Daily   PT Predicted Duration/Target Date for Goal Attainment 10/03/24   PT Goals Bed Mobility;Transfers;Gait;Stairs   PT: Bed Mobility Modified  independent;Supine to/from sit;Rolling   PT: Transfers Modified independent;Sit to/from stand;Bed to/from chair;Assistive device   PT: Gait Modified independent;Assistive device;Greater than 200 feet   PT: Stairs Supervision/stand-by assist;8 stairs   Interventions   Interventions Quick Adds Therapeutic Activity   Therapeutic Activity   Therapeutic Activities: dynamic activities to improve functional performance Minutes (34861) 18   Symptoms Noted During/After Treatment Fatigue   Treatment Detail/Skilled Intervention co-tx with OT d/t complexity, not anticipated to tolerate two sessions today d/t fatigue. Pt sleeping/snoring in bed upon arrival but wakes easily. Falling asleep at first and having trouble answering questions, but this improved when upright. [place] but can hear clear loud voice. Needs increased time to follow all cuing. Supine to seated EOB w/ min-mod assist of 2, pt seemed to have difficulty coordinating scooting legs over. Can hold railing with opposite hand and sit up from sidelying with legs over. Mod assist to scoot hips forward to feet flat. Reports dizziness that improves after a few minutes. /61 in sitting. Sit to stand impulsive first, able to wait for instructions with cuing. Sit to stand with min assist of 2, no overt buckling but generally shaky. Suprised herself at how challening it was. Ambulated x 3ft forward towards chair, short small steps slightly unsteady. Turned to sit with min assist, set up comfortable in chair./64 which is over parameters, pt would be willing to walk but medically not ready with this BP. Noted BP of 117/59 when leaving the room- no dizzines per pt.   PT Discharge Planning   PT Plan trial ambulation with FWW (consider chair follow); neuro re-ed   PT Discharge Recommendation (DC Rec) [facility] patient will benefit from intensive, interdisciplinary therapy.  Anticipate will be able to tolerate 3 hours of therapy per day   PT Rationale  "for DC Rec Patient presents with significantly decreased functional mobility compared to baseline primarily affected by decreased activity tolerance and decreased balance. Will benefit from intensive rehab to address this. Pt is independent at baseline and has support from daughter.   PT Brief overview of current status min-mod assist of 2 \"Goals of therapy will be to address safe mobility and make recs for d/c to next level of care. Pt and RN will continue to follow all falls risk precautions as documented by RN staff while hospitalized.\"   Total Session Time   Timed Code Treatment Minutes 18   Total Session Time (sum of timed and untimed services) 29     "

## 2024-09-26 NOTE — H&P
[facility]  History and Physical   Hospitalist  Phi Ritter MD       Chika Hurd MRN# [id number]   YOB: 1945 Age: 79 year old      Date of Admission:  9/25/2024         Assessment and Plan:   Chika Hurd is a 79 year old female with PMH significant for hypertension, dyslipidemia, GERD, h/o left breast cancer, h/o ischemic CVA (10/2023) who presented to [facility] ED with unsteady gait, noted with hemorrhagic stroke and transferred to [facility] ICU, admitted inpatient 9/25/24.    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)  H/o ischemic CVA (10/2023)  Hypertension  Dyslipidemia  - she has history of ischemic CVA with mild slurred speech has been on Plavix but presented with unsteady gait and dysarthria, worse than usual slurred speech  -CT head noted acute left basal ganglia intraparenchymal hematoma  -CT head noted no active extravasation; did note incidental 4 mm saccular and resume at anterior communicating artery; no large vessel occlusion or stenosis  -CT and neck noted at 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 and moderate stenosis right vertebral artery origin  -follow-up head CT after four hours noted with no significant interval change in hematoma  -EKG noted normal sinus rhythm    -Was evaluated by stroke neurology at [facility]; suggested for Q1 hour neuro- checks and vitals, goal SBP<140, head of bed elevation> 30   -admit to ICU  -will place stat neurosurgery consult per neurology recommendation  -will start Nicardipine drip to keep SBP goal<140 while holding PTA HCTZ and lisinopril  -resume Lipitor when taking NPO  -hold off on PTA Plavix  -keep NPO for now; IVF with NS at 75 ML per hour  -will follow serial troponin  -will get PT/OT/SLP evaluation  -MRI brain    GERD  - will hold off on PTA Pepcid and start  IV Protonix    Clinically Significant Risk Factors Present on Admission                  # Drug Induced Platelet Defect: home medication list includes an antiplatelet medication       # Hypertension: Noted on problem list                   # Financial/Environmental Concerns:                  DVT prophylaxis: mechanical with PCD boots  Code status: full code    Care plan discussed with patient and her family present in room along with nursing           Primary Care Physician:   Audrey Roy [phone]         Chief Complaint:     Transfer from [facility] for hemorrhagic stroke    History is obtained from the patient         History of Present Illness:     Chika Hurd is a 79 year old female with PMH significant for hypertension, dyslipidemia, GERD, h/o left breast cancer, h/o ischemic CVA (10/2023) who presented to [facility] ED with unsteady gait, noted with hemorrhagic stroke and transferred to [facility] ICU, admitted inpatient 9/25/24.    She has history of ischemic CVA with mild slurred speech has been on Plavix but presented with unsteady gait and dysarthria, worse than usual slurred speech. Denied any motor weakness.    She was last known normal when she went to bed last night. She woke up in the morning with unsteady gait and also was having difficulty with speech.    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    The patient denies any fever, chills, rigors, chest pain or shortness of breath.  Denies pain abdomen.  No bowel or bladder disturbances.           Past Medical History:     Hypertension  Dyslipidemia  GERD  h/o left breast cancer  h/o ischemic CVA (10/2023)           Past Surgical History:     Past Surgical History:   Procedure Laterality Date    COLONOSCOPY N/A 2021    Procedure: COLONOSCOPY, WITH POLYPECTOMY AND BIOPSY;  Surgeon: Lawrence Ortega DO;  Location: [facility]    ENT SURGERY      dental implants  started    FLEXIBLE SIGMOIDOSCOPY      LUMPECTOMY BREAST WITH SEED LOCALIZATION Left 2016    Procedure: LUMPECTOMY BREAST WITH SEED LOCALIZATION;  Surgeon: Russel Murry MD;  Location:  OR    PHACOEMULSIFICATION WITH STANDARD INTRAOCULAR LENS IMPLANT Right 2017    Procedure: PHACOEMULSIFICATION WITH STANDARD INTRAOCULAR LENS IMPLANT;  Right cataract removal with implant;  Surgeon: Michael Zuleta MD;  Location: [facility] OR    PHACOEMULSIFICATION WITH STANDARD INTRAOCULAR LENS IMPLANT Left 2017    Procedure: PHACOEMULSIFICATION WITH STANDARD INTRAOCULAR LENS IMPLANT;  Left cataract removal with implant;  Surgeon: Michael Zuleta MD;  Location: [facility] OR    SURGICAL HISTORY OF -       Right Hip Pinning    SURGICAL HISTORY OF -       Tubal Ligation              Home Medications:     Prior to Admission Medications   Prescriptions Last Dose Informant Patient Reported? Taking?   Ascorbic Acid (VITAMIN C PO)  Self Yes No   Sig: Take 500 mg by mouth daily   Glycerin-Polysorbate 80 (REFRESH DRY EYE THERAPY OP)  Self Yes No   Sig: Place 1 drop into both eyes as needed   MULTIVITAMIN OR  Self Yes No   Si daily   Omega-3 Fatty Acids (OMEGA-3 FISH OIL PO)  Self Yes No   Sig: Take 1 g by mouth daily    acetaminophen (TYLENOL) 500 MG tablet  Self Yes No   Sig: Take 2 tablets by mouth 2 times  daily as needed for mild pain   atorvastatin (LIPITOR) 40 MG tablet   No No   Sig: Take 1 tablet (40 mg) by mouth daily   clopidogrel (PLAVIX) 75 MG tablet   No No   Sig: Take 1 tablet (75 mg) by mouth daily   coenzyme Q-10 200 MG CAPS capsule  Self Yes No   Sig: Take 1 capsule by mouth at bedtime   famotidine (PEPCID) 20 MG tablet  Self No No   Sig: TAKE 1 TABLET (20 MG) BY MOUTH 2 TIMES DAILY   hydrochlorothiazide (HYDRODIURIL) 25 MG tablet   No No   Sig: Take 1 tablet (25 mg) by mouth daily   lisinopril (ZESTRIL) 40 MG tablet   No No   Sig: Take 1 tablet (40 mg) by mouth daily      Facility-Administered Medications: None            Allergies:     Allergies   Allergen Reactions    Cortisone Swelling     Cortisone Cream            Social History:   Chika Hurd  reports that she quit smoking about 19 years ago. Her smoking use included cigarettes. She started smoking about 54 years ago. She has never used smokeless tobacco. She reports current alcohol use. She reports that she does not use drugs.              Family History:   Chika Hurd family history includes Breast Cancer in her cousin, cousin, and maternal aunt; Circulatory in her father; Heart Disease in her father, mother, and sister.    Family history was reviewed by myself and not pertinent to current presentation.           Review of Systems:   A10 point Review of Systems was done and were negative other than noted in the HPI.             Physical Exam:   Blood pressure (!) 183/71, pulse 70, SpO2 100%, not currently breastfeeding.  0 lbs 0 oz        Constitutional: Alert, awake and oriented X 3; lying comfortably in bed in no apparent distress; noted slightly slurred speech   HEENT: Pupils equal and reactive to light and accomodation, EOMI intact; neck supple no raised JVD or rigidity    Oral cavity: Moist mucosa   Cardiovascular: Normal s1 s2, regular rate and rhythm, no murmur   Lungs: B/l clear to auscultation, no wheezes or crepitations    Abdomen: Soft, nt, nd, no guarding, rigidity or rebound; BS +   LE : No edema   Musculoskeletal: Power 5/5 in all extremities   Neuro: No focal neurological deficits noted apart from slight slurred speech; does have some finger to nose in coordination, CN II to XII grossly intact   Psychiatry: normal mood and affect  Skin: No obvious skin rashes or ulcers             Data:   All new lab and imaging data was reviewed in Epic.   Significant labs and imagings include:    CBC and CMP are mostly unremarkable with glucose 127, normal troponin T  EKG normal sinus rhythm  CT head:  Acute left basal ganglia intraparenchymal hematoma.   CTA head and neck:  IMPRESSION:   CTA Head:   1. No evidence of active extravasation into the left basal ganglia  acute parenchymal hematoma.  2. Incidental 4 mm saccular aneurysm at the anterior communicating  artery.  3. No evidence of large vessel occlusion or high-grade stenosis.   CTA Neck:   1. 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  2. Moderate stenosis at the right vertebral artery origin.  3. Background of scattered atherosclerotic plaque and mild stenoses.   4. Incidental findings as detailed.               Phi Ritter MD  Hospitalist

## 2024-09-26 NOTE — PROGRESS NOTES
[facility]    Hospitalist Progress Note    Interval History   Patient awake and alert.  Slow to answer questions with her expressive aphasia.  Is able to move all 4 extremities uniformly.  Answering questions appropriately.  Denies any headache, chest pain or shortness of breath.    -Data reviewed today: I reviewed all new labs and imaging results over the last 24 hours. I personally reviewed the brain MRI image(s) showing intraparenchymal hemorrhage .    Physical Exam   Temp: 98.1  F (36.7  C) Temp src: Axillary BP: 138/61 Pulse: 67     SpO2: 94 % O2 Device: None (Room air)    There were no vitals filed for this visit.  Vital Signs with Ranges  Temp:  [97.7  F (36.5  C)-98.5  F (36.9  C)] 98.1  F (36.7  C)  Pulse:  [59-80] 67  Resp:  [10-19] 17  BP: (103-183)/(52-98) 138/61  SpO2:  [91 %-100 %] 94 %  I/O last 3 completed shifts:  In: 1468.96 [I.V.:1468.96]  Out: 750 [Urine:750]    Physical Exam  Constitutional:       Appearance: She is obese.   Cardiovascular:      Rate and Rhythm: Normal rate and regular rhythm.      Pulses: Normal pulses.      Heart sounds: Normal heart sounds.   Pulmonary:      Effort: Pulmonary effort is normal. No respiratory distress.      Breath sounds: Normal breath sounds.   Abdominal:      General: Abdomen is flat. Bowel sounds are normal. There is no distension.      Tenderness: There is no abdominal tenderness. There is no guarding.   Skin:     General: Skin is warm and dry.   Neurological:      General: No focal deficit present.      Comments: Mild right facial droop.  Significant expressive aphasia.  4 x 5 strength in all the 4 extremities.  Generalized deconditioning and slowed mentation           Medications   Current Facility-Administered Medications   Medication Dose Route Frequency Provider Last Rate Last Admin    Medication Instructions: No D5W IV solutions unless patient hypoglycemic.   Does not apply Continuous PRN Phi Ritter MD         niCARdipine 40 mg in 200 mL NS (CARDENE) infusion  0.5-15 mg/hr Intravenous Continuous Phi Ritter MD   Stopped at 09/26/24 0630    sodium chloride 0.9 % infusion   Intravenous Continuous Phi Ritter MD   Stopped at 09/26/24 1320     Current Facility-Administered Medications   Medication Dose Route Frequency Provider Last Rate Last Admin    hydrochlorothiazide (HYDRODIURIL) tablet 25 mg  25 mg Oral Daily Kade Adams MD        pantoprazole (PROTONIX) IV push injection 40 mg  40 mg Intravenous Daily Phi Ritter MD   40 mg at 09/26/24 0930       Data   Recent Labs   Lab 09/26/24  1309 09/26/24  0446 09/25/24  1956 09/25/24  1336   WBC  --  11.5*  --  8.2   HGB  --  13.6  --  13.8   MCV  --  91  --  91   PLT  --  292  --  309   INR  --   --   --  1.04   NA  --  135  --  137   POTASSIUM  --  4.2  --  4.2   CHLORIDE  --  100  --  100   CO2  --  26  --  29   BUN  --  13.2  --  15.9   CR  --  0.64  --  0.73   ANIONGAP  --  9  --  8   KAR  --  9.3  --  9.8   GLC 84 92 82 127*       Recent Results (from the past 24 hour(s))   Head CT w/o contrast    Narrative    EXAM: CT HEAD W/O CONTRAST  LOCATION: [facility]  DATE: 9/25/2024    INDICATION: repeat 4 hours after first CT   for head bleed.  timing as requested by neurosurg  COMPARISON: CT head 9/25/2024  TECHNIQUE: Routine CT Head without IV contrast. Multiplanar reformats. Dose reduction techniques were used.    FINDINGS:  INTRACRANIAL CONTENTS:   Left basal ganglia acute infarct measuring 3.6 cm AP by 1.1 cm TR similar in size compared to prior examination.    No new or significant progressive acute intracranial hemorrhages.    Mild right midline shift measuring 2 mm similar compared to prior examination.    No CT evidence of acute infarct. Mild to moderate presumed chronic small vessel ischemic changes. Mild to moderate generalized volume loss. No hydrocephalus. Right caudate head small chronic lacunar infarct.  Right inferior frontal lobe punctate   calcification demonstrated.    VISUALIZED ORBITS/SINUSES/MASTOIDS: No intraorbital abnormality.     Left sphenoid sinus air-fluid level. Please correlate for acute sinusitis.     Remaining visualized paranasal sinuses and mastoid air cells are essentially clear.    BONES/SOFT TISSUES: No acute abnormality.      Impression    IMPRESSION:  1.  No significant interval change compared to CT head 9/25/2024.   MR Brain w/o & w Contrast    Narrative    EXAM: MR BRAIN W/O AND W CONTRAST  LOCATION: [facility]  DATE: 9/26/2024    INDICATION: Hemorrhagic stroke.  COMPARISON: CT head 9/25/2024.  CONTRAST: 8 Gadavist.  TECHNIQUE: Routine multiplanar multisequence head MRI without and with intravenous contrast.    FINDINGS:  INTRACRANIAL CONTENTS: As demonstrated on recent head CT, there is a 2.9 x 1.3 x 1.6 cm area of acute intraparenchymal hemorrhage centered in the left lentiform nucleus and extending into the corona radiata. Small to moderate amount of surrounding edema   . There is mild mass effect on the left lateral ventricle and slight bowing of the septum pellucidum to the right of midline. Slightly posterior to this area, adjacent to the left ventricular trigone, there is a 5 mm focus of increased signal on the   diffusion sequence which does not appear to be truly restricted on the ADC map. There is associated FLAIR signal hyperintensity. No intracranial mass. Chronic lacunar infarct in the right caudate nucleus. Patchy nonspecific T2/FLAIR hyperintensities   within the cerebral white matter most consistent with mild to moderate chronic microvascular ischemic change. Mild to moderate generalized cerebral atrophy. No hydrocephalus. Normal position of the cerebellar tonsils. No pathologic contrast enhancement.    SELLA: No abnormality accounting for technique.    OSSEOUS STRUCTURES/SOFT TISSUES: Normal marrow signal. The major intracranial vascular flow  voids are maintained.     ORBITS: No abnormality accounting for technique.     SINUSES/MASTOIDS: No paranasal sinus mucosal disease. No middle ear or mastoid effusion.       Impression    IMPRESSION:  1.  As demonstrated on the recent head CT, there is a 2.9 x 1.3 x 1.6 cm area of acute intraparenchymal hemorrhage centered in the left lentiform nucleus and extending into the corona radiata. No significant associated mass effect. No obvious underlying   enhancement although signal characteristics of the hemorrhage could obscure an underlying lesion and follow-up following resolution of the hemorrhage is advised.  2.  Within the left periatrial white matter, there is a 6 mm focus of probable subacute infarction.  3.  Underlying volume loss and presumed chronic small vessel ischemic changes.         Assessment & Plan      Chika Hurd is a 79 year old female with PMH significant for hypertension, dyslipidemia, GERD, h/o left breast cancer, h/o ischemic CVA (10/2023) who presented to [facility] ED with unsteady gait, noted with hemorrhagic stroke and transferred to [facility] ICU, admitted inpatient 9/25/24.     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)  H/o ischemic CVA (10/2023)  Hypertension  Dyslipidemia  - she has history of ischemic CVA with mild slurred speech has been on Plavix but presented with unsteady gait and dysarthria, worse than usual slurred speech  -CT head noted acute left basal ganglia intraparenchymal hematoma  -CT head noted no active extravasation; did note incidental 4 mm saccular and resume at anterior communicating artery; no large vessel occlusion or stenosis  -CT and neck noted at 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 and moderate stenosis right vertebral artery origin  -follow-up head CT after four hours noted with no significant interval change  in hematoma  -EKG noted normal sinus rhythm     -Was evaluated by stroke neurology at [facility]; suggested for Q1 hour neuro- checks and vitals, goal SBP<140, head of bed elevation> 30   -admit to ICU  -Neurosurgery consulted.  No immediate surgical needs.  Continue medical management.  -Was started on nicardipine drip to keep SBP goal<140 while holding PTA HCTZ and lisinopril  -Restart hydrochlorothiazide and lisinopril on 9/26/2024 and stop the drip today.  Neurochecks every 2 hours.  -resume Lipitor when taking NPO  -Prior to admission Plavix currently on hold.  Restart in 2 weeks.  -Troponin at 8  -PT/OT/speech consulted.  -Speech recommended video swallow study to further assess oropharyngeal function.  Currently on full liquid diet with mildly thick liquids under direct supervision.  -MRI brain showed 2.9 x 1.3 x 1.6 cm of acute intraparenchymal hemorrhage centered on left lentiform nucleus and extending into coronary radiator.  No mass effect.  No obvious underlying enhancement though underlying lesion cannot be completely ruled out.  Follow-up imaging after hemorrhage is resolved is recommended.  Review report for complete details.     GERD  - will hold off on PTA Pepcid and start IV Protonix    Clinically Significant Risk Factors Present on Admission                # Drug Induced Platelet Defect: home medication list includes an antiplatelet medication   # Hypertension: Noted on problem list         # Obesity: Estimated body mass index is 34.18 kg/m  as calculated from the following:    Height as of this encounter: 1.524 m (5').    Weight as of an earlier encounter on 9/25/24: 79.4 kg (175 lb).       # Financial/Environmental Concerns: none          DVT Prophylaxis: Pneumatic Compression Devices  Code Status: Full Code  Medically Ready for Discharge: Anticipated in 2-4 Days        Akanksha Mejias MD, MD  [phone](p)

## 2024-09-26 NOTE — CONSULTS
Care Management Initial Consult    General Information  Assessment completed with: Patient, Family, patient and Daughter Ramila outside of the room  Type of CM/SW Visit: CM Role Introduction    Primary Care Provider verified and updated as needed: Yes   Readmission within the last 30 days: no previous admission in last 30 days      Reason for Consult: discharge planning  Advance Care Planning:  patient notes that she does NOT have a HCD will provide information on discharge.        Communication Assessment  Patient's communication style: spoken language (English or Bilingual)    Hearing Difficulty or Deaf: yes   Wear Glasses or Blind: yes    Cognitive  Cognitive/Neuro/Behavioral: .WDL except  Level of Consciousness: alert  Arousal Level: opens eyes spontaneously  Orientation: oriented x 4  Mood/Behavior: cooperative, behavior appropriate to situation  Best Language: 1 - Mild to moderate  Speech: slurred    Living Environment:   People in home: spouse (has alzheimers per patient her and daughter provide care)  Don  Current living Arrangements: house      Able to return to prior arrangements: other (see comments) (pending final recommendations)       Family/Social Support:  Care provided by: self  Provides care for: spouse  Marital Status:   Support system: Children          Description of Support System: Supportive, Involved    Support Assessment: Adequate social supports, Adequate family and caregiver support    Current Resources:   Patient receiving home care services: No        Community Resources: None  Equipment currently used at home: walker, standard (walker if needced)  Supplies currently used at home: Hearing Aid Batteries    Employment/Financial:  Employment Status: retired        Financial Concerns: none   Referral to Financial Worker: No       Does the patient's insurance plan have a 3 day qualifying hospital stay waiver?  Yes     Which insurance plan 3 day waiver is available? [employer]    Will the  waiver be used for post-acute placement? Undetermined at this time    Lifestyle & Psychosocial Needs:  Social Determinants of Health     Food Insecurity: Low Risk  (9/26/2024)    Food Insecurity     Within the past 12 months, did you worry that your food would run out before you got money to buy more?: No     Within the past 12 months, did the food you bought just not last and you didn t have money to get more?: No   Depression: Not at risk (4/26/2024)    PHQ-2     PHQ-2 Score: 2   Housing Stability: Low Risk  (9/26/2024)    Housing Stability     Do you have housing? : Yes     Are you worried about losing your housing?: No   Recent Concern: Housing Stability - High Risk (9/26/2024)    Housing Stability     Do you have housing? : No     Are you worried about losing your housing?: No   Tobacco Use: Medium Risk (4/26/2024)    Patient History     Smoking Tobacco Use: Former     Smokeless Tobacco Use: Never     Passive Exposure: Not on file   Financial Resource Strain: Low Risk  (9/26/2024)    Financial Resource Strain     Within the past 12 months, have you or your family members you live with been unable to get utilities (heat, electricity) when it was really needed?: No   Alcohol Use: Not on file   Transportation Needs: Low Risk  (9/26/2024)    Transportation Needs     Within the past 12 months, has lack of transportation kept you from medical appointments, getting your medicines, non-medical meetings or appointments, work, or from getting things that you need?: No   Physical Activity: Not on file   Interpersonal Safety: Low Risk  (9/26/2024)    Interpersonal Safety     Do you feel physically and emotionally safe where you currently live?: Yes     Within the past 12 months, have you been hit, slapped, kicked or otherwise physically hurt by someone?: No     Within the past 12 months, have you been humiliated or emotionally abused in other ways by your partner or ex-partner?: No   Recent Concern: Interpersonal Safety -  "High Risk (9/26/2024)    Interpersonal Safety     Do you feel physically and emotionally safe where you currently live?: No     Within the past 12 months, have you been hit, slapped, kicked or otherwise physically hurt by someone?: No     Within the past 12 months, have you been humiliated or emotionally abused in other ways by your partner or ex-partner?: No   Stress: Not on file   Social Connections: Not on file   Health Literacy: Not on file       Functional Status:  Prior to admission patient needed assistance:   Dependent ADLs:: Independent, Ambulation-no assistive device     Assesssment of Functional Status: Not at baseline with ADL Functioning (awaiting therapies to see the patient for recommendations)    Mental Health Status:  Mental Health Status: No Current Concerns       Chemical Dependency Status:  Chemical Dependency Status: No Current Concerns             Values/Beliefs:  Spiritual, Cultural Beliefs, Confucianism Practices, Values that affect care: no               Discussed  Partnership in Safe Discharge Planning  document with patient/family: No    Additional Information:  Writer approached by Daughter Ramila outside of the room as she was awaiting update by bedside RN. Per Ramila she lives next door to her parents and assists her mother in caring for her Dad who has dementia who is fully ambulatory.  Ramila informs this writer that the patient called her on the phone saying \"something is not right.\" Ramila encouraged her Mother to come into the hospital.  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 She is aware that we will follow for discharge planning. Writer briefly went over ARU/TCU pending therapy consults.  Ramila noted her parents have one step to get into the home and 5 steps into the main floor (split level). She notes her Mother stopped doing her PT exercises however [place] and [place].  Ramila was awaiting update from bedside " "RN and is aware that we will follow the patient for discharge planning.  Writer met with the patient explained role and scope of discharge planning. Patient A+Ox3 (not situation).  Patient is aware that she has pending therapy consults and once recommendations are in they may recommend ARU/TCU or other.   Writer went over the ARU vs TCU stay and goal to get stronger to go home and return to baseline.  Patient questions how she will be able to provide assistance to her  and noted \"my daughter works she can't always take him.\" Writer discussed possible private duty and senior linkage line as some resources.. Patient also mentioned \"I am behind on my household tasks.\"  Patient notes her Son lives in [place] and is unable to help.  Patient has not been able to get out of bed yet while in the ICU.  Patient is aware that once she is no longer requiring ICU level of care she would most likely transfer to Neuro for a small amount of time while we work with her on discharge planning.  Patient did not have any questions at this time.  Care Transitions will continue to follow for further discharge planning needs as identified.    Yasmine Franz RN, BSN, ACM   Care Transitions Specialist  [facility]  Care Transitions Specialist  [id number] [place]. [address]  [email or web address]  Office: [phone]   Fax: [phone]  [facility]         Next Steps: therapy consults continued recommendations of rounding provider(s)          "

## 2024-09-26 NOTE — PROGRESS NOTES
09/26/24 9155   Appointment Info   Signing Clinician's Name / Credentials (OT) Debora Heredia OTR/L   Rehab Comments (OT) Initial Evaluation   Living Environment   People in Home spouse  (per chart, pts spouse has dementia and is a caregiver for him.)   Current Living Arrangements house   Transportation Anticipated family or friend will provide   Living Environment Comments Pt reports I with all ADL/IADL's prior to admit, does not use an AD for mobility.Per chart: lives w/  who has dementia. Per chart, pt's dtr, Ramila, lives next door to her parents and assists her mother in caring for her Dad who has dementia who is fully ambulatory. Pt drives and enjoys pontoon rides.   Self-Care   Equipment Currently Used at Home walker, rolling   Fall history within last six months no   Activity/Exercise/Self-Care Comment Has a walker to use as needed. Typically independent w/ basic self cares per chart   Instrumental Activities of Daily Living (IADL)   Previous Responsibilities meal prep;housekeeping;laundry;shopping;medication management;finances;driving   General Information   Onset of Illness/Injury or Date of Surgery 09/25/24   Referring Physician Phi Ritter MD   Patient/Family Therapy Goal Statement (OT) none stated   Additional Occupational Profile Info/Pertinent History of Current Problem Chika Hurd is a 79 year old female with a past medical history of hypertension on 2 antihypertensive at home the patient presented with acute onset of slurred speech and feeling of being off balance.  The patient presented to the outside hospital, stroke code was activated and CT head without contrast revealed left basal ganglia acute hemorrhage so she was transferred to our hospital for further workup.  MRI of the brain with and without contrast was obtained overnight on September 26, 2024 which showed stable bleeding and did not show any underlying pathology such as AVN observation or tumor to explain  the acute hemorrhage.  The pattern of hemorrhage with a history of hypertension is most consistent with hypertensive intracranial hemorrhage.   Existing Precautions/Restrictions fall  (SBP<140, HOB 30 degrees or higher)   Cognitive Status Examination   Orientation Status person;place  (pt needing cues and provided with choices in order to reports correct year and month)   Affect/Mental Status (Cognitive) low arousal/lethargic;flat/blunted affect   Follows Commands WFL;delayed response/completion;increased processing time needed;repetition of directions required   Safety Deficit impulsivity;judgment   Attention Deficit distractible in quiet environment   Cognitive Status Comments continue to monitor cognition   Visual Perception   Visual Impairment/Limitations corrective lenses for reading   Visual Field Deficit   (at this time no noted field cut, but will need more formal assessment.)   Visual Motor Impairment   (appears to be tracking WFL, including saccades WFL.)   Impact of Vision Impairment on Function (Vision) Pt denies any double or blurred vision   Sensory   Sensory Comments denies   Pain Assessment   Patient Currently in Pain No   Range of Motion Comprehensive   Comment, General Range of Motion B UE AROM WFL   Strength Comprehensive (MMT)   Comment, General Manual Muscle Testing (MMT) Assessment B UE strength appears symmetrical, including  strength. pt is R hand dominant.   Coordination   Coordination Comments B UE coordination appears WFL, cont to monitor.   Bed Mobility   Rolling Right [place] (Bed Mobility) minimum assist (75% patient effort)   Scooting/Bridging [place] (Bed Mobility) contact guard   Supine-Sit [place] (Bed Mobility) minimum assist (75% patient effort)   Transfer Skill: Bed to Chair/Chair to Bed   Bed-Chair [place] (Transfers) minimum assist (75% patient effort);1 person to manage equipment;2 person assist   Sit-Stand Transfer   Sit-Stand [place] (Transfers)  minimum assist (75% patient effort);1 person to manage equipment   Upper Body Dressing Assessment/Training   [place] Level (Upper Body Dressing) contact guard assist   Lower Body Dressing Assessment/Training   [place] Level (Lower Body Dressing) minimum assist (75% patient effort)   Grooming Assessment/Training   [place] Level (Grooming) contact guard assist   Clinical Impression   Criteria for Skilled Therapeutic Interventions Met (OT) Yes, treatment indicated   OT Diagnosis impaired I with ADL's and functional mobility.   OT Problem List-Impairments impacting ADL problems related to;activity tolerance impaired;balance;cognition;strength;vision;coordination   Assessment of Occupational Performance 3-5 Performance Deficits   Planned Therapy Interventions (OT) ADL retraining;cognition;transfer training;home program guidelines;progressive activity/exercise   Clinical Decision Making Complexity (OT) detailed assessment/moderate complexity   Risk & Benefits of therapy have been explained evaluation/treatment results reviewed;care plan/treatment goals reviewed;risks/benefits reviewed;current/potential barriers reviewed;participants voiced agreement with care plan;participants included;patient   OT Total Evaluation Time   OT Eval, Moderate Complexity Minutes (95899) 10   OT Goals   Therapy Frequency (OT) Daily   OT Predicted Duration/Target Date for Goal Attainment 10/04/24   OT Goals Hygiene/Grooming;Upper Body Dressing;Lower Body Dressing;Toilet Transfer/Toileting;Cognition   OT: Hygiene/Grooming modified independent;while standing;independent   OT: Upper Body Dressing Modified independent;Independent;including set-up/clothing retrieval   OT: Lower Body Dressing Modified independent;Independent;including set-up/clothing retrieval   OT: Toilet Transfer/Toileting Modified independent;Independent;toilet transfer;cleaning and garment management;using adaptive equipment   OT: Cognitive Patient/caregiver will  verbalize understanding of cognitive assessment results/recommendations as needed for safe discharge planning   Self-Care/Home Management   Self-Care/Home Mgmt/ADL, Compensatory, Meal Prep Minutes (00209) 10   Symptoms Noted During/After Treatment (Meal Preparation/Planning Training) fatigue   Treatment Detail/Skilled Intervention OT: pt oriented to self and place, pt needing to be provided with choices for correct birth year and date and correct current month. pt educated in figure 4 tech to doff/daphney socks and able to partially doff.daphney sock at this time with SBA.   Therapeutic Activities   Therapeutic Activity Minutes (68674) 18   Symptoms noted during/after treatment fatigue;significant change in vital signs   Treatment Detail/Skilled Intervention OT: pt sleepy but agreed to therapy, pt at first somewhat lethargic but did wake up more during mobility. supine to sit with cues for log roll tech with cues for bed rail use with CGA/min A, pt able to scoot to EOB with SBA/CGA, pt needing cues to wait for therapist to daphney gait belt before standing. sit <> stand with CGA/min A x 1-2 and transferred slowly to bedside chair with CGA/MIN A x1-2. pt may benefit from a ww use initially. BP monitored and was slightly hypertensive over , pt reports some lightheadedness but feeling ok to cont sitting and RN ok if BP slightly over. after resting in chair BP did drop to , denies dizziness, RN notified and ok with pt's seated in chair.  B LE's elevated. chair alarm activitated and call light within reach, pt encouraged to sit up in chair for at least hour if able to tolerate. pt receptive.   OT Discharge Planning   OT Plan OT plan; montior BP/vitals, g/h at sink or toilet transfer, monitor cognition and screen when appropriate. monitor and screen vision further   OT Discharge Recommendation (DC Rec) [facility]-Motivated patient will benefit from intensive, interdisciplinary therapy.  Anticipate will be  able to tolerate 3 hours of therapy per day   OT Rationale for DC Rec Prior to admit, pt independent with all ADL/IADL's and functional mobility usually without ww use. pt lives in a house with her  who has dementia. pt's  independnet with ambulatory but requires A with IADL's ie med mgmt. Per chart, pt's dtr Ramila, lives nearby and assists with caring for pts . pt currently limited due to impaired balance, activity tolerance, oveall impaired strength, and delayed responses and cognition, cont to dwain. At this time recommend ARU for intensive daily therapy to increase ADL and functional mobility independence and safety to PLOF. however, pending progress and level of A pt can receive at home from family pt may be able to return home with A with IADL's, showering, etc and home RN, OT and PT, cont to monitor for safety discharge disposition.   OT Brief overview of current status Goals of therapy will be to address safe mobility and make recs for d/c to next level of care. Pt and RN will continue to follow all falls risk precautions as documented by RN staff while hospitalized. CGA/MIN A x 1-2 for functional transfer to chair. LE ADL's CGA/MIN A. grooming task with SBA/CGA. B UE strength and ROM appears symmetrical.   Total Session Time   Timed Code Treatment Minutes 28   Total Session Time (sum of timed and untimed services) 38

## 2024-09-26 NOTE — PLAN OF CARE
Problem: Adult Inpatient Plan of Care  Goal: Plan of Care Review  Description: The Plan of Care Review/Shift note should be completed every shift.  The Outcome Evaluation is a brief statement about your assessment that the patient is improving, declining, or no change.  This information will be displayed automatically on your shift  note.  Flowsheets (Taken 9/26/2024 1350)  Outcome Evaluation: pt alert and oriented to self-pt forgetful in general and on city and date but knows she is in the hospital. no other neuro changes during assessments-neuros changed to every 2 hrs. pt seen by speech and having a swallow study. pt to be seen by pt/ot and to stay in ICU overnight and will transfer tomorrow if stable. pt daughter updated at bedside. pt nsr. pt on ra with clear lung sounds. pt able to void via purewick. no bm today.  Plan of Care Reviewed With: patient  Overall Patient Progress: improving  Goal: Absence of Hospital-Acquired Illness or Injury  Intervention: Identify and Manage Fall Risk  Recent Flowsheet Documentation  Taken 9/26/2024 1200 by Maria Alejandra Burgess RN  Safety Promotion/Fall Prevention: activity supervised  Taken 9/26/2024 0800 by Maria Alejandra Burgess RN  Safety Promotion/Fall Prevention: activity supervised  Intervention: Prevent Skin Injury  Recent Flowsheet Documentation  Taken 9/26/2024 1400 by Maria Alejandra Burgess RN  Body Position:   position changed independently   heels elevated   turned  Taken 9/26/2024 1200 by Maria Alejandra Burgess RN  Body Position: position changed independently  Taken 9/26/2024 1000 by Maria Alejandra Burgess RN  Body Position: position changed independently  Taken 9/26/2024 0800 by Maria Alejandra Burgess RN  Body Position: position changed independently  Goal: Optimal Comfort and Wellbeing  Intervention: Provide Person-Centered Care  Recent Flowsheet Documentation  Taken 9/26/2024 1200 by Maria Alejandra Burgess RN  Trust Relationship/Rapport:   care explained   choices provided   emotional support  provided   empathic listening provided   questions answered   reassurance provided   questions encouraged   thoughts/feelings acknowledged  Taken 9/26/2024 0800 by Maria Alejandra Burgess RN  Trust Relationship/Rapport:   care explained   choices provided   emotional support provided   empathic listening provided   questions answered   reassurance provided   questions encouraged   thoughts/feelings acknowledged     Problem: Risk for Delirium  Goal: Improved Behavioral Control  Intervention: Minimize Safety Risk  Recent Flowsheet Documentation  Taken 9/26/2024 1200 by Maria Alejandra Burgess RN  Communication Enhancement Strategies:   extra time allowed for response   family involved in communication plan  Trust Relationship/Rapport:   care explained   choices provided   emotional support provided   empathic listening provided   questions answered   reassurance provided   questions encouraged   thoughts/feelings acknowledged  Taken 9/26/2024 1000 by Maria Alejandra Burgess RN  Communication Enhancement Strategies:   extra time allowed for response   family involved in communication plan  Taken 9/26/2024 0900 by Maria Alejandra Burgess RN  Communication Enhancement Strategies:   extra time allowed for response   family involved in communication plan  Taken 9/26/2024 0800 by Maria Alejandra Burgess RN  Communication Enhancement Strategies:   extra time allowed for response   family involved in communication plan  Trust Relationship/Rapport:   care explained   choices provided   emotional support provided   empathic listening provided   questions answered   reassurance provided   questions encouraged   thoughts/feelings acknowledged  Taken 9/26/2024 0715 by Maria Alejandra Burgess RN  Communication Enhancement Strategies:   extra time allowed for response   family involved in communication plan  Goal: Improved Attention and Thought Clarity  Intervention: Maximize Cognitive Function  Recent Flowsheet Documentation  Taken 9/26/2024 1200 by Maria Alejandra Burgess  RN  Sensory Stimulation Regulation:   lighting decreased   quiet environment promoted  Reorientation Measures:   calendar in view   clock in view   glasses use encouraged   reorientation provided  Taken 9/26/2024 1000 by Maria Alejandra Burgess RN  Sensory Stimulation Regulation:   lighting decreased   quiet environment promoted  Reorientation Measures:   calendar in view   clock in view   glasses use encouraged   reorientation provided  Taken 9/26/2024 0900 by Maria Alejandra Burgess RN  Sensory Stimulation Regulation:   lighting decreased   quiet environment promoted  Reorientation Measures:   calendar in view   clock in view   glasses use encouraged   reorientation provided  Taken 9/26/2024 0800 by Maria Alejandra Burgess RN  Sensory Stimulation Regulation:   lighting decreased   quiet environment promoted  Reorientation Measures:   calendar in view   clock in view   glasses use encouraged   reorientation provided  Taken 9/26/2024 0715 by Maria Alejandra Burgess RN  Sensory Stimulation Regulation:   lighting decreased   quiet environment promoted  Reorientation Measures:   calendar in view   clock in view   glasses use encouraged   reorientation provided     Problem: Skin Injury Risk Increased  Goal: Skin Health and Integrity  Intervention: Plan: Nurse Driven Intervention: Moisture Management  Recent Flowsheet Documentation  Taken 9/26/2024 1200 by Maria Alejandra Burgess RN  Moisture Interventions:   No brief in bed   Incontinence pad   Urinary collection device   Perineal cleanser  Taken 9/26/2024 0800 by Maria Alejandra Burgess RN  Moisture Interventions:   No brief in bed   Incontinence pad   Urinary collection device   Perineal cleanser  Intervention: Plan: Nurse Driven Intervention: Friction and Shear  Recent Flowsheet Documentation  Taken 9/26/2024 1200 by Maria Alejandra Burgess RN  Friction/Shear Interventions: HOB 30 degrees or less  Taken 9/26/2024 0800 by Maria Alejandra Burgess RN  Friction/Shear Interventions: HOB 30 degrees or less  Intervention:  Optimize Skin Protection  Recent Flowsheet Documentation  Taken 9/26/2024 1400 by Maria Alejandra Burgess RN  Head of Bed (HOB) Positioning: HOB at 30-45 degrees  Taken 9/26/2024 1200 by Maria Alejandra Burgess RN  Activity Management: activity adjusted per tolerance  Head of Bed (HOB) Positioning: HOB at 30 degrees  Taken 9/26/2024 1000 by Maria Alejandra Burgess RN  Head of Bed (HOB) Positioning: HOB at 30 degrees  Taken 9/26/2024 0800 by Maria Alejandra Burgess RN  Activity Management: bedrest  Head of Bed (HOB) Positioning: HOB at 30 degrees     Problem: Stroke, Ischemic (Includes Transient Ischemic Attack)  Goal: Optimal Cerebral Tissue Perfusion  Intervention: Protect and Optimize Cerebral Perfusion  Recent Flowsheet Documentation  Taken 9/26/2024 1200 by Maria Alejandra Burgess RN  Sensory Stimulation Regulation:   lighting decreased   quiet environment promoted  Taken 9/26/2024 1000 by Maria Alejandra Burgess RN  Sensory Stimulation Regulation:   lighting decreased   quiet environment promoted  Taken 9/26/2024 0900 by Maria Alejandra Burgess RN  Sensory Stimulation Regulation:   lighting decreased   quiet environment promoted  Taken 9/26/2024 0800 by Maria Alejandra Burgess RN  Sensory Stimulation Regulation:   lighting decreased   quiet environment promoted  Taken 9/26/2024 0715 by Maria Alejandra Burgess RN  Sensory Stimulation Regulation:   lighting decreased   quiet environment promoted  Goal: Optimal Cognitive Function  Intervention: Optimize Cognitive Function  Recent Flowsheet Documentation  Taken 9/26/2024 1200 by Maria Alejandra Burgess RN  Sensory Stimulation Regulation:   lighting decreased   quiet environment promoted  Reorientation Measures:   calendar in view   clock in view   glasses use encouraged   reorientation provided  Taken 9/26/2024 1000 by Maria Alejandra Burgess RN  Sensory Stimulation Regulation:   lighting decreased   quiet environment promoted  Reorientation Measures:   calendar in view   clock in view   glasses use encouraged   reorientation  provided  Taken 9/26/2024 0900 by Maria Alejandra Burgess RN  Sensory Stimulation Regulation:   lighting decreased   quiet environment promoted  Reorientation Measures:   calendar in view   clock in view   glasses use encouraged   reorientation provided  Taken 9/26/2024 0800 by Maria Alejandra Burgess RN  Sensory Stimulation Regulation:   lighting decreased   quiet environment promoted  Reorientation Measures:   calendar in view   clock in view   glasses use encouraged   reorientation provided  Taken 9/26/2024 0715 by Maria Alejandra Burgess RN  Sensory Stimulation Regulation:   lighting decreased   quiet environment promoted  Reorientation Measures:   calendar in view   clock in view   glasses use encouraged   reorientation provided  Goal: Improved Communication Skills  Intervention: Optimize Communication Skills  Recent Flowsheet Documentation  Taken 9/26/2024 1200 by Maria Alejandra Burgess RN  Communication Enhancement Strategies:   extra time allowed for response   family involved in communication plan  Taken 9/26/2024 1000 by Maria Alejandra Burgess RN  Communication Enhancement Strategies:   extra time allowed for response   family involved in communication plan  Taken 9/26/2024 0900 by Maria Alejandra Burgess RN  Communication Enhancement Strategies:   extra time allowed for response   family involved in communication plan  Taken 9/26/2024 0800 by Maria Alejandra Burgess RN  Communication Enhancement Strategies:   extra time allowed for response   family involved in communication plan  Taken 9/26/2024 0715 by Maria Alejandra Burgess RN  Communication Enhancement Strategies:   extra time allowed for response   family involved in communication plan  Goal: Optimal Functional Ability  Intervention: Optimize Functional Ability  Recent Flowsheet Documentation  Taken 9/26/2024 1200 by Maria Alejandra Burgess RN  Activity Management: activity adjusted per tolerance  Taken 9/26/2024 0800 by Maria Alejandra Burgess RN  Activity Management: bedrest  Goal: Effective Oxygenation and  Ventilation  Intervention: Optimize Oxygenation and Ventilation  Recent Flowsheet Documentation  Taken 9/26/2024 1400 by Maria Alejandra Burgess RN  Head of Bed (HOB) Positioning: HOB at 30-45 degrees  Taken 9/26/2024 1200 by Maria Alejandra Burgess RN  Head of Bed (HOB) Positioning: HOB at 30 degrees  Taken 9/26/2024 1000 by Maria Alejandra Burgess RN  Head of Bed (HOB) Positioning: HOB at 30 degrees  Taken 9/26/2024 0800 by Maria Alejandra Burgess RN  Head of Bed (HOB) Positioning: HOB at 30 degrees  Goal: Improved Sensorimotor Function  Intervention: Optimize Range of Motion, Motor Control and Function  Recent Flowsheet Documentation  Taken 9/26/2024 1400 by Maria Alejandra Burgess RN  Positioning/Transfer Devices: pillows  Taken 9/26/2024 1200 by Maria Alejandra Burgess RN  Positioning/Transfer Devices: pillows  Taken 9/26/2024 1000 by Maria Alejandra Burgess RN  Positioning/Transfer Devices: pillows  Taken 9/26/2024 0800 by Maria Alejandra Burgess RN  Positioning/Transfer Devices: pillows   Goal Outcome Evaluation:      Plan of Care Reviewed With: patient    Overall Patient Progress: improvingOverall Patient Progress: improving    Outcome Evaluation: pt alert and oriented to self-pt forgetful in general and on city and date but knows she is in the hospital. no other neuro changes during assessments-neuros changed to every 2 hrs. pt seen by speech and having a swallow study. pt to be seen by pt/ot and to stay in ICU overnight and will transfer tomorrow if stable. pt daughter updated at bedside. pt nsr. pt on ra with clear lung sounds. pt able to void via purewick. no bm today.

## 2024-09-26 NOTE — CONSULTS
"[facility]    Stroke Consult Note    Reason for Consult:  Hemorrhagic stroke    Chief Complaint: No chief complaint on file.       HPI  Chika Hurd is a 79 year old female with a past medical history of hypertension on 2 antihypertensive at home the patient presented with acute onset of slurred speech and feeling of being off balance.  The patient presented to the outside hospital, stroke code was activated and CT head without contrast revealed left basal ganglia acute hemorrhage so she was transferred to our hospital for further workup.  MRI of the brain with and without contrast was obtained overnight on September 26, 2024 which showed stable bleeding and did not show any underlying pathology such as AVN observation or tumor to explain the acute hemorrhage.  The pattern of hemorrhage with a history of hypertension is most consistent with hypertensive intracranial hemorrhage.  She spent in the ICU overnight with stable exam.    CT head: Left basal ganglia intracranial hemorrhage  MRI of the brain with and without contrast: Stable left basal ganglia hemorrhage, no underlying pathology seen    Impression  Left basal ganglia hemorrhage likely secondary to hypertensive    Recommendations   -Ok to restart home antihypertensive medication  -Delirium precautions  -Goal of systolic blood pressure between 130-150  -Continue neurocheck every 2 hours as of today  -Avoid antithrombotics antiplatelets, anticoagulation  -PT OT and ST  -Elevate head of bed to 30 degree  -Regarding PTA Plavix, okay to restart in 2 weeks    Patient Follow-up    - final recommendation pending work-up    Thank you for this consult. We will continue to follow.     The Stroke Staff is Dr. Patterson.    Kade Adams MD  Vascular Neurology Fellow    To page me or covering stroke neurology team member, click here: AMCOM  Choose \"On Call\" tab at top, then select \"NEUROLOGY/ALL SITES\" from middle drop-down box, press Enter, " "then look for \"stroke\" or \"telestroke\" for your site.  _____________________________________________________    Clinically Significant Risk Factors Present on Admission                # Drug Induced Platelet Defect: home medication list includes an antiplatelet medication   # Hypertension: Noted on problem list         # Obesity: Estimated body mass index is 34.18 kg/m  as calculated from the following:    Height as of this encounter: 1.524 m (5').    Weight as of an earlier encounter on 9/25/24: 79.4 kg (175 lb).       # Financial/Environmental Concerns:              Past Medical History    Past Medical History:   Diagnosis Date    Breast cancer (H)     Central retinal artery occlusion, right     DCIS (ductal carcinoma in situ) of breast     GERD (gastroesophageal reflux disease)     Hyperlipidemia      Medications   Home Meds  Prior to Admission medications    Medication Sig Start Date End Date Taking? Authorizing Provider   acetaminophen (TYLENOL) 500 MG tablet Take 2 tablets by mouth 2 times daily as needed for mild pain    Reported, Patient   Ascorbic Acid (VITAMIN C PO) Take 500 mg by mouth daily    Reported, Patient   atorvastatin (LIPITOR) 40 MG tablet Take 1 tablet (40 mg) by mouth daily 4/26/24   Audrey Roy MD   clopidogrel (PLAVIX) 75 MG tablet Take 1 tablet (75 mg) by mouth daily 4/26/24   Audrey Roy MD   coenzyme Q-10 200 MG CAPS capsule Take 1 capsule by mouth at bedtime    Reported, Patient   famotidine (PEPCID) 20 MG tablet TAKE 1 TABLET (20 MG) BY MOUTH 2 TIMES DAILY 3/20/19   Audrey Roy MD   Glycerin-Polysorbate 80 (REFRESH DRY EYE THERAPY OP) Place 1 drop into both eyes as needed    Reported, Patient   hydrochlorothiazide (HYDRODIURIL) 25 MG tablet Take 1 tablet (25 mg) by mouth daily 4/26/24   Audrey Roy MD   lisinopril (ZESTRIL) 40 MG tablet Take 1 tablet (40 mg) by mouth daily 4/26/24   Audrey Roy MD   MULTIVITAMIN OR 1 daily    " Reported, Patient   Omega-3 Fatty Acids (OMEGA-3 FISH OIL PO) Take 1 g by mouth daily     Reported, Patient       Scheduled Meds  Current Facility-Administered Medications   Medication Dose Route Frequency Provider Last Rate Last Admin    pantoprazole (PROTONIX) IV push injection 40 mg  40 mg Intravenous Daily Phi Ritter MD   40 mg at 09/25/24 2257       Infusion Meds  Current Facility-Administered Medications   Medication Dose Route Frequency Provider Last Rate Last Admin    Medication Instructions: No D5W IV solutions unless patient hypoglycemic.   Does not apply Continuous PRN Phi Ritter MD        niCARdipine 40 mg in 200 mL NS (CARDENE) infusion  0.5-15 mg/hr Intravenous Continuous Phi Ritter MD   Stopped at 09/26/24 0630    sodium chloride 0.9 % infusion   Intravenous Continuous Phi Ritter MD 75 mL/hr at 09/25/24 2057 New Bag at 09/25/24 2057       Allergies   Allergies   Allergen Reactions    Cortisone Swelling     Cortisone Cream          PHYSICAL EXAMINATION   Temp:  [98  F (36.7  C)-98.5  F (36.9  C)] 98.1  F (36.7  C)  Pulse:  [59-80] 71  Resp:  [10-20] 17  BP: (103-183)/(52-92) 104/63  SpO2:  [91 %-100 %] 95 %    The patient is somnolent but easily arousable to voice command, following command, mild dysarthria noted, minor paralysis noted on the right side, mild right arm weakness noted, left arm full strength, drift of both legs noted symmetrically, no sensory deficit, no dysmetria.    Stroke Scales    NIHSS  1a. Level of Consciousness 1-->Not alert, but arousable by minor stimulation to obey, answer, or respond   1b. LOC Questions 0-->Answers both questions correctly   1c. LOC Commands 0-->Performs both tasks correctly   2.   Best Gaze 0-->Normal   3.   Visual 0-->No visual loss   4.   Facial Palsy 1-->Minor paralysis (flattened nasolabial fold, asymmetry on smiling)   5a. Motor Arm, Left 0-->No drift, limb holds 90 (or 45) degrees for full 10 secs   5b. Motor  "Arm, Right 1-->Drift, limb holds 90 (or 45) degrees, but drifts down before full 10 secs, does not hit bed or other support   6a. Motor Leg, Left 1-->Drift, leg falls by the end of the 5-sec period but does not hit bed   6b. Motor Leg, right 1-->Drift, leg falls by the end of the 5-sec period but does not hit bed   7.   Limb Ataxia 0-->Absent   8.   Sensory 0-->Normal, no sensory loss   9.   Best Language 0-->No aphasia, normal   10. Dysarthria 1-->Mild-to-moderate dysarthria, patient slurs at least some words and, at worst, can be understood with some difficulty   11. Extinction and Inattention  0-->No abnormality   Total 6 (09/26/24 0934)       Imaging  I personally reviewed all imaging; relevant findings per HPI.    Labs Data   CBC  Recent Labs   Lab 09/26/24  0446 09/25/24  1336   WBC 11.5* 8.2   RBC 4.44 4.64   HGB 13.6 13.8   HCT 40.6 42.3    309     Basic Metabolic Panel   Recent Labs   Lab 09/26/24  0446 09/25/24  1336    137   POTASSIUM 4.2 4.2   CHLORIDE 100 100   CO2 26 29   BUN 13.2 15.9   CR 0.64 0.73   GLC 92 127*   KAR 9.3 9.8     Liver Panel  No results for input(s): \"PROTTOTAL\", \"ALBUMIN\", \"BILITOTAL\", \"ALKPHOS\", \"AST\", \"ALT\", \"BILIDIRECT\" in the last 168 hours.  INR    Recent Labs   Lab Test 09/25/24  1336 10/12/23  1239 07/28/16  1409   INR 1.04 0.98 0.94             "

## 2024-09-26 NOTE — PHARMACY-ADMISSION MEDICATION HISTORY
Pharmacist Admission Medication History    Admission medication history is complete. The information provided in this note is only as accurate as the sources available at the time of the update.    Information Source(s): Patient and CareEverywhere/SureScripts via in-person    Pertinent Information: none    Changes made to PTA medication list:  Added: ASA 81mg daily  Deleted: None  Changed: None    Medication History Completed By: Luz Marina Smith [facility] 9/26/2024 11:07 AM    PTA Med List   Medication Sig Last Dose    acetaminophen (TYLENOL) 500 MG tablet Take 2 tablets by mouth 2 times daily as needed for mild pain     Ascorbic Acid (VITAMIN C PO) Take 500 mg by mouth daily     aspirin 81 MG EC tablet Take 81 mg by mouth daily.     atorvastatin (LIPITOR) 40 MG tablet Take 1 tablet (40 mg) by mouth daily     clopidogrel (PLAVIX) 75 MG tablet Take 1 tablet (75 mg) by mouth daily     coenzyme Q-10 200 MG CAPS capsule Take 1 capsule by mouth at bedtime     famotidine (PEPCID) 20 MG tablet TAKE 1 TABLET (20 MG) BY MOUTH 2 TIMES DAILY     Glycerin-Polysorbate 80 (REFRESH DRY EYE THERAPY OP) Place 1 drop into both eyes as needed     hydrochlorothiazide (HYDRODIURIL) 25 MG tablet Take 1 tablet (25 mg) by mouth daily     lisinopril (ZESTRIL) 40 MG tablet Take 1 tablet (40 mg) by mouth daily     MULTIVITAMIN OR 1 daily     Omega-3 Fatty Acids (OMEGA-3 FISH OIL PO) Take 1 g by mouth daily

## 2024-09-26 NOTE — PROGRESS NOTES
"CLINICAL SWALLOW EVALUATION     09/26/24 1032   Appointment Info   Signing Clinician's Name / Credentials (SLP) [facility]   General Information   Onset of Illness/Injury or Date of Surgery 09/25/24   Referring Physician Phi Ritter MD   Patient/Family Therapy Goal Statement (SLP) Patient would like to eat/drink.   Pertinent History of Current Problem Per provider note: \"Chika Hurd is a 79 year old female with PMH significant for hypertension, dyslipidemia, GERD, h/o left breast cancer, h/o ischemic CVA (10/2023) who presented to [facility] ED with unsteady gait, noted with hemorrhagic stroke and transferred to [facility] ICU, admitted inpatient 9/25/24.\" Brain MRI today: \" there is a 2.9 x 1.3 x 1.6 cm area of acute intraparenchymal hemorrhage centered in the left lentiform nucleus and extending into the corona radiata. No significant associated mass effect. No obvious underlying   enhancement although signal characteristics of the hemorrhage could obscure an underlying lesion and follow-up following resolution of the hemorrhage is advised.\"   General Observations Patient awoke easily from napping. Patient reported speech sounds different from baseline but not able to describe further. Note some word-finding difficulty during interview. Patient has residual right facial droop from prior stroke October 2023 per RN.   Type of Evaluation   Type of Evaluation Swallow Evaluation   Oral Motor   Oral Musculature anomalies present   Structural Abnormalities none present   Mucosal Quality adequate   Dentition (Oral Motor)   Dentition (Oral Motor) natural dentition;adequate dentition   Facial Symmetry (Oral Motor)   Facial Symmetry (Oral Motor) right side impairment   Right Side Facial Asymmetry minimal impairment   Lip Function (Oral Motor)   Lip Range of Motion (Oral Motor) protrusion impairment;retraction impairment   Lip Strength (Oral Motor) bilateral;mild impairment "   Protrusion, Lip Range of Motion right side;minimal impairment;moderate impairment   Retraction, Lip Range of Motion right side;moderate impairment   Tongue Function (Oral Motor)   Tongue Strength (Oral Motor) strength decreased;bilateral;moderate impairment   Tongue Coordination/Speed (Oral Motor) reduced rate   Tongue ROM (Oral Motor) protrusion is impaired   Protrusion, Tongue ROM Impairment (Oral Motor) bilateral;minimal impairment;moderate impairment   Jaw Function (Oral Motor)   Jaw Function (Oral Motor) range of motion impairment   Jaw Range of Motion Impairment bilateral;minimal impairment   Cough/Swallow/Gag Reflex (Oral Motor)   Soft Palate/Velum (Oral Motor) unable/difficult to assess   Volitional Throat Clear/Cough (Oral Motor) reduced strength   Vocal Quality/Secretion Management (Oral Motor)   Vocal Quality (Oral Motor) breathy;hypophonic   Secretion Management (Oral Motor) WNL   General Swallowing Observations   Past History of Dysphagia Clinical swallow evaluation 10/13/2023 with recommendation for regular diet and thin liquids.   Comment, General Swallowing Observations Patient positioned upright in bed with assist of 2.   Respiratory Support room air   Current Diet/Method of Nutritional Intake (General Swallowing Observations, NIS) NPO   Swallowing Evaluation Clinical swallow evaluation   Clinical Swallow Evaluation   Feeding Assistance frequent cues/help required   Clinical Swallow Evaluation Textures Trialed thin liquids;mildly thick liquids;pureed;soft & bite-sized   Clinical Swallow Eval: Thin Liquid Texture Trial   Mode of Presentation, Thin Liquids spoon;cup;straw;fed by clinician;self-fed  (hand over hand assist with cup)   Volume of Liquid or Food Presented 3 ice chips, 2-3 oz thin water   Oral Phase of Swallow delayed AP movement;premature pharyngeal entry  (suspected)   Pharyngeal Phase of Swallow throat clearing;repeated swallows  (mild throat clear x1)   Diagnostic Statement Swallow  incoordination and difficulty controlling sip size at times.   Clinical Swallow Eval: Mildly Thick Liquids   Mode of Presentation spoon;straw;fed by clinician;self-fed  (hand over hand assist with cup)   Volume Presented 3 oz   Oral Phase delayed AP movement   Pharyngeal Phase   (no overt aspiration signs)   Clinical Swallow Evaluation: Puree Solid Texture Trial   Mode of Presentation, Puree spoon;self-fed;fed by clinician  (hand over hand assist)   Volume of Puree Presented 3 bites applesauce   Oral Phase, Puree delayed AP movement  (mild)   Pharyngeal Phase, Puree   (no overt aspiration signs)   Clinical Swallow Eval: Soft & Bite Sized   Mode of Presentation fed by clinician   Volume Presented 2 bites   Oral Phase impaired mastication;residue in oral cavity   Oral Residue mid posterior tongue  (minimal)   Pharyngeal Phase   (no overt aspiration signs)   Diagnostic Statement Prolonged, incoordinated mastication with potential for fatigue.   Esophageal Phase of Swallow   Esophageal comments Hx of GERD.   Swallowing Recommendations   Diet Consistency Recommendations full liquid diet;mildly thick liquids (level 2)   Supervision Level for Intake 1:1 supervision needed   Mode of Delivery Recommendations bolus size, small;slow rate of intake   Swallowing Maneuver Recommendations alternate food and liquid intake   Monitoring/Assistance Required (Eating/Swallowing) stop eating activities when fatigue is present;monitor for cough or change in vocal quality with intake   Recommended Feeding/Eating Techniques (Swallow Eval) maintain upright posture during/after eating for 30 minutes;minimize distractions during oral intake;provide assist with feeding;set-up and prepare tray   Medication Administration Recommendations, Swallowing (SLP) Crush if possible or one at a time with applesauce   Instrumental Assessment Recommendations VFSS (videofluoroscopic swallowing study)   General Therapy Interventions   Planned Therapy  Interventions Dysphagia Treatment   Dysphagia treatment Oropharyngeal exercise training;Modified diet education;Instruction of safe swallow strategies   Clinical Impression   Criteria for Skilled Therapeutic Interventions Met (SLP Eval) Yes, treatment indicated   SLP Diagnosis Dysphagia   Risks & Benefits of therapy have been explained evaluation/treatment results reviewed;care plan/treatment goals reviewed;risks/benefits reviewed;current/potential barriers reviewed;participants voiced agreement with care plan;participants included;patient   Clinical Impression Comments Mild-moderate oropharyngeal dysphagia based on clinical swallow evaluation in setting of left basal ganglia hemorrhage. Oral mech exam remarkable for right sided weakness and reduced ROM (also has residual right sided deficit from prior stroke), breathy, hypophonic vocal quality, and reduced cough/throat clear strength. Patient needed assistance with positioning and feeding due to weakness. Patient assessed wtih thin liquid, mildly thick liquid, puree and soft/bite size texture. Note mild throat clearing (x1) and increased swallow incoordination and delay with thin liquid trials. No overt aspiraiton signs occurred with mildly thick liquid, puree and soft solid texture. Note prolonged mastication of soft solid texture and minimal oral residue cleared with subsequent liquid swallows. Recommend video swallow study to further assess oropharyngeal swallow function. Recommend cautiously initiate full liquid diet with mildly thick liquids (IDDSI 2) given direct supervision/assist and swallow strategies (fully upright position, small single sips/bites, slow pace, alternate liquids/solids). Please serve pills crushed if possible or one at a time with puree. Monitor for signs/symptoms of aspiration and hold PO if occur.   SLP Total Evaluation Time   Eval: oral/pharyngeal swallow function, clinical swallow Minutes (21510) 12   SLP Goals   Therapy Frequency (SLP  Eval) daily   SLP Predicted Duration/Target Date for Goal Attainment 10/31/24   SLP Goals Swallow           Interventions   Interventions Quick Adds Swallowing Dysfunction   Swallowing Dysfunction &/or Oral Function for Feeding               SLP Discharge Planning       SLP Discharge Recommendation Acute Rehab Center-Motivated patient will benefit from intensive, interdisciplinary therapy.  Anticipate will be able to tolerate 3 hours of therapy per day;Transitional Care Facility   SLP Rationale for DC Rec Below baseline for swallow function   SLP Brief overview of current status  Recommend video swallow study to further assess oropharyngeal swallow function. Recommend cautiously initiate full liquid diet with mildly thick liquids (IDDSI 2) given direct supervision/assist and swallow strategies (fully upright position, small single sips/bites, slow pace, alternate liquids/solids). Please serve pills crushed if possible or one at a time with puree. Monitor for signs/symptoms of aspiration and hold PO if occur.   Total Session Time   Total Session Time (sum of timed and untimed services) 20

## 2024-09-26 NOTE — PLAN OF CARE
Goal Outcome Evaluation:      Plan of Care Reviewed With: patient, child          Outcome Evaluation: discharge pending plan of care and therapy recommendations

## 2024-09-27 ENCOUNTER — APPOINTMENT (OUTPATIENT)
Dept: OCCUPATIONAL THERAPY | Facility: CLINIC | Age: 79
DRG: 066 | End: 2024-09-27
Attending: INTERNAL MEDICINE
Payer: MEDICARE

## 2024-09-27 ENCOUNTER — APPOINTMENT (OUTPATIENT)
Dept: SPEECH THERAPY | Facility: CLINIC | Age: 79
DRG: 066 | End: 2024-09-27
Attending: INTERNAL MEDICINE
Payer: MEDICARE

## 2024-09-27 ENCOUNTER — APPOINTMENT (OUTPATIENT)
Dept: CT IMAGING | Facility: CLINIC | Age: 79
DRG: 066 | End: 2024-09-27
Attending: STUDENT IN AN ORGANIZED HEALTH CARE EDUCATION/TRAINING PROGRAM
Payer: MEDICARE

## 2024-09-27 ENCOUNTER — APPOINTMENT (OUTPATIENT)
Dept: CARDIOLOGY | Facility: CLINIC | Age: 79
DRG: 066 | End: 2024-09-27
Attending: STUDENT IN AN ORGANIZED HEALTH CARE EDUCATION/TRAINING PROGRAM
Payer: MEDICARE

## 2024-09-27 ENCOUNTER — APPOINTMENT (OUTPATIENT)
Dept: PHYSICAL THERAPY | Facility: CLINIC | Age: 79
DRG: 066 | End: 2024-09-27
Attending: INTERNAL MEDICINE
Payer: MEDICARE

## 2024-09-27 LAB
CHOLEST SERPL-MCNC: 104 MG/DL
EST. AVERAGE GLUCOSE BLD GHB EST-MCNC: 126 MG/DL
GLUCOSE BLDC GLUCOMTR-MCNC: 105 MG/DL (ref 70–99)
GLUCOSE BLDC GLUCOMTR-MCNC: 124 MG/DL (ref 70–99)
GLUCOSE BLDC GLUCOMTR-MCNC: 91 MG/DL (ref 70–99)
HBA1C MFR BLD: 6 %
HDLC SERPL-MCNC: 34 MG/DL
LDLC SERPL CALC-MCNC: 55 MG/DL
LVEF ECHO: NORMAL
NONHDLC SERPL-MCNC: 70 MG/DL
TRIGL SERPL-MCNC: 74 MG/DL

## 2024-09-27 PROCEDURE — 999N000208 ECHOCARDIOGRAM COMPLETE

## 2024-09-27 PROCEDURE — 120N000001 HC R&B MED SURG/OB

## 2024-09-27 PROCEDURE — G1010 CDSM STANSON: HCPCS

## 2024-09-27 PROCEDURE — 99232 SBSQ HOSP IP/OBS MODERATE 35: CPT | Performed by: HOSPITALIST

## 2024-09-27 PROCEDURE — 250N000013 HC RX MED GY IP 250 OP 250 PS 637: Performed by: HOSPITALIST

## 2024-09-27 PROCEDURE — 97530 THERAPEUTIC ACTIVITIES: CPT | Mod: GO

## 2024-09-27 PROCEDURE — 97535 SELF CARE MNGMENT TRAINING: CPT | Mod: GO

## 2024-09-27 PROCEDURE — 255N000002 HC RX 255 OP 636: Performed by: STUDENT IN AN ORGANIZED HEALTH CARE EDUCATION/TRAINING PROGRAM

## 2024-09-27 PROCEDURE — 250N000009 HC RX 250: Performed by: HOSPITALIST

## 2024-09-27 PROCEDURE — 93306 TTE W/DOPPLER COMPLETE: CPT | Mod: 26 | Performed by: INTERNAL MEDICINE

## 2024-09-27 PROCEDURE — 99232 SBSQ HOSP IP/OBS MODERATE 35: CPT | Performed by: STUDENT IN AN ORGANIZED HEALTH CARE EDUCATION/TRAINING PROGRAM

## 2024-09-27 PROCEDURE — 83036 HEMOGLOBIN GLYCOSYLATED A1C: CPT | Performed by: STUDENT IN AN ORGANIZED HEALTH CARE EDUCATION/TRAINING PROGRAM

## 2024-09-27 PROCEDURE — 97116 GAIT TRAINING THERAPY: CPT | Mod: GP

## 2024-09-27 PROCEDURE — 97530 THERAPEUTIC ACTIVITIES: CPT | Mod: GP

## 2024-09-27 PROCEDURE — 92526 ORAL FUNCTION THERAPY: CPT | Mod: GN

## 2024-09-27 PROCEDURE — 36415 COLL VENOUS BLD VENIPUNCTURE: CPT | Performed by: STUDENT IN AN ORGANIZED HEALTH CARE EDUCATION/TRAINING PROGRAM

## 2024-09-27 PROCEDURE — 250N000011 HC RX IP 250 OP 636: Mod: JZ | Performed by: STUDENT IN AN ORGANIZED HEALTH CARE EDUCATION/TRAINING PROGRAM

## 2024-09-27 RX ADMIN — PANTOPRAZOLE SODIUM 40 MG: 40 INJECTION, POWDER, FOR SOLUTION INTRAVENOUS at 10:27

## 2024-09-27 RX ADMIN — HUMAN ALBUMIN MICROSPHERES AND PERFLUTREN 9 ML: 10; .22 INJECTION, SOLUTION INTRAVENOUS at 13:22

## 2024-09-27 RX ADMIN — LABETALOL HYDROCHLORIDE 20 MG: 5 INJECTION INTRAVENOUS at 03:18

## 2024-09-27 RX ADMIN — ATORVASTATIN CALCIUM 40 MG: 40 TABLET, FILM COATED ORAL at 10:35

## 2024-09-27 ASSESSMENT — ACTIVITIES OF DAILY LIVING (ADL)
ADLS_ACUITY_SCORE: 42
ADLS_ACUITY_SCORE: 34
ADLS_ACUITY_SCORE: 42
ADLS_ACUITY_SCORE: 34
ADLS_ACUITY_SCORE: 34
ADLS_ACUITY_SCORE: 42
ADLS_ACUITY_SCORE: 42
ADLS_ACUITY_SCORE: 38
ADLS_ACUITY_SCORE: 38
ADLS_ACUITY_SCORE: 34
ADLS_ACUITY_SCORE: 38
ADLS_ACUITY_SCORE: 34
ADLS_ACUITY_SCORE: 38
ADLS_ACUITY_SCORE: 34
ADLS_ACUITY_SCORE: 34
ADLS_ACUITY_SCORE: 38
ADLS_ACUITY_SCORE: 38
ADLS_ACUITY_SCORE: 34
ADLS_ACUITY_SCORE: 42

## 2024-09-27 NOTE — PROVIDER NOTIFICATION
"MD Notification    Notified Person: MD    Notified Person Name: Fabiana Adams    Notification Date/Time: 9/27/24 at 1016    Notification Interaction: chemo    Purpose of Notification: \"patietn just had spontaneous emesis episode. no nausea reported before, during or after. please advise \" A&Ox3, disoriented to situation    Orders Received: acknowledged, no new orders    Comments: fyi sent to hospitalist as well    "

## 2024-09-27 NOTE — PROGRESS NOTES
Care Management Follow Up    Length of Stay (days): 2    Expected Discharge Date: 09/29/2024     Concerns to be Addressed: discharge planning     Patient plan of care discussed at interdisciplinary rounds: Yes    Anticipated Discharge Disposition: Home, Home Care, Transitional Care, Acute Rehab              Anticipated Discharge Services: Other (see comment) (pending final recommendations)  Anticipated Discharge DME: Other (see comment) (pending discharge recommendations)    Patient/family educated on Medicare website which has current facility and service quality ratings:    Education Provided on the Discharge Plan: Yes  Patient/Family in Agreement with the Plan: unable to assess    Referrals Placed by CM/SW:    Private pay costs discussed: Not applicable    Discussed  Partnership in Safe Discharge Planning  document with patient/family: No     Handoff Completed: No, handoff not indicated or clinically appropriate    Additional Information:  Patient has been recommended for Acute Rehab. Writer sent over referral.    Next Steps: ARU acceptance, medically readiness    JUNAID CRENSHAW  [facility]  INPATIENT CARE COORDINATION

## 2024-09-27 NOTE — PROVIDER NOTIFICATION
"MD Notification    Notified Person: MD    Notified Person Name: Dr. Mejias    Notification Date/Time: 9/27/24 at 0727    Notification Interaction: chemo     Purpose of Notification: \"Patient currently has active order for strict BR. okay to change to activity as tolerated? Thanks! \"    Orders Received: ordered    Comments:    "

## 2024-09-27 NOTE — PROGRESS NOTES
[facility]    Hospitalist Progress Note    Interval History   Patient awake and alert.  Had an episode of emesis this morning.  Normotensive.  Was transferred out of ICU yesterday.  Denies any headache or worsening neurologic symptoms.  Continues to have expressive aphasia and difficulty with her balance.    -Data reviewed today: I reviewed all new labs and imaging results over the last 24 hours. I personally reviewed the brain MRI image(s) showing intraparenchymal hemorrhage .    Physical Exam   Temp: 98.5  F (36.9  C) Temp src: Oral BP: 117/60 Pulse: 92   Resp: 22 SpO2: 94 % O2 Device: None (Room air)    Vitals:    09/27/24 0638 09/27/24 1212   Weight: 88 kg (194 lb 0.1 oz) 81.6 kg (179 lb 14.3 oz)     Vital Signs with Ranges  Temp:  [98  F (36.7  C)-99.7  F (37.6  C)] 98.5  F (36.9  C)  Pulse:  [66-93] 92  Resp:  [15-23] 22  BP: (102-173)/() 117/60  SpO2:  [90 %-97 %] 94 %  I/O last 3 completed shifts:  In: 1218.75 [P.O.:350; I.V.:868.75]  Out: 800 [Urine:800]    Physical Exam  Constitutional:       Appearance: She is obese.   Cardiovascular:      Rate and Rhythm: Normal rate and regular rhythm.      Pulses: Normal pulses.      Heart sounds: Normal heart sounds.   Pulmonary:      Effort: Pulmonary effort is normal. No respiratory distress.      Breath sounds: Normal breath sounds.   Abdominal:      General: Abdomen is flat. Bowel sounds are normal. There is no distension.      Tenderness: There is no abdominal tenderness. There is no guarding.   Skin:     General: Skin is warm and dry.   Neurological:      General: No focal deficit present.      Comments: Mild right facial droop.  Significant expressive aphasia.  4 x 5 strength in all the 4 extremities.  Generalized deconditioning and slowed mentation.  Finger-nose test worse on the right compared to left.  Gait not assessed but per patient report quite poor           Medications   Current Facility-Administered Medications    Medication Dose Route Frequency Provider Last Rate Last Admin    Medication Instructions: No D5W IV solutions unless patient hypoglycemic.   Does not apply Continuous PRN Phi Ritter MD        sodium chloride 0.9 % infusion   Intravenous Continuous Phi Ritter MD   Stopped at 09/26/24 1320     Current Facility-Administered Medications   Medication Dose Route Frequency Provider Last Rate Last Admin    atorvastatin (LIPITOR) tablet 40 mg  40 mg Oral Daily Akanksha Mejias MD   40 mg at 09/27/24 1035    hydrochlorothiazide (HYDRODIURIL) tablet 25 mg  25 mg Oral Daily Akanksha Mejias MD   25 mg at 09/26/24 1603    lisinopril (ZESTRIL) tablet 40 mg  40 mg Oral Daily Akanksha Mejias MD   40 mg at 09/26/24 1603    pantoprazole (PROTONIX) IV push injection 40 mg  40 mg Intravenous Daily Phi Ritter MD   40 mg at 09/27/24 1027       Data   Recent Labs   Lab 09/27/24  0508 09/27/24  0122 09/26/24  2103 09/26/24  1309 09/26/24  0446 09/25/24  1956 09/25/24  1336   WBC  --   --   --   --  11.5*  --  8.2   HGB  --   --   --   --  13.6  --  13.8   MCV  --   --   --   --  91  --  91   PLT  --   --   --   --  292  --  309   INR  --   --   --   --   --   --  1.04   NA  --   --   --   --  135  --  137   POTASSIUM  --   --   --   --  4.2  --  4.2   CHLORIDE  --   --   --   --  100  --  100   CO2  --   --   --   --  26  --  29   BUN  --   --   --   --  13.2  --  15.9   CR  --   --   --   --  0.64  --  0.73   ANIONGAP  --   --   --   --  9  --  8   KAR  --   --   --   --  9.3  --  9.8   * 91 78   < > 92   < > 127*    < > = values in this interval not displayed.       No results found for this or any previous visit (from the past 24 hour(s)).        Assessment & Plan      Chika Hurd is a 79 year old female with PMH significant for hypertension, dyslipidemia, GERD, h/o left breast cancer, h/o ischemic CVA (10/2023) who presented to [facility] ED with unsteady gait, noted with  hemorrhagic left basal ganglia stroke and transferred to [facility] ICU, admitted inpatient 9/25/24.     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)  H/o ischemic CVA (10/2023)  Hypertension  Dyslipidemia  - she has history of ischemic CVA with mild slurred speech has been on Plavix but presented with unsteady gait and dysarthria, worse than usual slurred speech  -CT head noted acute left basal ganglia intraparenchymal hematoma  -CT head noted no active extravasation; did note incidental 4 mm saccular and resume at anterior communicating artery; no large vessel occlusion or stenosis  -CT and neck noted at 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 and moderate stenosis right vertebral artery origin  -follow-up head CT after four hours noted with no significant interval change in hematoma  -EKG noted normal sinus rhythm     -Was evaluated by stroke neurology at [facility]; suggested for Q1 hour neuro- checks and vitals, goal SBP<140, head of bed elevation> 30   -Initially admitted to ICU but transferred out to the floor on 9/26/2024  -Neurosurgery consulted.  No immediate surgical needs.  Continue medical management.  -Was started on nicardipine drip to keep SBP goal<140 while holding PTA HCTZ and lisinopril  -Restart hydrochlorothiazide and lisinopril on 9/26/2024 and stop the drip.    -Goal systolic blood pressure 1 30-1 50.  Holding antihypertensives for less than .    - Neurochecks every 2 hours.  -resume Lipitor when taking NPO  -Prior to admission Plavix currently on hold.  Restart in 2 weeks.  -Troponin at 8  -PT/OT/speech consulted.  -Speech recommended video swallow study to further assess oropharyngeal function.  Currently on full liquid diet with mildly thick liquids under direct supervision.  VFSS study completed on 9/27/2024.  Read pending.  -PT OT recommending  acute rehab.  Social work consulted and referral sent.  -MRI brain showed 2.9 x 1.3 x 1.6 cm of acute intraparenchymal hemorrhage centered on left lentiform nucleus and extending into coronary radiator.  No mass effect.  No obvious underlying enhancement though underlying lesion cannot be completely ruled out.  Follow-up imaging after hemorrhage is resolved is recommended.  Review report for complete details.     GERD  - will hold off on PTA Pepcid and start IV Protonix    Clinically Significant Risk Factors                  # Hypertension: Noted on problem list           # Obesity: Estimated body mass index is 35.13 kg/m  as calculated from the following:    Height as of this encounter: 1.524 m (5').    Weight as of this encounter: 81.6 kg (179 lb 14.3 oz)., PRESENT ON ADMISSION       # Financial/Environmental Concerns: none          DVT Prophylaxis: Pneumatic Compression Devices  Code Status: Full Code  Medically Ready for Discharge: Anticipated in 2-4 Days    Greater than 35 minutes spent on documentation review, lab review, examining the patient and discussing care with bedside nurse    Akanksha Mejias MD, MD  [phone](p)

## 2024-09-27 NOTE — PROGRESS NOTES
"Vascular neurology note     ICU team reached out to me regarding this patient being stable and there is a bed shortage and a patient needing an ICU bed. The patient is vitally stable. Off nicardipine for more than 12 hours and on room air. She has also been neurologically stable.     Ok to transfer out of the ICU. Appreciate hospitalist team taking charge of the transfer orders.     Leatha Crane MD, Msc, [facility]   of Neurology  [facility]     09/26/2024 11:05 PM  To page me or covering stroke neurology team member, click here: AMCOM  Choose \"On Call\" tab at top, then search dropdown box for \"Neurology Adult\" & press Enter, look for Neuro ICU/Stroke    "

## 2024-09-27 NOTE — PLAN OF CARE
Reason for Admission: left-sided basal ganglia hematoma/hemooragci CVA    Cognitive/Mentation: A/Ox 3, disoriented to situation intermittentmly. Unable to state age appropriately; states she is 78  Neuros/CMS: Intact ex right facial droop, BLE drift, LLE weakness 3/5, generalized weakness 4/5. Slow, slurred speech continues with some mild WFD.  VS: VSS. AM antihypertensives held to try and keep BP between 130-150.    Tele: NSR.  /GI: Incontinent. Continent. Last BM yesterday, 9/26.   Pulmonary: Intermittent audible expiratory wheezing. Pt denies SOB. LS clear when using stethoscope. IS encouraged when awake.  Pain: Denies.     Drains/Lines: PIV SL  Skin: Blanchable redness to lower buttocks. Mepilex in place for prevention.   Activity: Assist x 2 with pivot transfer with GB/walker this shift. Therapy was able to get patient up with ext. Assist x1 with GB/walker; pt able to walk to room's door and back. Assist x1-2 pending patient's mentation and ability moving forward with GB/walker.  Diet: SLP eval today with new order for minced/moist with mildly thickened liquids. Takes pills crushed in applesauce. SLP recommending 1:1 assist with alternating food/drink.    Therapies recs: ARU  Discharge: pending, BALDO following    Aggression Stoplight Tool: Yellow for pleasant confusion    End of shift summary: Patient had emesis at 1013. Neuro aware. Mentation unchanged; no new orders. Daughter, Ramila, updated.

## 2024-09-27 NOTE — PROGRESS NOTES
"[facility]    Vascular Neurology Progress Note    Interval History     No overnight issues the patient remains clinically stable, was transferred from the ICU to the floor.  PTA antihypertensive was started.    Hospital Course     Chief complaint: No chief complaint on file.    The patient is 79-year-old female with past medical history of hypertension on 2 antihypertensive, hydrochlorothiazide and lisinopril presented because of acute onset of slurred speech and feeling being off balance.  Presented to the outside hospital because of above symptoms, CT head without contrast revealed left basal ganglia acute hemorrhage so she was transferred to our hospital for further workup.  MRI of the brain with and without contrast revealed stable left basal ganglia hemorrhage without any underlying mass or underlying pathology that can secondarily lead to bleeding.  The patient remained stable in the ICU so she was transferred to the floor, and home antihypertensives were started.    Assessment and Plan     1. Non-traumatic intracerebral hemorrhage of left basal ganglia likely hypertensive etiology  -Goal of systolic blood pressure between 130-150  -Avoid antithrombotics  -Elevate head of bed to 30 degree or more  -Blood pressure goal at home should be below 130/80  -Continue PT OT and ST  -Repeat CT ordered due to decreased mental status    2. Subacute stroke in left periatrial white matter  -Restart Plavix in 2 weeks  -Echocardiogram, hemoglobin A1c and lipid panel  -Continue atorvastatin 40 mg, goal of LDL between 40 and 70    DVT Prophylaxis: Pneumatic compression device    Patient Follow-up    - final recommendation pending work-up    We will continue to follow.       The Stroke Staff is Dr. Patterson.    Kade Adams MD  Vascular Neurology Fellow    To page me or covering stroke neurology team member, click here: AMCOM  Choose \"On Call\" tab at top, then select \"NEUROLOGY/ALL SITES\" from middle " "drop-down box, press Enter, then look for \"stroke\" or \"telestroke\" for your site.    Physical Examination     Temp: 99.7  F (37.6  C) Temp src: Axillary BP: 113/53 Pulse: 92   Resp: 23 SpO2: 90 % O2 Device: None (Room air)      The patient is somnolent but easily arousable, following command, oriented to herself, time, situation, age, mild dysarthria noted, minor paralysis noted on the right side, right arm weakness noted compared to the left side, left side is full strength, no sensory deficit, no dysmetria, no gaze preference, no aphasia, no hemianopia    Stroke Scales       NIHSS  1a. Level of Consciousness 1-->Not alert, but arousable by minor stimulation to obey, answer, or respond   1b. LOC Questions 1-->Answers one question correctly   1c. LOC Commands 0-->Performs both tasks correctly   2.   Best Gaze 0-->Normal   3.   Visual 0-->No visual loss   4.   Facial Palsy 0-->Normal symmetrical movements   5a. Motor Arm, Left 0-->No drift, limb holds 90 (or 45) degrees for full 10 secs   5b. Motor Arm, Right 1-->Drift, limb holds 90 (or 45) degrees, but drifts down before full 10 secs, does not hit bed or other support   6a. Motor Leg, Left 0-->No drift, leg holds 30 degree position for full 5 secs   6b. Motor Leg, right 1-->Drift, leg falls by the end of the 5-sec period but does not hit bed   7.   Limb Ataxia 0-->Absent   8.   Sensory 0-->Normal, no sensory loss   9.   Best Language 0-->No aphasia, normal   10. Dysarthria 1-->Mild-to-moderate dysarthria, patient slurs at least some words and, at worst, can be understood with some difficulty   11. Extinction and Inattention  0-->No abnormality   Total 5 (09/27/24 1102)       Imaging/Labs   (Bolded imaging and labs new and/or personally reviewed or re-reviewed by me today)    MRI/Head CT Left basal ganglia hemorrhage, left subcortical white matter near the atrium subacute stroke   Intracranial Vasculature Incidental 4 mm saccular aneurysm in the ACOM   Cervical " Vasculature No significant stenosis of carotid arteries     Echocardiogram Pending   EKG/Telemetry Sinus rhythm     LDL  No lab value available in past 30 days   A1C  No lab value available in past 90 days   Troponin 9/25/2024: 8 ng/L     Other labs reviewed by me today

## 2024-09-27 NOTE — PROGRESS NOTES
Reason for Admission: left basal ganglia acute parenchymal hematoma  Cognitive/Mentation: A/Ox 3 disoriented to situation, forgetful  Neuros/CMS: Stable w/ Baseline right sided ataxia, weakness, and droop. Right pupil slightly larger than L. Slurred speech with mild to moderate aphasia.   VS: VSS SBP<150. Prn labetalol x1   Tele: NSR.  GI/: BS clear, Continent. Purewick in place  Pulmonary: LS clear.  Pain: denies.   Drains/Lines: PIV SL  Skin: intact, band aid on the right arm PTA  Activity: not OOB Assist x 2 with GBW.  Diet: Minced and moist with slightly thick liquids. Takes pills crushed.   Therapies recs: ARU  Discharge: pending   Aggression Stoplight Tool: green  End of shift summary: transfer for ICU, IMC status,  video swallow study

## 2024-09-27 NOTE — PROGRESS NOTES
"Video Fluoroscopic Swallow Study (VFSS)     09/26/24 9362   Appointment Info   Signing Clinician's Name / Credentials (SLP) Yessica Shankar MS CCC-SLP   General Information   Onset of Illness/Injury or Date of Surgery 09/25/24   Referring Physician Phi Ritter MD   Patient/Family Therapy Goal Statement (SLP) Patient would like to eat/drink.   Pertinent History of Current Problem   Per provider note: \"Chika Hurd is a 79 year old female with PMH significant for hypertension, dyslipidemia, GERD, h/o left breast cancer, h/o ischemic CVA (10/2023) who presented to [facility] ED with unsteady gait, noted with hemorrhagic stroke and transferred to [facility] ICU, admitted inpatient 9/25/24.\"     Brain MRI today: \" there is a 2.9 x 1.3 x 1.6 cm area of acute intraparenchymal hemorrhage centered in the left lentiform nucleus and extending into the corona radiata. No significant associated mass effect. No obvious underlying   enhancement although signal characteristics of the hemorrhage could obscure an underlying lesion and follow-up following resolution of the hemorrhage is advised.\"     SLP for swallow evaluations. Clinical swallow eval this AM- recc'ing VFSS for further assessment of oropharyngeal swallow mechanism, aspiration risk in the setting on acute IPH.     General Observations   Pt alert, upright in chair for VFSS, very pleasant. Slow speech/?delayed processing but funcitonal communiation during informal conversation.     Type of Evaluation   Type of Evaluation Swallow Evaluation   General Swallowing Observations   Swallowing Evaluation Videofluoroscopic swallow study (VFSS)   VFSS Evaluation   Radiologist Dr. Silverio   Views Taken left lateral   Physical Location of Procedure [facility], radiology dept, fluorosocpy suite   VFSS Textures Trialed thin liquids;slightly thick liquids;pureed;solid foods   VFSS Eval: Thin Liquid Texture Trial   Mode of Presentation, Thin " Liquid cup;straw   Order of Presentation 3 cup, 4 cup, 5 straw, 6 straw, 9 straw   Preparatory Phase poor bolus control   Oral Phase, Thin Liquid impaired AP movement;premature pharyngeal entry   Bolus Location When Swallow Triggered pyriforms   Pharyngeal Phase, Thin Liquid impaired hyolaryngeal excursion;impaired epiglottic movement;impaired tongue base retraction   Rosenbek's Penetration Aspiration Scale: Thin Liquid Trial Results 5 - contrast contacts vocal cords, visible residue remains (penetration)   Diagnostic Statement Before/during laryngeal penetration occuring 2/2 to reduced oral control, premature bolus spillage, incomplete and delayed laryngeal closure. Penetration was largely flash, but x1 deep penetration to vocal cords with no cough or throat clear response. Risk for aspiration given delay.   VFSS Eval: Slightly Thick Liquids   Mode of Presentation spoon;cup;straw   Order of Presentation 1 tsp, 2 cup, 10 straw   Preparatory Phase poor bolus control   Oral Phase impaired AP movement;premature pharyngeal entry   Bolus Location When Swallow Triggered pyriforms   Pharyngeal Phase impaired hyolaryngel excursion;impaired epiglottic movement;impaired tongue base retraction   Rosenbek's Penetration Aspiration Scale 2 - contrast enters airway, remains above the vocal cords, no residue remains (penetration)   Diagnostic Statement x1 instance of flash penetration   VFSS Evaluation: Puree Solid Texture Trial   Mode of Presentation, Puree spoon   Order of Presentation 7   Preparatory Phase prolonged bolus preparation   Oral Phase, Puree impaired AP movement   Bolus Location When Swallow Triggered valleculae   Pharyngeal Phase, Puree impaired hyolaryngel excursion;impaired epiglottic movement;impaired tongue base retraction   Rosenbek's Penetration Aspiration Scale: Puree Food Trial Results 1 - no aspiration, contrast does not enter airway   Diagnostic Statement [facility]   VFSS Evaluation: Solid Food Texture Trial    Mode of Presentation, Solid spoon   Order of Presentation 8   Preparatory Phase prolonged bolus preparation   Oral Phase, Solid impaired AP movement   Bolus Location When Swallow Triggered valleculae   Pharyngeal Phase, Solid impaired hyolaryngel excursion;impaired epiglottic movement;impaired tongue base retraction   Rosenbek's Penetration Aspiration Scale: Solid Food Trial Results 1 - no aspiration, contrast does not enter airway   Diagnostic Statement prolonged oral phase   Esophageal Phase of Swallow   Esophageal comments mild prominence of cricopharyngeus, which did not impede bolus flow into upper esophagus   Swallowing Recommendations   Diet Consistency Recommendations minced & moist (level 5);slightly thick liquids (level 1)   Supervision Level for Intake 1:1 supervision needed  (direct supervision)   Mode of Delivery Recommendations bolus size, small;slow rate of intake   Swallowing Maneuver Recommendations alternate food and liquid intake  (every few bites/sips)   Monitoring/Assistance Required (Eating/Swallowing) stop eating activities when fatigue is present;monitor for cough or change in vocal quality with intake   Recommended Feeding/Eating Techniques (Swallow Eval) maintain upright posture during/after eating for 30 minutes;minimize distractions during oral intake;provide assist with feeding;set-up and prepare tray   Medication Administration Recommendations, Swallowing (SLP) whole, crush if difficulty/needed   General Therapy Interventions   Planned Therapy Interventions Dysphagia Treatment   Dysphagia treatment Oropharyngeal exercise training;Modified diet education;Instruction of safe swallow strategies   Clinical Impression   Criteria for Skilled Therapeutic Interventions Met (SLP Eval) Yes, treatment indicated   SLP Diagnosis mild oropharyngeal dysphagia   Risks & Benefits of therapy have been explained evaluation/treatment results reviewed;care plan/treatment goals reviewed;risks/benefits  reviewed;current/potential barriers reviewed;participants voiced agreement with care plan;participants included;patient   Clinical Impression Comments   Video fluoroscopic swallow study completed with thin liquids, slightly thick liquids, puree solids, regular solids. Pt currently presents wtih mild oropharyngeal dysphagia. Mastication and oral propulsion with solids is prolonged; complete oral clearance achieved. Some lingual pumping with solids. Reduced oral control and premature bolus spillage across consitencies (valleculae with solids, pyriform sinsues with liquids). Base of tongue retraction is mildly reduced, hyolaryngeal elevation/excursion are moderately reduced. Pharyngoesphageal segment opening during the swallow is WFL. Mild prominence of cricopharyngeus, which did not impede bolus flow into upper esophagus. Trace oropharyngeal retention during evaluation, overall insignificant.     Before/during laryngeal penetration occuring 2/2 to reduced oral control, premature bolus spillage, incomplete and delayed laryngeal closure. Penetration was largely flash, but x1 deep penetration to vocal cords with no cough or throat clear response. Risk for aspiration given delay.     Briefly eduacted pt on results/ recs following study     SLP Total Evaluation Time   Evaluation, videofluoroscopic eval of swallow function Minutes (97375) 20   SLP Discharge Planning   SLP Plan PO tolerance, ADAT, exercises   SLP Discharge Recommendation Acute Rehab Center-Motivated patient will benefit from intensive, interdisciplinary therapy.  Anticipate will be able to tolerate 3 hours of therapy per day;Transitional Care Facility   SLP Rationale for DC Rec Below baseline for swallow function   SLP Brief overview of current status  VFSS completed 9/26. Recommend minced/moist solids, slightly thick liquids (IDDSI 5, 1) when fully alert, upright, slow rate, single bites/sips at a time, liquid wash PRN. Hold if difficulty/decline.   Total  Session Time   Total Session Time (sum of timed and untimed services) 20

## 2024-09-28 ENCOUNTER — APPOINTMENT (OUTPATIENT)
Dept: OCCUPATIONAL THERAPY | Facility: CLINIC | Age: 79
DRG: 066 | End: 2024-09-28
Attending: INTERNAL MEDICINE
Payer: MEDICARE

## 2024-09-28 ENCOUNTER — APPOINTMENT (OUTPATIENT)
Dept: SPEECH THERAPY | Facility: CLINIC | Age: 79
DRG: 066 | End: 2024-09-28
Attending: INTERNAL MEDICINE
Payer: MEDICARE

## 2024-09-28 LAB — GLUCOSE BLDC GLUCOMTR-MCNC: 123 MG/DL (ref 70–99)

## 2024-09-28 PROCEDURE — 99232 SBSQ HOSP IP/OBS MODERATE 35: CPT | Performed by: STUDENT IN AN ORGANIZED HEALTH CARE EDUCATION/TRAINING PROGRAM

## 2024-09-28 PROCEDURE — 250N000013 HC RX MED GY IP 250 OP 250 PS 637: Performed by: HOSPITALIST

## 2024-09-28 PROCEDURE — 92523 SPEECH SOUND LANG COMPREHEN: CPT | Mod: GN | Performed by: SPEECH-LANGUAGE PATHOLOGIST

## 2024-09-28 PROCEDURE — 120N000001 HC R&B MED SURG/OB

## 2024-09-28 PROCEDURE — 92526 ORAL FUNCTION THERAPY: CPT | Mod: GN | Performed by: SPEECH-LANGUAGE PATHOLOGIST

## 2024-09-28 PROCEDURE — 99232 SBSQ HOSP IP/OBS MODERATE 35: CPT | Performed by: HOSPITALIST

## 2024-09-28 PROCEDURE — 97535 SELF CARE MNGMENT TRAINING: CPT | Mod: GO

## 2024-09-28 PROCEDURE — 92507 TX SP LANG VOICE COMM INDIV: CPT | Mod: GN | Performed by: SPEECH-LANGUAGE PATHOLOGIST

## 2024-09-28 RX ORDER — POLYETHYLENE GLYCOL 3350 17 G/17G
17 POWDER, FOR SOLUTION ORAL DAILY
Status: DISCONTINUED | OUTPATIENT
Start: 2024-09-28 | End: 2024-10-01 | Stop reason: HOSPADM

## 2024-09-28 RX ORDER — PANTOPRAZOLE SODIUM 40 MG/1
40 TABLET, DELAYED RELEASE ORAL
Status: DISCONTINUED | OUTPATIENT
Start: 2024-09-28 | End: 2024-10-01 | Stop reason: HOSPADM

## 2024-09-28 RX ADMIN — POLYETHYLENE GLYCOL 3350 17 G: 17 POWDER, FOR SOLUTION ORAL at 10:53

## 2024-09-28 RX ADMIN — LISINOPRIL 40 MG: 40 TABLET ORAL at 10:17

## 2024-09-28 RX ADMIN — HYDROCHLOROTHIAZIDE 25 MG: 25 TABLET ORAL at 10:17

## 2024-09-28 RX ADMIN — PANTOPRAZOLE SODIUM 40 MG: 40 TABLET, DELAYED RELEASE ORAL at 10:17

## 2024-09-28 RX ADMIN — ATORVASTATIN CALCIUM 40 MG: 40 TABLET, FILM COATED ORAL at 10:17

## 2024-09-28 ASSESSMENT — ACTIVITIES OF DAILY LIVING (ADL)
ADLS_ACUITY_SCORE: 39
ADLS_ACUITY_SCORE: 38
ADLS_ACUITY_SCORE: 39
ADLS_ACUITY_SCORE: 38
ADLS_ACUITY_SCORE: 39
ADLS_ACUITY_SCORE: 38
ADLS_ACUITY_SCORE: 38
ADLS_ACUITY_SCORE: 39
ADLS_ACUITY_SCORE: 39
ADLS_ACUITY_SCORE: 38
ADLS_ACUITY_SCORE: 39
ADLS_ACUITY_SCORE: 39
ADLS_ACUITY_SCORE: 38
ADLS_ACUITY_SCORE: 39
ADLS_ACUITY_SCORE: 38
ADLS_ACUITY_SCORE: 39

## 2024-09-28 NOTE — PROGRESS NOTES
[facility]    Vascular Neurology Progress Note    Interval History     No overnight issues, the patient is sitting comfortably in the bed, repeat CT was performed last night which showed stable finding even slightly decreased size of the bleeding.    Hospital Course     Chief complaint: No chief complaint on file.    The patient is 79-year-old female with past medical history of hypertension on 2 antihypertensive, hydrochlorothiazide and lisinopril presented because of acute onset of slurred speech and feeling being off balance.  Presented to the outside hospital because of above symptoms, CT head without contrast revealed left basal ganglia acute hemorrhage so she was transferred to our hospital for further workup.  MRI of the brain with and without contrast revealed stable left basal ganglia hemorrhage without any underlying mass or underlying pathology that can secondarily lead to bleeding.  The patient remained stable in the ICU so she was transferred to the floor, and home antihypertensives were started.  September 27, repeat CT was unremarkable.    Assessment and Plan     1. 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 Subacute stroke in left periatrial white matter, ESUS  -Restart Plavix in 1 week due to ischemic stroke  -Long-term goal of hemoglobin A1c below 7  -Continue atorvastatin 40 mg, goal of LDL between 40 and 70  -Zio patch for 14 days ordered    DVT Prophylaxis: Pneumatic compression device    Patient Follow-up    -In 6 to 8 weeks with general neurology (order placed)     No further recommendation from stroke neurology, will sign off.    The Stroke Staff is Dr. Patterson.    Kade Adams MD  Vascular Neurology Fellow    To page me  "or covering stroke neurology team member, click here: [facility]  Choose \"On Call\" tab at top, then select \"NEUROLOGY/ALL SITES\" from middle drop-down box, press Enter, then look for \"stroke\" or \"telestroke\" for your site.    Physical Examination     Temp: 99.4  F (37.4  C) Temp src: Oral BP: 136/63 Pulse: 85   Resp: 21 SpO2: 91 % O2 Device: None (Room air)      The patient is somnolent but easily arousable, following command, oriented to herself, time, situation, age, mild dysarthria noted, minor paralysis noted on the right side, right arm weakness noted compared to the left side, left side is full strength, no sensory deficit, no dysmetria, no gaze preference, no aphasia, no hemianopia    Stroke Scales       NIHSS  1a. Level of Consciousness 1-->Not alert, but arousable by minor stimulation to obey, answer, or respond   1b. LOC Questions 1-->Answers one question correctly   1c. LOC Commands 0-->Performs both tasks correctly   2.   Best Gaze 0-->Normal   3.   Visual 0-->No visual loss   4.   Facial Palsy 0-->Normal symmetrical movements   5a. Motor Arm, Left 0-->No drift, limb holds 90 (or 45) degrees for full 10 secs   5b. Motor Arm, Right 1-->Drift, limb holds 90 (or 45) degrees, but drifts down before full 10 secs, does not hit bed or other support   6a. Motor Leg, Left 0-->No drift, leg holds 30 degree position for full 5 secs   6b. Motor Leg, right 1-->Drift, leg falls by the end of the 5-sec period but does not hit bed   7.   Limb Ataxia 0-->Absent   8.   Sensory 0-->Normal, no sensory loss   9.   Best Language 0-->No aphasia, normal   10. Dysarthria 1-->Mild-to-moderate dysarthria, patient slurs at least some words and, at worst, can be understood with some difficulty   11. 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

## 2024-09-28 NOTE — PROGRESS NOTES
[facility]    Hospitalist Progress Note    Interval History   Patient is awake and alert.  Continues to have expressive aphasia.  Follows commands and answers questions appropriately but slowly with word finding difficulty.  Continues to have generalized weakness.  Mild facial droop.  Has not had bowel movement in over 4 to 5 days.  Had a repeat head CT overnight was stable    -Data reviewed today: I reviewed all new labs and imaging results over the last 24 hours. I personally reviewed the brain MRI image(s) showing intraparenchymal hemorrhage .  Head CT done yesterday shows stable intracranial bleed    Physical Exam   Temp: 99.4  F (37.4  C) Temp src: Oral BP: 136/63 Pulse: 85   Resp: 21 SpO2: 91 % O2 Device: None (Room air)    Vitals:    09/27/24 0638 09/27/24 1212   Weight: 88 kg (194 lb 0.1 oz) 81.6 kg (179 lb 14.3 oz)     Vital Signs with Ranges  Temp:  [97.6  F (36.4  C)-99.7  F (37.6  C)] 99.4  F (37.4  C)  Pulse:  [76-95] 85  Resp:  [19-22] 21  BP: (111-146)/(48-76) 136/63  SpO2:  [91 %-97 %] 91 %  I/O last 3 completed shifts:  In: -   Out: 800 [Urine:800]    Physical Exam  Constitutional:       Appearance: She is obese.   Cardiovascular:      Rate and Rhythm: Normal rate and regular rhythm.      Pulses: Normal pulses.      Heart sounds: Normal heart sounds.   Pulmonary:      Effort: Pulmonary effort is normal. No respiratory distress.      Breath sounds: Normal breath sounds.   Abdominal:      General: Abdomen is flat. Bowel sounds are normal. There is no distension.      Tenderness: There is no abdominal tenderness. There is no guarding.   Skin:     General: Skin is warm and dry.   Neurological:      General: No focal deficit present.      Comments: Mild right facial droop.  Significant expressive aphasia.  4 x 5 strength in all the 4 extremities.  Generalized deconditioning and slowed mentation.  Finger-nose test worse on the right compared to left.  Gait not assessed but per  patient report quite poor           Medications   Current Facility-Administered Medications   Medication Dose Route Frequency Provider Last Rate Last Admin    Medication Instructions: No D5W IV solutions unless patient hypoglycemic.   Does not apply Continuous PRN Phi Ritter MD        sodium chloride 0.9 % infusion   Intravenous Continuous Phi Ritter MD   Stopped at 09/26/24 1320     Current Facility-Administered Medications   Medication Dose Route Frequency Provider Last Rate Last Admin    atorvastatin (LIPITOR) tablet 40 mg  40 mg Oral Daily Akanksha Mejias MD   40 mg at 09/28/24 1017    hydrochlorothiazide (HYDRODIURIL) tablet 25 mg  25 mg Oral Daily Akanksha Mejias MD   25 mg at 09/28/24 1017    lisinopril (ZESTRIL) tablet 40 mg  40 mg Oral Daily Akanksha Mejias MD   40 mg at 09/28/24 1017    pantoprazole (PROTONIX) EC tablet 40 mg  40 mg Oral QAM AC Akanksha Mejias MD   40 mg at 09/28/24 1017    polyethylene glycol (MIRALAX) Packet 17 g  17 g Oral Daily Akanksha Mejias MD   17 g at 09/28/24 1053       Data   Recent Labs   Lab 09/27/24  1726 09/27/24  0508 09/27/24  0122 09/26/24  1309 09/26/24  0446 09/25/24  1956 09/25/24  1336   WBC  --   --   --   --  11.5*  --  8.2   HGB  --   --   --   --  13.6  --  13.8   MCV  --   --   --   --  91  --  91   PLT  --   --   --   --  292  --  309   INR  --   --   --   --   --   --  1.04   NA  --   --   --   --  135  --  137   POTASSIUM  --   --   --   --  4.2  --  4.2   CHLORIDE  --   --   --   --  100  --  100   CO2  --   --   --   --  26  --  29   BUN  --   --   --   --  13.2  --  15.9   CR  --   --   --   --  0.64  --  0.73   ANIONGAP  --   --   --   --  9  --  8   KAR  --   --   --   --  9.3  --  9.8   * 105* 91   < > 92   < > 127*    < > = values in this interval not displayed.       Recent Results (from the past 24 hour(s))   Echocardiogram Complete   Result Value    LVEF  60-65%    Narrative     [id number]  [id number]  [id number]^DAYLIN^KAVON     [facility]  Echocardiography Laboratory  [address] [place] [address]     Name: GAYLE MITCHELL  MRN: [id number]  : 1945  Study Date: 2024 01:06 PM  Age: 79 yrs  Gender: Female  Patient Location: [facility]  Reason For Study: CVA, Hypertension (HTN)  Ordering Physician: KAVON MAO  Referring Physician: Audrey Roy  Performed By: Clemencia Hook     BSA: 1.8 m2  Height: 60 in  Weight: 179 lb  HR: 70  BP: 105/92 mmHg  ______________________________________________________________________________  Procedure  Complete Portable Echo Adult. Optison (NDC #[id number]) given intravenously.  ______________________________________________________________________________  Interpretation Summary     Technically difficult imaging  A cardiac source of embolus was not identified  Sinus rhythm was noted.  There is no atrial shunt seen.  Left ventricular systolic function is normal.  The visual ejection fraction is 60-65%.  The right ventricle is normal in structure, function and size.  Doppler interrogation does not demonstrate signifcant stenosis or  insufficiency involving cardiac valves.     No signficant change since 10/13/2023  ______________________________________________________________________________  Left Ventricle  The left ventricle is normal in size. There is normal left ventricular wall  thickness. Left ventricular systolic function is normal. The visual ejection  fraction is 60-65%. Grade I or early diastolic dysfunction. No regional wall  motion abnormalities noted. There is no thrombus seen in the left ventricle.     Right Ventricle  The right ventricle is normal in structure, function and size. There is no  mass or thrombus in the right ventricle.     Atria  Normal left atrial size. Right atrial size is normal. There is no atrial shunt  seen. The left atrial appendage is not well visualized.     Mitral Valve  The  mitral valve leaflets appear normal. There is no evidence of stenosis,  fluttering, or prolapse. There is no mitral regurgitation noted. There is no  mitral valve stenosis.     Tricuspid Valve  Normal tricuspid valve. No tricuspid regurgitation. Right ventricular systolic  pressure could not be approximated due to inadequate tricuspid regurgitation.  There is no tricuspid stenosis.     Aortic Valve  There is mild trileaflet aortic sclerosis. No aortic regurgitation is present.  No aortic stenosis is present.     Pulmonic Valve  Normal pulmonic valve. There is no pulmonic valvular regurgitation. There is  no pulmonic valvular stenosis.     Vessels  The aortic root is normal size. Normal size ascending aorta. The inferior vena  cava is normal. The pulmonary artery is normal size.     Pericardium  The pericardium appears normal. There is no pleural effusion.     Rhythm  Sinus rhythm was noted.  ______________________________________________________________________________  MMode/2D Measurements & Calculations  asc Aorta Diam: 3.0 cm     LVOT diam: 2.1 cm  LVOT area: 3.5 cm2  Asc Ao diam index BSA (cm/m2): 1.7  Asc Ao diam index Ht(cm/m): 2.0     Doppler Measurements & Calculations  MV E max yomi: 83.8 cm/sec  MV A max yomi: 101.0 cm/sec  MV E/A: 0.83  MV dec time: 0.20 sec  Ao V2 max: 120.0 cm/sec  Ao max P.0 mmHg  Ao V2 mean: 83.8 cm/sec  Ao mean PG: 3.0 mmHg  Ao V2 VTI: 23.8 cm  ANDREW(I,D): 2.6 cm2  ANDREW(V,D): 2.5 cm2  LV V1 max PG: 3.0 mmHg  LV V1 max: 86.5 cm/sec  LV V1 VTI: 17.6 cm  SV(LVOT): 61.0 ml  SI(LVOT): 34.2 ml/m2  PA acc time: 0.08 sec  AV Yomi Ratio (DI): 0.72  ANDREW Index (cm2/m2): 1.4  E/E' av.1  Lateral E/e': 5.9  Medial E/e': 10.3     ______________________________________________________________________________  Report approved by: Dr. Michael Wells 2024 04:39 PM         CT Head w/o Contrast    Narrative    EXAM: CT HEAD W/O CONTRAST  LOCATION: [facility]  DATE:  9/27/2024    INDICATION: Hemorrhagic stroke.  COMPARISON: MRI 9/26/2024, 9/25/2024.  TECHNIQUE: Routine CT Head without IV contrast. Multiplanar reformats. Dose reduction techniques were used.    FINDINGS:  INTRACRANIAL CONTENTS: Stable now subacute intraparenchymal hemorrhage in the lateral left basal ganglia measuring approximately 3.4 x 1.1 cm. Small amount of surrounding edema with localized mass effect without significant change. No convincing evidence   of interval acute intracranial hemorrhage, infarct or hydrocephalus.    VISUALIZED ORBITS/SINUSES/MASTOIDS: No intraorbital abnormality. Small amount of layering fluid in the left sphenoid locule. No middle ear or mastoid effusion.    BONES/SOFT TISSUES: No acute abnormality.      Impression    IMPRESSION:  1.  No significant change in a now subacute intraparenchymal hemorrhage in the lateral aspect of the left basal ganglia. Surrounding edema and mild localized mass effect are stable.  2.  No convincing evidence of interval acute intracranial hemorrhage, infarct or hydrocephalus.           Assessment & Plan      Chika Hurd is a 79 year old female with PMH significant for hypertension, dyslipidemia, GERD, h/o left breast cancer, h/o ischemic CVA (10/2023) who presented to [facility] ED with unsteady gait, noted with hemorrhagic left basal ganglia stroke and transferred to [facility] ICU, admitted inpatient 9/25/24.     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)  H/o ischemic CVA (10/2023)  Hypertension  Dyslipidemia  - she has history of ischemic CVA with mild slurred speech has been on Plavix but presented with unsteady gait and dysarthria, worse than usual slurred speech  -CT head noted acute left basal ganglia intraparenchymal hematoma  -CT head noted no active extravasation; did note incidental 4 mm saccular and resume at anterior  communicating artery; no large vessel occlusion or stenosis  -CT and neck noted at 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 and moderate stenosis right vertebral artery origin  -follow-up head CT after four hours noted with no significant interval change in hematoma  -EKG noted normal sinus rhythm     -Was evaluated by stroke neurology at [facility]; suggested for Q1 hour neuro- checks and vitals, goal SBP<140, head of bed elevation> 30   -Initially admitted to ICU but transferred out to the floor on 9/26/2024  -Neurosurgery consulted.  No immediate surgical needs.  Continue medical management.  -Was started on nicardipine drip to keep SBP goal<140 while holding PTA HCTZ and lisinopril  -Restart hydrochlorothiazide and lisinopril on 9/26/2024 and stop the drip.    -Goal systolic blood pressure 130-150.  Holding antihypertensives for less than .    - Neurochecks every 4 hours.  -resume Lipitor when taking NPO  -Prior to admission Plavix currently on hold.  Restart in 1 week.  -Troponin at 8  -PT/OT/speech consulted.  -Speech recommended video swallow study to further assess oropharyngeal function.  Currently on full liquid diet with mildly thick liquids under direct supervision.  VFSS study completed on 9/27/2024.  Recommending minced and moist diet with slightly thick liquids.  -PT OT recommending acute rehab.  Social work consulted and referral sent.  -MRI brain showed 2.9 x 1.3 x 1.6 cm of acute intraparenchymal hemorrhage centered on left lentiform nucleus and extending into coronary radiator.  No mass effect.  No obvious underlying enhancement though underlying lesion cannot be completely ruled out.  Follow-up imaging after hemorrhage is resolved is recommended.  Review report for complete details.     GERD  -On oral Protonix.    Severe constipation  -Has not had a bowel movement in the last 4 to 5 days.  Ordered scheduled MiraLAX and senna.    Clinically Significant Risk  Factors                  # Hypertension: Noted on problem list           # Obesity: Estimated body mass index is 35.13 kg/m  as calculated from the following:    Height as of this encounter: 1.524 m (5').    Weight as of this encounter: 81.6 kg (179 lb 14.3 oz)., PRESENT ON ADMISSION       # Financial/Environmental Concerns: none          DVT Prophylaxis: Pneumatic Compression Devices  Code Status: Full Code  Medically Ready for Discharge: Anticipated in 2-4 Days     Greater than 35 minutes spent on documentation review, lab review, examining the patient and discussing care with bedside nurse.    Akanksha Mejias MD, MD  [phone](p)

## 2024-09-28 NOTE — PLAN OF CARE
Goal Outcome Evaluation:      Plan of Care Reviewed With: patient    Overall Patient Progress: improvingOverall Patient Progress: improving     Reason for Admission:  Acute hemorrhagic stroke, left basal ganglia intraparenchymal hematoma     Cognitive/Mentation: A/Ox 3 dis to time  Neuros/CMS: R>L pupil, R droop,slow slurred, speech word finding difficulties,  VS: stable.   Tele: NSR.  /GI: Continent. Last BM 9/26/24.   Pulmonary: LS wheezes expiratory .  Pain: denies.     Drains/Lines: piv patent intact and SL  Skin: bruising forearm  Activity: Assist x two with pivot.  Diet: minced and moist with mildly liquids. Takes pills whole one at a time with apple sauce.     Therapies recs: ARU  Discharge: pending    Aggression Stoplight Tool: green

## 2024-09-28 NOTE — PLAN OF CARE
A&O x 3-4, d/o to situation at times.  VSS on RA. Slight R facial droop, LLE slight drift/weakness, slow to respond at times.  Tele: SR. Up AX2, GBW, pivot to commode. Minced/moist diet, pills crushed in apple sauce, tolerating well. PIV in LUE, SL. Expiratory wheezes at times, otherwise lung sounds clear. Pt denies pain. Purewick in place over night. -BM, +BS. Continue plan of care.

## 2024-09-29 ENCOUNTER — ORDERS ONLY (AUTO-RELEASED) (OUTPATIENT)
Dept: MEDSURG UNIT | Facility: CLINIC | Age: 79
End: 2024-09-29
Payer: MEDICARE

## 2024-09-29 DIAGNOSIS — I63.9 CEREBROVASCULAR ACCIDENT (CVA), UNSPECIFIED MECHANISM (H): ICD-10-CM

## 2024-09-29 PROCEDURE — 250N000013 HC RX MED GY IP 250 OP 250 PS 637: Performed by: HOSPITALIST

## 2024-09-29 PROCEDURE — 250N000009 HC RX 250: Performed by: INTERNAL MEDICINE

## 2024-09-29 PROCEDURE — 99232 SBSQ HOSP IP/OBS MODERATE 35: CPT | Performed by: HOSPITALIST

## 2024-09-29 PROCEDURE — 120N000001 HC R&B MED SURG/OB

## 2024-09-29 RX ORDER — OFLOXACIN 3 MG/ML
1 SOLUTION/ DROPS OPHTHALMIC 4 TIMES DAILY
Status: DISCONTINUED | OUTPATIENT
Start: 2024-09-29 | End: 2024-10-01 | Stop reason: HOSPADM

## 2024-09-29 RX ADMIN — PANTOPRAZOLE SODIUM 40 MG: 40 TABLET, DELAYED RELEASE ORAL at 08:31

## 2024-09-29 RX ADMIN — HYDROCHLOROTHIAZIDE 25 MG: 25 TABLET ORAL at 08:32

## 2024-09-29 RX ADMIN — LISINOPRIL 40 MG: 40 TABLET ORAL at 08:31

## 2024-09-29 RX ADMIN — POLYETHYLENE GLYCOL 3350 17 G: 17 POWDER, FOR SOLUTION ORAL at 08:31

## 2024-09-29 RX ADMIN — ATORVASTATIN CALCIUM 40 MG: 40 TABLET, FILM COATED ORAL at 08:31

## 2024-09-29 RX ADMIN — POLYETHYLENE GLYCOL 400 AND PROPYLENE GLYCOL 1 DROP: 4; 3 SOLUTION/ DROPS OPHTHALMIC at 19:12

## 2024-09-29 RX ADMIN — OFLOXACIN 1 DROP: 3 SOLUTION/ DROPS OPHTHALMIC at 20:48

## 2024-09-29 ASSESSMENT — ACTIVITIES OF DAILY LIVING (ADL)
ADLS_ACUITY_SCORE: 39
ADLS_ACUITY_SCORE: 45
ADLS_ACUITY_SCORE: 39
ADLS_ACUITY_SCORE: 41
ADLS_ACUITY_SCORE: 39
ADLS_ACUITY_SCORE: 41

## 2024-09-29 NOTE — PLAN OF CARE
Goal Outcome Evaluation:    Reason for Admission: Acute hemorrhagic stroke, left basal ganglia intraparenchymal hematoma      Cognitive/Mentation: A/Ox 4 was off on situation- Pt does wax and wane   Neuros/CMS: Intact ex Right droop, R>L pupil,slow slurred, garbled, speech word finding difficulties at times.   VS: stable.   Tele: NSR.  /GI: Continent- does use PW at night. Last BM 9/28.   Pulmonary: LS expiratory wheezes.  Pain: denied.      Drains/Lines: PIV- SL  Skin: scattered bruising   Activity: Assist x 2 with GB/W pivot to bed and chair.  Diet: Minced and moist with mildly thick liquids. Pt is a feeder, needing 1:1 assistance. Takes pills whole, one at a time in apple sauce.      Therapies recs: ARU  Discharge: pending     Aggression Stoplight Tool: green

## 2024-09-29 NOTE — PROGRESS NOTES
Goal Outcome Evaluation:       Plan of Care Reviewed With: patient     Overall Patient Progress: improvingOverall Patient Progress: improving      Reason for Admission:  Acute hemorrhagic stroke, left basal ganglia intraparenchymal hematoma      Cognitive/Mentation: A/Ox 4   Neuros/CMS: R>L pupil, R droop,slow slurred, speech word finding difficulties, BLE weakness (increased in left)  VS: stable.   Tele: NSR.  /GI: Continent. Last BM 9/26/24.   Pulmonary: LS wheezes expiratory .  Pain: denies.      Drains/Lines: piv patent intact and SL  Skin: bruising forearm  Activity: Assist x two with pivot.  Diet: minced and moist with mildly liquids. Takes pills whole one at a time with apple sauce.      Therapies recs: ARU  Discharge: pending     Aggression Stoplight Tool: green

## 2024-09-29 NOTE — PROGRESS NOTES
[facility]    Hospitalist Progress Note    Interval History   Patient is awake and alert.  No acute events overnight.  Has waxing and waning expressive aphasia.  Otherwise no new neurologic deficits.    -Data reviewed today: I reviewed all new labs and imaging results over the last 24 hours. I personally reviewed the head CT image(s) showing no significant change in and out of subacute intraparenchymal hemorrhage in the lateral aspect of the left basal ganglia. .    Physical Exam   Temp: 98.4  F (36.9  C) Temp src: Oral BP: 115/70 Pulse: 91   Resp: 18 SpO2: 93 % O2 Device: None (Room air)    Vitals:    09/27/24 0638 09/27/24 1212 09/29/24 0644   Weight: 88 kg (194 lb 0.1 oz) 81.6 kg (179 lb 14.3 oz) 80.7 kg (177 lb 14.6 oz)     Vital Signs with Ranges  Temp:  [98.4  F (36.9  C)-99.9  F (37.7  C)] 98.4  F (36.9  C)  Pulse:  [] 91  Resp:  [18-22] 18  BP: (115-155)/() 115/70  SpO2:  [91 %-96 %] 93 %  No intake/output data recorded.    Physical Exam  Constitutional:       Appearance: She is ill-appearing.   Eyes:      Comments: Bilateral discharge around the eyelids with no scleral erythema or swelling.   Cardiovascular:      Rate and Rhythm: Normal rate and regular rhythm.      Pulses: Normal pulses.      Heart sounds: Normal heart sounds.   Pulmonary:      Effort: Pulmonary effort is normal. No respiratory distress.      Breath sounds: Normal breath sounds.   Abdominal:      General: Abdomen is flat. Bowel sounds are normal. There is no distension.      Tenderness: There is no abdominal tenderness. There is no guarding.   Skin:     General: Skin is warm and dry.   Neurological:      General: No focal deficit present.      Comments: Significant expressive aphasia with mild right facial droop.  Right arm finger-nose test worse than left.           Medications   Current Facility-Administered Medications   Medication Dose Route Frequency Provider Last Rate Last Admin    Medication  Instructions: No D5W IV solutions unless patient hypoglycemic.   Does not apply Continuous PRN Phi Ritter MD        sodium chloride 0.9 % infusion   Intravenous Continuous Phi Ritter MD   Stopped at 09/26/24 1320     Current Facility-Administered Medications   Medication Dose Route Frequency Provider Last Rate Last Admin    atorvastatin (LIPITOR) tablet 40 mg  40 mg Oral Daily Akanksha Mejias MD   40 mg at 09/29/24 0831    hydrochlorothiazide (HYDRODIURIL) tablet 25 mg  25 mg Oral Daily Akanksha Mejias MD   25 mg at 09/29/24 0832    lisinopril (ZESTRIL) tablet 40 mg  40 mg Oral Daily Akanksha Mejias MD   40 mg at 09/29/24 0831    pantoprazole (PROTONIX) EC tablet 40 mg  40 mg Oral QAM AC Akanksha Mejias MD   40 mg at 09/29/24 0831    polyethylene glycol (MIRALAX) Packet 17 g  17 g Oral Daily Akanksha Mejias MD   17 g at 09/29/24 0831       Data   Recent Labs   Lab 09/28/24  1525 09/27/24  1726 09/27/24  0508 09/26/24  1309 09/26/24  0446 09/25/24  1956 09/25/24  1336   WBC  --   --   --   --  11.5*  --  8.2   HGB  --   --   --   --  13.6  --  13.8   MCV  --   --   --   --  91  --  91   PLT  --   --   --   --  292  --  309   INR  --   --   --   --   --   --  1.04   NA  --   --   --   --  135  --  137   POTASSIUM  --   --   --   --  4.2  --  4.2   CHLORIDE  --   --   --   --  100  --  100   CO2  --   --   --   --  26 --  29   BUN  --   --   --   --  13.2  --  15.9   CR  --   --   --   --  0.64  --  0.73   ANIONGAP  --   --   --   --  9  --  8   KAR  --   --   --   --  9.3  --  9.8   * 124* 105*   < > 92   < > 127*    < > = values in this interval not displayed.       No results found for this or any previous visit (from the past 24 hour(s)).      Assessment & Plan   Chika Hurd is a 79 year old female with PMH significant for hypertension, dyslipidemia, GERD, h/o left breast cancer, h/o ischemic CVA (10/2023) who presented to [facility] ED with unsteady gait,  noted with hemorrhagic left basal ganglia stroke and transferred to [facility] ICU, admitted inpatient 9/25/24.     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)  H/o ischemic CVA (10/2023)  Hypertension  Dyslipidemia  - she has history of ischemic CVA with mild slurred speech has been on Plavix but presented with unsteady gait and dysarthria, worse than usual slurred speech  -CT head noted acute left basal ganglia intraparenchymal hematoma  -CT head noted no active extravasation; did note incidental 4 mm saccular and resume at anterior communicating artery; no large vessel occlusion or stenosis  -CT and neck noted at 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 and moderate stenosis right vertebral artery origin  -follow-up head CT after four hours noted with no significant interval change in hematoma  -EKG noted normal sinus rhythm     -Was evaluated by stroke neurology at [facility]; suggested for Q1 hour neuro- checks and vitals, goal SBP<140, head of bed elevation> 30   -Initially admitted to ICU but transferred out to the floor on 9/26/2024  -Neurosurgery consulted.  No immediate surgical needs.  Continue medical management.  -Was started on nicardipine drip to keep SBP goal<140 while holding PTA HCTZ and lisinopril  -Restart hydrochlorothiazide and lisinopril on 9/26/2024 and stop the drip.    -Goal systolic blood pressure 130-150.  Holding antihypertensives for less than .    - Neurochecks every 4 hours.  -On atorvastatin 40 daily  -Prior to admission Plavix currently on hold.  Restart in 1 week post stroke  -Troponin at 8  -PT/OT/speech consulted.  -Speech recommended video swallow study to further assess oropharyngeal function. VFSS study completed on 9/27/2024.  Recommending minced and moist diet with slightly thick liquids.  -PT OT recommending acute rehab.  Social  work consulted and referral sent.  -MRI brain showed 2.9 x 1.3 x 1.6 cm of acute intraparenchymal hemorrhage centered on left lentiform nucleus and extending into coronary radiator.  No mass effect.  No obvious underlying enhancement though underlying lesion cannot be completely ruled out.  Follow-up imaging after hemorrhage is resolved is recommended.  Review report for complete details.     GERD  -On oral Protonix.    Severe constipation  -Has not had a bowel movement in the last 4 to 5 days.  Ordered scheduled MiraLAX and senna.        Clinically Significant Risk Factors                  # Hypertension: Noted on problem list           # Obesity: Estimated body mass index is 34.75 kg/m  as calculated from the following:    Height as of this encounter: 1.524 m (5').    Weight as of this encounter: 80.7 kg (177 lb 14.6 oz)., PRESENT ON ADMISSION     # Financial/Environmental Concerns: none          DVT Prophylaxis: Pneumatic Compression Devices  Code Status: Full Code  Medically Ready for Discharge: Ready Now    Patient is medically stable for discharge.  Needs ARU placement.  Social work consulted.    Greater than 35 minutes spent on documentation review, lab review, examining the patient and discussing care with bedside nurse    Akanksha Mejias MD, MD  [phone](p)

## 2024-09-29 NOTE — PROVIDER NOTIFICATION
MD Notification    Notified Person: MD    Notified Person Name:Annie Gallo    Notification Date/Time:9/29/24@1822    Notification Interaction:Kangou messaging    Purpose of Notification:Pt seen with darshana eye redness with creamy yellow discharge, crusts, pt reports eye pain, any new orders?    Orders Received: Will order Ofloxacin ointment    Comments:

## 2024-09-29 NOTE — PROGRESS NOTES
Care Management Follow Up    Length of Stay (days): 4    Expected Discharge Date: 09/30/2024     Concerns to be Addressed: discharge planning     Patient plan of care discussed at interdisciplinary rounds: Yes    Anticipated Discharge Disposition: Acute Rehab              Anticipated Discharge Services: Other (see comment) (pending final recommendations)  Anticipated Discharge DME: Other (see comment) (pending discharge recommendations)    Patient/family educated on Medicare website which has current facility and service quality ratings:    Education Provided on the Discharge Plan: Yes  Patient/Family in Agreement with the Plan: unable to assess    Referrals Placed by CM/SW:    Private pay costs discussed: Not applicable    Discussed  Partnership in Safe Discharge Planning  document with patient/family: No     Handoff Completed: No, handoff not indicated or clinically appropriate    Additional Information:  Per rounds patient likely ready for rounds Monday, writer updated ARU liason    Next Steps: talk to family     JUNAID Levy

## 2024-09-30 ENCOUNTER — APPOINTMENT (OUTPATIENT)
Dept: OCCUPATIONAL THERAPY | Facility: CLINIC | Age: 79
DRG: 066 | End: 2024-09-30
Attending: INTERNAL MEDICINE
Payer: MEDICARE

## 2024-09-30 ENCOUNTER — APPOINTMENT (OUTPATIENT)
Dept: SPEECH THERAPY | Facility: CLINIC | Age: 79
DRG: 066 | End: 2024-09-30
Attending: INTERNAL MEDICINE
Payer: MEDICARE

## 2024-09-30 ENCOUNTER — APPOINTMENT (OUTPATIENT)
Dept: PHYSICAL THERAPY | Facility: CLINIC | Age: 79
DRG: 066 | End: 2024-09-30
Attending: INTERNAL MEDICINE
Payer: MEDICARE

## 2024-09-30 PROCEDURE — 92507 TX SP LANG VOICE COMM INDIV: CPT | Mod: GN | Performed by: SPEECH-LANGUAGE PATHOLOGIST

## 2024-09-30 PROCEDURE — 97535 SELF CARE MNGMENT TRAINING: CPT | Mod: GO

## 2024-09-30 PROCEDURE — 250N000011 HC RX IP 250 OP 636: Mod: JZ | Performed by: STUDENT IN AN ORGANIZED HEALTH CARE EDUCATION/TRAINING PROGRAM

## 2024-09-30 PROCEDURE — 99232 SBSQ HOSP IP/OBS MODERATE 35: CPT | Performed by: INTERNAL MEDICINE

## 2024-09-30 PROCEDURE — 250N000013 HC RX MED GY IP 250 OP 250 PS 637: Performed by: HOSPITALIST

## 2024-09-30 PROCEDURE — 97530 THERAPEUTIC ACTIVITIES: CPT | Mod: GP

## 2024-09-30 PROCEDURE — 120N000001 HC R&B MED SURG/OB

## 2024-09-30 RX ORDER — AMLODIPINE BESYLATE 5 MG/1
5 TABLET ORAL EVERY EVENING
Status: DISCONTINUED | OUTPATIENT
Start: 2024-09-30 | End: 2024-10-01 | Stop reason: HOSPADM

## 2024-09-30 RX ORDER — OFLOXACIN 3 MG/ML
1 SOLUTION/ DROPS OPHTHALMIC 4 TIMES DAILY
Status: ON HOLD | DISCHARGE
Start: 2024-09-30 | End: 2024-10-03

## 2024-09-30 RX ADMIN — OFLOXACIN 1 DROP: 3 SOLUTION/ DROPS OPHTHALMIC at 18:30

## 2024-09-30 RX ADMIN — POLYETHYLENE GLYCOL 3350 17 G: 17 POWDER, FOR SOLUTION ORAL at 08:10

## 2024-09-30 RX ADMIN — LISINOPRIL 40 MG: 40 TABLET ORAL at 08:10

## 2024-09-30 RX ADMIN — PANTOPRAZOLE SODIUM 40 MG: 40 TABLET, DELAYED RELEASE ORAL at 06:50

## 2024-09-30 RX ADMIN — OFLOXACIN 1 DROP: 3 SOLUTION/ DROPS OPHTHALMIC at 13:53

## 2024-09-30 RX ADMIN — ATORVASTATIN CALCIUM 40 MG: 40 TABLET, FILM COATED ORAL at 08:09

## 2024-09-30 RX ADMIN — OFLOXACIN 1 DROP: 3 SOLUTION/ DROPS OPHTHALMIC at 00:23

## 2024-09-30 RX ADMIN — HYDROCHLOROTHIAZIDE 25 MG: 25 TABLET ORAL at 08:09

## 2024-09-30 RX ADMIN — OFLOXACIN 1 DROP: 3 SOLUTION/ DROPS OPHTHALMIC at 22:12

## 2024-09-30 RX ADMIN — LABETALOL HYDROCHLORIDE 20 MG: 5 INJECTION INTRAVENOUS at 01:15

## 2024-09-30 ASSESSMENT — ACTIVITIES OF DAILY LIVING (ADL)
ADLS_ACUITY_SCORE: 45
ADLS_ACUITY_SCORE: 45
ADLS_ACUITY_SCORE: 39
ADLS_ACUITY_SCORE: 45
ADLS_ACUITY_SCORE: 39
ADLS_ACUITY_SCORE: 39
ADLS_ACUITY_SCORE: 45
ADLS_ACUITY_SCORE: 39
ADLS_ACUITY_SCORE: 45
ADLS_ACUITY_SCORE: 39
ADLS_ACUITY_SCORE: 45
ADLS_ACUITY_SCORE: 45
ADLS_ACUITY_SCORE: 39

## 2024-09-30 NOTE — INTERIM SUMMARY
[facility] Pre-Admission Screen    Referral Source:  [facility]  Admit date to referring facility: 9/25/2024    Physical Medicine and Rehab Consult Completed: No    Rehab Diagnosis:    Stroke Vascular Hemorrhagic 01.2 (R) Body Involvement (L) Brain; Non-traumatic intracerebral hemorrhage of left basal ganglia likely hypertensive etiology     Justification for Acute Inpatient Rehabilitation  Chika Hurd is a 79 year old female with PMH significant for hypertension, dyslipidemia, GERD, h/o left breast cancer, h/o ischemic CVA (10/2023) who presented to [facility] ED with unsteady gait, noted with hemorrhagic left basal ganglia stroke and transferred to [facility] ICU, admitted inpatient 9/25/24. MRI of the brain with and without contrast revealed stable left basal ganglia hemorrhage without any underlying mass or underlying pathology that can secondarily lead to bleeding. The pt is on 2 anti-hypertensives at home. One day later, pt was also noted to have subacute infarct in the L parietal white matter, due to ESUS. Hospital course has been complicated by dysphagia, possible eye infection with discharge, and uncontrolled BP requiring IV BP medications. Pt is now stable for transfer to inpatient rehab with continued medical management at least 3 times per week of neuro status, BP, dysphagia/respiratory status and constipation.     At baseline, pt lives in a multi-level home with her spouse who has dementia, and was modified independent with ADL, IADL and mobility. 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 Pts daughter lives next door. Currently, pt requires assist of 2 with ADL and mobility, with use of lift or Aleah Steady for transfers with nursing due to significant BLE weakness. She has been able to pivot with Min-Mod A of 2 with rehab staff. Pt has been able to ambulate x 25 ft with A of 2 and close chair  follow. She needs significant assistance for all ADL and needs to sit to complete grooming tasks. She is noted to have dysphagia on a modified diet, mild-moderate dysarthria as well as mild-moderate aphasia with occasional word-salad.  Intensive PT, OT, and SLP required to maximize safety and independence to allow pt to return home with use of equipment and caregiver assistance.  She is at high risk for falls and needs BP monitored with activity.     Current Active Medical Management Needs/Risks for Clinical Complications  The patient requires the high level of rehabilitation physician supervision that accompanies the provision of intensive rehabilitation therapy.  The patient needs the services of the rehabilitation physician at least 3 times per week to assess the patient medically and functionally and to modify the course of treatment as needed to maximize the patient's capacity to benefit from the rehabilitation process.    Neuro: Pt is at risk of neurologic decline, recurrent stroke, falls with injury, depression, and increased intracranial pressure. Continue neuro checks and assess for neurologic recovery. Provide stroke education, including modifiable risk factors and proper medication management. Avoid antithrombotics, Elevate head of bed to 30 degree or more. Continue atorvastatin 40 mg, goal of LDL between 40 and 70. Antiplatelet medications were put on hold.  Per stroke neurology and neurosurgery, patient does not need to resume Plavix.  She was on Plavix for her prior stroke and the note at the time says continue DAPT x 3 months.  Also per them, patient may resume aspirin 81 mg daily beginning today. Ziopatch ordered to evaluate for cardiac arrhythmias.     BP: Pt is at risk for uncontrolled BP, hypertension as well as hypotension. Goal of systolic blood pressure between 130-150 at this time, and BP goal at home should be below 130/80. BP has been as low as 87/54 in the last 24 hours. Manage BP meds and  titrate as needed.   Dysphagia: Pt is at risk for aspiration pna and is on a modified diet with mildly thickened liquids and minced and moist solids. Use SLP guidance for diet upgrades and complete VFSS as indicated. Monitor respiratory status closely.   Bowel: Pt with severe constipation, at risk for bowel obstruction. Miralax and Senna ordered, but pt has not had a BM in the last 4-5 days. Continue to track BM frequency.  Suspected B eye infection: Continue to monitor and use Ocuflox in both eyes 4 times per day.     Past Medical/Surgical History   Surgery in the past 100 days: No   Additional relevant past medical history: L breast cancer, prior stroke in 2023, incidental saccular aneurysm, GERD    Level of Functioning Prior to Admission:    LIVING ENVIRONMENT   People in Home: spouse   Current Living Arrangements: house   Home Accessibility: stairs to enter home   Stair Railings, Main Entrance: railings safe and in good condition  Transportation Anticipated: family or friend will provide   Living Environment Comments: Per chart: lives w/  who has dementia. Daughter Ramila lives next door to her parents and assists her mother in caring for her Dad who has dementia who is fully ambulatory. Pt drives and enjoys pontoon rides.    SELF-CARE   Usual Activity Tolerance: good   Equipment Currently Used at Home: walker, rolling   Activity/Exercise/Self-Care Comment:  Typically independent w/ basic self cares per chart. Has access to a walker that she used occasionally.      Level of Function: GG Scale (Section GG Functional Ability and Goals; CMS's PEDRAZA Version 3.0 Manual effective 10.1.2019):  PT Current Function Goals for Rehab   Bed Rolling 3 Partial/moderate assistance 6 Independent   Supine to Sit 2 Substantial/maximal assistance 6 Independent   Sit to Stand 1 Dependent (A of 2) 6 Independent   Transfer 1 Dependent (A of 2) 6 Independent   Ambulation 1 Dependent (A of 2) 4 Supervision or touching assistance    Stairs 88 Not attempted due to safety 3 Partial/moderate assistance     OT Current Function Goals for Rehab   Feeding 4 Supervision or touching assistance 6 Independent   Grooming 3 Partial/moderate assistance (seated) 6 Independent   Bathing Not completed 3 Partial/moderate assistance   Upper Body Dressing Not completed 6 Independent   Lower Body Dressing 1 Dependent (A of 2) 4 Supervision or touching assistance   Toileting 1 Dependent (A of 2) 4 Supervision or touching assistance   Toilet Transfer 1 Dependent (A of 2) 4 Supervision or touching assistance   Tub/Shower Transfer 88 Not attempted due to safety 3 Partial/moderate assistance   Cognition Impaired Supervision     SLP Current Function Goals for Rehab   Swallow Impaired Tolerate least restrictive diet without signs & symptoms of aspiration and adhere to safe swallow strategies   Communication Impaired Communicate basic needs effectively       Current Diet:  1-Slightly thick and 5-Minced and moist    Summary Statement:  Pt is a 79 year old female who is typically Mod (I) with ADL, ADL and IADL and 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 Currently, she is very far below baseline after L hemorrhagic stroke, and also noted to have L subacute ischemic stroke. Pt presenting with BLE weakness, impaired alertness, dysphagia, impaired cognition and communication. She is only able to ambulate x 25 ft with A of 2 and wheelchair follow. She needs A of 2 staff with walker vs Aleah Steady vs lift for transfers. She needs significant assistance for all ADL. She has medical management needs including neuro status, BP, bowel, and also has a suspected eye infection. She is participating well, motivated, has support of her daughter and has intensive rehab needs for PT, OT and SLP. Anticipate pt will discharge home with caregiver assistance and use of equipment in 21 days.     Expected Therapies and Services Required During Inpatient Rehab  Admission  Intensity of Therapy: Patient requires intensive therapies not available in a lesser level of care. Patient is motivated, making gains, and can tolerate 3 hours of therapy a day.  Physical Therapy: 60 minutes per day, 6 days a week for 21 days  Occupational Therapy: 60 minutes per day, 6 days a week for 21 days  Speech and Language Therapy: 60 minutes per day, 6 days a week for 21 days  Rehabilitation Nursing Needs: Patient requires 24 hour Rehab Nursing to manage bowel program, vitals, medication education, positioning, carryover of new rehab techniques, care coordination, assess neurologic status, provide safe environment for patient at falls risk, and monitor nutritional intake.    Precautions/restrictions/special needs:   Precautions: fall precautions   Restrictions: NA   Special Needs: lift    Expected Level of Improvement: 21 days  Expected Length of time to achieve: SBA with ADL and mobility around the home, except Min A with bathing and on stairs, due to fall risk    Anticipated Discharge Needs:  Anticipated Discharge Destination: Home  Anticipated Discharge Support: Family member  24/7 support available : Unknown  Identified caregiver(s):  Daughter lives next door  Anticipated Discharge Needs: Home with homecare    Identified challenges/barriers:  Current level of function, lives with spouse who has dementia and pt will likely need some care herself.    Liaison signature/date/time: Chika Ruiz CM 9/30/2024 3:16 PM    Physician statement of review and agreement:  I have reviewed and am in agreement of the need for IRF stay to address above functional and medical needs. In addition to above statements address, Patient requires intensive active and ongoing therapeutic intervention and multiple therapies; Patient requires medical supervision; Expected to actively participate in the intensive rehab program; Sufficiently stable to actively participate; Expectation for measurable improvement  in functional capacity or adaption to impairments.    MD signature/date/time:

## 2024-09-30 NOTE — PLAN OF CARE
Goal Outcome Evaluation:      Plan of Care Reviewed With: patient, child    Overall Patient Progress: improving       Reason for Admission: Here with slurred speech, word finding difficulties , admitted for acute hemorrhagic stroke, right basal ganglia intraparenchymal hematoma, repeat CT shows surrounding edema and mild localized mass effect stable     Cognitive/Mentation: A&0X4,   Neuros/CMS: Expressive aphasia, slight rt droop, gen weakness wore on BLE  VS:VSS on RA  Tele:SR  GI: no BM this shift  : Incontinent, pure wick in place  Pulmonary:wheezes expiratory  Pain: denies pain but grimaces with movement     Drains/Lines:Pure wick, PIV s/l  Skin: scattered bruising, blanchable redness   Activity: chairfast, AX2 GB/W pivot   Diet: minced moist (level 5), mildly thick liquid(level1)     Therapies recs:ARU  Anticipated Discharge date & active delays: DW following, family yet to make choices, daughter available for phone call anytime, busy taking care of dad whom patient use to take care of     Aggression Stoplight Tool:Green  Other important info for next shift: Noted darshana eye redness with yellow creamy discharge, worsening during this shift, cross cover MD ordered ofloxacin eyedrop 4 x daily

## 2024-09-30 NOTE — PROGRESS NOTES
[facility]    Medicine Progress Note - Hospitalist Service    Date of Admission:  9/25/2024    Assessment & Plan   Chika Hurd is a 79 year old female with PMH significant for hypertension, dyslipidemia, GERD, h/o left breast cancer, h/o ischemic CVA (10/2023) who presented to [facility] ED with unsteady gait, noted with hemorrhagic left basal ganglia stroke and transferred to [facility] ICU, admitted inpatient 9/25/24.     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)  H/o ischemic CVA (10/2023)  Hypertension  Dyslipidemia  *she has history of ischemic CVA with mild slurred speech has been on Plavix but presented with unsteady gait and dysarthria, worse than usual slurred speech  CT head noted acute left basal ganglia intraparenchymal hematoma  CT head noted no active extravasation; did note incidental 4 mm saccular and resume at anterior communicating artery; no large vessel occlusion or stenosis  CT and neck noted at 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 and moderate stenosis right vertebral artery origin  follow-up head CT after four hours noted with no significant interval change in hematoma  EKG noted normal sinus rhythm  Was evaluated by stroke neurology at [facility]; suggested for Q1 hour neuro- checks and vitals, goal SBP<140, head of bed elevation> 30   Initially admitted to ICU but transferred out to the floor on 9/26/2024  Neurosurgery consulted.  No immediate surgical needs.  Continue medical management.  Was started on nicardipine drip to keep SBP goal<140 while holding PTA HCTZ and lisinopril  Discontinued nicardipine, restarted hydrochlorothiazide and lisinopril on 9/26/2024   Goal systolic blood pressure 130-150.  Holding antihypertensives for less than   Add amlodipine 5 mg daily  Change neurochecks to every 8  "hours.  Continue PTA atorvastatin 40 mg daily  Prior to admission Plavix currently on hold.  Restart in 1 week post stroke  PT/OT/speech consulted.  Speech recommended video swallow study to further assess oropharyngeal function. VFSS study completed on 9/27/2024.  Recommending minced and moist diet with slightly thick liquids.  PT OT recommending acute rehab.  Social work consulted and referral sent.  MRI brain showed 2.9 x 1.3 x 1.6 cm of acute intraparenchymal hemorrhage centered on left lentiform nucleus and extending into coronary radiator.  No mass effect.  No obvious underlying enhancement though underlying lesion cannot be completely ruled out.  Follow-up imaging after hemorrhage is resolved is recommended.  Review report for complete details.     GERD  On oral Protonix.    Severe constipation  Has not had a bowel movement in the last 4 to 5 days.  Ordered scheduled MiraLAX and senna.        Diet: Combination Diet Minced and Moist Diet (level 5); Slightly Thick (level 1) (single sips, upright, liquid wash PRN)    DVT Prophylaxis: Pneumatic Compression Devices  Smith Catheter: Not present  Lines: None     Cardiac Monitoring: ACTIVE order. Indication: Stroke, acute (48 hours)  Code Status: Full Code      Clinically Significant Risk Factors                  # Hypertension: Noted on problem list           # Obesity: Estimated body mass index is 34.75 kg/m  as calculated from the following:    Height as of this encounter: 1.524 m (5').    Weight as of this encounter: 80.7 kg (177 lb 14.6 oz).      # Financial/Environmental Concerns: none         Disposition Plan     Medically Ready for Discharge: Anticipated Tomorrow      Fadumo Cullen MD  Hospitalist Service  [facility]  Securely message with Indu (more info)  Text page via [employer] Paging/Directory   ______________________________________________________________________    Interval History   \"Would you take the lid off this?\"  " Patient was struggling to remove the lid from a cup of (thickened) apple juice on her meal tray.  She offers no respiratory or GI complaints.    Physical Exam   Vital Signs: Temp: 98.5  F (36.9  C) Temp src: Oral BP: (!) 142/59 Pulse: 89   Resp: 18 SpO2: 97 % O2 Device: None (Room air)    Weight: 177 lbs 14.58 oz    Constitutional: Awake, alert, cooperative, no apparent distress  Respiratory: Clear to auscultation bilaterally, no crackles or wheezing  Cardiovascular: Regular rate and rhythm  GI: Normal bowel sounds, soft, non-distended, non-tender  Skin/Integumen: No rash on exposed skin.  No lower extremity edema  Other: Mood is annoyed      Medical Decision Making       35 MINUTES SPENT BY ME on the date of service doing chart review, history, exam, documentation & further activities per the note.      Data         Imaging results reviewed over the past 24 hrs:   No results found for this or any previous visit (from the past 24 hour(s)).

## 2024-09-30 NOTE — PLAN OF CARE
Pt here with ICH. A&O x4. Neuros R facial droop, R pupil > L, forgetful, and generalized weakness. VSS, SBP<150. Tele NSR. Minced and moist diet, mildly thick liquids. Takes pills whole w/ appplesauce. Up with Ax2 SS. Purewick in place. Denies pain. Pt scoring green on the Aggression Stop Light Tool. Discharge to ARU tomorrow via whelchair between [id number].

## 2024-09-30 NOTE — PROVIDER NOTIFICATION
Ofloxacin ointment ordered for concerns of bilateral discharge around eyelids.      LUIS ENRIQUE ONOFRE MD  Hospitalist Service

## 2024-09-30 NOTE — PLAN OF CARE
Goal Outcome Evaluation:                 Outcome Evaluation: Pt here with L IPH. A&Ox4. Neuros intact ex pupils R>L, R droop, slurred/garbled speech, BLE weakness. VSS RA. Tele NSR. Minced and moist diet, mild thick liquids. Takes pills whole with applesauce. Up with ax2 pivot. Denies pain. Pt scoring green on the Aggression Stop Light Tool. Discharge pending placement.

## 2024-09-30 NOTE — PROGRESS NOTES
Care Management Follow Up    Length of Stay (days): 5    Expected Discharge Date: 10/01/2024     Concerns to be Addressed: discharge planning     Patient plan of care discussed at interdisciplinary rounds: Yes    Anticipated Discharge Disposition: Acute Rehab              Anticipated Discharge Services: Other (see comment) (pending final recommendations)  Anticipated Discharge DME: Other (see comment) (pending discharge recommendations)    Patient/family educated on Medicare website which has current facility and service quality ratings:    Education Provided on the Discharge Plan: Yes  Patient/Family in Agreement with the Plan: unable to assess    Referrals Placed by CM/SW:    Private pay costs discussed: Not applicable    Discussed  Partnership in Safe Discharge Planning  document with patient/family: No     Handoff Completed: No, handoff not indicated or clinically appropriate    Additional Information:  Patient discussed in rounds with ARU liaison. Patient needs to be off IV blood pressure meds for 24 hours before going to ARU    Writer attempted to contact patients daughter Ramila to discuss transportation and left a voicemail. Due to transport filling up quickly writer set up [facility] wheelchair for [phone] on Tuesday    Next Steps: Follow for discharge planning.    JUNAID CRENSHAW  [facility]  INPATIENT CARE COORDINATION

## 2024-09-30 NOTE — PROGRESS NOTES
Rehab Admissions:  Met with pt and spoke with daughter over the phone, to provide them with information on [facility], including location, parking, visiting hours, meals, what to bring, ELOS, room accommodations and about the intensive rehab program with medical management. Pt and her daughter Ramila are both on board with [facility]. 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 Her daughter lives next door and is caring for him now.     Thank you for the referral, we will continue to follow this patient for post acute placement.     Determination of admission is based upon the patient's need for an intensive, interdisciplinary approach to rehabilitation, their ability to progress, their ability to tolerate intensive therapies, their need for daily physician supervision, their need for twenty four hour nursing assistance, and their ability and willingness to participate in such a program.    Chika Ruiz CM  Rehab Liaison/  [facility]  9/30/2024    1:09 PM

## 2024-10-01 ENCOUNTER — HOSPITAL ENCOUNTER (INPATIENT)
Facility: CLINIC | Age: 79
DRG: 057 | End: 2024-10-01
Attending: PHYSICAL MEDICINE & REHABILITATION | Admitting: PHYSICAL MEDICINE & REHABILITATION
Payer: MEDICARE

## 2024-10-01 ENCOUNTER — APPOINTMENT (OUTPATIENT)
Dept: OCCUPATIONAL THERAPY | Facility: CLINIC | Age: 79
DRG: 066 | End: 2024-10-01
Attending: INTERNAL MEDICINE
Payer: MEDICARE

## 2024-10-01 VITALS
RESPIRATION RATE: 16 BRPM | OXYGEN SATURATION: 96 % | WEIGHT: 173.28 LBS | HEIGHT: 60 IN | HEART RATE: 82 BPM | TEMPERATURE: 99 F | BODY MASS INDEX: 34.02 KG/M2 | SYSTOLIC BLOOD PRESSURE: 130 MMHG | DIASTOLIC BLOOD PRESSURE: 57 MMHG

## 2024-10-01 DIAGNOSIS — H34.11 CENTRAL RETINAL ARTERY OCCLUSION OF RIGHT EYE: ICD-10-CM

## 2024-10-01 DIAGNOSIS — I63.9 CEREBROVASCULAR ACCIDENT (CVA), UNSPECIFIED MECHANISM (H): ICD-10-CM

## 2024-10-01 DIAGNOSIS — K21.9 GASTROESOPHAGEAL REFLUX DISEASE WITHOUT ESOPHAGITIS: ICD-10-CM

## 2024-10-01 DIAGNOSIS — I61.9 INTRAPARENCHYMAL HEMORRHAGE OF BRAIN (H): Primary | ICD-10-CM

## 2024-10-01 DIAGNOSIS — I10 BENIGN ESSENTIAL HYPERTENSION: ICD-10-CM

## 2024-10-01 LAB
ANION GAP SERPL CALCULATED.3IONS-SCNC: 12 MMOL/L (ref 7–15)
BUN SERPL-MCNC: 31.2 MG/DL (ref 8–23)
CALCIUM SERPL-MCNC: 9.3 MG/DL (ref 8.8–10.4)
CHLORIDE SERPL-SCNC: 97 MMOL/L (ref 98–107)
CREAT SERPL-MCNC: 0.73 MG/DL (ref 0.51–0.95)
EGFRCR SERPLBLD CKD-EPI 2021: 84 ML/MIN/1.73M2
GLUCOSE SERPL-MCNC: 104 MG/DL (ref 70–99)
HCO3 SERPL-SCNC: 25 MMOL/L (ref 22–29)
POTASSIUM SERPL-SCNC: 4.4 MMOL/L (ref 3.4–5.3)
SODIUM SERPL-SCNC: 134 MMOL/L (ref 135–145)

## 2024-10-01 PROCEDURE — 250N000013 HC RX MED GY IP 250 OP 250 PS 637: Performed by: PHYSICIAN ASSISTANT

## 2024-10-01 PROCEDURE — 250N000013 HC RX MED GY IP 250 OP 250 PS 637: Performed by: HOSPITALIST

## 2024-10-01 PROCEDURE — 36415 COLL VENOUS BLD VENIPUNCTURE: CPT | Performed by: INTERNAL MEDICINE

## 2024-10-01 PROCEDURE — 250N000009 HC RX 250: Performed by: PHYSICIAN ASSISTANT

## 2024-10-01 PROCEDURE — 128N000003 HC R&B REHAB

## 2024-10-01 PROCEDURE — 99223 1ST HOSP IP/OBS HIGH 75: CPT | Performed by: PHYSICAL MEDICINE & REHABILITATION

## 2024-10-01 PROCEDURE — 97530 THERAPEUTIC ACTIVITIES: CPT | Mod: GO

## 2024-10-01 PROCEDURE — 250N000013 HC RX MED GY IP 250 OP 250 PS 637: Performed by: INTERNAL MEDICINE

## 2024-10-01 PROCEDURE — 99239 HOSP IP/OBS DSCHRG MGMT >30: CPT | Performed by: INTERNAL MEDICINE

## 2024-10-01 PROCEDURE — 97535 SELF CARE MNGMENT TRAINING: CPT | Mod: GO

## 2024-10-01 PROCEDURE — 80048 BASIC METABOLIC PNL TOTAL CA: CPT | Performed by: INTERNAL MEDICINE

## 2024-10-01 PROCEDURE — 82947 ASSAY GLUCOSE BLOOD QUANT: CPT | Performed by: INTERNAL MEDICINE

## 2024-10-01 RX ORDER — OFLOXACIN 3 MG/ML
1 SOLUTION/ DROPS OPHTHALMIC 4 TIMES DAILY
Status: COMPLETED | OUTPATIENT
Start: 2024-10-01 | End: 2024-10-04

## 2024-10-01 RX ORDER — LISINOPRIL 40 MG/1
40 TABLET ORAL DAILY
Status: DISPENSED | OUTPATIENT
Start: 2024-10-02

## 2024-10-01 RX ORDER — ATORVASTATIN CALCIUM 40 MG/1
40 TABLET, FILM COATED ORAL DAILY
Status: DISPENSED | OUTPATIENT
Start: 2024-10-02

## 2024-10-01 RX ORDER — ACETAMINOPHEN 325 MG/1
650 TABLET ORAL EVERY 4 HOURS PRN
Status: ACTIVE | OUTPATIENT
Start: 2024-10-01

## 2024-10-01 RX ORDER — POLYETHYLENE GLYCOL 3350 17 G/17G
17 POWDER, FOR SOLUTION ORAL DAILY PRN
Status: ACTIVE | OUTPATIENT
Start: 2024-10-01

## 2024-10-01 RX ORDER — FAMOTIDINE 20 MG/1
20 TABLET, FILM COATED ORAL 2 TIMES DAILY
Status: DISCONTINUED | OUTPATIENT
Start: 2024-10-01 | End: 2024-10-03

## 2024-10-01 RX ORDER — ASPIRIN 81 MG/1
81 TABLET, CHEWABLE ORAL DAILY
Status: DISCONTINUED | OUTPATIENT
Start: 2024-10-01 | End: 2024-10-01 | Stop reason: HOSPADM

## 2024-10-01 RX ORDER — LANOLIN ALCOHOL/MO/W.PET/CERES
3 CREAM (GRAM) TOPICAL
Status: ACTIVE | OUTPATIENT
Start: 2024-10-01

## 2024-10-01 RX ORDER — GUAIFENESIN 600 MG/1
15 TABLET, EXTENDED RELEASE ORAL DAILY
Status: DISCONTINUED | OUTPATIENT
Start: 2024-10-02 | End: 2024-10-02

## 2024-10-01 RX ORDER — ASPIRIN 81 MG/1
81 TABLET, CHEWABLE ORAL DAILY
Status: ON HOLD | DISCHARGE
Start: 2024-10-01

## 2024-10-01 RX ORDER — HYDROCHLOROTHIAZIDE 25 MG/1
25 TABLET ORAL DAILY
Status: DISCONTINUED | OUTPATIENT
Start: 2024-10-02 | End: 2024-10-04

## 2024-10-01 RX ORDER — ASPIRIN 81 MG/1
81 TABLET, CHEWABLE ORAL DAILY
Status: DISPENSED | OUTPATIENT
Start: 2024-10-02

## 2024-10-01 RX ORDER — AMLODIPINE BESYLATE 5 MG/1
5 TABLET ORAL EVERY EVENING
Status: DISPENSED | OUTPATIENT
Start: 2024-10-01

## 2024-10-01 RX ORDER — AMOXICILLIN 250 MG
1-2 CAPSULE ORAL 2 TIMES DAILY PRN
Status: ACTIVE | OUTPATIENT
Start: 2024-10-01

## 2024-10-01 RX ADMIN — OFLOXACIN 1 DROP: 3 SOLUTION/ DROPS OPHTHALMIC at 00:10

## 2024-10-01 RX ADMIN — PANTOPRAZOLE SODIUM 40 MG: 40 TABLET, DELAYED RELEASE ORAL at 06:53

## 2024-10-01 RX ADMIN — HYDROCHLOROTHIAZIDE 25 MG: 25 TABLET ORAL at 08:13

## 2024-10-01 RX ADMIN — ATORVASTATIN CALCIUM 40 MG: 40 TABLET, FILM COATED ORAL at 08:13

## 2024-10-01 RX ADMIN — AMLODIPINE BESYLATE 5 MG: 5 TABLET ORAL at 20:10

## 2024-10-01 RX ADMIN — FAMOTIDINE 20 MG: 20 TABLET ORAL at 20:10

## 2024-10-01 RX ADMIN — LISINOPRIL 40 MG: 40 TABLET ORAL at 08:13

## 2024-10-01 RX ADMIN — POLYETHYLENE GLYCOL 3350 17 G: 17 POWDER, FOR SOLUTION ORAL at 08:13

## 2024-10-01 RX ADMIN — OFLOXACIN 1 DROP: 3 SOLUTION/ DROPS OPHTHALMIC at 20:10

## 2024-10-01 RX ADMIN — ASPIRIN 81 MG CHEWABLE TABLET 81 MG: 81 TABLET CHEWABLE at 12:47

## 2024-10-01 RX ADMIN — OFLOXACIN 1 DROP: 3 SOLUTION/ DROPS OPHTHALMIC at 12:47

## 2024-10-01 ASSESSMENT — ACTIVITIES OF DAILY LIVING (ADL)
ADLS_ACUITY_SCORE: 39
ADLS_ACUITY_SCORE: 32
ADLS_ACUITY_SCORE: 39
ADLS_ACUITY_SCORE: 26
ADLS_ACUITY_SCORE: 39
ADLS_ACUITY_SCORE: 26
ADLS_ACUITY_SCORE: 37
ADLS_ACUITY_SCORE: 39

## 2024-10-01 NOTE — PROGRESS NOTES
"      [facility]    Vascular Neurology Progress Note    Interval History     Received call regarding timing of resuming antiplatelet therapy. Patient new to me. Chart reviewed and discussed with stroke attending Dr Sanchez.    Appears patient is on DAPT since stroke in 10/2023. The recommendation at that time was DAPT x3 months followed by aspirin monotherapy. If no other indication for DAPT then Plavix does NOT need to be resumed and aspirin 81 mg daily monotherapy is sufficient when AP therapy is resumed.    As far as timing of resumption we usually rely on our neurosurgical colleagues to help guide us in timing of resuming AP/AC after ICH. Would recommend to reach out to neurosurgery for their suggestions. Typically head CT is repeated in the outpatient setting prior to resumption.      Patient Follow-up    - in 6-8 weeks with general neurology or stroke REUBEN ([phone]) (ordered)    No further stroke evaluation is recommended, so we will sign off. Please contact us with any additional questions.      Fior Levi PA-C  Vascular Neurology    To page me or covering stroke neurology team member, click here: [facility]  Choose \"On Call\" tab at top, then select \"NEUROLOGY/ALL SITES\" from middle drop-down box, press Enter, then look for \"stroke\" or \"telestroke\" for your site.  "

## 2024-10-01 NOTE — H&P
[facility]   Acute Rehabilitation Unit  Admission History and Physical    CHIEF COMPLAINT   Stroke Vascular Hemorrhagic 01.2 (R) Body Involvement (L) Brain; Non-traumatic intracerebral hemorrhage of left basal ganglia likely hypertensive etiology      HISTORY OF PRESENT ILLNESS  Chika Hurd is a 79 year old R hand dominant female with past medical history of prior stroke (10/2023; left frontal lobe and corona radiata), hyperlipidemia, hypertension, central retinal artery occlusion of right eye, left breast cancer, GERD, prediabetes, sensorineural hearing loss, and obesity who presented on 9/25/24 with gait instability and slurred speech.  CT head revealed acute left basal ganglia intraparenchymal hemorrhage.  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  She was not a candidate for thrombolytics or endovascular treatment.  She was admitted for further evaluation and management.     Neurosurgery was consulted, but did not feel any neurosurgical intervention was indicated.  Neurology was consulted.  She was initially started on nifedipine drip for BP management; later transitioned back to home anti-hypertensives.  MRI brain showed acute intraparenchymal hemorrhage centered in left lentiform nucleus and extending into corona radiata; also 6mm focus of probable subacute infarction within left periatrial white matter.  Additional stroke evaluation with echo with EF 60-65%, grade 1 or early diastolic dysfunction, no WMA, no significant valvular disease; EKG with sinus rhythm; A1c 6.0%; LDL 55.  Left basal ganglia hemorrhage etiology felt to be hypertensive.  Ischemic infarct attributed to ESUS.     Hospital course was further complicated by constipation, conjunctivitis, mild leukocytosis, and mild hyponatremia.     During acute hospitalization, patient was  seen and evaluated by PT and OT, who collectively recommended that patient would benefit from ongoing therapies in the acute inpatient rehabilitation setting.      In review of the therapy notes, she is only able to ambulate x 25 ft with A of 2 and wheelchair follow. She needs A of 2 staff with walker vs Aleah Steady vs lift for transfers. She needs significant assistance for all ADL.     On arrival to ARU, she is resting in bed.  She feels her left leg is slightly weaker than R.  Her R arm is weak and feels her R side of mouth is weak.  She denies any issues with vision.  She states last BM was a day ago.  Motivated to work with therapy.  Hopes that her diet can be advanced.  She has mild wheeze with exertion of voice, stridor like but no distress.  Denies chest pain, shortness of breath, no fever or chills.     PAST MEDICAL HISTORY   Reviewed and updated in Epic.  Past Medical History:   Diagnosis Date    Breast cancer (H)     Central retinal artery occlusion, right     DCIS (ductal carcinoma in situ) of breast     GERD (gastroesophageal reflux disease)     Hyperlipidemia        SURGICAL HISTORY  Reviewed and updated in Epic.  Past Surgical History:   Procedure Laterality Date    COLONOSCOPY N/A 12/8/2021    Procedure: COLONOSCOPY, WITH POLYPECTOMY AND BIOPSY;  Surgeon: Lawrence Ortega DO;  Location: [facility]    ENT SURGERY      dental implants 03/12 started    FLEXIBLE SIGMOIDOSCOPY  04/03    LUMPECTOMY BREAST WITH SEED LOCALIZATION Left 9/21/2016    Procedure: LUMPECTOMY BREAST WITH SEED LOCALIZATION;  Surgeon: Russel Murry MD;  Location:  OR    PHACOEMULSIFICATION WITH STANDARD INTRAOCULAR LENS IMPLANT Right 11/6/2017    Procedure: PHACOEMULSIFICATION WITH STANDARD INTRAOCULAR LENS IMPLANT;  Right cataract removal with implant;  Surgeon: Michael Zuleta MD;  Location: [facility]    PHACOEMULSIFICATION WITH STANDARD INTRAOCULAR LENS IMPLANT Left 11/27/2017    Procedure: PHACOEMULSIFICATION WITH  STANDARD INTRAOCULAR LENS IMPLANT;  Left cataract removal with implant;  Surgeon: Michael Zuleta MD;  Location: [facility]    SURGICAL HISTORY OF -       Right Hip Pinning    SURGICAL HISTORY OF -       Tubal Ligation       SOCIAL HISTORY  Reviewed and updated in Epic.  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   Family support: daughter lives next door, can provide some assist to patient and spouse     Tobacco use: quit   Social History     Socioeconomic History    Marital status:      Spouse name: Not on file    Number of children: Not on file    Years of education: Not on file    Highest education level: Not on file   Occupational History    Not on file   Tobacco Use    Smoking status: Former     Current packs/day: 0.00     Types: Cigarettes     Start date: 1970     Quit date: 2005     Years since quittin.7    Smokeless tobacco: Never   Vaping Use    Vaping status: Never Used   Substance and Sexual Activity    Alcohol use: Yes     Comment: rarely    Drug use: No    Sexual activity: Not Currently     Comment: refuses to answer   Other Topics Concern    Parent/sibling w/ CABG, MI or angioplasty before 65F 55M? No   Social History Narrative    Not on file     Social Determinants of Health     Financial Resource Strain: Low Risk  (2024)    Financial Resource Strain     Within the past 12 months, have you or your family members you live with been unable to get utilities (heat, electricity) when it was really needed?: No   Food Insecurity: Low Risk  (2024)    Food Insecurity     Within the past 12 months, did you worry that your food would run out before you got money to buy more?: No     Within the past 12 months, did the food you bought just not last and you didn t have money to get more?: No   Transportation Needs: Low Risk  (2024)    Transportation Needs     Within the past 12 months,  has lack of transportation kept you from medical appointments, getting your medicines, non-medical meetings or appointments, work, or from getting things that you need?: No   Physical Activity: Not on file   Stress: Not on file   Social Connections: Not on file   Interpersonal Safety: Low Risk  (9/26/2024)    Interpersonal Safety     Do you feel physically and emotionally safe where you currently live?: Yes     Within the past 12 months, have you been hit, slapped, kicked or otherwise physically hurt by someone?: No     Within the past 12 months, have you been humiliated or emotionally abused in other ways by your partner or ex-partner?: No   Recent Concern: Interpersonal Safety - High Risk (9/26/2024)    Interpersonal Safety     Do you feel physically and emotionally safe where you currently live?: No     Within the past 12 months, have you been hit, slapped, kicked or otherwise physically hurt by someone?: No     Within the past 12 months, have you been humiliated or emotionally abused in other ways by your partner or ex-partner?: No   Housing Stability: Low Risk  (9/26/2024)    Housing Stability     Do you have housing? : Yes     Are you worried about losing your housing?: No   Recent Concern: Housing Stability - High Risk (9/26/2024)    Housing Stability     Do you have housing? : No     Are you worried about losing your housing?: No       FAMILY HISTORY  Reviewed and updated in Epic.  Family History   Problem Relation Age of Onset    Heart Disease Mother     Heart Disease Father     Circulatory Father         main artery aneursym    Heart Disease Sister         sleep problems    Breast Cancer Maternal Aunt         diagnosed in 60's, surviving in 90's    Breast Cancer Cousin     Breast Cancer Cousin     Skin Cancer No family hx of          PRIOR FUNCTIONAL HISTORY   Pt was modified independent with all ADLs/IADLs, transfers, mobility and gait.  She occasionally used walker.    MEDICATIONS  Scheduled  meds  Medications Prior to Admission   Medication Sig Dispense Refill Last Dose    acetaminophen (TYLENOL) 500 MG tablet Take 2 tablets by mouth 2 times daily as needed for mild pain       Ascorbic Acid (VITAMIN C PO) Take 500 mg by mouth daily       atorvastatin (LIPITOR) 40 MG tablet Take 1 tablet (40 mg) by mouth daily 90 tablet 4     coenzyme Q-10 200 MG CAPS capsule Take 1 capsule by mouth at bedtime       famotidine (PEPCID) 20 MG tablet TAKE 1 TABLET (20 MG) BY MOUTH 2 TIMES DAILY 180 tablet 1     Glycerin-Polysorbate 80 (REFRESH DRY EYE THERAPY OP) Place 1 drop into both eyes as needed       hydrochlorothiazide (HYDRODIURIL) 25 MG tablet Take 1 tablet (25 mg) by mouth daily 90 tablet 4     lisinopril (ZESTRIL) 40 MG tablet Take 1 tablet (40 mg) by mouth daily 90 tablet 4     MULTIVITAMIN OR 1 daily       Omega-3 Fatty Acids (OMEGA-3 FISH OIL PO) Take 1 g by mouth daily        [DISCONTINUED] aspirin 81 MG EC tablet Take 81 mg by mouth daily.       [DISCONTINUED] clopidogrel (PLAVIX) 75 MG tablet Take 1 tablet (75 mg) by mouth daily 90 tablet 4        ALLERGIES     Allergies   Allergen Reactions    Cortisone Swelling     Cortisone Cream         REVIEW OF SYSTEMS  A 10 point ROS was performed and negative unless otherwise noted in HPI.       PHYSICAL EXAM  VITAL SIGNS:  LMP  (LMP Unknown)   BMI:  Estimated body mass index is 33.84 kg/m  as calculated from the following:    Height as of 9/26/24: 1.524 m (5').    Weight as of an earlier encounter on 10/1/24: 78.6 kg (173 lb 4.5 oz).     General: NAD, lying in bed  HEENT: NC/AT, MMM, there is coated tongue   Pulmonary: non-labored on room air, symmetrical chest rise  Cardiovascular: 2+ radial pulse, well perfused   Abdominal: soft, non-tender  Extremities: warm, no edema in bilateral lower extremities, no tenderness in calves   MSK/neuro:   Mental Status:  alert and oriented x3    Cranial Nerves: grossly normal   2nd CN: Pupils equal, round, reactive to light and  visual fields intact to confrontation.   3rd,4th,6th CN:  EOMI  5th CN: facial sensation intact   7th CN: face symmetrical - there is R facial weakness   8th CN: functional hearing bilaterally  9th, 10th CN: palate elevates symmetrically   11th CN: sternocleidomastoids and trapezii strong   12th CN: tongue midline and without fasciculations     Sensory: Normal to light touch in bilateral upper and lower extremities    Strength: 4+/5 in all muscle groups of the  LUE and 4-/5 in RUE.  There was active ROM with dorsiflexion bilateral, but and 4-/5 knee extension on L and 4/5 on right, her left hip flexors were weaker as well but against gravity, R hip flexor appeared stronger but due to body positioning was difficult to attain.    Dill's test: + on R   Tone per modified Dougie Scale: none appreciated    Abnormal movements: None    Coordination: mild dysmetria on finger to nose R   Speech: dysarthric, no aphasia present    Cognition: intact     LABS  CBC RESULTS:   Recent Labs   Lab Test 09/26/24 0446 09/25/24  1336 10/12/23  1239   WBC 11.5* 8.2 9.1   RBC 4.44 4.64 4.65   HGB 13.6 13.8 13.9   HCT 40.6 42.3 43.5   MCV 91 91 94   MCH 30.6 29.7 29.9   MCHC 33.5 32.6 32.0   RDW 13.9 13.8 13.3    309 285       Last Basic Metabolic Panel:  Recent Labs   Lab Test 10/01/24  0823 09/28/24  1525 09/27/24  1726 09/26/24  1309 09/26/24  0446 09/25/24  1956 09/25/24  1336   *  --   --   --  135  --  137   POTASSIUM 4.4  --   --   --  4.2  --  4.2   CHLORIDE 97*  --   --   --  100  --  100   CO2 25  --   --   --  26  --  29   ANIONGAP 12  --   --   --  9  --  8   * 123* 124*   < > 92   < > 127*   BUN 31.2*  --   --   --  13.2  --  15.9   CR 0.73  --   --   --  0.64  --  0.73   GFRESTIMATED 84  --   --   --  >90  --  84   KAR 9.3  --   --   --  9.3  --  9.8    < > = values in this interval not displayed.       Recent Labs   Lab Test 09/26/24  0446 04/26/24  1346   CHOL 104 113   HDL 34* 41*   LDL 55 54    TRIG 74 91     Hemoglobin A1C   Date Value Ref Range Status   09/27/2024 6.0 (H) <5.7 % Final     Comment:     Normal <5.7%   Prediabetes 5.7-6.4%    Diabetes 6.5% or higher     Note: Adopted from ADA consensus guidelines.   09/12/2016 5.4 4.3 - 6.0 % Final     EXAM: CT HEAD W/O CONTRAST  LOCATION: [facility]  DATE: 9/27/2024     INDICATION: Hemorrhagic stroke.  COMPARISON: MRI 9/26/2024, 9/25/2024.  TECHNIQUE: Routine CT Head without IV contrast. Multiplanar reformats. Dose reduction techniques were used.     FINDINGS:  INTRACRANIAL CONTENTS: Stable now subacute intraparenchymal hemorrhage in the lateral left basal ganglia measuring approximately 3.4 x 1.1 cm. Small amount of surrounding edema with localized mass effect without significant change. No convincing evidence   of interval acute intracranial hemorrhage, infarct or hydrocephalus.     VISUALIZED ORBITS/SINUSES/MASTOIDS: No intraorbital abnormality. Small amount of layering fluid in the left sphenoid locule. No middle ear or mastoid effusion.     BONES/SOFT TISSUES: No acute abnormality.                                                                      IMPRESSION:  1.  No significant change in a now subacute intraparenchymal hemorrhage in the lateral aspect of the left basal ganglia. Surrounding edema and mild localized mass effect are stable.  2.  No convincing evidence of interval acute intracranial hemorrhage, infarct or hydrocephalus.        EXAM: MR BRAIN W/O AND W CONTRAST  LOCATION: [facility]  DATE: 9/26/2024     INDICATION: Hemorrhagic stroke.  COMPARISON: CT head 9/25/2024.  CONTRAST: 8 [employer].  TECHNIQUE: Routine multiplanar multisequence head MRI without and with intravenous contrast.     FINDINGS:  INTRACRANIAL CONTENTS: As demonstrated on recent head CT, there is a 2.9 x 1.3 x 1.6 cm area of acute intraparenchymal hemorrhage centered in the left lentiform nucleus and extending into the  corona radiata. Small to moderate amount of surrounding edema   . There is mild mass effect on the left lateral ventricle and slight bowing of the septum pellucidum to the right of midline. Slightly posterior to this area, adjacent to the left ventricular trigone, there is a 5 mm focus of increased signal on the   diffusion sequence which does not appear to be truly restricted on the ADC map. There is associated FLAIR signal hyperintensity. No intracranial mass. Chronic lacunar infarct in the right caudate nucleus. Patchy nonspecific T2/FLAIR hyperintensities   within the cerebral white matter most consistent with mild to moderate chronic microvascular ischemic change. Mild to moderate generalized cerebral atrophy. No hydrocephalus. Normal position of the cerebellar tonsils. No pathologic contrast enhancement.     SELLA: No abnormality accounting for technique.     OSSEOUS STRUCTURES/SOFT TISSUES: Normal marrow signal. The major intracranial vascular flow voids are maintained.      ORBITS: No abnormality accounting for technique.      SINUSES/MASTOIDS: No paranasal sinus mucosal disease. No middle ear or mastoid effusion.                                                                       IMPRESSION:  1.  As demonstrated on the recent head CT, there is a 2.9 x 1.3 x 1.6 cm area of acute intraparenchymal hemorrhage centered in the left lentiform nucleus and extending into the corona radiata. No significant associated mass effect. No obvious underlying   enhancement although signal characteristics of the hemorrhage could obscure an underlying lesion and follow-up following resolution of the hemorrhage is advised.  2.  Within the left periatrial white matter, there is a 6 mm focus of probable subacute infarction.  3.  Underlying volume loss and presumed chronic small vessel ischemic changes.        EXAM: CT HEAD W/O CONTRAST  LOCATION: [facility]  DATE: 9/25/2024     INDICATION: repeat 4  hours after first CT   for head bleed.  timing as requested by neurosurg  COMPARISON: CT head 9/25/2024  TECHNIQUE: Routine CT Head without IV contrast. Multiplanar reformats. Dose reduction techniques were used.     FINDINGS:  INTRACRANIAL CONTENTS:   Left basal ganglia acute infarct measuring 3.6 cm AP by 1.1 cm TR similar in size compared to prior examination.     No new or significant progressive acute intracranial hemorrhages.     Mild right midline shift measuring 2 mm similar compared to prior examination.     No CT evidence of acute infarct. Mild to moderate presumed chronic small vessel ischemic changes. Mild to moderate generalized volume loss. No hydrocephalus. Right caudate head small chronic lacunar infarct. Right inferior frontal lobe punctate   calcification demonstrated.     VISUALIZED ORBITS/SINUSES/MASTOIDS: No intraorbital abnormality.      Left sphenoid sinus air-fluid level. Please correlate for acute sinusitis.      Remaining visualized paranasal sinuses and mastoid air cells are essentially clear.     BONES/SOFT TISSUES: No acute abnormality.                                                                      IMPRESSION:  1.  No significant interval change compared to CT head 9/25/2024.        CT ANGIOGRAM OF THE HEAD AND NECK WITH CONTRAST  9/25/2024 2:00 PM      HISTORY: Code Stroke to evaluate for potential thrombolysis and  thrombectomy. Neurological symptoms.     TECHNIQUE:  CT angiography with an injection of 67 mL Isovue 370 IV  with scans through the head and neck. Images were transferred to a  separate 3-D workstation where multiplanar reformations and 3-D images  were created. Estimates of carotid stenoses are made relative to the  distal internal carotid artery diameters except as noted. Radiation  dose for this scan was reduced using automated exposure control,  adjustment of the mA and/or kV according to patient size, or iterative  reconstruction technique.     COMPARISON: None.       CT ANGIOGRAM HEAD FINDINGS:    No convincing active extravasation associated with the left basal  ganglia intraparenchymal hematoma. A few small areas of vascular  enhancement are present along the margins of the hematoma, presumably  normal-size reactive vasculature. No evidence of large vessel  occlusion, high-grade stenosis, or high flow vascular malformation. A  4 mm saccular aneurysm is present projecting inferiorly off the right  anterior communicating artery at the A1/A2 junction. Scattered  atherosclerotic plaquing with mild stenoses.      CT ANGIOGRAM NECK FINDINGS:   Moderate stenosis at the right vertebral artery origin. At least  moderate and possibly severe stenosis involving the right proximal  brachiocephalic artery. Background of scattered atherosclerotic  plaquing with mild stenoses. No significant stenoses of the bilateral  carotid bifurcations.     INCIDENTAL FINDINGS: Cervical spine degenerative changes. Emphysema  with presumed scattered pulmonary atelectasis. Enlarged right axillary  lymph nodes, nonspecific. Aerated secretions within the left sphenoid  sinus.                                                                      IMPRESSION:   CTA Head:   1. No evidence of active extravasation into the left basal ganglia  acute parenchymal hematoma.  2. Incidental 4 mm saccular aneurysm at the anterior communicating  artery.  3. No evidence of large vessel occlusion or high-grade stenosis.   CTA Neck:   1. 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  2. Moderate stenosis at the right vertebral artery origin.  3. Background of scattered atherosclerotic plaque and mild stenoses.   4. Incidental findings as detailed.        CT SCAN OF THE HEAD WITHOUT CONTRAST   9/25/2024 1:58 PM      HISTORY: Code stroke to evaluate for potential thrombolysis and  thrombectomy. Neurological symptoms.     TECHNIQUE:  Axial images of the head and coronal reformations  without  IV contrast material. Radiation dose for this scan was reduced using  automated exposure control, adjustment of the mA and/or kV according  to patient size, or iterative reconstruction technique.     COMPARISON: Head MRI 12/12/2023     FINDINGS:  Acute intraparenchymal hematoma centered within the left lateral basal  ganglia measuring about 3.4 x 2.7 x 1.1 cm. Surrounding vasogenic  edema is present. Mild local mass effect with slight rightward bulging  of the septum pellucidum (about 3 mm of left-to-right midline shift).  Basilar cisterns remain patent. No other areas of hemorrhage are  identified.     Background of volume loss and patchy nonspecific white matter  hypoattenuation likely representing chronic small ischemic change.  Scattered old lacunar infarcts. No acute osseous abnormality.                                                                      IMPRESSION:   Acute left basal ganglia intraparenchymal hematoma.      IMPRESSION/PLAN:  Chika Hurd is a 79 year old R hand dominant female with a past medical history of prior stroke (10/2023; left frontal lobe and corona radiata), hyperlipidemia, hypertension, central retinal artery occlusion of right eye, left breast cancer, GERD, prediabetes, sensorineural hearing loss, and obesity who was admitted on 9/25/24 with acute left basal ganglia hemorrhage with hospital course complicated by subacute stroke in left periatrial white matter, incidental saccular aneurysm, constipation, and conjunctivitis.  She is now admitted to ARU on 10/1/24 for multidisciplinary rehabilitation and ongoing medical management.       Admission to acute inpatient rehab 10/01/24.    Impairment group code: Stroke Vascular Hemorrhagic 01.2 (R) Body Involvement (L) Brain; Non-traumatic intracerebral hemorrhage of left basal ganglia likely hypertensive etiology       PT, OT and SLP 60 minutes of each daily for 6 days per week for 18 days, in addition to rehab nursing and  close management of physiatrist.      Impairment of ADL's: Noted to have BLE weakness, impaired balance, impaired coordination, impaired activity tolerance, impaired alertness, and impaired cognition, all affecting her ability to safely and independently perform basic ADLs.  Goal for SBA with basic ADLs except min A for bathing.     Impairment of mobility:  Noted to have BLE weakness, impaired balance, impaired coordination, impaired activity tolerance, impaired alertness, and impaired cognition, all affecting her ability to safely and independently perform basic mobility.  Goal for SBA with basic mobility with min A for stairs.     Impairment of cognition/language/swallow:  Noted to have dysphagia, dysarthria, aphasia, and impaired cognition with goals for safe tolerance of least restrictive diet and improved cognitive-linguistic skills to meet basic needs.     Medical Conditions    Non-traumatic intracerebral hemorrhage of left basal ganglia likely hypertensive etiology  Subacute stroke in left periatrial white matter, ESUS   Incidental saccular aneurysm (4 mm at anterior communicating artery)   Hx prior ischemic CVA (10/2023; left frontal lobe and corona radiata)  Mild residual dysarthria from initial CVA.  Presented with new gait instability and worsened dysarthria.  Imaging findings as above.  PTA on Plavix + aspirin, plan had been for 3 months.  Per sending team, does not need to resume Plavix but ok'd to resume aspirin daily on 10/1.  - Continue ASA 81 mg daily (resumed on 10/1)  - Continue statin with LDL goal 40-70  - SBP goal 130-150 as inpatient, long-term outpatient gaol <130/80.  Management as below.  - Ziopatch x14 days  - Continue PT/OT/SLP  - Follow up with neurology in 6-8 weeks     Hyperlipidemia  - LDL goal 40-70   - Continue PTA atorvastatin 40 mg daily     Hypertension  - Continue PTA hydrochlorothiazide 25 mg daily  - Continue PTA lisinopril 40 mg daily  - Continue amlodipine 5 mg daily (added  10/1)  - Monitor BP and adjust regimen as indicated    Hyponatremia  Mild at 134 on 10/1.  Suspect related to hydrochlorothiazide, mild hypovolemia.  BUN mildly elevated.  On slightly thick liquids.  - Encourage fluids  - Monitor BMP every M/Th     GERD  - Continue PTA famotidine 20 mg BID     Constipation  In setting of decreased mobility  Last BM on 9/28  - Continue Miralax daily     Prediabetes  A1c 6.0% on 9/27/24  - Follow up with PCP for ongoing surveillance     Bilateral conjunctivitis  Started on ofloxacin at hospital on 9/29 due to purulent discharge from bilateral eyes with associated pain.  - Continue ofloxacin bilaterally for 3 more days        Adjustment to disability:  Clinical psychology to eval and treat if indicated  FEN: minced and moist (level 5) diet, slightly thick (level 1) liquids; swallow strategies per SLP  Bowel: continent, recent constipation.  Continue scheduled Miralax.  Additional PRN bowel medications available.  Monitor and adjust regimen as indicated.  Bladder: continent/incontinent, monitor PVRs on admission  DVT Prophylaxis: mechanical  GI Prophylaxis: pepcid  Code: full; as prior  Disposition: home vs TCU  ELOS:  21 days  Rehab prognosis:  fair  Follow up Appointments on Discharge: PCP in 1-2 weeks, general neurology or stroke REUBEN in 6-8 weeks        Note was partially prepared by Vaishnavi Pacheco PA-C, Physical Medicine & Rehabilitation, who did not personally examine patient on this date.          Physician Attestation   I, Michael Self DO, was present with the REUBEN  who participated in the service and in the documentation of the note.  I have verified the history and personally performed the physical exam and medical decision making.  I agree with the assessment and plan of care as documented in the note.            Michael Self DO  Date of Service (when I saw the patient): 10/01/24      I spent a total of 60 minutes face to face and coordinating care of  Chika Hurd. Over 50% of my time on the unit was spent counseling the patient and /or coordinating care regarding post CVA.

## 2024-10-01 NOTE — PLAN OF CARE
Goal Outcome Evaluation:       Reason for Admission: ICH Non traumatic    Cognitive/Mentation: A/Ox 4  Neuros/CMS: Intact ex Right facial droop, right pupil slightly larger than the left, and generalized weakness  VS: /59 (BP Location: Left arm)   Pulse 81   Temp 99.1  F (37.3  C) (Axillary)   Resp 14   Ht 1.524 m (5')   Wt 80.7 kg (177 lb 14.6 oz)   LMP  (LMP Unknown)   SpO2 93%   BMI 34.75 kg/m  .   Tele: Normal sinus.  /GI: Purewick in place. Last BM Unknown.   Pulmonary: LS Some wheezing noted.  Pain: None.     Drains/Lines: Left PIV, Saline locked  Skin: Redness on the coccyx.   Activity: Assist x 2 with Sera Steady.  Diet: Minced/moist with slightly thick liquids. Takes pills in applesauce.   Discharge: ARU  10/01/24 at [id number]    Aggression Stoplight Tool: Green    End of shift summary: HS dose of amlodipine 4mg PO was held per MD orders with soft blood pressure readings. Patient has SBP parameters of  >150 mm Hg. Ofloxacin administered twice last night.

## 2024-10-01 NOTE — DISCHARGE INSTRUCTIONS
Your risk factors for stroke or TIA (transient ischemic attack):     Your Risk Factors Your Results Goals   [] High blood pressure BP: 123/56 (10/01/24 0730) Less than 120/80   [] Cholesterol          Total 2024: 104 mg/dL   Less than 150    Triglycerides   2024: 74 mg/dL Less than 150    LDL 2024: 55 mg/dL    Less than 70    HDL 2024: 34 mg/dL         Greater than 40 (men)  Greater than 50 (women)   [] Diabetes                A1C 2024: 6.0 % Less than 5.7   [] Atrial fibrillation Atrial fibrillation not noted on cardiac monitoring Manage per physician orders   [] Smoking/tobacco use   Tobacco Use      Smoking status: Former        Packs/day: 0.00        Types: Cigarettes        Start date: 1970        Quit date: 2005        Years since quittin.7      Smokeless tobacco: Never   Quit smoking and tobacco   [] Overweight Body mass index is 33.84 kg/m .  Less than 25     Other risk factors include: carotid (neck) artery disease, other heart diseases, prior stroke or TIA, poor diet, lack of exercise, and excessive alcohol consumption.     [] Written stroke educational materials given to patient including:   - Learning about BE FAST: Stroke Warning Signs and Learning about Risk Factors for Stroke (Healthwise)   - Understanding Stroke: Key Resources After a Stroke (FOD #268422)       Know the warning signs and symptoms of stroke: BE FAST     B = Balance loss   E = Eyesight changes   F = Facial droop or numbness   A = Arm or leg weakness   S = Speech difficulty, slurred speech   T = Time to call 911 for help

## 2024-10-01 NOTE — PLAN OF CARE
Pt Admitted today from [facility] at 1730.  Assisted into bed, VSS.  Pt is AOX4, slow to respond oriented to the call light system and agrees to use call light and wait for assistance.  Alarms on. A2 rex moore for transfers.  Minced moist diet/slightly thick liquids.  Dinner/breakfast ordered.  Pt is ready to start therapies in the AM.  Admission process started and to be completed by [facility] nurse.

## 2024-10-01 NOTE — PROGRESS NOTES
Care Management Discharge Note    Discharge Date: 10/01/2024       Discharge Disposition: [facility]  Discharge Services: therapies, transport  Discharge Transportation: [facility] wheelchair    Private pay costs discussed: Not applicable    Does the patient's insurance plan have a 3 day qualifying hospital stay waiver?  No    Additional Information:  SW received a call from patient's daughter asking how to admit her dad to a locked memory care unit, SW referred her to [facility] and instructed her to meet with memory facilities to learn specifically what they need.     RNCC coordinated throughout the day with care team regarding discharge. BALDO changed patient's ride to this afternoon, [facility] wheelchair will pick patient up between [phone] to go to [facility].     SAMIA Rodriguez, LGSW   Social Work   [facility]

## 2024-10-01 NOTE — PLAN OF CARE
Goal Outcome Evaluation:    Pt here with ICH. A&O x4. Neuros R facial droop, R pupil > L, forgetful, mild aphasia, and generalized weakness. VSS on RA: SBP goal <150. Tele NSR. Minced and moist diet, slightly thick liquids. Takes pills whole one at a time. Up with Ax2 sarasteady or Ax2 pivot. Purewick in place. Last BM 9/28. Redness note on bottom, patient refusing repositioning. Denies pain. Pt scoring green on the Aggression Stop Light Tool. [id number]. Family notified of plan. Packet sent with patient. All belongings sent with patient. Report given to ARU. Discharged around 4:35pm.

## 2024-10-01 NOTE — DISCHARGE SUMMARY
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    Severe constipation    Clinically Significant Risk Factors     # Obesity: Estimated body mass index is 33.84 kg/m  as calculated from the following:    Height as of this encounter: 1.524 m (5').    Weight as of this encounter: 78.6 kg (173 lb 4.5 oz).       Follow-ups Needed After Discharge   Follow-up Appointments     Follow Up and recommended labs and tests      Follow up with [facility] physician.  The following labs/tests are   recommended: BMP, CBC in one week.            Unresulted Labs Ordered in the Past 30 Days of this Admission       No orders found from 8/26/2024 to 9/26/2024.            Discharge Disposition   Discharged to rehabilitation facility  Condition at discharge: Stable    Hospital Course   Chika Hurd is a 79 year old female with PMH significant for hypertension, dyslipidemia, GERD, h/o left breast cancer, h/o ischemic CVA (10/2023) who presented to [facility] ED with unsteady gait, noted with hemorrhagic left basal ganglia stroke and transferred to [facility] ICU, admitted inpatient 9/25/24.     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)  H/o ischemic CVA (10/2023)  Hypertension  Dyslipidemia  *she has history of ischemic CVA with mild slurred speech has been on Plavix but presented with unsteady gait and dysarthria, worse than usual slurred speech  CT head noted acute left basal ganglia intraparenchymal hematoma  CT head noted no active  extravasation; did note incidental 4 mm saccular and resume at anterior communicating artery; no large vessel occlusion or stenosis  CT and neck noted at 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 and moderate stenosis right vertebral artery origin  follow-up head CT after four hours noted with no significant interval change in hematoma  EKG noted normal sinus rhythm  Was evaluated by stroke neurology at [facility]; suggested for Q1 hour neuro- checks and vitals, goal SBP<140, head of bed elevation> 30   Initially admitted to ICU but transferred out to the floor on 9/26/2024  Neurosurgery consulted.  No immediate surgical needs.  Continue medical management.  Was started on nicardipine drip to keep SBP goal<140 while holding PTA HCTZ and lisinopril  Discontinued nicardipine, restarted hydrochlorothiazide and lisinopril on 9/26/2024   Goal systolic blood pressure 130-150.  Holding antihypertensives for less than   Add amlodipine 5 mg daily  Change neurochecks to every 8 hours.  Continue PTA atorvastatin 40 mg daily  Antiplatelet medications were put on hold.  I discussed with stroke neurology (Fior Levi) and neurosurgery Ellen Salmon).  Per them, patient does not need to resume Plavix.  She was on Plavix for her prior stroke and the note at the time says continue DAPT x 3 months.  Also per them, patient may resume aspirin 81 mg daily beginning today.  PT/OT/speech consulted.  Speech recommended video swallow study to further assess oropharyngeal function. VFSS study completed on 9/27/2024.  Recommending minced and moist diet with slightly thick liquids.  PT OT recommending acute rehab.  Social work consulted and referral sent.  MRI brain showed 2.9 x 1.3 x 1.6 cm of acute intraparenchymal hemorrhage centered on left lentiform nucleus and extending into coronary radiator.  No mass effect.  No obvious underlying enhancement though underlying lesion cannot be completely ruled  out.  Follow-up imaging after hemorrhage is resolved is recommended.  Review report for complete details.     GERD  On oral Protonix.    Severe constipation  Has not had a bowel movement in the last 4 to 5 days.  Ordered scheduled MiraLAX and senna.    Consultations This Hospital Stay   NEUROSURGERY IP CONSULT  PHYSICAL THERAPY ADULT IP CONSULT  OCCUPATIONAL THERAPY ADULT IP CONSULT  SPEECH LANGUAGE PATH ADULT IP CONSULT  CARE MANAGEMENT / SOCIAL WORK IP CONSULT  SMOKING CESSATION PROGRAM IP CONSULT  PHARMACY IP CONSULT  NEUROLOGY CRITICAL CARE ADULT IP CONSULT  PHYSICAL THERAPY ADULT IP CONSULT  OCCUPATIONAL THERAPY ADULT IP CONSULT  SPEECH LANGUAGE PATH ADULT IP CONSULT    Code Status   Full Code    Time Spent on this Encounter   I, Fadumo Cullen MD, personally saw the patient today and spent greater than 30 minutes discharging this patient.       Fadumo Cullen MD  [facility] NEUROSCIENCE UNIT  [address] [place] [address]  Phone: [phone]  ______________________________________________________________________    Physical Exam   Vital Signs: Temp: 98.5  F (36.9  C) Temp src: Oral BP: 123/56 Pulse: 78   Resp: 15 SpO2: 94 % O2 Device: None (Room air)    Weight: 173 lbs 4.5 oz  I saw and examined the patient on the date of discharge.           Primary Care Physician   Audrey Roy    Discharge Orders   Discharge Procedure Orders   Primary Care - Care Coordination Referral   Standing Status: Future   Referral Priority: Routine: Next available opening Referral Type: Care Coordination   Number of Visits Requested: 1     General info for SNF   Order Comments: Length of Stay Estimate: Short Term Care: Estimated # of Days <30  Condition at Discharge: Improving  Level of care:skilled   Rehabilitation Potential: Excellent  Admission H&P remains valid and up-to-date: Yes  Recent Chemotherapy: N/A  Use Nursing Home Standing Orders: Yes     Mantoux instructions   Order Comments:  "Give two-step Mantoux (PPD) Per Facility Policy Yes     Follow Up and recommended labs and tests   Order Comments: Follow up with residential physician.  The following labs/tests are recommended: BMP, CBC in one week.     Reason for your hospital stay   Order Comments: You had trouble walking and talking and scans of your brain (CT and MRI) showed bleeding deep in the brain (\"basal ganglia\") and a new stroke.  These problems have stabilized and your symptoms are improving.     Activity - Up with nursing assistance     Order Specific Question Answer Comments   Is discharge order? Yes      Physical Therapy Adult Consult   Order Comments: Evaluate and treat as clinically indicated.    Reason:  stroke     Occupational Therapy Adult Consult   Order Comments: Evaluate and treat as clinically indicated.    Reason:  stroke     Speech Language Path Adult Consult   Order Comments: Evaluate and treat as clinically indicated.    Reason:  stroke     Diet   Order Comments: Follow this diet upon discharge: Current Diet:Orders Placed This Encounter      Combination Diet Minced and Moist Diet (level 5); Slightly Thick (level 1) (single sips, upright, liquid wash PRN)     Order Specific Question Answer Comments   Is discharge order? Yes      Stroke Hospital Follow Up (for neurologist use only)   Standing Status: Future Standing Exp. Date: 09/28/25   Order Comments: [employer] will call you to coordinate care as prescribed by your provider. If you don t hear from a representative within 2 business days, please call [phone].       Order Specific Question Answer Comments   Schedule Patient With: Any Stroke REUBEN or General Neurologist    Specific Diagnosis: ESUS, left parenchymal hemorrhage    Follow up range in weeks (after discharge) 6-8 weeks    Contact: Patient    Patient Scheduling Instructions: [employer] will call you to coordinate care as prescribed by your provider. If you don t hear from a representative " within 2 business days, please call [phone].      LILLIAN PATCH MAIL OUT   Standing Status: Future Number of Occurrences: 1 Standing Exp. Date: 09/28/25     Order Specific Question Answer Comments   Order completed for? Adult Cardiology    Patient should wear the monitor for 14 days    Does the patient have an Neither            Significant Results and Procedures   Most Recent 3 CBC's:  Recent Labs   Lab Test 09/26/24  0446 09/25/24  1336 10/12/23  1239   WBC 11.5* 8.2 9.1   HGB 13.6 13.8 13.9   MCV 91 91 94    309 285     Most Recent 3 BMP's:  Recent Labs   Lab Test 10/01/24  0823 09/28/24  1525 09/27/24  1726 09/26/24  1309 09/26/24  0446 09/25/24  1956 09/25/24  1336   *  --   --   --  135  --  137   POTASSIUM 4.4  --   --   --  4.2  --  4.2   CHLORIDE 97*  --   --   --  100  --  100   CO2 25  --   --   --  26  --  29   BUN 31.2*  --   --   --  13.2  --  15.9   CR 0.73  --   --   --  0.64  --  0.73   ANIONGAP 12  --   --   --  9  --  8   KAR 9.3  --   --   --  9.3  --  9.8   * 123* 124*   < > 92   < > 127*    < > = values in this interval not displayed.     7-Day Micro Results       No results found for the last 168 hours.        ,   Results for orders placed or performed during the hospital encounter of 09/25/24   MR Brain w/o & w Contrast    Narrative    EXAM: MR BRAIN W/O AND W CONTRAST  LOCATION: [facility]  DATE: 9/26/2024    INDICATION: Hemorrhagic stroke.  COMPARISON: CT head 9/25/2024.  CONTRAST: 8 Gadavist.  TECHNIQUE: Routine multiplanar multisequence head MRI without and with intravenous contrast.    FINDINGS:  INTRACRANIAL CONTENTS: As demonstrated on recent head CT, there is a 2.9 x 1.3 x 1.6 cm area of acute intraparenchymal hemorrhage centered in the left lentiform nucleus and extending into the corona radiata. Small to moderate amount of surrounding edema   . There is mild mass effect on the left lateral ventricle and slight bowing of the septum  pellucidum to the right of midline. Slightly posterior to this area, adjacent to the left ventricular trigone, there is a 5 mm focus of increased signal on the   diffusion sequence which does not appear to be truly restricted on the ADC map. There is associated FLAIR signal hyperintensity. No intracranial mass. Chronic lacunar infarct in the right caudate nucleus. Patchy nonspecific T2/FLAIR hyperintensities   within the cerebral white matter most consistent with mild to moderate chronic microvascular ischemic change. Mild to moderate generalized cerebral atrophy. No hydrocephalus. Normal position of the cerebellar tonsils. No pathologic contrast enhancement.    SELLA: No abnormality accounting for technique.    OSSEOUS STRUCTURES/SOFT TISSUES: Normal marrow signal. The major intracranial vascular flow voids are maintained.     ORBITS: No abnormality accounting for technique.     SINUSES/MASTOIDS: No paranasal sinus mucosal disease. No middle ear or mastoid effusion.       Impression    IMPRESSION:  1.  As demonstrated on the recent head CT, there is a 2.9 x 1.3 x 1.6 cm area of acute intraparenchymal hemorrhage centered in the left lentiform nucleus and extending into the corona radiata. No significant associated mass effect. No obvious underlying   enhancement although signal characteristics of the hemorrhage could obscure an underlying lesion and follow-up following resolution of the hemorrhage is advised.  2.  Within the left periatrial white matter, there is a 6 mm focus of probable subacute infarction.  3.  Underlying volume loss and presumed chronic small vessel ischemic changes.   CT Head w/o Contrast    Narrative    EXAM: CT HEAD W/O CONTRAST  LOCATION: [facility]  DATE: 9/27/2024    INDICATION: Hemorrhagic stroke.  COMPARISON: MRI 9/26/2024, 9/25/2024.  TECHNIQUE: Routine CT Head without IV contrast. Multiplanar reformats. Dose reduction techniques were  used.    FINDINGS:  INTRACRANIAL CONTENTS: Stable now subacute intraparenchymal hemorrhage in the lateral left basal ganglia measuring approximately 3.4 x 1.1 cm. Small amount of surrounding edema with localized mass effect without significant change. No convincing evidence   of interval acute intracranial hemorrhage, infarct or hydrocephalus.    VISUALIZED ORBITS/SINUSES/MASTOIDS: No intraorbital abnormality. Small amount of layering fluid in the left sphenoid locule. No middle ear or mastoid effusion.    BONES/SOFT TISSUES: No acute abnormality.      Impression    IMPRESSION:  1.  No significant change in a now subacute intraparenchymal hemorrhage in the lateral aspect of the left basal ganglia. Surrounding edema and mild localized mass effect are stable.  2.  No convincing evidence of interval acute intracranial hemorrhage, infarct or hydrocephalus.   Echocardiogram Complete     Value    LVEF  60-65%    Narrative    [id number]  [id number]  [id number]^DAYLIN^KAVON     [facility]  Echocardiography Laboratory  [address] [place] [address]     Name: GAYLE MITCHELL  MRN: [id number]  : 1945  Study Date: 2024 01:06 PM  Age: 79 yrs  Gender: Female  Patient Location: [facility]  Reason For Study: CVA, Hypertension (HTN)  Ordering Physician: KAVON MAO  Referring Physician: Audrey Roy  Performed By: Clemencia Hook     BSA: 1.8 m2  Height: 60 in  Weight: 179 lb  HR: 70  BP: 105/92 mmHg  ______________________________________________________________________________  Procedure  Complete Portable Echo Adult. Optison (NDC #4756-2121) given intravenously.  ______________________________________________________________________________  Interpretation Summary     Technically difficult imaging  A cardiac source of embolus was not identified  Sinus rhythm was noted.  There is no atrial shunt seen.  Left ventricular systolic function is normal.  The visual ejection fraction is  60-65%.  The right ventricle is normal in structure, function and size.  Doppler interrogation does not demonstrate signifcant stenosis or  insufficiency involving cardiac valves.     No signficant change since 10/13/2023  ______________________________________________________________________________  Left Ventricle  The left ventricle is normal in size. There is normal left ventricular wall  thickness. Left ventricular systolic function is normal. The visual ejection  fraction is 60-65%. Grade I or early diastolic dysfunction. No regional wall  motion abnormalities noted. There is no thrombus seen in the left ventricle.     Right Ventricle  The right ventricle is normal in structure, function and size. There is no  mass or thrombus in the right ventricle.     Atria  Normal left atrial size. Right atrial size is normal. There is no atrial shunt  seen. The left atrial appendage is not well visualized.     Mitral Valve  The mitral valve leaflets appear normal. There is no evidence of stenosis,  fluttering, or prolapse. There is no mitral regurgitation noted. There is no  mitral valve stenosis.     Tricuspid Valve  Normal tricuspid valve. No tricuspid regurgitation. Right ventricular systolic  pressure could not be approximated due to inadequate tricuspid regurgitation.  There is no tricuspid stenosis.     Aortic Valve  There is mild trileaflet aortic sclerosis. No aortic regurgitation is present.  No aortic stenosis is present.     Pulmonic Valve  Normal pulmonic valve. There is no pulmonic valvular regurgitation. There is  no pulmonic valvular stenosis.     Vessels  The aortic root is normal size. Normal size ascending aorta. The inferior vena  cava is normal. The pulmonary artery is normal size.     Pericardium  The pericardium appears normal. There is no pleural effusion.     Rhythm  Sinus rhythm was noted.  ______________________________________________________________________________  MMode/2D Measurements &  Calculations  asc Aorta Diam: 3.0 cm     LVOT diam: 2.1 cm  LVOT area: 3.5 cm2  Asc Ao diam index BSA (cm/m2): 1.7  Asc Ao diam index Ht(cm/m): 2.0     Doppler Measurements & Calculations  MV E max yomi: 83.8 cm/sec  MV A max yomi: 101.0 cm/sec  MV E/A: 0.83  MV dec time: 0.20 sec  Ao V2 max: 120.0 cm/sec  Ao max P.0 mmHg  Ao V2 mean: 83.8 cm/sec  Ao mean PG: 3.0 mmHg  Ao V2 VTI: 23.8 cm  ANDREW(I,D): 2.6 cm2  ANDREW(V,D): 2.5 cm2  LV V1 max PG: 3.0 mmHg  LV V1 max: 86.5 cm/sec  LV V1 VTI: 17.6 cm  SV(LVOT): 61.0 ml  SI(LVOT): 34.2 ml/m2  PA acc time: 0.08 sec  AV Yomi Ratio (DI): 0.72  ANDREW Index (cm2/m2): 1.4  E/E' av.1  Lateral E/e': 5.9  Medial E/e': 10.3     ______________________________________________________________________________  Report approved by: Dr. Michael Wells 2024 04:39 PM             Discharge Medications   Current Discharge Medication List        START taking these medications    Details   aspirin (ASA) 81 MG chewable tablet Take 1 tablet (81 mg) by mouth daily.    Associated Diagnoses: Cerebrovascular accident (CVA), unspecified mechanism (H)      ofloxacin (OCUFLOX) 0.3 % ophthalmic solution Place 1 drop into both eyes 4 times daily for 3 days.    Associated Diagnoses: Acute conjunctivitis, unspecified acute conjunctivitis type, unspecified laterality           CONTINUE these medications which have NOT CHANGED    Details   acetaminophen (TYLENOL) 500 MG tablet Take 2 tablets by mouth 2 times daily as needed for mild pain      Ascorbic Acid (VITAMIN C PO) Take 500 mg by mouth daily      atorvastatin (LIPITOR) 40 MG tablet Take 1 tablet (40 mg) by mouth daily  Qty: 90 tablet, Refills: 4    Associated Diagnoses: Central retinal artery occlusion of right eye      coenzyme Q-10 200 MG CAPS capsule Take 1 capsule by mouth at bedtime      famotidine (PEPCID) 20 MG tablet TAKE 1 TABLET (20 MG) BY MOUTH 2 TIMES DAILY  Qty: 180 tablet, Refills: 1    Associated Diagnoses: Gastroesophageal reflux  disease without esophagitis      Glycerin-Polysorbate 80 (REFRESH DRY EYE THERAPY OP) Place 1 drop into both eyes as needed      hydrochlorothiazide (HYDRODIURIL) 25 MG tablet Take 1 tablet (25 mg) by mouth daily  Qty: 90 tablet, Refills: 4    Associated Diagnoses: Benign essential hypertension      lisinopril (ZESTRIL) 40 MG tablet Take 1 tablet (40 mg) by mouth daily  Qty: 90 tablet, Refills: 4    Associated Diagnoses: Benign essential hypertension      MULTIVITAMIN OR 1 daily      Omega-3 Fatty Acids (OMEGA-3 FISH OIL PO) Take 1 g by mouth daily            STOP taking these medications       aspirin 81 MG EC tablet Comments:   Reason for Stopping:         clopidogrel (PLAVIX) 75 MG tablet Comments:   Reason for Stopping:             Allergies   Allergies   Allergen Reactions    Cortisone Swelling     Cortisone Cream

## 2024-10-01 NOTE — PLAN OF CARE
"Physical Therapy Discharge Summary    Reason for therapy discharge:    Discharged to acute rehabilitation facility.    Progress towards therapy goal(s). See goals on Care Plan in [facility] electronic health record for goal details.  Goals partially met.  Barriers to achieving goals:   discharge from facility.    Therapy recommendation(s):    Continued therapy is recommended.  Rationale/Recommendations:   . PT Discharge Planning:    PT Plan: Pre-gait tasks and transfers w/ FWW as able, ambulation with FWW (consider chair follow for longer distances)  PT Discharge Recommendation (DC Rec): Acute Rehab Center-Motivated patient will benefit from intensive, interdisciplinary therapy.  Anticipate will be able to tolerate 3 hours of therapy per day  PT Rationale for DC Rec: Patient presents with significantly decreased functional mobility compared to baseline primarily affected by decreased activity tolerance and decreased balance. Will benefit from intensive rehab to address this. Pt is independent at baseline and has support from daughter.  PT Brief overview of current status: min-mod assist of 2 \"Goals of therapy will be to address safe mobility and make recs for d/c to next level of care. Pt and RN will continue to follow all falls risk precautions as documented by RN staff while hospitalized.\"  PT Equipment Needed at Discharge: walker, rolling    Recommendation above provided by last treating therapist.       "

## 2024-10-02 ENCOUNTER — PATIENT OUTREACH (OUTPATIENT)
Dept: CARE COORDINATION | Facility: CLINIC | Age: 79
End: 2024-10-02
Payer: MEDICARE

## 2024-10-02 ENCOUNTER — APPOINTMENT (OUTPATIENT)
Dept: PHYSICAL THERAPY | Facility: CLINIC | Age: 79
DRG: 057 | End: 2024-10-02
Attending: PHYSICAL MEDICINE & REHABILITATION
Payer: MEDICARE

## 2024-10-02 ENCOUNTER — APPOINTMENT (OUTPATIENT)
Dept: SPEECH THERAPY | Facility: CLINIC | Age: 79
DRG: 057 | End: 2024-10-02
Attending: PHYSICAL MEDICINE & REHABILITATION
Payer: MEDICARE

## 2024-10-02 ENCOUNTER — APPOINTMENT (OUTPATIENT)
Dept: OCCUPATIONAL THERAPY | Facility: CLINIC | Age: 79
DRG: 057 | End: 2024-10-02
Attending: PHYSICAL MEDICINE & REHABILITATION
Payer: MEDICARE

## 2024-10-02 PROCEDURE — 92610 EVALUATE SWALLOWING FUNCTION: CPT | Mod: GN | Performed by: SPEECH-LANGUAGE PATHOLOGIST

## 2024-10-02 PROCEDURE — 250N000013 HC RX MED GY IP 250 OP 250 PS 637: Performed by: PHYSICIAN ASSISTANT

## 2024-10-02 PROCEDURE — 97535 SELF CARE MNGMENT TRAINING: CPT | Mod: GO | Performed by: OCCUPATIONAL THERAPIST

## 2024-10-02 PROCEDURE — 99233 SBSQ HOSP IP/OBS HIGH 50: CPT | Performed by: PHYSICAL MEDICINE & REHABILITATION

## 2024-10-02 PROCEDURE — 92523 SPEECH SOUND LANG COMPREHEN: CPT | Mod: GN | Performed by: SPEECH-LANGUAGE PATHOLOGIST

## 2024-10-02 PROCEDURE — 97167 OT EVAL HIGH COMPLEX 60 MIN: CPT | Mod: GO | Performed by: OCCUPATIONAL THERAPIST

## 2024-10-02 PROCEDURE — 128N000003 HC R&B REHAB

## 2024-10-02 PROCEDURE — 97530 THERAPEUTIC ACTIVITIES: CPT | Mod: GP

## 2024-10-02 PROCEDURE — 97161 PT EVAL LOW COMPLEX 20 MIN: CPT | Mod: GP

## 2024-10-02 RX ORDER — MULTIPLE VITAMINS W/ MINERALS TAB 9MG-400MCG
1 TAB ORAL DAILY
Status: DISPENSED | OUTPATIENT
Start: 2024-10-03

## 2024-10-02 RX ADMIN — FAMOTIDINE 20 MG: 20 TABLET ORAL at 10:05

## 2024-10-02 RX ADMIN — ASPIRIN 81 MG CHEWABLE TABLET 81 MG: 81 TABLET CHEWABLE at 10:05

## 2024-10-02 RX ADMIN — OFLOXACIN 1 DROP: 3 SOLUTION/ DROPS OPHTHALMIC at 20:04

## 2024-10-02 RX ADMIN — Medication 15 ML: at 10:06

## 2024-10-02 RX ADMIN — DICLOFENAC 4 G: 10 GEL TOPICAL at 13:12

## 2024-10-02 RX ADMIN — LISINOPRIL 40 MG: 40 TABLET ORAL at 10:05

## 2024-10-02 RX ADMIN — OFLOXACIN 1 DROP: 3 SOLUTION/ DROPS OPHTHALMIC at 17:17

## 2024-10-02 RX ADMIN — ATORVASTATIN CALCIUM 40 MG: 40 TABLET, FILM COATED ORAL at 10:05

## 2024-10-02 RX ADMIN — OFLOXACIN 1 DROP: 3 SOLUTION/ DROPS OPHTHALMIC at 13:11

## 2024-10-02 RX ADMIN — HYDROCHLOROTHIAZIDE 25 MG: 25 TABLET ORAL at 10:05

## 2024-10-02 RX ADMIN — OFLOXACIN 1 DROP: 3 SOLUTION/ DROPS OPHTHALMIC at 10:06

## 2024-10-02 RX ADMIN — FAMOTIDINE 20 MG: 20 TABLET ORAL at 20:03

## 2024-10-02 ASSESSMENT — ACTIVITIES OF DAILY LIVING (ADL)
ADLS_ACUITY_SCORE: 32
PREVIOUS_RESPONSIBILITIES: MEAL PREP;HOUSEKEEPING;LAUNDRY;MEDICATION MANAGEMENT;FINANCES;DRIVING
ADLS_ACUITY_SCORE: 31
ADLS_ACUITY_SCORE: 32
IADLS,_PREVIOUS_FUNCTIONAL_LEVEL: INDEPENDENT
ADLS_ACUITY_SCORE: 32
ADLS_ACUITY_SCORE: 32
ADLS_ACUITY_SCORE: 31
ADLS_ACUITY_SCORE: 32
ADLS_ACUITY_SCORE: 31
ADLS_ACUITY_SCORE: 32
ADLS_ACUITY_SCORE: 32
ADLS_ACUITY_SCORE: 31
ADLS_ACUITY_SCORE: 32
ADLS_ACUITY_SCORE: 31
ADLS_ACUITY_SCORE: 31
BADLS,_PREVIOUS_FUNCTIONAL_LEVEL: INDEPENDENT
ADLS_ACUITY_SCORE: 32
ADLS_ACUITY_SCORE: 32
ADLS_ACUITY_SCORE: 31
ADLS_ACUITY_SCORE: 32

## 2024-10-02 NOTE — PROGRESS NOTES
10/02/24 0982   Appointment Info   Signing Clinician's Name / Credentials (OT) Hannah oneil, OTR/L   Living Environment   People in Home spouse   Current Living Arrangements house   Home Accessibility stairs to enter home;stairs within home   Number of Stairs, Main Entrance 2   Stair Railings, Main Entrance railing on right side (ascending)   Number of Stairs, Within Home, Primary   (Pt reports a full flight to access main floor, and 6 to go downstairs)   Stair Railings, Within Home, Primary railings safe and in good condition   Transportation Anticipated family or friend will provide   Living Environment Comments OT: Per chart review, pt lives in a split level home in [facility] with spouse. 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 Pt has a tub/shower and combo and walk in shower with grab bars. Pt uses walk in shower. Pt has an elevated toilet seat and a regular toilet seat with vanity nearby. Pt reports having a walk out on the lower level.   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 Pt was IND with all ADLs/IADLs and mobility prior. Pt has a FWW that she uses occasionally.   Instrumental Activities of Daily Living (IADL)   Previous Responsibilities meal prep;housekeeping;laundry;medication management;finances;driving   IADL Comments OT: Pt's daughter assists with yardwork and grocery shopping   Post-Acute Assessment Only   Post-Acute Functional Assessment See below   Previous Level of Function/Home Environm   Bathing, Previous Functional Level independent   Grooming, Previous Functional Level independent   Dressing, Previous Functional Level independent   Eating/Feeding,  "Previous Functional Level independent   Toileting, Previous Functional Level independent   BADLs, Previous Functional Level independent   IADLs, Previous Functional Level independent   Bed Mobility, Previous Functional Level independent   Transfers, Previous Functional Level independent   Household Ambulation, Previous Functional Level independent   Stairs, Previous Functional Level independent   Community Ambulation, Previous Functional Level independent   General Information   Onset of Illness/Injury or Date of Surgery 09/25/24   Referring Physician Dr. Self   Patient/Family Therapy Goal Statement (OT) OT: \" To get home\"   Additional Occupational Profile Info/Pertinent History of Current Problem OT: Per chart review, pt \"is a 79 year old R hand dominant female with past medical history of prior stroke (10/2023; left frontal lobe and corona radiata), hyperlipidemia, hypertension, central retinal artery occlusion of right eye, left breast cancer, GERD, prediabetes, sensorineural hearing loss, and obesity who presented on 9/25/24 with gait instability and slurred speech.  CT head revealed acute left basal ganglia intraparenchymal hemorrhage.  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  She was not a candidate for thrombolytics or endovascular treatment.  She was admitted for further evaluation and management.\"   Existing Precautions/Restrictions fall;swallowing   Limitations/Impairments hearing;swallowing   Heart Disease Risk Factors Family history;Medical history;Age;Dislipidemia   Cognitive Status Examination   Orientation Status person;place  (cues for day of week and date)   Visual Perception   Impact of Vision Impairment on Function (Vision) OT: Intact visual tracking, impaired peripheral and convergence, impaired confrontation with R lower quadrant. Pt wears reading glasses at baseline "   Sensory   Sensory Comments OT: Intact vibration, and light touch in BUE's   Pain Assessment   Patient Currently in Pain No   Posture   Posture Comments OT: Posterior leaning when seated at EOB. to maintain balance requires unilateral UE support   Range of Motion Comprehensive   Comment, General Range of Motion OT: WFL all planes bilaterally   Strength Comprehensive (MMT)   Comment, General Manual Muscle Testing (MMT) Assessment OT: 3+/5 with sh. flex, sh. abd, elb. flex/ext, wrist flex/ext and  bilaterally   Clinical Impression   Criteria for Skilled Therapeutic Interventions Met (OT) Yes, treatment indicated   OT Diagnosis OT: Decreased safety and IND with ADLs/IADLs   Influenced by the following impairments OT: Impaired trunk control, impaired balance, impaired strength, impaired vision, impaired cognition, and impaired activity tolerance   OT Problem List-Impairments impacting ADL problems related to;activity tolerance impaired;balance;cognition;communication;coordination;eating/swallowing;fear & anxiety;hearing;inability to direct their own care;mobility;motor control;strength;pain;postural control;other (see comments);vision   Assessment of Occupational Performance 5 or more Performance Deficits   Identified Performance Deficits OT: Performance deficits include: dressing, toileting, bathing, G/H tasks, and all IADLs   Planned Therapy Interventions (OT) ADL retraining;IADL retraining;balance training;bed mobility training;cognition;fine motor coordination training;groups;other (see comments);risk factor education;home program guidelines;progressive activity/exercise;visual perception;transfer training;stretching;strengthening;neuromuscular re-education;motor coordination training   Clinical Decision Making Complexity (OT) comprehensive assessment/high complexity   Risk & Benefits of therapy have been explained evaluation/treatment results reviewed;care plan/treatment goals reviewed;risks/benefits  reviewed;current/potential barriers reviewed;participants voiced agreement with care plan;participants included;patient   Clinical Impression Comments OT: Pt would benefit from skilled OT services d/t recent decline in safety and IND with ADLs/IADLs and functional mobility following hospitalization for hemorrhagic stroke resulting in impaired trunk control, impaired balance, impaired strength, impaired vision, impaired cognition, and impaired activity tolerance. Anticipate a 3 week LOS with HC OT services vs extended rehabilitation.   OT Total Evaluation Time   OT Eval, High Complexity Minutes (84587) 25   OT Goals   Therapy Frequency (OT) 6 times/week   OT Predicted Duration/Target Date for Goal Attainment 10/22/24   OT Goals Hygiene/Grooming;Upper Body Dressing;Lower Body Dressing;Upper Body Bathing;Lower Body Bathing;Toilet Transfer/Toileting;Cognition;OT Goal 1;OT Goal 2;Meal Preparation   OT: Hygiene/Grooming modified independent;from wheelchair   OT: Upper Body Dressing Independent   OT: Lower Body Dressing Minimal assist;using adaptive equipment   OT: Upper Body Bathing Supervision/stand-by assist;using adaptive equipment   OT: Lower Body Bathing using adaptive equipment;Minimal assist   OT: Toilet Transfer/Toileting Minimal assist;toilet transfer;cleaning and garment management;using adaptive equipment   OT: Meal Preparation Modified independent;with simple meal preparation;from wheelchair;using adaptive equipment   OT: Cognitive Patient/caregiver will verbalize understanding of cognitive assessment results/recommendations as needed for safe discharge planning   OT: Goal 1 OT: Pt will safely complete shower transfer utilizing appropriate AE/DME with min A.   OT: Goal 2 OT: Pt will safely complete BUE HEP to increase strength and endurance needed for ADLs/IADLs and functional mobility.   Self-Care/Home Management   Self-Care/Home Mgmt/ADL, Compensatory, Meal Prep Minutes (63997) 37   Treatment Detail/Skilled  Intervention OT: Educated pt on role of OT and goals for OT POC. Completed toileting tasks with use of rex steady. Recommend Ax2 with nsg for use of rex steady at this time.   OT Discharge Planning   OT Plan OT; Rex steady to/from rolling shower chair, GG bathing, FB dressing and G/H tasks   Total Session Time   Timed Code Treatment Minutes 35   Total Session Time (sum of timed and untimed services) 60   Post Acute Settings Only   What unit is patient on? Acute Rehab   OT - Acute Rehab Center Time   Individual Time (minutes) - OT 60   Group Time (minutes) - OT 0   Concurrent Time (minutes) - OT 0   Co-Treatment Time (minutes) - OT 0   ARC Total Session Time (minutes) - OT 60   ARC Daily Total Session Time   OT ARC Daily Total Session Time 60   ARC Daily Rehab Total Minutes 60   Comprehension   Comprehension Comment OT: needs hearing aids   Lower Body Dressing putting on/taking off footwear   Describe performance OT; dependent with footwear   Toilet Hygiene   Physical assistance to complete Toileting Task Complete dependence for toileting (patient does none of effort) or assist of 2 helpers   Describe performance OT: Ax2 with rex steady   Toilet Transfer   Complete independence with getting on and off a toilet or commode no   Describe performance OT: Ax2 with rex steady   Physical assistance for getting on and off a toilet or commode Completely dependent for getting on and off the toilet/commode, pt does none of the effort or 2 helpers.   Chair/bed-to-chair Transfer   Complete independence for chair-bed-to-chair transfer no   Describe performance OT: Max A with regular height bed with rex steady. Min A with elevated bed with rex steady   Physical assistance for getting from chair-bed-to-chair Requires maximal assistance- lifting AND lowering assist provided by staff for getting from chair-bed-to-chair, 51%-75% assist provided by staff   Sit to Lying   Assistance Needed Adaptive equipment;Set-up /  clean-up;Supervision   Physical Assistance Level 75% or more   Comment OT: Assistance with BLE's and trunk   Sit to Lying CARE Score 2   Lying to Sitting on Side of Bed   Assistance Needed Adaptive equipment;Set-up / clean-up;Supervision   Physical Assistance Level 75% or more   Comment OT: Assistance with BLE's and trunk   Lying to Sitting CARE Score 2   Sit to Stand   Assistance Needed Adaptive equipment;Supervision;Set-up / clean-up   Physical Assistance Level Total assistance   Comment OT: Ax2 with rex steady   Sitting to Standing CARE Score 1

## 2024-10-02 NOTE — PHARMACY-ADMISSION MEDICATION HISTORY
Admission medication history completed at [facility]. Please see Pharmacist Admission Medication History note from 09/26/2024.

## 2024-10-02 NOTE — PROGRESS NOTES
Discharge Planner Post-Acute Rehab OT:     Discharge Plan: Home with HC OT services vs TCU pending progress    Precautions: Falls, Left low back pain, L knee pain, Elim IRA (needs hearing aids)    Current Status:  ADLs:  Mobility: W/C based. Rex steady Ax2  Grooming: TBA  Dressing: TBA  Bathing: TBA  Toileting: Ax2 with rex steady to/from toilet  IADLs: IND prior. Daughter assisted with yard work and grocery shopping.   Vision/Cognition: Pt is Ox2. Intact visual tracking, impaired peripheral and convergence, impaired confrontation with R lower quadrant. Pt wears reading glasses at baseline. Will further assess cognition.     Assessment: Pt would benefit from skilled OT services d/t recent decline in safety and IND with ADLs/IADLs and functional mobility following hospitalization for hemorrhagic stroke resulting in impaired trunk control, impaired balance, impaired strength, impaired vision, impaired cognition, and impaired activity tolerance. Anticipate a 3 week LOS with HC OT services vs extended rehabilitation.     Other Barriers to Discharge (DME, Family Training, etc): Family training prior to discharge    AE/DME pending progress     Barriers to home: stairs

## 2024-10-02 NOTE — PROGRESS NOTES
"   10/02/24 1103   Appointment Info   Signing Clinician's Name / Credentials (PT) Maritza Ruiz, PT, DPT   Living Environment   People in Home spouse   Current Living Arrangements house   Home Accessibility stairs to enter home;stairs within home   Number of Stairs, Main Entrance 2   Stair Railings, Main Entrance railing on right side (ascending)   Number of Stairs, Within Home, Primary   (pt reports split entry with 7 steps up/5 steps down)   Stair Railings, Within Home, Primary railing on left side (ascending);railing on right side (ascending)  (L side up to upper level, R side up from lower level to entrance level)   Self-Care   Usual Activity Tolerance good   Current Activity Tolerance fair   Fall history within last six months yes   Number of times patient has fallen within last six months 1   Activity/Exercise/Self-Care Comment Pt was IND with all mobility and ADLs prior. PT reports she briefly used a cane in the past but not recently. 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 Per chart review their daughter lives next door.   General Information   Onset of Illness/Injury or Date of Surgery 10/01/24   Referring Physician Vaishnavi Pacheco, VICKY   Patient/Family Therapy Goals Statement (PT) improved mobility and independence & to go home   Pertinent History of Current Problem (include personal factors and/or comorbidities that impact the POC) Per chart, \"Chika Hurd is a 79 year old R hand dominant female with past medical history of prior stroke (10/2023; left frontal lobe and corona radiata), hyperlipidemia, hypertension, central retinal artery occlusion of right eye, left breast cancer, GERD, prediabetes, sensorineural hearing loss, and obesity who presented on 9/25/24 with gait instability and slurred speech. CT head revealed acute left basal ganglia intraparenchymal hemorrhage. CTA head/neck with incidental 4mm saccular aneurysm at [facility]; at least moderate stenosis " "brachiocephalic artery; moderate stenosis of right vertebral artery origin. She was not a candidate for thrombolytics or endovascular treatment. She was admitted for further evaluation and management.\"   Existing Precautions/Restrictions fall   Cognition   Orientation Status (Cognition) oriented x 4   Follows Commands (Cognition) delayed response/completion;increased processing time needed   Pain Assessment   Patient Currently in Pain   (denies pain initially but later reports some back pain with bed mobility and L knee pain with activity during session)   Posture    Posture Forward head position;Protracted shoulders   Range of Motion (ROM)   Range of Motion ROM deficits secondary to weakness   Strength (Manual Muscle Testing)   Strength Comments Pt demonstrates generalized weakness & deconditioning with functional mobility. Able to lift each LE off bed small amount with difficulty. Hip flexion <3/5 bilaterally   Bed Mobility   Comment, (Bed Mobility) ModA semifowler's>sit, pt reports some back pain with transition; Donte for LEs & light support at trunk with sit>supine   Transfers   Comment, (Transfers) ModA sit>partial stand from EOB, not acheiving full standing, LEs braced on EOB, needing continued Donte for stability   Gait/Stairs (Locomotion)   Comment, (Gait/Stairs) Unable to take a step forward with L UE on wall rail and R UE HHA from PT, pt with posterior LOB and assisted to return to sit on WC   Sensory Examination   Sensory Perception Comments With closed eye assessment, pt able to describe location of light touch on LEs with inaccuracy of R vs L ~40% of the time but suspect this may be due to speech deficit moreso than sensation impairment   Coordination   Coordination Comments deliberate & slow with finger<>nose; heel<>shin intact, MONA intact   Clinical Impression   Criteria for Skilled Therapeutic Intervention Yes, treatment indicated   PT Diagnosis (PT) Impaired functional mobility s/p L basal ganglia " CVA   Influenced by the following impairments impaired strength, impaired activity tolerance, impaired balance, decreased activity tolerance   Functional limitations due to impairments difficulty with bed mobility, transfers, ambulation, stairs   Clinical Presentation (PT Evaluation Complexity) stable   Clinical Presentation Rationale clinical judgement   Clinical Decision Making (Complexity) low complexity   Planned Therapy Interventions (PT) balance training;bed mobility training;gait training;home exercise program;neuromuscular re-education;patient/family education;stair training;strengthening;transfer training;cryotherapy;ROM (range of motion);stretching;home program guidelines;progressive activity/exercise;thermotherapy;manual therapy techniques;motor coordination training   Risk & Benefits of therapy have been explained evaluation/treatment results reviewed;care plan/treatment goals reviewed;risks/benefits reviewed;current/potential barriers reviewed;participants voiced agreement with care plan;participants included;patient   Clinical Impression Comments Pt presents after acute L  basal ganglia hemorrhage. She presents with deficits in force production, activity tolerance & balance and is well below baseline mobility, currently needing Ax1-2 for transfers and unable to ambulate at time of PT eval. 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   PT Total Evaluation Time   PT Eval, Low Complexity Minutes (03969) 50   Physical Therapy Goals   PT Frequency 6x/week   PT Predicted Duration/Target Date for Goal Attainment 10/22/24   PT Goals Bed Mobility;Transfers;Gait;Stairs   PT: Bed Mobility Modified independent;Supine to/from sit;Rolling  (per home bed set up)   PT: Transfers Modified independent;Sit to/from stand;Bed to/from chair;Assistive device   PT: Gait Modified  independent;Assistive device;100 feet   PT: Stairs Minimal assist;7 stairs;Rail on left  (Stairs as needed for home navigation)   Interventions   Interventions Quick Adds Therapeutic Activity   Therapeutic Activity   Therapeutic Activities: dynamic activities to improve functional performance Minutes (46151) 25   Treatment Detail/Skilled Intervention See GG items below which included cuing & facilitation- WC<>mat table transfer, sit>stand from edge of bed, rolling.   PT Discharge Planning   PT Plan Trial ambulation in parallel bars, Sit>stand progression from elevated surface, high intensity intervals on NuStep?   Total Session Time   Timed Code Treatment Minutes 25   Total Session Time (sum of timed and untimed services) 75   Post Acute Settings Only   What unit is patient on? Acute Rehab   PT - Acute Rehab Center Time   Individual Time (minutes) - PT 75   Group Time (minutes) - PT 0   Concurrent Time (minutes) - PT 0   Co-Treatment Time (minutes) - PT 0   ARC Total Session Time (minutes) - PT 75   ARC Daily Total Session Time   PT ARC Daily Total Session Time 75   ARC Daily Rehab Total Minutes 135   Toilet Transfer   Describe performance MaxA with norbert Westbrook   Chair/bed-to-chair Transfer   Describe performance WC<>mat table squat pivot skilled ModA, significant extra time and cueing needed; skilled set up needed. Pt with limited ability to heel-toe feet but not able to lift either foot off floor with this   Roll Left and Right   Comment Rolled to R sidelying with SBA, to L sidelying with light Donte for initiation of upper trunk+ cues   Sit to Lying   Comment Donte for LEs and light trunk support   Lying to Sitting on Side of Bed   Comment ModA, assist with LEs and trunk, performed both from bed with HOB elevated and from flat mat  table   Sit to Stand   Comment ModA from elevated EOB but not able to acheive full standing, with LEs braced on bed and hips remain in some flexion/trunk forward flexion    Locomotion   Locomotion Comment Unable to ambulate forward currently; Able to perform some WC mobility with frequent cuing needed   Walk 10 Feet   Comment Unable   Walk 50 Feet with Two Turns   Comment Unable   Walk 150 Feet   Comment Unable   Walking 10 Feet on Uneven Surfaces   Comment Unable   Wheel 50 Feet with Two Turns   Comment After initial orientation/edu on WC self propulsion, pt able to complete ~50ft with 2 90 degree turns with continued cues throughout; initially trialed UEs only, then LEs only, then UEs & LEs for WC propulsion. Pt noted to stop using R UE 2x, pt cuing her to continue to use R UE. Very slow forward progress with WC self propulsion on flat smooth surface   Wheel 150 Feet   Comment Unable/fatigues with ~50 ft   Stairs   Functional Performance Safety concern (see comments)   1 Step (Curb)   Reason if not Attempted Safety concerns   1 Step CARE Score 88   4 Steps   Reason if not Attempted Safety concerns   4 Steps CARE Score 88   12 Steps   Reason if not Attempted Safety concerns   12 Steps CARE Score 88   Picking Up Object   Reason if not Attempted Safety concerns    CARE Score 88   Car Transfer   Reason if not Attempted Safety concerns   Car Transfer CARE Score 88

## 2024-10-02 NOTE — CARE CONFERENCE
Acute Rehab Care Conference/Team Rounds      Type: Team Rounds    Present: Dr. Sapp, Vaishnavi Pacheco PA, Dr. Akhtar Neuropsychologist, Shelia Spaulding PT, Bren Lorenz OT, Raudel Guzman SLP, Isamar George , Anahi Moses RD, Ricky Carballo RN, and Chika Hurd Patient.      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  Dysphagia evaluation plan for later 10/2 or 10/3 as schedule allows.    Bowel/Bladder:    Psychosocial: SW assessment pending.     ADLs/IADLs: Pending OT eval    Mobility: Undergoing PT eval today    Cognition/Language: SLP evaluation in progress.    Community Re-Entry: pending therapy evals today    Transportation: pending therapy evals today    Decision maker: self with assist?    Plan of Care and goals reviewed and updated.    Discharge Plan/Recommendations    Fall Precautions: continue    Patient/Family input to goals: Yes    Anticipated rehab needs following discharge: Continued PT, OT, SLP    Anticipated care giver support after discharge: Limited support identified at this time    Estimated length of stay: 3 weeks    Overall plan for the patient: Patient early in stay and therapy evaluations underway.  At this time no significant social supports have been identified and with significant deficits, would anticipate longer stay versus discharge to alternative level care.      Utilization Review and Continued Stay Justification    Medical Necessity Criteria:    For any criteria that is not met, please document reason and plan for  discharge, transfer, or modification of plan of care to address.    Requires intensive rehabilitation program to treat functional deficits?: Yes    Requires 3x per week or greater involvement of rehabilitation physician to oversee rehabilitation program?: Yes    Requires rehabilitation nursing interventions?: Yes    Patient is making functional progress?: Yes    There is a potential for additional functional progress? Yes    Patient is participating in therapy 3 hours per day a minimum of 5 days per week or 15 hours per week in 7 day period?:Yes    Has discharge needs that require coordinated discharge planning approach?:Yes      Barriers/Concerns related to meeting medical necessity criteria:  None    Team Plan to Address Concern:  N/A      Final Physician Sign off    Statement of Approval:   I have reviewed and agree with the recommendations and documentation in this team rounds note.       Patient Goals  Social Work Goals: Confirm discharge recommendations with therapy, coordinate safe discharge plan and remain available to support and assist as needed.    OT goals pending evaluation       PT Goals: pending PT eval today    SLP goals pending evaluation results.                 Patient/Family Goal: Bowel: Pt will regain bowel continence either with timed toileting or bowel program by discharge from ARU.  Patient/Family Goal: Bladder: Pt will regain bladder continence on toilet using timed toileting by discharge from ARU.     Patient/Family Goal: Medication Management: Pt will pass MAP by discharge from ARU.

## 2024-10-02 NOTE — PROGRESS NOTES
"   10/02/24 5135   Appointment Info   Signing Clinician's Name / Credentials (SLP) Katiana Franco MS, CCC-SLP   General Information   Onset of Illness/Injury or Date of Surgery 09/25/24   Referring Physician Vaishnavi Pacheco PA-C   Pertinent History of Current Problem Per H&P: Patient is \"79 year old R hand dominant female with past medical history of prior stroke (10/2023; left frontal lobe and corona radiata), hyperlipidemia, hypertension, central retinal artery occlusion of right eye, left breast cancer, GERD, prediabetes, sensorineural hearing loss, and obesity who presented on 9/25/24 with gait instability and slurred speech.  CT head revealed acute left basal ganglia intraparenchymal hemorrhage.  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  She was not a candidate for thrombolytics or endovascular treatment.  She was admitted for further evaluation and management.     Neurosurgery was consulted, but did not feel any neurosurgical intervention was indicated.  Neurology was consulted.  She was initially started on nifedipine drip for BP management; later transitioned back to home anti-hypertensives.  MRI brain showed acute intraparenchymal hemorrhage centered in left lentiform nucleus and extending into corona radiata; also 6mm focus of probable subacute infarction within left periatrial white matter.  Additional stroke evaluation with echo with EF 60-65%, grade 1 or early diastolic dysfunction, no WMA, no significant valvular disease; EKG with sinus rhythm; A1c 6.0%; LDL 55.  Left basal ganglia hemorrhage etiology felt to be hypertensive.  Ischemic infarct attributed to ESUS.\" SLP consult received for evaluation and treatment as indicated.   General Observations Pt arrived to ARU with orders for minced and moist solids (5) and slightly thick liquids (1). Pt followed by acute SLP for " "dysphagia, VFSS completed 09/26; please see dysphagia history below for more details.   Type of Evaluation   Type of Evaluation Swallow Evaluation   Dentition (Oral Motor)   Dentition (Oral Motor) natural dentition;adequate dentition   Facial Symmetry (Oral Motor)   Facial Symmetry (Oral Motor) WNL   Lip Function (Oral Motor)   Lip Range of Motion (Oral Motor) WNL   Lip Strength (Oral Motor) WNL   Tongue Function (Oral Motor)   Tongue Coordination/Speed (Oral Motor) reduced rate   Tongue ROM (Oral Motor) WNL   Jaw Function (Oral Motor)   Jaw Function (Oral Motor) WNL   Vocal Quality/Secretion Management (Oral Motor)   Vocal Quality (Oral Motor) WFL   General Swallowing Observations   Past History of Dysphagia Pt followed by SLP with 10/2023 CVA, per chart pt reported no dysphagia to OP SLP with regular/thin if she slows down. Clinical swallow and VFSS completed 09/26 during hospitalization for most recent CVA: \"Video fluoroscopic swallow study completed with thin liquids, slightly thick liquids, puree solids, regular solids. Pt currently presents wtih mild oropharyngeal dysphagia. Mastication and oral propulsion with solids is prolonged; complete oral clearance achieved. Some lingual pumping with solids. Reduced oral control and premature bolus spillage across consitencies (valleculae with solids, pyriform sinsues with liquids). Base of tongue retraction is mildly reduced, hyolaryngeal elevation/excursion are moderately reduced. Pharyngoesphageal segment opening during the swallow is WFL. Mild prominence of cricopharyngeus, which did not impede bolus flow into upper esophagus. Trace oropharyngeal retention during evaluation, overall insignificant. Before/during laryngeal penetration occuring 2/2 to reduced oral control, premature bolus spillage, incomplete and delayed laryngeal closure. Penetration was largely flash, but x1 deep penetration to vocal cords with no cough or throat clear response. Risk for aspiration " "given delay. Briefly eduacted pt on results/ recs following study.\"   Respiratory Support room air   Current Diet/Method of Nutritional Intake (General Swallowing Observations, NIS) slightly thick liquids (level 1);minced and moist (dysphagia mechanically altered) (level 5)   Swallowing Evaluation Clinical swallow evaluation   Clinical Swallow Evaluation   Clinical Swallow Evaluation Textures Trialed slightly thick liquids;minced & moist;soft & bite-sized   Clinical Swallow Eval: Slightly Thick Liquids   Mode of Presentation cup;self-fed   Volume Presented single sips   Oral Phase WFL   Pharyngeal Phase intact   Diagnostic Statement No overt s/sx of airway penetration/aspiration.   Clinical Swallow Eval: Minced & Moist   Mode of Presentation spoon;self-fed  (level 5 carrots from lunch, pt still consuming when SLP arrived for eval.)   Volume Presented small, single bites   Oral Phase WFL   Pharyngeal Phase intact   Diagnostic Statement No pocketing. Adequate mastication. No overt s/sx of airway penetration/aspiration.   Clinical Swallow Eval: Soft & Bite Sized   Mode of Presentation spoon;self-fed  (diced peaches)   Volume Presented small, single bites   Oral Phase WFL   Pharyngeal Phase intact   Diagnostic Statement No pocketing. Adequate mastication. No overt s/sx of airway penetration/aspiration.   Esophageal Phase of Swallow   Patient reports or presents with symptoms of esophageal dysphagia No   Swallowing Recommendations   Diet Consistency Recommendations slightly thick liquids (level 1);minced & moist (level 5)   Mode of Delivery Recommendations bolus size, small;slow rate of intake   Medication Administration Recommendations, Swallowing (SLP) per pt preference   Instrumental Assessment Recommendations VFSS (videofluoroscopic swallowing study)   Comment, Swallowing Recommendations Likely repeat VFSS early in coming week   General Therapy Interventions   Planned Therapy Interventions Dysphagia Treatment "   Dysphagia treatment Instruction of safe swallow strategies;Modified diet education   Clinical Impression   Criteria for Skilled Therapeutic Interventions Met (SLP Eval) Yes, treatment indicated   SLP Diagnosis Mild oropharyngeal dysphagia   Activity Limitations Related to Problem List (SLP) Modified solids and liquids for safety and efficiency with oral intake.   Risks & Benefits of therapy have been explained evaluation/treatment results reviewed;care plan/treatment goals reviewed;participants voiced agreement with care plan;participants included;patient   Clinical Impression Comments SLP: Clinical swallow evaluation completed. Oral mechanism exam unremarkable. Evaluated pt with cup sips slightly thick liquids (1), pt administered bites of minced and moist (5) and soft and bite sized (6) solids. Pt demo'd adequate oral phase with both solid textures, no pocketing or residue noted. No reflexive coughing or throat clearing post-swallow. Repeat VFSS indicated before advancing liquids, plan to complete early in coming week. Continue minced and moist (5) solids and slightly thick liquids (1), pt to implement general safe swallow strategies (sit upright, small/single bites and sips, alternate liquids/solids).  Skilled SLP services indicated to assess and determine safest and most efficient level of oral intake.   SLP Total Evaluation Time   Eval: oral/pharyngeal swallow function, clinical swallow Minutes (18369) 30   SLP Goals   Therapy Frequency (SLP Eval) 6 times/week   SLP Predicted Duration/Target Date for Goal Attainment 10/22/24   SLP Goals Swallow   SLP Discharge Planning   SLP Plan SLP: trial level 6 meal with 5/1 meal also available. WAB-R, pull to note.   Total Session Time   Total Session Time (sum of timed and untimed services) 30   SLP - Acute Rehab Center Time   Individual Time (minutes) - SLP 30   ARC Total Session Time (minutes) - SLP 30   ARC Daily Total Session Time   SLP ARC Daily Total Session Time  30   ARC Daily Rehab Total Minutes 90

## 2024-10-02 NOTE — PROGRESS NOTES
CLINICAL NUTRITION SERVICES - ASSESSMENT NOTE     Nutrition Prescription    RECOMMENDATIONS FOR MDs/PROVIDERS TO ORDER:  Appreciate encouragement surrounding PO intake    Malnutrition Status:    Patient does not meet two of the established criteria necessary for diagnosing malnutrition but is at risk for malnutrition    Recommendations already ordered by Registered Dietitian (RD):  - Ensure Enlive with dinner  - Additional snacks/supplements PRN  - Adjusted liquid MVI to tablet    Future/Additional Recommendations:  - Monitor PO intake, supplement use/acceptance, labs, and weight trends  - Monitor diet advancement per SLP  - Obtain nutrition history and complete NFPE as able     REASON FOR ASSESSMENT  Chika Hurd is a/an 79 year old female assessed by the dietitian for Provider Order - assess/optimize nutrition in setting of modified diet     [facility]  Past medical history of prior stroke (10/2023; left frontal lobe and corona radiata), hyperlipidemia, hypertension, central retinal artery occlusion of right eye, left breast cancer, GERD, prediabetes, sensorineural hearing loss, and obesity who was admitted on 9/25/24 with acute left basal ganglia hemorrhage with hospital course complicated by subacute stroke in left periatrial white matter, incidental saccular aneurysm, constipation, and conjunctivitis.  She is now admitted to ARU on 10/1/24 for multidisciplinary rehabilitation and ongoing medical management.     NUTRITION HISTORY  Pt not known to nutrition services. Pt was sleeping soundly during RD attempt to visit. Had finished ~90% of shortcake and more than 50% of pasta and carrots on her lunch tray. RD will obtain nutrition history and complete NFPE as able.     CURRENT NUTRITION ORDERS  Diet: Level 5: Minced & Moist Dysphagia Diet  and Slightly Thick  Thickened Liquids   - Room service with assist    Intake/Tolerance: no intakes this admission, most intakes 50-75% during hospitalization  Per José, pt  ordered 1073 kcal and 73 g protein today (3 meals).    LABS  Labs reviewed  Na: 134 (L)  BUN: 31.2 (H)  Hemoglobin A1C: 6.0 (9/27)    MEDICATIONS  Medications reviewed  Pepcid  Hydrochlorothiazide  MVI w/ minerals, liquid    ANTHROPOMETRICS  Height: 152.4 cm (5')  Most Recent Weight: 78.6 kg (173 lb 4.5 oz)  IBW: 45.5 kg  BMI: Obesity Grade I BMI 30-34.9  Weight History:   Wt Readings from Last 10 Encounters:   10/01/24 78.6 kg (173 lb 4.5 oz)   09/25/24 79.4 kg (175 lb)   04/26/24 81.6 kg (180 lb)   10/18/23 80.3 kg (177 lb)   10/12/23 79.8 kg (176 lb)   09/15/23 82.1 kg (181 lb)   08/25/23 78.9 kg (174 lb)   02/16/23 80.3 kg (177 lb)   09/06/22 80.3 kg (177 lb)   07/26/22 81.6 kg (180 lb)   No recent weights in Care Everywhere. 3.7% wt loss in ~5 months    Dosing Weight: 54 kg (adjusted BW)    ASSESSED NUTRITION NEEDS  Estimated Energy Needs: 2189-5753 kcals/day (25 - 30 kcals/kg)  Justification: Maintenance  Estimated Protein Needs: 65-81 grams protein/day (1.2 - 1.5 grams of pro/kg)  Justification: Obesity guidelines  Estimated Fluid Needs: 1 mL/kcal  Justification: Maintenance    PHYSICAL FINDINGS  See malnutrition section below.  Kenendy Pizano     MALNUTRITION  % Intake: < 75% for > 7 days (moderate)  % Weight Loss: Weight loss does not meet criteria  Subcutaneous Fat Loss: None observed - visual only  Muscle Loss: None observed - visual only  Fluid Accumulation/Edema: Does not meet criteria - trace  Malnutrition Diagnosis: Patient does not meet two of the established criteria necessary for diagnosing malnutrition but is at risk for malnutrition    NUTRITION DIAGNOSIS  Inadequate oral intake related to limited diet as evidenced by need for level 5/1 diet, documented intakes, and meal ordering history.       INTERVENTIONS  Implementation  Nutrition Education: Unable to complete due to pt sleeping during visit   Medical food supplement therapy  Multivitamin/mineral supplement therapy     Goals  Patient to consume  % of nutritionally adequate meal trays TID, or the equivalent with supplements/snacks.     Monitoring/Evaluation  Progress toward goals will be monitored and evaluated per protocol.   Anahi Moses RD, LD  Available via phone and [employer]  Phone: [phone]  Vocera: 5R Acute Rehab Clinical Dietitian  Weekend/Holiday Vocera: Weekend Holiday Clinical Dietitian [Multi Site Groups]

## 2024-10-02 NOTE — PLAN OF CARE
Goal Outcome Evaluation:      Plan of Care Reviewed With: patient    Overall Patient Progress: no changeOverall Patient Progress: no change    Patient is alert and oriented x4, make needs known. Garbled speech and with word difficulty finding. No complaints of pain, sob, dizziness, fever or any new weakness. Incontinent of bladder per report and was received with purewick on. Slept through the night. No issues. Assisted with ordering bfast today. Continue poc.

## 2024-10-02 NOTE — PLAN OF CARE
Goal Outcome Evaluation:      Plan of Care Reviewed With: patient    Overall Patient Progress: improvingOverall Patient Progress: improving    Alert to self and place, disoriented to time. Incontinent of bladder contained in her brief, also continent on the toilet x 1. C/ L hip area pain whilst participating in PT/OT, provider is aware and has schedule votaren gel. Need set up assist for meals. Use her call light appropriately, will continue poc

## 2024-10-02 NOTE — LETTER
To:             Please give to facility    From:   Nazia Medrano RN, Care Coordinator   [facility]   Nazia Medrano RN, Care Coordinator   [facility]   E-mail [email or web address]   [phone]   Patient Name:  Chika Hurd YOB: 1945   Admit date: 10/1/2024      *Information Needed:  Please contact me when the patient will discharge (or if they will move to long term care)- include the discharge date, disposition, and main diagnosis   If the patient is discharged with home care services, please provide the name of the agency    Also- Please inform me if a care conference is being held.   [facility]   Nazia Medrano RN, Care Coordinator   [facility]   E-mail [email or web address]   [phone]                             Thank you

## 2024-10-02 NOTE — PROGRESS NOTES
"  [facility]   Acute Rehabilitation Unit  Daily progress note    INTERVAL HISTORY  Chika Hurd was seen at bedside this morning.  No acute events reported overnight.  She reports to be feeling \"tired\" despite overall sleeping well.  She denies any pain at the time of my visit.  When reminded about complaints of pain during OT session yesterday, she notes that she does feel some \"pulling\" in left anterior hip region when she tries to sit up.  She denies having this pain prior to her stroke and denies other areas of pain.  She does not have any pain at rest, with palpation, or with AROM of the left leg.  She admits that bowels have not been moving very regularly though has difficulty answering follow-up questions.  She denies loose stools.  She also denies headache, dizziness, chest pain, shortness of breath, abdominal pain, dysuria, or nausea.    With therapies, she is needing rex teresa with Ax2 for transfers with nursing, min A for oral cares, max A for hair grooming, mod A for upper body dressing, max A for lower body dressing with total A for footwear.      MEDICATIONS  Current Facility-Administered Medications   Medication Dose Route Frequency Provider Last Rate Last Admin    amLODIPine (NORVASC) tablet 5 mg  5 mg Oral QPM Vaishnavi Pacheco PA-C   5 mg at 10/01/24 2010    aspirin (ASA) chewable tablet 81 mg  81 mg Oral Daily Vaishnavi Pacheco PA-C        atorvastatin (LIPITOR) tablet 40 mg  40 mg Oral Daily Vaishnavi Pacheco PA-C        famotidine (PEPCID) tablet 20 mg  20 mg Oral BID Vaishnavi Pacheco PA-C   20 mg at 10/01/24 2010    hydrochlorothiazide (HYDRODIURIL) tablet 25 mg  25 mg Oral Daily Vaishnavi Pacheco PA-C        lisinopril (ZESTRIL) tablet 40 mg  40 mg Oral Daily Vaishnavi Pacheco PA-C        multivitamins w/minerals liquid 15 mL  15 mL Oral Daily Vaishnavi Pacheco PA-C        ofloxacin (OCUFLOX) 0.3 % ophthalmic solution 1 drop  1 " drop Both Eyes 4x Daily Vaishnavi Pacheco PA-C   1 drop at 10/01/24 2010          Current Facility-Administered Medications   Medication Dose Route Frequency Provider Last Rate Last Admin    acetaminophen (TYLENOL) tablet 650 mg  650 mg Oral Q4H PRN Vaishnavi Pacheco PA-C        melatonin tablet 3 mg  3 mg Oral At Bedtime PRN Vaishnavi Pacheco PA-C        polyethylene glycol (MIRALAX) Packet 17 g  17 g Oral Daily PRN Vaishnavi Pacheco PA-C        senna-docusate (SENOKOT-S/PERICOLACE) 8.6-50 MG per tablet 1-2 tablet  1-2 tablet Oral BID PRN Vaishnavi Pacheco PA-C            PHYSICAL EXAM  /59 (BP Location: Right arm, Patient Position: Semi-Love's, Cuff Size: Adult Regular)   Pulse 77   Temp 97.8  F (36.6  C) (Oral)   Resp 18   LMP  (LMP Unknown)   SpO2 92%   Gen: NAD, resting in bed  HEENT: NC/AT, dry mucous membranes  Cardio: RRR, no murmurs  Pulm: non-labored on room air, lungs CTA bilaterally  Abd: soft, non-tender, non-distended, bowel sounds present  Ext: no edema in BLE  Neuro/MSK: drowsy, PERRL, EOMI, left ptosis, right facial droop, +dysarthria, +expressive aphasia (inappropriate/illogical word substitutions), able to intermittently follow simple commands.  Strength at least 4-/5 throughout bilateral upper extremities.  Able to lift both legs off bed though with some effort and could not sustain with resistance.  Slightly weaker on RUE/RLE as compared to left.      LABS  CBC RESULTS:   Recent Labs   Lab Test 09/26/24  0446 09/25/24  1336 10/12/23  1239   WBC 11.5* 8.2 9.1   RBC 4.44 4.64 4.65   HGB 13.6 13.8 13.9   HCT 40.6 42.3 43.5   MCV 91 91 94   MCH 30.6 29.7 29.9   MCHC 33.5 32.6 32.0   RDW 13.9 13.8 13.3    309 285       Last Basic Metabolic Panel:  Recent Labs   Lab Test 10/01/24  0823 09/28/24  1525 09/27/24  1726 09/26/24  1309 09/26/24  0446 09/25/24  1956 09/25/24  1336   *  --   --   --  135  --  137   POTASSIUM 4.4  --   --   --  4.2  --  4.2   CHLORIDE  97*  --   --   --  100  --  100   CO2 25  --   --   --  26  --  29   ANIONGAP 12  --   --   --  9  --  8   * 123* 124*   < > 92   < > 127*   BUN 31.2*  --   --   --  13.2  --  15.9   CR 0.73  --   --   --  0.64  --  0.73   GFRESTIMATED 84  --   --   --  >90  --  84   KAR 9.3  --   --   --  9.3  --  9.8    < > = values in this interval not displayed.         Rehabilitation     Admission to acute inpatient rehab 10/01/24.    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       Impairment of ADL's: Noted to have BLE weakness, impaired balance, impaired coordination, impaired activity tolerance, impaired alertness, and impaired cognition, all affecting her ability to safely and independently perform basic ADLs.  Goal for SBA with basic ADLs except min A for bathing.      Impairment of mobility:  Noted to have BLE weakness, impaired balance, impaired coordination, impaired activity tolerance, impaired alertness, and impaired cognition, all affecting her ability to safely and independently perform basic mobility.  Goal for SBA with basic mobility with min A for stairs.      Impairment of cognition/language/swallow:  Noted to have dysphagia, dysarthria, aphasia, and impaired cognition with goals for safe tolerance of least restrictive diet and improved cognitive-linguistic skills to meet basic needs.     Continue comprehensive acute inpatient rehabilitation program with multidisciplinary approach including therapies, rehab nursing, and physiatry following. See interval history for updates.      ASSESSMENT AND PLAN  Chika Hurd is a 79 year old right hand dominant female with a past medical history of prior stroke (10/2023; left frontal  lobe and corona radiata), hyperlipidemia, hypertension, central retinal artery occlusion of right eye, left breast cancer, GERD, prediabetes, sensorineural hearing loss, and obesity who was admitted on 9/25/24 with acute left basal ganglia hemorrhage with hospital course complicated by subacute stroke in left periatrial white matter, incidental saccular aneurysm, constipation, and conjunctivitis.  She was admitted to ARU on 10/1/24 for multidisciplinary rehabilitation and ongoing medical management.      Medical Conditions  New actions/orders/updates for today are in blue.     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  Presented with new gait instability and worsened dysarthria.  Imaging findings as above.  PTA on Plavix + aspirin, plan had been for 3 months.  Per sending team, does not need to resume Plavix but ok'd to resume aspirin daily on 10/1.  - Continue ASA 81 mg daily (resumed on 10/1)  - Continue statin with LDL goal 40-70  - SBP goal 130-150 as inpatient, long-term outpatient goal <130/80.  Management as below.  - Diet advanced to soft and bite-sized per SLP.  Repeat video swallow study ordered per discussion with SLP.  - Ziopatch x14 days  - Continue PT/OT/SLP  - Follow up with neurology in 6-8 weeks     Hyperlipidemia  - LDL goal 40-70 (LDL 55 on 9/26)  - Continue PTA atorvastatin 40 mg daily     Hypertension  - Continue PTA hydrochlorothiazide 25 mg daily  - Continue PTA lisinopril 40 mg daily  - Continue amlodipine 5 mg daily (added 10/1)  - Monitor BP and adjust  regimen as indicated     Hyponatremia  Mild at 134 on 10/1.  Suspect related to hydrochlorothiazide, mild hypovolemia.  BUN mildly elevated.  On slightly thick liquids.  10/3: Na stable/mildly low at 134.  BUN also elevated, mild leukocytosis, and appears dry on exam.  Suspect mild hypovolemia in setting of impaired cognition, thickened liquids, impaired intake - encourage fluids.  - Monitor BMP every M/Th     GERD  - Continue PTA famotidine 20 mg BID     Constipation  In setting of decreased mobility  Last BM on 10/2 after several days without  - Continue Miralax daily  - Monitor, additional PRN bowel meds available, can schedule if ongoing issues     Prediabetes  A1c 6.0% on 9/27/24  - Follow up with PCP for ongoing surveillance     Bilateral conjunctivitis  Started on ofloxacin at hospital on 9/29 due to purulent discharge from bilateral eyes with associated pain.  - Continue ofloxacin bilaterally thru 10/4 to complete course        Adjustment to disability:  Clinical psychology to eval and treat if indicated  FEN: soft and bite-sized (level 6) diet, slightly thick (level 1) liquids; swallow strategies per SLP  Bowel: continent, recent constipation.  Continue scheduled Miralax.  Additional PRN bowel medications available.  Monitor and adjust regimen as indicated.  Bladder: continent/incontinent  DVT Prophylaxis: mechanical  GI Prophylaxis: pepcid  Code: full; as prior  Disposition: home vs TCU  ELOS: target 10/22/24 pending progress  Follow up Appointments on Discharge: PCP in 1-2 weeks, general neurology or stroke REUBEN in 6-8 weeks      25 minutes spent on the date of service doing chart review, history and exam, documentation, and further activities as noted above.         Vaishnavi Pacheco PA-C  Physical Medicine & Rehabilitation

## 2024-10-02 NOTE — PLAN OF CARE
Discharge Planner Post-Acute Rehab SLP:     Discharge Plan: TBD. Ongoing SLP    Precautions: fall risk    Current Status:  Hearing: Sensorineural hearing loss, bilateral hearing aides.  Vision: Readers  Communication: Mild to moderate motor speech deficits, Mild expressive and receptive aphasia.  Cognition: To be evaluated in coming days  Swallow: Minced and moist solids (5)/Slightly thick liquids (1), pt to sit upright, small/single bites and sips, alternate liquids/solids    Assessment:   Swallow: Clinical swallow evaluation completed. Oral mechanism exam unremarkable. Evaluated pt with cup sips slightly thick liquids (1), pt administered bites of minced and moist (5) and soft and bite sized (6) solids. Pt demo'd adequate oral phase with both solid textures, no pocketing or residue noted. No reflexive coughing or throat clearing post-swallow. Repeat VFSS indicated before advancing liquids, plan to complete early in coming week. Continue minced and moist (5) solids and slightly thick liquids (1), pt to implement general safe swallow strategies (sit upright, small/single bites and sips, alternate liquids/solids).  Skilled SLP services indicated to assess and determine safest and most efficient level of oral intake.    Communication:  Pt with mild to moderate motor speech deficits, charaterized by mild dysarthria/imprecise articulation and dysfluent-type speech during novel/spontaneous productions which is suspected to be related to cognitive and high level verbal expression deficits. WAB-R Bedside administered, score of 91.67 significant improvement from prior administration; pt rated at mild expressive and receptive aphasia. SLP recommends 1-2 sessions of high level verbal expression for additional informal analysis before administering standardied cognitive-linguistic evaluation CLQT+ Aphasia likely most appropriate. Skilled SLP services indicated to improve cognitive-linguistic skills.     Other Barriers to  Discharge (Family Training, etc): diet texture pending progress

## 2024-10-02 NOTE — PROGRESS NOTES
"   10/02/24 5388   Appointment Info   Signing Clinician's Name / Credentials (SLP) Katiana Franco MS, CCC-SLP   General Information   Onset of Illness/Injury or Date of Surgery 09/25/24   Referring Physician Vaishnavi Pacheco PA-C   Pertinent History of Current Problem Per H&P: Patient is \"79 year old R hand dominant female with past medical history of prior stroke (10/2023; left frontal lobe and corona radiata), hyperlipidemia, hypertension, central retinal artery occlusion of right eye, left breast cancer, GERD, prediabetes, sensorineural hearing loss, and obesity who presented on 9/25/24 with gait instability and slurred speech. CT head revealed acute left basal ganglia intraparenchymal hemorrhage. 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 She was not a candidate for thrombolytics or endovascular treatment. She was admitted for further evaluation and management. Neurosurgery was consulted, but did not feel any neurosurgical intervention was indicated. Neurology was consulted. She was initially started on nifedipine drip for BP management; later transitioned back to home anti-hypertensives. MRI brain showed acute intraparenchymal hemorrhage centered in left lentiform nucleus and extending into corona radiata; also 6mm focus of probable subacute infarction within left periatrial white matter. Additional stroke evaluation with echo with EF 60-65%, grade 1 or early diastolic dysfunction, no WMA, no significant valvular disease; EKG with sinus rhythm; A1c 6.0%; LDL 55. Left basal ganglia hemorrhage etiology felt to be hypertensive. Ischemic infarct attributed to ESUS.\" SLP consult received for evaluation and treatment as indicated.   General Observations Pt also followed by acute SLP for communication, WAB-R BEdside administered 09/28, score 23.33/100 consistent with severe aphasia.   Type of " Evaluation   Type of Evaluation Speech, Language, Cognition   Motor Speech   Vocal Loudness (Motor Speech) WFL   Speech Intelligibility (Motor Speech) word level;phrase/sentence level;conversational level   Resonance (Motor Speech) WFL   Speech Fluency (Motor Speech) restarts/self-corrections   Articulation (Motor Speech) imprecise articulation   Word Level, Speech Intelligibility (Motor Speech) minimal impairment   Conversational Level, Speech Intelligibility (Motor Speech) severe impairment  (moderate to severe during novel/spontaneous connected speech attempts.)   Phrase/Sentence Level, Speech Intelligibility (Motor Speech) moderate impairment   Western Aphasia Battery- Revised Bedside Record From   Spontaneous Speech Content Score (out of 10) 9   Spontaneous Speech Fluency Score (out of 10) 9   Auditory Verbal Comprehension Score (out of 10) 9   Sequential Commands Score (out of 10) 10   Repetition Score (out of 10) 9   Object Naming Score (out of 10) 9   Bedside Aphasia Sum 55   WAB-R Bedside Aphasia Score 91.67   Reading Score (out of 10) 10   Apraxia Score - optional (out of 10) 10   Bedside Aphasia Classification Anomic Aphasia   Aphasia Severity Level Mild Aphasia   Auditory Comprehension   Object Identification (Auditory Comprehension) field of 3   Yes/No Questions (Auditory Comprehension) simple/factual questions;biographical/personal questions   Biographical/Personal Questions (Auditory Comprehension) intact   Simple/Factual Questions (Auditory Comprehension) 75-90% accuracy   Field of 3, Object Identification (Auditory Comprehension) intact   Verbal Expression   Confrontational Naming (Verbal Expression) body parts;objects;pictures;WFL   Conversational Speech (Verbal Expression) connected speech;sentence level;phrase level   Narrative Speech (Verbal Expression) picture description   Repetition Skills (Verbal Expression) words;phrases;WFL;sentences   Sentences, Repetition Skills (Verbal Expression)  impaired  (suspect due to working memory deficits)   Picture Description, Narrative Speech (Verbal Expression) minimal impairment   Sentence Level, Conversational Speech (Verbal Expression) intact   Phrase Level, Conversational Speech (Verbal Expression) intact   Connected Speech, Conversational (Verbal Expression) moderate impairment  (impacted by motor speech deficits)   Reading Comprehension   Oral Reading Ability (Reading Comprehension) paragraph level;WFL   Comprehension Level (Reading Comprehension) paragraph level tasks;WFL   Written Language   Comment, Assessment (Written Language) Not tested this day   Cognition   Cognitive Function memory deficit  (suspected)   Cognitive Status Alert, pleasant, cooperative   Additional cognitive-linguistic evaluation indicated  Other (see comments)   Cognitive Status Exam Comments recommend 1-2 sessions of high level verbal expression before administeringjohn CLQT+ Aphasia for cog-ling eval.   Orientation Status (Cognition) oriented to;person;situation   Follows Commands (Cognition) follows one-step commands;follows two-step commands;WFL   General Therapy Interventions   Planned Therapy Interventions Language;Cognitive Treatment   Cognitive treatment Progressive attention training;External memory strategy training;Internal memory strategy training   Language Verbal expression   Clinical Impression   Criteria for Skilled Therapeutic Interventions Met (SLP Eval) Yes, treatment indicated   SLP Diagnosis Mild expressive-receptive aphasia, mild to moderate dysarthria   Activity Limitations Related to Problem List (SLP) Increased assist/supervision with iADLs.   Risks & Benefits of therapy have been explained evaluation/treatment results reviewed;care plan/treatment goals reviewed;participants voiced agreement with care plan;participants included;patient   Clinical Impression Comments SLP: Pt with mild to moderate motor speech deficits, charaterized by mild  dysarthria/imprecise articulation and dysfluent-type speech during novel/spontaneous productions which is suspected to be related to cognitive and high level verbal expression deficits. WAB-R Bedside administered, score of 91.67 significant improvement from prior administration; pt rated at mild expressive and receptive aphasia. SLP recommends 1-2 sessions of high level verbal expression for additional informal analysis before administering standardied cognitive-linguistic evaluation CLQT+ APhasia likely most appopriate. Skilled SLP services indicated to improve cognitive-linguistic skills.   SLP Goals   Therapy Frequency (SLP Eval) 6 times/week   SLP Predicted Duration/Target Date for Goal Attainment 10/22/24   SLP Goals Communication   SLP: Communicate basic wants and needs verbally;independent   SLP Discharge Planning   SLP Plan SLP: trial level 6 meal with 5/1 meal also available. Tx high level expressive language 1-2 sessions, then schedule cog eval with CLQT+ Aphasia and add cog goals. Re-intro motor speech strategies, hx dysarthria with prior CVA   SLP - Acute Rehab Center Time   Individual Time (minutes) - SLP 30   ARC Total Session Time (minutes) - SLP 30   ARC Daily Total Session Time   SLP ARC Daily Total Session Time 30   ARC Daily Rehab Total Minutes 90

## 2024-10-02 NOTE — PHARMACY-MEDICATION REGIMEN REVIEW
Pharmacy Medication Regimen Review  Chika Hurd is a 79 year old female who is currently in the Acute Rehab Unit.    Assessment: All medications have an appropriate indications, durations and no unnecessary use was found.    Plan:   Continue current treatment.   Attending provider will be sent this note for review.  If there are any emergent issues noted above, pharmacist will contact provider directly by phone.      Pharmacy will periodically review the resident's medication regimen for any PRN medications not administered in > 72 hours and discontinue them. The pharmacist will discuss gradual dose reductions of psychopharmacologic medications with interdisciplinary team on a regular basis.    Please contact pharmacy if the above does not answer specific medication questions/concerns.    Background:  A pharmacist has reviewed all medications and pertinent medical history today.  Medications were reviewed for appropriate use and any irregularities found are listed with recommendations.      Current Facility-Administered Medications:     acetaminophen (TYLENOL) tablet 650 mg, 650 mg, Oral, Q4H PRN, Vaishnavi Pacheco PA-C    amLODIPine (NORVASC) tablet 5 mg, 5 mg, Oral, QPM, Vaishnavi Pacheco PA-C, 5 mg at 10/01/24 2010    aspirin (ASA) chewable tablet 81 mg, 81 mg, Oral, Daily, Vaishnavi Pacheco PA-C    atorvastatin (LIPITOR) tablet 40 mg, 40 mg, Oral, Daily, Vaishnavi Pacheco PA-C    famotidine (PEPCID) tablet 20 mg, 20 mg, Oral, BID, Vaishnavi Pacheco PA-C, 20 mg at 10/01/24 2010    hydrochlorothiazide (HYDRODIURIL) tablet 25 mg, 25 mg, Oral, Daily, Vaishnavi Pacheco PA-C    lisinopril (ZESTRIL) tablet 40 mg, 40 mg, Oral, Daily, Vaishnavi Pacheco PA-C    melatonin tablet 3 mg, 3 mg, Oral, At Bedtime PRN, Vaishnavi Pacheco PA-C    multivitamins w/minerals liquid 15 mL, 15 mL, Oral, Daily, Vaishnavi Pacheco PA-C    ofloxacin (OCUFLOX) 0.3 % ophthalmic solution 1 drop, 1 drop, Both  Eyes, 4x Daily, Vaishnavi Pacheco PA-C, 1 drop at 10/01/24 2010    polyethylene glycol (MIRALAX) Packet 17 g, 17 g, Oral, Daily PRN, Vaishnavi Pacheco PA-C    senna-docusate (SENOKOT-S/PERICOLACE) 8.6-50 MG per tablet 1-2 tablet, 1-2 tablet, Oral, BID PRN, Vaishnavi Pacheco PA-C  No current outpatient prescriptions on file.   PMH: Non-traumatic intracerebral hemorrhage of left basal ganglia likely hypertensive etiology, Subacute stroke in left periatrial white matter, Incidental saccular aneurysm (4 mm at anterior communicating artery), Hx prior ischemic CVA, hyperlipidemia, hypertension, central retinal artery occlusion of right eye, left breast cancer, GERD, prediabetes, sensorineural hearing loss, Bilateral conjunctivitis and obesity

## 2024-10-02 NOTE — PLAN OF CARE
Goal Outcome Evaluation:      Plan of Care Reviewed With: patient    Overall Patient Progress: improvingOverall Patient Progress: improving    Outcome Evaluation: Pt is alert and oriented x4, able to make needs known and calls appropriately. Denies pain or shortness of breath. Remains vitally stable at room air. On minced and moist diet and slightly thickened water. pt takes meds whole with water, no coughing or any sign of aspiration noted. Incontinent of bladder, purewick placed overnight. Pt has blanchable redness on the buttocks, mepilex replaced. Pt is assist of 2 with a rex steady. PVR is 54cc.

## 2024-10-02 NOTE — PLAN OF CARE
Occupational Therapy Discharge Summary    Reason for therapy discharge:    Discharged to acute rehabilitation facility.    Progress towards therapy goal(s). See goals on Care Plan in [facility] electronic health record for goal details.  Goals partially met.  Barriers to achieving goals:   discharge from facility.    Therapy recommendation(s):    Continued therapy is recommended.  Rationale/Recommendations:  Prior to admit, pt independent with all ADL/IADL's and functional mobility usually without ww use. pt lives in a house with her  who has dementia. pt's  independnet with ambulatory but requires A with IADL's ie med mgmt. Per chart, pt's dtr Ramila, lives nearby and assists with caring for pts . pt currently limited due to impaired balance, activity tolerance, oveall impaired strength, and delayed responses and cognition, cont to dwain. At this time recommend ARU for intensive daily therapy to increase ADL and functional mobility independence and safety to PLOF. Pending pt progress during current admit, may need skilled TCU for longer rehab.

## 2024-10-02 NOTE — PROGRESS NOTES
[facility]   Acute Rehabilitation Unit  Daily progress note    INTERVAL HISTORY  Notes reviewed.  Pt was seen bedside this morning, and discussed with team during team rounds.  During my visit she reported sleeping well had no concerns or complaints.  She denied any pain including chest pain or shortness of breath, however later in the day with participation with therapies had complained of considerable lower back and left hip pain.  Therapy evaluations underway today.      MEDICATIONS  Scheduled:  Current Facility-Administered Medications   Medication Dose Route Frequency Provider Last Rate Last Admin    amLODIPine (NORVASC) tablet 5 mg  5 mg Oral QPM Vaishnavi Pacheco PA-C   5 mg at 10/01/24 2010    aspirin (ASA) chewable tablet 81 mg  81 mg Oral Daily Vaishnavi Pacheco PA-C   81 mg at 10/02/24 1005    atorvastatin (LIPITOR) tablet 40 mg  40 mg Oral Daily Vaishnavi Pacheco PA-C   40 mg at 10/02/24 1005    diclofenac (VOLTAREN) 1 % topical gel 4 g  4 g Topical 4x Daily Vaishnavi Pacheco PA-C   4 g at 10/02/24 1312    famotidine (PEPCID) tablet 20 mg  20 mg Oral BID Vaishnavi Pacheco PA-C   20 mg at 10/02/24 1005    hydrochlorothiazide (HYDRODIURIL) tablet 25 mg  25 mg Oral Daily Vaishnavi Pacheco PA-C   25 mg at 10/02/24 1005    lisinopril (ZESTRIL) tablet 40 mg  40 mg Oral Daily Vaishnavi Pacheco PA-C   40 mg at 10/02/24 1005    [START ON 10/3/2024] multivitamin w/minerals (THERA-VIT-M) tablet 1 tablet  1 tablet Oral Daily Vaishnavi Pacheco PA-C        ofloxacin (OCUFLOX) 0.3 % ophthalmic solution 1 drop  1 drop Both Eyes 4x Daily Vaishnavi Pacheco PA-C   1 drop at 10/02/24 1311        PRN:    Current Facility-Administered Medications   Medication Dose Route Frequency Provider Last Rate Last Admin    acetaminophen (TYLENOL) tablet 650 mg  650 mg Oral Q4H PRN Vaishnavi Pacheco PA-C        melatonin tablet 3 mg  3 mg Oral At Bedtime PRN Junior  Vaishnavi CASTRO PA-C        polyethylene glycol (MIRALAX) Packet 17 g  17 g Oral Daily PRN Vaishnavi Pacheco PA-C        senna-docusate (SENOKOT-S/PERICOLACE) 8.6-50 MG per tablet 1-2 tablet  1-2 tablet Oral BID PRN Vaishnavi Pacheco PA-C              PHYSICAL EXAM  Patient Vitals for the past 24 hrs:   BP Temp Temp src Pulse Resp SpO2   10/02/24 1119 125/64 -- -- -- -- --   10/02/24 1000 137/72 -- -- -- -- 94 %   10/02/24 0624 132/59 97.8  F (36.6  C) Oral 77 18 92 %   10/01/24 2008 124/60 98.2  F (36.8  C) Oral 87 16 94 %   10/01/24 1808 128/62 98.2  F (36.8  C) Oral 86 16 95 %       General: NAD, lying in bed  HEENT: NC/AT, MMM  Pulmonary: non-labored on room air, CTA b/l, no w/r/r  Cardiovascular: RRR, S1+S2, no m/r/g  Abdominal: soft, non-tender, non-distended, BS+  Extremities: warm, no edema in bilateral lower extremities, no tenderness in calves   Neuro: Psychomotor slowing present.  Was able to only follow 1/3 step commands and was unable to repeat.  Psych: Flat affect    LABS  CBC RESULTS:   Recent Labs   Lab Test 09/26/24  0446   WBC 11.5*   RBC 4.44   HGB 13.6   HCT 40.6   MCV 91   MCH 30.6   MCHC 33.5   RDW 13.9          Last Comprehensive Metabolic Panel:  Sodium   Date Value Ref Range Status   10/01/2024 134 (L) 135 - 145 mmol/L Final   07/09/2021 138 133 - 144 mmol/L Final     Potassium   Date Value Ref Range Status   10/01/2024 4.4 3.4 - 5.3 mmol/L Final   08/17/2022 4.1 3.4 - 5.3 mmol/L Final   07/09/2021 4.7 3.4 - 5.3 mmol/L Final     Chloride   Date Value Ref Range Status   10/01/2024 97 (L) 98 - 107 mmol/L Final   08/17/2022 103 94 - 109 mmol/L Final   07/09/2021 105 94 - 109 mmol/L Final     Carbon Dioxide   Date Value Ref Range Status   07/09/2021 29 20 - 32 mmol/L Final     Carbon Dioxide (CO2)   Date Value Ref Range Status   10/01/2024 25 22 - 29 mmol/L Final   08/17/2022 30 20 - 32 mmol/L Final     Anion Gap   Date Value Ref Range Status   10/01/2024 12 7 - 15 mmol/L Final    08/17/2022 5 3 - 14 mmol/L Final   07/09/2021 4 3 - 14 mmol/L Final     Glucose   Date Value Ref Range Status   10/01/2024 104 (H) 70 - 99 mg/dL Final   08/17/2022 94 70 - 99 mg/dL Final   07/09/2021 97 70 - 99 mg/dL Final     GLUCOSE BY METER POCT   Date Value Ref Range Status   09/28/2024 123 (H) 70 - 99 mg/dL Final     Urea Nitrogen   Date Value Ref Range Status   10/01/2024 31.2 (H) 8.0 - 23.0 mg/dL Final   08/17/2022 15 7 - 30 mg/dL Final   07/09/2021 13 7 - 30 mg/dL Final     Creatinine   Date Value Ref Range Status   10/01/2024 0.73 0.51 - 0.95 mg/dL Final   07/09/2021 0.77 0.52 - 1.04 mg/dL Final     GFR Estimate   Date Value Ref Range Status   10/01/2024 84 >60 mL/min/1.73m2 Final     Comment:     eGFR calculated using 2021 CKD-EPI equation.   07/09/2021 76 >60 mL/min/[1.73_m2] Final     Comment:     Non  GFR Calc  Starting 12/18/2018, serum creatinine based estimated GFR (eGFR) will be   calculated using the Chronic Kidney Disease Epidemiology Collaboration   (CKD-EPI) equation.       GFR, ESTIMATED POCT   Date Value Ref Range Status   08/30/2023 >60 >60 mL/min/1.73m2 Final     Calcium   Date Value Ref Range Status   10/01/2024 9.3 8.8 - 10.4 mg/dL Final     Comment:     Reference intervals for this test were updated on 7/16/2024 to reflect our healthy population more accurately. There may be differences in the flagging of prior results with similar values performed with this method. Those prior results can be interpreted in the context of the updated reference intervals.   07/09/2021 9.5 8.5 - 10.1 mg/dL Final       Recent Labs   Lab 10/01/24  0823 09/28/24  1525 09/27/24  1726 09/27/24  0508 09/27/24  0122 09/26/24  2103   * 123* 124* 105* 91 78              IMPRESSION/PLAN:  Chika Hurd is a 79 year old R hand dominant female with a past medical history of prior stroke (10/2023; left frontal lobe and corona radiata), hyperlipidemia, hypertension, central retinal artery  occlusion of right eye, left breast cancer, GERD, prediabetes, sensorineural hearing loss, and obesity who was admitted on 9/25/24 with acute left basal ganglia hemorrhage with hospital course complicated by subacute stroke in left periatrial white matter, incidental saccular aneurysm, constipation, and conjunctivitis.  She is now admitted to ARU on 10/1/24 for multidisciplinary rehabilitation and ongoing medical management.         Admission to acute inpatient rehab 10/01/24.    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       Impairment of ADL's: Noted to have BLE weakness, impaired balance, impaired coordination, impaired activity tolerance, impaired alertness, and impaired cognition, all affecting her ability to safely and independently perform basic ADLs.  Goal for SBA with basic ADLs except min A for bathing.      Impairment of mobility:  Noted to have BLE weakness, impaired balance, impaired coordination, impaired activity tolerance, impaired alertness, and impaired cognition, all affecting her ability to safely and independently perform basic mobility.  Goal for SBA with basic mobility with min A for stairs.      Impairment of cognition/language/swallow:  Noted to have dysphagia, dysarthria, aphasia, and impaired cognition with goals for safe tolerance of least restrictive diet and improved cognitive-linguistic skills to meet basic needs.      Medical Conditions     Non-traumatic intracerebral hemorrhage of left basal ganglia likely hypertensive etiology  Subacute stroke in left periatrial white matter, ESUS   Incidental saccular aneurysm (4 mm at anterior communicating artery)   Hx prior ischemic CVA (10/2023; left frontal lobe  and corona radiata)  Mild residual dysarthria from initial CVA.  Presented with new gait instability and worsened dysarthria.  Imaging findings as above.  PTA on Plavix + aspirin, plan had been for 3 months.  Per sending team, does not need to resume Plavix but ok'd to resume aspirin daily on 10/1.  - Continue ASA 81 mg daily (resumed on 10/1)  - Continue statin with LDL goal 40-70  - SBP goal 130-150 as inpatient, long-term outpatient goal <130/80.  Management as below.  - Ziopatch x14 days  - Continue PT/OT/SLP  - Follow up with neurology in 6-8 weeks     Hyperlipidemia  - LDL goal 40-70 (LDL 55 on 9/26)  - Continue PTA atorvastatin 40 mg daily     Hypertension  - Continue PTA hydrochlorothiazide 25 mg daily  - Continue PTA lisinopril 40 mg daily  - Continue amlodipine 5 mg daily (added 10/1)  - Monitor BP and adjust regimen as indicated     Hyponatremia  Mild at 134 on 10/1.  Suspect related to hydrochlorothiazide, mild hypovolemia.  BUN mildly elevated.  On slightly thick liquids.  - Encourage fluids  - Monitor BMP every M/Th     GERD  - Continue PTA famotidine 20 mg BID     Constipation  In setting of decreased mobility  Last BM on 9/28  - Continue Miralax daily     Prediabetes  A1c 6.0% on 9/27/24  - Follow up with PCP for ongoing surveillance     Bilateral conjunctivitis  Started on ofloxacin at hospital on 9/29 due to purulent discharge from bilateral eyes with associated pain.  - Continue ofloxacin bilaterally for 3 more days from admission (ending 10/4)        Adjustment to disability:  Clinical psychology to eval and treat if indicated  FEN: minced and moist (level 5) diet, slightly thick (level 1) liquids; swallow strategies per SLP  Bowel: continent, recent constipation.  Continue scheduled Miralax.  Additional PRN bowel medications available.  Monitor and adjust regimen as indicated.  Bladder: continent/incontinent, monitor PVRs on admission  DVT Prophylaxis: mechanical  GI Prophylaxis: pepcid  Code:  full; as prior  Disposition: home vs TCU  ELOS:  21 days  Rehab prognosis:  fair  Follow up Appointments on Discharge: PCP in 1-2 weeks, general neurology or stroke REUBEN in 6-8 weeks        Thad Sapp MD  Department of Rehabilitation Medicine      55 minutes spent on the date of service doing team rounds, chart review, history and exam, documentation, and further activities as noted above.

## 2024-10-02 NOTE — PLAN OF CARE
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 She presents with deficits in force production, activity tolerance & balance and is well below baseline mobility, currently needing Ax1-2 for transfers and unable to ambulate at time of PT eval. 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     Other Barriers to Discharge (DME, Family Training, etc):   Several stairs to navigate home  Patient is a caregiver for her  who has dementia (he is ambulatory)

## 2024-10-02 NOTE — PROGRESS NOTES
Clinic Care Coordination Contact  Care Coordination Transition Communication    Clinical Data:     9/25/2024 - 10/1/2024 (6 days)  [facility]  Hemorrhagic stroke (H)  Principal problem         Assessment: Patient has transitioned to TCU/ARU for short term rehabilitation:    Facility Name: Acute Rehab  Transition Communication:  Notified facility of Primary Care- Care Coordination support via Epic fax.    Plan: Care Coordinator will await notification from facility staff informing of patient's discharge plans/needs. Care Coordinator will review chart and outreach to facility staff every 4 weeks and as needed.     [facility]   Nazia Medrano RN, Care Coordinator   [facility]'s   E-mail [email or web address]   [phone]

## 2024-10-03 ENCOUNTER — APPOINTMENT (OUTPATIENT)
Dept: PHYSICAL THERAPY | Facility: CLINIC | Age: 79
DRG: 057 | End: 2024-10-03
Attending: PHYSICAL MEDICINE & REHABILITATION
Payer: MEDICARE

## 2024-10-03 ENCOUNTER — APPOINTMENT (OUTPATIENT)
Dept: OCCUPATIONAL THERAPY | Facility: CLINIC | Age: 79
DRG: 057 | End: 2024-10-03
Attending: PHYSICAL MEDICINE & REHABILITATION
Payer: MEDICARE

## 2024-10-03 ENCOUNTER — APPOINTMENT (OUTPATIENT)
Dept: SPEECH THERAPY | Facility: CLINIC | Age: 79
DRG: 057 | End: 2024-10-03
Attending: PHYSICAL MEDICINE & REHABILITATION
Payer: MEDICARE

## 2024-10-03 LAB
ANION GAP SERPL CALCULATED.3IONS-SCNC: 12 MMOL/L (ref 7–15)
BUN SERPL-MCNC: 34.5 MG/DL (ref 8–23)
CALCIUM SERPL-MCNC: 9.4 MG/DL (ref 8.8–10.4)
CHLORIDE SERPL-SCNC: 96 MMOL/L (ref 98–107)
CREAT SERPL-MCNC: 0.78 MG/DL (ref 0.51–0.95)
EGFRCR SERPLBLD CKD-EPI 2021: 78 ML/MIN/1.73M2
ERYTHROCYTE [DISTWIDTH] IN BLOOD BY AUTOMATED COUNT: 13.6 % (ref 10–15)
GLUCOSE SERPL-MCNC: 100 MG/DL (ref 70–99)
HCO3 SERPL-SCNC: 26 MMOL/L (ref 22–29)
HCT VFR BLD AUTO: 36.9 % (ref 35–47)
HGB BLD-MCNC: 12.2 G/DL (ref 11.7–15.7)
MCH RBC QN AUTO: 30 PG (ref 26.5–33)
MCHC RBC AUTO-ENTMCNC: 33.1 G/DL (ref 31.5–36.5)
MCV RBC AUTO: 91 FL (ref 78–100)
PLATELET # BLD AUTO: 346 10E3/UL (ref 150–450)
POTASSIUM SERPL-SCNC: 4.1 MMOL/L (ref 3.4–5.3)
RBC # BLD AUTO: 4.06 10E6/UL (ref 3.8–5.2)
SODIUM SERPL-SCNC: 134 MMOL/L (ref 135–145)
WBC # BLD AUTO: 12.3 10E3/UL (ref 4–11)

## 2024-10-03 PROCEDURE — 97116 GAIT TRAINING THERAPY: CPT | Mod: GP | Performed by: PHYSICAL THERAPIST

## 2024-10-03 PROCEDURE — 36415 COLL VENOUS BLD VENIPUNCTURE: CPT | Performed by: PHYSICIAN ASSISTANT

## 2024-10-03 PROCEDURE — 92507 TX SP LANG VOICE COMM INDIV: CPT | Mod: GN

## 2024-10-03 PROCEDURE — 92526 ORAL FUNCTION THERAPY: CPT | Mod: GN

## 2024-10-03 PROCEDURE — 97530 THERAPEUTIC ACTIVITIES: CPT | Mod: GP | Performed by: PHYSICAL THERAPIST

## 2024-10-03 PROCEDURE — 128N000003 HC R&B REHAB

## 2024-10-03 PROCEDURE — 85027 COMPLETE CBC AUTOMATED: CPT | Performed by: PHYSICIAN ASSISTANT

## 2024-10-03 PROCEDURE — 99231 SBSQ HOSP IP/OBS SF/LOW 25: CPT | Performed by: PHYSICIAN ASSISTANT

## 2024-10-03 PROCEDURE — 97112 NEUROMUSCULAR REEDUCATION: CPT | Mod: GP | Performed by: PHYSICAL THERAPIST

## 2024-10-03 PROCEDURE — 97535 SELF CARE MNGMENT TRAINING: CPT | Mod: GO

## 2024-10-03 PROCEDURE — 250N000013 HC RX MED GY IP 250 OP 250 PS 637: Performed by: PHYSICIAN ASSISTANT

## 2024-10-03 PROCEDURE — 80048 BASIC METABOLIC PNL TOTAL CA: CPT | Performed by: PHYSICIAN ASSISTANT

## 2024-10-03 RX ORDER — FAMOTIDINE 20 MG/1
20 TABLET, FILM COATED ORAL DAILY
Status: DISPENSED | OUTPATIENT
Start: 2024-10-04

## 2024-10-03 RX ADMIN — OFLOXACIN 1 DROP: 3 SOLUTION/ DROPS OPHTHALMIC at 19:55

## 2024-10-03 RX ADMIN — Medication 1 TABLET: at 08:00

## 2024-10-03 RX ADMIN — OFLOXACIN 1 DROP: 3 SOLUTION/ DROPS OPHTHALMIC at 18:30

## 2024-10-03 RX ADMIN — ASPIRIN 81 MG CHEWABLE TABLET 81 MG: 81 TABLET CHEWABLE at 08:00

## 2024-10-03 RX ADMIN — OFLOXACIN 1 DROP: 3 SOLUTION/ DROPS OPHTHALMIC at 08:08

## 2024-10-03 RX ADMIN — ATORVASTATIN CALCIUM 40 MG: 40 TABLET, FILM COATED ORAL at 08:01

## 2024-10-03 RX ADMIN — OFLOXACIN 1 DROP: 3 SOLUTION/ DROPS OPHTHALMIC at 13:24

## 2024-10-03 RX ADMIN — LISINOPRIL 40 MG: 40 TABLET ORAL at 08:00

## 2024-10-03 RX ADMIN — FAMOTIDINE 20 MG: 20 TABLET ORAL at 08:01

## 2024-10-03 RX ADMIN — HYDROCHLOROTHIAZIDE 25 MG: 25 TABLET ORAL at 08:00

## 2024-10-03 ASSESSMENT — ACTIVITIES OF DAILY LIVING (ADL)
ADLS_ACUITY_SCORE: 32
ADLS_ACUITY_SCORE: 36
ADLS_ACUITY_SCORE: 32
ADLS_ACUITY_SCORE: 36
ADLS_ACUITY_SCORE: 32

## 2024-10-03 NOTE — PLAN OF CARE
FOCUS/GOAL  Medical management    ASSESSMENT, INTERVENTIONS AND CONTINUING PLAN FOR GOAL:  Pt is alert and oriented. Garbled speech noted. No complaints of pain. Purewick in place overnight. Assist of 2 rex moore. Appeared to be sleeping on rounds. POC ongong.

## 2024-10-03 NOTE — PLAN OF CARE
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; their daughter lives next-door    Precautions: fall, L knee pain, back pain, aphasia, [place] (has hearing aides)    Current Status:  Bed Mobility: ModA  Transfer: Ax2 Aleah moore with nursing, ModA squat pivot & Mod-MaxA sit>stand with PT  Gait: length of bars, min to mod A, cues for sequencing and another helper for wc follow   Stairs: not safe/not able  Balance: SBA sitting balance, Needs external support & assist of therapist in partial standing    Outcome Measures:   30 second sit to stand: 0 on 10/2/24  PASS  [place]      Assessment:  Pt with BM and urin incontinence upon PT arrival.  Pt needing cues, max A for toilet transfer and clean up.  Pt did amb the length of the bars with A and wc follow, needs cues and increased time to process directions.   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

## 2024-10-03 NOTE — PLAN OF CARE
Individualized Overall Plan Of Care (IOPOC)      Rehab diagnosis/Impairment Group Code: Stroke vascular hemorrhagic 01.2 (r) body involvement (l) brain; non-traumatic intracerebral hemorrhage of left basal ganglia likely hypertensive etiology  Intraparenchymal hemorrhage of brain (h)     Expected functional outcome: Supervision for functional mobility and ADLs    Clinical Impression Comments: Significant deficits currently but participating well    Mobility: Pt presents after acute L  basal ganglia hemorrhage. She presents with deficits in force production, activity tolerance & balance and is well below baseline mobility, currently needing Ax1-2 for transfers and unable to ambulate at time of PT eval. 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 Anticipate a 3 week LOS with HC OT services vs extended rehabilitation.    Communication/Cognition/Swallow: SLP: Pt with mild to moderate motor speech deficits, charaterized by mild dysarthria/imprecise articulation and dysfluent-type speech during novel/spontaneous productions which is suspected to be related to cognitive and high level verbal expression deficits. WAB-R Bedside administered, score of 91.67 significant improvement from prior administration; pt rated at mild expressive and receptive aphasia. SLP recommends 1-2 sessions of high level verbal expression for additional informal analysis before administering standardied cognitive-linguistic evaluation CLQT+ APhasia likely most appopriate. Skilled SLP services  indicated to improve cognitive-linguistic skills.     Intensity of therapy:   PT 60 minutes, 6x/week, for 3 weeks  OT 60 minutes, 6 times/week, for 3 weeks  SLP 60 minutes, 6 times/week, for 3 weeks    Orthotics None  Education stroke  Neuropsychology Testing: Not currently, potentially a candidate in the future  Other:  None      Medical Prognosis: Fair      Physician summary statement: Anticipate will need at least 24 hr supervision, if not physical assistance as well, at the time of discharge.  Will need to discuss with family regarding how much assistance is available, but the patient was already a caregiver for her spouse with dementia.    Discharge destination: prior home vs alternative level of care  Discharge rehabilitation needs: home care, PT, OT, and SLP      Estimated length of stay: 3 weeks      Rehabilitation Physician Yusuf Sapp MD

## 2024-10-03 NOTE — PROGRESS NOTES
Discharge Planner Post-Acute Rehab OT:     Discharge Plan: Home with HC OT services vs TCU pending progress    Precautions: Falls, Left lolow back and knee pain, [place] - needs hearing aids    Current Status:  ADLs:  Mobility: W/C based. Rex steady Ax2 nsg; Ax1 therapy.  Grooming: Min A oral cares per SLP. Max A hair grooming.  Dressing: UB - Mod A. LB - Max A. Feet Total A.   Bathing: Transfer Ax1 Rex Stedy <> blue + white rolling shower chair. Mod A tasks.  Toileting: Ax2 with rex steady to/from toilet, total A tasks.  IADLs: IND prior. Daughter assisted with yard work and grocery shopping.   Vision/Cognition: Pt is Ox2. Intact visual tracking, impaired peripheral and convergence, impaired confrontation with R lower quadrant. Pt wears reading glasses at baseline. Will further assess cognition.     Assessment: Facilitated shower ADL assessment, see above functional levels.      Other Barriers to Discharge (DME, Family Training, etc): Family training prior to discharge    AE/DME pending progress     Barriers to home: stairs

## 2024-10-03 NOTE — CONSULTS
Social Work: Initial Assessment with Discharge Plan    Patient Name: Chika Hurd  : 1945  Age: 79 year old  MRN: [id number]  Completed assessment with: Chart review and interview with patient.   Admitted to ARU: 10/01/2024    Presenting Information   Date of SW assessment: October 3, 2024  Health Care Directive: Provided education and copy   Primary Health Care Agent: Patient/self. 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  She occasionally used walker.   DME available: Walker, rolling. See therapy evaluation for more information   Patient and family understanding of hospitalization: Appropriate and Pleasant.  Cultural/Language/Spiritual Considerations: , Nondenominational and English speaking.    Physical Health  Reason for admission: Stroke Vascular Hemorrhagic, patient has a past medical history of prior stroke (10/2023; left frontal lobe and corona radiata), hyperlipidemia, hypertension, central retinal artery occlusion of right eye, left breast cancer, GERD, prediabetes, sensorineural hearing loss, and obesity who was admitted on 24 with acute left basal ganglia hemorrhage with hospital course complicated by subacute stroke in left periatrial white matter, incidental saccular aneurysm, constipation, and conjunctivitis.  She is now admitted to ARU on 10/1/24 for multidisciplinary rehabilitation and ongoing medical management.        Provider Information   Primary Care Physician:Audrey Roy   [facility] will schedule PCP apt at discharge.   : none reported     Mental Health/Chemical Dependency:    Diagnosis: Patient reports good mood. No mental health concerns  Alcohol/Tobacco/Narcotis: Quit smoking in 2005  Support/Services in Place: none reported  Services Needed/Recommended: [place] and Health Psychology support while on ARU available.   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    Other support available: none reported.       Community Resources  Current in home services: no services   Previous services: Social support, and medications     Financial/Employment/Education  Employment Status: Retired   Income Source: social security   Education: Not discussed   Financial Concerns:  none reported   Insurance: MEDICARE/MEDICARE//[place]     Discharge Plan     Patient and family discharge goal: To be determined, pending progress  Provided Education on discharge plan: Evaluations and discharge recommendations pending.   Patient agreeable to discharge plan:  Pending further discussion. Evaluations and discharge recommendations pending.   Provided education and attained signature for Medicare IM and IRF Patient Rights and Privacy Information provided to patient : Yes  Provided patient with [place] [facility] Resources: Yes  Barriers to discharge: to be determined.    Discharge Recommendations   Disposition: Home vs TCU   Transportation Needs: Patient, family/friends, paid transport, insurance transport (if applicable)   Name of Transportation Company and Phone: N/A    Additional comments   Discharge TBD, ELOS 21 days. Evals and discharge needs pending.     Patient has dysarthric, no aphasia present     Sw called patient's daughter Ramila;  "she shared that patient was not receiving any services at home. She is the patient's primary support system.     SW will continue to remain available for patient and family support, discharge planning, and access to resources.    ------------------------------------------------------------------------------------------------------------    AUBREY Pain Assessment    Pain Effect on Sleep  Over the past 5 days, how much of the time has pain made it hard for you to sleep at night?\"    0. Does not apply - I have not had any pain or hurting in the past 5 days    -------------------------------------------------------------------------------------------------------------    TRANSPORTATION:    Has lack of transportation kept you from medical appointments, meetings, work, or from getting things needed for daily living?  A. Yes, it has kept me from medical appointments or from getting my medications  B. Yes, it has kept me from non-medical meetings, appointments, work or from getting things that I need  C. No  X. Patient Unable to respond  Y. Patient declines to respond  -------------------------------------------------------------------------------------------------------------  Health Literacy:   How Often do you need to have someone help you when you read instructions, pamphlets, or other written material from your doctor or pharmacy?  0.       Never  1.       Rarely  2.       Sometimes  3.       Often  4.       Always  5.       Patient declines to respond  6.       Patient unable to respond  ------------------------------------------------------------------------------------------------------------    SOCIAL ISOLATION  How often do you feel lonely or isolated from those around you?  0.       Never  1.       Rarely  2.       Sometimes  3.       Often  4.       Always  5.       Patient declines to respond  6.       Patient unable to " respond  -------------------------------------------------------------------------------------------------------------        Cintia Koch [facility], Acute Inpatient Rehab Unit   [address] [place] [address], [address] [place] [id number]  Phone: [phone]  Fax: [phone]

## 2024-10-03 NOTE — PLAN OF CARE
Discharge Planner Post-Acute Rehab SLP:     Discharge Plan: RAMILA. Ongoing SLP    Precautions: fall risk    Current Status:  Hearing: Sensorineural hearing loss, bilateral hearing aides.  Vision: Readers  Communication: Mild to moderate motor speech deficits, Mild expressive and receptive aphasia.  Cognition: To be evaluated in coming days  Swallow: Soft and bite sized (6)/ Slightly thick liquids (1), pt to sit upright, small/single bites and sips, alternate liquids/solids    Assessment:   Earlier session: Trialed 6/1 diet at lunch meal. Pt tolerated texture and liquids with no s/sx aspiration or change in vocal quality. Some throat clearing throughout meal. Advanced diet to soft and bite sized (6) textures and slightly thin (1) liquids.     Later session: Single item identification using LARK kit. Required max semantic cues to describe details of item/answer follow up questions. Able to come up with answers easier when provided choice. Able to consistent name items presented but when responses required multiple word responses, unable to consistently generate. Also unable to verbalize how to complete common every day tasks due to severity of language.      Other Barriers to Discharge (Family Training, etc): diet texture pending progress

## 2024-10-03 NOTE — PLAN OF CARE
FOCUS/GOAL  Psychosocial needs, Safety management, Prevention of secondary complications, and Adjustment to lifestyle change    ASSESSMENT, INTERVENTIONS AND CONTINUING PLAN FOR GOAL:    Goal Outcome Evaluation:      Plan of Care Reviewed With: patient    Overall Patient Progress: no changeOverall Patient Progress: no change    Outcome Evaluation: see note    Pt d/o to time  Calls appropriately  Needs in reach and safety measures in place.   Cont POC    Mobility: Assist of 2 sera stedy  Bowel/Bladder: incont of both, LBM 9/28  Skin: no new or acute concerns  O2: RA  Diet: Lvl 5/ slightly thick/meds whole 1 at a time  Psychosocial: appropriate to situation  Pain: denies  Tubes/Lines/Drains: none  Misc:

## 2024-10-04 ENCOUNTER — APPOINTMENT (OUTPATIENT)
Dept: PHYSICAL THERAPY | Facility: CLINIC | Age: 79
DRG: 057 | End: 2024-10-04
Attending: PHYSICAL MEDICINE & REHABILITATION
Payer: MEDICARE

## 2024-10-04 ENCOUNTER — APPOINTMENT (OUTPATIENT)
Dept: OCCUPATIONAL THERAPY | Facility: CLINIC | Age: 79
DRG: 057 | End: 2024-10-04
Attending: PHYSICAL MEDICINE & REHABILITATION
Payer: MEDICARE

## 2024-10-04 ENCOUNTER — APPOINTMENT (OUTPATIENT)
Dept: SPEECH THERAPY | Facility: CLINIC | Age: 79
DRG: 057 | End: 2024-10-04
Attending: PHYSICAL MEDICINE & REHABILITATION
Payer: MEDICARE

## 2024-10-04 PROCEDURE — 97530 THERAPEUTIC ACTIVITIES: CPT | Mod: GP | Performed by: PHYSICAL THERAPIST

## 2024-10-04 PROCEDURE — 128N000003 HC R&B REHAB

## 2024-10-04 PROCEDURE — 97535 SELF CARE MNGMENT TRAINING: CPT | Mod: GO | Performed by: OCCUPATIONAL THERAPIST

## 2024-10-04 PROCEDURE — 92526 ORAL FUNCTION THERAPY: CPT | Mod: GN

## 2024-10-04 PROCEDURE — 99232 SBSQ HOSP IP/OBS MODERATE 35: CPT | Performed by: PHYSICAL MEDICINE & REHABILITATION

## 2024-10-04 PROCEDURE — 250N000013 HC RX MED GY IP 250 OP 250 PS 637: Performed by: PHYSICAL MEDICINE & REHABILITATION

## 2024-10-04 PROCEDURE — 92507 TX SP LANG VOICE COMM INDIV: CPT | Mod: GN

## 2024-10-04 PROCEDURE — 250N000013 HC RX MED GY IP 250 OP 250 PS 637: Performed by: PHYSICIAN ASSISTANT

## 2024-10-04 RX ORDER — HYDROCHLOROTHIAZIDE 12.5 MG/1
12.5 TABLET ORAL DAILY
Status: DISPENSED | OUTPATIENT
Start: 2024-10-05

## 2024-10-04 RX ORDER — METHOCARBAMOL 500 MG/1
500 TABLET, FILM COATED ORAL 4 TIMES DAILY PRN
Status: ACTIVE | OUTPATIENT
Start: 2024-10-04

## 2024-10-04 RX ADMIN — FAMOTIDINE 20 MG: 20 TABLET ORAL at 09:07

## 2024-10-04 RX ADMIN — AMLODIPINE BESYLATE 5 MG: 5 TABLET ORAL at 20:20

## 2024-10-04 RX ADMIN — OFLOXACIN 1 DROP: 3 SOLUTION/ DROPS OPHTHALMIC at 09:10

## 2024-10-04 RX ADMIN — ASPIRIN 81 MG CHEWABLE TABLET 81 MG: 81 TABLET CHEWABLE at 09:06

## 2024-10-04 RX ADMIN — OFLOXACIN 1 DROP: 3 SOLUTION/ DROPS OPHTHALMIC at 16:08

## 2024-10-04 RX ADMIN — Medication 1 TABLET: at 09:09

## 2024-10-04 RX ADMIN — OFLOXACIN 1 DROP: 3 SOLUTION/ DROPS OPHTHALMIC at 12:20

## 2024-10-04 RX ADMIN — ATORVASTATIN CALCIUM 40 MG: 40 TABLET, FILM COATED ORAL at 09:06

## 2024-10-04 ASSESSMENT — ACTIVITIES OF DAILY LIVING (ADL)
ADLS_ACUITY_SCORE: 47
ADLS_ACUITY_SCORE: 47
ADLS_ACUITY_SCORE: 45
ADLS_ACUITY_SCORE: 44
ADLS_ACUITY_SCORE: 47
ADLS_ACUITY_SCORE: 47
ADLS_ACUITY_SCORE: 45
ADLS_ACUITY_SCORE: 44
ADLS_ACUITY_SCORE: 45
ADLS_ACUITY_SCORE: 45
ADLS_ACUITY_SCORE: 44
ADLS_ACUITY_SCORE: 44
ADLS_ACUITY_SCORE: 47
ADLS_ACUITY_SCORE: 47
ADLS_ACUITY_SCORE: 45
ADLS_ACUITY_SCORE: 44
ADLS_ACUITY_SCORE: 44
ADLS_ACUITY_SCORE: 45
ADLS_ACUITY_SCORE: 44
ADLS_ACUITY_SCORE: 47

## 2024-10-04 NOTE — PLAN OF CARE
[id number]    Goal Outcome Evaluation:      Plan of Care Reviewed With: patient    Overall Patient Progress: improvingOverall Patient Progress: improving    Orientation:Alert & oriented x3 disoriented ye time, able to make needs known  Bowel: incontinent Last BM 10/3  Bladder: incontinent of bladder Purewick in place  Pain:Denies  Ambulation/Transfers: Assist of 2/ rex steady  Diet/ Liquids:soft bite sized diet level 6 , slightly thick liquid level . Poor appetite with meals this shift.  Tubes/ Lines/ Drains: none  Oxygen:On room air, denies chest pain or shortness of breath  Skin: Blanchable redness to coccxy, R arm skin tear  Pt had a shower today with OT .Was up on the chair for most part of the afternoon. No new concerns this shift. Bed and chair alarm on for safety, call light within reach. Continue with POC.     Soniya Bradshaw RN

## 2024-10-04 NOTE — PLAN OF CARE
Discharge Planner Post-Acute Rehab OT:      Discharge Plan: Home with HC OT services vs TCU pending progress     Precautions: Falls, Left lolow back and knee pain, [place] - needs hearing aids     Current Status:  ADLs:  Mobility: W/C based. Rex steady Ax2 nsg; Ax1 therapy.  Grooming: Min A oral cares per SLP. Max A hair grooming.  Dressing: UB - Mod A. LB - Max A. Feet Total A. At eob   Bathing: Transfer Ax1 Rex Stedy <> blue + white rolling shower chair. Mod A tasks.  Toileting: Ax2 with rex steady to/from toilet, total A tasks.  IADLs: IND prior. Daughter assisted with yard work and grocery shopping.   Vision/Cognition: Pt is Ox2. Intact visual tracking, impaired peripheral and convergence, impaired confrontation with R lower quadrant. Pt wears reading glasses at baseline. Will further assess cognition.      Assessment: Pt improving with standing abilities with being min A to stand this date from eob, and improving in time standing in general. Nice work with trying to use reacher for LBD at eob. Limited by incontinence. -15 PT overlap     Other Barriers to Discharge (DME, Family Training, etc): Family training prior to discharge     AE/DME pending progress      Barriers to home: stairs

## 2024-10-04 NOTE — PROGRESS NOTES
Assessment & Plan     Cerebrovascular accident (CVA), unspecified mechanism (H)  Continue with Plavix and aspirin.  Refills provided.  Speech therapy referral to address dysarthria/expressive aphasia.  Zio patch to rule out arrhythmia as a source of embolic CVA.  Neurology consult.  - Speech Therapy Referral; Future  - clopidogrel (PLAVIX) 75 MG tablet; Take 1 tablet (75 mg) by mouth daily  - Adult Leadless EKG Monitor 3 to 7 Days; Future  - Adult Neurology  Referral; Future    Dysarthria  See above.    Generalized muscle weakness  - Physical Therapy Referral; Future           MED REC REQUIRED  Post Medication Reconciliation Status:   BMI:   Estimated body mass index is 34.57 kg/m  as calculated from the following:    Height as of this encounter: 1.524 m (5').    Weight as of this encounter: 80.3 kg (177 lb).           Audrey Roy MD  [facility]    Anatoliy Parada is a 78 year old, presenting for the following health issues:  Hospital F/U      History of Present Illness       Reason for visit:  Follow up for stroke  Symptom onset:  3-7 days ago  Symptoms include:  Slurred speech  Symptom intensity:  Moderate  Symptom progression:  Improving  Had these symptoms before:  No    She eats 2-3 servings of fruits and vegetables daily.She consumes 1 sweetened beverage(s) daily.She exercises with enough effort to increase her heart rate 9 or less minutes per day.  She exercises with enough effort to increase her heart rate 3 or less days per week.   She is taking medications regularly.           10/15/2023    10:08 AM   Post Discharge Outreach   Admission Date 10/12/2023   Reason for Admission acute speech changes   Discharge Date 10/13/2023   Discharge Diagnosis Acute/subacute stroke   How are you doing now that you are home? pretty good per patient and daughter via speaker phone   How are your symptoms? (Red Flag symptoms escalate to triage hotline per guidelines)  "          Clinical Nutrition Assessment     Patient Name: Carolyn Snowden  YOB: 1946  MRN: [id number]  Date of Encounter: 10/03/24 22:08 EDT  Admission date: 10/2/2024  Reason for Visit: Identified at risk by screening criteria, Malnutrition Severity Assessment    Assessment   Nutrition Assessment   Admission Diagnosis:  Diarrhea in adult patient [R19.7]    Problem List:    Anxiety and depression    Pure hypercholesterolemia    Essential hypertension    Diarrhea    Respiratory alkalosis    Norovirus    PMH:   She  has a past medical history of Acute angina, Anxiety, Hyperlipidemia, Hypertension, and NSTEMI (non-ST elevated myocardial infarction).    PSH:  She  has a past surgical history that includes Tubal ligation and Cyst Removal.    Applicable Nutrition History:       Anthropometrics     Height: Height: 154.9 cm (61\")  Last Filed Weight: Weight: 56.7 kg (125 lb) (10/02/24 2207)  Method: Weight Method: Stated  BMI: BMI (Calculated): 23.6    UBW:  Per EMR wt of 129 lbs on 9/10/24  Weight change: If stated wt accurate loss of 4 lbs 3%b body wt within one month - true timeframe unk Unable to confirm    Nutrition Focused Physical Exam    Date: 10/3    Patient meets criteria for malnutrition diagnosis, see MSA note.     Subjective   Reported/Observed/Food/Nutrition Related History:     10/3  Pt allows not tolerating solid food diet well at this time, wanting soft food and clr liq suppl sounded best. Allows 3 da PTA w minimal intake.    Current Nutrition Prescription   PO: Diet: Gastrointestinal; Fiber-Restricted, Low Irritant; Texture: Regular (IDDSI 7); Fluid Consistency: Thin (IDDSI 0)  Oral Nutrition Supplement:   Intake: Insufficient data 25% x 1 meal recorded    Assessment & Plan   Nutrition Diagnosis   Date:  10/3            Updated:    Problem Malnutrition  moderate acute   Etiology Alt GI Fx w wt loss   Signs/Symptoms Wasting    Status: New      Goal / Objectives:   Nutrition to support " Improved   Do you feel your condition is stable enough to be safe at home until your provider visit? Yes   Does the patient have their discharge instructions?  Yes   Does the patient have questions regarding their discharge instructions?  No   Were you started on any new medications or were there changes to any of your previous medications?  Yes   Does the patient have all of their medications? Yes   Do you have questions regarding any of your medications?  No   Discharge follow-up appointment scheduled within 14 calendar days?  No     Hospital Follow-up Visit:    Hospital/Nursing Home/IP Rehab Facility: [facility]  Date of Admission: 10/12/2023  Date of Discharge: 10/13/2023  Reason(s) for Admission: CVA    Was your hospitalization related to COVID-19? No   Problems taking medications regularly:  None  Medication changes since discharge: None  Problems adhering to non-medication therapy:  None    Summary of hospitalization:  [facility] discharge summary reviewed  Diagnostic Tests/Treatments reviewed.  Follow up needed: Speech therapy.  Med refills.    Other Healthcare Providers Involved in Patient s Care:         Specialist appointment - neurology  Update since discharge: stable.         Plan of care communicated with patient and family                 Review of Systems   Constitutional, neuro, ENT, endocrine, pulmonary, cardiac, gastrointestinal, genitourinary, musculoskeletal, integument and psychiatric systems are negative, except as otherwise noted.       Objective    /82   Pulse 75   Temp 98.4  F (36.9  C) (Tympanic)   Resp 16   Ht 1.524 m (5')   Wt 80.3 kg (177 lb)   LMP  (LMP Unknown)   SpO2 97%   BMI 34.57 kg/m    Body mass index is 34.57 kg/m .  Physical Exam   GENERAL: Pleasant, well appearing female.  CV: RRR, no murmurs, rubs or gallops.  LUNGS: CTAB, normal effort.  NEURO: Cranial nerves II through XII grossly intact.  Mild expressive  treatment and Establish PO      Nutrition Intervention      Follow treatment progress, Care plan reviewed, Advise alternate selection, Menu adjusted, Supplement provided    Adjusted to GI soft diet w Boost Breeze suppl 2/2 pt rpt of what food has been able to tolerate.     Monitoring/Evaluation:   Per protocol, I&O, PO intake, Supplement intake, Pertinent labs, Weight, GI status, Symptoms    Thelma Gonzales RD  Time Spent: 30 min   aphasia/dysarthria.

## 2024-10-04 NOTE — PROGRESS NOTES
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; their daughter lives next-door     Precautions: fall, L knee pain, back pain, aphasia, [place] (has hearing aides)     Current Status:  Bed Mobility: ModA  Transfer: Ax2 Aleah moore with nursing, min/modA stand pivot with increased time, repeated cues  Gait: length of bars, min to mod A, cues for sequencing and another helper for wc follow   Stairs: not safe/not able  Balance: SBA sitting balance, Needs external support & assist of therapist in partial standing     Outcome Measures:   30 second sit to stand: 0 on 10/2/24  PASS   [place]        Assessment:  Transfers improving, however session limited by urgency/incontinence.  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

## 2024-10-04 NOTE — PLAN OF CARE
Goal Outcome Evaluation:      Plan of Care Reviewed With: patient    Overall Patient Progress: improving    Outcome Evaluation    Orientation:Patient is alert and oriented x3, can make her needs known by using the call light appropriately  O2:Denies sob, dizziness or lightheadedness.   Pain:Denies pain. Declined scheduled voltaren gel  Diet:Soft bite sized diet, slightly thickened liquids. Pt takes pills whole with water  Bladder:Incontinent, purewick at night  Bowel:Incontinent, last BM 10/3  Ambulation/Transfer:Assist of 2 with a rex steady  Tubes/Lines/Drains/Brace:Sacral mepilex exchanged  Skin:blanchable redness to sacrum, skin tear right arm with band aid in place. Some redness to bilateral inner thigh observed while doing incontinent cares. Desitin applied.    Scheduled norvasc held tonight due to BP not within the parameters.

## 2024-10-04 NOTE — PROGRESS NOTES
General acute hospital   Acute Rehabilitation Unit  Daily progress note    INTERVAL HISTORY  Chika Hurd was seen at bedside this morning.  No acute events reported overnight.  This morning Karma feels well and has no concerns or complaints.  She says she slept well and denies any pain including chest pain, shortness of breath, fevers, or chills.  I inquired about the pain reported by therapies and she says this only occurs intermittently and with certain movements or positions.  When asked to describe it she feels like it has tight muscles and would be agreeable to a as needed muscle relaxer.  She has been declining scheduled Voltaren gel and therefore will changes to as needed as well.  She would be agreeable to speak with neurology team about potential enrollment in Discovery trial.  She is able to express short and basic words and phrases, however her aphasia and difficulty with speaking becomes much more apparent with longer conversation.      MEDICATIONS  Current Facility-Administered Medications   Medication Dose Route Frequency Provider Last Rate Last Admin    amLODIPine (NORVASC) tablet 5 mg  5 mg Oral QPM Vaishnavi Pacheco PA-C   5 mg at 10/01/24 2010    aspirin (ASA) chewable tablet 81 mg  81 mg Oral Daily Vaishnavi Pacheco PA-C   81 mg at 10/04/24 0906    atorvastatin (LIPITOR) tablet 40 mg  40 mg Oral Daily Vaishnavi Pacheco PA-C   40 mg at 10/04/24 0906    famotidine (PEPCID) tablet 20 mg  20 mg Oral Daily Yusuf Sapp MD   20 mg at 10/04/24 0907    [START ON 10/5/2024] hydroCHLOROthiazide tablet 12.5 mg  12.5 mg Oral Daily Yusuf Sapp MD        lisinopril (ZESTRIL) tablet 40 mg  40 mg Oral Daily Vaishnavi Pacheco PA-C   40 mg at 10/03/24 0800    multivitamin w/minerals (THERA-VIT-M) tablet 1 tablet  1 tablet Oral Daily Vaishnavi Pacheco PA-C   1 tablet at 10/04/24 0909    ofloxacin (OCUFLOX) 0.3 % ophthalmic solution 1 drop  1 drop  Both Eyes 4x Daily Vaishnavi Pacheco PA-C   1 drop at 10/04/24 1220          Current Facility-Administered Medications   Medication Dose Route Frequency Provider Last Rate Last Admin    acetaminophen (TYLENOL) tablet 650 mg  650 mg Oral Q4H PRN Vaishnavi Pacheco PA-C        diclofenac (VOLTAREN) 1 % topical gel 4 g  4 g Topical 4x Daily PRN Vaishnavi Pacheco PA-C        melatonin tablet 3 mg  3 mg Oral At Bedtime PRN Vaishnavi Pacheco PA-C        methocarbamol (ROBAXIN) tablet 500 mg  500 mg Oral 4x Daily PRN Yusuf Sapp MD        polyethylene glycol (MIRALAX) Packet 17 g  17 g Oral Daily PRN Vaishnavi Pacheco PA-C        senna-docusate (SENOKOT-S/PERICOLACE) 8.6-50 MG per tablet 1-2 tablet  1-2 tablet Oral BID PRN Vaishnavi Pacheco PA-C            PHYSICAL EXAM  /50 (BP Location: Right arm, Patient Position: Sitting, Cuff Size: Adult Regular)   Pulse 92   Temp 98.6  F (37  C) (Oral)   Resp 16   Wt 79.4 kg (175 lb 0.7 oz)   LMP  (LMP Unknown)   SpO2 96%   BMI 34.19 kg/m    Gen: NAD, resting in bed  HEENT: NC/AT  Cardio: RRR, no murmurs  Pulm: non-labored on room air, lungs CTA bilaterally  Abd: soft, non-tender, non-distended, bowel sounds present  Ext: no edema in BLE  Neuro/MSK: drowsy, PERRL, EOMI, left ptosis, right facial droop, +dysarthria, +expressive aphasia (inappropriate/illogical word substitutions), able to intermittently follow simple commands.       LABS  CBC RESULTS:   Recent Labs   Lab Test 10/03/24  0614 09/26/24  0446 09/25/24  1336   WBC 12.3* 11.5* 8.2   RBC 4.06 4.44 4.64   HGB 12.2 13.6 13.8   HCT 36.9 40.6 42.3   MCV 91 91 91   MCH 30.0 30.6 29.7   MCHC 33.1 33.5 32.6   RDW 13.6 13.9 13.8    292 309       Last Basic Metabolic Panel:  Recent Labs   Lab Test 10/03/24  0614 10/01/24  0823 09/28/24  1525 09/26/24  1309 09/26/24  0446   * 134*  --   --  135   POTASSIUM 4.1 4.4  --   --  4.2   CHLORIDE 96* 97*  --   --  100   CO2 26 25  --   --   26   ANIONGAP 12 12  --   --  9   * 104* 123*   < > 92   BUN 34.5* 31.2*  --   --  13.2   CR 0.78 0.73  --   --  0.64   GFRESTIMATED 78 84  --   --  >90   KAR 9.4 9.3  --   --  9.3    < > = values in this interval not displayed.         Rehabilitation     Admission to acute inpatient rehab 10/01/24.    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       Impairment of ADL's: Noted to have BLE weakness, impaired balance, impaired coordination, impaired activity tolerance, impaired alertness, and impaired cognition, all affecting her ability to safely and independently perform basic ADLs.  Goal for SBA with basic ADLs except min A for bathing.      Impairment of mobility:  Noted to have BLE weakness, impaired balance, impaired coordination, impaired activity tolerance, impaired alertness, and impaired cognition, all affecting her ability to safely and independently perform basic mobility.  Goal for SBA with basic mobility with min A for stairs.      Impairment of cognition/language/swallow:  Noted to have dysphagia, dysarthria, aphasia, and impaired cognition with goals for safe tolerance of least restrictive diet and improved cognitive-linguistic skills to meet basic needs.     Continue comprehensive acute inpatient rehabilitation program with multidisciplinary approach including therapies, rehab nursing, and physiatry following. See interval history for updates.      ASSESSMENT AND PLAN  Chika Hurd is a 79 year old right hand dominant female with a past medical history of prior stroke (10/2023; left frontal lobe and corona radiata), hyperlipidemia, hypertension, central retinal artery occlusion of right eye, left breast cancer,  GERD, prediabetes, sensorineural hearing loss, and obesity who was admitted on 9/25/24 with acute left basal ganglia hemorrhage with hospital course complicated by subacute stroke in left periatrial white matter, incidental saccular aneurysm, constipation, and conjunctivitis.  She was admitted to ARU on 10/1/24 for multidisciplinary rehabilitation and ongoing medical management.      Medical Conditions  New actions/orders/updates for today are in blue.     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  Presented with new gait instability and worsened dysarthria.  Imaging findings as above.  PTA on Plavix + aspirin, plan had been for 3 months.  Per sending team, does not need to resume Plavix but ok'd to resume aspirin daily on 10/1.  - Continue ASA 81 mg daily (resumed on 10/1)  - Continue statin with LDL goal 40-70  - SBP goal 130-150 as inpatient, long-term outpatient goal <130/80.  Management as below.  - Diet advanced to soft and bite-sized per SLP.  Repeat video swallow study ordered per discussion with SLP, pending  - Ziopatch x14 days  - Continue PT/OT/SLP  - Follow up with neurology in 6-8 weeks     Hyperlipidemia  - LDL goal 40-70 (LDL 55 on 9/26)  - Continue PTA atorvastatin 40 mg daily     Hypertension  - Decrease hydrochlorothiazide to 12.5 mg today due to soft blood pressures and mild hyponatremia  - Continue PTA lisinopril 40 mg daily  - Continue amlodipine 5 mg daily (added 10/1)  - Monitor BP and adjust regimen as indicated     Hyponatremia  Mild at 134 on 10/1.   Suspect related to hydrochlorothiazide, mild hypovolemia.  BUN mildly elevated.  On slightly thick liquids.  10/3: Na stable/mildly low at 134.  BUN also elevated, mild leukocytosis, and appears dry on exam.  Suspect mild hypovolemia in setting of impaired cognition, thickened liquids, impaired intake - encourage fluids.  - Monitor BMP every M/Th     GERD  - Continue PTA famotidine 20 mg BID     Constipation  In setting of decreased mobility  Last BM on 10/4  - Continue Miralax daily  - Monitor, additional PRN bowel meds available, can schedule if ongoing issues     Prediabetes  A1c 6.0% on 9/27/24  - Follow up with PCP for ongoing surveillance     Bilateral conjunctivitis  Started on ofloxacin at hospital on 9/29 due to purulent discharge from bilateral eyes with associated pain.  - Continue ofloxacin bilaterally thru 10/4 to complete course        Adjustment to disability:  Clinical psychology to eval and treat if indicated  FEN: soft and bite-sized (level 6) diet, slightly thick (level 1) liquids; swallow strategies per SLP  Bowel: continent, recent constipation.  Continue scheduled Miralax.  Additional PRN bowel medications available.  Monitor and adjust regimen as indicated.  Bladder: continent/incontinent  DVT Prophylaxis: mechanical  GI Prophylaxis: pepcid  Code: full; as prior  Disposition: home vs TCU  ELOS: target 10/22/24 pending progress  Follow up Appointments on Discharge: PCP in 1-2 weeks, general neurology or stroke REUBEN in 6-8 weeks      35 minutes spent on the date of service doing chart review, history and exam, documentation, and further activities as noted above.       Thad Sapp MD  Department of Rehabilitation Medicine

## 2024-10-04 NOTE — PLAN OF CARE
Goal Outcome Evaluation:      Plan of Care Reviewed With: patient    Overall Patient Progress: no change    Outcome Evaluation: Pt is alert, oriented with exception of day of the week.[place], uses hearing aids.  Blood pressure medications held d/t low SBP. Incontinent of bowel and bladder, LBM 10/3. Denied pain or discomfort during the shift. Up assist of 2 with Aleah Colin. Pt uses call light appropriately, able to make needs known. Bed and chair alarms on for safety.

## 2024-10-04 NOTE — PLAN OF CARE
Discharge Planner Post-Acute Rehab SLP:     Discharge Plan: TBD. Ongoing SLP    Precautions: fall risk    Current Status:  Hearing: Sensorineural hearing loss, bilateral hearing aides.  Vision: Readers  Communication: Mild to moderate motor speech deficits, Mild expressive and receptive aphasia.  Cognition: To be evaluated in coming days  Swallow: Soft and bite sized (6)/ Slightly thick liquids (1), pt to sit upright, small/single bites and sips, alternate liquids/solids    Assessment:  Pt significantly fatigued throughout session and required consistent max cues to remain alert and engage in session. Not appropriate for attempt at cognitive linguistic assessment this date d/t lethargy. Attempted task, identifying and adding to categories. Notable slow processing speed and ultimately pt unable to continue d/t difficulty. Pt engaged in identifying pictures via [place] as task. able to identify items with 50% accuracy IND, 70% mod (semantic and phonemic), 100% max (MC). Noting ongoing difficulties remaining alert. Required max repeititon and extra time      Other Barriers to Discharge (Family Training, etc): diet texture pending progress

## 2024-10-05 ENCOUNTER — APPOINTMENT (OUTPATIENT)
Dept: OCCUPATIONAL THERAPY | Facility: CLINIC | Age: 79
DRG: 057 | End: 2024-10-05
Attending: PHYSICAL MEDICINE & REHABILITATION
Payer: MEDICARE

## 2024-10-05 ENCOUNTER — APPOINTMENT (OUTPATIENT)
Dept: SPEECH THERAPY | Facility: CLINIC | Age: 79
DRG: 057 | End: 2024-10-05
Attending: PHYSICAL MEDICINE & REHABILITATION
Payer: MEDICARE

## 2024-10-05 ENCOUNTER — APPOINTMENT (OUTPATIENT)
Dept: PHYSICAL THERAPY | Facility: CLINIC | Age: 79
DRG: 057 | End: 2024-10-05
Attending: PHYSICAL MEDICINE & REHABILITATION
Payer: MEDICARE

## 2024-10-05 PROCEDURE — 128N000003 HC R&B REHAB

## 2024-10-05 PROCEDURE — 97112 NEUROMUSCULAR REEDUCATION: CPT | Mod: GP

## 2024-10-05 PROCEDURE — 97530 THERAPEUTIC ACTIVITIES: CPT | Mod: GP

## 2024-10-05 PROCEDURE — 250N000013 HC RX MED GY IP 250 OP 250 PS 637: Performed by: PHYSICAL MEDICINE & REHABILITATION

## 2024-10-05 PROCEDURE — 250N000013 HC RX MED GY IP 250 OP 250 PS 637: Performed by: PHYSICIAN ASSISTANT

## 2024-10-05 PROCEDURE — 92507 TX SP LANG VOICE COMM INDIV: CPT | Mod: GN

## 2024-10-05 PROCEDURE — 97535 SELF CARE MNGMENT TRAINING: CPT | Mod: GO

## 2024-10-05 PROCEDURE — 92526 ORAL FUNCTION THERAPY: CPT | Mod: GN | Performed by: SPEECH-LANGUAGE PATHOLOGIST

## 2024-10-05 RX ADMIN — AMLODIPINE BESYLATE 5 MG: 5 TABLET ORAL at 20:50

## 2024-10-05 RX ADMIN — ASPIRIN 81 MG CHEWABLE TABLET 81 MG: 81 TABLET CHEWABLE at 08:34

## 2024-10-05 RX ADMIN — HYDROCHLOROTHIAZIDE 12.5 MG: 12.5 TABLET ORAL at 08:34

## 2024-10-05 RX ADMIN — ATORVASTATIN CALCIUM 40 MG: 40 TABLET, FILM COATED ORAL at 08:34

## 2024-10-05 RX ADMIN — FAMOTIDINE 20 MG: 20 TABLET ORAL at 08:34

## 2024-10-05 RX ADMIN — Medication 1 TABLET: at 08:34

## 2024-10-05 RX ADMIN — LISINOPRIL 40 MG: 40 TABLET ORAL at 08:34

## 2024-10-05 ASSESSMENT — ACTIVITIES OF DAILY LIVING (ADL)
ADLS_ACUITY_SCORE: 47

## 2024-10-05 NOTE — PLAN OF CARE
Discharge Planner Post-Acute Rehab SLP:     Discharge Plan: [facility]ILA. Ongoing SLP    Precautions: fall risk    Current Status:  Hearing: Sensorineural hearing loss, bilateral hearing aides.  Vision: Readers  Communication: Mild to moderate motor speech deficits, Mild expressive and receptive aphasia.  Cognition: To be evaluated in coming days  Swallow: Easy to chew solids (7)/ Slightly thick liquids (1), pt to sit upright, small/single bites and sips, alternate liquids/solids    Assessment: Probed pt recall of coughing when eating breakfast earlier, pt recalled coughing but denied remembering if it was from liquid sip or solid bite. Facilitated easy to chew (7) meal trial with turkey and cheese sandwich and cooked carrot coins. Pt independently administered small, single bites and sips; demo'd adequate oral phase, no pocketing. Slightly thick liquids (1) consumed via cup edge. Pt with no coughing or throat clearing during this meal intake. Recommend advancing pt to easy to chew solids (7), continue slightly thick liquids (1).     Other Barriers to Discharge (Family Training, etc): diet texture pending progress

## 2024-10-05 NOTE — PLAN OF CARE
Discharge Planner Post-Acute Rehab OT:      Discharge Plan: Home with HC OT services vs TCU pending progress     Precautions: Falls, Left lolow back and knee pain, [place] - needs hearing aids     Current Status:  ADLs:  Mobility: W/C based. Rex steady Ax2 nsg; Ax1 therapy.  Grooming: Min A oral cares per SLP. Max A hair grooming.  Dressing: UB - Mod A. LB - Max A. Feet Total A. At eob   Bathing: Transfer Ax1 Rex Stedy <> blue + white rolling shower chair. Mod A tasks.  Toileting: Ax2 with rex steady to/from toilet, total A tasks.  IADLs: IND prior. Daughter assisted with yard work and grocery shopping.   Vision/Cognition: Pt is Ox2. Intact visual tracking, impaired peripheral and convergence, impaired confrontation with R lower quadrant. Pt wears reading glasses at baseline. Will further assess cognition.      Assessment: Pt improving with LB dressing using AE at EOB. Focus on progressing standing tolerance with ADLs at sink. Pt participating in session but takes extra time to process and respond to questions. Continue POC.      Other Barriers to Discharge (DME, Family Training, etc): Family training prior to discharge     AE/DME pending progress      Barriers to home: stairs

## 2024-10-05 NOTE — PROGRESS NOTES
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; their daughter lives next-door     Precautions: fall, L knee pain, back pain, aphasia, [place] (has hearing aides)     Current Status:  Bed Mobility: ModA  Transfer: Ax2 Aleah moore with nursing, min/modA stand pivot with increased time, repeated cues  Gait: length of bars, min to mod A, cues for sequencing and another helper for wc follow   Stairs: not safe/not able  Balance: SBA sitting balance, Needs external support & assist of therapist in partial standing     Outcome Measures:   30 second sit to stand: 0 on 10/2/24  PASS    10/5: 7/36  Cueto   10/5: 3/56        Assessment:  Overall, session tolerated well. Pt is slow to respond verbally and physically. Improvements noted in bed mobility and sit to stand during session, but potentially intra session only versus carry over to future sessions.     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

## 2024-10-05 NOTE — PLAN OF CARE
Alert and oriented with expressive aphasia. A2 rex steady  Incont of B&B. LBM 10/4/24. Purewick in place at HS.  Reg diet soft bite/ Mildly thick / Meds whole with water  VSS at RA. Denies pain , SOB, and Chest pain.  Cream applied on coccyx area and groin.    No acute event noted during shift.

## 2024-10-05 NOTE — PLAN OF CARE
Goal Outcome Evaluation:    Outcome Evaluation: Pt received asleep in bed, and through most of the shift. Purewick on and connected to wall suction with canister. Alarms on, call light placed within reach. No acute events overnight. Endorsed accordingly to incoming NOD.

## 2024-10-05 NOTE — PROGRESS NOTES
Postural Assessment for Stroke Scale (PASS)    Maintaining posture -   1) Sitting without support - 3  2) Standing with support (feet position free) - 1  3) Standing without support (feet position free) - 0  4) Standing on nonparetic leg - 0  5) Standing on paretic leg - 0    Changing posture -  6) Rolling supine -> affected side - 1  7) Rolling supine -> unaffected side - 1  8) Sup->sitting edge of bed - 0  9) Sitting edge of bed -> sup - 1  10) Sit->stand without support - 0  11) Stand->sit without support - 0  12) Standing,  pencil from floor without support - 0    Total Score - 7/36    The PASS assesses a patient's ability to change and maintain posture during different balance activities. It is not associated with falls risk, but is used to track progress with the patient's ability to control their posture and balance throughout the course of the patient's admission.    A score of <22 points at admission to Acute Rehab is predictive that pt is not likely to be walking independently at 3 months.   (Vega et al. 2021. Postural Maintenance Is Associated with Walking Ability in People Received Acute Rehabilitation after Stroke)

## 2024-10-05 NOTE — PLAN OF CARE
Discharge Planner Post-Acute Rehab SLP:     Discharge Plan: TBD. Ongoing SLP    Precautions: fall risk    Current Status:  Hearing: Sensorineural hearing loss, bilateral hearing aides.  Vision: Readers  Communication: Mild to moderate motor speech deficits, Mild expressive and receptive aphasia.  Cognition: To be evaluated in coming days  Swallow: Soft and bite sized (6)/ Slightly thick liquids (1), pt to sit upright, small/single bites and sips, alternate liquids/solids    Assessment:  Upon SLP arrival, pt observed coughing while eating, requesting additional water. SLP obtained 8oz of slightly thickened water (2 packets 4 oz slightly thick) to bring back to pt. Pt requested to continue eating before beginning activity. Pt appearing fatigued during activity despite time of day. Pt completed a state the category activity with ~70% accuracy provided mod max cues. Pt requiring repetition of questions, full attention, and sometimes sentence completion or other cues to accurately answer.     Other Barriers to Discharge (Family Training, etc): diet texture pending progress

## 2024-10-06 ENCOUNTER — APPOINTMENT (OUTPATIENT)
Dept: OCCUPATIONAL THERAPY | Facility: CLINIC | Age: 79
DRG: 057 | End: 2024-10-06
Attending: PHYSICAL MEDICINE & REHABILITATION
Payer: MEDICARE

## 2024-10-06 ENCOUNTER — APPOINTMENT (OUTPATIENT)
Dept: SPEECH THERAPY | Facility: CLINIC | Age: 79
DRG: 057 | End: 2024-10-06
Attending: PHYSICAL MEDICINE & REHABILITATION
Payer: MEDICARE

## 2024-10-06 ENCOUNTER — APPOINTMENT (OUTPATIENT)
Dept: PHYSICAL THERAPY | Facility: CLINIC | Age: 79
DRG: 057 | End: 2024-10-06
Attending: PHYSICAL MEDICINE & REHABILITATION
Payer: MEDICARE

## 2024-10-06 PROCEDURE — 250N000013 HC RX MED GY IP 250 OP 250 PS 637: Performed by: PHYSICAL MEDICINE & REHABILITATION

## 2024-10-06 PROCEDURE — 92526 ORAL FUNCTION THERAPY: CPT | Mod: GN | Performed by: SPEECH-LANGUAGE PATHOLOGIST

## 2024-10-06 PROCEDURE — 97116 GAIT TRAINING THERAPY: CPT | Mod: GP

## 2024-10-06 PROCEDURE — 97112 NEUROMUSCULAR REEDUCATION: CPT | Mod: GP

## 2024-10-06 PROCEDURE — 250N000013 HC RX MED GY IP 250 OP 250 PS 637: Performed by: PHYSICIAN ASSISTANT

## 2024-10-06 PROCEDURE — 97535 SELF CARE MNGMENT TRAINING: CPT | Mod: GO

## 2024-10-06 PROCEDURE — 128N000003 HC R&B REHAB

## 2024-10-06 RX ADMIN — AMLODIPINE BESYLATE 5 MG: 5 TABLET ORAL at 20:24

## 2024-10-06 RX ADMIN — Medication 1 TABLET: at 09:03

## 2024-10-06 RX ADMIN — ATORVASTATIN CALCIUM 40 MG: 40 TABLET, FILM COATED ORAL at 09:03

## 2024-10-06 RX ADMIN — ASPIRIN 81 MG CHEWABLE TABLET 81 MG: 81 TABLET CHEWABLE at 09:03

## 2024-10-06 RX ADMIN — HYDROCHLOROTHIAZIDE 12.5 MG: 12.5 TABLET ORAL at 09:03

## 2024-10-06 RX ADMIN — FAMOTIDINE 20 MG: 20 TABLET ORAL at 09:03

## 2024-10-06 RX ADMIN — LISINOPRIL 40 MG: 40 TABLET ORAL at 09:03

## 2024-10-06 ASSESSMENT — ACTIVITIES OF DAILY LIVING (ADL)
ADLS_ACUITY_SCORE: 47

## 2024-10-06 NOTE — PROGRESS NOTES
[facility], [place]   Physical Medicine and Rehabilitation Daily Note           Assessment and Plan of Care:   Chika Hurd is a 79 year old right hand dominant female with a past medical history of prior stroke (10/2023; left frontal lobe and corona radiata), hyperlipidemia, hypertension, central retinal artery occlusion of right eye, left breast cancer, GERD, prediabetes, sensorineural hearing loss, and obesity who was admitted on 9/25/24 with acute left basal ganglia hemorrhage with hospital course complicated by subacute stroke in left periatrial white matter, incidental saccular aneurysm, constipation, and conjunctivitis. She was admitted to ARU on 10/1/24 for multidisciplinary rehabilitation and ongoing medical management.     --Vitals stable. No lab today.  --Blood pressure has been stable: 101/46, 110/59, 124/54, 119/49  --Continue ongoing medical management.  --Continue therapies and plan of care.             Interval history:   No acute events overnight. Patient was seen at bedside, reports she is feeling well.     Denies fever, chills, CP, SOB, N/V, abdominal pain, new pain or weakness/numbness/tingling.             Physical Exam:     Vitals:    10/05/24 1517 10/05/24 2050 10/06/24 0600 10/06/24 0901   BP: 101/46 110/59 124/54 119/49   BP Location: Right arm  Right arm Right arm   Patient Position:    Sitting   Cuff Size:    Adult Regular   Pulse: 77  79 82   Resp: 18  16    Temp: 97.7  F (36.5  C)  97.9  F (36.6  C)    TempSrc: Oral      SpO2: 93%  96%    Weight:         Gen: NAD, resting in bed  Heart: RRR  Lungs: clear breath sounds b/l  Abd: soft and non-tender  Ext: wwp, no edema in BLE, no tenderness in calves  MSK/neuro: alert and oriented. speech fluent. moves BUE and BLE volitionally.          Data:   Scheduled meds  Current Facility-Administered Medications   Medication Dose Route Frequency Provider Last Rate Last Admin    amLODIPine (NORVASC) tablet 5 mg  5 mg  Oral QPM Vaishnavi Pacheco PA-C   5 mg at 10/05/24 2050    aspirin (ASA) chewable tablet 81 mg  81 mg Oral Daily Vaishnavi Pacheco PA-C   81 mg at 10/06/24 0903    atorvastatin (LIPITOR) tablet 40 mg  40 mg Oral Daily Vaishnavi Pacheco PA-C   40 mg at 10/06/24 0903    famotidine (PEPCID) tablet 20 mg  20 mg Oral Daily Yusuf Sapp MD   20 mg at 10/06/24 0903    hydroCHLOROthiazide tablet 12.5 mg  12.5 mg Oral Daily Yusuf Sapp MD   12.5 mg at 10/06/24 0903    lisinopril (ZESTRIL) tablet 40 mg  40 mg Oral Daily Vaishnavi Pacheco PA-C   40 mg at 10/06/24 0903    multivitamin w/minerals (THERA-VIT-M) tablet 1 tablet  1 tablet Oral Daily Vaishnavi Pacheco PA-C   1 tablet at 10/06/24 0903       PRN meds:  Current Facility-Administered Medications   Medication Dose Route Frequency Provider Last Rate Last Admin    acetaminophen (TYLENOL) tablet 650 mg  650 mg Oral Q4H PRN Vaishnavi Pacheco PA-C        diclofenac (VOLTAREN) 1 % topical gel 4 g  4 g Topical 4x Daily PRN Vaishnavi Pacheco PA-C        melatonin tablet 3 mg  3 mg Oral At Bedtime PRN Vaishnavi Pacheco PA-C        methocarbamol (ROBAXIN) tablet 500 mg  500 mg Oral 4x Daily PRN Yusuf Sapp MD        polyethylene glycol (MIRALAX) Packet 17 g  17 g Oral Daily PRN Vaishnavi Pacheco PA-C        senna-docusate (SENOKOT-S/PERICOLACE) 8.6-50 MG per tablet 1-2 tablet  1-2 tablet Oral BID PRN Vaishnavi Pacheco PA-C             Patient was seen and discussed with staff attending, Dr. Ikramuddin Paola Toussaint Gonzalez, MD  Physical Medicine & Rehabilitation PGY-3

## 2024-10-06 NOTE — PLAN OF CARE
Discharge Planner Post-Acute Rehab SLP:     Discharge Plan: RAMILA. Ongoing SLP    Precautions: fall risk    Current Status:  Hearing: Sensorineural hearing loss, bilateral hearing aides.  Vision: Readers  Communication: Mild to moderate motor speech deficits, Mild expressive and receptive aphasia.  Cognition: To be evaluated in coming days  Swallow: Easy to chew solids (7)/ Slightly thick liquids (1), pt to sit upright, small/single bites and sips, alternate liquids/solids    Assessment: Pt up in wheelchair when SLP arrived. Analyzed pt with easy to chew (7) breakfast item Bulgarian toast saturated in syrup. Pt demo'd independently taking small, single bites; adequate mastication and oral clearance. Slightly thick liquid available via cup edge but none consumed during SLP session. Continue easy to chew solids (7) and slightly thick liquids (1) at this time.     Other Barriers to Discharge (Family Training, etc): diet texture pending progress

## 2024-10-06 NOTE — PLAN OF CARE
Goal Outcome Evaluation:      Plan of Care Reviewed With: patient    Overall Patient Progress: no change    Outcome Evaluation: 1388-4561 Pt is alert, disoriented to time. Incontinent of bladder this shift. Last BM 10/5. Denied pain or discomfort this shift. Up assist of 2 with Aleah Colin. Level 7 diet with slightly thick liquids, medications taken whole with water. Skin intact with some blanchable redness to coccyx and scabbing to right forearm. Uses call light appropriately, able to make needs known. Bed alarm on for safety.

## 2024-10-06 NOTE — PROGRESS NOTES
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; their daughter lives next-door     Precautions: fall, L knee pain, back pain, aphasia, [place] (has hearing aides)     Current Status:  Bed Mobility: ModA  Transfer: Ax2 Aleah moore with nursing, min/modA stand pivot with increased time, repeated cues  Gait: length of bars, min to mod A, cues for sequencing and another helper for wc follow   Stairs: not safe/not able  Balance: SBA sitting balance, Needs external support & assist of therapist in partial standing     Outcome Measures:   30 second sit to stand: 0 on 10/2/24  PASS    10/5: 7/36  Cueto   10/5: 3/56        Assessment:  Overall, session tolerated fair. Pt attempts to refuse session due to football game. Session modifiied to in room until half time to faciliate participation. Able to progress sit to stand assist to a min-mod A to stand with poorly controlled sit but does not follow directions to use UE to assist. Pt able to progress to walker for transfers and for gait.     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

## 2024-10-06 NOTE — PLAN OF CARE
Goal Outcome Evaluation:       Plan of Care Reviewed With: patient, spouse     Overall Patient Progress: no change     Outcome Evaluation    Alert and orientedx4, A2 rex. Makes needs known.  Pt denied pain at night.  Incontinent of bowel and bladder, LBM 10/5.  No acute changes, continue with POC.

## 2024-10-06 NOTE — PLAN OF CARE
Discharge Planner Post-Acute Rehab OT:      Discharge Plan: Home with HC OT services vs TCU pending progress     Precautions: Falls, Left low back and knee pain, [place] - needs hearing aids     Current Status:  ADLs:  Mobility: W/C based. Rex steady Ax2 nsg; Ax1 therapy - min A after set-up for SPT.  Grooming: Min A oral cares per SLP. Max A hair grooming. Pt alternated sitting/standing at the sink.  Dressing: UB - Mod A. LB - Max A with reacher seated EOB for shorts and pt works on balance for pants over hips. Feet Total A.   Bathing: Transfer Ax1 Rex Stedy <> blue + white rolling shower chair. Mod A tasks.  Toileting: Ax2 with rex steady to/from toilet, pt participating in kj cares in standing but needs assist to be thorough.  Pt is using commode overlay and the wall grab bar.  IADLs: IND prior. Daughter assisted with yard work and grocery shopping.   Vision/Cognition: Pt is Ox2. Intact visual tracking, impaired peripheral and convergence, impaired confrontation with R lower quadrant. Pt wears reading glasses at baseline. Will further assess cognition.      Assessment: Pt making progress.  See skill levels above, improved transfers.  Pt is a good candidate to con't working on ADL and mobility skills.   Pt prefers to wake at 8am vs 7am.      Other Barriers to Discharge (DME, Family Training, etc): Family training prior to discharge     AE/DME pending progress      Barriers to home: stairs

## 2024-10-06 NOTE — PLAN OF CARE
Discharge Planner Post-Acute Rehab SLP:      Discharge Plan: RAMILA. Ongoing SLP     Precautions: fall risk     Current Status:  Hearing: Sensorineural hearing loss, bilateral hearing aides.  Vision: Readers  Communication: Mild to moderate motor speech deficits, Mild expressive and receptive aphasia.  Cognition: To be evaluated in coming days  Swallow: Easy to chew solids (7)/ Slightly thick liquids (1), pt to sit upright, small/single bites and sips, alternate liquids/solids     Assessment: Patient declined to participate in pm session due to family visiting. -45 minutes.      Other Barriers to Discharge (Family Training, etc): diet texture pending progress

## 2024-10-06 NOTE — PLAN OF CARE
Goal Outcome Evaluation:      Plan of Care Reviewed With: patient    Overall Patient Progress: no change    Outcome Evaluation: 0705-1206 Pt is alert, disoriented to time. [place] and wears hearing aids. Incontinent of bladder this shift. Denied pain or discomfort this shift. Up assist of 2 with Aleah Colin. Upgraded to Level 7 diet with slightly thick liquids. Uses call light appropriately, able to make needs known. Bed alarm on for safety.

## 2024-10-07 ENCOUNTER — APPOINTMENT (OUTPATIENT)
Dept: OCCUPATIONAL THERAPY | Facility: CLINIC | Age: 79
DRG: 057 | End: 2024-10-07
Attending: PHYSICAL MEDICINE & REHABILITATION
Payer: MEDICARE

## 2024-10-07 ENCOUNTER — APPOINTMENT (OUTPATIENT)
Dept: SPEECH THERAPY | Facility: CLINIC | Age: 79
DRG: 057 | End: 2024-10-07
Attending: PHYSICAL MEDICINE & REHABILITATION
Payer: MEDICARE

## 2024-10-07 ENCOUNTER — APPOINTMENT (OUTPATIENT)
Dept: PHYSICAL THERAPY | Facility: CLINIC | Age: 79
DRG: 057 | End: 2024-10-07
Attending: PHYSICAL MEDICINE & REHABILITATION
Payer: MEDICARE

## 2024-10-07 ENCOUNTER — APPOINTMENT (OUTPATIENT)
Dept: GENERAL RADIOLOGY | Facility: CLINIC | Age: 79
End: 2024-10-07
Attending: PHYSICIAN ASSISTANT
Payer: MEDICARE

## 2024-10-07 VITALS
SYSTOLIC BLOOD PRESSURE: 120 MMHG | TEMPERATURE: 97.7 F | HEART RATE: 66 BPM | OXYGEN SATURATION: 96 % | DIASTOLIC BLOOD PRESSURE: 60 MMHG | RESPIRATION RATE: 16 BRPM | WEIGHT: 175.04 LBS | BODY MASS INDEX: 34.19 KG/M2

## 2024-10-07 LAB
ANION GAP SERPL CALCULATED.3IONS-SCNC: 10 MMOL/L (ref 7–15)
BUN SERPL-MCNC: 23.4 MG/DL (ref 8–23)
CALCIUM SERPL-MCNC: 9.6 MG/DL (ref 8.8–10.4)
CHLORIDE SERPL-SCNC: 103 MMOL/L (ref 98–107)
CREAT SERPL-MCNC: 0.62 MG/DL (ref 0.51–0.95)
EGFRCR SERPLBLD CKD-EPI 2021: >90 ML/MIN/1.73M2
ERYTHROCYTE [DISTWIDTH] IN BLOOD BY AUTOMATED COUNT: 13.8 % (ref 10–15)
GLUCOSE SERPL-MCNC: 100 MG/DL (ref 70–99)
HCO3 SERPL-SCNC: 26 MMOL/L (ref 22–29)
HCT VFR BLD AUTO: 37.4 % (ref 35–47)
HGB BLD-MCNC: 12.2 G/DL (ref 11.7–15.7)
MCH RBC QN AUTO: 29.5 PG (ref 26.5–33)
MCHC RBC AUTO-ENTMCNC: 32.6 G/DL (ref 31.5–36.5)
MCV RBC AUTO: 90 FL (ref 78–100)
PLATELET # BLD AUTO: 454 10E3/UL (ref 150–450)
POTASSIUM SERPL-SCNC: 4.7 MMOL/L (ref 3.4–5.3)
RBC # BLD AUTO: 4.14 10E6/UL (ref 3.8–5.2)
SODIUM SERPL-SCNC: 139 MMOL/L (ref 135–145)
WBC # BLD AUTO: 10.9 10E3/UL (ref 4–11)

## 2024-10-07 PROCEDURE — 250N000013 HC RX MED GY IP 250 OP 250 PS 637: Performed by: PHYSICIAN ASSISTANT

## 2024-10-07 PROCEDURE — 97112 NEUROMUSCULAR REEDUCATION: CPT | Mod: GP | Performed by: PHYSICAL THERAPIST

## 2024-10-07 PROCEDURE — 74230 X-RAY XM SWLNG FUNCJ C+: CPT | Mod: 26 | Performed by: STUDENT IN AN ORGANIZED HEALTH CARE EDUCATION/TRAINING PROGRAM

## 2024-10-07 PROCEDURE — 36415 COLL VENOUS BLD VENIPUNCTURE: CPT | Performed by: PHYSICIAN ASSISTANT

## 2024-10-07 PROCEDURE — 80048 BASIC METABOLIC PNL TOTAL CA: CPT | Performed by: PHYSICIAN ASSISTANT

## 2024-10-07 PROCEDURE — 74230 X-RAY XM SWLNG FUNCJ C+: CPT

## 2024-10-07 PROCEDURE — 85027 COMPLETE CBC AUTOMATED: CPT | Performed by: PHYSICIAN ASSISTANT

## 2024-10-07 PROCEDURE — 97535 SELF CARE MNGMENT TRAINING: CPT | Mod: GO | Performed by: OCCUPATIONAL THERAPIST

## 2024-10-07 PROCEDURE — 250N000013 HC RX MED GY IP 250 OP 250 PS 637: Performed by: PHYSICAL MEDICINE & REHABILITATION

## 2024-10-07 PROCEDURE — 92611 MOTION FLUOROSCOPY/SWALLOW: CPT | Mod: GN

## 2024-10-07 PROCEDURE — 128N000003 HC R&B REHAB

## 2024-10-07 PROCEDURE — 99232 SBSQ HOSP IP/OBS MODERATE 35: CPT | Performed by: PHYSICAL MEDICINE & REHABILITATION

## 2024-10-07 PROCEDURE — 97116 GAIT TRAINING THERAPY: CPT | Mod: GP | Performed by: PHYSICAL THERAPIST

## 2024-10-07 PROCEDURE — 92526 ORAL FUNCTION THERAPY: CPT | Mod: GN

## 2024-10-07 RX ORDER — BARIUM SULFATE 400 MG/ML
SUSPENSION ORAL ONCE
Status: COMPLETED | OUTPATIENT
Start: 2024-10-07 | End: 2024-10-07

## 2024-10-07 RX ADMIN — ATORVASTATIN CALCIUM 40 MG: 40 TABLET, FILM COATED ORAL at 08:07

## 2024-10-07 RX ADMIN — LISINOPRIL 40 MG: 40 TABLET ORAL at 08:08

## 2024-10-07 RX ADMIN — BARIUM SULFATE 15 ML: 400 SUSPENSION ORAL at 15:21

## 2024-10-07 RX ADMIN — HYDROCHLOROTHIAZIDE 12.5 MG: 12.5 TABLET ORAL at 08:07

## 2024-10-07 RX ADMIN — FAMOTIDINE 20 MG: 20 TABLET ORAL at 08:08

## 2024-10-07 RX ADMIN — ASPIRIN 81 MG CHEWABLE TABLET 81 MG: 81 TABLET CHEWABLE at 08:07

## 2024-10-07 RX ADMIN — AMLODIPINE BESYLATE 5 MG: 5 TABLET ORAL at 20:13

## 2024-10-07 RX ADMIN — Medication 1 TABLET: at 08:07

## 2024-10-07 ASSESSMENT — ACTIVITIES OF DAILY LIVING (ADL)
ADLS_ACUITY_SCORE: 47

## 2024-10-07 NOTE — PROGRESS NOTES
[facility]   Acute Rehabilitation Unit  Daily progress note    INTERVAL HISTORY  Weekend and therapy notes reviewed, no acute events reported.    Chika Hurd was seen at bedside this afternoon.  She is getting ready for a video swallow study. Denies chest pain, shortness of breath, no fever or chills.    Functional  PT:  Bed Mobility: ModA  Transfer: Ax2 Aleah teresa with nursing, min/modA stand pivot with increased time, repeated cues  Gait: length of bars, min to mod A, cues for sequencing and another helper for wc follow   Stairs: not safe/not able  Balance: SBA sitting balance, Needs external support & assist of therapist in partial standing     Outcome Measures:   30 second sit to stand: 0 on 10/2/24  PASS               10/5: 7/36  Cueto              10/5: 3/56       ROS: 10 point ROS neg other than the symptoms noted above in the HPI.        MEDICATIONS  Current Facility-Administered Medications   Medication Dose Route Frequency Provider Last Rate Last Admin    amLODIPine (NORVASC) tablet 5 mg  5 mg Oral QPM Vaishnavi Pacheco PA-C   5 mg at 10/06/24 2024    aspirin (ASA) chewable tablet 81 mg  81 mg Oral Daily Vaishnavi Pacheco PA-C   81 mg at 10/07/24 0807    atorvastatin (LIPITOR) tablet 40 mg  40 mg Oral Daily Vaishnavi Pacheco PA-C   40 mg at 10/07/24 0807    famotidine (PEPCID) tablet 20 mg  20 mg Oral Daily Yusuf Sapp MD   20 mg at 10/07/24 0808    hydroCHLOROthiazide tablet 12.5 mg  12.5 mg Oral Daily Yusuf Sapp MD   12.5 mg at 10/07/24 0807    lisinopril (ZESTRIL) tablet 40 mg  40 mg Oral Daily Vaishnavi Pacheco PA-C   40 mg at 10/07/24 0808    multivitamin w/minerals (THERA-VIT-M) tablet 1 tablet  1 tablet Oral Daily Vaishnavi Pacheco PA-C   1 tablet at 10/07/24 0807          Current Facility-Administered Medications   Medication Dose Route Frequency Provider Last Rate Last Admin    acetaminophen (TYLENOL) tablet 650 mg   650 mg Oral Q4H PRN Vaishnavi Pacheco PA-C        diclofenac (VOLTAREN) 1 % topical gel 4 g  4 g Topical 4x Daily PRN Vaishnavi Pacheco PA-C        melatonin tablet 3 mg  3 mg Oral At Bedtime PRN Vaishnavi Pacheco PA-C        methocarbamol (ROBAXIN) tablet 500 mg  500 mg Oral 4x Daily PRN Yusuf Sapp MD        polyethylene glycol (MIRALAX) Packet 17 g  17 g Oral Daily PRN Vaishnavi Pacheco PA-C        senna-docusate (SENOKOT-S/PERICOLACE) 8.6-50 MG per tablet 1-2 tablet  1-2 tablet Oral BID PRN Vaishnavi Pacheco PA-C            PHYSICAL EXAM  /54 (BP Location: Right arm, Patient Position: Fowlers, Cuff Size: Adult Regular)   Pulse 71   Temp 97.4  F (36.3  C) (Oral)   Resp 16   Wt 79.4 kg (175 lb 0.7 oz)   LMP  (LMP Unknown)   SpO2 97%   BMI 34.19 kg/m      Gen: NAD, up in chair  HEENT: NC/AT  Cardio: RRR, no murmurs  Pulm: non-labored on room air, lungs CTA bilaterally  Abd: soft, non-tender, non-distended, bowel sounds present  Ext: no edema in BLE  Neuro/MSK: drowsy, PERRL, EOMI, left ptosis, right facial droop, +dysarthria, +expressive aphasia (inappropriate/illogical word substitutions), able to intermittently follow simple commands.       LABS  CBC RESULTS:   Recent Labs   Lab Test 10/07/24  0606 10/03/24  0614 09/26/24  0446   WBC 10.9 12.3* 11.5*   RBC 4.14 4.06 4.44   HGB 12.2 12.2 13.6   HCT 37.4 36.9 40.6   MCV 90 91 91   MCH 29.5 30.0 30.6   MCHC 32.6 33.1 33.5   RDW 13.8 13.6 13.9   * 346 292       Last Basic Metabolic Panel:  Recent Labs   Lab Test 10/07/24  0606 10/03/24  0614 10/01/24  0823    134* 134*   POTASSIUM 4.7 4.1 4.4   CHLORIDE 103 96* 97*   CO2 26 26 25   ANIONGAP 10 12 12   * 100* 104*   BUN 23.4* 34.5* 31.2*   CR 0.62 0.78 0.73   GFRESTIMATED >90 78 84   KAR 9.6 9.4 9.3         Rehabilitation     Admission to acute inpatient rehab 10/01/24.    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       Impairment of ADL's: Noted to have BLE weakness, impaired balance, impaired coordination, impaired activity tolerance, impaired alertness, and impaired cognition, all affecting her ability to safely and independently perform basic ADLs.  Goal for SBA with basic ADLs except min A for bathing.      Impairment of mobility:  Noted to have BLE weakness, impaired balance, impaired coordination, impaired activity tolerance, impaired alertness, and impaired cognition, all affecting her ability to safely and independently perform basic mobility.  Goal for SBA with basic mobility with min A for stairs.      Impairment of cognition/language/swallow:  Noted to have dysphagia, dysarthria, aphasia, and impaired cognition with goals for safe tolerance of least restrictive diet and improved cognitive-linguistic skills to meet basic needs.     Continue comprehensive acute inpatient rehabilitation program with multidisciplinary approach including therapies, rehab nursing, and physiatry following. See interval history for updates.      ASSESSMENT AND PLAN  Chika Hurd is a 79 year old right hand dominant female with a past medical history of prior stroke (10/2023; left frontal lobe and corona radiata), hyperlipidemia, hypertension, central retinal artery occlusion of right eye, left breast cancer, GERD, prediabetes, sensorineural hearing loss, and obesity who was admitted on 9/25/24 with acute left basal ganglia hemorrhage with hospital course complicated by subacute stroke in left periatrial white matter, incidental saccular aneurysm, constipation, and conjunctivitis.  She was admitted to ARU on 10/1/24 for multidisciplinary rehabilitation and ongoing medical management.      Medical  Conditions  New actions/orders/updates for today are in blue.     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  Presented with new gait instability and worsened dysarthria.  Imaging findings as above.  PTA on Plavix + aspirin, plan had been for 3 months.  Per sending team, does not need to resume Plavix but ok'd to resume aspirin daily on 10/1.  - Continue ASA 81 mg daily (resumed on 10/1)  - Continue statin with LDL goal 40-70  - SBP goal 130-150 as inpatient, long-term outpatient goal <130/80.  Management as below.  - Continue easy to chew diet, slightly thick (level 1) liquids.  Plan for repeat video swallow study this afternoon  - Ziopatch x14 days  - Continue PT/OT/SLP  - Follow up with neurology in 6-8 weeks     Hyperlipidemia  - LDL goal 40-70 (LDL 55 on 9/26)  - Continue PTA atorvastatin 40 mg daily     Hypertension  - Continue hydrochlorothiazide 12.5 mg (decreased 10/4 due to soft blood pressures and mild hyponatremia)  - Continue PTA lisinopril 40 mg daily  - Continue amlodipine 5 mg daily (added 10/1)  - Monitor BP and adjust regimen as indicated     Hyponatremia  Mild at 134 on 10/1.  Suspect related to hydrochlorothiazide, mild hypovolemia.  BUN mildly elevated.  On slightly thick liquids.  10/7: Na   - Monitor BMP every M/Th     GERD  - Continue PTA famotidine 20 mg BID     Constipation  In setting of decreased mobility  Last BM on 10/5 - will need suppository if no BM over night.   - Continue Miralax daily  - Monitor, additional PRN bowel meds available, can  schedule if ongoing issues     Prediabetes  A1c 6.0% on 9/27/24  - Follow up with PCP for ongoing surveillance     Bilateral conjunctivitis, resolved  Started on ofloxacin at hospital on 9/29 due to purulent discharge from bilateral eyes with associated pain.  Completed course of ofloxacin as of 10/4.  No recurrent issues at ARU.        Adjustment to disability:  Clinical psychology to eval and treat if indicated  FEN: easy to chew (level 7) diet, slightly thick (level 1) liquids; swallow strategies per SLP  Bowel: continent, recent constipation.  Continue scheduled Miralax.  Additional PRN bowel medications available.  Monitor and adjust regimen as indicated.  Bladder: continent/incontinent  DVT Prophylaxis: mechanical  GI Prophylaxis: pepcid  Code: full; as prior  Disposition: home vs TCU  ELOS: target 10/22/24 pending progress  Follow up Appointments on Discharge: PCP in 1-2 weeks, general neurology or stroke REUBEN in 6-8 weeks      25 minutes spent on the date of service doing chart review, history and exam, documentation, and further activities as noted above.         Michael Self, DO    Physical Medicine & Rehabilitation

## 2024-10-07 NOTE — PLAN OF CARE
Goal Outcome Evaluation:      Plan of Care Reviewed With: patient    Overall Patient Progress: improvingOverall Patient Progress: improving  VS: /47 (BP Location: Right arm)   Pulse 71   Temp 97.4  F (36.3  C) (Oral)   Resp 16   Wt 79.4 kg (175 lb 0.7 oz)   LMP  (LMP Unknown)   SpO2 97%   BMI 34.19 kg/m     O2: 97% On RA    Output: Voids spontaneously without difficulty to bathroom /   Last BM: 10/5    Activity: Up with AX2 Aleah steady   Skin: WDL    Pain: Denies pain   CMS: Intact, Alert and oriented disoriented to time   Dressing: None   Diet: Regular diet, slightly thick liquids takes pills whole.   LDA: None   Equipment: None   Plan: Precautions maintained. Continue with plan of care. Call light within reach, bed alarm on. Pt able to use a call light appropriately.    Additional Info:

## 2024-10-07 NOTE — PLAN OF CARE
Goal Outcome Evaluation:      Plan of Care Reviewed With: patient    Overall Patient Progress: improvingOverall Patient Progress: improving    VS: /55 (BP Location: Right arm)   Pulse 68   Temp 97.7  F (36.5  C) (Oral)   Resp 16   Wt 79.4 kg (175 lb 0.7 oz)   LMP  (LMP Unknown)   SpO2 96%   BMI 34.19 kg/m     O2: SpO2 > 96 and stable on RA. LS clear and equal bilaterally. Denies chest pain and SOB.    Output: Voids spontaneously without difficulty to bathroom, incontinent.   Last BM: 10/5, denies abdominal discomfort. BS active / passing flatus.    Activity: Up with A x 2 rex steady   Skin: WDL.    Pain: Denied pain.   CMS: Oriented to self/ situation. Denies numbness and tingling.   Dressing: None.   Diet: Regular diet. Denies nausea/vomiting. Pt had fair appetite for meals.   LDA: None.   Equipment: Personal belongings,    Plan: Fall/ seizure precautions maintained / Continue with plan of care. Call light within reach, safety check completed. Call light within reach.   Additional Info: Pt not using call light, anticipate needs and On Q2H toileting. Mixed continent to bladder. Bed alarm on.

## 2024-10-07 NOTE — PROGRESS NOTES
Sw received a power of  from patient's spouse (Igor) Sister. Appointing Daughter Ramila Castro as agent. Document was emailed to [employer] to be upload onto patient's chart.       Cintia Koch [facility], Acute Inpatient Rehab Unit   [facility] [address], [address] [place] [address]  Phone: [phone]  Fax: [phone]

## 2024-10-07 NOTE — PLAN OF CARE
Discharge Planner Post-Acute Rehab SLP:      Discharge Plan: RAMILA. Ongoing SLP     Precautions: fall risk     Current Status:  Hearing: Sensorineural hearing loss, bilateral hearing aides.  Vision: Readers  Communication: Mild to moderate motor speech deficits, Mild expressive and receptive aphasia.  Cognition: To be evaluated in coming days  Swallow: Easy to chew solids (7)/ Slightly thick liquids (1), pt to sit upright, small/single bites and sips, alternate liquids/solids     Assessment: Repeat VFSS completed. Patient overall tolerating solid food textures and thin liquids with intermittent penetration but with immediate injection of the penetrated material. With 1 exception, penetration occurring during the swallow but able to be ejected from the laryngeal vestibule. Mild amounts of vallecular residue with solid textures that clears with multiple liquid washes. Anticipated being able to advance diet to thin liquids if able to tolerate clinically at bedside per SLP recommendations.      Other Barriers to Discharge (Family Training, etc): diet texture pending progress

## 2024-10-07 NOTE — PROGRESS NOTES
"   10/07/24 1641   Appointment Info   Signing Clinician's Name / Credentials (SLP) Raudel Guzman MS, CCC-SLP   General Information   Onset of Illness/Injury or Date of Surgery 09/25/24   Referring Physician Vaishnavi Pacheco PA-C   Patient/Family Therapy Goal Statement (SLP) Drink water   Pertinent History of Current Problem Per H&P: Patient is \"79 year old R hand dominant female with past medical history of prior stroke (10/2023; left frontal lobe and corona radiata), hyperlipidemia, hypertension, central retinal artery occlusion of right eye, left breast cancer, GERD, prediabetes, sensorineural hearing loss, and obesity who presented on 9/25/24 with gait instability and slurred speech.  CT head revealed acute left basal ganglia intraparenchymal hemorrhage.  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  She was not a candidate for thrombolytics or endovascular treatment.  She was admitted for further evaluation and management.     Neurosurgery was consulted, but did not feel any neurosurgical intervention was indicated.  Neurology was consulted.  She was initially started on nifedipine drip for BP management; later transitioned back to home anti-hypertensives.  MRI brain showed acute intraparenchymal hemorrhage centered in left lentiform nucleus and extending into corona radiata; also 6mm focus of probable subacute infarction within left periatrial white matter.  Additional stroke evaluation with echo with EF 60-65%, grade 1 or early diastolic dysfunction, no WMA, no significant valvular disease; EKG with sinus rhythm; A1c 6.0%; LDL 55.  Left basal ganglia hemorrhage etiology felt to be hypertensive.  Ischemic infarct attributed to ESUS.\"   General Observations Patient currently being seen by speech therapy for dysphagia and aphasia interventions.  Recommended repeat VFSS in setting of prior CVA " and assessing to see if patient is ready to advance liquid textures.   Type of Evaluation   Type of Evaluation Swallow Evaluation   General Swallowing Observations   Past History of Dysphagia Pt followed by SLP with 10/2023 CVA, per chart pt reported no dysphagia to OP SLP with regular/thin if she slows down.   Current Diet/Method of Nutritional Intake (General Swallowing Observations, NIS) easy to chew (level 7);slightly thick liquids (level 1)   Swallowing Evaluation Videofluoroscopic swallow study (VFSS)   VFSS Evaluation   Radiologist [facility] radiology staff   Views Taken left lateral   Physical Location of Procedure [facility]   VFSS Eval: Thin Liquid Texture Trial   Mode of Presentation, Thin Liquid cup;spoon;self-fed   Order of Presentation Series # 5-9, 11-13   Preparatory Phase WFL   Oral Phase, Thin Liquid premature pharyngeal entry   Bolus Location When Swallow Triggered pyriforms   Pharyngeal Phase, Thin Liquid impaired hyolaryngeal excursion   Rosenbek's Penetration Aspiration Scale: Thin Liquid Trial Results 2 - contrast enters airway, remains above the vocal cords, no residue remains (penetration)   Diagnostic Statement On approximately 50% of thin liquid trials, noted flash or shallow penetration without residue.  Penetration is consistently occurring during the swallow.   VFSS Eval: Slightly Thick Liquids   Mode of Presentation cup;self-fed   Order of Presentation Series numbers 3, 4   Preparatory Phase WFL   Oral Phase premature pharyngeal entry   Bolus Location When Swallow Triggered pyriforms   Pharyngeal Phase impaired hyolaryngel excursion   Rosenbek's Penetration Aspiration Scale 2 - contrast enters airway, remains above the vocal cords, no residue remains (penetration)   Diagnostic Statement Premature pharyngeal entry leading to penetration but the penetrated material was injected.  Penetration occurred before the swallow.   VFSS Evaluation: Solid Food Texture Trial   Mode of Presentation, Solid  spoon;self-fed   Order of Presentation Series #10   Preparatory Phase WFL   Oral Phase, Solid WFL   Bolus Location When Swallow Triggered posterior laryngeal surface of epiglottis   Pharyngeal Phase, Solid residue in vallecula   Rosenbek's Penetration Aspiration Scale: Solid Food Trial Results 1 - no aspiration, contrast does not enter airway   Diagnostic Statement Multiple liquid washes required to clear vallecular residue.  Degree of penetration was not significantly impacted by presence of vallecular residue.   Esophageal Phase of Swallow   Patient reports or presents with symptoms of esophageal dysphagia No   Esophageal comments No deficits noted in upper esophagus   Swallowing Recommendations   Diet Consistency Recommendations thin liquids (level 0);easy to chew (level 7)   Supervision Level for Intake distant supervision needed   Mode of Delivery Recommendations bolus size, small;slow rate of intake   Swallowing Maneuver Recommendations alternate food and liquid intake   Monitoring/Assistance Required (Eating/Swallowing) monitor for cough or change in vocal quality with intake   Recommended Feeding/Eating Techniques (Swallow Eval) set-up and prepare tray   Medication Administration Recommendations, Swallowing (SLP) per patient preference   Clinical Impression   Criteria for Skilled Therapeutic Interventions Met (SLP Eval) Yes, treatment indicated   SLP Diagnosis Mild oropharyngeal dysphagia   Problem List (SLP) premature spillage to level of pyriforms. Reduced hyolaryngeal excursion.   Activity Limitations Related to Problem List (SLP) need for safe swallow strategies.   Risks & Benefits of therapy have been explained evaluation/treatment results reviewed;care plan/treatment goals reviewed;participants voiced agreement with care plan;patient;participants included   Clinical Impression Comments Repeat VFSS completed.  Patient overall tolerating solid food textures and thin liquids with intermittent penetration  but with immediate injection of the penetrated material.  With 1 exception, penetration occurring during the swallow but able to be ejected from the laryngeal vestibule.  Mild amounts of vallecular residue with solid textures that clears with multiple liquid washes.  Anticipated being able to advance diet to thin liquids if able to tolerate clinically at bedside per SLP recommendations.   SLP Discharge Planning   SLP Plan Trial 7/0 in meal setting and advance diet as tolerated.  Moderate level verbal expression tasks.  CLQT+   SLP - Acute Rehab Center Time   Individual Time (minutes) - SLP 30   Group Time (minutes) - SLP 0   Concurrent Time (minutes) - SLP 0   Co-Treatment Time (minutes) - SLP 0   ARC Total Session Time (minutes) - SLP 30   ARC Daily Total Session Time   SLP ARC Daily Total Session Time 60   ARC Daily Rehab Total Minutes 135

## 2024-10-07 NOTE — PROGRESS NOTES
Discharge Planner Post-Acute Rehab OT:      Discharge Plan: Home with HC OT services vs TCU pending progress     Precautions: Falls, Left low back and knee pain, [place] - needs hearing aids     Current Status:  ADLs:  Mobility: W/C based. Rex steady Ax2 nsg; Ax1 therapy - min A after set-up for SPT.  Grooming: Min A seated at sink   Dressing: UB - CGA. LB - Mod A with rex steady. Feet Total A.   Bathing: Transfer Ax1 Rex Stedy <> blue + white rolling shower chair. Mod A tasks.  Toileting: Ax2 with rex steady to/from toilet, pt participating in kj cares in standing but needs assist to be thorough.  Pt is using commode overlay and the wall grab bar.  IADLs: IND prior. Daughter assisted with yard work and grocery shopping.   Vision/Cognition: Pt is Ox2. Intact visual tracking, impaired peripheral and convergence, impaired confrontation with R lower quadrant. Pt wears reading glasses at baseline. Will further assess cognition.      Assessment: Pt initially declining OT session d/t wanting to finish breakfast. Re-approached 3x until pt agreeable to participate. Writer noted incontinence with pt unaware. Requested for timed toileting from provider. Completed partial ADL routine with use of rex steady.    -15 d/t breakfast     Other Barriers to Discharge (DME, Family Training, etc): Family training prior to discharge     AE/DME pending progress      Barriers to home: stairs

## 2024-10-08 ENCOUNTER — APPOINTMENT (OUTPATIENT)
Dept: SPEECH THERAPY | Facility: CLINIC | Age: 79
DRG: 057 | End: 2024-10-08
Attending: PHYSICAL MEDICINE & REHABILITATION
Payer: MEDICARE

## 2024-10-08 ENCOUNTER — APPOINTMENT (OUTPATIENT)
Dept: OCCUPATIONAL THERAPY | Facility: CLINIC | Age: 79
DRG: 057 | End: 2024-10-08
Attending: PHYSICAL MEDICINE & REHABILITATION
Payer: MEDICARE

## 2024-10-08 ENCOUNTER — APPOINTMENT (OUTPATIENT)
Dept: PHYSICAL THERAPY | Facility: CLINIC | Age: 79
DRG: 057 | End: 2024-10-08
Attending: PHYSICAL MEDICINE & REHABILITATION
Payer: MEDICARE

## 2024-10-08 PROCEDURE — 128N000003 HC R&B REHAB

## 2024-10-08 PROCEDURE — 92507 TX SP LANG VOICE COMM INDIV: CPT | Mod: GN

## 2024-10-08 PROCEDURE — 92526 ORAL FUNCTION THERAPY: CPT | Mod: GN

## 2024-10-08 PROCEDURE — 250N000013 HC RX MED GY IP 250 OP 250 PS 637: Performed by: PHYSICIAN ASSISTANT

## 2024-10-08 PROCEDURE — 97110 THERAPEUTIC EXERCISES: CPT | Mod: GP | Performed by: PHYSICAL THERAPIST

## 2024-10-08 PROCEDURE — 99231 SBSQ HOSP IP/OBS SF/LOW 25: CPT | Performed by: PHYSICIAN ASSISTANT

## 2024-10-08 PROCEDURE — 97535 SELF CARE MNGMENT TRAINING: CPT | Mod: GO | Performed by: OCCUPATIONAL THERAPIST

## 2024-10-08 PROCEDURE — 97530 THERAPEUTIC ACTIVITIES: CPT | Mod: GP | Performed by: PHYSICAL THERAPIST

## 2024-10-08 PROCEDURE — 250N000013 HC RX MED GY IP 250 OP 250 PS 637: Performed by: PHYSICAL MEDICINE & REHABILITATION

## 2024-10-08 RX ADMIN — ASPIRIN 81 MG CHEWABLE TABLET 81 MG: 81 TABLET CHEWABLE at 08:35

## 2024-10-08 RX ADMIN — AMLODIPINE BESYLATE 5 MG: 5 TABLET ORAL at 19:41

## 2024-10-08 RX ADMIN — FAMOTIDINE 20 MG: 20 TABLET ORAL at 08:35

## 2024-10-08 RX ADMIN — ATORVASTATIN CALCIUM 40 MG: 40 TABLET, FILM COATED ORAL at 08:35

## 2024-10-08 RX ADMIN — Medication 1 TABLET: at 08:34

## 2024-10-08 RX ADMIN — HYDROCHLOROTHIAZIDE 12.5 MG: 12.5 TABLET ORAL at 08:35

## 2024-10-08 RX ADMIN — LISINOPRIL 40 MG: 40 TABLET ORAL at 08:35

## 2024-10-08 ASSESSMENT — ACTIVITIES OF DAILY LIVING (ADL)
ADLS_ACUITY_SCORE: 47
ADLS_ACUITY_SCORE: 43
ADLS_ACUITY_SCORE: 47
ADLS_ACUITY_SCORE: 47
ADLS_ACUITY_SCORE: 42
ADLS_ACUITY_SCORE: 42
ADLS_ACUITY_SCORE: 43
ADLS_ACUITY_SCORE: 47
ADLS_ACUITY_SCORE: 43
ADLS_ACUITY_SCORE: 47
ADLS_ACUITY_SCORE: 43
ADLS_ACUITY_SCORE: 47

## 2024-10-08 NOTE — PLAN OF CARE
"Discharge Planner Post-Acute Rehab PT:      Discharge Plan: Home with Home PT. 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     Precautions: fall, L knee pain, back pain, aphasia, [place] (has hearing aides)     Current Status:  Bed Mobility: ModA  Transfer: CGA - min A. Verbal cueing for hand positioning and weight-shifting.  Gait: FWW, CGA, 10-20ft  Stairs: 4\"x6, CGA, bilateral rails  Balance: Requires heavy UE support for dynamic gait.     Outcome Measures:   30 second sit to stand  10/2: 0  PASS               10/5: 7/36  Cueto              10/5: 3/56    Assessment:  Overall, session tolerated good, being able to demonstrate improved independence with walking, transfers, and stairs in PT session today. Updated to Ax1 with FWW with nursing.    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

## 2024-10-08 NOTE — PLAN OF CARE
Goal Outcome Evaluation:      Plan of Care Reviewed With: patient    Overall Patient Progress: improving         VS: /60 (BP Location: Right arm, Patient Position: Semi-Love's, Cuff Size: Adult Regular)   Pulse 66   Temp 97.7  F (36.5  C) (Oral)   Resp 16   Wt 79.4 kg (175 lb 0.7 oz)   LMP  (LMP Unknown)   SpO2 96%   BMI 34.19 kg/m       O2: Room air. Denies SOB. Respirations even and non labored    Output: Pt has mixed incontinence. Voided in toilet x1 this shift.    Last BM: 10/5   Activity: Ax2 rex steady    Skin: Blanchable redness on buttocks    Pain: Pt denied pain.    Neuro: A/O x2    Dressing: None    Diet: EZ chew    LDA: Purewick    Equipment: Purewick. Walker. Gait belt    Plan: Continue plan of care    Additional Info: Sticky note left for provider to discuss Norvasc. Pt stated this is a new medication for her and would like to know more about it as her BP readings have been WNL and she is already on 2 other BP meds. Please advise.   No other acute concerns this shift. Continue plan of care.

## 2024-10-08 NOTE — PROGRESS NOTES
"  [facility]   Acute Rehabilitation Unit  Daily progress note    INTERVAL HISTORY  Chika Hurd was seen at bedside this morning.  No acute events reported overnight.  She reports to be feeling well overall.  She denies any headache, dizziness or lightheadedness, chest pain/tightness, shortness of breath, upset stomach, nausea.  She reports having a BM this morning (after a few days without).  She was pleased about being advanced to thin liquids.  Her only concern is that she feels she is taking \"too many medications\".  In particular, she asked about the reason for 3 different BP medications.  We reviewed her medications and she felt more comfortable.  Reports to be sleeping well.  She declines COVID vaccine at this time.  She denies other questions.    With SLP, she was able to tolerate current easy to chew diet without difficulties. Trial of thin liquids also tolerated without difficulties. Recommend diet advancement to easy to chew textures with thin liquids. Reviewed with patient results from [facility].       MEDICATIONS  Current Facility-Administered Medications   Medication Dose Route Frequency Provider Last Rate Last Admin    amLODIPine (NORVASC) tablet 5 mg  5 mg Oral QPM Vaishnavi Pacheco PA-C   5 mg at 10/07/24 2013    aspirin (ASA) chewable tablet 81 mg  81 mg Oral Daily Vaishnavi Pacheco PA-C   81 mg at 10/07/24 0807    atorvastatin (LIPITOR) tablet 40 mg  40 mg Oral Daily Vaishnavi Pacheco PA-C   40 mg at 10/07/24 0807    famotidine (PEPCID) tablet 20 mg  20 mg Oral Daily Yusuf Sapp MD   20 mg at 10/07/24 0808    hydroCHLOROthiazide tablet 12.5 mg  12.5 mg Oral Daily Yusuf Sapp MD   12.5 mg at 10/07/24 0807    lisinopril (ZESTRIL) tablet 40 mg  40 mg Oral Daily Vaishnavi Pacheco PA-C   40 mg at 10/07/24 0808    multivitamin w/minerals (THERA-VIT-M) tablet 1 tablet  1 tablet Oral Daily Vaishnavi Pacheco PA-C   1 tablet at 10/07/24 " 0807          Current Facility-Administered Medications   Medication Dose Route Frequency Provider Last Rate Last Admin    acetaminophen (TYLENOL) tablet 650 mg  650 mg Oral Q4H PRN Vaishnavi Pacheco PA-C        diclofenac (VOLTAREN) 1 % topical gel 4 g  4 g Topical 4x Daily PRN Vaishnavi Pacheco PA-C        melatonin tablet 3 mg  3 mg Oral At Bedtime PRN Vaishnavi Pacheco PA-C        methocarbamol (ROBAXIN) tablet 500 mg  500 mg Oral 4x Daily PRN Yusuf Sapp MD        polyethylene glycol (MIRALAX) Packet 17 g  17 g Oral Daily PRN Vaishnavi Pacheco PA-C        senna-docusate (SENOKOT-S/PERICOLACE) 8.6-50 MG per tablet 1-2 tablet  1-2 tablet Oral BID PRN Vaishnavi Pacheco PA-C            PHYSICAL EXAM  /60 (BP Location: Right arm, Patient Position: Semi-Love's, Cuff Size: Adult Regular)   Pulse 66   Temp 97.7  F (36.5  C) (Oral)   Resp 16   Wt 79.4 kg (175 lb 0.7 oz)   LMP  (LMP Unknown)   SpO2 96%   BMI 34.19 kg/m    Gen: NAD, lying in bed  HEENT: NC/AT  Cardio: RRR, no murmurs  Pulm: non-labored on room air, lungs CTA bilaterally  Abd: soft, non-tender, non-distended, bowel sounds present  Ext: no edema in BLE  Neuro/MSK: drowsy, PERRL, EOMI, left ptosis, right facial droop, +dysarthria, +expressive aphasia (inappropriate/illogical word substitutions), able to intermittently follow simple commands.       LABS  CBC RESULTS:   Recent Labs   Lab Test 10/07/24  0606 10/03/24  0614 09/26/24  0446   WBC 10.9 12.3* 11.5*   RBC 4.14 4.06 4.44   HGB 12.2 12.2 13.6   HCT 37.4 36.9 40.6   MCV 90 91 91   MCH 29.5 30.0 30.6   MCHC 32.6 33.1 33.5   RDW 13.8 13.6 13.9   * 346 292       Last Basic Metabolic Panel:  Recent Labs   Lab Test 10/07/24  0606 10/03/24  0614 10/01/24  0823    134* 134*   POTASSIUM 4.7 4.1 4.4   CHLORIDE 103 96* 97*   CO2 26 26 25   ANIONGAP 10 12 12   * 100* 104*   BUN 23.4* 34.5* 31.2*   CR 0.62 0.78 0.73   GFRESTIMATED >90 78 84   KAR 9.6 9.4  9.3         Rehabilitation     Admission to acute inpatient rehab 10/01/24.    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       Impairment of ADL's: Noted to have BLE weakness, impaired balance, impaired coordination, impaired activity tolerance, impaired alertness, and impaired cognition, all affecting her ability to safely and independently perform basic ADLs.  Goal for SBA with basic ADLs except min A for bathing.      Impairment of mobility:  Noted to have BLE weakness, impaired balance, impaired coordination, impaired activity tolerance, impaired alertness, and impaired cognition, all affecting her ability to safely and independently perform basic mobility.  Goal for SBA with basic mobility with min A for stairs.      Impairment of cognition/language/swallow:  Noted to have dysphagia, dysarthria, aphasia, and impaired cognition with goals for safe tolerance of least restrictive diet and improved cognitive-linguistic skills to meet basic needs.     Continue comprehensive acute inpatient rehabilitation program with multidisciplinary approach including therapies, rehab nursing, and physiatry following. See interval history for updates.      ASSESSMENT AND PLAN  Chika Hurd is a 79 year old right hand dominant female with a past medical history of prior stroke (10/2023; left frontal lobe and corona radiata), hyperlipidemia, hypertension, central retinal artery occlusion of right eye, left breast cancer, GERD, prediabetes, sensorineural hearing loss, and obesity who was admitted on 9/25/24 with acute left basal ganglia hemorrhage with hospital course complicated by subacute stroke in left periatrial white matter, incidental saccular  aneurysm, constipation, and conjunctivitis.  She was admitted to ARU on 10/1/24 for multidisciplinary rehabilitation and ongoing medical management.      Medical Conditions  New actions/orders/updates for today are in blue.     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  Presented with new gait instability and worsened dysarthria.  Imaging findings as above.  PTA on Plavix + aspirin, plan had been for 3 months.  Per sending team, does not need to resume Plavix but ok'd to resume aspirin daily on 10/1.  - Continue ASA 81 mg daily (resumed on 10/1)  - Continue statin with LDL goal 40-70  - SBP goal 130-150 as inpatient, long-term outpatient goal <130/80.  Management as below.  - Continue easy to chew diet, slightly thick (level 1) liquids.  Repeat video swallow study on 10/7 with intermittent penetration with thin liquids but immediate ejection.  Per SLP, advanced to thins after bedside assessment and good tolerance.  - Ziopatch x14 days  - Continue PT/OT/SLP  - Follow up with neurology in 6-8 weeks     Hyperlipidemia  - LDL goal 40-70 (LDL 55 on 9/26)  - Continue PTA atorvastatin 40 mg daily     Hypertension  - Continue hydrochlorothiazide 12.5 mg (decreased 10/4 due to soft blood pressures and mild hyponatremia)  - Continue PTA lisinopril 40 mg daily  - Continue amlodipine 5 mg daily (added 10/1)  - Monitor BP and adjust regimen as indicated     Hyponatremia  Mild at 134 on 10/1.  Suspect related to hydrochlorothiazide, mild hypovolemia.  BUN mildly elevated.  On slightly thick  liquids.  10/7: Na   - Monitor BMP every M/Th     GERD  - Continue PTA famotidine 20 mg BID     Constipation  In setting of decreased mobility  Last BM on 10/8 after several days without  - Continue Miralax daily  - Monitor, additional PRN bowel meds available, can schedule if ongoing issues     Prediabetes  A1c 6.0% on 9/27/24  - Follow up with PCP for ongoing surveillance     Bilateral conjunctivitis, resolved  Started on ofloxacin at hospital on 9/29 due to purulent discharge from bilateral eyes with associated pain.  Completed course of ofloxacin as of 10/4.  No recurrent issues at ARU.        Adjustment to disability:  Clinical psychology to eval and treat if indicated  FEN: easy to chew (level 7) diet, slightly thick (level 1) liquids; swallow strategies per SLP  Bowel: continent, recent constipation.  Continue scheduled Miralax.  Additional PRN bowel medications available.  Monitor and adjust regimen as indicated.  Bladder: continent/incontinent.  Timed toileting implemented on 10/7.  DVT Prophylaxis: mechanical  GI Prophylaxis: pepcid  Code: full; as prior  Disposition: home vs TCU  ELOS: target 10/22/24 pending progress  Follow up Appointments on Discharge: PCP in 1-2 weeks, general neurology or stroke REUBEN in 6-8 weeks      25 minutes spent on the date of service doing chart review, history and exam, documentation, and further activities as noted above.         Vaishnavi Pacheco PA-C  Physical Medicine & Rehabilitation

## 2024-10-08 NOTE — PLAN OF CARE
Discharge Planner Post-Acute Rehab SLP:      Discharge Plan: [facility]. Ongoing SLP     Precautions: fall risk     Current Status:  Hearing: Sensorineural hearing loss, bilateral hearing aides.  Vision: Readers  Communication: Mild to moderate motor speech deficits, Mild expressive and receptive aphasia.  Cognition: To be evaluated in coming days  Swallow: Easy to chew solids (7)/ thin liquids (0), pt to sit upright, small/single bites and sips, alternate liquids/solids     Assessment: Able to tolerate current easy to chew diet without difficulties. Trial of thin liquids also tolerated without difficulties. Recommend diet advancement to easy to chew textures with thin liquids. Reviewed with patient results from [facility].      Other Barriers to Discharge (Family Training, etc): diet texture pending progress

## 2024-10-08 NOTE — PLAN OF CARE
Discharge Planner Post-Acute Rehab OT:      Discharge Plan: Home with HC OT services vs TCU pending progress     Precautions: Falls, Left low back and knee pain, [place] - needs hearing aids     Current Status:  ADLs:  Mobility: Rex scottiedy x1; WC based mobility  Grooming: CGA perched or standing in stedy  Dressing: UB - CGA at eob. LB - Mod A at eob; Feet: MOD A with sock aide at eob-needs help with unsupported sitting  Bathing: Transfer Ax1 Rex Colin <> blue + white rolling shower chair. Mod A tasks.  Toileting: Rex stedy x1 on/off commode overlay; Max A with pericare standing  IADLs: IND prior. Daughter assisted with yard work and grocery shopping.   Vision/Cognition: Pt is Ox2. Intact visual tracking, impaired peripheral and convergence, impaired confrontation with R lower quadrant. Pt wears reading glasses at baseline. Will further assess cognition.      Assessment: Pt participating in OT session with encouragement. Pt donning socks with sock aide with MOD A at eob, requiring support d/t poor unsupported sitting balance, moderate vc's and increased time to complete task. Pt completed grooming/hygiene with rex colin at sink, standing approx 5 min total with CGA with encouragement from therapist, completed remainder sitting w/ rex colin. Pt requires extra time to complete tasks, and increased time to respond to cues or questions throughout session. Will benefit form formal cog assessment going forward.     Other Barriers to Discharge (DME, Family Training, etc):   FT: TBD  OT: Per chart review, pt lives in a split level home in [facility] with spouse. 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 Pt has a tub/shower and combo and walk in shower with grab bars. Pt uses walk in shower. Pt has an elevated toilet seat and a regular toilet seat with vanity nearby. Pt reports having a walk out on the lower level.   Barriers to home: stairs  Need: shower chair for walk in shower

## 2024-10-08 NOTE — PLAN OF CARE
Goal Outcome Evaluation:      Plan of Care Reviewed With: patient    Overall Patient Progress: improvingOverall Patient Progress: improving    Patient is alert and oriented, disoriented with time and place. Denies pain at this time. Assist of x 2 with a rex steady. No concern at this time. Call light within reach, bed alarm on. Nursing staff will continue with POC.

## 2024-10-09 ENCOUNTER — APPOINTMENT (OUTPATIENT)
Dept: OCCUPATIONAL THERAPY | Facility: CLINIC | Age: 79
DRG: 057 | End: 2024-10-09
Attending: PHYSICAL MEDICINE & REHABILITATION
Payer: MEDICARE

## 2024-10-09 ENCOUNTER — APPOINTMENT (OUTPATIENT)
Dept: PHYSICAL THERAPY | Facility: CLINIC | Age: 79
DRG: 057 | End: 2024-10-09
Attending: PHYSICAL MEDICINE & REHABILITATION
Payer: MEDICARE

## 2024-10-09 ENCOUNTER — APPOINTMENT (OUTPATIENT)
Dept: SPEECH THERAPY | Facility: CLINIC | Age: 79
DRG: 057 | End: 2024-10-09
Attending: PHYSICAL MEDICINE & REHABILITATION
Payer: MEDICARE

## 2024-10-09 LAB — GLUCOSE BLDC GLUCOMTR-MCNC: 95 MG/DL (ref 70–99)

## 2024-10-09 PROCEDURE — 250N000013 HC RX MED GY IP 250 OP 250 PS 637: Performed by: PHYSICIAN ASSISTANT

## 2024-10-09 PROCEDURE — 97110 THERAPEUTIC EXERCISES: CPT | Mod: GP | Performed by: PHYSICAL THERAPIST

## 2024-10-09 PROCEDURE — 97535 SELF CARE MNGMENT TRAINING: CPT | Mod: GO

## 2024-10-09 PROCEDURE — 97530 THERAPEUTIC ACTIVITIES: CPT | Mod: GO | Performed by: OCCUPATIONAL THERAPIST

## 2024-10-09 PROCEDURE — 250N000013 HC RX MED GY IP 250 OP 250 PS 637: Performed by: PHYSICAL MEDICINE & REHABILITATION

## 2024-10-09 PROCEDURE — 97530 THERAPEUTIC ACTIVITIES: CPT | Mod: GP | Performed by: PHYSICAL THERAPIST

## 2024-10-09 PROCEDURE — 99232 SBSQ HOSP IP/OBS MODERATE 35: CPT | Mod: FS | Performed by: PHYSICIAN ASSISTANT

## 2024-10-09 PROCEDURE — 92526 ORAL FUNCTION THERAPY: CPT | Mod: GN

## 2024-10-09 PROCEDURE — 92526 ORAL FUNCTION THERAPY: CPT | Mod: GN | Performed by: SPEECH-LANGUAGE PATHOLOGIST

## 2024-10-09 PROCEDURE — 128N000003 HC R&B REHAB

## 2024-10-09 RX ORDER — FAMOTIDINE 20 MG/1
20 TABLET, FILM COATED ORAL 2 TIMES DAILY
Status: DISCONTINUED | OUTPATIENT
Start: 2024-10-09 | End: 2024-10-24 | Stop reason: HOSPADM

## 2024-10-09 RX ADMIN — Medication 1 TABLET: at 09:22

## 2024-10-09 RX ADMIN — AMLODIPINE BESYLATE 5 MG: 5 TABLET ORAL at 20:54

## 2024-10-09 RX ADMIN — ATORVASTATIN CALCIUM 40 MG: 40 TABLET, FILM COATED ORAL at 09:22

## 2024-10-09 RX ADMIN — FAMOTIDINE 20 MG: 20 TABLET ORAL at 09:22

## 2024-10-09 RX ADMIN — FAMOTIDINE 20 MG: 20 TABLET ORAL at 20:54

## 2024-10-09 RX ADMIN — HYDROCHLOROTHIAZIDE 12.5 MG: 12.5 TABLET ORAL at 09:22

## 2024-10-09 RX ADMIN — LISINOPRIL 40 MG: 40 TABLET ORAL at 09:22

## 2024-10-09 RX ADMIN — ASPIRIN 81 MG CHEWABLE TABLET 81 MG: 81 TABLET CHEWABLE at 09:22

## 2024-10-09 ASSESSMENT — ACTIVITIES OF DAILY LIVING (ADL)
ADLS_ACUITY_SCORE: 42
ADLS_ACUITY_SCORE: 43
ADLS_ACUITY_SCORE: 42
ADLS_ACUITY_SCORE: 42
ADLS_ACUITY_SCORE: 43
ADLS_ACUITY_SCORE: 42
ADLS_ACUITY_SCORE: 43
ADLS_ACUITY_SCORE: 43
ADLS_ACUITY_SCORE: 42
ADLS_ACUITY_SCORE: 43
ADLS_ACUITY_SCORE: 42
ADLS_ACUITY_SCORE: 43

## 2024-10-09 NOTE — PLAN OF CARE
Discharge Planner Post-Acute Rehab OT:      Discharge Plan: Home with HC OT services vs TCU pending progress     Precautions: Falls, Left low back and knee pain, [place] - needs hearing aids     Current Status:  ADLs:  Mobility: CGA w/ fww short distances  Grooming: CGA perched or standing in stedy  Dressing: UB - CGA at eob. LB - Mod A at eob; Feet: MOD A with sock aide at eob-needs help with unsupported sitting  Bathing: Transfer Ax1 Aleah Stedy <> blue + white rolling shower chair. Mod A tasks.  Toileting: CGA w fww to walk to/from BR; CGA-Min A on/off raised toilet; Total A pericare standing  IADLs: IND prior. Daughter assisted with yard work and grocery shopping.   Vision/Cognition: Pt wears glasses's at baseline as well as visual deficits at baseline from previous stroke. Pt completed SLUMS examinaiton with an overall score of 18/30, indicating cognitive deficits (normal range 25-30). Pt showed errors that indicate decreased short-term memory and procressing speed (e.g. short-term recall, naming animals, drawing correct time on clock); note that aphasia may contribute to score on formal assessment.     Assessment: Pt completed SLUMS examinaiton with an overall score of 18/30, indicating cognitive deficits (normal range 25-30). Pt showed errors that indicate decreased short-term memory and processing speed (e.g. short-term recall, naming animals, drawing correct time on clock); note that aphasia may contribute to score on formal assessment.      Other Barriers to Discharge (DME, Family Training, etc):   FT: TBD  OT: Per chart review, pt lives in a split level home in [facility] with spouse. 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 Pt has a tub/shower and combo and walk in shower with grab bars. Pt uses walk in shower. Pt has an elevated toilet seat and a regular toilet seat with vanity nearby. Pt reports having a walk out on the lower level.   Barriers to home: stairs  Need: shower chair for walk in  shower

## 2024-10-09 NOTE — PROGRESS NOTES
"  [facility]   Acute Rehabilitation Unit  Daily progress note    INTERVAL HISTORY  Chika Hurd was seen at bedside this morning during team rounds.  No acute events reported overnight.  She does acknowledge some improvements, but is concerned about the plan after rehab, especially as her family is also dealing with her , who requires significant assist due to his dementia.  She was also not pleased to hear our anticipated need for up to 2 more weeks here.  Nursing and therapies noting intermittent incontinence of bowel and bladder; does not always seem to be aware that she has had an incontinent episode.    With therapies, PT is noting daily progress, improving ease and IND with transfers and short distance gait with FWW, as well as progression to 6\" stairs.  SLP reports progress with swallow goals and able to advance diet.  Still most limited by cognitive-linguistic deficits.  OT working on seated balance and core strength for ADLs.  Noting some impaired sequencing and slowed processing speed.  For full functional updates, see team rounds note from today.      MEDICATIONS  Current Facility-Administered Medications   Medication Dose Route Frequency Provider Last Rate Last Admin    amLODIPine (NORVASC) tablet 5 mg  5 mg Oral QPM Vaishnavi Pacheco PA-C   5 mg at 10/08/24 1941    aspirin (ASA) chewable tablet 81 mg  81 mg Oral Daily Vaishnavi Pacheco PA-C   81 mg at 10/08/24 0835    atorvastatin (LIPITOR) tablet 40 mg  40 mg Oral Daily Vaishnavi Pacheco PA-C   40 mg at 10/08/24 0835    famotidine (PEPCID) tablet 20 mg  20 mg Oral Daily Yusuf Sapp MD   20 mg at 10/08/24 0835    hydroCHLOROthiazide tablet 12.5 mg  12.5 mg Oral Daily Yusuf Sapp MD   12.5 mg at 10/08/24 0835    lisinopril (ZESTRIL) tablet 40 mg  40 mg Oral Daily Vaishnavi Pacheco PA-C   40 mg at 10/08/24 0835    multivitamin w/minerals (THERA-VIT-M) tablet 1 tablet  1 " tablet Oral Daily Vaishnavi Pacheco PA-C   1 tablet at 10/08/24 0834          Current Facility-Administered Medications   Medication Dose Route Frequency Provider Last Rate Last Admin    acetaminophen (TYLENOL) tablet 650 mg  650 mg Oral Q4H PRN Vaishnavi Pacheco PA-C        diclofenac (VOLTAREN) 1 % topical gel 4 g  4 g Topical 4x Daily PRN Vaishnavi Pacheco PA-C        melatonin tablet 3 mg  3 mg Oral At Bedtime PRN Vaishnavi Pacheco PA-C        methocarbamol (ROBAXIN) tablet 500 mg  500 mg Oral 4x Daily PRN Yusuf Sapp MD        polyethylene glycol (MIRALAX) Packet 17 g  17 g Oral Daily PRN Vaishnavi Pacheco PA-C        senna-docusate (SENOKOT-S/PERICOLACE) 8.6-50 MG per tablet 1-2 tablet  1-2 tablet Oral BID PRN Vaishnavi Pacheco PA-C            PHYSICAL EXAM  /57   Pulse 76   Temp 98.4  F (36.9  C) (Oral)   Resp 16   Wt 79.4 kg (175 lb 0.7 oz)   LMP  (LMP Unknown)   SpO2 95%   BMI 34.19 kg/m    Gen: NAD, lying in bed  HEENT: NC/AT  Pulm: non-labored on room air  Abd: soft, non-tender, non-distended  Ext: no edema in BLE  Neuro/MSK: drowsy, PERRL, EOMI, left ptosis, right facial droop, +dysarthria, +expressive aphasia (inappropriate/illogical word substitutions), able to intermittently follow simple commands.   *Full exam deferred today for conversation      LABS  CBC RESULTS:   Recent Labs   Lab Test 10/07/24  0606 10/03/24  0614 09/26/24  0446   WBC 10.9 12.3* 11.5*   RBC 4.14 4.06 4.44   HGB 12.2 12.2 13.6   HCT 37.4 36.9 40.6   MCV 90 91 91   MCH 29.5 30.0 30.6   MCHC 32.6 33.1 33.5   RDW 13.8 13.6 13.9   * 346 292       Last Basic Metabolic Panel:  Recent Labs   Lab Test 10/07/24  0606 10/03/24  0614 10/01/24  0823    134* 134*   POTASSIUM 4.7 4.1 4.4   CHLORIDE 103 96* 97*   CO2 26 26 25   ANIONGAP 10 12 12   * 100* 104*   BUN 23.4* 34.5* 31.2*   CR 0.62 0.78 0.73   GFRESTIMATED >90 78 84   KAR 9.6 9.4 9.3         Rehabilitation     Admission to  acute inpatient rehab 10/01/24.    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       Impairment of ADL's: Noted to have BLE weakness, impaired balance, impaired coordination, impaired activity tolerance, impaired alertness, and impaired cognition, all affecting her ability to safely and independently perform basic ADLs.  Goal for SBA with basic ADLs except min A for bathing.      Impairment of mobility:  Noted to have BLE weakness, impaired balance, impaired coordination, impaired activity tolerance, impaired alertness, and impaired cognition, all affecting her ability to safely and independently perform basic mobility.  Goal for SBA with basic mobility with min A for stairs.      Impairment of cognition/language/swallow:  Noted to have dysphagia, dysarthria, aphasia, and impaired cognition with goals for safe tolerance of least restrictive diet and improved cognitive-linguistic skills to meet basic needs.     Continue comprehensive acute inpatient rehabilitation program with multidisciplinary approach including therapies, rehab nursing, and physiatry following. See interval history for updates.      ASSESSMENT AND PLAN  Chika Hurd is a 79 year old right hand dominant female with a past medical history of prior stroke (10/2023; left frontal lobe and corona radiata), hyperlipidemia, hypertension, central retinal artery occlusion of right eye, left breast cancer, GERD, prediabetes, sensorineural hearing loss, and obesity who was admitted on 9/25/24 with acute left basal ganglia hemorrhage with hospital course complicated by subacute stroke in left periatrial white matter, incidental saccular aneurysm, constipation, and conjunctivitis.   She was admitted to ARU on 10/1/24 for multidisciplinary rehabilitation and ongoing medical management.      Medical Conditions  New actions/orders/updates for today are in blue.     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  Presented with new gait instability and worsened dysarthria.  Imaging findings as above.  PTA on Plavix + aspirin, plan had been for 3 months.  Per sending team, does not need to resume Plavix but ok'd to resume aspirin daily on 10/1.  - Continue ASA 81 mg daily (resumed on 10/1)  - Continue statin with LDL goal 40-70  - SBP goal 130-150 as inpatient, long-term outpatient goal <130/80.  Management as below.  - Continue easy to chew diet.  Repeat video swallow study on 10/7 with intermittent penetration with thin liquids but immediate ejection.  Per SLP, advanced to thins after bedside assessment and good tolerance.  - Ziopatch x14 days  - Continue PT/OT/SLP  - Follow up with neurology in 6-8 weeks     Hyperlipidemia  - LDL goal 40-70 (LDL 55 on 9/26)  - Continue PTA atorvastatin 40 mg daily     Hypertension  - Continue hydrochlorothiazide 12.5 mg (decreased 10/4 due to soft blood pressures and mild hyponatremia)  - Continue PTA lisinopril 40 mg daily  - Continue amlodipine 5 mg daily (added 10/1)  - Monitor BP and adjust regimen as indicated     Hyponatremia  Mild at 134 on 10/1.  Suspect related to hydrochlorothiazide, mild hypovolemia.  BUN mildly elevated.  On slightly thick liquids.  10/7: Na   - Monitor BMP every M/Th     GERD  - Continue PTA  famotidine 20 mg BID     Constipation  In setting of decreased mobility  Last BM on 10/8 after several days without (had some loose/incontinent stools prior to that.  - Continue Miralax daily  - Monitor, additional PRN bowel meds available, can schedule if ongoing issues     Prediabetes  A1c 6.0% on 9/27/24  - Follow up with PCP for ongoing surveillance     Bilateral conjunctivitis, resolved  Started on ofloxacin at hospital on 9/29 due to purulent discharge from bilateral eyes with associated pain.  Completed course of ofloxacin as of 10/4.  No recurrent issues at ARU.        Adjustment to disability:  Clinical psychology to eval and treat if indicated  FEN: easy to chew (level 7) diet, thin liquids; swallow strategies per SLP  Bowel: continent, recent constipation.  Continue scheduled Miralax.  Additional PRN bowel medications available.  Monitor and adjust regimen as indicated.  Bladder: continent/incontinent.  Timed toileting implemented on 10/7.  Wean/avoid Purewick use.  DVT Prophylaxis: mechanical  GI Prophylaxis: pepcid  Code: full; as prior  Disposition: home vs TCU  ELOS: target 10/22/24 pending progress  Follow up Appointments on Discharge: PCP in 1-2 weeks, general neurology or stroke REUBEN in 6-8 weeks      30 minutes spent on the date of service doing chart review, history and exam, documentation, and further activities as noted above.       Patient was seen and discussed with Dr. Michael Self, PM&R staff physician as part of a shared visit    Vaishnavi Pacheco PA-C  Physical Medicine & Rehabilitation

## 2024-10-09 NOTE — PLAN OF CARE
Goal Outcome Evaluation:      Plan of Care Reviewed With: patient    Overall Patient Progress: improvingOverall Patient Progress: improving        VS: /57   Pulse 76   Temp 98.4  F (36.9  C) (Oral)   Resp 16   Wt 79.4 kg (175 lb 0.7 oz)   LMP  (LMP Unknown)   SpO2 95%   BMI 34.19 kg/m     O2: SpO2 > 95 and stable on RA. LS clear and equal bilaterally. Denies chest pain and SOB.    Output: Voids spontaneously without difficulty to bathroom, incontinent.   Last BM: 10/8, denies abdominal discomfort. BS active / passing flatus.    Activity: Up with CGA x 1 with a walker/ belt   Skin: WDL.    Pain: Denied pain.   CMS: Oriented to self/ situation. Denies numbness and tingling.   Dressing: None.   Diet: Easy to chew/ thin liquids/ meds whole. Denies nausea/vomiting. Pt had fair appetite for meals.   LDA: None.   Equipment: Personal belongings,    Plan: Fall/ seizure precautions maintained / Continue with plan of care. Call light within reach, safety check completed. Call light within reach. Able to make needs known.   Additional Info: On Q2H toileting. Mixed continent to bladder. Bed alarm on. Pure wick on at bedtime.

## 2024-10-09 NOTE — PROGRESS NOTES
CLINICAL NUTRITION SERVICES - REASSESSMENT NOTE     Nutrition Prescription    RECOMMENDATIONS FOR MDs/PROVIDERS TO ORDER:  Appreciate encouragement surrounding PO intake    Malnutrition Status:    Patient does not meet two of the established criteria necessary for diagnosing malnutrition but is at risk for malnutrition    Recommendations already ordered by Registered Dietitian (RD):  - Discontinued scheduled Ensure Enlive  - Additional snacks/supplements PRN    Future/Additional Recommendations:  - Monitor PO intake, labs, and weight trends  - Monitor diet advancement per SLP     EVALUATION OF THE PROGRESS TOWARD GOALS   Diet: Level 7: Easy to Chew Dysphagia Diet   - Room service with assist    Snacks/supplements: Ensure Enlive with dinner, additional snacks/supplements PRN    Intake: most intakes % per flowsheets over past week  Per [employer], pt ordering 3 meals/day from room service. Ordered 3-day average of 2546 kcal and 97 g protein (includes Ensure).        NEW FINDINGS   Met with pt at bedside. Pt repots eating most of meal trays. She does not like Ensure and has not been drinking it. Discussed alternative snacks/supplements - pt declined need at this time. She thinks she is eating adequately.     Weight:   Wt Readings from Last 10 Encounters:   10/03/24 79.4 kg (175 lb 0.7 oz)   10/01/24 78.6 kg (173 lb 4.5 oz)   09/25/24 79.4 kg (175 lb)   04/26/24 81.6 kg (180 lb)   10/18/23 80.3 kg (177 lb)   10/12/23 79.8 kg (176 lb)   09/15/23 82.1 kg (181 lb)   08/25/23 78.9 kg (174 lb)   02/16/23 80.3 kg (177 lb)   09/06/22 80.3 kg (177 lb)   2.7% wt loss in ~5 months    Labs:   BUN: 23.4 (H)  Hemoglobin A1C: 6.0 (9/27)    Meds:  Pepcid  Hydrochlorothiazide  Thera-vit-m    GI: last BM 10/8 per I/Os    Skin: [place] 15    MALNUTRITION  % Intake: Decreased intake does not meet criteria  % Weight Loss: Weight loss does not meet criteria  Subcutaneous Fat Loss: None observed  Muscle Loss: None observed  Fluid  Accumulation/Edema: Does not meet criteria - trace  Malnutrition Diagnosis: Patient does not meet two of the established criteria necessary for diagnosing malnutrition but is at risk for malnutrition    Previous Goals   Patient to consume % of nutritionally adequate meal trays TID, or the equivalent with supplements/snacks.   Evaluation: Met    Previous Nutrition Diagnosis  Inadequate oral intake related to limited diet as evidenced by need for level 5/1 diet, documented intakes, and meal ordering history.     Evaluation: Improving    CURRENT NUTRITION DIAGNOSIS  Predicted inadequate nutrient intake (kcal/pro) related to potential for variation in intake and menu fatigue.      INTERVENTIONS  Implementation  Collaboration with other providers - discussed in team rounds  Nutrition education for recommended modifications    Goals  Patient to consume % of nutritionally adequate meal trays TID, or the equivalent with supplements/snacks.    Monitoring/Evaluation  Progress toward goals will be monitored and evaluated per protocol.     Anahi Moses RD, LD  Available via phone and Vocera  Phone: [phone]  Vocera: 5R Acute Rehab Clinical Dietitian  Weekend/Holiday Vocera: Weekend Holiday Clinical Dietitian [Multi Site Groups]

## 2024-10-09 NOTE — PROGRESS NOTES
Team rounds today, currently recommending Home with Home Care Vs Assisted Living depending on family's finances and family support. Patient has been updated.     Next Steps.  Sw will call daughter and give updates       ALEJANDRA Barber  [facility], Acute Inpatient Rehab Unit   [address] [place] [address], [address] [place] [address]  Phone: [phone]  Fax: [phone]

## 2024-10-09 NOTE — PLAN OF CARE
"Discharge Planner Post-Acute Rehab PT:      Discharge Plan: Home with Home PT. 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     Precautions: fall, L knee pain, aphasia, [place] (has hearing aides)     Current Status:  Bed Mobility: ModA  Transfer: CGA with FWW - repeat cues for hand placement  Gait: up to ~50 ft, CGA/SBA with FWW  Stairs: 4x6\" steps with bilat rails and CGA, step-to  Balance: Requires heavy UE support for dynamic gait.     Outcome Measures:   30 second sit to stand  10/2: 0  10/9: 6x with UE assist  PASS               10/5: 7/36  Cueto              10/5: 3/56  10mWT   10/9: 0.13 m/sec comfortable, 0.24 m/sec fast with FWW and CGA/SBA    Assessment:  Pt continues to be making daily progress, demonstrating improving ease and IND with transfers and short distance gait with FWW, as well as progression to 6\" stairs per above. Still slow to initiate and complete movement, but responds well to timed tasks external cueing.    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

## 2024-10-09 NOTE — PLAN OF CARE
Discharge Planner Post-Acute Rehab SLP:      Discharge Plan: RAMILA. Ongoing SLP     Precautions: fall risk     Current Status:  Hearing: Sensorineural hearing loss, bilateral hearing aides.  Vision: Readers  Communication: Mild to moderate motor speech deficits, Mild expressive and receptive aphasia.  Cognition: To be evaluated in coming days  Swallow: Easy to chew solids (7)/ thin liquids (0), pt to sit upright, small/single bites and sips, alternate liquids/solids     Assessment: Aide removing an empty tray/plate, pt consuming easy to chew meal items (omelette) and thin via cup ege when SLP arrived, planned for language session but modified to swallow as pt eating. 2x blueberry muffins on tray, pt asked why she was sent two. SLP looked at meal slip on tray, information for pt in room  listed, ordered included vela/regular solid. Queried pt about vela, pt endorsed eating it. Pt demo'd adequate oral and pharyngeal phase with easy to chew and thin via cup edge. Compass entered regarding dietary delivery error.      Other Barriers to Discharge (Family Training, etc): diet texture pending progress

## 2024-10-09 NOTE — PLAN OF CARE
Goal Outcome Evaluation:  Alert with intermittent confusion. Disoriented to time. [place], uses hearing aides. Able to make needs known. Call light in reach answered in person.. Denies pain/discomfort. Not out of bed this shift. CGA with walker.  Incontinent of bladder. Pure wick in place overnight. No BM so far this shift. Appears to be sleeping during rounds. No new issues or changes overnight. Bed alarm on for safety. Continue with plan of care.

## 2024-10-09 NOTE — PLAN OF CARE
Acute Rehab Care Conference/Team Rounds    Type: Team Rounds    Present: Dr Aramis Rodriguez,  Junior Riggins PA, Dorita Lopes, Neuropsychologist, Bing Denise PT, Kelechi JOSE OT, Raudel Guzman SLP, Cintia Koch , Anahi Moses RD, Rachel Carson RN, Vickey CASTRO Patient       Discharge Barriers/Treatment/Education    Rehab Diagnosis: post CVA     Active Medical Co-morbidities/Prognosis:    Patient Active Problem List   Diagnosis    Hyperlipidemia LDL goal <130    SENSONRL HEAR LOSS,BILAT    GERD (gastroesophageal reflux disease)    Cataract right eye    Ductal carcinoma in situ (DCIS) of left breast    Central retinal artery occlusion of right eye    Carcinoma of left breast in female, estrogen receptor positive, unspecified site of breast (H)    Benign essential hypertension    Class 2 severe obesity due to excess calories with serious comorbidity in adult (H)    Stroke (H)    Cerebrovascular accident (CVA), unspecified mechanism (H)    Generalized muscle weakness    Hemorrhagic stroke (H)    Intraparenchymal hemorrhage of brain (H)         Safety: Alert with intermittent confusion. Disoriented to time. [place], uses hearing aides. Call light in reach, answered in person.  Bed alarm on for safety.    Pain:.  Denies pain overnight.     Medications, Skin, Tubes/Lines    No tubes or lines. Skin WNLs.    Swallowing/Nutrition: Following completion of VFSS, diet advanced to easy to chew textures with thin liquids. Will continue to follow up to ensure consistent diet tolerance but not anticipating ongoing dysphagia interventions upon facility discharge. Will encourage patient to sit up in chair for meals.     Bowel/Bladder: Mixed incontinence of bowel and bladder, Using pure wick overnight. Last BM on 10/8.    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     ADLs/IADLs: Pt making improvement with functional mobility and standing tolerance for ADLs. Also making improvements in pericare with toileting. Continuing to work on LBD with use of new AE at eob, however this is challenging d/t decreased unsupported sitting balance. Will attempt this in wc and assess cognition formally going forward. Anticipate deficits in higher level EF areas and processing speed. 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 Anticipate that pt will need assist from dtr upon return home for IADLs and bathing for safety. Anticipate shower chair/etb needed on return home.    Mobility:   Bed Mobility: ModA  Transfer: CGA - min A. Verbal cueing for hand positioning and weight-shifting.  Gait: FWW, CGA, 10-20ft  Stairs: 4\"x6, CGA, bilateral rails  Balance: Requires heavy UE support for dynamic gait.  Outcome Measures:   30 second sit to stand  10/2: 0  PASS               10/5: 7/36  Cueto              10/5: 3/56  Pt requires considerable encouragement for participation and progression of mobility, limited by aphasia, reduced balance confidence, and reduced activity tolerance. Despite, able to progress to Ax1 with FWW with nursing yesterday, as well as up/down 4\" stairs. Will need to continue to progress to mod I mobility and ADLs as able, with goal for daughter assist with IADLs per chart review. Anticipate need for FWW vs 4WW.    Cognition/Language: Ongoing aphasia with word finding difficulties very evident. Able to express her general wants/needs verbally but lacking specificity. Cooperative and motivated to continue. Will require assistance with IADL tasks given degree of aphasia and cognitive deficits. Unable to adequately assess cognition due to severity of aphasia. Ongoing SLP interventions upon discharge.     Community Re-Entry: Plan for ongoing therapies to progress community mobility tolerance and safety. In the meantime, she may benefit from a transport/manual WC " pending progress with family support.    Transportation: Family to provide. Car transfer not an anticipated significant barrier.    Decision maker: self    Plan of Care and goals reviewed and updated.    Discharge Plan/Recommendations    Fall Precautions: continue    Patient/Family input to goals: yes     Estimated length of stay: 18 days     Overall plan for the patient: reach a level of mod I       Utilization Review and Continued Stay Justification    Medical Necessity Criteria:    For any criteria that is not met, please document reason and plan for discharge, transfer, or modification of plan of care to address.    Requires intensive rehabilitation program to treat functional deficits?: Yes    Requires 3x per week or greater involvement of rehabilitation physician to oversee rehabilitation program?: Yes    Requires rehabilitation nursing interventions?: Yes    Patient is making functional progress?: Yes    There is a potential for additional functional progress? Yes    Patient is participating in therapy 3 hours per day a minimum of 5 days per week or 15 hours per week in 7 day period?:Yes    Has discharge needs that require coordinated discharge planning approach?:Yes      Barriers/Concerns related to meeting medical necessity criteria:  none    Team Plan to Address Concern:  As needed      Final Physician Sign off    Statement of Approval:     I agree with all the recommendations detailed in this document.      Michael Self, DO      Patient Goals    Social Work Goals: Confirm discharge recommendations with therapy, coordinate safe discharge plan and remain available to support and assist as needed.       OT Predicted Duration/Target Date for Goal Attainment: 10/22/24  Therapy Frequency (OT): 6 times/week  OT: Hygiene/Grooming: modified independent, from wheelchair  OT: Upper Body Dressing: Independent  OT: Lower Body Dressing: Minimal assist, using adaptive equipment  OT: Upper Body Bathing:  Supervision/stand-by assist, using adaptive equipment  OT: Lower Body Bathing: using adaptive equipment, Minimal assist  OT: Toilet Transfer/Toileting: Minimal assist, toilet transfer, cleaning and garment management, using adaptive equipment  OT: Meal Preparation: Modified independent, with simple meal preparation, from wheelchair, using adaptive equipment  OT: Cognitive: Patient/caregiver will verbalize understanding of cognitive assessment results/recommendations as needed for safe discharge planning  OT: Goal 1: OT: Pt will safely complete shower transfer utilizing appropriate AE/DME with min A.  OT: Goal 2: OT: Pt will safely complete BUE HEP to increase strength and endurance needed for ADLs/IADLs and functional mobility.     PT Predicted Duration/Target Date for Goal Attainment: 10/22/24  PT Frequency: 6x/week  PT: Bed Mobility: Modified independent, Supine to/from sit, Rolling (per home bed set up)  PT: Transfers: Modified independent, Sit to/from stand, Bed to/from chair, Assistive device  PT: Gait: Modified independent, Assistive device, 100 feet  PT: Stairs: Minimal assist, 7 stairs, Rail on left (Stairs as needed for home navigation)    SLP Predicted Duration/Target Date for Goal Attainment: 10/22/24  Therapy Frequency (SLP Eval): 6 times/week  SLP: Safely tolerate diet without signs/symptoms of aspiration: Regular diet, Thin liquids  SLP: Communicate basic wants and needs: verbally, independent     Nursing Goals  Bowel and Bladder care  Fall prevention   Medication Education  Skin Care protection

## 2024-10-09 NOTE — PLAN OF CARE
Goal Outcome Evaluation:      Plan of Care Reviewed With: patient    Overall Patient Progress: improvingOverall Patient Progress: improving           Activity  CGA 1 with walker   Diet  Combination diet easy to chew thin   Skin  intact   Neuro WDL, forgetful.   Cardiac  BP slight ly elevated 143/81   Respiratory  WDL   GI/ Voiding in BR, LBM today   Lines/Wounds None   MISC Up in the chair for meals

## 2024-10-10 ENCOUNTER — APPOINTMENT (OUTPATIENT)
Dept: OCCUPATIONAL THERAPY | Facility: CLINIC | Age: 79
DRG: 057 | End: 2024-10-10
Attending: PHYSICAL MEDICINE & REHABILITATION
Payer: MEDICARE

## 2024-10-10 ENCOUNTER — APPOINTMENT (OUTPATIENT)
Dept: SPEECH THERAPY | Facility: CLINIC | Age: 79
DRG: 057 | End: 2024-10-10
Attending: PHYSICAL MEDICINE & REHABILITATION
Payer: MEDICARE

## 2024-10-10 ENCOUNTER — APPOINTMENT (OUTPATIENT)
Dept: PHYSICAL THERAPY | Facility: CLINIC | Age: 79
DRG: 057 | End: 2024-10-10
Attending: PHYSICAL MEDICINE & REHABILITATION
Payer: MEDICARE

## 2024-10-10 LAB
ANION GAP SERPL CALCULATED.3IONS-SCNC: 4 MMOL/L (ref 7–15)
BUN SERPL-MCNC: 18.6 MG/DL (ref 8–23)
CALCIUM SERPL-MCNC: 9.3 MG/DL (ref 8.8–10.4)
CHLORIDE SERPL-SCNC: 100 MMOL/L (ref 98–107)
CREAT SERPL-MCNC: 0.61 MG/DL (ref 0.51–0.95)
EGFRCR SERPLBLD CKD-EPI 2021: >90 ML/MIN/1.73M2
ERYTHROCYTE [DISTWIDTH] IN BLOOD BY AUTOMATED COUNT: 13.9 % (ref 10–15)
GLUCOSE SERPL-MCNC: 94 MG/DL (ref 70–99)
HCO3 SERPL-SCNC: 29 MMOL/L (ref 22–29)
HCT VFR BLD AUTO: 37.1 % (ref 35–47)
HGB BLD-MCNC: 12.2 G/DL (ref 11.7–15.7)
MCH RBC QN AUTO: 29.9 PG (ref 26.5–33)
MCHC RBC AUTO-ENTMCNC: 32.9 G/DL (ref 31.5–36.5)
MCV RBC AUTO: 91 FL (ref 78–100)
PLATELET # BLD AUTO: 424 10E3/UL (ref 150–450)
POTASSIUM SERPL-SCNC: 4.5 MMOL/L (ref 3.4–5.3)
RBC # BLD AUTO: 4.08 10E6/UL (ref 3.8–5.2)
SODIUM SERPL-SCNC: 133 MMOL/L (ref 135–145)
WBC # BLD AUTO: 11.5 10E3/UL (ref 4–11)

## 2024-10-10 PROCEDURE — 36415 COLL VENOUS BLD VENIPUNCTURE: CPT | Performed by: PHYSICIAN ASSISTANT

## 2024-10-10 PROCEDURE — 80048 BASIC METABOLIC PNL TOTAL CA: CPT | Performed by: PHYSICIAN ASSISTANT

## 2024-10-10 PROCEDURE — 92507 TX SP LANG VOICE COMM INDIV: CPT | Mod: GN

## 2024-10-10 PROCEDURE — 99231 SBSQ HOSP IP/OBS SF/LOW 25: CPT | Performed by: PHYSICAL MEDICINE & REHABILITATION

## 2024-10-10 PROCEDURE — 97110 THERAPEUTIC EXERCISES: CPT | Mod: GP | Performed by: PHYSICAL THERAPIST

## 2024-10-10 PROCEDURE — 97110 THERAPEUTIC EXERCISES: CPT | Mod: GO | Performed by: OCCUPATIONAL THERAPIST

## 2024-10-10 PROCEDURE — 128N000003 HC R&B REHAB

## 2024-10-10 PROCEDURE — 85027 COMPLETE CBC AUTOMATED: CPT | Performed by: PHYSICIAN ASSISTANT

## 2024-10-10 PROCEDURE — 92526 ORAL FUNCTION THERAPY: CPT | Mod: GN

## 2024-10-10 PROCEDURE — 250N000013 HC RX MED GY IP 250 OP 250 PS 637: Performed by: PHYSICIAN ASSISTANT

## 2024-10-10 PROCEDURE — 250N000013 HC RX MED GY IP 250 OP 250 PS 637: Performed by: PHYSICAL MEDICINE & REHABILITATION

## 2024-10-10 RX ADMIN — AMLODIPINE BESYLATE 5 MG: 5 TABLET ORAL at 20:14

## 2024-10-10 RX ADMIN — Medication 1 TABLET: at 08:17

## 2024-10-10 RX ADMIN — FAMOTIDINE 20 MG: 20 TABLET ORAL at 20:14

## 2024-10-10 RX ADMIN — FAMOTIDINE 20 MG: 20 TABLET ORAL at 08:14

## 2024-10-10 RX ADMIN — ASPIRIN 81 MG CHEWABLE TABLET 81 MG: 81 TABLET CHEWABLE at 08:14

## 2024-10-10 RX ADMIN — ATORVASTATIN CALCIUM 40 MG: 40 TABLET, FILM COATED ORAL at 08:14

## 2024-10-10 ASSESSMENT — ACTIVITIES OF DAILY LIVING (ADL)
ADLS_ACUITY_SCORE: 38
ADLS_ACUITY_SCORE: 37
ADLS_ACUITY_SCORE: 42
ADLS_ACUITY_SCORE: 38
ADLS_ACUITY_SCORE: 42
ADLS_ACUITY_SCORE: 38
ADLS_ACUITY_SCORE: 42
ADLS_ACUITY_SCORE: 38
ADLS_ACUITY_SCORE: 42
ADLS_ACUITY_SCORE: 37
ADLS_ACUITY_SCORE: 42
ADLS_ACUITY_SCORE: 38
ADLS_ACUITY_SCORE: 37
ADLS_ACUITY_SCORE: 42
ADLS_ACUITY_SCORE: 42
ADLS_ACUITY_SCORE: 38
ADLS_ACUITY_SCORE: 37
ADLS_ACUITY_SCORE: 38
ADLS_ACUITY_SCORE: 42
ADLS_ACUITY_SCORE: 38
ADLS_ACUITY_SCORE: 37

## 2024-10-10 NOTE — PLAN OF CARE
Goal Outcome Evaluation:      Plan of Care Reviewed With: patient    Overall Patient Progress: improving  VS: /56 (BP Location: Right arm, Patient Position: Supine, Cuff Size: Adult Regular)   Pulse 76   Temp 97.3  F (36.3  C) (Oral)   Resp 16   Wt 79.4 kg (175 lb 0.7 oz)   LMP  (LMP Unknown)   SpO2 98%   BMI 34.19 kg/m       O2: SpO2 98% and stable on RA. LS clear and equal bilaterally. Denies chest pain and SOB.    Output: Voids spontaneously without difficulty to bathroom .   Last BM: 10/9, denies abdominal discomfort. BS active / passing flatus.    Activity: Up with A x 1 with a walker.   Skin: WDL except,    Pain: Denies pain.   Orientation:  Alert and Oriented x 4. Pt is able to use call light appropriately.   Dressing: None.   Diet: Easy to chew diet and on thin liquids.Takes meds one at a time.       LDA: No PIV access.    Equipment: IV pole, personal belongings,    Plan:  Continue with plan of care. Call light within reach, pt able to make needs known.    Additional Info: Pure wick on at night.

## 2024-10-10 NOTE — PROGRESS NOTES
Discharge Planner Post-Acute Rehab OT:      Discharge Plan: Home with HC OT services vs TCU pending progress     Precautions: Falls, Left low back and knee pain, [place] - needs hearing aids     Current Status:  ADLs:  Mobility: CGA w/ fww short distances  Grooming: CGA perched or standing in stedy  Dressing: UB - CGA at eob. LB - Mod A at eob; Feet: MOD A with sock aide at eob-needs help with unsupported sitting  Bathing: Transfer Ax1 Aleah Stedy <> blue + white rolling shower chair. Mod A tasks.  Toileting: CGA w fww to walk to/from BR; CGA-Min A on/off raised toilet; Total A pericare standing  IADLs: IND prior. Daughter assisted with yard work and grocery shopping.   Vision/Cognition: Pt wears glasses's at baseline as well as visual deficits at baseline from previous stroke. Pt completed SLUMS examinaiton with an overall score of 18/30, indicating cognitive deficits (normal range 25-30). Pt showed errors that indicate decreased short-term memory and procressing speed (e.g. short-term recall, naming animals, drawing correct time on clock); note that aphasia may contribute to score on formal assessment.      Assessment: Pt required encouragement to participate during afternoon OT session d/t c/o of fatigue. Pt agreeable to BUE strengthening seated at bedside. Will continue to address ADLs next session.      Other Barriers to Discharge (DME, Family Training, etc):   FT: TBD  OT: Per chart review, pt lives in a split level home in [facility] with spouse. 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 Pt has a tub/shower and combo and walk in shower with grab bars. Pt uses walk in shower. Pt has an elevated toilet seat and a regular toilet seat with vanity nearby. Pt reports having a walk out on the lower level.   Barriers to home: stairs  Need: shower chair for walk in shower

## 2024-10-10 NOTE — PLAN OF CARE
Goal Outcome Evaluation:      Plan of Care Reviewed With: patient    Overall Patient Progress: improvingOverall Patient Progress: improving    VS: /56 (BP Location: Right arm)   Pulse 74   Temp 97.7  F (36.5  C) (Oral)   Resp 16   Wt 90 kg (198 lb 6.6 oz)   LMP  (LMP Unknown)   SpO2 99%   BMI 38.75 kg/m     O2: SpO2 > 99 and stable on RA. LS clear and equal bilaterally. Denies chest pain and SOB.    Output: Voids spontaneously without difficulty to bathroom/ pad.   Last BM: 10/9, denies abdominal discomfort. BS active / passing flatus.    Activity: Up with CGA with a walker.   Skin: WDL.   Pain: Denied pain.   CMS: Aox4, sometimes forgetful . Denies numbness and tingling.   Dressing: None.   Diet: Regular diet. Denies nausea/vomiting. Pt need tray set up with good appetite.    LDA: None   Equipment: Personal belongings,    Plan: Fall/ seizure precautions maintained / Continue with plan of care. Call light within reach, pt able to make needs known. Safety checks completed and bed alarm on.    Additional Info: Q2H toileting done.

## 2024-10-10 NOTE — PLAN OF CARE
Post Rounds Family Phone Call  Staff involved: LETY Dneise  Length of call: 10 min  Family involved: Ramila Malik  Projected discharge date: ~10/22/24  Projected discharge location: TBD- goal for home vs one-level apartment  Equipment needs: FWW, tub bench vs shower chair pending bathroom set-up  Other questions and misc: Provided update from yesterday's weekly team rounds, including current limiting factors but also that the patient is continuing to make daily progress. Discussed plan for family training late next week or weekend; however, Ramila does note she will have to plan for a time when the pt's spouse is not present to limit stress so unable to commit to specific day/time yet. She is coordinating alternative living placement for both pt and her spouse with SW.

## 2024-10-10 NOTE — PLAN OF CARE
Goal Outcome Evaluation:      Plan of Care Reviewed With: patient    Overall Patient Progress: no changeOverall Patient Progress: no change    Pt is A&Ox4, [place]. No complaints of pain, sob, or new weakness.  Mix continence in bladder. Uses the purewick at night. No BM this shift.   Transfers as Assist of 1 with the walker. Independent in bed mobility.   Calls appropriately for needs. Not in distress. Sleeping during safety rounds.   Seizure precaution maintained. Call light w/in reach. Bed alarm on. Continue POC.

## 2024-10-10 NOTE — PLAN OF CARE
"Discharge Planner Post-Acute Rehab PT:      Discharge Plan: Home with Home vs OP PT. 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     Precautions: fall, L knee pain/OA, aphasia, [place] (has hearing aides)     Current Status:  Bed Mobility: SBA<>min A  Transfer: SBA with FWW - repeat cues for hand placement  Gait: up to ~100 ft, SBA with FWW  Stairs: 8x6\" steps with bilat rails and CGA, step-to  Balance: Requires heavy UE support for dynamic gait.     Outcome Measures:   30 second sit to stand  10/2: 0  10/9: 6x with UE assist  PASS               10/5: 7/36  Cu[place]              10/5: 3/56  10mWT   10/9: 0.13 m/sec comfortable, 0.24 m/sec fast with FWW and CGA/SBA    Assessment:  Pt continues to be making daily progress, demonstrating improving ease and more consistent safe hand placement for transfers, increased ambulation endurance, as well as increased number (although will need to assess increased height) of stairs. L knee pain/OA present when attempting to ascend stairs with L LE lead.    Other Barriers to Discharge (DME, Family Training, etc):   1 KIP with rail. Split level with single rail.  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

## 2024-10-10 NOTE — PLAN OF CARE
Discharge Planner Post-Acute Rehab SLP:      Discharge Plan: RAMILA. Ongoing SLP     Precautions: fall risk     Current Status:  Hearing: Sensorineural hearing loss, bilateral hearing aides.  Vision: Readers  Communication: Mild to moderate motor speech deficits, Mild expressive and receptive aphasia.  Cognition: To be evaluated in coming days  Swallow: Easy to chew solids (7)/ thin liquids (0), pt to sit upright, small/single bites and sips, alternate liquids/solids     Assessment: Pt observed with regular textures and thin liquids at breakfast meal. Pt moved from bed to chair to maintain upright position throughout meal. Pt tolerated regular textures via single bites of breakfast sandwich and thin liquids via cup sip. No overt s/sx of aspiration or vocal quality. Will continue to trial regular textures and follow up on current diet level.      Other Barriers to Discharge (Family Training, etc): diet texture pending progress

## 2024-10-10 NOTE — PROGRESS NOTES
[facility]   Acute Rehabilitation Unit  Daily progress note    INTERVAL HISTORY  Chika Hurd was seen at bedside this morning.  No acute events reported overnight.  She was sitting in her WC and doing ok. Answered questions with yes/no and short phrases. Denied any pain or discomfort. No CP, SOB or abdominal pain. She is sleeping and eating well.     With therapies, she is participating and making slow progress. Currently requires SBA to CGA for mobility and ADLs. Functional deficits seem to be mainly imbalance, aphasia and cognitive deficits.       MEDICATIONS  Current Facility-Administered Medications   Medication Dose Route Frequency Provider Last Rate Last Admin    amLODIPine (NORVASC) tablet 5 mg  5 mg Oral QPM Vaishnavi Pacheco PA-C   5 mg at 10/09/24 2054    aspirin (ASA) chewable tablet 81 mg  81 mg Oral Daily Vaishnavi Pacheco PA-C   81 mg at 10/09/24 0922    atorvastatin (LIPITOR) tablet 40 mg  40 mg Oral Daily Vaishnavi Pacheco PA-C   40 mg at 10/09/24 0922    famotidine (PEPCID) tablet 20 mg  20 mg Oral BID Michael Self DO   20 mg at 10/09/24 2054    hydroCHLOROthiazide tablet 12.5 mg  12.5 mg Oral Daily Yusuf Sapp MD   12.5 mg at 10/09/24 0922    lisinopril (ZESTRIL) tablet 40 mg  40 mg Oral Daily Vaishnavi Pacheco PA-C   40 mg at 10/09/24 0922    multivitamin w/minerals (THERA-VIT-M) tablet 1 tablet  1 tablet Oral Daily Vaishnavi Pacheco PA-C   1 tablet at 10/09/24 0922          Current Facility-Administered Medications   Medication Dose Route Frequency Provider Last Rate Last Admin    acetaminophen (TYLENOL) tablet 650 mg  650 mg Oral Q4H PRN Vaishnavi Pacheco PA-C        diclofenac (VOLTAREN) 1 % topical gel 4 g  4 g Topical 4x Daily PRN Vaishnavi Pacheco PA-C        melatonin tablet 3 mg  3 mg Oral At Bedtime PRN Vaishnavi Pacheco PA-C        methocarbamol (ROBAXIN) tablet 500 mg  500 mg Oral 4x Daily PRN Senait  Yusuf Oneil MD        polyethylene glycol (MIRALAX) Packet 17 g  17 g Oral Daily PRN Vaishnavi Pacheco PA-C        senna-docusate (SENOKOT-S/PERICOLACE) 8.6-50 MG per tablet 1-2 tablet  1-2 tablet Oral BID PRN Vaishnavi Pacheco PA-C            PHYSICAL EXAM  /56 (BP Location: Right arm, Patient Position: Supine, Cuff Size: Adult Regular)   Pulse 76   Temp 97.3  F (36.3  C) (Oral)   Resp 16   Wt 90 kg (198 lb 6.6 oz)   LMP  (LMP Unknown)   SpO2 98%   BMI 38.75 kg/m      Gen: NAD, sitting in WC  HEENT: NC/AT  Card: RRR  Pulm: clear breath sounds b/l   Abd: soft, non-tender, non-distended  Ext: no edema in bilateral lower extremities and no tenderness at calves   Neuro/MSK: left ptosis, right facial droop, +dysarthria, +expressive aphasia, she was able to move all extremities and had good resistance with MMT; no clear focal motor deficits. Sensory exam was difficult due to aphasia       LABS  CBC RESULTS:   Recent Labs   Lab Test 10/10/24  0632 10/07/24  0606 10/03/24  0614   WBC 11.5* 10.9 12.3*   RBC 4.08 4.14 4.06   HGB 12.2 12.2 12.2   HCT 37.1 37.4 36.9   MCV 91 90 91   MCH 29.9 29.5 30.0   MCHC 32.9 32.6 33.1   RDW 13.9 13.8 13.6    454* 346       Last Basic Metabolic Panel:  Recent Labs   Lab Test 10/10/24  0632 10/09/24  0911 10/07/24  0606 10/03/24  0614   *  --  139 134*   POTASSIUM 4.5  --  4.7 4.1   CHLORIDE 100  --  103 96*   CO2 29  --  26 26   ANIONGAP 4*  --  10 12   GLC 94 95 100* 100*   BUN 18.6  --  23.4* 34.5*   CR 0.61  --  0.62 0.78   GFRESTIMATED >90  --  >90 78   KAR 9.3  --  9.6 9.4         Rehabilitation   Admission to acute inpatient rehab 10/01/24.    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       Impairment of ADL's: Noted to have BLE weakness, impaired balance, impaired coordination, impaired activity tolerance, impaired alertness, and impaired cognition, all affecting her ability to safely and independently perform basic ADLs.  Goal for SBA with basic ADLs except min A for bathing.      Impairment of mobility:  Noted to have BLE weakness, impaired balance, impaired coordination, impaired activity tolerance, impaired alertness, and impaired cognition, all affecting her ability to safely and independently perform basic mobility.  Goal for SBA with basic mobility with min A for stairs.      Impairment of cognition/language/swallow:  Noted to have dysphagia, dysarthria, aphasia, and impaired cognition with goals for safe tolerance of least restrictive diet and improved cognitive-linguistic skills to meet basic needs.     Continue comprehensive acute inpatient rehabilitation program with multidisciplinary approach including therapies, rehab nursing, and physiatry following. See interval history for updates.      ASSESSMENT AND PLAN  hCika Hurd is a 79 year old right hand dominant female with a past medical history of prior stroke (10/2023; left frontal lobe and corona radiata), hyperlipidemia, hypertension, central retinal artery occlusion of right eye, left breast cancer, GERD, prediabetes, sensorineural hearing loss, and obesity who was admitted on 9/25/24 with acute left basal ganglia hemorrhage with hospital course complicated by subacute stroke in left periatrial white matter, incidental saccular aneurysm, constipation, and conjunctivitis.  She was admitted to ARU on 10/1/24 for multidisciplinary rehabilitation and ongoing medical management.      Medical Conditions  New actions/orders/updates for today are in blue.     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  Presented with new gait instability and worsened dysarthria.  Imaging findings as above.  PTA on Plavix + aspirin, plan had been for 3 months.  Per sending team, does not need to resume Plavix but ok'd to resume aspirin daily on 10/1.  - Continue ASA 81 mg daily (resumed on 10/1)  - Continue statin with LDL goal 40-70  - SBP goal 130-150 as inpatient, long-term outpatient goal <130/80.  Management as below.  - Continue easy to chew diet.  Repeat video swallow study on 10/7 with intermittent penetration with thin liquids but immediate ejection.  Per SLP, advanced to thins after bedside assessment and good tolerance.  - Ziopatch x14 days  - Continue PT/OT/SLP  - Follow up with neurology in 6-8 weeks     Hyperlipidemia  - LDL goal 40-70 (LDL 55 on 9/26)  - Continue PTA atorvastatin 40 mg daily     Hypertension  - Continue hydrochlorothiazide 12.5 mg (decreased 10/4 due to soft blood pressures and mild hyponatremia)  - Continue PTA lisinopril 40 mg daily  - Continue amlodipine 5 mg daily (added 10/1)  - Monitor BP and adjust regimen as indicated     Hyponatremia  Mild at 134 on 10/1.  Suspect related to hydrochlorothiazide, mild hypovolemia.  BUN mildly elevated.  On slightly thick liquids.  10/10: Na mildly low at 133.  Suspect mild hypovolemia given WBC also mildly elevated at 11.5. clinically stable; will monitor and repeat labs on Monday. Encourage hydration.   - Monitor BMP every M/Th     GERD  - Continue PTA famotidine 20 mg BID     Constipation  In setting of decreased mobility  Last BM on 10/9  - Continue Miralax daily  - Monitor, additional PRN bowel meds available, can schedule if ongoing  issues     Prediabetes  A1c 6.0% on 9/27/24  - Follow up with PCP for ongoing surveillance     Bilateral conjunctivitis, resolved  Started on ofloxacin at hospital on 9/29 due to purulent discharge from bilateral eyes with associated pain.  Completed course of ofloxacin as of 10/4.  No recurrent issues at ARU.        Adjustment to disability:  Clinical psychology to eval and treat if indicated  FEN: easy to chew (level 7) diet, thin liquids; swallow strategies per SLP  Bowel: continent, recent constipation.  Continue scheduled Miralax.  Additional PRN bowel medications available.  Monitor and adjust regimen as indicated.  Bladder: continent/incontinent.  Timed toileting implemented on 10/7.  Wean/avoid Purewick use.  DVT Prophylaxis: mechanical  GI Prophylaxis: pepcid  Code: full; as prior  Disposition: home vs TCU  ELOS: target 10/22/24 pending progress  Follow up Appointments on Discharge: PCP in 1-2 weeks, general neurology or stroke REUBEN in 6-8 weeks      20 minutes spent on the date of service doing chart review, history and exam, documentation, and further activities as noted above.       Melisa Butterfield MD  Physical Medicine & Rehabilitation

## 2024-10-11 ENCOUNTER — APPOINTMENT (OUTPATIENT)
Dept: PHYSICAL THERAPY | Facility: CLINIC | Age: 79
DRG: 057 | End: 2024-10-11
Attending: PHYSICAL MEDICINE & REHABILITATION
Payer: MEDICARE

## 2024-10-11 ENCOUNTER — APPOINTMENT (OUTPATIENT)
Dept: OCCUPATIONAL THERAPY | Facility: CLINIC | Age: 79
DRG: 057 | End: 2024-10-11
Attending: PHYSICAL MEDICINE & REHABILITATION
Payer: MEDICARE

## 2024-10-11 ENCOUNTER — APPOINTMENT (OUTPATIENT)
Dept: SPEECH THERAPY | Facility: CLINIC | Age: 79
DRG: 057 | End: 2024-10-11
Attending: PHYSICAL MEDICINE & REHABILITATION
Payer: MEDICARE

## 2024-10-11 PROCEDURE — 128N000003 HC R&B REHAB

## 2024-10-11 PROCEDURE — 250N000013 HC RX MED GY IP 250 OP 250 PS 637: Performed by: PHYSICIAN ASSISTANT

## 2024-10-11 PROCEDURE — 97116 GAIT TRAINING THERAPY: CPT | Mod: GP

## 2024-10-11 PROCEDURE — 250N000013 HC RX MED GY IP 250 OP 250 PS 637: Performed by: PHYSICAL MEDICINE & REHABILITATION

## 2024-10-11 PROCEDURE — 97535 SELF CARE MNGMENT TRAINING: CPT | Mod: GO | Performed by: OCCUPATIONAL THERAPIST

## 2024-10-11 PROCEDURE — 99231 SBSQ HOSP IP/OBS SF/LOW 25: CPT | Performed by: PHYSICIAN ASSISTANT

## 2024-10-11 PROCEDURE — 92507 TX SP LANG VOICE COMM INDIV: CPT | Mod: GN

## 2024-10-11 PROCEDURE — 97110 THERAPEUTIC EXERCISES: CPT | Mod: GO | Performed by: OCCUPATIONAL THERAPIST

## 2024-10-11 PROCEDURE — 92526 ORAL FUNCTION THERAPY: CPT | Mod: GN

## 2024-10-11 RX ADMIN — LISINOPRIL 40 MG: 40 TABLET ORAL at 09:12

## 2024-10-11 RX ADMIN — ASPIRIN 81 MG CHEWABLE TABLET 81 MG: 81 TABLET CHEWABLE at 09:12

## 2024-10-11 RX ADMIN — Medication 1 TABLET: at 09:12

## 2024-10-11 RX ADMIN — ATORVASTATIN CALCIUM 40 MG: 40 TABLET, FILM COATED ORAL at 09:12

## 2024-10-11 RX ADMIN — FAMOTIDINE 20 MG: 20 TABLET ORAL at 09:12

## 2024-10-11 RX ADMIN — FAMOTIDINE 20 MG: 20 TABLET ORAL at 20:34

## 2024-10-11 RX ADMIN — HYDROCHLOROTHIAZIDE 12.5 MG: 12.5 TABLET ORAL at 09:12

## 2024-10-11 RX ADMIN — AMLODIPINE BESYLATE 5 MG: 5 TABLET ORAL at 20:35

## 2024-10-11 ASSESSMENT — ACTIVITIES OF DAILY LIVING (ADL)
ADLS_ACUITY_SCORE: 38

## 2024-10-11 NOTE — PLAN OF CARE
Goal Outcome Evaluation:      Plan of Care Reviewed With: patient    Overall Patient Progress: no change    Pt. AOx4, forgetful. [place].   Takes meds whole w/ thin liquids.   Purewick on at night. Transfers CGA w/ walker.   Pt sleeping during safety checks.   No acute issues overnight.   Seizure precaution maintained.   Bed alarm on. Call light w/in reach.

## 2024-10-11 NOTE — PLAN OF CARE
"Discharge Planner Post-Acute Rehab PT:      Discharge Plan: Home with Home vs OP PT. 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     Precautions: fall, L knee pain/OA, aphasia, [place] (has hearing aides)     Current Status:  Bed Mobility: SBA<>min A  Transfer: SBA with FWW - repeat cues for hand placement  Gait: up to ~160 ft, SBA with FWW  Stairs: 8x6\" steps with bilat rails and CGA, step-to  Balance: Requires heavy UE support for dynamic gait.     Outcome Measures:   30 second sit to stand  10/2: 0  10/9: 6x with UE assist  PASS               10/5: 7/36  [place]              10/5: 3/56  10mWT   10/9: 0.13 m/sec comfortable, 0.24 m/sec fast with FWW and CGA/SBA    Assessment:  Pt improving gait distance this PM, multiple bouts of 100', followed by final bout of 160' back to room. Has some L knee pain present with stairs, but able to ascend with L LE reciprocally, not yet ready for 8\" height due to poor eccentric control with lowering.    Other Barriers to Discharge (DME, Family Training, etc):   1 KIP with rail. Split level with single rail.  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

## 2024-10-11 NOTE — PROGRESS NOTES
[facility]   Acute Rehabilitation Unit  Daily progress note    INTERVAL HISTORY  Chika Hurd was seen at bedside this morning.  No acute events reported overnight.  She reports to have slept fine last night.  She denies headache or other pain, dizziness or lightheadedness, shortness of breath, nausea.  Last BM was yesterday, she feels this was normal.  She denies recent issues with incontinence, though acknowledges it is difficult when she has to wait for assist.  She thinks therapies are going well and is noting some progress.  She is still quite worried about plan after she leaves from ARU.  She is concerned about asking her daughter to help her when she is also helping her spouse, who requires quite a lot due to his advanced dementia.  SW is working with family to consider additional community supports or alternative living arrangements so that both patient and her spouse have adequate support while she continues to recover from her stroke.  Nursing was present and administering medications.  Patient wanted to review what she is taking and why, so we did go through her meds.  She denies other questions or concerns at this time.    With OT, she completed don/doffing socks seated in w/c with figure four method, and CGA with fww for toileting and g/h at sink this date. Completed dynamic reaching exercises at EOB to increase core strength; pt showing progress in ADLs and activity tolerance this date, as well as dynamic sitting balance.     With SLP, she was advanced to regular diet and thin liquids.  She also engaged in mod-complex naming to description task given min cues of reptition. Pt generated 60% of words IND, inc to 80% with mod semantic and gestural cues. Inc to 100% with max phonemic cues. Pt used good problem solving strategies, but level of aphasia did impact completion of task.       MEDICATIONS  Current Facility-Administered Medications   Medication Dose  Route Frequency Provider Last Rate Last Admin    amLODIPine (NORVASC) tablet 5 mg  5 mg Oral QPM Vaishnavi Pacheco PA-C   5 mg at 10/10/24 2014    aspirin (ASA) chewable tablet 81 mg  81 mg Oral Daily Vaishnavi Pacheco PA-C   81 mg at 10/10/24 0814    atorvastatin (LIPITOR) tablet 40 mg  40 mg Oral Daily Vaishnavi Pacheco PA-C   40 mg at 10/10/24 0814    famotidine (PEPCID) tablet 20 mg  20 mg Oral BID Michael Self DO   20 mg at 10/10/24 2014    hydroCHLOROthiazide tablet 12.5 mg  12.5 mg Oral Daily Yusuf Sapp MD   12.5 mg at 10/09/24 0922    lisinopril (ZESTRIL) tablet 40 mg  40 mg Oral Daily Vaishnavi Pacheco PA-C   40 mg at 10/09/24 0922    multivitamin w/minerals (THERA-VIT-M) tablet 1 tablet  1 tablet Oral Daily Vaishnavi Pacheco PA-C   1 tablet at 10/10/24 0817          Current Facility-Administered Medications   Medication Dose Route Frequency Provider Last Rate Last Admin    acetaminophen (TYLENOL) tablet 650 mg  650 mg Oral Q4H PRN Vaishnavi Pacheco PA-C        diclofenac (VOLTAREN) 1 % topical gel 4 g  4 g Topical 4x Daily PRN Vaishnavi Pacheco PA-C        melatonin tablet 3 mg  3 mg Oral At Bedtime PRN Vaishnavi Pacheco PA-C        methocarbamol (ROBAXIN) tablet 500 mg  500 mg Oral 4x Daily PRN Yusuf Sapp MD        polyethylene glycol (MIRALAX) Packet 17 g  17 g Oral Daily PRN Vaishnavi Pacheco PA-C        senna-docusate (SENOKOT-S/PERICOLACE) 8.6-50 MG per tablet 1-2 tablet  1-2 tablet Oral BID PRN Vaishnavi Pacheco PA-C            PHYSICAL EXAM  /56   Pulse 74   Temp 97.7  F (36.5  C) (Oral)   Resp 16   Wt 90 kg (198 lb 6.6 oz)   LMP  (LMP Unknown)   SpO2 99%   BMI 38.75 kg/m    Gen: NAD, sitting in wheelchair  HEENT: NC/AT  Card: RRR, no murmurs  Pulm: non-labored on room air, lungs CTA bilaterally  Abd: soft, non-tender, non-distended, +bowel sounds  Ext: no edema in bilateral lower extremities and no tenderness at calves    Neuro/MSK: left ptosis, right facial droop, +dysarthria, +expressive aphasia, she was able to move all extremities and had good resistance with MMT; no clear focal motor deficits. Sensory exam was difficult due to aphasia       LABS  CBC RESULTS:   Recent Labs   Lab Test 10/10/24  0632 10/07/24  0606 10/03/24  0614   WBC 11.5* 10.9 12.3*   RBC 4.08 4.14 4.06   HGB 12.2 12.2 12.2   HCT 37.1 37.4 36.9   MCV 91 90 91   MCH 29.9 29.5 30.0   MCHC 32.9 32.6 33.1   RDW 13.9 13.8 13.6    454* 346       Last Basic Metabolic Panel:  Recent Labs   Lab Test 10/10/24  0632 10/09/24  0911 10/07/24  0606 10/03/24  0614   *  --  139 134*   POTASSIUM 4.5  --  4.7 4.1   CHLORIDE 100  --  103 96*   CO2 29  --  26 26   ANIONGAP 4*  --  10 12   GLC 94 95 100* 100*   BUN 18.6  --  23.4* 34.5*   CR 0.61  --  0.62 0.78   GFRESTIMATED >90  --  >90 78   KAR 9.3  --  9.6 9.4         Rehabilitation   Admission to acute inpatient rehab 10/01/24.    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       Impairment of ADL's: Noted to have BLE weakness, impaired balance, impaired coordination, impaired activity tolerance, impaired alertness, and impaired cognition, all affecting her ability to safely and independently perform basic ADLs.  Goal for SBA with basic ADLs except min A for bathing.      Impairment of mobility:  Noted to have BLE weakness, impaired balance, impaired coordination, impaired activity tolerance, impaired alertness, and impaired cognition, all affecting her ability to safely and independently perform basic mobility.  Goal for SBA with basic mobility with min A for stairs.      Impairment of cognition/language/swallow:  Noted to have  dysphagia, dysarthria, aphasia, and impaired cognition with goals for safe tolerance of least restrictive diet and improved cognitive-linguistic skills to meet basic needs.     Continue comprehensive acute inpatient rehabilitation program with multidisciplinary approach including therapies, rehab nursing, and physiatry following. See interval history for updates.      ASSESSMENT AND PLAN  Chika Hurd is a 79 year old right hand dominant female with a past medical history of prior stroke (10/2023; left frontal lobe and corona radiata), hyperlipidemia, hypertension, central retinal artery occlusion of right eye, left breast cancer, GERD, prediabetes, sensorineural hearing loss, and obesity who was admitted on 9/25/24 with acute left basal ganglia hemorrhage with hospital course complicated by subacute stroke in left periatrial white matter, incidental saccular aneurysm, constipation, and conjunctivitis.  She was admitted to ARU on 10/1/24 for multidisciplinary rehabilitation and ongoing medical management.      Medical Conditions  New actions/orders/updates for today are in blue.     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  Presented with new gait instability and worsened dysarthria.  Imaging findings as above.  PTA on Plavix + aspirin, plan had been for 3 months.  Per sending team, does not need to resume Plavix but ok'd to resume aspirin daily on 10/1.  - Continue ASA 81 mg daily (resumed on 10/1)  - Continue statin with LDL goal 40-70  - SBP goal 130-150 as inpatient,  long-term outpatient goal <130/80.  Management as below.  - Repeat video swallow study on 10/7 with intermittent penetration with thin liquids but immediate ejection.  Per SLP, advanced to thins after bedside assessment and good tolerance.  Advanced to regular diet today per SLP.  - Ziopatch x14 days  - Continue PT/OT/SLP  - Follow up with neurology in 6-8 weeks     Hyperlipidemia  - LDL goal 40-70 (LDL 55 on 9/26)  - Continue PTA atorvastatin 40 mg daily     Hypertension  - Continue hydrochlorothiazide 12.5 mg (decreased 10/4 due to soft blood pressures and mild hyponatremia)  - Continue PTA lisinopril 40 mg daily  - Continue amlodipine 5 mg daily (added 10/1)  - Monitor BP and adjust regimen as indicated     Hyponatremia  Mild at 134 on 10/1.  Suspect related to hydrochlorothiazide, mild hypovolemia.  BUN mildly elevated.  On slightly thick liquids.  10/10: Na mildly low at 133.  Suspect mild hypovolemia given WBC also mildly elevated at 11.5. clinically stable; will monitor and repeat labs on Monday. Encourage hydration.   - Monitor BMP every M/Th     GERD  - Continue PTA famotidine 20 mg BID     Constipation  In setting of decreased mobility  Last BM on 10/10 per patient report  - Continue Miralax daily  - Monitor, additional PRN bowel meds available, can schedule if ongoing issues     Prediabetes  A1c 6.0% on 9/27/24  - Follow up with PCP for ongoing surveillance     Bilateral conjunctivitis, resolved  Started on ofloxacin at hospital on 9/29 due to purulent discharge from bilateral eyes with associated pain.  Completed course of ofloxacin as of 10/4.  No recurrent issues at ARU.        Adjustment to disability:  Clinical psychology to eval and treat if indicated  FEN: advanced to regular diet, continue thin liquids; swallow strategies per SLP  Bowel: continent, recent constipation.  Continue scheduled Miralax.  Additional PRN bowel medications available.  Monitor and adjust regimen as indicated.  Bladder:  continent/incontinent.  Timed toileting implemented on 10/7.  Wean/avoid Purewick use.  DVT Prophylaxis: mechanical  GI Prophylaxis: pepcid  Code: full; as prior  Disposition: goal for home but some challenges with support especially as daughter caring for patient's spouse who has advanced dementia  ELOS: target 10/22/24 pending progress  Follow up Appointments on Discharge: PCP in 1-2 weeks, general neurology or stroke REUBEN in 6-8 weeks      25 minutes spent on the date of service doing chart review, history and exam, documentation, and further activities as noted above.       Plan of care was discussed with Dr. Melisa Butterfield, PM&R staff physician     Vaishnavi Pacheco PA-C  Physical Medicine & Rehabilitation

## 2024-10-11 NOTE — PLAN OF CARE
Discharge Planner Post-Acute Rehab SLP:      Discharge Plan: [facility]. Ongoing SLP     Precautions: fall risk     Current Status:  Hearing: Sensorineural hearing loss, bilateral hearing aides.  Vision: Readers  Communication: Mild to moderate motor speech deficits, Mild expressive and receptive aphasia.  Cognition: To be evaluated in coming days  Swallow: Regular/ thin liquids (0), pt to sit upright, small/single bites and sips, alternate liquids/solids     Assessment: Pt seen with meal tray of regular solids and thin liquids. Pt upright in chair and pleasant. Oral phase was WFL with timely mastication and no oral residuals. pt used liquid wash PRN and took small bites. No s/s of aspiration or difficulty with swallowing. Appropriate for diet upgrade.     Pt engaged in mod-complex naming to description task given min cues of reptition. Pt generated 60% of words IND, inc to 80% with mod semantic and gestural cues. Inc to 100% with max phonemic cues. Pt used good problem solving strategies, but level of aphasia did impact completion of task.     Other Barriers to Discharge (Family Training, etc): diet texture pending progress

## 2024-10-11 NOTE — PLAN OF CARE
Discharge Planner Post-Acute Rehab OT:      Discharge Plan: Home with HC OT services vs TCU pending progress     Precautions: Falls, Left low back and knee pain, [place] - needs hearing aids     Current Status:  ADLs:  Mobility: CGA w/ fww short distances  Grooming: CGA standing at sink w/ fww for washing hands/face  Dressing: UB - CGA at eob. LB - Mod A at eob; Feet: SBA using figure four method in w/c  Bathing: Transfer Ax1 Aleah Stedy <> blue + white rolling shower chair. Mod A tasks.  Toileting: CGA w fww to walk to/from BR; CGA on/off raised toilet w/ gb and fww; CGA pericare standing with gb  IADLs: IND prior. Daughter assisted with yard work and grocery shopping.     Vision/Cognition: Pt wears glasses's at baseline as well as visual deficits at baseline from previous stroke. Pt completed SLUMS examinaiton with an overall score of 18/30, indicating cognitive deficits (normal range 25-30). Pt showed errors that indicate decreased short-term memory and procressing speed (e.g. short-term recall, naming animals, drawing correct time on clock); note that aphasia may contribute to score on formal assessment.      Assessment: Pt completed don/doffing socks seated in w/c with figure four method, and CGA with fww for toileting and g/h at sink this date. Completed dynamic reaching exercises at EOB to increase core strength; pt showing progress in ADLs and activity tolerance this date, as well as dynamic sitting balance.     Other Barriers to Discharge (DME, Family Training, etc):   FT: TBD  OT: Per chart review, pt lives in a split level home in [facility] with spouse. 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 Pt has a tub/shower and combo and walk in shower with grab bars. Pt uses walk in shower. Pt has an elevated toilet seat and a regular toilet seat with vanity nearby. Pt reports having a walk out on the lower level.   Barriers to home: stairs  Need: shower chair for walk in shower

## 2024-10-12 ENCOUNTER — APPOINTMENT (OUTPATIENT)
Dept: OCCUPATIONAL THERAPY | Facility: CLINIC | Age: 79
DRG: 057 | End: 2024-10-12
Attending: PHYSICAL MEDICINE & REHABILITATION
Payer: MEDICARE

## 2024-10-12 ENCOUNTER — APPOINTMENT (OUTPATIENT)
Dept: PHYSICAL THERAPY | Facility: CLINIC | Age: 79
DRG: 057 | End: 2024-10-12
Attending: PHYSICAL MEDICINE & REHABILITATION
Payer: MEDICARE

## 2024-10-12 PROCEDURE — 97110 THERAPEUTIC EXERCISES: CPT | Mod: GO

## 2024-10-12 PROCEDURE — 250N000013 HC RX MED GY IP 250 OP 250 PS 637: Performed by: PHYSICAL MEDICINE & REHABILITATION

## 2024-10-12 PROCEDURE — 128N000003 HC R&B REHAB

## 2024-10-12 PROCEDURE — 250N000013 HC RX MED GY IP 250 OP 250 PS 637: Performed by: PHYSICIAN ASSISTANT

## 2024-10-12 PROCEDURE — 97112 NEUROMUSCULAR REEDUCATION: CPT | Mod: GP | Performed by: PHYSICAL THERAPIST

## 2024-10-12 PROCEDURE — 97116 GAIT TRAINING THERAPY: CPT | Mod: GP | Performed by: PHYSICAL THERAPIST

## 2024-10-12 PROCEDURE — 97110 THERAPEUTIC EXERCISES: CPT | Mod: GP | Performed by: PHYSICAL THERAPIST

## 2024-10-12 RX ADMIN — FAMOTIDINE 20 MG: 20 TABLET ORAL at 20:13

## 2024-10-12 RX ADMIN — FAMOTIDINE 20 MG: 20 TABLET ORAL at 09:01

## 2024-10-12 RX ADMIN — AMLODIPINE BESYLATE 5 MG: 5 TABLET ORAL at 20:13

## 2024-10-12 RX ADMIN — ATORVASTATIN CALCIUM 40 MG: 40 TABLET, FILM COATED ORAL at 09:01

## 2024-10-12 RX ADMIN — ASPIRIN 81 MG CHEWABLE TABLET 81 MG: 81 TABLET CHEWABLE at 09:01

## 2024-10-12 RX ADMIN — Medication 1 TABLET: at 09:01

## 2024-10-12 ASSESSMENT — ACTIVITIES OF DAILY LIVING (ADL)
ADLS_ACUITY_SCORE: 38

## 2024-10-12 NOTE — CARE PLAN
Shift: 0700 - 1530    /49 (BP Location: Right arm)   Pulse 70   Temp 98  F (36.7  C) (Oral)   Resp 18   Wt 90 kg (198 lb 6.6 oz)   LMP  (LMP Unknown)   SpO2 96%   BMI 38.75 kg/m      Patient is alert and oriented, forgetful No acute change or events; VSS, Denies shortness of breath,chest pain, nausea/vomiting, no fever or chills. Pt denies pain. No concerns at this time.  Pt is able to make needs know, call light is in reach, continue with POC.

## 2024-10-12 NOTE — PLAN OF CARE
Discharge Planner Post-Acute Rehab OT:      Discharge Plan: Home with HC OT services vs TCU pending progress     Precautions: Falls, Left low back and knee pain, [place] - needs hearing aids     Current Status:  ADLs:  Mobility: CGA w/ fww short distances  Grooming: CGA standing at sink w/ fww for washing hands/face  Dressing: UB - CGA at eob. LB - Mod A at eob; Feet: SBA using figure four method in w/c  Bathing: Transfer Ax1 Aleah Stedy <> blue + white rolling shower chair. Mod A tasks.  Toileting: CGA w fww to walk to/from BR; CGA on/off raised toilet w/ gb and fww; CGA pericare standing with gb  IADLs: IND prior. Daughter assisted with yard work and grocery shopping.     Vision/Cognition: Pt wears glasses's at baseline as well as visual deficits at baseline from previous stroke. Pt completed SLUMS examinaiton with an overall score of 18/30, indicating cognitive deficits (normal range 25-30). Pt showed errors that indicate decreased short-term memory and procressing speed (e.g. short-term recall, naming animals, drawing correct time on clock); note that aphasia may contribute to score on formal assessment.      Assessment: Focus of session on strengthening exercises. Good motivation, patient did express fatigue after PT today however overall good participation.      Other Barriers to Discharge (DME, Family Training, etc):   FT: TBD  OT: Per chart review, pt lives in a split level home in [facility] with spouse. 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 Pt has a tub/shower and combo and walk in shower with grab bars. Pt uses walk in shower. Pt has an elevated toilet seat and a regular toilet seat with vanity nearby. Pt reports having a walk out on the lower level.   Barriers to home: stairs  Need: shower chair for walk in shower

## 2024-10-12 NOTE — PLAN OF CARE
"Discharge Planner Post-Acute Rehab PT:      Discharge Plan: Home with Home vs OP PT. 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     Precautions: fall, L knee pain/OA, aphasia, [place] (has hearing aides)     Current Status:  Bed Mobility: SBA<>min A  Transfer: SBA with FWW - repeat cues for hand placement  Gait: up to ~160 ft, SBA with FWW  Stairs: 8x6\" steps with bilat rails and CGA, step-to  Balance: Requires heavy UE support for dynamic gait.     Outcome Measures:   30 second sit to stand  10/2: 0  10/9: 6x with UE assist  PASS               10/5: 7/36  Cueto              10/5: 3/56  10mWT   10/9: 0.13 m/sec comfortable, 0.24 m/sec fast with FWW and CGA/SBA    Assessment:  Focused on gait progression and postural control with decreased reliance on FWW for UE support including use of NuStep, parallel bars, stair climbing and B STCs during walking for reciprocal movement pattern development.    Other Barriers to Discharge (DME, Family Training, etc):   1 KIP with rail. Split level with single rail.  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

## 2024-10-12 NOTE — PROGRESS NOTES
VS: /46 (BP Location: Right arm)   Pulse 77   Temp 97.4  F (36.3  C) (Oral)   Resp 16   Wt 90 kg (198 lb 6.6 oz)   LMP  (LMP Unknown)   SpO2 97%   BMI 38.75 kg/m       O2: 98% RA    Output: Voiding without difficulty    Last BM: 10/10   Activity: Contact assist    Skin: CDI    Pain: Pt denies pain    CMS: A/Ox4   Dressing: NA   Diet: Regular thin liquid    LDA: NA   Plan: Continue with care    Additional Info:

## 2024-10-12 NOTE — PLAN OF CARE
Goal Outcome Evaluation:    Patient is alert and oriented with forgetfulness. Able to use call light and make needs known, continent for bowel, external cath. in use. Stand by assist, uses hearing aids. Patient took pills whole with water, denied chest pain, SOB, and N/V. No concerns from patient at this time, appeared sleeping during rounds, call light in reach, will continue with the cares as planned.        Patient's most recent vital signs are:     Vital signs:  BP: 130/40  Temp: 97.4  HR: 71  RR: 16  SpO2: 97 %     Patient does not have new respiratory symptoms.  Patient does not have new sore throat.  Patient does not have a fever greater than 99.5.

## 2024-10-13 ENCOUNTER — APPOINTMENT (OUTPATIENT)
Dept: SPEECH THERAPY | Facility: CLINIC | Age: 79
DRG: 057 | End: 2024-10-13
Attending: PHYSICAL MEDICINE & REHABILITATION
Payer: MEDICARE

## 2024-10-13 ENCOUNTER — APPOINTMENT (OUTPATIENT)
Dept: PHYSICAL THERAPY | Facility: CLINIC | Age: 79
DRG: 057 | End: 2024-10-13
Attending: PHYSICAL MEDICINE & REHABILITATION
Payer: MEDICARE

## 2024-10-13 PROCEDURE — 99231 SBSQ HOSP IP/OBS SF/LOW 25: CPT | Performed by: PHYSICAL MEDICINE & REHABILITATION

## 2024-10-13 PROCEDURE — 97116 GAIT TRAINING THERAPY: CPT | Mod: GP

## 2024-10-13 PROCEDURE — 128N000003 HC R&B REHAB

## 2024-10-13 PROCEDURE — 92526 ORAL FUNCTION THERAPY: CPT | Mod: GN

## 2024-10-13 PROCEDURE — 250N000013 HC RX MED GY IP 250 OP 250 PS 637: Performed by: PHYSICAL MEDICINE & REHABILITATION

## 2024-10-13 PROCEDURE — 250N000013 HC RX MED GY IP 250 OP 250 PS 637: Performed by: PHYSICIAN ASSISTANT

## 2024-10-13 PROCEDURE — 92507 TX SP LANG VOICE COMM INDIV: CPT | Mod: GN

## 2024-10-13 RX ADMIN — Medication 1 TABLET: at 09:40

## 2024-10-13 RX ADMIN — HYDROCHLOROTHIAZIDE 12.5 MG: 12.5 TABLET ORAL at 09:40

## 2024-10-13 RX ADMIN — ASPIRIN 81 MG CHEWABLE TABLET 81 MG: 81 TABLET CHEWABLE at 09:39

## 2024-10-13 RX ADMIN — ATORVASTATIN CALCIUM 40 MG: 40 TABLET, FILM COATED ORAL at 09:40

## 2024-10-13 RX ADMIN — FAMOTIDINE 20 MG: 20 TABLET ORAL at 09:40

## 2024-10-13 RX ADMIN — LISINOPRIL 40 MG: 40 TABLET ORAL at 09:40

## 2024-10-13 RX ADMIN — FAMOTIDINE 20 MG: 20 TABLET ORAL at 20:00

## 2024-10-13 ASSESSMENT — ACTIVITIES OF DAILY LIVING (ADL)
ADLS_ACUITY_SCORE: 38

## 2024-10-13 NOTE — PLAN OF CARE
Goal Outcome Evaluation:      Plan of Care Reviewed With: patient    Overall Patient Progress: no changeOverall Patient Progress: no change    Outcome Evaluation: No change    Pt A&Ox4, no pain, Stand by assist transfer, diet regular thin pills whole, continent of bowel last BM 10/12 mixed continence of bladder with purewick overnight. Skin intact with some redness in abdominal folds. Bed lowered and call light within reach.

## 2024-10-13 NOTE — PLAN OF CARE
Discharge Planner Post-Acute Rehab SLP:      Discharge Plan: [facility]. Ongoing SLP     Precautions: fall risk     Current Status:  Hearing: Sensorineural hearing loss, bilateral hearing aides.  Vision: Readers  Communication: Mild to moderate motor speech deficits, Mild expressive and receptive aphasia.  Cognition: To be evaluated in coming days  Swallow: Regular/ thin liquids (0), pt to sit upright, small/single bites and sips, alternate liquids/solids - Swallow goals met 10/13.     Assessment: Pt seen for dysphagia tx session during breakfast meal. Pt able to set up and prep tray independently with extra time. Slow but functional mastication and oral clearance. No overt s/sx of aspiration and pt denied any difficulty across trials. Swallow goals met     Other Barriers to Discharge (Family Training, etc):none anticipated from SLP

## 2024-10-13 NOTE — PLAN OF CARE
Goal Outcome Evaluation:    Patient is alert and oriented with forgetfulness. Able to use call light and make needs known, continent for bowel, external cath. in use this shift. Standby assist, uses hearing aids. No concerns from patient at this time, appeared sleeping during rounds, denied chest pain, SOB, and N/V. Call light in reach, will continue with the cares as planned.      Patient's most recent vital signs are:     Vital signs:  BP: 124/53  Temp: 98  HR: 70  RR: 18  SpO2: 96 %     Patient does not have new respiratory symptoms.  Patient does not have new sore throat.  Patient does not have a fever greater than 99.5.

## 2024-10-13 NOTE — PLAN OF CARE
"Discharge Planner Post-Acute Rehab PT:      Discharge Plan: Home with Home vs OP PT. 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     Precautions: fall, L knee pain/OA, aphasia, [place] (has hearing aides)     Current Status:  Bed Mobility: SBA<>min A  Transfer: SBA with FWW - repeat cues for hand placement  Gait: up to ~160 ft, SBA with FWW  Stairs: 8x6\" steps with bilat rails and CGA, step-to  Balance: Requires heavy UE support for dynamic gait.     Outcome Measures:   30 second sit to stand  10/2: 0  10/9: 6x with UE assist  PASS               10/5: 7/36  Cu[place]              10/5: 3/56  10mWT   10/9: 0.13 m/sec comfortable, 0.24 m/sec fast with FWW and CGA/SBA    Assessment:  Trialed 8\" steps today, needing Lorri for ascent and close CGA for descent sideways, pt remains limited due to weakness.     Other Barriers to Discharge (DME, Family Training, etc):   1 KIP with rail. Split level with single rail.  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

## 2024-10-13 NOTE — PLAN OF CARE
Goal Outcome Evaluation:                    VS: VSS AOX4    O2: RA   Output: Continent x2   Last BM: 10/13   Activity: Ax1 walker gait belt   Diet: Reg thin whole   Additional Info: Pt denies chest pain, pain and SOB. No acute changes during this shift, call light within reach, continue with plan of care.

## 2024-10-14 ENCOUNTER — APPOINTMENT (OUTPATIENT)
Dept: PHYSICAL THERAPY | Facility: CLINIC | Age: 79
DRG: 057 | End: 2024-10-14
Attending: PHYSICAL MEDICINE & REHABILITATION
Payer: MEDICARE

## 2024-10-14 ENCOUNTER — APPOINTMENT (OUTPATIENT)
Dept: SPEECH THERAPY | Facility: CLINIC | Age: 79
DRG: 057 | End: 2024-10-14
Attending: PHYSICAL MEDICINE & REHABILITATION
Payer: MEDICARE

## 2024-10-14 LAB
ANION GAP SERPL CALCULATED.3IONS-SCNC: 9 MMOL/L (ref 7–15)
BUN SERPL-MCNC: 12.2 MG/DL (ref 8–23)
CALCIUM SERPL-MCNC: 8.8 MG/DL (ref 8.8–10.4)
CHLORIDE SERPL-SCNC: 102 MMOL/L (ref 98–107)
CREAT SERPL-MCNC: 0.62 MG/DL (ref 0.51–0.95)
EGFRCR SERPLBLD CKD-EPI 2021: >90 ML/MIN/1.73M2
ERYTHROCYTE [DISTWIDTH] IN BLOOD BY AUTOMATED COUNT: 14.4 % (ref 10–15)
GLUCOSE SERPL-MCNC: 97 MG/DL (ref 70–99)
HCO3 SERPL-SCNC: 26 MMOL/L (ref 22–29)
HCT VFR BLD AUTO: 38.2 % (ref 35–47)
HGB BLD-MCNC: 12.7 G/DL (ref 11.7–15.7)
MCH RBC QN AUTO: 30.7 PG (ref 26.5–33)
MCHC RBC AUTO-ENTMCNC: 33.2 G/DL (ref 31.5–36.5)
MCV RBC AUTO: 92 FL (ref 78–100)
PLATELET # BLD AUTO: 400 10E3/UL (ref 150–450)
POTASSIUM SERPL-SCNC: 4.7 MMOL/L (ref 3.4–5.3)
RBC # BLD AUTO: 4.14 10E6/UL (ref 3.8–5.2)
SODIUM SERPL-SCNC: 137 MMOL/L (ref 135–145)
WBC # BLD AUTO: 10.3 10E3/UL (ref 4–11)

## 2024-10-14 PROCEDURE — 97750 PHYSICAL PERFORMANCE TEST: CPT | Mod: GP | Performed by: PHYSICAL THERAPIST

## 2024-10-14 PROCEDURE — 80048 BASIC METABOLIC PNL TOTAL CA: CPT | Performed by: PHYSICIAN ASSISTANT

## 2024-10-14 PROCEDURE — 128N000003 HC R&B REHAB

## 2024-10-14 PROCEDURE — 99231 SBSQ HOSP IP/OBS SF/LOW 25: CPT | Performed by: PHYSICIAN ASSISTANT

## 2024-10-14 PROCEDURE — 36415 COLL VENOUS BLD VENIPUNCTURE: CPT | Performed by: PHYSICIAN ASSISTANT

## 2024-10-14 PROCEDURE — 97130 THER IVNTJ EA ADDL 15 MIN: CPT | Mod: GN

## 2024-10-14 PROCEDURE — 250N000013 HC RX MED GY IP 250 OP 250 PS 637: Performed by: PHYSICAL MEDICINE & REHABILITATION

## 2024-10-14 PROCEDURE — 85027 COMPLETE CBC AUTOMATED: CPT | Performed by: PHYSICIAN ASSISTANT

## 2024-10-14 PROCEDURE — 250N000013 HC RX MED GY IP 250 OP 250 PS 637: Performed by: PHYSICIAN ASSISTANT

## 2024-10-14 PROCEDURE — 92507 TX SP LANG VOICE COMM INDIV: CPT | Mod: GN

## 2024-10-14 PROCEDURE — 97530 THERAPEUTIC ACTIVITIES: CPT | Mod: GP | Performed by: PHYSICAL THERAPIST

## 2024-10-14 PROCEDURE — 97129 THER IVNTJ 1ST 15 MIN: CPT | Mod: GN

## 2024-10-14 RX ADMIN — ASPIRIN 81 MG CHEWABLE TABLET 81 MG: 81 TABLET CHEWABLE at 09:18

## 2024-10-14 RX ADMIN — Medication 1 TABLET: at 09:24

## 2024-10-14 RX ADMIN — ATORVASTATIN CALCIUM 40 MG: 40 TABLET, FILM COATED ORAL at 09:24

## 2024-10-14 RX ADMIN — LISINOPRIL 40 MG: 40 TABLET ORAL at 09:23

## 2024-10-14 RX ADMIN — AMLODIPINE BESYLATE 5 MG: 5 TABLET ORAL at 20:38

## 2024-10-14 RX ADMIN — FAMOTIDINE 20 MG: 20 TABLET ORAL at 09:24

## 2024-10-14 RX ADMIN — FAMOTIDINE 20 MG: 20 TABLET ORAL at 20:38

## 2024-10-14 RX ADMIN — HYDROCHLOROTHIAZIDE 12.5 MG: 12.5 TABLET ORAL at 09:23

## 2024-10-14 ASSESSMENT — ACTIVITIES OF DAILY LIVING (ADL)
ADLS_ACUITY_SCORE: 34
ADLS_ACUITY_SCORE: 38
ADLS_ACUITY_SCORE: 34
ADLS_ACUITY_SCORE: 38
ADLS_ACUITY_SCORE: 34
ADLS_ACUITY_SCORE: 38
ADLS_ACUITY_SCORE: 38
ADLS_ACUITY_SCORE: 34
ADLS_ACUITY_SCORE: 34
ADLS_ACUITY_SCORE: 38
ADLS_ACUITY_SCORE: 34
ADLS_ACUITY_SCORE: 38
ADLS_ACUITY_SCORE: 34

## 2024-10-14 NOTE — PROGRESS NOTES
[facility]   Acute Rehabilitation Unit  Daily progress note    INTERVAL HISTORY  Chika Hurd was seen at bedside this afternoon. Her  and daughter were present and very supportive. She was doing well. Answered questions with yes/no. Asked her to examine skin under abdominal fold per nursing request.    She is SBA for most ADLs and mobility; able to ambulate short distances with FWW. Aphasia is her main barrier for functional independence.         MEDICATIONS  Current Facility-Administered Medications   Medication Dose Route Frequency Provider Last Rate Last Admin    amLODIPine (NORVASC) tablet 5 mg  5 mg Oral QPM Vaishnavi Pacheco PA-C   5 mg at 10/12/24 2013    aspirin (ASA) chewable tablet 81 mg  81 mg Oral Daily Vaishnavi Pacheco PA-C   81 mg at 10/13/24 0939    atorvastatin (LIPITOR) tablet 40 mg  40 mg Oral Daily Vaishnavi Pacheco PA-C   40 mg at 10/13/24 0940    famotidine (PEPCID) tablet 20 mg  20 mg Oral BID Michael Self DO   20 mg at 10/13/24 2000    hydroCHLOROthiazide tablet 12.5 mg  12.5 mg Oral Daily Yusuf Sapp MD   12.5 mg at 10/13/24 0940    lisinopril (ZESTRIL) tablet 40 mg  40 mg Oral Daily Vaishnavi Pacheco PA-C   40 mg at 10/13/24 0940    multivitamin w/minerals (THERA-VIT-M) tablet 1 tablet  1 tablet Oral Daily Vaishnavi Pacheco PA-C   1 tablet at 10/13/24 0940          Current Facility-Administered Medications   Medication Dose Route Frequency Provider Last Rate Last Admin    acetaminophen (TYLENOL) tablet 650 mg  650 mg Oral Q4H PRN Vaishnavi Pacheco PA-C        diclofenac (VOLTAREN) 1 % topical gel 4 g  4 g Topical 4x Daily PRN Vaishnavi Pacheco PA-C        melatonin tablet 3 mg  3 mg Oral At Bedtime PRN Vaishnavi Pacheco PA-C        methocarbamol (ROBAXIN) tablet 500 mg  500 mg Oral 4x Daily PRN Yusuf Sapp MD        polyethylene glycol (MIRALAX) Packet 17 g  17 g Oral Daily PRN Junior  Vaishnavi CASTRO PA-C        senna-docusate (SENOKOT-S/PERICOLACE) 8.6-50 MG per tablet 1-2 tablet  1-2 tablet Oral BID PRN Vaishnavi Pacheco PA-C            PHYSICAL EXAM  /42 (BP Location: Right arm, Patient Position: Sitting, Cuff Size: Adult Regular)   Pulse 80   Temp 97.5  F (36.4  C) (Oral)   Resp 16   Wt 90 kg (198 lb 6.6 oz)   LMP  (LMP Unknown)   SpO2 96%   BMI 38.75 kg/m    Gen: NAD, sitting in c=bed  HEENT: NC/AT  Pulm: non-labored on room air  Abd: soft, non-tender, non-distended - noted mild erythema and skin maceration under the abdominal fold   Ext: no edema in bilateral lower extremities and no tenderness at calves   Neuro/MSK: aphasic, was seen ambulating in therapies       LABS  CBC RESULTS:   Recent Labs   Lab Test 10/10/24  0632 10/07/24  0606 10/03/24  0614   WBC 11.5* 10.9 12.3*   RBC 4.08 4.14 4.06   HGB 12.2 12.2 12.2   HCT 37.1 37.4 36.9   MCV 91 90 91   MCH 29.9 29.5 30.0   MCHC 32.9 32.6 33.1   RDW 13.9 13.8 13.6    454* 346       Last Basic Metabolic Panel:  Recent Labs   Lab Test 10/10/24  0632 10/09/24  0911 10/07/24  0606 10/03/24  0614   *  --  139 134*   POTASSIUM 4.5  --  4.7 4.1   CHLORIDE 100  --  103 96*   CO2 29  --  26 26   ANIONGAP 4*  --  10 12   GLC 94 95 100* 100*   BUN 18.6  --  23.4* 34.5*   CR 0.61  --  0.62 0.78   GFRESTIMATED >90  --  >90 78   KAR 9.3  --  9.6 9.4         Rehabilitation   Admission to acute inpatient rehab 10/01/24.    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       Impairment of ADL's: Noted to have BLE weakness, impaired balance, impaired coordination, impaired activity tolerance, impaired alertness, and impaired cognition, all affecting her  ability to safely and independently perform basic ADLs.  Goal for SBA with basic ADLs except min A for bathing.      Impairment of mobility:  Noted to have BLE weakness, impaired balance, impaired coordination, impaired activity tolerance, impaired alertness, and impaired cognition, all affecting her ability to safely and independently perform basic mobility.  Goal for SBA with basic mobility with min A for stairs.      Impairment of cognition/language/swallow:  Noted to have dysphagia, dysarthria, aphasia, and impaired cognition with goals for safe tolerance of least restrictive diet and improved cognitive-linguistic skills to meet basic needs.     Continue comprehensive acute inpatient rehabilitation program with multidisciplinary approach including therapies, rehab nursing, and physiatry following. See interval history for updates.      ASSESSMENT AND PLAN  Chika Hurd is a 79 year old right hand dominant female with a past medical history of prior stroke (10/2023; left frontal lobe and corona radiata), hyperlipidemia, hypertension, central retinal artery occlusion of right eye, left breast cancer, GERD, prediabetes, sensorineural hearing loss, and obesity who was admitted on 9/25/24 with acute left basal ganglia hemorrhage with hospital course complicated by subacute stroke in left periatrial white matter, incidental saccular aneurysm, constipation, and conjunctivitis.  She was admitted to ARU on 10/1/24 for multidisciplinary rehabilitation and ongoing medical management.      Medical Conditions  New actions/orders/updates for today are in blue.     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  Presented with new gait instability and worsened dysarthria.  Imaging findings as above.  PTA on Plavix + aspirin, plan had been for 3 months.  Per sending team, does not need to resume Plavix but ok'd to resume aspirin daily on 10/1.  - Continue ASA 81 mg daily (resumed on 10/1)  - Continue statin with LDL goal 40-70  - SBP goal 130-150 as inpatient, long-term outpatient goal <130/80.  Management as below.  - Repeat video swallow study on 10/7 with intermittent penetration with thin liquids but immediate ejection.  Per SLP, advanced to thins after bedside assessment and good tolerance.  Advanced to regular diet per SLP 10/11.  - Ziopatch x14 days  - Continue PT/OT/SLP  - Follow up with neurology in 6-8 weeks     Hyperlipidemia  - LDL goal 40-70 (LDL 55 on 9/26)  - Continue PTA atorvastatin 40 mg daily     Hypertension  - Continue hydrochlorothiazide 12.5 mg (decreased 10/4 due to soft blood pressures and mild hyponatremia)  - Continue PTA lisinopril 40 mg daily  - Continue amlodipine 5 mg daily (added 10/1)  - Monitor BP and adjust regimen as indicated     Hyponatremia  Mild at 134 on 10/1.  Suspect related to hydrochlorothiazide, mild hypovolemia.  BUN mildly elevated.  On slightly thick liquids.  10/10: Na mildly low at 133.  Suspect mild hypovolemia given WBC also mildly elevated at 11.5. clinically stable; will monitor and repeat labs on Monday. Encourage hydration.   - Monitor BMP every M/Th     GERD  - Continue PTA famotidine 20 mg BID     Constipation  In setting of decreased mobility  Last BM on 10/13  - Continue Miralax daily  - Monitor, additional PRN bowel meds available, can schedule if ongoing issues     Prediabetes  A1c 6.0% on 9/27/24  - Follow up with PCP for ongoing surveillance     Bilateral conjunctivitis, resolved  Started on ofloxacin at hospital on 9/29 due to purulent  discharge from bilateral eyes with associated pain.  Completed course of ofloxacin as of 10/4.  No recurrent issues at ARU.    Skin   Mild erythema and skin maceration under the abdominal fold; noted and examined on 10/13. Placed new patient care order and will monitor         Adjustment to disability:  Clinical psychology to eval and treat if indicated  FEN: advanced to regular diet, continue thin liquids; swallow strategies per SLP  Bowel: continent, recent constipation.  Continue scheduled Miralax.  Additional PRN bowel medications available.  Monitor and adjust regimen as indicated.  Bladder: continent/incontinent.  Timed toileting implemented on 10/7.  Wean/avoid Purewick use.  DVT Prophylaxis: mechanical  GI Prophylaxis: pepcid  Code: full; as prior  Disposition: goal for home but some challenges with support especially as daughter caring for patient's spouse who has advanced dementia  ELOS: target 10/22/24 pending progress  Follow up Appointments on Discharge: PCP in 1-2 weeks, general neurology or stroke REUBEN in 6-8 weeks        Melisa Butterfield MD  Physical Medicine & Rehabilitation

## 2024-10-14 NOTE — PLAN OF CARE
"Discharge Planner Post-Acute Rehab PT:      Discharge Plan: Home with Home vs OP PT. 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     Precautions: fall, L knee pain/OA, mild aphasia, [place] (has hearing aides)     Current Status:  Bed Mobility: IND  Transfer: Mod I with FWW  Gait: Mod I with FWW, up to 215 ft  Stairs: 6x8\" steps with single rail and min A  Balance: Fair static, fair/poor dynamic standing balance w/out UE support     Outcome Measures:   30 second sit to stand  10/2: 0  10/9: 6x with UE assist  PASS               10/5: 7/36   10/14: 30/36  Cu[place]              10/5: 3/56   10/14: 33/56  10mWT   10/9: 0.13 m/sec comfortable, 0.24 m/sec fast with FWW and CGA/SBA    Assessment:  Updated to mod I with FWW, as pt demonstrating significantly improved functional mobility (PASS from 7 >> 30/36), standing balance (Cueto from 3 >> 33/56), and insight into current deficits/impairments compared to start of care. Able to manage brief and clothing for toileting without added assist or LOB, and able to verbalize w/out cueing to perform upper and lower body dressing seated in chair with back for added stability.     Other Barriers to Discharge (DME, Family Training, etc):   Equipment: Plan to issue FWW vs 4WW.  Family training to be scheduled to cover safety on stairs.  1 KIP with rail. Split level with single rail.  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  "

## 2024-10-14 NOTE — PROGRESS NOTES
"  [facility]   Acute Rehabilitation Unit  Daily progress note    INTERVAL HISTORY  Weekend and therapy notes reviewed, no acute events reported.    Chika Hurd was seen at bedside this morning, taking medications.  She notes some difficulty swallowing pills this morning, keeps finding them in her mouth despite efforts to swallow, though eventually resolved with liquid wash and a few bites of her fruit cup.  She notes difficulty with this after her initial stroke and had to take pills with applesauce.  She denies more recent difficulty with this.  She overall is feeling good, notes progress with therapies though wishes it were faster.  She is sleeping well overall, though woke early this morning.  She notes some mild intermittent dizziness with position changes.  She denies any headache, chest pain/tightness, shortness of breath, abdominal pain, nausea.  Last BM was today.  She is still using Purewick at night.  She notes that therapies feel she is near progressing to mod I, which she would appreciate.  She notes some bladder urgency and finds it difficult to wait especially at night.  Discussed goals to wean Purewick, especially if able to progress to mod I.  She remains concerned about discharge plan.  Expresses that her daughter is \"overwhelmed\" with caring for her spouse.    With therapies, she is needing SBA to min A for bed mobility, SBA for transfers with FWW, SBA to ambulate up to 160' with FWW.  She needed min A for ascent of 8\" steps and close CGA for descent sideways.  With SLP, she engaged in ELENITA subtests- Reading a clock, 100% IND accuracy. Counting money, 80% IND acccuracy. Longer processing time duirng money counting activity, but able to come up with answer with repetition of prompts and min verbal cueing.       MEDICATIONS  Current Facility-Administered Medications   Medication Dose Route Frequency Provider Last Rate Last Admin    amLODIPine (NORVASC) " tablet 5 mg  5 mg Oral QPM Vaishnavi Pacheco PA-C   5 mg at 10/12/24 2013    aspirin (ASA) chewable tablet 81 mg  81 mg Oral Daily Vaishnavi Pacheco PA-C   81 mg at 10/13/24 0939    atorvastatin (LIPITOR) tablet 40 mg  40 mg Oral Daily Vaishnavi Pacheco PA-C   40 mg at 10/13/24 0940    famotidine (PEPCID) tablet 20 mg  20 mg Oral BID Michael Self DO   20 mg at 10/13/24 2000    hydroCHLOROthiazide tablet 12.5 mg  12.5 mg Oral Daily Yusuf Sapp MD   12.5 mg at 10/13/24 0940    lisinopril (ZESTRIL) tablet 40 mg  40 mg Oral Daily Vaishnavi Pacheco PA-C   40 mg at 10/13/24 0940    multivitamin w/minerals (THERA-VIT-M) tablet 1 tablet  1 tablet Oral Daily Vaishnavi Pacheco PA-C   1 tablet at 10/13/24 0940          Current Facility-Administered Medications   Medication Dose Route Frequency Provider Last Rate Last Admin    acetaminophen (TYLENOL) tablet 650 mg  650 mg Oral Q4H PRN Vaishnavi Pacheco PA-C        diclofenac (VOLTAREN) 1 % topical gel 4 g  4 g Topical 4x Daily PRN Vaishnavi Pacheco PA-C        melatonin tablet 3 mg  3 mg Oral At Bedtime PRN Vaishnavi Pacheco PA-C        methocarbamol (ROBAXIN) tablet 500 mg  500 mg Oral 4x Daily PRN Yusuf Sapp MD        polyethylene glycol (MIRALAX) Packet 17 g  17 g Oral Daily PRN Vaishnavi Pacheco PA-C        senna-docusate (SENOKOT-S/PERICOLACE) 8.6-50 MG per tablet 1-2 tablet  1-2 tablet Oral BID PRN Vaishnavi Pacheco PA-C            PHYSICAL EXAM  /52 (BP Location: Right arm)   Pulse 74   Temp 97.6  F (36.4  C) (Oral)   Resp 16   Wt 90 kg (198 lb 6.6 oz)   LMP  (LMP Unknown)   SpO2 94%   BMI 38.75 kg/m    Gen: NAD, sitting in bed  HEENT: NC/AT  Cardio: RRR, no murmurs  Pulm: non-labored on room air, lungs CTA bilaterally  Abd: soft, non-tender, non-distended, bowel sounds present  Ext: no edema in bilateral lower extremities and no tenderness at calves   Neuro/MSK: awake, alert, left ptosis, right  facial droop, +dysarthria, +aphasia.  Strength at least 4/5 in all muscle groups of all 4 extremities with exception of 2/5 in bilateral HF.       LABS  CBC RESULTS:   Recent Labs   Lab Test 10/14/24  0633 10/10/24  0632 10/07/24  0606   WBC 10.3 11.5* 10.9   RBC 4.14 4.08 4.14   HGB 12.7 12.2 12.2   HCT 38.2 37.1 37.4   MCV 92 91 90   MCH 30.7 29.9 29.5   MCHC 33.2 32.9 32.6   RDW 14.4 13.9 13.8    424 454*       Last Basic Metabolic Panel:  Recent Labs   Lab Test 10/14/24  0633 10/10/24  0632 10/09/24  0911 10/07/24  0606    133*  --  139   POTASSIUM 4.7 4.5  --  4.7   CHLORIDE 102 100  --  103   CO2 26 29  --  26   ANIONGAP 9 4*  --  10   GLC 97 94 95 100*   BUN 12.2 18.6  --  23.4*   CR 0.62 0.61  --  0.62   GFRESTIMATED >90 >90  --  >90   KAR 8.8 9.3  --  9.6         Rehabilitation   Admission to acute inpatient rehab 10/01/24.    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       Impairment of ADL's: Noted to have BLE weakness, impaired balance, impaired coordination, impaired activity tolerance, impaired alertness, and impaired cognition, all affecting her ability to safely and independently perform basic ADLs.  Goal for SBA with basic ADLs except min A for bathing.      Impairment of mobility:  Noted to have BLE weakness, impaired balance, impaired coordination, impaired activity tolerance, impaired alertness, and impaired cognition, all affecting her ability to safely and independently perform basic mobility.  Goal for SBA with basic mobility with min A for stairs.      Impairment of cognition/language/swallow:  Noted to have dysphagia, dysarthria, aphasia, and impaired cognition with goals for safe tolerance of least  restrictive diet and improved cognitive-linguistic skills to meet basic needs.     Continue comprehensive acute inpatient rehabilitation program with multidisciplinary approach including therapies, rehab nursing, and physiatry following. See interval history for updates.      ASSESSMENT AND PLAN  Chika Hurd is a 79 year old right hand dominant female with a past medical history of prior stroke (10/2023; left frontal lobe and corona radiata), hyperlipidemia, hypertension, central retinal artery occlusion of right eye, left breast cancer, GERD, prediabetes, sensorineural hearing loss, and obesity who was admitted on 9/25/24 with acute left basal ganglia hemorrhage with hospital course complicated by subacute stroke in left periatrial white matter, incidental saccular aneurysm, constipation, and conjunctivitis.  She was admitted to ARU on 10/1/24 for multidisciplinary rehabilitation and ongoing medical management.      Medical Conditions  New actions/orders/updates for today are in blue.     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  Presented with new gait instability and worsened dysarthria.  Imaging findings as above.  PTA on Plavix + aspirin, plan had been for 3 months.  Per sending team, does not need to resume Plavix but ok'd to resume aspirin daily on 10/1.  - Continue ASA 81 mg daily (resumed on 10/1)  - Continue statin with LDL goal 40-70  - SBP goal 130-150 as inpatient, long-term outpatient goal <130/80.  Management as below.  - Repeat video swallow study on 10/7  with intermittent penetration with thin liquids but immediate ejection.  Per SLP, advanced to thins after bedside assessment and good tolerance.  Advanced to regular diet per SLP 10/11.  - Ziopatch x14 days  - Continue PT/OT/SLP  - Follow up with neurology in 6-8 weeks     Hyperlipidemia  - LDL goal 40-70 (LDL 55 on 9/26)  - Continue PTA atorvastatin 40 mg daily     Hypertension  - Continue hydrochlorothiazide 12.5 mg (decreased 10/4 due to soft blood pressures and mild hyponatremia)  - Continue PTA lisinopril 40 mg daily  - Continue amlodipine 5 mg daily (added 10/1)  - Monitor BP and adjust regimen as indicated     Hyponatremia  Mild at 134 on 10/1.  Suspect related to hydrochlorothiazide, mild hypovolemia.  BUN mildly elevated.  On slightly thick liquids.  10/14: Na WNL   - Monitor BMP every M/Th     GERD  - Continue PTA famotidine 20 mg BID     Constipation  In setting of decreased mobility  - Continue Miralax daily  - Monitor, additional PRN bowel meds available, can schedule if ongoing issues     Prediabetes  A1c 6.0% on 9/27/24  - Follow up with PCP for ongoing surveillance     Bilateral conjunctivitis, resolved  Started on ofloxacin at hospital on 9/29 due to purulent discharge from bilateral eyes with associated pain.  Completed course of ofloxacin as of 10/4.  No recurrent issues at ARU.    Skin   Mild erythema and skin maceration under the abdominal fold; noted and examined on 10/13. Placed new patient care order and will monitor         Adjustment to disability:  Clinical psychology to eval and treat if indicated  FEN: regular diet, continue thin liquids; swallow strategies per SLP  Bowel: continent, recent constipation.  Continue scheduled Miralax.  Additional PRN bowel medications available.  Monitor and adjust regimen as indicated.  Bladder: continent/incontinent.  Timed toileting implemented on 10/7.  Wean/avoid Purewick use.  DVT Prophylaxis: mechanical  GI Prophylaxis: pepcid  Code: full; as  prior  Disposition: goal for home but some challenges with support especially as daughter caring for patient's spouse who has advanced dementia  ELOS: target 10/22/24 pending progress  Follow up Appointments on Discharge: PCP in 1-2 weeks, general neurology or stroke REUBEN in 6-8 weeks      25 minutes spent on the date of service doing chart review, history and exam, documentation, and further activities as noted above.    Plan of care was discussed with Dr. Thad Sapp, PM&R staff physician     Vaishnavi Pacheco PA-C  Physical Medicine & Rehabilitation

## 2024-10-14 NOTE — PLAN OF CARE
Discharge Planner Post-Acute Rehab SLP:      Discharge Plan: [facility]ILA. Ongoing SLP     Precautions: fall risk     Current Status:  Hearing: Sensorineural hearing loss, bilateral hearing aides.  Vision: Readers  Communication: Mild to moderate motor speech deficits, Mild expressive and receptive aphasia.  Cognition: To be evaluated in coming days  Swallow: Regular/ thin liquids (0), pt to sit upright, small/single bites and sips, alternate liquids/solids - Swallow goals met 10/13.     Assessment: Engaged in ELENITA subtests- Reading a clock, 100% IND accuracy. Counting money, 80% IND acccuracy. Longer processing time duirng money counting activity, but able to come up with answer with repetition of prompts and min verbal cueing.     Other Barriers to Discharge (Family Training, etc):none anticipated from SLP

## 2024-10-14 NOTE — PROGRESS NOTES
10/14/24 1600   Signing Clinician's Name / Credentials   Signing clinician's name / credentials Howie Smart DPT   Cueto Balance Scale (MARTI BURR, SUSAN S, SANTOS SMALLWOOD, NEETU MARTINEZ: MEASURING BALANCE IN THE ELDERLY: VALIDATION OF AN INSTRUMENT. CAN. J. PUB. HEALTH, JULY/AUGUST SUPPLEMENT 2:S7-11, 1992.)   Sit To Stand 4   Standing Unsupported 3   Sitting Unsupported 4   Stand to Sit 4   Transfers 4   Standing with Eyes Closed 3   Standing Unsupported, Feet Together 3   Reach Forward With Outstretched Arm 2   Retrieve Object From Floor 3   Turning to Look Behind 1   Turn 360 Degrees 0   Placing Alternate Foot on Stool (4-6 inches) 0   Unsupported Tandem Stand (Demonstrate to Subject) 2   One Leg Stand 0   Total Score (A score of 45 or less has been correlated with an increased risk of falls)   Total Score (out of 56) 33         Cueto Balance Scale (BBS) Cutoff Scores for CVA Population:    The BBS is a measure of static and dynamic standing balance that has been validated in community dwelling elderly individuals and individuals who have Parkinson's Disease, MS, and those who are s/p CVA and TBI. The test is administered without an assistive device. Scores from the Cueto are used to determine the probability of falling based on the patient's previous history of falls and their test performance.     0-20 High risk for falling- Corresponded with w/c bound status  21-40 Medium risk for falling- Able to walk with assistance  41-56 Low risk for falling- Able to walk independently  According to [facility].  Available at http://www.strokecenter.org/.  Accessibility verified April 10, 2013.  Minimal Detectable Change = 6.5 according to Ian & Seney 2008        Postural Assessment for Stroke Scale (PASS)    Maintaining posture -   1) Sitting without support - 3  2) Standing with support (feet position free) - 3  3) Standing without support (feet position free) - 3  4) Standing on nonparetic leg - 0  5)  Standing on paretic leg - 0    Changing posture -  6) Rolling supine -> affected side - 3  7) Rolling supine -> unaffected side - 3  8) Sup->sitting edge of bed - 3  9) Sitting edge of bed -> sup - 3  10) Sit->stand without support - 3  11) Stand->sit without support - 3  12) Standing,  pencil from floor without support - 3    Total Score - 30/36    The PASS assesses a patient's ability to change and maintain posture during different balance activities. It is not associated with falls risk, but is used to track progress with the patient's ability to control their posture and balance throughout the course of the patient's admission.    A score of <22 points at admission to Acute Rehab is predictive that pt is not likely to be walking independently at 3 months.   (Vega et al. 2021. Postural Maintenance Is Associated with Walking Ability in People Received Acute Rehabilitation after Stroke)

## 2024-10-14 NOTE — PLAN OF CARE
/52 (BP Location: Right arm)   Pulse 74   Temp 97.6  F (36.4  C) (Oral)   Resp 16   Wt 90 kg (198 lb 6.6 oz)   LMP  (LMP Unknown)   SpO2 94%   BMI 38.75 kg/m    O2>90% ORA, denies feeling SOB, lightheadedness. Lungs clear, equal bilateral    Output: Voids spontaneously, denies difficulties    Last BM: 10/14/24   Activity: JC in room   Up for meals? Yes   Skin: Open areas under pannus, barrier cream in place   Pain: Denies pain   CMS: CMS intact, denies N&T   Dressing: N/A   Diet: Reg, denies nausea and vomiting    LDA: N/A   Plan: Continue with plan of care   Additional Info: Pt on seizure precautions, A&Ox4 with mild aphasia, [facility]          Goal Outcome Evaluation:      Plan of Care Reviewed With: patient    Overall Patient Progress: no change Overall Patient Progress: no change

## 2024-10-14 NOTE — PLAN OF CARE
Goal Outcome Evaluation:      Plan of Care Reviewed With: patient    Overall Patient Progress: no changeOverall Patient Progress: no change  Orientation: alert and oriented with slight garble speech. [place] with hearing aids  O2 use:denies of SOB; on room air  Pain:denies of pain  Diet:regular diet thin liquid  Bladder:mixed continence with bladder.. up to toilet at bedtime; purewick at night as per patient's request  Bowel: continent. no BM this shift. Last BM 10/13/24  Ambulation/Transfer: SBA with FWW  Skin: small open area under panus. Cleansed and barrier applied  Safety:fall precaution in place

## 2024-10-15 ENCOUNTER — APPOINTMENT (OUTPATIENT)
Dept: PHYSICAL THERAPY | Facility: CLINIC | Age: 79
DRG: 057 | End: 2024-10-15
Attending: PHYSICAL MEDICINE & REHABILITATION
Payer: MEDICARE

## 2024-10-15 ENCOUNTER — APPOINTMENT (OUTPATIENT)
Dept: OCCUPATIONAL THERAPY | Facility: CLINIC | Age: 79
DRG: 057 | End: 2024-10-15
Attending: PHYSICAL MEDICINE & REHABILITATION
Payer: MEDICARE

## 2024-10-15 ENCOUNTER — APPOINTMENT (OUTPATIENT)
Dept: SPEECH THERAPY | Facility: CLINIC | Age: 79
DRG: 057 | End: 2024-10-15
Attending: PHYSICAL MEDICINE & REHABILITATION
Payer: MEDICARE

## 2024-10-15 PROCEDURE — 250N000013 HC RX MED GY IP 250 OP 250 PS 637: Performed by: PHYSICAL MEDICINE & REHABILITATION

## 2024-10-15 PROCEDURE — 97110 THERAPEUTIC EXERCISES: CPT | Mod: GP | Performed by: PHYSICAL THERAPIST

## 2024-10-15 PROCEDURE — 97535 SELF CARE MNGMENT TRAINING: CPT | Mod: GO | Performed by: OCCUPATIONAL THERAPIST

## 2024-10-15 PROCEDURE — 97530 THERAPEUTIC ACTIVITIES: CPT | Mod: GP | Performed by: PHYSICAL THERAPIST

## 2024-10-15 PROCEDURE — 97129 THER IVNTJ 1ST 15 MIN: CPT | Mod: GN

## 2024-10-15 PROCEDURE — 99231 SBSQ HOSP IP/OBS SF/LOW 25: CPT | Performed by: PHYSICIAN ASSISTANT

## 2024-10-15 PROCEDURE — 128N000003 HC R&B REHAB

## 2024-10-15 PROCEDURE — 97130 THER IVNTJ EA ADDL 15 MIN: CPT | Mod: GN

## 2024-10-15 PROCEDURE — 97530 THERAPEUTIC ACTIVITIES: CPT | Mod: GO | Performed by: OCCUPATIONAL THERAPIST

## 2024-10-15 PROCEDURE — 97116 GAIT TRAINING THERAPY: CPT | Mod: GP | Performed by: REHABILITATION PRACTITIONER

## 2024-10-15 PROCEDURE — 250N000013 HC RX MED GY IP 250 OP 250 PS 637: Performed by: PHYSICIAN ASSISTANT

## 2024-10-15 RX ADMIN — Medication 1 TABLET: at 08:29

## 2024-10-15 RX ADMIN — LISINOPRIL 40 MG: 40 TABLET ORAL at 08:29

## 2024-10-15 RX ADMIN — FAMOTIDINE 20 MG: 20 TABLET ORAL at 21:30

## 2024-10-15 RX ADMIN — FAMOTIDINE 20 MG: 20 TABLET ORAL at 08:29

## 2024-10-15 RX ADMIN — ASPIRIN 81 MG CHEWABLE TABLET 81 MG: 81 TABLET CHEWABLE at 08:29

## 2024-10-15 RX ADMIN — HYDROCHLOROTHIAZIDE 12.5 MG: 12.5 TABLET ORAL at 08:29

## 2024-10-15 RX ADMIN — ATORVASTATIN CALCIUM 40 MG: 40 TABLET, FILM COATED ORAL at 08:29

## 2024-10-15 ASSESSMENT — ACTIVITIES OF DAILY LIVING (ADL)
ADLS_ACUITY_SCORE: 37
ADLS_ACUITY_SCORE: 34
ADLS_ACUITY_SCORE: 37
ADLS_ACUITY_SCORE: 34
ADLS_ACUITY_SCORE: 36
ADLS_ACUITY_SCORE: 37
ADLS_ACUITY_SCORE: 34
ADLS_ACUITY_SCORE: 34
ADLS_ACUITY_SCORE: 37
ADLS_ACUITY_SCORE: 34

## 2024-10-15 NOTE — PLAN OF CARE
Discharge Planner Post-Acute Rehab OT:      Discharge Plan: Home with HC OT services vs TCU pending progress     Precautions: Falls, Left low back and knee pain, [place] - needs hearing aids     Current Status:  ADLs:  Mobility: MOD I with fww  Grooming: MOD I standing at sink w/ fww for washing hands/face  Dressing: UB: SBA seated. LB: Min-MOD A with AE seated; Feet: SBA using figure four method in w/c  Bathing: Transfer Ax1 Aleah Stedy <> blue + white rolling shower chair. Mod A tasks.  Toileting: MOD I w/ fww to walk to/from BR; SBA on/off raised toilet w/ gb and fww;  MOD I pericare standing with gb, though pt calling for assistance prn  IADLs: IND prior. Daughter assisted with yard work and grocery shopping.      Vision/Cognition: Pt wears glasses's at baseline as well as visual deficits at baseline from previous stroke. Pt completed SLUMS examinaiton with an overall score of 18/30, indicating cognitive deficits (normal range 25-30). Pt showed errors that indicate decreased short-term memory and procressing speed (e.g. short-term recall, naming animals, drawing correct time on clock); note that aphasia may contribute to score on formal assessment.      Assessment: Pt reporting difficulty with LBD, especially daphney/doffing briefs; modified environment with AE/briefs on chair close to toilet or w/c- continue to problem solve. Pt completed cognitive intervention in PM due to fatigue from earlier therapy sessions. Pt demonstrated some limitations in STM and divided attention, however demonstrated adequate initiation and turn taking skills. Continue compensatory edu for memory challenges.      Other Barriers to Discharge (DME, Family Training, etc):   FT: TBD  OT: Per chart review, pt lives in a split level home in [place] [place] [facility] with spouse. 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 Pt has a tub/shower and combo and walk in shower with grab bars. Pt uses walk in shower. Pt has an elevated toilet seat and a regular toilet  seat with vanity nearby. Pt reports having a walk out on the lower level.   Barriers to home: stairs  Need: shower chair for walk in shower

## 2024-10-15 NOTE — PROGRESS NOTES
"  [facility]   Acute Rehabilitation Unit  Daily progress note    INTERVAL HISTORY  Chika Hurd was seen at bedside this morning.  No acute events reported overnight.  She notes that she did not sleep very well last night, states this is because she woke several times to urinate.  She notes some urinary frequency and urgency.  At home, she does endorse frequency but was able to manage since her mobility was better at that time.  She denies dysuria, abdominal/pelvic pain, nausea.  Discussed some behavioral modifications such as limiting fluids before bedtime, voiding before bedtime.  She is open to trying those.  She otherwise states she is feeling well overall, though \"adjusting\" to her new situation of needing assist for some things.  She notes no BM for a couple days.  Not necessarily feeling constipated but reports that at home she was very regular with 1-2 BM per day.  She denies any headache or other pain, dizziness or lightheadedness, chest discomfort/tightness, shortness of breath.  She denies other questions or concerns.    With therapies, she has now progressed to mod I in room.  With SLP, she engaged in ELENITA subtests of numerical reasonin, filling out envelope and paying a bill. Able to complete each task with % accuracy. Micrographia but legible. Patient recognizes her writing is impaired as compared to baseline.      MEDICATIONS  Current Facility-Administered Medications   Medication Dose Route Frequency Provider Last Rate Last Admin    amLODIPine (NORVASC) tablet 5 mg  5 mg Oral QPM Vaishnavi Pacheco PA-C   5 mg at 10/14/24 2038    aspirin (ASA) chewable tablet 81 mg  81 mg Oral Daily Vaishnavi Pacheco PA-C   81 mg at 10/14/24 0918    atorvastatin (LIPITOR) tablet 40 mg  40 mg Oral Daily Vaishnavi Pacheco PA-C   40 mg at 10/14/24 0924    famotidine (PEPCID) tablet 20 mg  20 mg Oral BID Michael Self DO   20 mg at 10/14/24 2038    " hydroCHLOROthiazide tablet 12.5 mg  12.5 mg Oral Daily Yusuf Sapp MD   12.5 mg at 10/14/24 0923    lisinopril (ZESTRIL) tablet 40 mg  40 mg Oral Daily Vaishnavi Pacheco PA-C   40 mg at 10/14/24 0923    multivitamin w/minerals (THERA-VIT-M) tablet 1 tablet  1 tablet Oral Daily Vaishnavi Pacheco PA-C   1 tablet at 10/14/24 0924          Current Facility-Administered Medications   Medication Dose Route Frequency Provider Last Rate Last Admin    acetaminophen (TYLENOL) tablet 650 mg  650 mg Oral Q4H PRN Vaishnavi Pacheco PA-C        diclofenac (VOLTAREN) 1 % topical gel 4 g  4 g Topical 4x Daily PRN Vaishnavi Pacheco PA-C        melatonin tablet 3 mg  3 mg Oral At Bedtime PRN Vaishnavi Pacheco PA-C        methocarbamol (ROBAXIN) tablet 500 mg  500 mg Oral 4x Daily PRN Yusuf Sapp MD        polyethylene glycol (MIRALAX) Packet 17 g  17 g Oral Daily PRN Vaishnavi Pacheco PA-C        senna-docusate (SENOKOT-S/PERICOLACE) 8.6-50 MG per tablet 1-2 tablet  1-2 tablet Oral BID PRN Vaishnavi Pacheco PA-C            PHYSICAL EXAM  /52 (BP Location: Right arm)   Pulse 74   Temp 97.6  F (36.4  C) (Oral)   Resp 16   Wt 90 kg (198 lb 6.6 oz)   LMP  (LMP Unknown)   SpO2 94%   BMI 38.75 kg/m    Gen: NAD, sitting up in wheelchair  HEENT: NC/AT  Pulm: non-labored on room air  Abd: soft, non-tender, non-distended  Ext: no edema in bilateral lower extremities and no tenderness at calves   Neuro/MSK: awake, alert, left ptosis, right facial droop, +aphasia but improving.  Moving all extremities spontaneously.      LABS  CBC RESULTS:   Recent Labs   Lab Test 10/14/24  0633 10/10/24  0632 10/07/24  0606   WBC 10.3 11.5* 10.9   RBC 4.14 4.08 4.14   HGB 12.7 12.2 12.2   HCT 38.2 37.1 37.4   MCV 92 91 90   MCH 30.7 29.9 29.5   MCHC 33.2 32.9 32.6   RDW 14.4 13.9 13.8    424 454*       Last Basic Metabolic Panel:  Recent Labs   Lab Test 10/14/24  0633 10/10/24  0632 10/09/24  0911  10/07/24  0606    133*  --  139   POTASSIUM 4.7 4.5  --  4.7   CHLORIDE 102 100  --  103   CO2 26 29  --  26   ANIONGAP 9 4*  --  10   GLC 97 94 95 100*   BUN 12.2 18.6  --  23.4*   CR 0.62 0.61  --  0.62   GFRESTIMATED >90 >90  --  >90   KAR 8.8 9.3  --  9.6         Rehabilitation   Admission to acute inpatient rehab 10/01/24.    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       Impairment of ADL's: Noted to have BLE weakness, impaired balance, impaired coordination, impaired activity tolerance, impaired alertness, and impaired cognition, all affecting her ability to safely and independently perform basic ADLs.  Goal for SBA with basic ADLs except min A for bathing.      Impairment of mobility:  Noted to have BLE weakness, impaired balance, impaired coordination, impaired activity tolerance, impaired alertness, and impaired cognition, all affecting her ability to safely and independently perform basic mobility.  Goal for SBA with basic mobility with min A for stairs.      Impairment of cognition/language/swallow:  Noted to have dysphagia, dysarthria, aphasia, and impaired cognition with goals for safe tolerance of least restrictive diet and improved cognitive-linguistic skills to meet basic needs.     Continue comprehensive acute inpatient rehabilitation program with multidisciplinary approach including therapies, rehab nursing, and physiatry following. See interval history for updates.      ASSESSMENT AND PLAN  Chika Hurd is a 79 year old right hand dominant female with a past medical history of prior stroke (10/2023; left frontal lobe and corona radiata), hyperlipidemia, hypertension, central retinal artery occlusion of  right eye, left breast cancer, GERD, prediabetes, sensorineural hearing loss, and obesity who was admitted on 9/25/24 with acute left basal ganglia hemorrhage with hospital course complicated by subacute stroke in left periatrial white matter, incidental saccular aneurysm, constipation, and conjunctivitis.  She was admitted to ARU on 10/1/24 for multidisciplinary rehabilitation and ongoing medical management.      Medical Conditions  New actions/orders/updates for today are in blue.     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  Presented with new gait instability and worsened dysarthria.  Imaging findings as above.  PTA on Plavix + aspirin, plan had been for 3 months.  Per sending team, does not need to resume Plavix but ok'd to resume aspirin daily on 10/1.  - Continue ASA 81 mg daily (resumed on 10/1)  - Continue statin with LDL goal 40-70  - SBP goal 130-150 as inpatient, long-term outpatient goal <130/80.  Management as below.  - Repeat video swallow study on 10/7 with intermittent penetration with thin liquids but immediate ejection.  Per SLP, advanced to thins after bedside assessment and good tolerance.  Advanced to regular diet per SLP 10/11.  - Ziopatch x14 days  - Continue PT/OT/SLP  - Follow up with neurology in 6-8 weeks     Hyperlipidemia  - LDL goal 40-70 (LDL 55 on 9/26)  - Continue PTA atorvastatin 40 mg daily     Hypertension  - Continue hydrochlorothiazide 12.5 mg (decreased 10/4 due to soft blood pressures and mild hyponatremia)  - Continue PTA lisinopril 40 mg  daily  - Continue amlodipine 5 mg daily (added 10/1)  - Monitor BP and adjust regimen as indicated     Hyponatremia  Mild at 134 on 10/1.  Suspect related to hydrochlorothiazide, mild hypovolemia.  BUN mildly elevated.  On slightly thick liquids.  10/14: Na WNL   - Monitor BMP every M/Th     GERD  - Continue PTA famotidine 20 mg BID     Constipation  In setting of decreased mobility  - Continue Miralax daily  - Monitor, additional PRN bowel meds available, can schedule if ongoing issues     Prediabetes  A1c 6.0% on 9/27/24  - Follow up with PCP for ongoing surveillance     Bilateral conjunctivitis, resolved  Started on ofloxacin at hospital on 9/29 due to purulent discharge from bilateral eyes with associated pain.  Completed course of ofloxacin as of 10/4.  No recurrent issues at ARU.    Skin   Mild erythema and skin maceration under the abdominal fold; noted and examined on 10/13. Placed new patient care order and will monitor         Adjustment to disability:  Clinical psychology to eval and treat if indicated  FEN: regular diet, continue thin liquids; swallow strategies per SLP  Bowel: continent, recent constipation.  Continue scheduled Miralax.  Additional PRN bowel medications available.  Monitor and adjust regimen as indicated.  Bladder: continent/incontinent.  Timed toileting implemented on 10/7.  Wean/avoid Purewick use.  DVT Prophylaxis: mechanical  GI Prophylaxis: pepcid  Code: full; as prior  Disposition: goal for home but some challenges with support especially as daughter caring for patient's spouse who has advanced dementia.  Daughter is pursuing [facility] for both parents, her goal is for patient to move in there upon discharge from ARU.  ELOS: target 10/22/24  Follow up Appointments on Discharge: PCP in 1-2 weeks, general neurology or stroke REUBEN in 6-8 weeks      25 minutes spent on the date of service doing chart review, history and exam, documentation, and further activities as noted above.    Plan of  care was discussed with Dr. Thad Sapp, PM&R staff physician     MIKO Rocha-C  Physical Medicine & Rehabilitation

## 2024-10-15 NOTE — PLAN OF CARE
Goal Outcome Evaluation:    Patient is alert and oriented.  Patient transfers IND in room with walker.  Patient is on room air.  Patient continent of bowel. New order to discontinue purewick. LBM today.   Vital signs this shift B/P: 117/52, T: 97.6, P: 72, R: 18   Patient is able to make needs known, uses the call light appropriately. Continue with the plan of care.

## 2024-10-15 NOTE — PLAN OF CARE
"Discharge Planner Post-Acute Rehab PT:      Discharge Plan: Home with Home vs OP PT. 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     Precautions: fall, L knee pain/OA, mild aphasia, [place] (has hearing aides)     Current Status:  Bed Mobility: IND  Transfer: Mod I with FWW  Gait: Mod I with FWW, up to 215 ft  Stairs: 6x8\" steps with single rail and min A  Balance: Fair static, fair/poor dynamic standing balance w/out UE support     Outcome Measures:   30 second sit to stand  10/2: 0  10/9: 6x with UE assist  PASS               10/5: 7/36   10/14: 30/36  Cueto              10/5: 3/56   10/14: 33/56  10mWT   10/9: 0.13 m/sec comfortable, 0.24 m/sec fast with FWW and CGA/SBA    Assessment:  Today pt worked on 6\"x4 and 8\"x6 stairs, as well as hip thera ex. For stairs pt was SBA with 6\"x4 and min A 8\"x6, specifically going down (climbing was CGA). Going down stairs, pt struggles with the eccentric control of the quadriceps due to pain, weakness, and OA stiffness. However, pt education was provided on stair strategies, going down with the weaker leg. Pt reports feeling more stable while implementing strategies provided. Pt was able to complete all hip thera ex with little fatigue/rest breaks, need to increase resistance.    Other Barriers to Discharge (DME, Family Training, etc):   Equipment: Plan to issue FWW vs 4WW.  Family training to be scheduled to cover safety on stairs.  1 KIP with rail. Split level with single rail.  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  "

## 2024-10-15 NOTE — PLAN OF CARE
Discharge Planner Post-Acute Rehab SLP:      Discharge Plan: [facility]. Ongoing SLP     Precautions: fall risk     Current Status:  Hearing: Sensorineural hearing loss, bilateral hearing aides.  Vision: Readers  Communication: Mild to moderate motor speech deficits, Mild expressive and receptive aphasia.  Cognition: To be evaluated in coming days  Swallow: Regular/ thin liquids (0), pt to sit upright, small/single bites and sips, alternate liquids/solids - Swallow goals met 10/13.     Assessment: Engaged in ELENITA subtests of numerical reasonin, filling out envelope and paying a bill. Able to complete each task with % accuracy. Micrographia but legible. Patient recognizes her writing is impaired as compared to baseline.      Other Barriers to Discharge (Family Training, etc):none anticipated from SLP

## 2024-10-15 NOTE — PLAN OF CARE
Goal Outcome Evaluation:      Plan of Care Reviewed With: patient    Overall Patient Progress: no changeOverall Patient Progress: no change     Pt is hard of hearing, alert and oriented x 4, with a mild aphasia due to stroke. Pt is continent of bowel, last BM was on the 10/13, but incontinent of urine, uses pure-miguel overnight. Pt is on a regular diet thin liquids, takes meds whole with water, pt is on a MOD I in the room with walker, uses C-PAP overnight. Pt has redness on her fold site. Denied pain, chest pain, SOB, N/V, numbness and tingling. Pt was able to make needs known, no acute issue noted this shift. Continue pt's plan of care.

## 2024-10-16 ENCOUNTER — APPOINTMENT (OUTPATIENT)
Dept: PHYSICAL THERAPY | Facility: CLINIC | Age: 79
DRG: 057 | End: 2024-10-16
Attending: PHYSICAL MEDICINE & REHABILITATION
Payer: MEDICARE

## 2024-10-16 ENCOUNTER — APPOINTMENT (OUTPATIENT)
Dept: SPEECH THERAPY | Facility: CLINIC | Age: 79
DRG: 057 | End: 2024-10-16
Attending: PHYSICAL MEDICINE & REHABILITATION
Payer: MEDICARE

## 2024-10-16 ENCOUNTER — APPOINTMENT (OUTPATIENT)
Dept: OCCUPATIONAL THERAPY | Facility: CLINIC | Age: 79
DRG: 057 | End: 2024-10-16
Attending: PHYSICAL MEDICINE & REHABILITATION
Payer: MEDICARE

## 2024-10-16 PROCEDURE — 92507 TX SP LANG VOICE COMM INDIV: CPT | Mod: GN | Performed by: SPEECH-LANGUAGE PATHOLOGIST

## 2024-10-16 PROCEDURE — 250N000013 HC RX MED GY IP 250 OP 250 PS 637: Performed by: PHYSICIAN ASSISTANT

## 2024-10-16 PROCEDURE — 97129 THER IVNTJ 1ST 15 MIN: CPT | Mod: GN

## 2024-10-16 PROCEDURE — 97130 THER IVNTJ EA ADDL 15 MIN: CPT | Mod: GN

## 2024-10-16 PROCEDURE — 128N000003 HC R&B REHAB

## 2024-10-16 PROCEDURE — 97110 THERAPEUTIC EXERCISES: CPT | Mod: GP | Performed by: PHYSICAL THERAPIST

## 2024-10-16 PROCEDURE — 97530 THERAPEUTIC ACTIVITIES: CPT | Mod: GO | Performed by: OCCUPATIONAL THERAPIST

## 2024-10-16 PROCEDURE — 97116 GAIT TRAINING THERAPY: CPT | Mod: GP | Performed by: PHYSICAL THERAPIST

## 2024-10-16 PROCEDURE — 250N000013 HC RX MED GY IP 250 OP 250 PS 637: Performed by: PHYSICAL MEDICINE & REHABILITATION

## 2024-10-16 PROCEDURE — 99233 SBSQ HOSP IP/OBS HIGH 50: CPT | Performed by: PHYSICAL MEDICINE & REHABILITATION

## 2024-10-16 PROCEDURE — 97535 SELF CARE MNGMENT TRAINING: CPT | Mod: GO | Performed by: OCCUPATIONAL THERAPIST

## 2024-10-16 RX ADMIN — ASPIRIN 81 MG CHEWABLE TABLET 81 MG: 81 TABLET CHEWABLE at 07:54

## 2024-10-16 RX ADMIN — FAMOTIDINE 20 MG: 20 TABLET ORAL at 19:54

## 2024-10-16 RX ADMIN — AMLODIPINE BESYLATE 5 MG: 5 TABLET ORAL at 19:54

## 2024-10-16 RX ADMIN — ATORVASTATIN CALCIUM 40 MG: 40 TABLET, FILM COATED ORAL at 07:55

## 2024-10-16 RX ADMIN — FAMOTIDINE 20 MG: 20 TABLET ORAL at 07:53

## 2024-10-16 RX ADMIN — Medication 1 TABLET: at 07:54

## 2024-10-16 ASSESSMENT — ACTIVITIES OF DAILY LIVING (ADL)
ADLS_ACUITY_SCORE: 33
ADLS_ACUITY_SCORE: 36
ADLS_ACUITY_SCORE: 36
ADLS_ACUITY_SCORE: 33
ADLS_ACUITY_SCORE: 33
ADLS_ACUITY_SCORE: 36
ADLS_ACUITY_SCORE: 33
ADLS_ACUITY_SCORE: 35
ADLS_ACUITY_SCORE: 33
ADLS_ACUITY_SCORE: 33
ADLS_ACUITY_SCORE: 36
ADLS_ACUITY_SCORE: 33
ADLS_ACUITY_SCORE: 35
ADLS_ACUITY_SCORE: 36
ADLS_ACUITY_SCORE: 33
ADLS_ACUITY_SCORE: 36
ADLS_ACUITY_SCORE: 33
ADLS_ACUITY_SCORE: 33
ADLS_ACUITY_SCORE: 36
ADLS_ACUITY_SCORE: 35
ADLS_ACUITY_SCORE: 33
ADLS_ACUITY_SCORE: 33
ADLS_ACUITY_SCORE: 36

## 2024-10-16 NOTE — PLAN OF CARE
"Acute Rehab Care Conference/Team Rounds      Type: Team Rounds    Present: Dr. Senait Oneil, Vaishnavi Pacheco  PA,  Bing Denise PT, Kelechi Correia OT, Raudel Guzman SLP, Cintia Koch , Anahi Moses RD, Maine Hopper RN, and Vickey Farr M Patient.       Discharge Barriers/Treatment/Education    Rehab Diagnosis: Hemorrhagic CVA    Active Medical Co-morbidities/Prognosis: Prior ischemic CVA, HLD, HTN, hyponatremia, GERD, pre-diabetes    Safety: A & O ,MOD I Walker in the room,calls appropriately    Pain: no pain over night    Medications, Skin, Tubes/Lines: Meds whole ,redness to abdominal fold,barrier cream treatments. No tubes or lines    Swallowing/Nutrition: Dysphagia goals met with patient on regular texture diet and thin liquids. Some difficulties still exist with taking medications. No ongoing dysphagia interventions anticipated.     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 Pt earning a 5/5 on med task 10/16    Mobility:   Bed Mobility: IND  Transfer: Mod I with FWW  Gait: Mod I with FWW, up to 215 ft  Stairs: 6x8\" steps with single rail and min A  Balance: Fair static, fair/poor dynamic standing balance w/out UE support  Outcome Measures:   30 second sit to stand  10/2: 0  10/9: 6x " "with UE assist  PASS               10/5: 7/36              10/14: 30/36  Cueto              10/5: 3/56              10/14: 33/56  10mWT              10/9: 0.13 m/sec comfortable, 0.24 m/sec fast with FWW and CGA/SBA  Pt is making excellent progress from start of care, demonstrating significantly improved strength, standing balance, functional mobility, and insight/cognition/communication. She is now mod I for mobility and toileting in room, but does admit to still requiring dressing assist, noting \"I didn't realize how much work I still had to do.\" Plan to trial 4WW, and will issue FWW vs 4WW for home, as well as recommend family training for safe guarding and assist with stairs, as pt still requires min A for 8\" stair height. Will recommend ongoing therapies to progress post ARU.    Cognition/Language: Patient showing significant improvement in both language and cognition. Still has some word finding difficulties but able to converse more clearly and work through it. Cognitively, completing tasks with minimal verbal cues and assistance needed. Anticipate general oversight to ensure initial accuracy with meds and finances but less assistance than originally anticipated. Ongoing therapy upon facility discharge.     Community Re-Entry: Plan for ongoing therapies to progress community mobility tolerance and safety. If planning to be active in the community, pt may choose to purchase a transport WC OTC.    Transportation: Family to provide. Car transfer not a barrier.    Decision maker: self    Plan of Care and goals reviewed and updated.    Discharge Plan/Recommendations    Fall Precautions: continue    Patient/Family input to goals: Daughter    Anticipated rehab needs following discharge: OP PT, OT, SLP    Anticipated care giver support after discharge: Daughter vs. [facility]    Estimated length of stay: 22 days    Overall plan for the patient: Making excellent functional progress.  Anticipate will be able to achieve Mod I " with ambulation, mobility, and ADLs, with assistance for IADLs only.  Daughter looking into intermediate facilities, but anticipate if she is not able to move into one immediately, pt will be able to discharge home.       Utilization Review and Continued Stay Justification    Medical Necessity Criteria:    For any criteria that is not met, please document reason and plan for discharge, transfer, or modification of plan of care to address.    Requires intensive rehabilitation program to treat functional deficits?: Yes    Requires 3x per week or greater involvement of rehabilitation physician to oversee rehabilitation program?: Yes    Requires rehabilitation nursing interventions?: Yes    Patient is making functional progress?: Yes    There is a potential for additional functional progress? Yes    Patient is participating in therapy 3 hours per day a minimum of 5 days per week or 15 hours per week in 7 day period?:Yes    Has discharge needs that require coordinated discharge planning approach?:Yes      Barriers/Concerns related to meeting medical necessity criteria:  None    Team Plan to Address Concern:  N/A      Final Physician Sign off    Statement of Approval: I have reviewed and agree with the recommendations and documentation in this team rounds note.       Patient Goals    Social Work Goals: Confirm discharge recommendations with therapy, coordinate safe discharge plan and remain available to support and assist as needed.     OT Predicted Duration/Target Date for Goal Attainment: 10/22/24  Therapy Frequency (OT): 6 times/week  OT: Hygiene/Grooming: modified independent, from wheelchair  OT: Upper Body Dressing: Independent  OT: Lower Body Dressing: Minimal assist, using adaptive equipment  OT: Upper Body Bathing: Supervision/stand-by assist, using adaptive equipment  OT: Lower Body Bathing: using adaptive equipment, Minimal assist  OT: Toilet Transfer/Toileting: Minimal assist, toilet transfer, cleaning and garment  management, using adaptive equipment  OT: Meal Preparation: Modified independent, with simple meal preparation, from wheelchair, using adaptive equipment  OT: Cognitive: Patient/caregiver will verbalize understanding of cognitive assessment results/recommendations as needed for safe discharge planning  OT: Goal 1: OT: Pt will safely complete shower transfer utilizing appropriate AE/DME with min A.  OT: Goal 2: OT: Pt will safely complete BUE HEP to increase strength and endurance needed for ADLs/IADLs and functional mobility.    PT Predicted Duration/Target Date for Goal Attainment: 10/22/24  PT Frequency: 6x/week  PT: Bed Mobility: Modified independent, Supine to/from sit, Rolling (per home bed set up)  PT: Transfers: Modified independent, Sit to/from stand, Bed to/from chair, Assistive device  PT: Gait: Modified independent, Assistive device, 100 feet  PT: Stairs: Minimal assist, 7 stairs, Rail on left (Stairs as needed for home navigation).    SLP Predicted Duration/Target Date for Goal Attainment: 10/22/24  Therapy Frequency (SLP Eval): 6 times/week  SLP: Safely tolerate diet without signs/symptoms of aspiration: Goal Met  SLP: Communicate basic wants and needs: verbally, independent  SLP: Goal 1: Patient will complete moderate level of difficulty reasoning/problem solving tasks without need for redirection with 90% accuracy.                 Patient/Family Goal: Bowel: Pt will regain bowel continence either with timed toileting or bowel program by discharge from ARU.  Patient/Family Goal: Bladder: Pt will regain bladder continence on toilet using timed toileting by discharge from ARU.     Patient/Family Goal: Medication Management: Pt will pass MAP by discharge from ARU.

## 2024-10-16 NOTE — PLAN OF CARE
Discharge Planner Post-Acute Rehab OT:      Discharge Plan: Home with HC OT services      Precautions: Falls, Left low back and knee pain, [place] - needs hearing aids     Current Status:  ADLs:  Mobility: MOD I with fww  Grooming: MOD I standing at sink w/ fww for washing hands/face  Dressing: UB: SBA seated. LB: SBA with AE seated to standing; Feet: SBA using figure four method in w/c  Bathing: Transfer Ax1 Aleah Stedy <> blue + white rolling shower chair. Mod A tasks.  Toileting: MOD I w/ fww to walk to/from BR; SBA on/off raised toilet w/ gb and fww;  MOD I pericare standing with gb, though pt calling for assistance prn  IADLs: IND prior. Daughter assisted with yard work and grocery shopping.      Vision/Cognition: Pt wears glasses's at baseline as well as visual deficits at baseline from previous stroke. Pt completed SLUMS examinaiton with an overall score of 18/30, indicating cognitive deficits (normal range 25-30). Pt showed errors that indicate decreased short-term memory and procressing speed (e.g. short-term recall, naming animals, drawing correct time on clock); note that aphasia may contribute to score on formal assessment. Pt completed med task 10/16 with no errors and 5/5 correct. Recommend that pt can continue setting up medications upon d/c, as she did prior to admission.      Assessment: Pt participated this date with encouragement: completed LBD with underwear (those she will wear at home, as hospital briefs remain difficult) and shorts this date, utilizing reacher prn to thread legs. Pt completed med task 5/5 with no errors, demonstrating appropriate cognition and coordination skills to continue self set up of meds upon discharge.      Other Barriers to Discharge (DME, Family Training, etc):   FT: TBD  OT: Per chart review, pt lives in a split level home in [facility] with spouse. 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 Pt has a tub/shower and combo and walk in shower with grab bars. Pt uses walk  in shower. Pt has an elevated toilet seat and a regular toilet seat with vanity nearby. Pt reports having a walk out on the lower level.   Barriers to home: stairs  Need: shower chair for walk in shower

## 2024-10-16 NOTE — PLAN OF CARE
"Goal Outcome Evaluation:      Plan of Care Reviewed With: patient    Overall Patient Progress: no change    Outcome Evaluation: see note    Patient incontinent of urine this shift. Encouraged to attempt timed toileting q2-3 hours.   Patient mod-I per therapy. Calls for incont cares, clothing management and assist with bed positioning.   Order to not have purewick at HS; pt updated and reports getting up 5-6 times per night at home. Education provided on not wanting pt to become dependent on purewick due to not discharging with one. Pt agreed and stated, \"yes, I think I am dependent on the purewick.\" Patient agreed to toilet prior to bed, continent and to toilet every 2-3 hours to prevent incontinence as well as limiting fluids after dinner. Sticky note left for providers regarding calling staff for assist and upset about purewick.   Shower provided this shift.  Held norvasc due to b/p parameters.      /45 (BP Location: Right arm)   Pulse 78   Temp 97.8  F (36.6  C) (Oral)   Resp 18   Wt 90 kg (198 lb 6.6 oz)   LMP  (LMP Unknown)   SpO2 98%   BMI 38.75 kg/m      "

## 2024-10-16 NOTE — PLAN OF CARE
"Discharge Planner Post-Acute Rehab PT:      Discharge Plan: Home with OP PT.      Precautions: fall, L knee pain/OA, mild aphasia, Andreaf[place] (has hearing aides)     Current Status: *trialing 4WW in PT  Bed Mobility: IND  Transfer: Mod I with FWW  Gait: Mod I with FWW, up to 215 ft  Stairs: 6x8\" steps with single rail and min A  Balance: Fair static, fair/poor dynamic standing balance w/out UE support     Outcome Measures:   30 second sit to stand  10/2: 0  10/9: 6x with UE assist  PASS               10/5: 7/36   10/14: 30/36  Cuteo              10/5: 3/56   10/14: 33/56  10mWT   10/9: 0.13 m/sec comfortable, 0.24 m/sec fast with FWW and CGA/SBA    Assessment:  Today pt worked step-ups and step-downs in parallel bars to focus on improving quadricep strengthening and eccentric control on a 4-inch step. Pt did require BUE support to assist with load transfer during exercise and was unable to wean from BUE support due to strength and balance deficits. Additionally, pt trialed a 4WW ambulating a total of 245ft w/o needing a rest break. Overall pt was steady having observed similar gait speed to FWW, no LOB, and step-through gait pattern. Need to continue working on safety awareness, specifically remembering/struggles physically with locking/unlocking brakes.    Other Barriers to Discharge (DME, Family Training, etc):   Equipment: Plan to issue FWW vs 4WW.  Family training Sat 10/19 PM.  1 KIP with rail. Split level with single rail.  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  "

## 2024-10-16 NOTE — PLAN OF CARE
"Acute Rehab Care Conference/Team Rounds      Type: {Type of Conference:3888362}    Present: ***      Discharge Barriers/Treatment/Education    Rehab Diagnosis: ***    Active Medical Co-morbidities/Prognosis: ***    Safety: Modified Independent with walker in the room, alert and oriented, calls appropriately    Pain: no reported pain overnight    Medications, Skin, Tubes/Lines: takes medication whole, skin, redness to abdominal folds, barrier cream applied, no lines/drains    Swallowing/Nutrition:    Bowel/Bladder: Mix Continent of Bladder, used to have purewick at night, incontinent pad noted , Continent of Bowel, LBM 10/15/2024    Psychosocial:    ADLs/IADLs:    Mobility:     Cognition/Language:    Community Re-Entry:    Transportation:    Decision maker: {ACR DECISION MAKER:1732444}    Plan of Care and goals reviewed and updated.    Discharge Plan/Recommendations    Fall Precautions: {ACR FALL PRECAUTIONS:7266835}    Patient/Family input to goals: ***    Anticipated rehab needs following discharge: ***    Anticipated care giver support after discharge: ***    Estimated length of stay: ***    Overall plan for the patient: ***      Utilization Review and Continued Stay Justification    Medical Necessity Criteria:    For any criteria that is not met, please document reason and plan for discharge, transfer, or modification of plan of care to address.    Requires intensive rehabilitation program to treat functional deficits?: {YES/NO :875050::\"Yes\"}    Requires 3x per week or greater involvement of rehabilitation physician to oversee rehabilitation program?: {YES/NO :853762::\"Yes\"}    Requires rehabilitation nursing interventions?: {YES/NO :306277::\"Yes\"}    Patient is making functional progress?: {YES/NO :882039::\"Yes\"}    There is a potential for additional functional progress? {YES/NO :661464::\"Yes\"}    Patient is participating in therapy 3 hours per day a minimum of 5 days per week or 15 hours per week in 7 day " "period?:{YES/NO :008388::\"Yes\"}    Has discharge needs that require coordinated discharge planning approach?:{YES/NO :731614::\"Yes\"}      Barriers/Concerns related to meeting medical necessity criteria:  ***    Team Plan to Address Concern:  ***      Final Physician Sign off    Statement of Approval: ***    Patient Goals  {Social Work Goals:929829}    {OT Goals:486756}    {PT Goals:969351}    {Speech Goals:756657}    {RN Goals:181586}Goal Outcome Evaluation:      Plan of Care Reviewed With: patient    Overall Patient Progress: no changeOverall Patient Progress: no change           "

## 2024-10-16 NOTE — PLAN OF CARE
Goal Outcome Evaluation:      Plan of Care Reviewed With: patient    Overall Patient Progress: no changeOverall Patient Progress: no change     Pt is alert and oriented X4 able to make needs known to staff, uses hearing aids. Pt is MOD I in room with a walker. On seizure precautions with padded side rails. Mixed continence to bladder,continent of bowel. On regular diet,meds whole with thin liquids. Pt has redness in abdominal fold barrier cream treatments. Pt appeared sleeping during rounds with no acute concerns during shift. Will cont POC.

## 2024-10-16 NOTE — PLAN OF CARE
Goal Outcome Evaluation:      Plan of Care Reviewed With: patient    Overall Patient Progress: improvingOverall Patient Progress: improving       Vitals: Stable. Morning lisinopril and Hydrochlorothiazide held per parameters.  Orientation: Alert and oriented x 4. Pt using darshana hearing aids.  Pain: Denied.  Mobility: Mod I in room with walker.  Diet: Regular/thin. Had trouble taking pills so recommend to take pills with apple sauce.  Bladder: Pt continent of bladder using the toilet. She is using regular underwear. Encouraged frequent toileting.  Bowel: Pt continent of bm using the toilet this morning.  Skin/Lines: Rash under pannus clearing up. Continue to monitor and maintain skin cares.  Safety: Pt using call light for assist appropriately.  Miscellaneous: MAP program ordered per interdisciplinary team during rounds in preparation for discharge. Med list given to pt and program explained to her. Will start tomorrow.

## 2024-10-16 NOTE — PROGRESS NOTES
"  [facility]   Acute Rehabilitation Unit  Daily progress note    INTERVAL HISTORY  Notes reviewed.  Chika Hurd was seen at bedside during team rounds.  No acute events overnight and this morning she does not endorse any concerns or complaints.  She denies chest pain, shortness of breath, fevers, or chills.  Per nursing notes, she was upset that the Pure Wick was discontinued overnight, despite the fact that she was progressed to modified independent to the bathroom specifically to wean away from the Pure Wick.  We discussed the importance of utilizing the bathroom as she will not have a pure wick at home.  She is progressing very well and anticipate she can achieve modified independence for mobility and basic ADLs at home, however daughter is already looking into assisted living facilities for both her and the patient's  who has dementia.  She will continue to require IADL assistance.  Will start an program near, however she seems to demonstrate a good understanding of her medications are ready.    Current Functional Status:  PT:  Bed Mobility: IND  Transfer: Mod I with FWW  Gait: Mod I with FWW, up to 215 ft  Stairs: 6x8\" steps with single rail and min A  Balance: Fair static, fair/poor dynamic standing balance w/out UE support    OT:  ADLs:  Mobility: MOD I with fww  Grooming: MOD I standing at sink w/ fww for washing hands/face  Dressing: UB: SBA seated. LB: SBA with AE seated to standing; Feet: SBA using figure four method in w/c  Bathing: Transfer Ax1 Aleah Stedy <> blue + white rolling shower chair. Mod A tasks.  Toileting: MOD I w/ fww to walk to/from BR; SBA on/off raised toilet w/ gb and fww;  MOD I pericare standing with gb, though pt calling for assistance prn  IADLs: IND prior. Daughter assisted with yard work and grocery shopping.      Vision/Cognition: Pt wears glasses's at baseline as well as visual deficits at baseline from previous stroke. Pt " completed SLUMS examinaiton with an overall score of 18/30, indicating cognitive deficits (normal range 25-30). Pt showed errors that indicate decreased short-term memory and procressing speed (e.g. short-term recall, naming animals, drawing correct time on clock); note that aphasia may contribute to score on formal assessment. Pt completed med task 10/16 with no errors and 5/5 correct. Recommend that pt can continue setting up medications upon d/c, as she did prior to admission.     SLP:  Hearing: Sensorineural hearing loss, bilateral hearing aides.  Vision: Readers  Communication: Mild to moderate motor speech deficits, Mild expressive and receptive aphasia.  Cognition: To be evaluated in coming days  Swallow: Regular/ thin liquids (0), pt to sit upright, small/single bites and sips, alternate liquids/solids - Swallow goals met 10/13.    MEDICATIONS  Current Facility-Administered Medications   Medication Dose Route Frequency Provider Last Rate Last Admin    - Medication Assessment Program - Rehab Services   Does not apply See Admin Instructions Yusuf Sapp MD        amLODIPine (NORVASC) tablet 5 mg  5 mg Oral QPM Vaishnavi Pacheco PA-C   5 mg at 10/14/24 2038    aspirin (ASA) chewable tablet 81 mg  81 mg Oral Daily Vaishnavi Pacheco PA-C   81 mg at 10/16/24 0754    atorvastatin (LIPITOR) tablet 40 mg  40 mg Oral Daily Vaishnavi Pacheco PA-C   40 mg at 10/16/24 0755    famotidine (PEPCID) tablet 20 mg  20 mg Oral BID Michael Self DO   20 mg at 10/16/24 0753    hydroCHLOROthiazide tablet 12.5 mg  12.5 mg Oral Daily Yusuf Sapp MD   12.5 mg at 10/15/24 0829    lisinopril (ZESTRIL) tablet 40 mg  40 mg Oral Daily Vaishnavi Pacheco PA-C   40 mg at 10/15/24 0829    multivitamin w/minerals (THERA-VIT-M) tablet 1 tablet  1 tablet Oral Daily Vaishnavi Pacheco PA-C   1 tablet at 10/16/24 0754          Current Facility-Administered Medications   Medication Dose Route Frequency  Provider Last Rate Last Admin    acetaminophen (TYLENOL) tablet 650 mg  650 mg Oral Q4H PRN Vaishnavi Pacheco PA-C        diclofenac (VOLTAREN) 1 % topical gel 4 g  4 g Topical 4x Daily PRN Vaishnavi Pacheco PA-C        melatonin tablet 3 mg  3 mg Oral At Bedtime PRN Vaishnavi Pacheco PA-C        methocarbamol (ROBAXIN) tablet 500 mg  500 mg Oral 4x Daily PRN Yusuf Sapp MD        polyethylene glycol (MIRALAX) Packet 17 g  17 g Oral Daily PRN Vaishnavi Pacheco PA-C        senna-docusate (SENOKOT-S/PERICOLACE) 8.6-50 MG per tablet 1-2 tablet  1-2 tablet Oral BID PRN Vaishnavi Pacheco PA-C            PHYSICAL EXAM  /61 (BP Location: Right arm)   Pulse 83   Temp 97.4  F (36.3  C) (Oral)   Resp 18   Wt 90 kg (198 lb 6.6 oz)   LMP  (LMP Unknown)   SpO2 98%   BMI 38.75 kg/m    Gen: NAD, lying in bed  HEENT: NC/AT  Pulm: non-labored on room air  Abd:  non-distended  Ext: no edema in bilateral lower extremities and no tenderness at calves   Neuro/MSK: awake, alert, left ptosis, right facial droop, +aphasia but improving.       LABS  CBC RESULTS:   Recent Labs   Lab Test 10/14/24  0633 10/10/24  0632 10/07/24  0606   WBC 10.3 11.5* 10.9   RBC 4.14 4.08 4.14   HGB 12.7 12.2 12.2   HCT 38.2 37.1 37.4   MCV 92 91 90   MCH 30.7 29.9 29.5   MCHC 33.2 32.9 32.6   RDW 14.4 13.9 13.8    424 454*       Last Basic Metabolic Panel:  Recent Labs   Lab Test 10/14/24  0633 10/10/24  0632 10/09/24  0911 10/07/24  0606    133*  --  139   POTASSIUM 4.7 4.5  --  4.7   CHLORIDE 102 100  --  103   CO2 26 29  --  26   ANIONGAP 9 4*  --  10   GLC 97 94 95 100*   BUN 12.2 18.6  --  23.4*   CR 0.62 0.61  --  0.62   GFRESTIMATED >90 >90  --  >90   KAR 8.8 9.3  --  9.6         Rehabilitation   Admission to acute inpatient rehab 10/01/24.    Impairment group code: Stroke Vascular Hemorrhagic 01.2 (R) Body Involvement (L) Brain; Non-traumatic intracerebral hemorrhage of left basal ganglia likely  hypertensive etiology         PT, OT and SLP 60 minutes of each daily for 6 days per week for 18 days, in addition to rehab nursing and close management of physiatrist.       Impairment of ADL's: Noted to have BLE weakness, impaired balance, impaired coordination, impaired activity tolerance, impaired alertness, and impaired cognition, all affecting her ability to safely and independently perform basic ADLs.  Goal for SBA with basic ADLs except min A for bathing.      Impairment of mobility:  Noted to have BLE weakness, impaired balance, impaired coordination, impaired activity tolerance, impaired alertness, and impaired cognition, all affecting her ability to safely and independently perform basic mobility.  Goal for SBA with basic mobility with min A for stairs.      Impairment of cognition/language/swallow:  Noted to have dysphagia, dysarthria, aphasia, and impaired cognition with goals for safe tolerance of least restrictive diet and improved cognitive-linguistic skills to meet basic needs.     Continue comprehensive acute inpatient rehabilitation program with multidisciplinary approach including therapies, rehab nursing, and physiatry following. See interval history for updates.      ASSESSMENT AND PLAN  Chika Hurd is a 79 year old right hand dominant female with a past medical history of prior stroke (10/2023; left frontal lobe and corona radiata), hyperlipidemia, hypertension, central retinal artery occlusion of right eye, left breast cancer, GERD, prediabetes, sensorineural hearing loss, and obesity who was admitted on 9/25/24 with acute left basal ganglia hemorrhage with hospital course complicated by subacute stroke in left periatrial white matter, incidental saccular aneurysm, constipation, and conjunctivitis.  She was admitted to ARU on 10/1/24 for multidisciplinary rehabilitation and ongoing medical management.      Medical Conditions  New actions/orders/updates for today are in blue.      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  Presented with new gait instability and worsened dysarthria.  Imaging findings as above.  PTA on Plavix + aspirin, plan had been for 3 months.  Per sending team, does not need to resume Plavix but ok'd to resume aspirin daily on 10/1.  - Continue ASA 81 mg daily (resumed on 10/1)  - Continue statin with LDL goal 40-70  - SBP goal 130-150 as inpatient, long-term outpatient goal <130/80.  Management as below.  - Repeat video swallow study on 10/7 with intermittent penetration with thin liquids but immediate ejection.  Per SLP, advanced to thins after bedside assessment and good tolerance.  Advanced to regular diet per SLP 10/11.  - Ziopatch x14 days  - Continue PT/OT/SLP  - Follow up with neurology in 6-8 weeks     Hyperlipidemia  - LDL goal 40-70 (LDL 55 on 9/26)  - Continue PTA atorvastatin 40 mg daily     Hypertension  - Continue hydrochlorothiazide 12.5 mg (decreased 10/4 due to soft blood pressures and mild hyponatremia)  - Continue PTA lisinopril 40 mg daily  - Continue amlodipine 5 mg daily (added 10/1)  - Monitor BP and adjust regimen as indicated     Hyponatremia  Mild at 134 on 10/1.  Suspect related to hydrochlorothiazide, mild hypovolemia.  BUN mildly elevated.  On slightly thick liquids.  10/14: Na WNL   - Monitor BMP every M/Th     GERD  - Continue PTA famotidine 20 mg BID     Constipation  In setting of decreased mobility  - Continue Miralax daily  - Monitor, additional PRN bowel meds available, can schedule if ongoing  issues     Prediabetes  A1c 6.0% on 9/27/24  - Follow up with PCP for ongoing surveillance     Bilateral conjunctivitis, resolved  Started on ofloxacin at hospital on 9/29 due to purulent discharge from bilateral eyes with associated pain.  Completed course of ofloxacin as of 10/4.  No recurrent issues at ARU.    Skin   Mild erythema and skin maceration under the abdominal fold; noted and examined on 10/13. Placed new patient care order and will monitor         Adjustment to disability:  Clinical psychology to eval and treat if indicated  FEN: regular diet, continue thin liquids; swallow strategies per SLP  Bowel: continent, recent constipation.  Continue scheduled Miralax.  Additional PRN bowel medications available.  Monitor and adjust regimen as indicated.  Bladder: continent/incontinent.  Timed toileting implemented on 10/7.  Wean/avoid Purewick use.  Patient cleared for modified independence to bathroom  DVT Prophylaxis: mechanical  GI Prophylaxis: pepcid  Code: full; as prior  Disposition: goal for home but some challenges with support especially as daughter caring for patient's spouse who has advanced dementia.  Daughter is pursuing prison for both parents, her goal is for patient to move in there upon discharge from ARU.  Functionally, the patient has made enough progress to discharge home with IADL assistance only.  ELOS: target 10/22/24 to home versus [facility] with outpatient PT, OT, speech therapy  Follow up Appointments on Discharge: PCP in 1-2 weeks, general neurology or stroke REUBEN in 6-8 weeks      50 minutes spent on the date of service doing chart review, history and exam, documentation, and further activities as noted above.    Thad Sapp MD  Department of Rehabilitation Medicine

## 2024-10-16 NOTE — PLAN OF CARE
Discharge Planner Post-Acute Rehab SLP:      Discharge Plan: RAMILA. Ongoing SLP     Precautions: fall risk     Current Status:  Hearing: Sensorineural hearing loss, bilateral hearing aides.  Vision: Readers  Communication: Mild to moderate motor speech deficits, Mild expressive and receptive aphasia.  Cognition: To be evaluated in coming days  Swallow: Regular/ thin liquids (0), pt to sit upright, small/single bites and sips, alternate liquids/solids - Swallow goals met 10/13.     Assessment: Engaged in ELENITA tasks of medicine labels and using calendar with % IND accuracy. Notable moderate difficulty with reading weekly pill chart, however after verbal prompting able to improve as task progressed. Began reading directions task with 1/4 IND accuracy, and had mild impulsivity when answering questions. Overall mod verbal cueing to complete tasks. Patient reported rushing through answers as something she had noticed about herself prior to hospitalization.     Other Barriers to Discharge (Family Training, etc):none anticipated from SLP

## 2024-10-16 NOTE — PROGRESS NOTES
Patient daughter Ramila left a  voicemail about patient's insurance [employer]. Sw followed up with patient and daughter. Ramila shared she has called the insurance to resolve the issue.          ALEJANDRA Barber  [facility], Acute Inpatient Rehab Unit   [address] [place] [address], [address] [place] [address]  Phone: [phone]  Fax: [phone]

## 2024-10-17 ENCOUNTER — APPOINTMENT (OUTPATIENT)
Dept: PHYSICAL THERAPY | Facility: CLINIC | Age: 79
DRG: 057 | End: 2024-10-17
Attending: PHYSICAL MEDICINE & REHABILITATION
Payer: MEDICARE

## 2024-10-17 ENCOUNTER — APPOINTMENT (OUTPATIENT)
Dept: OCCUPATIONAL THERAPY | Facility: CLINIC | Age: 79
DRG: 057 | End: 2024-10-17
Attending: PHYSICAL MEDICINE & REHABILITATION
Payer: MEDICARE

## 2024-10-17 ENCOUNTER — APPOINTMENT (OUTPATIENT)
Dept: SPEECH THERAPY | Facility: CLINIC | Age: 79
DRG: 057 | End: 2024-10-17
Attending: PHYSICAL MEDICINE & REHABILITATION
Payer: MEDICARE

## 2024-10-17 ENCOUNTER — DOCUMENTATION ONLY (OUTPATIENT)
Dept: OTHER | Facility: CLINIC | Age: 79
End: 2024-10-17
Payer: MEDICARE

## 2024-10-17 LAB
ANION GAP SERPL CALCULATED.3IONS-SCNC: 10 MMOL/L (ref 7–15)
BUN SERPL-MCNC: 15.3 MG/DL (ref 8–23)
CALCIUM SERPL-MCNC: 9.9 MG/DL (ref 8.8–10.4)
CHLORIDE SERPL-SCNC: 103 MMOL/L (ref 98–107)
CREAT SERPL-MCNC: 0.68 MG/DL (ref 0.51–0.95)
EGFRCR SERPLBLD CKD-EPI 2021: 89 ML/MIN/1.73M2
ERYTHROCYTE [DISTWIDTH] IN BLOOD BY AUTOMATED COUNT: 14.2 % (ref 10–15)
GLUCOSE SERPL-MCNC: 103 MG/DL (ref 70–99)
HCO3 SERPL-SCNC: 26 MMOL/L (ref 22–29)
HCT VFR BLD AUTO: 39.7 % (ref 35–47)
HGB BLD-MCNC: 13.2 G/DL (ref 11.7–15.7)
MCH RBC QN AUTO: 30.2 PG (ref 26.5–33)
MCHC RBC AUTO-ENTMCNC: 33.2 G/DL (ref 31.5–36.5)
MCV RBC AUTO: 91 FL (ref 78–100)
PLATELET # BLD AUTO: 373 10E3/UL (ref 150–450)
POTASSIUM SERPL-SCNC: 4.7 MMOL/L (ref 3.4–5.3)
RBC # BLD AUTO: 4.37 10E6/UL (ref 3.8–5.2)
SODIUM SERPL-SCNC: 139 MMOL/L (ref 135–145)
WBC # BLD AUTO: 9.7 10E3/UL (ref 4–11)

## 2024-10-17 PROCEDURE — 97750 PHYSICAL PERFORMANCE TEST: CPT | Mod: GP | Performed by: PHYSICAL THERAPIST

## 2024-10-17 PROCEDURE — 99232 SBSQ HOSP IP/OBS MODERATE 35: CPT | Performed by: PHYSICIAN ASSISTANT

## 2024-10-17 PROCEDURE — 85027 COMPLETE CBC AUTOMATED: CPT | Performed by: PHYSICIAN ASSISTANT

## 2024-10-17 PROCEDURE — 250N000013 HC RX MED GY IP 250 OP 250 PS 637: Performed by: PHYSICAL MEDICINE & REHABILITATION

## 2024-10-17 PROCEDURE — 36415 COLL VENOUS BLD VENIPUNCTURE: CPT | Performed by: PHYSICIAN ASSISTANT

## 2024-10-17 PROCEDURE — 97110 THERAPEUTIC EXERCISES: CPT | Mod: GP | Performed by: PHYSICAL THERAPIST

## 2024-10-17 PROCEDURE — 128N000003 HC R&B REHAB

## 2024-10-17 PROCEDURE — 92507 TX SP LANG VOICE COMM INDIV: CPT | Mod: GN

## 2024-10-17 PROCEDURE — 97535 SELF CARE MNGMENT TRAINING: CPT | Mod: GO

## 2024-10-17 PROCEDURE — 250N000013 HC RX MED GY IP 250 OP 250 PS 637: Performed by: PHYSICIAN ASSISTANT

## 2024-10-17 PROCEDURE — 80048 BASIC METABOLIC PNL TOTAL CA: CPT | Performed by: PHYSICIAN ASSISTANT

## 2024-10-17 RX ADMIN — ASPIRIN 81 MG CHEWABLE TABLET 81 MG: 81 TABLET CHEWABLE at 09:14

## 2024-10-17 RX ADMIN — FAMOTIDINE 20 MG: 20 TABLET ORAL at 09:14

## 2024-10-17 RX ADMIN — ATORVASTATIN CALCIUM 40 MG: 40 TABLET, FILM COATED ORAL at 09:14

## 2024-10-17 RX ADMIN — FAMOTIDINE 20 MG: 20 TABLET ORAL at 20:11

## 2024-10-17 RX ADMIN — AMLODIPINE BESYLATE 5 MG: 5 TABLET ORAL at 20:11

## 2024-10-17 RX ADMIN — Medication 1 TABLET: at 09:14

## 2024-10-17 ASSESSMENT — ACTIVITIES OF DAILY LIVING (ADL)
ADLS_ACUITY_SCORE: 34
ADLS_ACUITY_SCORE: 34
ADLS_ACUITY_SCORE: 33
ADLS_ACUITY_SCORE: 33
ADLS_ACUITY_SCORE: 34
ADLS_ACUITY_SCORE: 34
ADLS_ACUITY_SCORE: 33
ADLS_ACUITY_SCORE: 34
ADLS_ACUITY_SCORE: 33
ADLS_ACUITY_SCORE: 33
ADLS_ACUITY_SCORE: 34
ADLS_ACUITY_SCORE: 34
ADLS_ACUITY_SCORE: 33
ADLS_ACUITY_SCORE: 33

## 2024-10-17 NOTE — PROGRESS NOTES
CLINICAL NUTRITION SERVICES - REASSESSMENT NOTE     Nutrition Prescription    RECOMMENDATIONS FOR MDs/PROVIDERS TO ORDER:  None at present.    Malnutrition Status:    Patient does not meet two of the established criteria necessary for diagnosing malnutrition    Recommendations already ordered by Registered Dietitian (RD):  Ordered updated wt  Continue Ensure PRN  Continue room service w/ assist    Future/Additional Recommendations:  Monitor oral intake, weight, supplement preferences.     EVALUATION OF THE PROGRESS TOWARD GOALS   Diet: Regular  Ensure Enlive and additional supplements PRN  Room service w/ assist  Intake: Pt consuming 75% of meals per flowsheet. Pt ordering 2-3 meals a day per [employer] [facility].     NEW FINDINGS   Met with pt. She reports that she enjoys the hospital food. Her appetite is good and she eats 3 meals a day. Sometimes she doesn't get her meal orders taken and she gets repeat trays.    Weight:  Last wt (10/17): 80.6 kg - standing  10.4% wt loss in 1 week, but question accuracy of 10/10 wt (90 kg). Current wt is stable compared to admit wt  No significant wt loss PTA    Labs:  9/27 A1C 6 (H).    Medications:  Pepcid  Thera-vit-m  PRN miralax, senna    GI:  Stooling 0-2x daily per I/O. LBM 10/16 per flowsheet.  Recent constipation, scheduled bowel reg per provider note    Skin: no significant findings    Respiratory: RA    Endo: preDM    MALNUTRITION  % Intake: No decreased intake noted  % Weight Loss: Weight loss does not meet criteria  Subcutaneous Fat Loss: None observed**  Muscle Loss: None observed**  Fluid Accumulation/Edema: Does not meet criteria  Malnutrition Diagnosis: Patient does not meet two of the established criteria necessary for diagnosing malnutrition  **per last assessment and visualized with clothes on    Previous Goals   Patient to consume % of nutritionally adequate meal trays TID, or the equivalent with supplements/snacks.  Evaluation: Met    Previous Nutrition  "Diagnosis  Predicted inadequate nutrient intake (kcal/pro) related to potential for variation in intake and menu fatigue.    Evaluation: No change    CURRENT NUTRITION DIAGNOSIS  Predicted inadequate nutrient intake (kcal/pro) related to potential for variation in intake and menu fatigue.      INTERVENTIONS  Implementation  Medical food supplement therapy    Goals  Patient to consume % of nutritionally adequate meal trays TID, or the equivalent with supplements/snacks.    Monitoring/Evaluation  Progress toward goals will be monitored and evaluated per protocol.    Erwin Victor, RD, LD  Available on [employer]  Weekend/Holiday RD [employer] - \"Weekend Clinical Dietitian\"  No longer available by paging   "

## 2024-10-17 NOTE — PLAN OF CARE
Goal Outcome Evaluation:      Plan of Care Reviewed With: patient    Overall Patient Progress: improvingOverall Patient Progress: improving    Patient started MAP today.  Writer explained the process. Patient participated today by identifying what medication she needed and how many tablets for her am meds. She did well, no concerns noted.  Instructed her to call staff when her meds are due in the future.  /53 this am, asymptomatic. Lisinopril held per parameters. Weight has decreased. Hydrochlorothiazide discontinued today. Later /55, HR 79.

## 2024-10-17 NOTE — PLAN OF CARE
Goal Outcome Evaluation:      Plan of Care Reviewed With: patient    Overall Patient Progress: improvingOverall Patient Progress: improving  Orientation: alert and oriented; call light appropriate  O2 use:on room air  Pain:denies of pain  Diet:regular diet  Bladder: continent with bladder this shift. Up to toilet  Bowel: continent with BM. Last BM 10/16/24  Ambulation/Transfer: Mod I with Walker  Skin: redness under panus; KAILEY  Safety:fall; seizure precaution maintained  Others:   Patient to start MAP today

## 2024-10-17 NOTE — PLAN OF CARE
Discharge Planner Post-Acute Rehab SLP:      Discharge Plan: RAMILA. Ongoing SLP     Precautions: fall risk     Current Status:  Hearing: Sensorineural hearing loss, bilateral hearing aides.  Vision: Readers  Communication: Mild to moderate motor speech deficits, Mild expressive and receptive aphasia.  Cognition: To be evaluated in coming days  Swallow: Regular/ thin liquids (0), pt to sit upright, small/single bites and sips, alternate liquids/solids - Swallow goals met 10/13.     Assessment: Pt achieved basic level verbal reasoning with 80% accuracy independently, min cues and prompts needed to achieve 100% accuracy. Category naming with 12/15 accuracy and adding to category with 9/15 accuracy and mod cues to add to a category and utilize word finding strategies    Other Barriers to Discharge (Family Training, etc):none anticipated from SLP

## 2024-10-17 NOTE — PLAN OF CARE
Goal Outcome Evaluation:      Plan of Care Reviewed With: patient    Overall Patient Progress: no changeOverall Patient Progress: no change    Outcome Evaluation: No change in pt progress.    Pt alert and oriented x4. Denies pain, shortness of breath, or chest pain. Continent of both bowel and bladder, LBM 10/16. Pt to start MAP 10/17. MOD I with walker in the room. Call light within reach, bed in the low position.

## 2024-10-17 NOTE — PLAN OF CARE
"Discharge Planner Post-Acute Rehab PT:      Discharge Plan: Home with OP PT ([place]).      Precautions: fall, L knee pain/OA, mild aphasia, [place] (has hearing aides)     Current Status: *trialing 4WW in PT  Bed Mobility: IND  Transfer: Mod I with FWW  Gait: Mod I with FWW, up to 300 ft  Stairs: 6x8\" steps with single rail and min A  Balance: Fair static, dynamic standing balance w/out UE support     Outcome Measures:   30 second sit to stand  10/2: 0  10/9: 6x with UE assist  10/17: 7x without UE assist  PASS               10/5: 7/36   10/14: 30/36  [place]              10/5: 3/56   10/14: 33/56  10mWT   10/9: 0.13 m/sec comfortable, 0.24 m/sec fast with FWW and CGA   10/17: 0.47/sec comfortable, 1.09 m/sec fast with FWW and SBA    Assessment:  Pt continues to be making significant progress in PT, with reassessment of 30\" sit<>stand and 10mWT/gait speed per above. Trialing 4WW in PT, which she does note be easier to use and with faster gait speed evident, although the patient still notes hesitancy/fear of it being too fast for her. Remains on track for discharge, with family training scheduled for Sat PM.    Other Barriers to Discharge (DME, Family Training, etc):   Equipment: Plan to issue FWW vs 4WW.  Family training Sat 10/19 PM.  1 KIP with rail. Split level with single rail.  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  "

## 2024-10-17 NOTE — PROGRESS NOTES
[facility]   Acute Rehabilitation Unit  Daily progress note    INTERVAL HISTORY  Chika Hurd was seen up in her room this morning.  No acute events overnight.  She reports to be feeling well overall, though was frustrated with trying to navigate her remote.  She denies any headache or other pain, dizziness or lightheadedness, shortness of breath, cough, nausea.  She has been having some difficulty with sleep since weaning Purewick (external catheter use).  She has been waking 2-3 times per night to urinate and last night was not able to get to the toilet before being incontinent despite modified independence.  She denies incontinence at home, but does note that she would get up frequently in the night to urinate, up to 5-6 times.  She was not bothered by that, as she notes bathroom was close and she would return to sleep easily.  She notes trying a medication for bladder in the past (unsure which) but stopped as she did not notice any difference.  Reviewed behavioral strategies again.  Also discussed plans to hold diuretic medication due to lower BP, this may help with urinary frequency as well.  Reviewed lab results.  Also spent significant time discussing social situation.  She is worried about who/how to care for her spouse; he is in a difficult stage of his illness.  Currently her daughter is caring for him.  She acknowledges that she cannot care for him initially on discharge as she was previously.  He likely needs additional supports.  She would like for him to have some type of day program where he could return home at night.  Discussed focusing on her own recovery and reviewed areas where she may need assist initially at home.  Plan is for family training with her daughter over the weekend and further discussion on discharge since she has made excellent functional progress.    With PT, patient continues to be making significant progress in PT, with reassessment of  "30\" sit<>stand and 10mWT/gait speed per above. Trialing 4WW in PT, which she does note be easier to use and with faster gait speed evident, although the patient still notes hesitancy/fear of it being too fast for her.  Remains on track for discharge, with family training scheduled for Sat PM.     MEDICATIONS  Current Facility-Administered Medications   Medication Dose Route Frequency Provider Last Rate Last Admin    - Medication Assessment Program - Rehab Services   Does not apply See Admin Instructions Yusuf Sapp MD        amLODIPine (NORVASC) tablet 5 mg  5 mg Oral QPM Vaishnavi Pacheco PA-C   5 mg at 10/16/24 1954    aspirin (ASA) chewable tablet 81 mg  81 mg Oral Daily Vaishnavi Pacheco PA-C   81 mg at 10/16/24 0754    atorvastatin (LIPITOR) tablet 40 mg  40 mg Oral Daily Vaishnavi Pacheco PA-C   40 mg at 10/16/24 0755    famotidine (PEPCID) tablet 20 mg  20 mg Oral BID Michael Self DO   20 mg at 10/16/24 1954    hydroCHLOROthiazide tablet 12.5 mg  12.5 mg Oral Daily Yusuf Sapp MD   12.5 mg at 10/15/24 0829    lisinopril (ZESTRIL) tablet 40 mg  40 mg Oral Daily Vaishnavi Pacheco PA-C   40 mg at 10/15/24 0829    multivitamin w/minerals (THERA-VIT-M) tablet 1 tablet  1 tablet Oral Daily Vaishnavi Pacheco PA-C   1 tablet at 10/16/24 0754          Current Facility-Administered Medications   Medication Dose Route Frequency Provider Last Rate Last Admin    acetaminophen (TYLENOL) tablet 650 mg  650 mg Oral Q4H PRN Vaishnavi Pacheco PA-C        diclofenac (VOLTAREN) 1 % topical gel 4 g  4 g Topical 4x Daily PRN Vaishnavi Pacheco PA-C        melatonin tablet 3 mg  3 mg Oral At Bedtime PRN Vaishnavi Pacheco PA-C        methocarbamol (ROBAXIN) tablet 500 mg  500 mg Oral 4x Daily PRN Yusuf Sapp MD        polyethylene glycol (MIRALAX) Packet 17 g  17 g Oral Daily PRN Vaishnavi Pacheco PA-C        senna-docusate (SENOKOT-S/PERICOLACE) 8.6-50 MG per tablet " 1-2 tablet  1-2 tablet Oral BID PRN Vaishnavi Pacheco PA-C            PHYSICAL EXAM  /56 (BP Location: Right arm)   Pulse 88   Temp 98.7  F (37.1  C) (Oral)   Resp 16   Wt 90 kg (198 lb 6.6 oz)   LMP  (LMP Unknown)   SpO2 94%   BMI 38.75 kg/m    Gen: NAD, up in chair  HEENT: NC/AT  Pulm: non-labored on room air  Abd:  non-distended  Ext: no edema in bilateral lower extremities and no tenderness at calves   Neuro/MSK: awake, alert, left ptosis, right facial droop, +aphasia but improving.       LABS  CBC RESULTS:   Recent Labs   Lab Test 10/17/24  0634 10/14/24  0633 10/10/24  0632   WBC 9.7 10.3 11.5*   RBC 4.37 4.14 4.08   HGB 13.2 12.7 12.2   HCT 39.7 38.2 37.1   MCV 91 92 91   MCH 30.2 30.7 29.9   MCHC 33.2 33.2 32.9   RDW 14.2 14.4 13.9    400 424       Last Basic Metabolic Panel:  Recent Labs   Lab Test 10/17/24  0634 10/14/24  0633 10/10/24  0632    137 133*   POTASSIUM 4.7 4.7 4.5   CHLORIDE 103 102 100   CO2 26 26 29   ANIONGAP 10 9 4*   * 97 94   BUN 15.3 12.2 18.6   CR 0.68 0.62 0.61   GFRESTIMATED 89 >90 >90   KAR 9.9 8.8 9.3         Rehabilitation   Admission to acute inpatient rehab 10/01/24.    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       Impairment of ADL's: Noted to have BLE weakness, impaired balance, impaired coordination, impaired activity tolerance, impaired alertness, and impaired cognition, all affecting her ability to safely and independently perform basic ADLs.  Goal for SBA with basic ADLs except min A for bathing.      Impairment of mobility:  Noted to have BLE weakness, impaired balance, impaired coordination, impaired activity tolerance, impaired alertness, and impaired  cognition, all affecting her ability to safely and independently perform basic mobility.  Goal for SBA with basic mobility with min A for stairs.      Impairment of cognition/language/swallow:  Noted to have dysphagia, dysarthria, aphasia, and impaired cognition with goals for safe tolerance of least restrictive diet and improved cognitive-linguistic skills to meet basic needs.     Continue comprehensive acute inpatient rehabilitation program with multidisciplinary approach including therapies, rehab nursing, and physiatry following. See interval history for updates.      ASSESSMENT AND PLAN  Chika Hurd is a 79 year old right hand dominant female with a past medical history of prior stroke (10/2023; left frontal lobe and corona radiata), hyperlipidemia, hypertension, central retinal artery occlusion of right eye, left breast cancer, GERD, prediabetes, sensorineural hearing loss, and obesity who was admitted on 9/25/24 with acute left basal ganglia hemorrhage with hospital course complicated by subacute stroke in left periatrial white matter, incidental saccular aneurysm, constipation, and conjunctivitis.  She was admitted to ARU on 10/1/24 for multidisciplinary rehabilitation and ongoing medical management.      Medical Conditions  New actions/orders/updates for today are in blue.     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  Presented with new gait instability and worsened dysarthria.  Imaging findings as above.  PTA on Plavix + aspirin, plan had been for 3 months.  Per  sending team, does not need to resume Plavix but ok'd to resume aspirin daily on 10/1.  - Continue ASA 81 mg daily (resumed on 10/1)  - Continue statin with LDL goal 40-70  - SBP goal 130-150 as inpatient, long-term outpatient goal <130/80.  Management as below.  - Repeat video swallow study on 10/7 with intermittent penetration with thin liquids but immediate ejection.  Per SLP, advanced to thins after bedside assessment and good tolerance.  Advanced to regular diet per SLP 10/11.  - Ziopatch x14 days  - Continue PT/OT/SLP  - Follow up with neurology in 6-8 weeks     Hyperlipidemia  - LDL goal 40-70 (LDL 55 on 9/26)  - Continue PTA atorvastatin 40 mg daily     Hypertension  - BP has been on low side and typically not receiving all daily scheduled anti-hypertensives per hold parameters.  Also having nocturia.  Will stop hydrochlorothiazide for now and continue to monitor.  - Continue PTA lisinopril 40 mg daily  - Continue amlodipine 5 mg daily (added 10/1)  - Monitor BP and adjust regimen as indicated     Hyponatremia, resolved  Mild at 134 on 10/1.  Suspect related to hydrochlorothiazide, mild hypovolemia.  BUN mildly elevated.  On slightly thick liquids.  10/17: Na WNL   - Monitor BMP every M/Th     GERD  - Continue PTA famotidine 20 mg BID     Constipation  In setting of decreased mobility  - Continue Miralax daily  - Monitor, additional PRN bowel meds available, can schedule if ongoing issues     Prediabetes  A1c 6.0% on 9/27/24  - Follow up with PCP for ongoing surveillance     Bilateral conjunctivitis, resolved  Started on ofloxacin at hospital on 9/29 due to purulent discharge from bilateral eyes with associated pain.  Completed course of ofloxacin as of 10/4.  No recurrent issues at ARU.    Skin   Mild erythema and skin maceration under the abdominal fold; noted and examined on 10/13. Placed new patient care order and will monitor         Adjustment to disability:  Clinical psychology to eval and treat if  indicated  FEN: regular diet, continue thin liquids; swallow strategies per SLP  Bowel: continent, recent constipation.  Continue scheduled Miralax.  Additional PRN bowel medications available.  Monitor and adjust regimen as indicated.  Bladder: continent/incontinent.  Timed toileting implemented on 10/7.  Wean/avoid Purewick use.  Patient cleared for modified independence to bathroom  DVT Prophylaxis: mechanical  GI Prophylaxis: pepcid  Code: full; as prior  Disposition: goal for home but some challenges with support especially as daughter caring for patient's spouse who has advanced dementia.  Daughter is pursuing assisted for both parents, her goal is for patient to move in there upon discharge from ARU.  Functionally, the patient has made enough progress to discharge home with IADL assistance only.  ELOS: target 10/22/24 to home versus GISELLA with outpatient PT, OT, speech therapy  Follow up Appointments on Discharge: PCP in 1-2 weeks, general neurology or stroke REUBEN in 6-8 weeks      30 minutes spent on the date of service doing chart review, history and exam, documentation, and further activities as noted above.      Vaishnavi Pacheco PA-C  Physical Medicine & Rehabilitation

## 2024-10-18 ENCOUNTER — APPOINTMENT (OUTPATIENT)
Dept: OCCUPATIONAL THERAPY | Facility: CLINIC | Age: 79
DRG: 057 | End: 2024-10-18
Attending: PHYSICAL MEDICINE & REHABILITATION
Payer: MEDICARE

## 2024-10-18 ENCOUNTER — APPOINTMENT (OUTPATIENT)
Dept: SPEECH THERAPY | Facility: CLINIC | Age: 79
DRG: 057 | End: 2024-10-18
Attending: PHYSICAL MEDICINE & REHABILITATION
Payer: MEDICARE

## 2024-10-18 ENCOUNTER — APPOINTMENT (OUTPATIENT)
Dept: PHYSICAL THERAPY | Facility: CLINIC | Age: 79
DRG: 057 | End: 2024-10-18
Attending: PHYSICAL MEDICINE & REHABILITATION
Payer: MEDICARE

## 2024-10-18 PROCEDURE — 99231 SBSQ HOSP IP/OBS SF/LOW 25: CPT | Performed by: PHYSICAL MEDICINE & REHABILITATION

## 2024-10-18 PROCEDURE — 250N000013 HC RX MED GY IP 250 OP 250 PS 637: Performed by: PHYSICAL MEDICINE & REHABILITATION

## 2024-10-18 PROCEDURE — 92507 TX SP LANG VOICE COMM INDIV: CPT | Mod: GN | Performed by: SPEECH-LANGUAGE PATHOLOGIST

## 2024-10-18 PROCEDURE — 97530 THERAPEUTIC ACTIVITIES: CPT | Mod: GP | Performed by: REHABILITATION PRACTITIONER

## 2024-10-18 PROCEDURE — 250N000013 HC RX MED GY IP 250 OP 250 PS 637: Performed by: PHYSICIAN ASSISTANT

## 2024-10-18 PROCEDURE — 128N000003 HC R&B REHAB

## 2024-10-18 PROCEDURE — 97535 SELF CARE MNGMENT TRAINING: CPT | Mod: GO | Performed by: OCCUPATIONAL THERAPIST

## 2024-10-18 PROCEDURE — 97116 GAIT TRAINING THERAPY: CPT | Mod: GP | Performed by: REHABILITATION PRACTITIONER

## 2024-10-18 PROCEDURE — 92507 TX SP LANG VOICE COMM INDIV: CPT | Mod: GN

## 2024-10-18 PROCEDURE — 97110 THERAPEUTIC EXERCISES: CPT | Mod: GP | Performed by: REHABILITATION PRACTITIONER

## 2024-10-18 RX ADMIN — FAMOTIDINE 20 MG: 20 TABLET ORAL at 10:05

## 2024-10-18 RX ADMIN — ASPIRIN 81 MG CHEWABLE TABLET 81 MG: 81 TABLET CHEWABLE at 10:05

## 2024-10-18 RX ADMIN — LISINOPRIL 40 MG: 40 TABLET ORAL at 10:08

## 2024-10-18 RX ADMIN — FAMOTIDINE 20 MG: 20 TABLET ORAL at 20:49

## 2024-10-18 RX ADMIN — Medication 1 TABLET: at 10:05

## 2024-10-18 RX ADMIN — ATORVASTATIN CALCIUM 40 MG: 40 TABLET, FILM COATED ORAL at 10:05

## 2024-10-18 ASSESSMENT — ACTIVITIES OF DAILY LIVING (ADL)
ADLS_ACUITY_SCORE: 33

## 2024-10-18 NOTE — PROGRESS NOTES
Sw received a message from patient's daughter Ramila; stating that she found a senior living place and made downpayment for the place for mum. She already spoke to Chika and she is agreeable.  asked; if she knows if she could move in on Tuesday. She shared they (senior living) would have to come and do an assessment first.    Family Training is tomorrow with the therapy.      GISELLA is called   [facility]  [place]-  [phone]      ALEJANDRA Barber  [facility], Acute Inpatient Rehab Unit   [address] [place] [address], [address] [place] [address]  Phone: [phone]  Fax: [phone]

## 2024-10-18 NOTE — PLAN OF CARE
Discharge Planner Post-Acute Rehab SLP:      Discharge Plan: RAMILA. Ongoing SLP     Precautions: fall risk     Current Status:  Hearing: Sensorineural hearing loss, bilateral hearing aides.  Vision: Readers  Communication: Mild to moderate motor speech deficits, Mild expressive and receptive aphasia.  Cognition: To be evaluated in coming days  Swallow: Regular/ thin liquids (0), pt to sit upright, small/single bites and sips, alternate liquids/solids - Swallow goals met 10/13.     Assessment: Patient participated in verbal and visual reasoning task, sequencing 3 picture scenes. Patient completed with 100% accuracy and gave desscription of story line independently. Generateive naming task: 8 items per category independently (10-12 items given min cue). Patient answered phone call independently and participated in conversation with daughter with mild word-finding difficulty.      Other Barriers to Discharge (Family Training, etc):none anticipated from SLP

## 2024-10-18 NOTE — PLAN OF CARE
Goal Outcome Evaluation:      Plan of Care Reviewed With: patient    Overall Patient Progress: no changeOverall Patient Progress: no change    A&O x 4, Mod I w/walker. Hard of hearing and mild aphasia. Reg, thin, whole (apple sauce) Pinkness under abd folds, refused intervention. Mix cont, LBM 10/18. Denies SOB, and chest pains. VSS, no acute changes this shift. Pt able to make needs known, bed low, locked, and call light within reach.     Additional info: seizure precaution

## 2024-10-18 NOTE — PLAN OF CARE
Goal Outcome Evaluation:    Overall Patient Progress: no change    Outcome Evaluation: No change in Pt progress this shift.    Pt is alert and oriented. Mod I with walker. Denied pain, SOB, CP, and n/t. Call light within reach. Pt appeared asleep during majority of the safety checks this shift. Will continue with POC.

## 2024-10-18 NOTE — PLAN OF CARE
Discharge Planner Post-Acute Rehab OT:      Discharge Plan: Home with HC OT services      Precautions: Falls, Left low back and knee pain, [place] - needs hearing aids     Current Status:  ADLs:  Mobility: MOD I with fww  Grooming: MOD I standing at sink w/ fww for washing hands/face  Dressing: UB: SBA seated. LB: SBA with AE seated to standing; Feet: SBA using figure four method in w/c  Bathing: Tx to shower chair/tub bench with fww and gb; Mod A tasks.  Toileting: MOD I w/ fww to walk to/from BR; SBA on/off raised toilet w/ gb and fww;  MOD I pericare standing with gb, though pt calling for assistance prn  IADLs: IND prior. Daughter assisted with yard work and grocery shopping.      Vision/Cognition: Pt wears glasses's at baseline as well as visual deficits at baseline from previous stroke. Pt completed SLUMS examinaiton with an overall score of 18/30, indicating cognitive deficits (normal range 25-30). Pt showed errors that indicate decreased short-term memory and procressing speed (e.g. short-term recall, naming animals, drawing correct time on clock); note that aphasia may contribute to score on formal assessment. Pt completed med task 10/16 with no errors and 5/5 correct. Recommend that pt can continue setting up medications upon d/c, as she did prior to admission.      Assessment: Pt participated in shower chair tx in walk-in shower with fww and gb as well as simple kitchen task with FWW and walker tray. Pt noted fatigue and L knee pain towards end of session requiring rest break, however showed progress in standing activity tolerance this date.      Other Barriers to Discharge (DME, Family Training, etc):   FT: Saturday 10/19 at 1:30p with dtr  DME Needs: Shower chair, reacher, and walker tray    OT: Per chart review, pt lives in a split level home in [facility] with spouse. 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 Pt has a tub/shower and combo and walk in shower with grab bars. Pt uses walk in shower. Pt has  an elevated toilet seat and a regular toilet seat with vanity nearby. Pt reports having a walk out on the lower level.   Barriers to home: stairs

## 2024-10-18 NOTE — PLAN OF CARE
"Discharge Planner Post-Acute Rehab PT:      Discharge Plan: Home with OP PT ([place]).      Precautions: fall, L knee pain/OA, mild aphasia, [place] (has hearing aides)     Current Status: *trialing 4WW in PT  Bed Mobility: IND  Transfer: Mod I with FWW  Gait: Mod I with FWW, up to 300 ft  Stairs: 6x8\" steps with single rail and min A  Balance: Fair static, dynamic standing balance w/out UE support     Outcome Measures:   30 second sit to stand  10/2: 0  10/9: 6x with UE assist  10/17: 7x without UE assist  PASS               10/5: 7/36   10/14: 30/36  [place]              10/5: 3/56   10/14: 33/56  10mWT   10/9: 0.13 m/sec comfortable, 0.24 m/sec fast with FWW and CGA   10/17: 0.47/sec comfortable, 1.09 m/sec fast with FWW and SBA    Assessment: pt cont to progress with functional mob and general safety. Still needing at time V.c for  of hand placement when sitting and use of 4WW .   Other Barriers to Discharge (DME, Family Training, etc):   Equipment: Plan to issue FWW vs 4WW.  Family training Sat 10/19 PM.  1 KIP with rail. Split level with single rail.  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  "

## 2024-10-18 NOTE — PROGRESS NOTES
"  [facility]   Acute Rehabilitation Unit  Daily progress note    INTERVAL HISTORY  Chika Hurd was seen up in her room this morning.  No acute events overnight.  Karma has no new concerns or complaints today and family training is scheduled for tomorrow.  She denies chest pain, shortness of breath, fevers, chills, however is asking about return to driving.  I discussed that she is not cleared to drive at this time and outpatient occupational therapy will help navigate the return to driving process.  She expresses ongoing concern about care for her  who has dementia.  At this time Karma has progressed very well, however would be unable to take care of someone else in addition to herself.  Daughter continues to look into assisted living facilities, and she will be evaluated by one in person in the upcoming days.    Current Functional Status:  PT:  Bed Mobility: IND  Transfer: Mod I with FWW  Gait: Mod I with FWW, up to 300 ft  Stairs: 6x8\" steps with single rail and min A  Balance: Fair static, dynamic standing balance w/out UE support     Outcome Measures:   30 second sit to stand  10/2: 0  10/9: 6x with UE assist  10/17: 7x without UE assist  PASS               10/5: 7/36              10/14: 30/36  Cueto              10/5: 3/56              10/14: 33/56  10mWT              10/9: 0.13 m/sec comfortable, 0.24 m/sec fast with FWW and CGA              10/17: 0.47/sec comfortable, 1.09 m/sec fast with FWW and SBA    OT:  ADLs:  Mobility: MOD I with fww  Grooming: MOD I standing at sink w/ fww for washing hands/face  Dressing: UB: SBA seated. LB: SBA with AE seated to standing; Feet: SBA using figure four method in w/c  Bathing: Tx to shower chair/tub bench with fww and gb; Mod A tasks.  Toileting: MOD I w/ fww to walk to/from BR; SBA on/off raised toilet w/ gb and fww;  MOD I pericare standing with gb, though pt calling for assistance prn  IADLs: IND prior. Daughter " assisted with yard work and grocery shopping.      Vision/Cognition: Pt wears glasses's at baseline as well as visual deficits at baseline from previous stroke. Pt completed SLUMS examinaiton with an overall score of 18/30, indicating cognitive deficits (normal range 25-30). Pt showed errors that indicate decreased short-term memory and procressing speed (e.g. short-term recall, naming animals, drawing correct time on clock); note that aphasia may contribute to score on formal assessment. Pt completed med task 10/16 with no errors and 5/5 correct. Recommend that pt can continue setting up medications upon d/c, as she did prior to admission.     SLP:  Hearing: Sensorineural hearing loss, bilateral hearing aides.  Vision: Readers  Communication: Mild to moderate motor speech deficits, Mild expressive and receptive aphasia.  Cognition: To be evaluated in coming days  Swallow: Regular/ thin liquids (0), pt to sit upright, small/single bites and sips, alternate liquids/solids - Swallow goals met 10/13.     Assessment: Patient participated in verbal and visual reasoning task, sequencing 3 picture scenes. Patient completed with 100% accuracy and gave desscription of story line independently. Generateive naming task: 8 items per category independently (10-12 items given min cue). Patient answered phone call independently and participated in conversation with daughter with mild word-finding difficulty.     MEDICATIONS  Current Facility-Administered Medications   Medication Dose Route Frequency Provider Last Rate Last Admin    amLODIPine (NORVASC) tablet 5 mg  5 mg Oral QPM Vaishnavi Pacheco PA-C   5 mg at 10/17/24 2011    aspirin (ASA) chewable tablet 81 mg  81 mg Oral Daily Vaishnavi Pacheco PA-C   81 mg at 10/18/24 1005    atorvastatin (LIPITOR) tablet 40 mg  40 mg Oral Daily Vaishnavi Pacheco PA-C   40 mg at 10/18/24 1005    famotidine (PEPCID) tablet 20 mg  20 mg Oral BID Michael Self DO   20 mg at  10/18/24 1005    lisinopril (ZESTRIL) tablet 40 mg  40 mg Oral Daily Vaishnavi Pacheco PA-C   40 mg at 10/18/24 1008    multivitamin w/minerals (THERA-VIT-M) tablet 1 tablet  1 tablet Oral Daily Vaishnavi Pacheco PA-C   1 tablet at 10/18/24 1005          Current Facility-Administered Medications   Medication Dose Route Frequency Provider Last Rate Last Admin    acetaminophen (TYLENOL) tablet 650 mg  650 mg Oral Q4H PRN Vaishnavi Pacheco PA-C        diclofenac (VOLTAREN) 1 % topical gel 4 g  4 g Topical 4x Daily PRN Vaishnavi Pacheco PA-C        melatonin tablet 3 mg  3 mg Oral At Bedtime PRN Vaishnavi Pacheco PA-C        methocarbamol (ROBAXIN) tablet 500 mg  500 mg Oral 4x Daily PRN Yusuf Sapp MD        polyethylene glycol (MIRALAX) Packet 17 g  17 g Oral Daily PRN Vaishnavi Pacheco PA-C        senna-docusate (SENOKOT-S/PERICOLACE) 8.6-50 MG per tablet 1-2 tablet  1-2 tablet Oral BID PRN Vaishnavi Pacheco PA-C            PHYSICAL EXAM  /52 (BP Location: Left arm)   Pulse 77   Temp 97.5  F (36.4  C) (Oral)   Resp 14   Wt 80.6 kg (177 lb 12.8 oz)   LMP  (LMP Unknown)   SpO2 95%   BMI 34.72 kg/m    Gen: NAD, up in chair  HEENT: NC/AT  CVS: RRR, S1+S2, no m/r/g  Pulm: non-labored on room air, CTA b/l, no w/r/r  Abd:  non-distended, soft, NT, BS+  Ext: no edema in bilateral lower extremities and no tenderness at calves   Neuro/MSK: awake, alert, left ptosis, right facial droop, +Broca's aphasia but improving.       LABS  CBC RESULTS:   Recent Labs   Lab Test 10/17/24  0634 10/14/24  0633 10/10/24  0632   WBC 9.7 10.3 11.5*   RBC 4.37 4.14 4.08   HGB 13.2 12.7 12.2   HCT 39.7 38.2 37.1   MCV 91 92 91   MCH 30.2 30.7 29.9   MCHC 33.2 33.2 32.9   RDW 14.2 14.4 13.9    400 424       Last Basic Metabolic Panel:  Recent Labs   Lab Test 10/17/24  0634 10/14/24  0633 10/10/24  0632    137 133*   POTASSIUM 4.7 4.7 4.5   CHLORIDE 103 102 100   CO2 26 26 29   ANIONGAP 10 9  4*   * 97 94   BUN 15.3 12.2 18.6   CR 0.68 0.62 0.61   GFRESTIMATED 89 >90 >90   KAR 9.9 8.8 9.3         Rehabilitation   Admission to acute inpatient rehab 10/01/24.    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       Impairment of ADL's: Noted to have BLE weakness, impaired balance, impaired coordination, impaired activity tolerance, impaired alertness, and impaired cognition, all affecting her ability to safely and independently perform basic ADLs.  Goal for SBA with basic ADLs except min A for bathing.      Impairment of mobility:  Noted to have BLE weakness, impaired balance, impaired coordination, impaired activity tolerance, impaired alertness, and impaired cognition, all affecting her ability to safely and independently perform basic mobility.  Goal for SBA with basic mobility with min A for stairs.      Impairment of cognition/language/swallow:  Noted to have dysphagia, dysarthria, aphasia, and impaired cognition with goals for safe tolerance of least restrictive diet and improved cognitive-linguistic skills to meet basic needs.     Continue comprehensive acute inpatient rehabilitation program with multidisciplinary approach including therapies, rehab nursing, and physiatry following. See interval history for updates.      ASSESSMENT AND PLAN  Chika Hurd is a 79 year old right hand dominant female with a past medical history of prior stroke (10/2023; left frontal lobe and corona radiata), hyperlipidemia, hypertension, central retinal artery occlusion of right eye, left breast cancer, GERD, prediabetes, sensorineural hearing loss, and obesity who was admitted on 9/25/24 with acute left basal ganglia hemorrhage with  hospital course complicated by subacute stroke in left periatrial white matter, incidental saccular aneurysm, constipation, and conjunctivitis.  She was admitted to ARU on 10/1/24 for multidisciplinary rehabilitation and ongoing medical management.      Medical Conditions  New actions/orders/updates for today are in blue.     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  Presented with new gait instability and worsened dysarthria.  Imaging findings as above.  PTA on Plavix + aspirin, plan had been for 3 months.  Per sending team, does not need to resume Plavix but ok'd to resume aspirin daily on 10/1.  - Continue ASA 81 mg daily (resumed on 10/1)  - Continue statin with LDL goal 40-70  - SBP goal 130-150 as inpatient, long-term outpatient goal <130/80.  Management as below.  - Repeat video swallow study on 10/7 with intermittent penetration with thin liquids but immediate ejection.  Per SLP, advanced to thins after bedside assessment and good tolerance.  Advanced to regular diet per SLP 10/11.  - Ziopatch x14 days  - Continue PT/OT/SLP  - Follow up with neurology in 6-8 weeks     Hyperlipidemia  - LDL goal 40-70 (LDL 55 on 9/26)  - Continue PTA atorvastatin 40 mg daily     Hypertension  - BP has been on low side and typically not receiving all daily scheduled anti-hypertensives per hold parameters.  Also having nocturia.  Stopped hydrochlorothiazide 10/17.  continue to monitor.  - Continue PTA lisinopril 40 mg daily  - Continue amlodipine 5 mg daily (added 10/1)  - Monitor BP and  adjust regimen as indicated     Hyponatremia, resolved  Mild at 134 on 10/1.  Suspect related to hydrochlorothiazide, mild hypovolemia.  BUN mildly elevated.  On slightly thick liquids.  10/17: Na WNL   - Monitor BMP every M/Th     GERD  - Continue PTA famotidine 20 mg BID     Constipation  In setting of decreased mobility  - Continue Miralax daily  - Monitor, additional PRN bowel meds available, can schedule if ongoing issues     Prediabetes  A1c 6.0% on 9/27/24  - Follow up with PCP for ongoing surveillance     Bilateral conjunctivitis, resolved  Started on ofloxacin at hospital on 9/29 due to purulent discharge from bilateral eyes with associated pain.  Completed course of ofloxacin as of 10/4.  No recurrent issues at ARU.    Skin   Mild erythema and skin maceration under the abdominal fold; noted and examined on 10/13. Placed new patient care order and will monitor         Adjustment to disability:  Clinical psychology to eval and treat if indicated  FEN: regular diet, continue thin liquids; swallow strategies per SLP  Bowel: continent, recent constipation.  Continue scheduled Miralax.  Additional PRN bowel medications available.  Monitor and adjust regimen as indicated.  Bladder: continent/incontinent.  Timed toileting implemented on 10/7.  Wean/avoid Purewick use.  Patient cleared for modified independence to bathroom  DVT Prophylaxis: mechanical  GI Prophylaxis: pepcid  Code: full; as prior  Disposition: goal for home but some challenges with support especially as daughter caring for patient's spouse who has advanced dementia.  Daughter is pursuing [facility] for both parents, her goal is for patient to move in there upon discharge from ARU.  Functionally, the patient has made enough progress to discharge home with IADL assistance only.  [facility] facility will be evaluating the patient for appropriateness in the upcoming days.   ELOS: target 10/22/24 to home versus [facility] with outpatient PT, OT, speech therapy  Follow up  Appointments on Discharge: PCP in 1-2 weeks, general neurology or stroke REUBEN in 6-8 weeks      30 minutes spent on the date of service doing chart review, history and exam, documentation, and further activities as noted above.    Thad Sapp MD  Department of Rehabilitation Medicine

## 2024-10-18 NOTE — PLAN OF CARE
Goal Outcome Evaluation:           Overall Patient Progress: improvingOverall Patient Progress: improving    Outcome Evaluation: Pt on MAP. Continent of bowel and bladder.    Pt alert and oriented x4. Continent of both bowel and bladder, LBM 10/16. Pt called appropriately for MAP and successfully determined medications needed. Call light within reach, bed in low position.

## 2024-10-18 NOTE — PROGRESS NOTES
Felipe called and spoke to Audelia and was transferred to Nurse. She shared she was not sure when patient could move in. The decision would be up to marketing. But she can come and do her assessment on Tuesday. She wanted  to fax h&p, nursing and therapy notes to determine patient needs.   They would know more on Monday when patient could move in but Tuesday would be too soon.     [facility]  Audelia-  [phone]  Nurse fax: [phone]  Tel: [phone]    Felipe faxed requested information

## 2024-10-18 NOTE — PLAN OF CARE
Goal Outcome Evaluation:      Plan of Care Reviewed With: patient    Overall Patient Progress: no change    Outcome Evaluation: patient continent of bowel this shift per patient report. mixed continence of bladder. encouraging patient to void every 2-3 hours to limit incontinence. patient called for meds at approx 10am, chose correctly. will continue through the day with MAP.    Declined cares to abdominal folds.     Addendum: Per communication with charge, MAP will be discontinued at this time.

## 2024-10-18 NOTE — PROGRESS NOTES
[facility]   Acute Rehabilitation Unit  Daily progress note    INTERVAL HISTORY  Weekend and therapy notes reviewed, no acute events reported.    Chika Hurd was seen up in her room this afternoon with spouse and daughter present.  She reports that she is feeling well overall though fatigued.  Notes fatigue has been present throughout admission.  She slept from about 10pm-2am, then woke to urinate and returned to sleep until almost 6am.  She was not able to return to sleep after that.  She notes some dizziness when she first gets up in the morning; reviewed some strategies for that.  She denies headache or other pain, shortness of breath, nausea.      With OT, patient progressing in dynamic standing balance activities via use of dynavision and ring tree. No LOB noted. Patient advancing in target reaches in dynavision.  Educated that pt is currently in unsafe  range and pt agreeable that she does not have reaction or processing speeds yet in able to return to driving.     MEDICATIONS  Current Facility-Administered Medications   Medication Dose Route Frequency Provider Last Rate Last Admin    - Medication Assessment Program - Rehab Services   Does not apply See Admin Instructions Yusuf Sapp MD        amLODIPine (NORVASC) tablet 5 mg  5 mg Oral QPM Vaishnavi Pacheco PA-C   5 mg at 10/17/24 2011    aspirin (ASA) chewable tablet 81 mg  81 mg Oral Daily Vaishnavi Pacheco PA-C   81 mg at 10/17/24 0914    atorvastatin (LIPITOR) tablet 40 mg  40 mg Oral Daily Vaishnavi Pacheco PA-C   40 mg at 10/17/24 0914    famotidine (PEPCID) tablet 20 mg  20 mg Oral BID Michael Self DO   20 mg at 10/17/24 2011    lisinopril (ZESTRIL) tablet 40 mg  40 mg Oral Daily Vaishnavi Pacheco PA-C   40 mg at 10/15/24 0829    multivitamin w/minerals (THERA-VIT-M) tablet 1 tablet  1 tablet Oral Daily Vaishnavi Pacheco PA-C   1 tablet at 10/17/24 0914          Current  Facility-Administered Medications   Medication Dose Route Frequency Provider Last Rate Last Admin    acetaminophen (TYLENOL) tablet 650 mg  650 mg Oral Q4H PRN Vaishnavi Pacheco PA-C        diclofenac (VOLTAREN) 1 % topical gel 4 g  4 g Topical 4x Daily PRN Vaishnavi Pacheco PA-C        melatonin tablet 3 mg  3 mg Oral At Bedtime PRN Vaishnavi Pacheco PA-C        methocarbamol (ROBAXIN) tablet 500 mg  500 mg Oral 4x Daily PRN Yusuf Sapp MD        polyethylene glycol (MIRALAX) Packet 17 g  17 g Oral Daily PRN Vaishnavi Pacheco PA-C        senna-docusate (SENOKOT-S/PERICOLACE) 8.6-50 MG per tablet 1-2 tablet  1-2 tablet Oral BID PRN Vaishnavi Pacheco PA-C            PHYSICAL EXAM  /62 (BP Location: Right arm)   Pulse 94   Temp 97.6  F (36.4  C) (Oral)   Resp 16   Wt 80.6 kg (177 lb 12.8 oz)   LMP  (LMP Unknown)   SpO2 94%   BMI 34.72 kg/m    Gen: NAD, up in chair  HEENT: NC/AT  Pulm: non-labored on room air  Abd:  non-distended  Ext: no edema in bilateral lower extremities and no tenderness at calves   Neuro/MSK: awake, alert, left ptosis, right facial droop, +aphasia but improving.       LABS  CBC RESULTS:   Recent Labs   Lab Test 10/17/24  0634 10/14/24  0633 10/10/24  0632   WBC 9.7 10.3 11.5*   RBC 4.37 4.14 4.08   HGB 13.2 12.7 12.2   HCT 39.7 38.2 37.1   MCV 91 92 91   MCH 30.2 30.7 29.9   MCHC 33.2 33.2 32.9   RDW 14.2 14.4 13.9    400 424       Last Basic Metabolic Panel:  Recent Labs   Lab Test 10/17/24  0634 10/14/24  0633 10/10/24  0632    137 133*   POTASSIUM 4.7 4.7 4.5   CHLORIDE 103 102 100   CO2 26 26 29   ANIONGAP 10 9 4*   * 97 94   BUN 15.3 12.2 18.6   CR 0.68 0.62 0.61   GFRESTIMATED 89 >90 >90   KAR 9.9 8.8 9.3         Rehabilitation   Admission to acute inpatient rehab 10/01/24.    Impairment group code: Stroke Vascular Hemorrhagic 01.2 (R) Body Involvement (L) Brain; Non-traumatic intracerebral hemorrhage of left basal ganglia likely  hypertensive etiology         PT, OT and SLP 60 minutes of each daily for 6 days per week for 18 days, in addition to rehab nursing and close management of physiatrist.       Impairment of ADL's: Noted to have BLE weakness, impaired balance, impaired coordination, impaired activity tolerance, impaired alertness, and impaired cognition, all affecting her ability to safely and independently perform basic ADLs.  Goal for SBA with basic ADLs except min A for bathing.      Impairment of mobility:  Noted to have BLE weakness, impaired balance, impaired coordination, impaired activity tolerance, impaired alertness, and impaired cognition, all affecting her ability to safely and independently perform basic mobility.  Goal for SBA with basic mobility with min A for stairs.      Impairment of cognition/language/swallow:  Noted to have dysphagia, dysarthria, aphasia, and impaired cognition with goals for safe tolerance of least restrictive diet and improved cognitive-linguistic skills to meet basic needs.     Continue comprehensive acute inpatient rehabilitation program with multidisciplinary approach including therapies, rehab nursing, and physiatry following. See interval history for updates.      ASSESSMENT AND PLAN  Chika Hurd is a 79 year old right hand dominant female with a past medical history of prior stroke (10/2023; left frontal lobe and corona radiata), hyperlipidemia, hypertension, central retinal artery occlusion of right eye, left breast cancer, GERD, prediabetes, sensorineural hearing loss, and obesity who was admitted on 9/25/24 with acute left basal ganglia hemorrhage with hospital course complicated by subacute stroke in left periatrial white matter, incidental saccular aneurysm, constipation, and conjunctivitis.  She was admitted to ARU on 10/1/24 for multidisciplinary rehabilitation and ongoing medical management.      Medical Conditions  New actions/orders/updates for today are in blue.      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  Presented with new gait instability and worsened dysarthria.  Imaging findings as above.  PTA on Plavix + aspirin, plan had been for 3 months.  Per sending team, does not need to resume Plavix but ok'd to resume aspirin daily on 10/1.  - Continue ASA 81 mg daily (resumed on 10/1)  - Continue statin with LDL goal 40-70  - SBP goal 130-150 as inpatient, long-term outpatient goal <130/80.  Management as below.  - Repeat video swallow study on 10/7 with intermittent penetration with thin liquids but immediate ejection.  Per SLP, advanced to thins after bedside assessment and good tolerance.  Advanced to regular diet per SLP 10/11.  - Ziopatch x14 days  - Continue PT/OT/SLP  - Follow up with neurology in 6-8 weeks     Hyperlipidemia  - LDL goal 40-70 (LDL 55 on 9/26)  - Continue PTA atorvastatin 40 mg daily     Hypertension  - BP overall at current goal, still missing some doses even since hydrochlorothiazide stopped.  Continue to monitor in case further adjustments are indicated  - Continue PTA lisinopril 40 mg daily  - Continue amlodipine 5 mg daily (added 10/1)  - PTA hydrochlorothiazide stopped on 10/17 due to soft BP and nocturia  - Monitor BP and adjust regimen as indicated     Hyponatremia, resolved  Mild at 134 on 10/1.  Suspect related to hydrochlorothiazide, mild hypovolemia.  BUN mildly elevated.  On slightly thick liquids.  10/21: Na WNL  - Monitor BMP every M/Th     GERD  - Continue PTA famotidine 20 mg BID     Constipation  In  setting of decreased mobility  - Continue Miralax daily  - Monitor, additional PRN bowel meds available, can schedule if ongoing issues     Prediabetes  A1c 6.0% on 9/27/24  - Follow up with PCP for ongoing surveillance     Bilateral conjunctivitis, resolved  Started on ofloxacin at hospital on 9/29 due to purulent discharge from bilateral eyes with associated pain.  Completed course of ofloxacin as of 10/4.  No recurrent issues at ARU.    Skin   Mild erythema and skin maceration under the abdominal fold; noted and examined on 10/13. Placed new patient care order and will monitor         Adjustment to disability:  Clinical psychology to eval and treat if indicated  FEN: regular diet, continue thin liquids; swallow strategies per SLP  Bowel: continent, recent constipation.  Continue scheduled Miralax.  Additional PRN bowel medications available.  Monitor and adjust regimen as indicated.  Bladder: continent/incontinent.  Timed toileting implemented on 10/7.  Wean/avoid Purewick use.  Patient cleared for modified independence to bathroom  DVT Prophylaxis: mechanical  GI Prophylaxis: pepcid  Code: full; as prior  Disposition: now planning for [facility] with outpatient PT, OT, speech therapy as patient unable to care for spouse (being cared for by daughter) and unsafe to live together given his advanced dementia.  Functionally, the patient has made enough progress to discharge with IADL assistance.  ELOS: discharge date pending [facility] acceptance/availability  Follow up Appointments on Discharge: PCP in 1-2 weeks, general neurology or stroke RE[facility]EN in 6-8 weeks      25 minutes spent on the date of service doing chart review, history and exam, documentation, and further activities as noted above.    Plan of care was discussed with Dr. Thad Sapp, PM&R staff physician     Vaishnavi Pacheco PA-C  Physical Medicine & Rehabilitation

## 2024-10-19 ENCOUNTER — APPOINTMENT (OUTPATIENT)
Dept: PHYSICAL THERAPY | Facility: CLINIC | Age: 79
DRG: 057 | End: 2024-10-19
Attending: PHYSICAL MEDICINE & REHABILITATION
Payer: MEDICARE

## 2024-10-19 ENCOUNTER — APPOINTMENT (OUTPATIENT)
Dept: OCCUPATIONAL THERAPY | Facility: CLINIC | Age: 79
DRG: 057 | End: 2024-10-19
Attending: PHYSICAL MEDICINE & REHABILITATION
Payer: MEDICARE

## 2024-10-19 PROCEDURE — 250N000013 HC RX MED GY IP 250 OP 250 PS 637: Performed by: PHYSICIAN ASSISTANT

## 2024-10-19 PROCEDURE — 97150 GROUP THERAPEUTIC PROCEDURES: CPT | Mod: GP

## 2024-10-19 PROCEDURE — 128N000003 HC R&B REHAB

## 2024-10-19 PROCEDURE — 99231 SBSQ HOSP IP/OBS SF/LOW 25: CPT | Performed by: PHYSICAL MEDICINE & REHABILITATION

## 2024-10-19 PROCEDURE — 92507 TX SP LANG VOICE COMM INDIV: CPT | Mod: GN | Performed by: SPEECH-LANGUAGE PATHOLOGIST

## 2024-10-19 PROCEDURE — 97530 THERAPEUTIC ACTIVITIES: CPT | Mod: GP | Performed by: PHYSICAL THERAPIST

## 2024-10-19 PROCEDURE — 97530 THERAPEUTIC ACTIVITIES: CPT | Mod: GO | Performed by: OCCUPATIONAL THERAPIST

## 2024-10-19 PROCEDURE — 250N000013 HC RX MED GY IP 250 OP 250 PS 637: Performed by: PHYSICAL MEDICINE & REHABILITATION

## 2024-10-19 RX ADMIN — AMLODIPINE BESYLATE 5 MG: 5 TABLET ORAL at 20:14

## 2024-10-19 RX ADMIN — ASPIRIN 81 MG CHEWABLE TABLET 81 MG: 81 TABLET CHEWABLE at 09:10

## 2024-10-19 RX ADMIN — ATORVASTATIN CALCIUM 40 MG: 40 TABLET, FILM COATED ORAL at 09:10

## 2024-10-19 RX ADMIN — LISINOPRIL 40 MG: 40 TABLET ORAL at 09:09

## 2024-10-19 RX ADMIN — Medication 1 TABLET: at 09:10

## 2024-10-19 RX ADMIN — FAMOTIDINE 20 MG: 20 TABLET ORAL at 20:14

## 2024-10-19 RX ADMIN — FAMOTIDINE 20 MG: 20 TABLET ORAL at 09:10

## 2024-10-19 ASSESSMENT — ACTIVITIES OF DAILY LIVING (ADL)
ADLS_ACUITY_SCORE: 33

## 2024-10-19 NOTE — PROGRESS NOTES
"  [facility]   Acute Rehabilitation Unit  Daily progress note    INTERVAL HISTORY  Chiak Hurd was seen up in her room this morning.  No acute events overnight.  Karma has no new concerns or complaints .  She denies chest pain, shortness of breath, fevers, chills.  Daughter continues to look into assisted living facilities, and she will be evaluated by one in person in the upcoming days.    Current Functional Status:  Bed Mobility: IND  Transfer: Mod I with FWW  Gait: Mod I with FWW, up to 300 ft  Stairs: 6x8\" steps with single rail and CGA  Balance: Fair static, dynamic standing balance w/out UE support     Outcome Measures:   30 second sit to stand  10/2: 0  10/9: 6x with UE assist  10/17: 7x without UE assist  PASS               10/5: 7/36              10/14: 30/36  Cueto              10/5: 3/56              10/14: 33/56  10mWT              10/9: 0.13 m/sec comfortable, 0.24 m/sec fast with FWW and CGA              10/17: 0.47/sec comfortable, 1.09 m/sec fast with FWW and SBA    MEDICATIONS  Current Facility-Administered Medications   Medication Dose Route Frequency Provider Last Rate Last Admin    amLODIPine (NORVASC) tablet 5 mg  5 mg Oral QPM Vaishnavi Pacheco PA-C   5 mg at 10/17/24 2011    aspirin (ASA) chewable tablet 81 mg  81 mg Oral Daily Vaishnavi Pacheco PA-C   81 mg at 10/19/24 0910    atorvastatin (LIPITOR) tablet 40 mg  40 mg Oral Daily Vaishnavi Pacheco PA-C   40 mg at 10/19/24 0910    famotidine (PEPCID) tablet 20 mg  20 mg Oral BID Michael Self DO   20 mg at 10/19/24 0910    lisinopril (ZESTRIL) tablet 40 mg  40 mg Oral Daily Vaishnavi Pacheco PA-C   40 mg at 10/19/24 0909    multivitamin w/minerals (THERA-VIT-M) tablet 1 tablet  1 tablet Oral Daily Vaishnavi Pacheco PA-C   1 tablet at 10/19/24 0910          Current Facility-Administered Medications   Medication Dose Route Frequency Provider Last Rate Last Admin    acetaminophen " (TYLENOL) tablet 650 mg  650 mg Oral Q4H PRN Vaishnavi Pacheco PA-C        diclofenac (VOLTAREN) 1 % topical gel 4 g  4 g Topical 4x Daily PRN Vaishnavi Pacheco PA-C        melatonin tablet 3 mg  3 mg Oral At Bedtime PRN Vaishnavi Pacheco PA-C        methocarbamol (ROBAXIN) tablet 500 mg  500 mg Oral 4x Daily PRN Yusuf Sapp MD        polyethylene glycol (MIRALAX) Packet 17 g  17 g Oral Daily PRN Vaishnavi Pacheco PA-C        senna-docusate (SENOKOT-S/PERICOLACE) 8.6-50 MG per tablet 1-2 tablet  1-2 tablet Oral BID PRN Vaishnavi Pacheco PA-C            PHYSICAL EXAM  /50 (BP Location: Left arm)   Pulse 77   Temp 97.1  F (36.2  C) (Oral)   Resp 16   Wt 80.6 kg (177 lb 12.8 oz)   LMP  (LMP Unknown)   SpO2 95%   BMI 34.72 kg/m    Gen: NAD, up in chair  HEENT: NC/AT  CVS: RRR, S1+S2, no m/r/g  Pulm: non-labored on room air, CTA b/l, no w/r/r  Abd:  non-distended, soft, NT, BS+  Ext: no edema in bilateral lower extremities and no tenderness at calves   Neuro/MSK: awake, alert, left ptosis, right facial droop, +Broca's aphasia but improving.       LABS  CBC RESULTS:   Recent Labs   Lab Test 10/17/24  0634 10/14/24  0633 10/10/24  0632   WBC 9.7 10.3 11.5*   RBC 4.37 4.14 4.08   HGB 13.2 12.7 12.2   HCT 39.7 38.2 37.1   MCV 91 92 91   MCH 30.2 30.7 29.9   MCHC 33.2 33.2 32.9   RDW 14.2 14.4 13.9    400 424       Last Basic Metabolic Panel:  Recent Labs   Lab Test 10/17/24  0634 10/14/24  0633 10/10/24  0632    137 133*   POTASSIUM 4.7 4.7 4.5   CHLORIDE 103 102 100   CO2 26 26 29   ANIONGAP 10 9 4*   * 97 94   BUN 15.3 12.2 18.6   CR 0.68 0.62 0.61   GFRESTIMATED 89 >90 >90   KAR 9.9 8.8 9.3         Rehabilitation   Admission to acute inpatient rehab 10/01/24.    Impairment group code: Stroke Vascular Hemorrhagic 01.2 (R) Body Involvement (L) Brain; Non-traumatic intracerebral hemorrhage of left basal ganglia likely hypertensive etiology         PT, OT and SLP 60  minutes of each daily for 6 days per week for 18 days, in addition to rehab nursing and close management of physiatrist.       Impairment of ADL's: Noted to have BLE weakness, impaired balance, impaired coordination, impaired activity tolerance, impaired alertness, and impaired cognition, all affecting her ability to safely and independently perform basic ADLs.  Goal for SBA with basic ADLs except min A for bathing.      Impairment of mobility:  Noted to have BLE weakness, impaired balance, impaired coordination, impaired activity tolerance, impaired alertness, and impaired cognition, all affecting her ability to safely and independently perform basic mobility.  Goal for SBA with basic mobility with min A for stairs.      Impairment of cognition/language/swallow:  Noted to have dysphagia, dysarthria, aphasia, and impaired cognition with goals for safe tolerance of least restrictive diet and improved cognitive-linguistic skills to meet basic needs.     Continue comprehensive acute inpatient rehabilitation program with multidisciplinary approach including therapies, rehab nursing, and physiatry following. See interval history for updates.      ASSESSMENT AND PLAN  Chika Hurd is a 79 year old right hand dominant female with a past medical history of prior stroke (10/2023; left frontal lobe and corona radiata), hyperlipidemia, hypertension, central retinal artery occlusion of right eye, left breast cancer, GERD, prediabetes, sensorineural hearing loss, and obesity who was admitted on 9/25/24 with acute left basal ganglia hemorrhage with hospital course complicated by subacute stroke in left periatrial white matter, incidental saccular aneurysm, constipation, and conjunctivitis.  She was admitted to ARU on 10/1/24 for multidisciplinary rehabilitation and ongoing medical management.      Medical Conditions  New actions/orders/updates for today are in blue.     Non-traumatic intracerebral hemorrhage of left basal  ganglia likely hypertensive etiology  Subacute stroke in left periatrial white matter, ESUS   Incidental saccular aneurysm (4 mm at anterior communicating artery)   Hx prior ischemic CVA (10/2023; left frontal lobe and corona radiata)  Deficits: impaired strength, impaired balance, impaired coordination, impaired cognition, mild oropharyngeal dysphagia, mild anomic aphasia, dysarthria  Mild residual dysarthria from initial CVA.  Presented with new gait instability and worsened dysarthria.  Imaging findings as above.  PTA on Plavix + aspirin, plan had been for 3 months.  Per sending team, does not need to resume Plavix but ok'd to resume aspirin daily on 10/1.  - Continue ASA 81 mg daily (resumed on 10/1)  - Continue statin with LDL goal 40-70  - SBP goal 130-150 as inpatient, long-term outpatient goal <130/80.  Management as below.  - Repeat video swallow study on 10/7 with intermittent penetration with thin liquids but immediate ejection.  Per SLP, advanced to thins after bedside assessment and good tolerance.  Advanced to regular diet per SLP 10/11.  - Ziopatch x14 days  - Continue PT/OT/SLP  - Follow up with neurology in 6-8 weeks     Hyperlipidemia  - LDL goal 40-70 (LDL 55 on 9/26)  - Continue PTA atorvastatin 40 mg daily     Hypertension  - BP has been on low side and typically not receiving all daily scheduled anti-hypertensives per hold parameters.  Also having nocturia.  Stopped hydrochlorothiazide 10/17.  continue to monitor.  - Continue PTA lisinopril 40 mg daily  - Continue amlodipine 5 mg daily (added 10/1)  - Monitor BP and adjust regimen as indicated     Hyponatremia, resolved  Mild at 134 on 10/1.  Suspect related to hydrochlorothiazide, mild hypovolemia.  BUN mildly elevated.  On slightly thick liquids.  10/17: Na WNL   - Monitor BMP every M/Th     GERD  - Continue PTA famotidine 20 mg BID     Constipation  In setting of decreased mobility  - Continue Miralax daily  - Monitor, additional PRN bowel  meds available, can schedule if ongoing issues     Prediabetes  A1c 6.0% on 9/27/24  - Follow up with PCP for ongoing surveillance     Bilateral conjunctivitis, resolved  Started on ofloxacin at hospital on 9/29 due to purulent discharge from bilateral eyes with associated pain.  Completed course of ofloxacin as of 10/4.  No recurrent issues at ARU.    Skin   Mild erythema and skin maceration under the abdominal fold; noted and examined on 10/13. Placed new patient care order and will monitor         Adjustment to disability:  Clinical psychology to eval and treat if indicated  FEN: regular diet, continue thin liquids; swallow strategies per SLP  Bowel: continent, recent constipation.  Continue scheduled Miralax.  Additional PRN bowel medications available.  Monitor and adjust regimen as indicated.  Bladder: continent/incontinent.  Timed toileting implemented on 10/7.  Wean/avoid Purewick use.  Patient cleared for modified independence to bathroom  DVT Prophylaxis: mechanical  GI Prophylaxis: pepcid  Code: full; as prior  Disposition: goal for home but some challenges with support especially as daughter caring for patient's spouse who has advanced dementia.  Daughter is pursuing [facility] for both parents, her goal is for patient to move in there upon discharge from ARU.  Functionally, the patient has made enough progress to discharge home with IADL assistance only.  [facility] facility will be evaluating the patient for appropriateness in the upcoming days.   ELOS: target 10/22/24 to home versus [facility] with outpatient PT, OT, speech therapy  Follow up Appointments on Discharge: PCP in 1-2 weeks, general neurology or stroke REUBEN in 6-8 weeks    Doing well. Discussed with team. Continue cares and plans outlined.    Gio Bazan MD

## 2024-10-19 NOTE — PLAN OF CARE
Goal Outcome Evaluation:      Plan of Care Reviewed With: patient    Overall Patient Progress: no change    Outcome Evaluation: Patient met requirements for MAP; discontinued 10/18.    /55 (BP Location: Left arm)   Pulse 77   Temp 98.1  F (36.7  C) (Oral)   Resp 14   Wt 80.6 kg (177 lb 12.8 oz)   LMP  (LMP Unknown)   SpO2 94%   BMI 34.72 kg/m

## 2024-10-19 NOTE — PLAN OF CARE
Discharge Planner Post-Acute Rehab OT:      Discharge Plan: Home with OP therapies     Precautions: Falls, [place]- has hearing aids      Current Status:  ADLs:  Mobility: Mod I with fww  Grooming: Mod I standing at sink w/ fww   Dressing: UBD: independent seated; LBD: mod I using AE seated  Bathing: Tx to shower chair/tub bench with fww and gb; Mod A tasks  Toileting: Mod I in all with fww  IADLs: IND prior. Daughter assisted with yard work and grocery shopping; supervision with IADLs   Vision/Cognition: Pt completed med task 10/16 with no errors and 5/5 correct. Recommend that pt can continue setting up medications upon d/c, as she did prior to admission.      Assessment:  FT completed with family in prep for dc home/[facility]. Topics included: reducing fall risk w use of adequate lighting and getting rid of rugs/mats, shower tx using gb and walk in, sitting on a chair to conserve energy, supervision w bathing on inital dc home for safety, toilet tx, and dressing useing hip kit/reacher.     OTR having discussion w dtr about discharge location halfway vs home. If in doable time frame, halfway will be best, however if unable to be completed by S date, will need to discharge to another location until accepted to [facility]. Dtr stating that patients  does not recognize her as his wife and therefore it is unsafe for her live there upon discharge.     Other Barriers to Discharge (DME, Family Training, etc):   FT: Saturday 10/19 at 1:30p with dtr  DME Needs: Shower chair, reacher, and walker tray (pt plans on going to [facility])  May get DME from here-continue to talk with dtr

## 2024-10-19 NOTE — PLAN OF CARE
Goal Outcome Evaluation:      Plan of Care Reviewed With: patient    Overall Patient Progress: no changeOverall Patient Progress: no change    Outcome Evaluation: Patient slept well overnight. Denies pain, nausea and SOB. Pt is calm, pleasant and cooperatove with staff and cares. No acute concerns this shift. Endorsed accordingly to incoming NOD. Continue plan of care.

## 2024-10-19 NOTE — PLAN OF CARE
Discharge Planner Post-Acute Rehab SLP:      Discharge Plan: [facility]?. Ongoing SLP     Precautions: fall risk     Current Status:  Hearing: Sensorineural hearing loss, bilateral hearing aides.  Vision: Readers  Communication: Mild to moderate motor speech deficits, Mild expressive and receptive aphasia.  Cognition: To be evaluated in coming days  Swallow: Regular/ thin liquids (0), pt to sit upright, small/single bites and sips, alternate liquids/solids - Swallow goals met 10/13.     Assessment: Pt seen with daughter,  (who pt was caregiver for prior) present for family training. Pt and family edu in progress in swallowing, aphasia; passed medication practice with RN per coordination with team earlier. Pt and family educated in recommendation for supervision/checkin with medication and financial management; ongoing SLP. Pt and daughter denied having questions.      Other Barriers to Discharge (Family Training, etc):none anticipated from SLP. Pt and daughter edu in progress, SLP recommendations 10/19.

## 2024-10-19 NOTE — PLAN OF CARE
"Discharge Planner Post-Acute Rehab PT:      Discharge Plan: Home with OP PT ([place]).      Precautions: fall, L knee pain/OA, mild aphasia, [place] (has hearing aides)     Current Status: *practicing 4WW in PT  Bed Mobility: IND  Transfer: Mod I with FWW  Gait: Mod I with FWW, up to 300 ft  Stairs: 6x8\" steps with single rail and CGA  Balance: Fair static, dynamic standing balance w/out UE support     Outcome Measures:   30 second sit to stand  10/2: 0  10/9: 6x with UE assist  10/17: 7x without UE assist  PASS               10/5: 7/36   10/14: 30/36  Cueto              10/5: 3/56   10/14: 33/56  10mWT   10/9: 0.13 m/sec comfortable, 0.24 m/sec fast with FWW and CGA   10/17: 0.47/sec comfortable, 1.09 m/sec fast with FWW and SBA    Assessment: Today pt completed FT with PT/OT co-treat with pt's daughter and . Overall, pt was able to demonstrate mod I - IND in all mobility tasks. Pt education and family discussion was needed on stairs, bed mobility, and car transfers. Observing, tasks helped instill confidence in daughter about pt's capabilities, however ongoing discussion concerning home environment still persists. Concerns were discussed with OT about pt's 's dementia arising safety issues, which is why daughter is trying to get pt in snf, already paid down payment, and awaiting snf to complete assessment of pt on 10/22/2024.    Other Barriers to Discharge (DME, Family Training, etc):   Equipment: Plan to issue FWW   Family training Sat 10/19 PM. - completed  1 KIP with rail. Split level with single rail.  Dgtr currently looking into snf placement. - snf assessment 10/22/2024.  "

## 2024-10-19 NOTE — PLAN OF CARE
Pt attended Falls Prevention class today with group of 5 patients. Pt selected for class due to documented gait deficit and falls risk. Class includes education in falls risks, how to decrease that risk through behavior and home modifications and energy conservation; and instruction in available equipment designed to increase home safety. Pt was able to verbalize understanding of materials and participated appropriately in the discussion and problem-solving segments of the class.   Pt was instructed in foundational fall information, strategies, mechanics, equipment and environmental considerations to decrease fall risk; pt will be quizzed later in order to demonstrate comprehension of material.

## 2024-10-20 ENCOUNTER — APPOINTMENT (OUTPATIENT)
Dept: OCCUPATIONAL THERAPY | Facility: CLINIC | Age: 79
DRG: 057 | End: 2024-10-20
Attending: PHYSICAL MEDICINE & REHABILITATION
Payer: MEDICARE

## 2024-10-20 ENCOUNTER — APPOINTMENT (OUTPATIENT)
Dept: PHYSICAL THERAPY | Facility: CLINIC | Age: 79
DRG: 057 | End: 2024-10-20
Attending: PHYSICAL MEDICINE & REHABILITATION
Payer: MEDICARE

## 2024-10-20 PROCEDURE — 250N000013 HC RX MED GY IP 250 OP 250 PS 637: Performed by: PHYSICIAN ASSISTANT

## 2024-10-20 PROCEDURE — 97535 SELF CARE MNGMENT TRAINING: CPT | Mod: GO | Performed by: OCCUPATIONAL THERAPIST

## 2024-10-20 PROCEDURE — 128N000003 HC R&B REHAB

## 2024-10-20 PROCEDURE — 97110 THERAPEUTIC EXERCISES: CPT | Mod: GP

## 2024-10-20 PROCEDURE — 250N000013 HC RX MED GY IP 250 OP 250 PS 637: Performed by: PHYSICAL MEDICINE & REHABILITATION

## 2024-10-20 PROCEDURE — 99231 SBSQ HOSP IP/OBS SF/LOW 25: CPT | Performed by: PHYSICAL MEDICINE & REHABILITATION

## 2024-10-20 PROCEDURE — 97530 THERAPEUTIC ACTIVITIES: CPT | Mod: GO | Performed by: OCCUPATIONAL THERAPIST

## 2024-10-20 RX ADMIN — ASPIRIN 81 MG CHEWABLE TABLET 81 MG: 81 TABLET CHEWABLE at 09:51

## 2024-10-20 RX ADMIN — FAMOTIDINE 20 MG: 20 TABLET ORAL at 09:51

## 2024-10-20 RX ADMIN — FAMOTIDINE 20 MG: 20 TABLET ORAL at 20:20

## 2024-10-20 RX ADMIN — ATORVASTATIN CALCIUM 40 MG: 40 TABLET, FILM COATED ORAL at 09:52

## 2024-10-20 RX ADMIN — AMLODIPINE BESYLATE 5 MG: 5 TABLET ORAL at 20:20

## 2024-10-20 RX ADMIN — Medication 1 TABLET: at 09:51

## 2024-10-20 ASSESSMENT — ACTIVITIES OF DAILY LIVING (ADL)
ADLS_ACUITY_SCORE: 29
ADLS_ACUITY_SCORE: 33
ADLS_ACUITY_SCORE: 29

## 2024-10-20 NOTE — PLAN OF CARE
"Discharge Planner Post-Acute Rehab PT:      Discharge Plan: Home with OP PT ([place]).      Precautions: fall, L knee pain/OA, mild aphasia, [place] (has hearing aides)     Current Status: *trialing 4WW in PT  Bed Mobility: IND  Transfer: Mod I with FWW  Gait: Mod I with FWW, up to 300 ft  Stairs: 6x8\" steps with single rail and min A  Balance: Fair static, dynamic standing balance w/out UE support     Outcome Measures:   30 second sit to stand  10/2: 0  10/9: 6x with UE assist  10/17: 7x without UE assist  PASS               10/5: 7/36   10/14: 30/36  Cueto              10/5: 3/56   10/14: 33/56  10mWT   10/9: 0.13 m/sec comfortable, 0.24 m/sec fast with FWW and CGA   10/17: 0.47/sec comfortable, 1.09 m/sec fast with FWW and SBA    Assessment: Pt did well with endurance challenges, resistant to continuing practice with 4WW this date, says \"I'm fine with this one\" in reference to FWW. States she will be getting a tray for a FWW.     Other Barriers to Discharge (DME, Family Training, etc):   Equipment: Plan to issue FWW vs 4WW.  Family training Sat 10/19 PM.  1 KIP with rail. Split level with single rail.  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  "

## 2024-10-20 NOTE — PROGRESS NOTES
"  [facility]   Acute Rehabilitation Unit  Daily progress note    INTERVAL HISTORY  Chika Hurd was seen up in her room this morning.  No acute events overnight.  Karma has no new concerns or complaints .  She denies chest pain, shortness of breath, fevers, chills.  Daughter continues to look into assisted living facilities, and she will be evaluated by one in person in the upcoming days.    Current Functional Status:    Bed Mobility: IND  Transfer: Mod I with FWW  Gait: Mod I with FWW, up to 300 ft  Stairs: 6x8\" steps with single rail and min A  Balance: Fair static, dynamic standing balance w/out UE support     Outcome Measures:   30 second sit to stand  10/2: 0  10/9: 6x with UE assist  10/17: 7x without UE assist  PASS               10/5: 7/36              10/14: 30/36  Cueto              10/5: 3/56              10/14: 33/56  10mWT              10/9: 0.13 m/sec comfortable, 0.24 m/sec fast with FWW and CGA              10/17: 0.47/sec comfortable, 1.09 m/sec fast with FWW and SBA     Assessment: Pt did well with endurance challenges, resistant to continuing practice with 4WW this date, says \"I'm fine with this one\" in reference to FWW. States she will be getting a tray for a FWW.      Other Barriers to Discharge (DME, Family Training, etc):   Equipment: Plan to issue FWW vs 4WW.  Family training Sat 10/19 PM.  1 KIP with rail. Split level with single rail.  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    MEDICATIONS  Current Facility-Administered Medications   Medication Dose Route Frequency Provider Last Rate Last Admin    amLODIPine (NORVASC) tablet 5 mg  5 mg Oral QPM Vaishnavi Pacheco PA-C   5 mg at 10/19/24 2014    aspirin (ASA) chewable tablet 81 mg  81 mg Oral Daily Vaishnavi Pcaheco PA-C   81 mg at 10/20/24 0951    atorvastatin (LIPITOR) tablet 40 mg  40 mg Oral Daily Vaishnavi Pacheco " VICKY CASTRO   40 mg at 10/20/24 0952    famotidine (PEPCID) tablet 20 mg  20 mg Oral BID Michael Self DO   20 mg at 10/20/24 0951    lisinopril (ZESTRIL) tablet 40 mg  40 mg Oral Daily Vaishnavi Pacheco PA-C   40 mg at 10/19/24 0909    multivitamin w/minerals (THERA-VIT-M) tablet 1 tablet  1 tablet Oral Daily Vaishnavi Pacheco PA-C   1 tablet at 10/20/24 0951          Current Facility-Administered Medications   Medication Dose Route Frequency Provider Last Rate Last Admin    acetaminophen (TYLENOL) tablet 650 mg  650 mg Oral Q4H PRN Vaishnavi Pacheco PA-C        diclofenac (VOLTAREN) 1 % topical gel 4 g  4 g Topical 4x Daily PRN Vaishnavi Pacheco PA-C        melatonin tablet 3 mg  3 mg Oral At Bedtime PRN Vaishnavi Pacheco PA-C        methocarbamol (ROBAXIN) tablet 500 mg  500 mg Oral 4x Daily PRN Yusuf Sapp MD        polyethylene glycol (MIRALAX) Packet 17 g  17 g Oral Daily PRN Vaishnavi Pacheco PA-C        senna-docusate (SENOKOT-S/PERICOLACE) 8.6-50 MG per tablet 1-2 tablet  1-2 tablet Oral BID PRN Vaishnavi Pacheco PA-C            PHYSICAL EXAM  /49 (BP Location: Left arm)   Pulse 74   Temp 97.8  F (36.6  C) (Oral)   Resp 16   Wt 80.6 kg (177 lb 12.8 oz)   LMP  (LMP Unknown)   SpO2 95%   BMI 34.72 kg/m    Gen: NAD, up in chair  HEENT: NC/AT  CVS: RRR, S1+S2, no m/r/g  Pulm: non-labored on room air, CTA b/l, no w/r/r  Abd:  non-distended, soft, NT, BS+  Ext: no edema in bilateral lower extremities and no tenderness at calves   Neuro/MSK: awake, alert, left ptosis, right facial droop, +Broca's aphasia but improving.       LABS  CBC RESULTS:   Recent Labs   Lab Test 10/17/24  0634 10/14/24  0633 10/10/24  0632   WBC 9.7 10.3 11.5*   RBC 4.37 4.14 4.08   HGB 13.2 12.7 12.2   HCT 39.7 38.2 37.1   MCV 91 92 91   MCH 30.2 30.7 29.9   MCHC 33.2 33.2 32.9   RDW 14.2 14.4 13.9    400 424       Last Basic Metabolic Panel:  Recent Labs   Lab Test 10/17/24  0634  10/14/24  0633 10/10/24  0632    137 133*   POTASSIUM 4.7 4.7 4.5   CHLORIDE 103 102 100   CO2 26 26 29   ANIONGAP 10 9 4*   * 97 94   BUN 15.3 12.2 18.6   CR 0.68 0.62 0.61   GFRESTIMATED 89 >90 >90   KAR 9.9 8.8 9.3         Rehabilitation   Admission to acute inpatient rehab 10/01/24.    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       Impairment of ADL's: Noted to have BLE weakness, impaired balance, impaired coordination, impaired activity tolerance, impaired alertness, and impaired cognition, all affecting her ability to safely and independently perform basic ADLs.  Goal for SBA with basic ADLs except min A for bathing.      Impairment of mobility:  Noted to have BLE weakness, impaired balance, impaired coordination, impaired activity tolerance, impaired alertness, and impaired cognition, all affecting her ability to safely and independently perform basic mobility.  Goal for SBA with basic mobility with min A for stairs.      Impairment of cognition/language/swallow:  Noted to have dysphagia, dysarthria, aphasia, and impaired cognition with goals for safe tolerance of least restrictive diet and improved cognitive-linguistic skills to meet basic needs.     Continue comprehensive acute inpatient rehabilitation program with multidisciplinary approach including therapies, rehab nursing, and physiatry following. See interval history for updates.      ASSESSMENT AND PLAN  Chika Hurd is a 79 year old right hand dominant female with a past medical history of prior stroke (10/2023; left frontal lobe and corona radiata), hyperlipidemia, hypertension, central retinal artery occlusion of right eye, left breast cancer, GERD,  prediabetes, sensorineural hearing loss, and obesity who was admitted on 9/25/24 with acute left basal ganglia hemorrhage with hospital course complicated by subacute stroke in left periatrial white matter, incidental saccular aneurysm, constipation, and conjunctivitis.  She was admitted to ARU on 10/1/24 for multidisciplinary rehabilitation and ongoing medical management.      Medical Conditions  New actions/orders/updates for today are in blue.     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  Presented with new gait instability and worsened dysarthria.  Imaging findings as above.  PTA on Plavix + aspirin, plan had been for 3 months.  Per sending team, does not need to resume Plavix but ok'd to resume aspirin daily on 10/1.  - Continue ASA 81 mg daily (resumed on 10/1)  - Continue statin with LDL goal 40-70  - SBP goal 130-150 as inpatient, long-term outpatient goal <130/80.  Management as below.  - Repeat video swallow study on 10/7 with intermittent penetration with thin liquids but immediate ejection.  Per SLP, advanced to thins after bedside assessment and good tolerance.  Advanced to regular diet per SLP 10/11.  - Ziopatch x14 days  - Continue PT/OT/SLP  - Follow up with neurology in 6-8 weeks     Hyperlipidemia  - LDL goal 40-70 (LDL 55 on 9/26)  - Continue PTA atorvastatin 40 mg daily     Hypertension  - BP has been on low side and typically not receiving all daily scheduled anti-hypertensives per hold parameters.  Also having nocturia.  Stopped hydrochlorothiazide 10/17.   continue to monitor.  - Continue PTA lisinopril 40 mg daily  - Continue amlodipine 5 mg daily (added 10/1)  - Monitor BP and adjust regimen as indicated     Hyponatremia, resolved  Mild at 134 on 10/1.  Suspect related to hydrochlorothiazide, mild hypovolemia.  BUN mildly elevated.  On slightly thick liquids.  10/17: Na WNL   - Monitor BMP every M/Th     GERD  - Continue PTA famotidine 20 mg BID     Constipation  In setting of decreased mobility  - Continue Miralax daily  - Monitor, additional PRN bowel meds available, can schedule if ongoing issues     Prediabetes  A1c 6.0% on 9/27/24  - Follow up with PCP for ongoing surveillance     Bilateral conjunctivitis, resolved  Started on ofloxacin at hospital on 9/29 due to purulent discharge from bilateral eyes with associated pain.  Completed course of ofloxacin as of 10/4.  No recurrent issues at ARU.    Skin   Mild erythema and skin maceration under the abdominal fold; noted and examined on 10/13. Placed new patient care order and will monitor         Adjustment to disability:  Clinical psychology to eval and treat if indicated  FEN: regular diet, continue thin liquids; swallow strategies per SLP  Bowel: continent, recent constipation.  Continue scheduled Miralax.  Additional PRN bowel medications available.  Monitor and adjust regimen as indicated.  Bladder: continent/incontinent.  Timed toileting implemented on 10/7.  Wean/avoid Purewick use.  Patient cleared for modified independence to bathroom  DVT Prophylaxis: mechanical  GI Prophylaxis: pepcid  Code: full; as prior  Disposition: goal for home but some challenges with support especially as daughter caring for patient's spouse who has advanced dementia.  Daughter is pursuing shelter for both parents, her goal is for patient to move in there upon discharge from ARU.  Functionally, the patient has made enough progress to discharge home with IADL assistance only.  [facility] facility will be evaluating the patient for  appropriateness in the upcoming days.   ELOS: target 10/22/24 to home versus [facility] with outpatient PT, OT, speech therapy  Follow up Appointments on Discharge: PCP in 1-2 weeks, general neurology or stroke REUBEN in 6-8 weeks    Doing well. Discussed with team. Continue cares and plans outlined.    Gio Bazan MD

## 2024-10-20 NOTE — PLAN OF CARE
Goal Outcome Evaluation:      Plan of Care Reviewed With: patient    Overall Patient Progress: improvingOverall Patient Progress: improving       Patient slept most of the night, Modified Independent in the room. No complained of pain, no respiratory distress, appeared to be comfortable on bed during rounding checks. Safety checks done, fall prevention and seizure precautions observed. Plan of care ongoing.

## 2024-10-20 NOTE — PLAN OF CARE
Goal Outcome Evaluation:      Plan of Care Reviewed With: patient    Overall Patient Progress: improving      Patient is alert, with mild aphasia. Calls appropriately. On regular diet, thin liquids and takes pills whole with apple sauce. MOD I with walker. Continent of bowel but incontinent (urgency) of bladder. Last bm 10/18. On seizure precautions. Call light within reach. Will continue with POC.

## 2024-10-20 NOTE — PLAN OF CARE
Discharge Planner Post-Acute Rehab OT:      Discharge Plan: Home with OP therapies     Precautions: Falls, [place]- has hearing aids      Current Status:  ADLs:  Mobility: Mod I with fww  Grooming: Mod I standing at sink w/ fww   Dressing: UBD: independent seated; LBD: mod I using AE seated  Bathing: Tx to shower chair/tub bench with fww and gb; Mod A tasks  Toileting: Mod I in all with fww  IADLs: IND prior. Daughter assisted with yard work and grocery shopping; supervision with IADLs   Vision/Cognition: Pt completed med task 10/16 with no errors and 5/5 correct. Recommend that pt can continue setting up medications upon d/c, as she did prior to admission.      Assessment:  Pt completing calendar planning activity and writing check both with 100% accuracy. Pt showing insight into vague instructions for calendar planning activity and able to elaborate that she would need to know details of apts including location, time, place, etc. Nice improvement in these EF areas.     Other Barriers to Discharge (DME, Family Training, etc):   FT: Saturday 10/19 at 1:30p with dtr  DME Needs: Shower chair, reacher, and walker tray (pt plans on going to [facility])  May get DME from here-continue to talk with dtr

## 2024-10-20 NOTE — PLAN OF CARE
Goal Outcome Evaluation:      Plan of Care Reviewed With: patient    Overall Patient Progress: no change    Outcome Evaluation: Pt. alert orientedx4. Calls appropriately. Mod I in room. Denies pain  SOB and chest pain. BP medication in AM not given order parameter not met. Regular thin, with good appetite. Continent of both bowel and bladder LBM 10/20/24. Seizure precaution maintained. Call light within reach. Will continue with current POC.

## 2024-10-21 ENCOUNTER — APPOINTMENT (OUTPATIENT)
Dept: SPEECH THERAPY | Facility: CLINIC | Age: 79
DRG: 057 | End: 2024-10-21
Attending: PHYSICAL MEDICINE & REHABILITATION
Payer: MEDICARE

## 2024-10-21 ENCOUNTER — APPOINTMENT (OUTPATIENT)
Dept: PHYSICAL THERAPY | Facility: CLINIC | Age: 79
DRG: 057 | End: 2024-10-21
Attending: PHYSICAL MEDICINE & REHABILITATION
Payer: MEDICARE

## 2024-10-21 ENCOUNTER — APPOINTMENT (OUTPATIENT)
Dept: OCCUPATIONAL THERAPY | Facility: CLINIC | Age: 79
DRG: 057 | End: 2024-10-21
Attending: PHYSICAL MEDICINE & REHABILITATION
Payer: MEDICARE

## 2024-10-21 LAB
ANION GAP SERPL CALCULATED.3IONS-SCNC: 7 MMOL/L (ref 7–15)
BUN SERPL-MCNC: 14.8 MG/DL (ref 8–23)
CALCIUM SERPL-MCNC: 9 MG/DL (ref 8.8–10.4)
CHLORIDE SERPL-SCNC: 103 MMOL/L (ref 98–107)
CREAT SERPL-MCNC: 0.63 MG/DL (ref 0.51–0.95)
EGFRCR SERPLBLD CKD-EPI 2021: >90 ML/MIN/1.73M2
ERYTHROCYTE [DISTWIDTH] IN BLOOD BY AUTOMATED COUNT: 14.4 % (ref 10–15)
GLUCOSE SERPL-MCNC: 92 MG/DL (ref 70–99)
HCO3 SERPL-SCNC: 28 MMOL/L (ref 22–29)
HCT VFR BLD AUTO: 36.4 % (ref 35–47)
HGB BLD-MCNC: 11.6 G/DL (ref 11.7–15.7)
MCH RBC QN AUTO: 29.4 PG (ref 26.5–33)
MCHC RBC AUTO-ENTMCNC: 31.9 G/DL (ref 31.5–36.5)
MCV RBC AUTO: 92 FL (ref 78–100)
PLATELET # BLD AUTO: 268 10E3/UL (ref 150–450)
POTASSIUM SERPL-SCNC: 4.2 MMOL/L (ref 3.4–5.3)
RBC # BLD AUTO: 3.95 10E6/UL (ref 3.8–5.2)
SODIUM SERPL-SCNC: 138 MMOL/L (ref 135–145)
WBC # BLD AUTO: 7.6 10E3/UL (ref 4–11)

## 2024-10-21 PROCEDURE — 97110 THERAPEUTIC EXERCISES: CPT | Mod: GP | Performed by: PHYSICAL THERAPIST

## 2024-10-21 PROCEDURE — 128N000003 HC R&B REHAB

## 2024-10-21 PROCEDURE — 97530 THERAPEUTIC ACTIVITIES: CPT | Mod: GO | Performed by: OCCUPATIONAL THERAPIST

## 2024-10-21 PROCEDURE — 250N000013 HC RX MED GY IP 250 OP 250 PS 637: Performed by: PHYSICIAN ASSISTANT

## 2024-10-21 PROCEDURE — 97750 PHYSICAL PERFORMANCE TEST: CPT | Mod: GP | Performed by: PHYSICAL THERAPIST

## 2024-10-21 PROCEDURE — 80048 BASIC METABOLIC PNL TOTAL CA: CPT | Performed by: PHYSICIAN ASSISTANT

## 2024-10-21 PROCEDURE — 92507 TX SP LANG VOICE COMM INDIV: CPT | Mod: GN

## 2024-10-21 PROCEDURE — 97535 SELF CARE MNGMENT TRAINING: CPT | Mod: GO | Performed by: OCCUPATIONAL THERAPIST

## 2024-10-21 PROCEDURE — 250N000013 HC RX MED GY IP 250 OP 250 PS 637: Performed by: PHYSICAL MEDICINE & REHABILITATION

## 2024-10-21 PROCEDURE — 36415 COLL VENOUS BLD VENIPUNCTURE: CPT | Performed by: PHYSICIAN ASSISTANT

## 2024-10-21 PROCEDURE — 85027 COMPLETE CBC AUTOMATED: CPT | Performed by: PHYSICIAN ASSISTANT

## 2024-10-21 PROCEDURE — 99231 SBSQ HOSP IP/OBS SF/LOW 25: CPT | Performed by: PHYSICIAN ASSISTANT

## 2024-10-21 PROCEDURE — 97530 THERAPEUTIC ACTIVITIES: CPT | Mod: GP | Performed by: PHYSICAL THERAPIST

## 2024-10-21 RX ADMIN — ASPIRIN 81 MG CHEWABLE TABLET 81 MG: 81 TABLET CHEWABLE at 08:12

## 2024-10-21 RX ADMIN — Medication 1 TABLET: at 08:13

## 2024-10-21 RX ADMIN — FAMOTIDINE 20 MG: 20 TABLET ORAL at 20:56

## 2024-10-21 RX ADMIN — FAMOTIDINE 20 MG: 20 TABLET ORAL at 08:13

## 2024-10-21 RX ADMIN — LISINOPRIL 40 MG: 40 TABLET ORAL at 08:12

## 2024-10-21 RX ADMIN — AMLODIPINE BESYLATE 5 MG: 5 TABLET ORAL at 20:56

## 2024-10-21 RX ADMIN — ATORVASTATIN CALCIUM 40 MG: 40 TABLET, FILM COATED ORAL at 08:13

## 2024-10-21 ASSESSMENT — ACTIVITIES OF DAILY LIVING (ADL)
ADLS_ACUITY_SCORE: 29

## 2024-10-21 NOTE — PLAN OF CARE
Goal Outcome Evaluation:      Plan of Care Reviewed With: patient    Overall Patient Progress: improving    Patient is alert and oriented. Can make needs known. Continent of bowel and bladder. Last bm 10/20. MOD I with walker in the room. On regular diet, thin liquid. Takes pills whole with apple sauce.On seizure precautions, pads on the bed. Call light within reach. Will continue with POC.

## 2024-10-21 NOTE — PROGRESS NOTES
Felipe called and left a message for Katerine [phone] (Director of Assisted Living). Wanting to know when patient could potential move to independent living place after assessment is completed.     Waiting on a call back.        ALEJANDRA Barber  [facility], Acute Inpatient Rehab Unit   [address] [place] [address], [address] [place] [address]  Phone: [phone]  Fax: [phone]

## 2024-10-21 NOTE — PROGRESS NOTES
Sw called director of nursing ([phone]); left a message requesting for a call back.      ALEJANDRA Barber  [facility], Acute Inpatient Rehab Unit   [address] [place] [address], [address] [place] [address]  Phone: [phone]  Fax: [phone]

## 2024-10-21 NOTE — PLAN OF CARE
Goal Outcome Evaluation:      Plan of Care Reviewed With: patient    Overall Patient Progress: improvingOverall Patient Progress: improving    VS: /59 (BP Location: Left arm)   Pulse 75   Temp 97.8  F (36.6  C) (Oral)   Resp 16   Wt 80.6 kg (177 lb 12.8 oz)   LMP  (LMP Unknown)   SpO2 94%   BMI 34.72 kg/m       O2: SpO2 > 94 and stable on RA. LS clear and equal bilaterally. Denies chest pain and SOB.    Output: Voids spontaneously without difficulty to bathroom    Last BM: 10/20 denies abdominal discomfort. BS active / passing flatus.    Activity: MOD I in the room with walker.    Skin: WDL    Pain: Denied pain    CMS: Intact, AOx4. Denies numbness and tingling.   Dressing:    Diet: Regular diet. Denies nausea/vomiting.    LDA: None    Equipment: personal belongings,    Plan:  Continue with plan of care. Call light within reach, pt able to make needs known.    Additional Info: Safety rounds completed.

## 2024-10-21 NOTE — PLAN OF CARE
Discharge Planner Post-Acute Rehab OT:      Discharge Plan: Home with OP therapies     Precautions: Falls, [place]- has hearing aids      Current Status:  ADLs:  Mobility: Mod I with fww  Grooming: Mod I standing at sink w/ fww   Dressing: UBD: independent seated; LBD: mod I using AE seated  Bathing: Tx to shower chair/tub bench with fww and gb; Mod A tasks  Toileting: Mod I in all with fww  IADLs: IND prior. Daughter assisted with yard work and grocery shopping; supervision with IADLs   Vision/Cognition: Pt completed med task 10/16 with no errors and 5/5 correct. Recommend that pt can continue setting up medications upon d/c, as she did prior to admission.      Assessment:  Pt progressing in dynamic standing balance activities via use of dynavision and ring tree. No LOB noted. Pt advancing in target reaches in dynavision from 22, 28, 28, to 30. Edu that pt is currently in unsafe  range and pt agreeable that she does not have reaction or processing speeds yet in able to return to driving.     Other Barriers to Discharge (DME, Family Training, etc):   FT: Saturday 10/19 at 1:30p with dtr  DME Needs: Shower chair, reacher, and walker tray (pt plans on going to custodial)  May get DME from here-continue to talk with dtr

## 2024-10-21 NOTE — PLAN OF CARE
"Discharge Planner Post-Acute Rehab PT:      Discharge Plan: Home with OP PT ([place]).      Precautions: fall, L knee pain/OA, mild aphasia, [place] (has hearing aides)     Current Status: *trialing 4WW in PT  Bed Mobility: IND  Transfer: Mod I with FWW  Gait: Mod I with FWW, up to 300 ft  Stairs: 6x8\" steps with single rail and CGA  Balance: Fair static, dynamic standing balance w/out UE support     Outcome Measures:   30 second sit to stand  10/2: 0  10/9: 6x with UE assist  10/17: 7x without UE assist  PASS               10/5: 7/36   10/14: 30/36  Cueto              10/5: 3/56   10/14: 33/56   10/21: 41/56  10mWT   10/9: 0.13 m/sec comfortable, 0.24 m/sec fast with FWW and CGA   10/17: 0.47/sec comfortable, 1.09 m/sec fast with FWW and SBA    Assessment: Pt completed the [place] improving by 8 points and going pass the <40 cutoff indicating moderate fall risk rather than high fall risk. Additionally, pt trialed outside ambulation with FWW, CGA, and WC follow. However pt struggled with task due to mobility restrictions of a FWW, will want to trial 4WW with outside ambulation to see if it will improve performance.  *Still awaiting to hear back when pt could move into long term, as daughter notes discharge home would be unsafe due to pt's 's dementia and associated agitation.    Other Barriers to Discharge (DME, Family Training, etc):   Equipment: Plan to issue FWW.  Family training Sat 10/19 PM - completed.  1 KIP with rail. Split level with single rail.  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  "

## 2024-10-21 NOTE — PROGRESS NOTES
10/21/24 1400   Signing Clinician's Name / Credentials   Signing clinician's name / credentials Jorgito Wilson, DANIEL   Kidd Balance Scale (KIDD K, SUSAN S, S[place], JUAN, NEETU: MEASURING BALANCE IN THE ELDERLY: VALIDATION OF AN INSTRUMENT. CAN. J. PUB. HEALTH, JULY/AUGUST SUPPLEMENT 2:S7-11, 1992.)   Sit To Stand 4   Standing Unsupported 4   Sitting Unsupported 4   Stand to Sit 4   Transfers 4   Standing with Eyes Closed 4   Standing Unsupported, Feet Together 1   Reach Forward With Outstretched Arm 3   Retrieve Object From Floor 4   Turning to Look Behind 3   Turn 360 Degrees 2   Placing Alternate Foot on Stool (4-6 inches) 1   Unsupported Tandem Stand (Demonstrate to Subject) 3   One Leg Stand 0   Total Score (A score of 45 or less has been correlated with an increased risk of falls)   Total Score (out of 56) 41     Kidd Balance Scale (BBS) Cutoff Scores for CVA Population:    The BBS is a measure of static and dynamic standing balance that has been validated in community dwelling elderly individuals and individuals who have Parkinson's Disease, MS, and those who are s/p CVA and TBI. The test is administered without an assistive device. Scores from the Kidd are used to determine the probability of falling based on the patient's previous history of falls and their test performance.     0-20 High risk for falling- Corresponded with w/c bound status  21-40 Medium risk for falling- Able to walk with assistance  41-56 Low risk for falling- Able to walk independently  According to [facility].  Available at http://www.strokecenter.org/.  Accessibility verified April 10, 2013.  Minimal Detectable Change = 6.5 according to Ian [employer] Stacey 2008

## 2024-10-21 NOTE — PLAN OF CARE
Discharge Planner Post-Acute Rehab SLP:      Discharge Plan: prison?. Ongoing SLP     Precautions: fall risk     Current Status:  Hearing: Sensorineural hearing loss, bilateral hearing aides.  Vision: Readers  Communication: Mild to moderate motor speech deficits, Mild expressive and receptive aphasia.  Cognition: To be evaluated in coming days  Swallow: Regular/ thin liquids (0), pt to sit upright, small/single bites and sips, alternate liquids/solids - Swallow goals met 10/13.     Assessment: Upon arrival, pt able to engage in casual conversation. Mild word finding difficulties, otherwise able to sustain attention and convey 2 minute long story. Engaged in multiple word meaning task. Overall able to provide one meaning for word, but requiring mod-max verbal cues to generate additional meanings. Pt required fewer cues as task progressed. Pt expressed that this was a challenging but good exercise.      Other Barriers to Discharge (Family Training, etc):none anticipated from SLP. Pt and daughter edu in progress, SLP recommendations 10/19.

## 2024-10-21 NOTE — PLAN OF CARE
Goal Outcome Evaluation:      Plan of Care Reviewed With: patient    Overall Patient Progress: no change    Patient appeared sleeping on safety checks. On seizure precautions. Independent with bed mobility. Transfers MOD I in room. No acute issues overnight. No pain or discomfort reported. No PRN given/requested. Comfortable in bed. Fall precautions maintained, call light in reach, safety checks completed.    Continue with POC.

## 2024-10-22 ENCOUNTER — APPOINTMENT (OUTPATIENT)
Dept: SPEECH THERAPY | Facility: CLINIC | Age: 79
DRG: 057 | End: 2024-10-22
Attending: PHYSICAL MEDICINE & REHABILITATION
Payer: MEDICARE

## 2024-10-22 ENCOUNTER — APPOINTMENT (OUTPATIENT)
Dept: OCCUPATIONAL THERAPY | Facility: CLINIC | Age: 79
DRG: 057 | End: 2024-10-22
Attending: PHYSICAL MEDICINE & REHABILITATION
Payer: MEDICARE

## 2024-10-22 ENCOUNTER — APPOINTMENT (OUTPATIENT)
Dept: PHYSICAL THERAPY | Facility: CLINIC | Age: 79
DRG: 057 | End: 2024-10-22
Attending: PHYSICAL MEDICINE & REHABILITATION
Payer: MEDICARE

## 2024-10-22 PROCEDURE — 128N000003 HC R&B REHAB

## 2024-10-22 PROCEDURE — 99231 SBSQ HOSP IP/OBS SF/LOW 25: CPT | Performed by: PHYSICIAN ASSISTANT

## 2024-10-22 PROCEDURE — 92507 TX SP LANG VOICE COMM INDIV: CPT | Mod: GN

## 2024-10-22 PROCEDURE — 97129 THER IVNTJ 1ST 15 MIN: CPT | Mod: GN

## 2024-10-22 PROCEDURE — 97130 THER IVNTJ EA ADDL 15 MIN: CPT | Mod: GN

## 2024-10-22 PROCEDURE — 97530 THERAPEUTIC ACTIVITIES: CPT | Mod: GO | Performed by: OCCUPATIONAL THERAPIST

## 2024-10-22 PROCEDURE — 97110 THERAPEUTIC EXERCISES: CPT | Mod: GP | Performed by: PHYSICAL THERAPIST

## 2024-10-22 PROCEDURE — 250N000013 HC RX MED GY IP 250 OP 250 PS 637: Performed by: PHYSICAL MEDICINE & REHABILITATION

## 2024-10-22 PROCEDURE — 250N000013 HC RX MED GY IP 250 OP 250 PS 637: Performed by: PHYSICIAN ASSISTANT

## 2024-10-22 RX ADMIN — FAMOTIDINE 20 MG: 20 TABLET ORAL at 09:20

## 2024-10-22 RX ADMIN — Medication 1 TABLET: at 09:20

## 2024-10-22 RX ADMIN — LISINOPRIL 40 MG: 40 TABLET ORAL at 09:20

## 2024-10-22 RX ADMIN — AMLODIPINE BESYLATE 5 MG: 5 TABLET ORAL at 20:44

## 2024-10-22 RX ADMIN — ATORVASTATIN CALCIUM 40 MG: 40 TABLET, FILM COATED ORAL at 09:20

## 2024-10-22 RX ADMIN — FAMOTIDINE 20 MG: 20 TABLET ORAL at 20:44

## 2024-10-22 RX ADMIN — ASPIRIN 81 MG CHEWABLE TABLET 81 MG: 81 TABLET CHEWABLE at 09:20

## 2024-10-22 ASSESSMENT — ACTIVITIES OF DAILY LIVING (ADL)
ADLS_ACUITY_SCORE: 29
ADLS_ACUITY_SCORE: 29
ADLS_ACUITY_SCORE: 30
ADLS_ACUITY_SCORE: 30
ADLS_ACUITY_SCORE: 29
ADLS_ACUITY_SCORE: 30
ADLS_ACUITY_SCORE: 29
ADLS_ACUITY_SCORE: 30
ADLS_ACUITY_SCORE: 29
ADLS_ACUITY_SCORE: 29
ADLS_ACUITY_SCORE: 30
ADLS_ACUITY_SCORE: 29
ADLS_ACUITY_SCORE: 30

## 2024-10-22 NOTE — PLAN OF CARE
Goal Outcome Evaluation:      Plan of Care Reviewed With: patient    Overall Patient Progress: improvingOverall Patient Progress: improving     Orientation: alert and oriented. Can make needs known.   O2 use:denies of SOB  Pain:denies of pain  Diet:regular diet thin liquid  Bladder:mixed continence with bladder  Bowel:continent of BM. Last BM 10/21/24  Ambulation/Transfer: mod I in room  Asleep most of the night

## 2024-10-22 NOTE — PLAN OF CARE
Goal Outcome Evaluation:      Plan of Care Reviewed With: patient    Overall Patient Progress: no changeOverall Patient Progress: no change    Outcome Evaluation: no change in pt progress this shift.    A&Ox4, able to make needs known, denied pain, sob, and chest pain. [place] in both ears, edema to BLE. Pt is JC in room, continentx2-LBM 10/20. On reg/thin-pills whole one at a time with applesauce. Skin intact except for redness in abd folds. Waiting for placement at a [facility]. No other concerns as of now, continue with POC.     Patient most recent vitals:  BP (!) 148/73 (BP Location: Left arm, Patient Position: Chair, Cuff Size: Adult Regular)   Pulse 87   Temp 97.4  F (36.3  C) (Oral)   Resp 16   Wt 80.6 kg (177 lb 12.8 oz)   LMP  (LMP Unknown)   SpO2 94%   BMI 34.72 kg/m

## 2024-10-22 NOTE — PROGRESS NOTES
"  [facility]   Acute Rehabilitation Unit  Daily progress note    INTERVAL HISTORY  Chika Hurd was seen up in her room this morning.  No acute events reported overnight.  She reports sleep was not great.  She woke a few times to urinate as she does at home, but had some difficulty getting back to sleep.  She is feeling tired this morning.  With stopping hydrochlorothiazide, she feels urinary frequency possibly improved though still noticeable.   She has observed more swelling in her feet.  She has some ongoing mild dizziness when first getting up.  She denies headache, chest discomfort, shortness of breath, abdominal pain, nausea.  She notes some left knee pain, which comes and goes quite suddenly.  She had it this morning in therapy session, can \"out of nowhere\" and can be quite severe but then resolves quickly without intervention.  She notes some history of this before her stroke, though maybe not as often.  Pain is in posterior knee.  She otherwise denies concerns or questions.    With OT, she is noted to be progressing in dynamic standing balance activities via bean bag toss and reacher activity w/o fww. Pt progressing to more bags near target during second set.1 LOB noted near end of session due to L knee/foot pain.      MEDICATIONS  Current Facility-Administered Medications   Medication Dose Route Frequency Provider Last Rate Last Admin    amLODIPine (NORVASC) tablet 5 mg  5 mg Oral QPM Vaishnavi Pacheco PA-C   5 mg at 10/21/24 2056    aspirin (ASA) chewable tablet 81 mg  81 mg Oral Daily Vaishnavi Pacheco PA-C   81 mg at 10/21/24 0812    atorvastatin (LIPITOR) tablet 40 mg  40 mg Oral Daily Vaishnavi Pacheco PA-C   40 mg at 10/21/24 0813    famotidine (PEPCID) tablet 20 mg  20 mg Oral BID Michael Self DO   20 mg at 10/21/24 2056    lisinopril (ZESTRIL) tablet 40 mg  40 mg Oral Daily Vaishnavi Pacheco PA-C   40 mg at 10/21/24 0812    multivitamin " w/minerals (THERA-VIT-M) tablet 1 tablet  1 tablet Oral Daily Vaishnavi Pacheco PA-C   1 tablet at 10/21/24 0813          Current Facility-Administered Medications   Medication Dose Route Frequency Provider Last Rate Last Admin    acetaminophen (TYLENOL) tablet 650 mg  650 mg Oral Q4H PRN Vaishnavi Pacheco PA-C        diclofenac (VOLTAREN) 1 % topical gel 4 g  4 g Topical 4x Daily PRN Vaishnavi Pacheco PA-C        melatonin tablet 3 mg  3 mg Oral At Bedtime PRN Vaishnavi Pacheco PA-C        methocarbamol (ROBAXIN) tablet 500 mg  500 mg Oral 4x Daily PRN Yusuf Sapp MD        polyethylene glycol (MIRALAX) Packet 17 g  17 g Oral Daily PRN Vaishnavi Pacheco PA-C        senna-docusate (SENOKOT-S/PERICOLACE) 8.6-50 MG per tablet 1-2 tablet  1-2 tablet Oral BID PRN Vaishnavi Pacheco PA-C            PHYSICAL EXAM  /51 (BP Location: Right arm)   Pulse 75   Temp 97.4  F (36.3  C) (Oral)   Resp 16   Wt 80.6 kg (177 lb 12.8 oz)   LMP  (LMP Unknown)   SpO2 94%   BMI 34.72 kg/m    Gen: NAD, up in chair  HEENT: NC/AT  Pulm: non-labored on room air  Abd:  non-distended  Ext: 1+ pitting edema in bilateral feet; no tenderness at calves   Neuro/MSK: awake, alert, left ptosis, right facial droop, +aphasia but improving.       LABS  CBC RESULTS:   Recent Labs   Lab Test 10/21/24  0732 10/17/24  0634 10/14/24  0633   WBC 7.6 9.7 10.3   RBC 3.95 4.37 4.14   HGB 11.6* 13.2 12.7   HCT 36.4 39.7 38.2   MCV 92 91 92   MCH 29.4 30.2 30.7   MCHC 31.9 33.2 33.2   RDW 14.4 14.2 14.4    373 400       Last Basic Metabolic Panel:  Recent Labs   Lab Test 10/21/24  0732 10/17/24  0634 10/14/24  0633    139 137   POTASSIUM 4.2 4.7 4.7   CHLORIDE 103 103 102   CO2 28 26 26   ANIONGAP 7 10 9   GLC 92 103* 97   BUN 14.8 15.3 12.2   CR 0.63 0.68 0.62   GFRESTIMATED >90 89 >90   KAR 9.0 9.9 8.8         Rehabilitation   Admission to acute inpatient rehab 10/01/24.    Impairment group code: Stroke  Vascular Hemorrhagic 01.2 (R) Body Involvement (L) Brain; Non-traumatic intracerebral hemorrhage of left basal ganglia likely hypertensive etiology         PT, OT and SLP 60 minutes of each daily for 6 days per week for 18 days, in addition to rehab nursing and close management of physiatrist.       Impairment of ADL's: Noted to have BLE weakness, impaired balance, impaired coordination, impaired activity tolerance, impaired alertness, and impaired cognition, all affecting her ability to safely and independently perform basic ADLs.  Goal for SBA with basic ADLs except min A for bathing.      Impairment of mobility:  Noted to have BLE weakness, impaired balance, impaired coordination, impaired activity tolerance, impaired alertness, and impaired cognition, all affecting her ability to safely and independently perform basic mobility.  Goal for SBA with basic mobility with min A for stairs.      Impairment of cognition/language/swallow:  Noted to have dysphagia, dysarthria, aphasia, and impaired cognition with goals for safe tolerance of least restrictive diet and improved cognitive-linguistic skills to meet basic needs.     Continue comprehensive acute inpatient rehabilitation program with multidisciplinary approach including therapies, rehab nursing, and physiatry following. See interval history for updates.      ASSESSMENT AND PLAN  Chika Hurd is a 79 year old right hand dominant female with a past medical history of prior stroke (10/2023; left frontal lobe and corona radiata), hyperlipidemia, hypertension, central retinal artery occlusion of right eye, left breast cancer, GERD, prediabetes, sensorineural hearing loss, and obesity who was admitted on 9/25/24 with acute left basal ganglia hemorrhage with hospital course complicated by subacute stroke in left periatrial white matter, incidental saccular aneurysm, constipation, and conjunctivitis.  She was admitted to ARU on 10/1/24 for multidisciplinary  rehabilitation and ongoing medical management.      Medical Conditions  New actions/orders/updates for today are in blue.     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  Presented with new gait instability and worsened dysarthria.  Imaging findings as above.  PTA on Plavix + aspirin, plan had been for 3 months.  Per sending team, does not need to resume Plavix but ok'd to resume aspirin daily on 10/1.  - Continue ASA 81 mg daily (resumed on 10/1)  - Continue statin with LDL goal 40-70  - SBP goal 130-150 as inpatient, long-term outpatient goal <130/80.  Management as below.  - Repeat video swallow study on 10/7 with intermittent penetration with thin liquids but immediate ejection.  Per SLP, advanced to thins after bedside assessment and good tolerance.  Advanced to regular diet per SLP 10/11.  - Ziopatch x14 days  - Continue PT/OT/SLP  - Follow up with neurology in 6-8 weeks     Hyperlipidemia  - LDL goal 40-70 (LDL 55 on 9/26)  - Continue PTA atorvastatin 40 mg daily     Hypertension  - BP mildly elevated this morning (SBP 140s) but appears this was prior to AM meds.  Noting some mild pedal edema bilaterally in setting of hydrochlorothiazide discontinuation.  Advised elevation, can trial light compression.  Continue to monitor BP  - Continue PTA lisinopril 40 mg daily  - Continue amlodipine 5 mg daily (added 10/1)  - PTA hydrochlorothiazide stopped on 10/17 due to soft BP and nocturia  - Monitor BP and adjust regimen as indicated     Hyponatremia, resolved  Mild at 134 on 10/1.   Suspect related to hydrochlorothiazide, mild hypovolemia.  BUN mildly elevated.  On slightly thick liquids.  10/21: Na WNL  - Monitor BMP every M/Th     GERD  - Continue PTA famotidine 20 mg BID     Constipation  In setting of decreased mobility  - Continue Miralax daily  - Monitor, additional PRN bowel meds available, can schedule if ongoing issues     Prediabetes  A1c 6.0% on 9/27/24  - Follow up with PCP for ongoing surveillance     Bilateral conjunctivitis, resolved  Started on ofloxacin at hospital on 9/29 due to purulent discharge from bilateral eyes with associated pain.  Completed course of ofloxacin as of 10/4.  No recurrent issues at ARU.    Skin   Mild erythema and skin maceration under the abdominal fold; noted and examined on 10/13. Placed new patient care order and will monitor         Adjustment to disability:  Clinical psychology to eval and treat if indicated  FEN: regular diet, continue thin liquids; swallow strategies per SLP  Bowel: continent, recent constipation.  Continue scheduled Miralax.  Additional PRN bowel medications available.  Monitor and adjust regimen as indicated.  Bladder: continent/incontinent.  Timed toileting implemented on 10/7.  Wean/avoid Purewick use.  Patient cleared for modified independence to bathroom  DVT Prophylaxis: mechanical  GI Prophylaxis: pepcid  Code: full; as prior  Disposition: now planning for [facility] with outpatient PT, OT, speech therapy as patient unable to care for spouse (being cared for by daughter) and unsafe to live together given his advanced dementia.  Functionally, the patient has made enough progress to discharge with IADL assistance.  ELOS: [facility] date pending [facility] acceptance/availability  Follow up Appointments on Discharge: PCP in 1-2 weeks, general neurology or stroke [facility] in 6-8 weeks      25 minutes spent on the date of service doing chart review, history and exam, documentation, and further activities as noted above.    Vaishnavi Pacheco,  PA-C  Physical Medicine & Rehabilitation

## 2024-10-22 NOTE — PLAN OF CARE
Discharge Planner Post-Acute Rehab OT:      Discharge Plan: Home with OP therapies     Precautions: Falls, [place]- has hearing aids      Current Status:  ADLs:  Mobility: Mod I with fww  Grooming: Mod I standing at sink w/ fww   Dressing: UBD: independent seated; LBD: mod I using AE seated  Bathing: Tx to shower chair/tub bench with fww and gb; Mod A tasks  Toileting: Mod I in all with fww  IADLs: IND prior. Daughter assisted with yard work and grocery shopping; supervision with IADLs   Vision/Cognition: Pt completed med task 10/16 with no errors and 5/5 correct. Recommend that pt can continue setting up medications upon d/c, as she did prior to admission.      Assessment:  Pt progressing in dynamic standing balance activities via bean bag toss and reacher activity w/o fww. Pt progressing to more bags near target during second set.1 LOB noted near end of session due to L knee/foot pain.       Other Barriers to Discharge (DME, Family Training, etc):   FT completed Saturday 10/19 at 1:30p with dtr  DME Needs: Shower chair, reacher, and walker tray (pt plans on going to [facility])  May get DME from here-continue to talk with dtr

## 2024-10-22 NOTE — PLAN OF CARE
"Discharge Planner Post-Acute Rehab PT:      Discharge Plan: residential. OP PT ([place]).      Precautions: fall, L knee pain/OA, mild aphasia, [place] (has hearing aides)     Current Status: *trialing 4WW in PT to reduce UE support needs  Bed Mobility: IND  Transfer: Mod I with FWW  Gait: Mod I with FWW, up to 300 ft  Stairs: 6x8\" steps with single rail and CGA  Balance: Fair static, dynamic standing balance w/out UE support     Outcome Measures:   30 second sit to stand  10/2: 0  10/9: 6x with UE assist  10/17: 7x without UE assist  PASS               10/5: 7/36              10/14: 30/36  [place]              10/5: 3/56              10/14: 33/56              10/21: 41/56  10mWT              10/9: 0.13 m/sec comfortable, 0.24 m/sec fast with FWW and CGA              10/17: 0.47/sec comfortable, 1.09 m/sec fast with FWW and SBA     Assessment: -30 min as pt mod I completing toileting, grooming/hygiene, and dressing. All goals met for safe discharge on Thursday.     Other Barriers to Discharge (DME, Family Training, etc):   Equipment: Plan to issue FWW.  Family training completed 10/19.  1 KIP with rail. Split level with single rail.  "

## 2024-10-22 NOTE — PLAN OF CARE
Discharge Planner Post-Acute Rehab SLP:      Discharge Plan: halfway?. Ongoing SLP     Precautions: fall risk     Current Status:  Hearing: Sensorineural hearing loss, bilateral hearing aides.  Vision: Readers  Communication: Mild to moderate motor speech deficits, Mild expressive and receptive aphasia.  Cognition: To be evaluated in coming days  Swallow: Regular/ thin liquids (0), pt to sit upright, small/single bites and sips, alternate liquids/solids - Swallow goals met 10/13.     Assessment:   Engaged in verbal sequencing task. Pt overall able to provide appropriate steps and sequencing when describing common activities. Mild word finding difficulties, and able to determine word with mod verbal and visual cueing.      Other Barriers to Discharge (Family Training, etc):none anticipated from SLP. Pt and daughter edu in progress, SLP recommendations 10/19.

## 2024-10-23 ENCOUNTER — APPOINTMENT (OUTPATIENT)
Dept: PHYSICAL THERAPY | Facility: CLINIC | Age: 79
DRG: 057 | End: 2024-10-23
Attending: PHYSICAL MEDICINE & REHABILITATION
Payer: MEDICARE

## 2024-10-23 ENCOUNTER — APPOINTMENT (OUTPATIENT)
Dept: OCCUPATIONAL THERAPY | Facility: CLINIC | Age: 79
DRG: 057 | End: 2024-10-23
Attending: PHYSICAL MEDICINE & REHABILITATION
Payer: MEDICARE

## 2024-10-23 ENCOUNTER — APPOINTMENT (OUTPATIENT)
Dept: SPEECH THERAPY | Facility: CLINIC | Age: 79
DRG: 057 | End: 2024-10-23
Attending: PHYSICAL MEDICINE & REHABILITATION
Payer: MEDICARE

## 2024-10-23 PROCEDURE — 250N000013 HC RX MED GY IP 250 OP 250 PS 637: Performed by: PHYSICAL MEDICINE & REHABILITATION

## 2024-10-23 PROCEDURE — 97535 SELF CARE MNGMENT TRAINING: CPT | Mod: GO | Performed by: OCCUPATIONAL THERAPIST

## 2024-10-23 PROCEDURE — 97130 THER IVNTJ EA ADDL 15 MIN: CPT | Mod: GN

## 2024-10-23 PROCEDURE — 250N000013 HC RX MED GY IP 250 OP 250 PS 637: Performed by: PHYSICIAN ASSISTANT

## 2024-10-23 PROCEDURE — 99232 SBSQ HOSP IP/OBS MODERATE 35: CPT | Performed by: PHYSICAL MEDICINE & REHABILITATION

## 2024-10-23 PROCEDURE — 97129 THER IVNTJ 1ST 15 MIN: CPT | Mod: GN

## 2024-10-23 PROCEDURE — 128N000003 HC R&B REHAB

## 2024-10-23 PROCEDURE — 97530 THERAPEUTIC ACTIVITIES: CPT | Mod: GP

## 2024-10-23 RX ORDER — ACETAMINOPHEN 325 MG/1
650 TABLET ORAL EVERY 4 HOURS PRN
COMMUNITY
Start: 2024-10-23

## 2024-10-23 RX ORDER — ASPIRIN 81 MG/1
81 TABLET, CHEWABLE ORAL DAILY
Qty: 30 TABLET | Refills: 0 | Status: SHIPPED | OUTPATIENT
Start: 2024-10-23

## 2024-10-23 RX ORDER — AMOXICILLIN 250 MG
1 CAPSULE ORAL 2 TIMES DAILY PRN
COMMUNITY
Start: 2024-10-23

## 2024-10-23 RX ORDER — AMLODIPINE BESYLATE 5 MG/1
5 TABLET ORAL EVERY EVENING
Qty: 30 TABLET | Refills: 0 | Status: SHIPPED | OUTPATIENT
Start: 2024-10-23 | End: 2024-11-06

## 2024-10-23 RX ORDER — LISINOPRIL 40 MG/1
40 TABLET ORAL DAILY
Qty: 30 TABLET | Refills: 0 | Status: SHIPPED | OUTPATIENT
Start: 2024-10-23

## 2024-10-23 RX ORDER — ATORVASTATIN CALCIUM 40 MG/1
40 TABLET, FILM COATED ORAL DAILY
Qty: 30 TABLET | Refills: 0 | Status: SHIPPED | OUTPATIENT
Start: 2024-10-23

## 2024-10-23 RX ORDER — FAMOTIDINE 20 MG/1
TABLET, FILM COATED ORAL
Qty: 60 TABLET | Refills: 0 | Status: SHIPPED | OUTPATIENT
Start: 2024-10-23

## 2024-10-23 RX ADMIN — ASPIRIN 81 MG CHEWABLE TABLET 81 MG: 81 TABLET CHEWABLE at 08:45

## 2024-10-23 RX ADMIN — ATORVASTATIN CALCIUM 40 MG: 40 TABLET, FILM COATED ORAL at 08:45

## 2024-10-23 RX ADMIN — LISINOPRIL 40 MG: 40 TABLET ORAL at 08:45

## 2024-10-23 RX ADMIN — FAMOTIDINE 20 MG: 20 TABLET ORAL at 20:15

## 2024-10-23 RX ADMIN — AMLODIPINE BESYLATE 5 MG: 5 TABLET ORAL at 20:15

## 2024-10-23 RX ADMIN — FAMOTIDINE 20 MG: 20 TABLET ORAL at 08:45

## 2024-10-23 RX ADMIN — Medication 1 TABLET: at 08:45

## 2024-10-23 ASSESSMENT — ACTIVITIES OF DAILY LIVING (ADL)
ADLS_ACUITY_SCORE: 0
ADLS_ACUITY_SCORE: 29
ADLS_ACUITY_SCORE: 0

## 2024-10-23 NOTE — PROGRESS NOTES
"Sw met with patient and completed IMM and IRF. A copy of IMM was given to patient. Transportation information was given to patient as well.       IRF-AUBREY Pain Assessment    Pain Effect on Sleep  \"Over the past 5 days, how much of the time has pain made it hard for you to sleep at night?\"    1. Rarely or not at all     Pain Interference with Therapy Activities  \"Over the past 5 days, how often have you limited your participation in rehabilitation therapy sessions due to pain?\"   1. Rarely or not at all    Pain Interference with Day-to-Day Activities  \"Over the past 5 days, how often have you limited your day-to-day activities (excluding rehabilitation therapy sessions) because of pain?\"   1. Rarely or not at all         ALEJANDRA Barber  [facility], Acute Inpatient Rehab Unit   [address] [place] [address], [address] [place] [address]  Phone: [phone]  Fax: [phone]   "

## 2024-10-23 NOTE — PROGRESS NOTES
Team rounds today; team recommending OP therapy. Patient would be moving to [facility] tomorrow 10/23.      ALEJANDRA Barber  [facility], Acute Inpatient Rehab Unit   [address] [place] [address], [address] [place] [address]  Phone: [phone]  Fax: [phone]

## 2024-10-23 NOTE — PLAN OF CARE
Speech Language Therapy Discharge Summary    Reason for therapy discharge:    Discharged to home with outpatient therapy.    Progress towards therapy goal(s). See goals on Care Plan in [facility] electronic health record for goal details.  Goals partially met.  Barriers to achieving goals:   discharge from facility.    Therapy recommendation(s):    Continued therapy is recommended.  Rationale/Recommendations:  Dysphagia related goals met with patient on regular texture diet and thin liquids. Significant improvement noted in language function allowing to work on higher level cognition. Mild word finding difficulties at times and higher level memory and executive functioning tasks.

## 2024-10-23 NOTE — PLAN OF CARE
Goal Outcome Evaluation:      Plan of Care Reviewed With: patient    Overall Patient Progress: improvingOverall Patient Progress: improving    VS: /52 (BP Location: Left arm, Patient Position: Sitting, Cuff Size: Adult Regular)   Pulse 84   Temp 98.1  F (36.7  C) (Oral)   Resp 16   Wt 80.6 kg (177 lb 12.8 oz)   LMP  (LMP Unknown)   SpO2 97%   BMI 34.72 kg/m       O2: SpO2 > 97 and stable on RA. Denies chest pain and SOB.    Output: Voids spontaneously without difficulty to bathroom    Last BM: 10/22 denies abdominal discomfort. BS active / passing flatus.    Activity: MOD I in the room    Skin: WDL except redness on the abdominal folds.   Pain: Denied pain    CMS: Intact, AOx4.    Dressing:    Diet: Regular diet. Denies nausea/vomiting.      LDA: None    Equipment:  personal belongings,    Plan:  Continue with plan of care. Call light within reach, pt able to make needs known.    Additional Info: Pt to be discharged home tomorrow. Safety rounds completed.

## 2024-10-23 NOTE — PLAN OF CARE
"Acute Rehab Care Conference/Team Rounds    Type: Team Rounds    Present: Dr. Melisa Butterfield, Junior Riggins PA, Dorita Lopes Neuropsychologist, Howie Smart PT  , Bren Lorenz OT, Thomas Starks SLP, Cintia Koch , Anahi Moses RD, PPS Coordinator Rosalinda Forbes, Carmen Hawkins RN, Omerofarhanmason Chika M Patient.     Discharge Barriers/Treatment/Education    Rehab Diagnosis: Hemorrhagic stroke     Active Medical Co-morbidities/Prognosis: Subacute ischemic stroke brain aneurysm, history of prior stroke in 2023, hypertension, hyperlipidemia, hyponatremia, prediabetes, GERD    Safety: fall     Pain: denies of pain    Medications, Skin, Tubes/Lines:takes medications whole with apple sauce. Rash under  left abdominal fold   Edema on lower legs and feet    Swallowing/Nutrition: regular diet thin liquid. Dysphagia related goals met.     Bowel/Bladder:continent of bladder and bowel. Last BM 10/22/24    Psychosocial:lives in [facility], lives with spouse in house, serves as caregiver for spouse who has dementia.daughter lives next door, can provide some assist to patient and spouse but she is looking to move the patient and spouse into an [facility] Patient got accepted to an independent [facility] place and would be moving in 10/24.    ADLs/IADLs: Pt has progressed in dynamic standing activities with and without fww. She benefits from reminders for rest breaks due to activity tolerance. Overall, pt is MOD I for ADLs, and supervision with higher level IADLs. She is ready to discharge to [facility] with OP therapies. Family purchasing shower chair and walker tray for pt.     Mobility:   Bed Mobility: IND  Transfer: Mod I with FWW  Gait: Mod I with FWW, up to 300 ft  Stairs: 6x8\" steps with single rail and CGA  Balance: Fair static, dynamic standing balance w/out UE support  Outcome Measures:   30 second sit to stand  10/2: 0  10/9: 6x with UE assist  10/17: 7x without UE assist  PASS               10/5: 7/36         "      10/14: 30/36  [place]              10/5: 3/56              10/14: 33/56              10/21: 41/56  10mWT              10/9: 0.13 m/sec comfortable, 0.24 m/sec fast with FWW and CGA              10/17: 0.47/sec comfortable, 1.09 m/sec fast with FWW and SBA  All goals met. Pt is safe for discharge, mod I with FWW. Recommend ongoing OP PT post ARU to progress community mobility tolerance and safety.    Cognition/Language: Patient showing ongoing improvement with both language and cognition over course of rehab stay. Able to manage her own medications. Initial oversight recommended with higher level IADL tasks but anticipate ability to complete with minimal assistance from family. Ongoing therapy upon discharge to continue addressing higher level language and cognition.     Community Re-Entry: Plan for ongoing OP PT to progress community mobility tolerance and safety.    Transportation: Daughter to provide. Car transfer mod I with FWW.    Decision maker: self    Plan of Care and goals reviewed and updated.    Discharge Plan/Recommendations    Fall Precautions: modify    Overall plan for the patient: Karma has done very well during her rehab course and has made significant progress.  She is currently modified independent with ADLs and basic mobility.  She will be discharged tomorrow to a new apartment in an independent living facility and will have outpatient therapies.  She passed MAP and will be able to manage her medications, however she might need assistant for some other IADLs at least initially at discharge.      Utilization Review and Continued Stay Justification    Medical Necessity Criteria:    For any criteria that is not met, please document reason and plan for discharge, transfer, or modification of plan of care to address.    Requires intensive rehabilitation program to treat functional deficits?: Yes    Requires 3x per week or greater involvement of rehabilitation physician to oversee rehabilitation  program?: Yes    Requires rehabilitation nursing interventions?: Yes    Patient is making functional progress?: Yes    There is a potential for additional functional progress? Yes    Patient is participating in therapy 3 hours per day a minimum of 5 days per week or 15 hours per week in 7 day period?:Yes    Has discharge needs that require coordinated discharge planning approach?:Yes        Final Physician Sign off    Statement of Approval: I agree with all the recommendations detailed in this document.      Patient Goals  Social Work Goals: Confirm discharge recommendations with therapy, coordinate safe discharge plan and remain available to support and assist as needed.     OT Predicted Duration/Target Date for Goal Attainment: 10/24/24  Therapy Frequency (OT): 6 times/week  OT: Hygiene/Grooming: modified independent, from wheelchair  OT: Upper Body Dressing: Independent  OT: Lower Body Dressing: Minimal assist, using adaptive equipment  OT: Upper Body Bathing: Supervision/stand-by assist, using adaptive equipment  OT: Lower Body Bathing: using adaptive equipment, Minimal assist  OT: Toilet Transfer/Toileting: Minimal assist, toilet transfer, cleaning and garment management, using adaptive equipment  OT: Meal Preparation: Modified independent, with simple meal preparation, from wheelchair, using adaptive equipment  OT: Cognitive: Patient/caregiver will verbalize understanding of cognitive assessment results/recommendations as needed for safe discharge planning  OT: Goal 1: OT: Pt will safely complete shower transfer utilizing appropriate AE/DME with min A.  OT: Goal 2: OT: Pt will safely complete BUE HEP to increase strength and endurance needed for ADLs/IADLs and functional mobility.    PT Predicted Duration/Target Date for Goal Attainment: 10/22/24  PT Frequency: 6x/week  PT: Bed Mobility: Goal Met  PT: Transfers: Goal Met  PT: Gait: Goal Met  PT: Stairs: Goal Met    SLP Predicted Duration/Target Date for Goal  Attainment: 10/22/24  Therapy Frequency (SLP Eval): 6 times/week  SLP: Safely tolerate diet without signs/symptoms of aspiration: Goal Met  SLP: Communicate basic wants and needs: verbally, independent  SLP: Goal 1: Patient will complete moderate level of difficulty reasoning/problem solving tasks without need for redirection with 90% accuracy.     Nursing goal           Patient/Family Goal: Bowel: Pt will regain bowel continence either with timed toileting or bowel program by discharge from ARU.  Patient/Family Goal: Bladder: Pt will regain bladder continence on toilet using timed toileting by discharge from ARU.     Patient/Family Goal: Medication Management: Pt will pass MAP by discharge from ARU.

## 2024-10-23 NOTE — PROGRESS NOTES
[facility]   Acute Rehabilitation Unit  Daily progress note    INTERVAL HISTORY  Chika uHrd was seen up in her room this morning.  No acute events reported overnight.      Saw her with the whole team during our team rounds.  She was excited and also anxious about discharge to a new apartment tomorrow.  Karma has done very well during her rehab course and has made significant progress.  She is currently modified independent with ADLs and basic mobility.  She will be discharged tomorrow to a new apartment in an independent living facility and will have outpatient therapies.  She passed MAP and will be able to manage her medications, however she might need assistant for some other IADLs at least initially at discharge.    Reported problem with some swelling in her legs and we discussed possible etiologies and the plan as outlined below.  No other new symptoms or issues today.    MEDICATIONS  Current Facility-Administered Medications   Medication Dose Route Frequency Provider Last Rate Last Admin    amLODIPine (NORVASC) tablet 5 mg  5 mg Oral QPM Vaishnavi Pacheco PA-C   5 mg at 10/22/24 2044    aspirin (ASA) chewable tablet 81 mg  81 mg Oral Daily Vaishnavi Pacheco PA-C   81 mg at 10/22/24 0920    atorvastatin (LIPITOR) tablet 40 mg  40 mg Oral Daily Vaishnavi Pacheco PA-C   40 mg at 10/22/24 0920    famotidine (PEPCID) tablet 20 mg  20 mg Oral BID Michael Self DO   20 mg at 10/22/24 2044    lisinopril (ZESTRIL) tablet 40 mg  40 mg Oral Daily Vaishnavi Pacheco PA-C   40 mg at 10/22/24 0920    multivitamin w/minerals (THERA-VIT-M) tablet 1 tablet  1 tablet Oral Daily Vaishnavi Pacheco PA-C   1 tablet at 10/22/24 0920          Current Facility-Administered Medications   Medication Dose Route Frequency Provider Last Rate Last Admin    acetaminophen (TYLENOL) tablet 650 mg  650 mg Oral Q4H PRN Vaishnavi Pacheco PA-C        diclofenac (VOLTAREN) 1 %  topical gel 4 g  4 g Topical 4x Daily PRN Vaishnavi Pacheco PA-C        melatonin tablet 3 mg  3 mg Oral At Bedtime PRN Vaishnavi Pacheco PA-C        methocarbamol (ROBAXIN) tablet 500 mg  500 mg Oral 4x Daily PRN Yusuf Sapp MD        polyethylene glycol (MIRALAX) Packet 17 g  17 g Oral Daily PRN Vaishnavi Pacheco PA-C        senna-docusate (SENOKOT-S/PERICOLACE) 8.6-50 MG per tablet 1-2 tablet  1-2 tablet Oral BID PRN Vaishnavi Pacheco PA-C            PHYSICAL EXAM  /62 (BP Location: Left arm)   Pulse 78   Temp 98.1  F (36.7  C) (Oral)   Resp 16   Wt 80.6 kg (177 lb 12.8 oz)   LMP  (LMP Unknown)   SpO2 95%   BMI 34.72 kg/m      Gen: NAD, up in chair  HEENT: NC/AT  Pulm: non-labored on room air  Abd: Benign  Ext: 1+ pitting edema in bilateral feet; no tenderness at calves   Neuro/MSK: awake, alert, left ptosis, right facial droop, speech was slow and slightly dysarthric but her aphasia has improved significantly and was able to answer all the questions and make appropriate comments during our conversation today  *Full exam deferred today for conversation      LABS  CBC RESULTS:   Recent Labs   Lab Test 10/21/24  0732 10/17/24  0634 10/14/24  0633   WBC 7.6 9.7 10.3   RBC 3.95 4.37 4.14   HGB 11.6* 13.2 12.7   HCT 36.4 39.7 38.2   MCV 92 91 92   MCH 29.4 30.2 30.7   MCHC 31.9 33.2 33.2   RDW 14.4 14.2 14.4    373 400       Last Basic Metabolic Panel:  Recent Labs   Lab Test 10/21/24  0732 10/17/24  0634 10/14/24  0633    139 137   POTASSIUM 4.2 4.7 4.7   CHLORIDE 103 103 102   CO2 28 26 26   ANIONGAP 7 10 9   GLC 92 103* 97   BUN 14.8 15.3 12.2   CR 0.63 0.68 0.62   GFRESTIMATED >90 89 >90   KAR 9.0 9.9 8.8         Rehabilitation   Admission to acute inpatient rehab 10/01/24.    Impairment group code: Stroke Vascular Hemorrhagic 01.2 (R) Body Involvement (L) Brain; Non-traumatic intracerebral hemorrhage of left basal ganglia likely hypertensive etiology         PT, OT  and SLP 60 minutes of each daily for 6 days per week for 18 days, in addition to rehab nursing and close management of physiatrist.       Impairment of ADL's: Noted to have BLE weakness, impaired balance, impaired coordination, impaired activity tolerance, impaired alertness, and impaired cognition, all affecting her ability to safely and independently perform basic ADLs.  Goal for SBA with basic ADLs except min A for bathing.      Impairment of mobility:  Noted to have BLE weakness, impaired balance, impaired coordination, impaired activity tolerance, impaired alertness, and impaired cognition, all affecting her ability to safely and independently perform basic mobility.  Goal for SBA with basic mobility with min A for stairs.      Impairment of cognition/language/swallow:  Noted to have dysphagia, dysarthria, aphasia, and impaired cognition with goals for safe tolerance of least restrictive diet and improved cognitive-linguistic skills to meet basic needs.     Continue comprehensive acute inpatient rehabilitation program with multidisciplinary approach including therapies, rehab nursing, and physiatry following. See interval history for updates.      ASSESSMENT AND PLAN  Chika Hurd is a 79 year old right hand dominant female with a past medical history of prior stroke (10/2023; left frontal lobe and corona radiata), hyperlipidemia, hypertension, central retinal artery occlusion of right eye, left breast cancer, GERD, prediabetes, sensorineural hearing loss, and obesity who was admitted on 9/25/24 with acute left basal ganglia hemorrhage with hospital course complicated by subacute stroke in left periatrial white matter, incidental saccular aneurysm, constipation, and conjunctivitis.  She was admitted to ARU on 10/1/24 for multidisciplinary rehabilitation and ongoing medical management.      Medical Conditions  New actions/orders/updates for today are in blue.     Non-traumatic intracerebral hemorrhage of  left basal ganglia likely hypertensive etiology  Subacute stroke in left periatrial white matter, ESUS   Incidental saccular aneurysm (4 mm at anterior communicating artery)   Hx prior ischemic CVA (10/2023; left frontal lobe and corona radiata)  Deficits: impaired strength, impaired balance, impaired coordination, impaired cognition, mild oropharyngeal dysphagia, mild anomic aphasia, dysarthria  Mild residual dysarthria from initial CVA.  Presented with new gait instability and worsened dysarthria.  Imaging findings as above.  PTA on Plavix + aspirin, plan had been for 3 months.  Per sending team, does not need to resume Plavix but ok'd to resume aspirin daily on 10/1.  - Continue ASA 81 mg daily (resumed on 10/1)  - Continue statin with LDL goal 40-70  - SBP goal 130-150 as inpatient, long-term outpatient goal <130/80.  Management as below.  - Repeat video swallow study on 10/7 with intermittent penetration with thin liquids but immediate ejection.  Per SLP, advanced to thins after bedside assessment and good tolerance.  Advanced to regular diet per SLP 10/11.  - Ziopatch x14 days  - Continue PT/OT/SLP  - Follow up with neurology in 6-8 weeks     Hyperlipidemia  - LDL goal 40-70 (LDL 55 on 9/26)  - Continue PTA atorvastatin 40 mg daily     Hypertension  - BP remains stable   - Noting some mild pedal edema bilaterally in setting of hydrochlorothiazide discontinuation.  Advised elevation, can trial light compression.  Continue to monitor BP  - Continue PTA lisinopril 40 mg daily  - Continue amlodipine 5 mg daily (added 10/1)  - PTA hydrochlorothiazide stopped on 10/17 due to soft BP and nocturia  - Monitor BP and adjust regimen as indicated     Hyponatremia, resolved  Mild at 134 on 10/1.  Suspect related to hydrochlorothiazide, mild hypovolemia.  BUN mildly elevated.  On slightly thick liquids.  10/21: Na WNL  - Monitor BMP every M/Th     GERD  - Continue PTA famotidine 20 mg BID     Constipation  In setting of  decreased mobility  - Continue Miralax daily  - Monitor, additional PRN bowel meds available, can schedule if ongoing issues     Prediabetes  A1c 6.0% on 9/27/24  - Follow up with PCP for ongoing surveillance     Bilateral conjunctivitis, resolved  Started on ofloxacin at hospital on 9/29 due to purulent discharge from bilateral eyes with associated pain.  Completed course of ofloxacin as of 10/4.  No recurrent issues at ARU.    Skin   Mild erythema and skin maceration under the abdominal fold; noted and examined on 10/13. Placed new patient care order and will monitor         Adjustment to disability:  Clinical psychology to eval and treat if indicated  FEN: regular diet, continue thin liquids; swallow strategies per SLP  Bowel: continent, recent constipation.  Continue scheduled Miralax.  Additional PRN bowel medications available.  Monitor and adjust regimen as indicated.  Bladder: continent/incontinent.  Timed toileting implemented on 10/7.  Wean/avoid Purewick use.  Patient cleared for modified independence to bathroom  DVT Prophylaxis: mechanical  GI Prophylaxis: pepcid  Code: full; as prior  Disposition: now planning for [facility] with outpatient PT, OT, speech therapy as patient unable to care for spouse (being cared for by daughter) and unsafe to live together given his advanced dementia.  Functionally, the patient has made enough progress to discharge with IADL assistance.  ELOS: discharge date pending intermediate acceptance/availability  Follow up Appointments on Discharge: PCP in 1-2 weeks, general neurology or stroke REUBEN in 6-8 weeks      Melisa Butterfield MD  Physical Medicine & Rehabilitation

## 2024-10-23 NOTE — DISCHARGE INSTRUCTIONS
Follow up appointments    PCP  You are scheduled to see Dr. Roy on 11/06/2024 at 9:00am.    Address [address] [facility] [address]   Phone  [phone]     Neurology  You are scheduled to see Maritza Sethi CNP on 1/3/2024 at 10:30. You have been placed on a waiting list if a sooner appointment becomes available, you will be contacted.    Address [address] [place] [id number]   Phone  [phone]

## 2024-10-23 NOTE — PROGRESS NOTES
Sw met with Lianna (nurse) from [facility] patient would be going to. She came to do patient's assessment. She had some few questions and  connected her to the nursing staff. She shared patient could move into [facility] on Thursday.    They would want discharge papers to be faxed to facility.     Next Steps    Sw will follow-up with facility about the correct fax number for discharge papers and questions about medication.         ALEJANDRA Barber  [facility], Acute Inpatient Rehab Unit   [address] [place] [address], [address] [place] [address]  Phone: [phone]  Fax: [phone]

## 2024-10-23 NOTE — PROGRESS NOTES
[facility]  (spoke with Gianna)  [phone]  Ride Time: 11:37 to 12:22PM    Weight: 173 lb     From:  Acute Rehab  [address],  [place] [address]    To:[facility] [address]      ALEJANDRA Barber [facility], Acute Inpatient Rehab Unit   [address] [place] [address]  Phone: [phone]  Fax: [phone]

## 2024-10-23 NOTE — PLAN OF CARE
Occupational Therapy Discharge Summary    Reason for therapy discharge:    Discharged to assisted living facility  All goals and outcomes met, no further needs identified.    Progress towards therapy goal(s). See goals on Care Plan in [facility] electronic health record for goal details.  Goals met    Therapy recommendation(s):    Continued therapy is recommended.  Rationale/Recommendations:  Home (detention) with OP therapies to continue to work on UE strength, dynamic standing balance, and cognition/language.    Current Status:  ADLs:  Mobility: Mod I with fww  Grooming: Mod I standing at sink w/ fww   Dressing: UBD: IND seated; LBD: MOD I using AE as needed seated, stand to pull pants up  Bathing: SBA Tx to shower chair/tub bench with fww and gb; SBA to wash/dry all body parts; recommend supervision initially upon discharge   Toileting: Mod I in all with fww  IADLs: IND prior. Daughter assisted with yard work and grocery shopping; supervision with IADLs   Vision/Cognition: Pt completed med task 10/16 with no errors and 5/5 correct. Recommend that pt can continue setting up medications upon d/c, as she did prior to admission

## 2024-10-23 NOTE — PROGRESS NOTES
Felipe called director of nursing Mc[place]s([phone]); left a message requesting for a call back.          ALEJANDRA Barber  [facility], Acute Inpatient Rehab Unit   [address] [place] [address], [address] [place] [address]  Phone: [phone]  Fax: [phone]

## 2024-10-23 NOTE — DISCHARGE SUMMARY
[facility]   Acute Rehabilitation Unit  Discharge summary     Date of Admission: 10/1/2024  Date of Discharge: 10/24/2024  Disposition: ILF with OP PT/OT/SLP  Primary Care Physician: Audrey Roy  Attending physician: Yusuf Sapp MD      DISCHARGE DIAGNOSIS    Non-traumatic left basal ganglia hemorrhage  Subacute stroke in left periatrial white matter  Incidental saccular aneurysm  Hx prior ischemic stroke  Hyperlipidemia  Hypertension   GERD   Constipation  Pre-diabetes        BRIEF SUMMARY  Chika Hurd is a 79 year old right  hand dominant female with past medical history of prior stroke (10/2023; left frontal lobe and corona radiata), hyperlipidemia, hypertension, central retinal artery occlusion of right eye, left breast cancer, GERD, prediabetes, sensorineural hearing loss, and obesity who presented on 9/25/24 with gait instability and slurred speech.  CT head revealed acute left basal ganglia intraparenchymal hemorrhage.  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  She was not a candidate for thrombolytics or endovascular treatment.  She was admitted for further evaluation and management.     Neurosurgery was consulted, but did not feel any neurosurgical intervention was indicated.  Neurology was consulted.  She was initially started on nifedipine drip for BP management; later transitioned back to home anti-hypertensives.  MRI brain showed acute intraparenchymal hemorrhage centered in left lentiform nucleus and extending into corona radiata; also 6mm focus of probable subacute infarction within left periatrial white matter.  Additional stroke evaluation with echo with EF 60-65%, grade 1 or early diastolic dysfunction, no WMA, no significant valvular disease; EKG with sinus rhythm; A1c 6.0%; LDL 55.  Left basal ganglia  "hemorrhage etiology felt to be hypertensive.  Ischemic infarct attributed to ESUS.     Hospital course was further complicated by constipation, conjunctivitis, mild leukocytosis, and mild hyponatremia.     During acute hospitalization, patient was seen and evaluated by PT and OT, who collectively recommended that patient would benefit from ongoing therapies in the acute inpatient rehabilitation setting.     She was subsequently admitted to ARU on 10/1/24 for multidisciplinary rehabilitation and ongoing medical management.  Her medical course at rehab was notable for mild hyponatremia and constipation, both resolved by time of discharge.  Some adjustments were made to her anti-hypertensive regimen due to soft readings, which improved by the time of discharge.  She made good progress with therapies and is modified independent with basic home mobility and ADLs.  She would benefit from ongoing assistance for more complex IADLs such as finances, medical appointments, community access, and complex decision-making.  She did demonstrate competency on medication assessment program.  Due to her inability to provide care for her spouse with advanced dementia and limited family support to accommodate both of their needs, she is discharging to Independent Living Facility with ongoing outpatient therapies.    REHABILITATION COURSE  PT: Discharged to home with outpatient therapy.     Progress towards therapy goal(s). See goals on Care Plan in [facility] electronic health record for goal details.  Goals met     Bed Mobility: IND  Transfer: Mod I with FWW  Gait: Mod I with FWW, up to 300 ft  Stairs: 6x8\" steps with single rail and CGA  Balance: Fair static, dynamic standing balance w/out UE support     Outcome Measures:   30 second sit to stand  10/2: 0  10/9: 6x with UE assist  10/17: 7x without UE assist  PASS               10/5: 7/36              10/14: 30/36  Cueto              10/5: 3/56              10/14: 33/56              10/21: " 41/56  10mWT              10/9: 0.13 m/sec comfortable, 0.24 m/sec fast with FWW and CGA              10/17: 0.47/sec comfortable, 1.09 m/sec fast with FWW and SBA     Therapy recommendation(s):    Continued therapy is recommended.  Rationale/Recommendations:  To address ongoing functional strength, standing balance and endurance, and L knee pain/OA-related dysfunction, for improved safety and IND with functional mobility, ADLs, and IADLs and to reduce risk of future falls. Pt has been issued a FWW through [email or web address].    OT: Discharged to assisted living facility  All goals and outcomes met, no further needs identified.     Progress towards therapy goal(s). See goals on Care Plan in [facility] electronic health record for goal details.  Goals met     Therapy recommendation(s):    Continued therapy is recommended.  Rationale/Recommendations:  Home ([facility]) with OP therapies to continue to work on UE strength, dynamic standing balance, and cognition/language.     Current Status:  ADLs:  Mobility: Mod I with fww  Grooming: Mod I standing at sink w/ fww   Dressing: UBD: IND seated; LBD: MOD I using AE as needed seated, stand to pull pants up  Bathing: SBA Tx to shower chair/tub bench with fww and gb; SBA to wash/dry all body parts; recommend supervision initially upon discharge   Toileting: Mod I in all with fww  IADLs: IND prior. Daughter assisted with yard work and grocery shopping; supervision with IADLs   Vision/Cognition: Pt completed med task 10/16 with no errors and 5/5 correct. Recommend that pt can continue setting up medications upon d/c, as she did prior to admission    SLP: Discharged to home with outpatient therapy.     Progress towards therapy goal(s). See goals on Care Plan in [facility] electronic health record for goal details.  Goals partially met.  Barriers to achieving goals:   discharge from facility.     Therapy recommendation(s):    Continued therapy is recommended.  Rationale/Recommendations:  Dysphagia related  goals met with patient on regular texture diet and thin liquids. Significant improvement noted in language function allowing to work on higher level cognition. Mild word finding difficulties at times and higher level memory and executive functioning tasks.    MEDICAL COURSE  Non-traumatic intracerebral hemorrhage of left basal ganglia likely hypertensive etiology  Subacute stroke in left periatrial white matter, ESUS   Incidental saccular aneurysm (4 mm at anterior communicating artery)   Hx prior ischemic CVA (10/2023; left frontal lobe and corona radiata)  Deficits: impaired strength, impaired balance, impaired coordination, impaired cognition, mild oropharyngeal dysphagia, mild anomic aphasia, dysarthria  Mild residual dysarthria from initial CVA.  Presented with new gait instability and worsened dysarthria.  Imaging findings as above.  PTA on Plavix + aspirin, plan had been for 3 months.  Per sending team, does not need to resume Plavix but ok'd to resume aspirin daily on 10/1.  - Continue ASA 81 mg daily (resumed on 10/1)  - Continue statin with LDL goal 40-70  - SBP goal 130-150 as inpatient, long-term outpatient goal <130/80.  Management as below.  - Repeat video swallow study on 10/7 with intermittent penetration with thin liquids but immediate ejection.  Per SLP, advanced to thins after bedside assessment and good tolerance.  Advanced to regular diet per SLP 10/11.  - Ziopatch x14 days  - Continue PT/OT/SLP  - Follow up with neurology in 6-8 weeks     Hyperlipidemia  - LDL goal 40-70 (LDL 55 on 9/26)  - Continue PTA atorvastatin 40 mg daily     Hypertension  - Continue PTA lisinopril 40 mg daily  - Continue amlodipine 5 mg daily (added 10/1)  - PTA hydrochlorothiazide stopped on 10/17 due to soft BP and nocturia.  Noting some mild pedal edema bilaterally in setting of hydrochlorothiazide discontinuation.  Advised elevation, can trial light compression.    - Monitor BP.  Follow up with PCP and adjust regimen  as indicated     Hyponatremia, resolved  Mild at 134 on 10/1.  Suspect related to hydrochlorothiazide, mild hypovolemia.  BUN mildly elevated.  On slightly thick liquids.  As of 10/24, Na WNL at 139.  - Follow up with PCP in 1-2 weeks, recheck BMP at that time     GERD  - Continue PTA famotidine 20 mg BID     Constipation  In setting of decreased mobility  - Continue Miralax daily  - Monitor     Prediabetes  A1c 6.0% on 9/27/24  - Follow up with PCP for ongoing surveillance     Bilateral conjunctivitis, resolved  Started on ofloxacin at hospital on 9/29 due to purulent discharge from bilateral eyes with associated pain.  Completed course of ofloxacin as of 10/4.  No recurrent issues at ARU.     Skin   Mild erythema and skin maceration under the abdominal fold; noted and examined on 10/13.   - Continue to monitor     DISCHARGE MEDICATIONS  Current Discharge Medication List        START taking these medications    Details   amLODIPine (NORVASC) 5 MG tablet Take 1 tablet (5 mg) by mouth every evening.  Qty: 30 tablet, Refills: 0    Associated Diagnoses: Benign essential hypertension      diclofenac (VOLTAREN) 1 % topical gel Apply 4 g topically 4 times daily as needed for moderate pain.    Associated Diagnoses: Cerebrovascular accident (CVA), unspecified mechanism (H)      senna-docusate (SENOKOT-S/PERICOLACE) 8.6-50 MG tablet Take 1 tablet by mouth 2 times daily as needed for constipation.    Associated Diagnoses: Cerebrovascular accident (CVA), unspecified mechanism (H)           CONTINUE these medications which have CHANGED    Details   acetaminophen (TYLENOL) 325 MG tablet Take 2 tablets (650 mg) by mouth every 4 hours as needed for mild pain or fever.    Associated Diagnoses: Cerebrovascular accident (CVA), unspecified mechanism (H)      aspirin (ASA) 81 MG chewable tablet Take 1 tablet (81 mg) by mouth daily.  Qty: 30 tablet, Refills: 0    Associated Diagnoses: Cerebrovascular accident (CVA), unspecified  mechanism (H)      atorvastatin (LIPITOR) 40 MG tablet Take 1 tablet (40 mg) by mouth daily.  Qty: 30 tablet, Refills: 0    Associated Diagnoses: Central retinal artery occlusion of right eye      famotidine (PEPCID) 20 MG tablet TAKE 1 TABLET (20 MG) BY MOUTH 2 TIMES DAILY  Qty: 60 tablet, Refills: 0    Associated Diagnoses: Gastroesophageal reflux disease without esophagitis      lisinopril (ZESTRIL) 40 MG tablet Take 1 tablet (40 mg) by mouth daily.  Qty: 30 tablet, Refills: 0    Associated Diagnoses: Benign essential hypertension           CONTINUE these medications which have NOT CHANGED    Details   Ascorbic Acid (VITAMIN C PO) Take 500 mg by mouth daily      coenzyme Q-10 200 MG CAPS capsule Take 1 capsule by mouth at bedtime      Glycerin-Polysorbate 80 (REFRESH DRY EYE THERAPY OP) Place 1 drop into both eyes as needed      MULTIVITAMIN OR 1 daily      Omega-3 Fatty Acids (OMEGA-3 FISH OIL PO) Take 1 g by mouth daily            STOP taking these medications       hydrochlorothiazide (HYDRODIURIL) 25 MG tablet Comments:   Reason for Stopping:         ofloxacin (OCUFLOX) 0.3 % ophthalmic solution Comments:   Reason for Stopping:                 DISCHARGE INSTRUCTIONS AND FOLLOW UP  Discharge Procedure Orders   Physical Therapy  Referral   Standing Status: Future   Referral Priority: Routine: Next available opening Referral Type: Rehab Therapy Physical Therapy   Number of Visits Requested: 1     Occupational Therapy  Referral   Standing Status: Future   Referral Priority: Routine: Next available opening Referral Type: Occupational Therapy   Number of Visits Requested: 1     Speech Therapy  Referral   Standing Status: Future   Referral Priority: Routine: Next available opening Referral Type: Therapeutic Services   Number of Visits Requested: 1     Reason for your hospital stay   Order Comments: You were admitted for rehabilitation following a stroke.     Activity   Order Comments: Your  activity upon discharge: activity as tolerated and as directed by therapies.  We recommend ongoing use of walker for walker.  You currently benefit from help from another person for navigating stairs or IADLs such as finances, medical appointments, community access, and complex decision-making.  We have referred you to outpatient therapies to continue to progress your function and independence.     Order Specific Question Answer Comments   Is discharge order? Yes      Adult [facility] Follow-up and recommended labs and tests   Order Comments: Follow up with primary care provider, Audrey Roy, within 7-14 days for hospital follow- up.  The following labs/tests are recommended: BMP.    Follow up with neurology in 6-8 weeks.      Appointments on [place] and/or [facility] (with [facility] or [facility] provider or service). Call [phone] if you haven't heard regarding these appointments within 7 days of discharge.     Diet   Order Comments: Follow this diet upon discharge: Regular diet, thin liquids     Order Specific Question Answer Comments   Is discharge order? Yes           PHYSICAL EXAMINATION    Most recent Vital Signs:   Vitals:    10/23/24 1500 10/23/24 2015 10/23/24 2040 10/24/24 0700   BP: 122/55 128/40 129/51 128/77   BP Location: Right arm  Right arm Right arm   Patient Position:       Cuff Size:       Pulse: 76  80 82   Resp: 17  17 16   Temp: 98  F (36.7  C)  97.5  F (36.4  C) 97.8  F (36.6  C)   TempSrc: Oral  Oral Oral   SpO2: 97%  97% 98%   Weight:           Gen: NAD, pleasant, seated upright at edge of bed  HEENT: NC/AT, MMM  Cardio: RRR, no murmurs  Pulm: non-labored on room air, lungs CTA bilaterally  Abd: soft, non-tender, non-distended, bowel sounds present  Extr: no edema or calf tenderness in bilateral lower extremities  Neuro/MSK: AAOx3, PERRL, EOMI, R facial weakness.  Strength at least 4/5 in all 4 extremities.  No dysmetria on finger nose bilaterally.  Sensation intact to light touch  in all 4 extremities.    30 minutes spent in discharge, including >50% in counseling and coordination of care, medication review and plan of care recommended on follow up.     Discharge summary was forwarded to Audrey Roy (PCP) at the time of discharge, so as to bridge from hospital to outpatient care.     It was our pleasure to care for Chika Hurd during this hospitalization. Please do not hesitate to contact me should there be questions regarding the hospital course or discharge plan.          Vaishnavi Pacheco PA-C  Physical Medicine & Rehabilitation

## 2024-10-23 NOTE — PLAN OF CARE
"Physical Therapy Discharge Summary    Reason for therapy discharge:    Discharged to home with outpatient therapy.    Progress towards therapy goal(s). See goals on Care Plan in [facility] electronic health record for goal details.  Goals met    Bed Mobility: IND  Transfer: Mod I with FWW  Gait: Mod I with FWW, up to 300 ft  Stairs: 6x8\" steps with single rail and CGA  Balance: Fair static, dynamic standing balance w/out UE support     Outcome Measures:   30 second sit to stand  10/2: 0  10/9: 6x with UE assist  10/17: 7x without UE assist  PASS               10/5: 7/36              10/14: 30/36  Cueto              10/5: 3/56              10/14: 33/56              10/21: 41/56  10mWT              10/9: 0.13 m/sec comfortable, 0.24 m/sec fast with FWW and CGA              10/17: 0.47/sec comfortable, 1.09 m/sec fast with FWW and SBA    Therapy recommendation(s):    Continued therapy is recommended.  Rationale/Recommendations:  To address ongoing functional strength, standing balance and endurance, and L knee pain/OA-related dysfunction, for improved safety and IND with functional mobility, ADLs, and IADLs and to reduce risk of future falls. Pt [facility]      "

## 2024-10-24 VITALS
DIASTOLIC BLOOD PRESSURE: 77 MMHG | SYSTOLIC BLOOD PRESSURE: 128 MMHG | TEMPERATURE: 97.8 F | HEART RATE: 82 BPM | RESPIRATION RATE: 16 BRPM | WEIGHT: 177.8 LBS | OXYGEN SATURATION: 98 % | BODY MASS INDEX: 34.72 KG/M2

## 2024-10-24 LAB
ANION GAP SERPL CALCULATED.3IONS-SCNC: 11 MMOL/L (ref 7–15)
BUN SERPL-MCNC: 14.7 MG/DL (ref 8–23)
CALCIUM SERPL-MCNC: 9.9 MG/DL (ref 8.8–10.4)
CHLORIDE SERPL-SCNC: 103 MMOL/L (ref 98–107)
CREAT SERPL-MCNC: 0.61 MG/DL (ref 0.51–0.95)
EGFRCR SERPLBLD CKD-EPI 2021: >90 ML/MIN/1.73M2
ERYTHROCYTE [DISTWIDTH] IN BLOOD BY AUTOMATED COUNT: 14.6 % (ref 10–15)
GLUCOSE SERPL-MCNC: 97 MG/DL (ref 70–99)
HCO3 SERPL-SCNC: 25 MMOL/L (ref 22–29)
HCT VFR BLD AUTO: 39.6 % (ref 35–47)
HGB BLD-MCNC: 12.7 G/DL (ref 11.7–15.7)
MCH RBC QN AUTO: 29.3 PG (ref 26.5–33)
MCHC RBC AUTO-ENTMCNC: 32.1 G/DL (ref 31.5–36.5)
MCV RBC AUTO: 91 FL (ref 78–100)
PLATELET # BLD AUTO: 274 10E3/UL (ref 150–450)
POTASSIUM SERPL-SCNC: 4.5 MMOL/L (ref 3.4–5.3)
RBC # BLD AUTO: 4.34 10E6/UL (ref 3.8–5.2)
SODIUM SERPL-SCNC: 139 MMOL/L (ref 135–145)
WBC # BLD AUTO: 8.5 10E3/UL (ref 4–11)

## 2024-10-24 PROCEDURE — 36415 COLL VENOUS BLD VENIPUNCTURE: CPT | Performed by: PHYSICIAN ASSISTANT

## 2024-10-24 PROCEDURE — 85027 COMPLETE CBC AUTOMATED: CPT | Performed by: PHYSICIAN ASSISTANT

## 2024-10-24 PROCEDURE — 250N000013 HC RX MED GY IP 250 OP 250 PS 637: Performed by: PHYSICIAN ASSISTANT

## 2024-10-24 PROCEDURE — 99238 HOSP IP/OBS DSCHRG MGMT 30/<: CPT | Performed by: PHYSICIAN ASSISTANT

## 2024-10-24 PROCEDURE — 80048 BASIC METABOLIC PNL TOTAL CA: CPT | Performed by: PHYSICIAN ASSISTANT

## 2024-10-24 PROCEDURE — 250N000013 HC RX MED GY IP 250 OP 250 PS 637: Performed by: PHYSICAL MEDICINE & REHABILITATION

## 2024-10-24 RX ADMIN — ASPIRIN 81 MG CHEWABLE TABLET 81 MG: 81 TABLET CHEWABLE at 09:36

## 2024-10-24 RX ADMIN — LISINOPRIL 40 MG: 40 TABLET ORAL at 09:36

## 2024-10-24 RX ADMIN — FAMOTIDINE 20 MG: 20 TABLET ORAL at 09:36

## 2024-10-24 RX ADMIN — Medication 1 TABLET: at 09:36

## 2024-10-24 RX ADMIN — ATORVASTATIN CALCIUM 40 MG: 40 TABLET, FILM COATED ORAL at 09:36

## 2024-10-24 ASSESSMENT — ACTIVITIES OF DAILY LIVING (ADL)
ADLS_ACUITY_SCORE: 0

## 2024-10-24 NOTE — PROGRESS NOTES
Felipe called Lianna (Director of Nursing) and left a voicemail but did not get any response. Felipe called Audelia-  [phone] from the facility and told her about the ride time for patient. She shared she would have Lianna call back with any questions.    Addendum; Felipe received a call back from Lianna; she wants discharge summary and orders to be faxed to [phone]. She also requested that home care orders be placed for patient instead of outpatient therapy. She shared that their facility does not have transportation and patients family would not be able to provide transportation as well.    Felipe will update team.         ALEJANDRA Barber  [facility], Acute Inpatient Rehab Unit   [facility] [address], [address] [place] [address]  Phone: [phone]  Fax: [phone]    yes

## 2024-10-24 NOTE — PLAN OF CARE
Goal Outcome Evaluation:      Plan of Care Reviewed With: patient    Overall Patient Progress: no change    Pt. alert and orientedx4. Able to make needs known.   Takes pills w/ applesauce. Continent of bowel and bladder.  Last BM: 10/22. Compression stockings removed at night.   Mod I in Room. Slept most of the night. Pt. will be discharging today.  Call light w/in reach. Continue POC.

## 2024-10-25 ENCOUNTER — PATIENT OUTREACH (OUTPATIENT)
Dept: CARE COORDINATION | Facility: CLINIC | Age: 79
End: 2024-10-25
Payer: MEDICARE

## 2024-10-25 NOTE — PROGRESS NOTES
Clinic Care Coordination Contact  [facility]/Voicemail    9/25/2024 - 10/1/2024 (6 days)  [facility]  Hemorrhagic stroke (H)  Principal problem           Assessment: Patient has transitioned to TCU/ARU for short term rehabilitation:     Facility Name: Acute Rehab     Clinical Data: Care Coordinator Outreach    Outreach Documentation Number of Outreach Attempt   10/25/2024  11:11 AM 1       Left message on patient's voicemail with call back information and requested return call.      Plan: . Care Coordinator will try to reach patient again in 1-2 business days.    [facility]   Nazia Medrano RN, Care Coordinator   [facility]   E-mail [email or web address]   [phone]

## 2024-10-29 NOTE — PROGRESS NOTES
Clinic Care Coordination Contact  [facility]/Voicemail    Clinical Data: Care Coordinator Outreach    Outreach Documentation Number of Outreach Attempt   10/25/2024  11:11 AM 1   10/29/2024  10:30 AM 2       Left message on patient's voicemail with call back information and requested return call.Left a reminder message of hospital follow up with PCP 11/6/2024      Plan: . Care Coordinator will do no further outreaches at this time.    [facility]   aNzia Medrano RN, Care Coordinator   [facility]'s   E-mail [email or web address]   [phone]

## 2024-10-30 ENCOUNTER — TRANSFERRED RECORDS (OUTPATIENT)
Dept: HEALTH INFORMATION MANAGEMENT | Facility: CLINIC | Age: 79
End: 2024-10-30
Payer: MEDICARE

## 2024-11-05 DIAGNOSIS — I10 BENIGN ESSENTIAL HYPERTENSION: ICD-10-CM

## 2024-11-06 ENCOUNTER — OFFICE VISIT (OUTPATIENT)
Dept: FAMILY MEDICINE | Facility: CLINIC | Age: 79
End: 2024-11-06
Payer: MEDICARE

## 2024-11-06 ENCOUNTER — MEDICAL CORRESPONDENCE (OUTPATIENT)
Dept: HEALTH INFORMATION MANAGEMENT | Facility: CLINIC | Age: 79
End: 2024-11-06

## 2024-11-06 VITALS
DIASTOLIC BLOOD PRESSURE: 72 MMHG | WEIGHT: 178 LBS | TEMPERATURE: 97.5 F | HEIGHT: 60 IN | HEART RATE: 68 BPM | SYSTOLIC BLOOD PRESSURE: 110 MMHG | BODY MASS INDEX: 34.95 KG/M2 | RESPIRATION RATE: 16 BRPM | OXYGEN SATURATION: 97 %

## 2024-11-06 DIAGNOSIS — I63.9 CEREBROVASCULAR ACCIDENT (CVA), UNSPECIFIED MECHANISM (H): Primary | ICD-10-CM

## 2024-11-06 DIAGNOSIS — I10 BENIGN ESSENTIAL HYPERTENSION: ICD-10-CM

## 2024-11-06 PROCEDURE — 99495 TRANSJ CARE MGMT MOD F2F 14D: CPT | Performed by: FAMILY MEDICINE

## 2024-11-06 RX ORDER — HYDROCHLOROTHIAZIDE 25 MG/1
25 TABLET ORAL DAILY
Qty: 30 TABLET | Refills: 1 | Status: SHIPPED | OUTPATIENT
Start: 2024-11-06 | End: 2024-11-07

## 2024-11-06 RX ORDER — AMLODIPINE BESYLATE 5 MG/1
5 TABLET ORAL EVERY EVENING
Qty: 30 TABLET | Refills: 0 | OUTPATIENT
Start: 2024-11-06

## 2024-11-06 ASSESSMENT — PATIENT HEALTH QUESTIONNAIRE - PHQ9
SUM OF ALL RESPONSES TO PHQ QUESTIONS 1-9: 0
SUM OF ALL RESPONSES TO PHQ QUESTIONS 1-9: 0
10. IF YOU CHECKED OFF ANY PROBLEMS, HOW DIFFICULT HAVE THESE PROBLEMS MADE IT FOR YOU TO DO YOUR WORK, TAKE CARE OF THINGS AT HOME, OR GET ALONG WITH OTHER PEOPLE: NOT DIFFICULT AT ALL

## 2024-11-06 NOTE — PROGRESS NOTES
{PROVIDER CHARTING PREFERENCE:260660}    Subjective   Karma is a 79 year old, presenting for the following health issues:  Hospital F/U        11/6/2024     8:52 AM   Additional Questions   Roomed by Sophie CASTRO     [facility]       Hospital Follow-up Visit:    Hospital/Nursing Home/IP [facility]hab [facility]  Date of Admission: 10/1/24  Date of Discharge: 10/24/24  Reason(s) for Admission: intraparenchymal hemorrhage of brain   Was the patient in the ICU or did the patient experience delirium during hospitalization?  No  Do you have any other stressors you would like to discuss with your provider? No    Problems taking medications regularly:  needs applesauce to help get big pills down   Medication changes since discharge: started amlodipine, diclofenac, and senna; stopped hydrochlorothiazide and ofloxacin  Problems adhering to non-medication therapy:  None    Summary of hospitalization:  [facility] discharge summary reviewed  Diagnostic Tests/Treatments reviewed.  Follow up needed: As per A&P  Other Healthcare Providers Involved in Patient s Care:         Specialist appointment - neurology  Update since discharge: fluctuating course. {TIP  Include information from family/caregivers, SNF, Care Coordination :985081}        Plan of care communicated with patient     {Reference  Coding guidelines- Moderate Complexity F2F/Video within 7 - 14 days of discharge [id number], High Complexity F2F/Video within 7 days [id number] or [id number] days [id number] :[id number]}          ROS:   Constitutional, neuro, ENT, endocrine, pulmonary, cardiac, gastrointestinal, genitourinary, musculoskeletal, integument and psychiatric systems are negative, except as otherwise noted.       Objective    /72   Pulse 68   Temp 97.5  F (36.4  C) (Tympanic)   Resp 16   Ht 1.524 m (5')   Wt 80.7 kg (178 lb)   LMP  (LMP Unknown)   SpO2 97%   BMI 34.76 kg/m    Body mass index is 34.76 kg/m .  Physical Exam   {Exam List  (Optional):544219}    {Diagnostic Test Results (Optional):723933}        Signed Electronically by: Audrey Roy MD  {Email feedback regarding this note to [email or web address]   :268799}   was seen and evaluated by PT and OT, who collectively recommended that patient would benefit from ongoing therapies in the acute inpatient rehabilitation setting.      She was subsequently admitted to ARU on 10/1/24 for multidisciplinary rehabilitation and ongoing medical management.  Her medical course at rehab was notable for mild hyponatremia and constipation, both resolved by time of discharge.  Some adjustments were made to her anti-hypertensive regimen due to soft readings, which improved by the time of discharge.  She made good progress with therapies and is modified independent with basic home mobility and ADLs.  She would benefit from ongoing assistance for more complex IADLs such as finances, medical appointments, community access, and complex decision-making.  She did demonstrate competency on medication assessment program.  Due to her inability to provide care for her spouse with advanced dementia and limited family support to accommodate both of their needs, she is discharging to Independent Living Facility with ongoing outpatient therapies.    Plan of care communicated with patient               ROS:   Constitutional, neuro, ENT, endocrine, pulmonary, cardiac, gastrointestinal, genitourinary, musculoskeletal, integument and psychiatric systems are negative, except as otherwise noted.       Objective    /72   Pulse 68   Temp 97.5  F (36.4  C) (Tympanic)   Resp 16   Ht 1.524 m (5')   Wt 80.7 kg (178 lb)   LMP  (LMP Unknown)   SpO2 97%   BMI 34.76 kg/m    Body mass index is 34.76 kg/m .  Physical Exam   GENERAL: Pleasant, well appearing female.  CV: RRR, no murmurs, rubs or gallops.  LUNGS: CTAB, normal effort.  NEURO: Mild dysarthria. Cranial nerves II through XII grossly intact. No focal deficits. Ataxic gait.            Signed Electronically by: Audrey Roy MD

## 2024-11-06 NOTE — PATIENT INSTRUCTIONS
"Stop amlodipine 5 mg once daily and replace with hydrochlorothiazide 25mg once daily.    Check blood pressures daily. Call Dr. Roy's office if < 100/60 or >150/95.  You can call the care team at [phone] and say \"Care Team\".    * * *  If you have a [employer] account results will appear in your MyChart.  If you do not have a MyChart account results will be mailed or called to you.    Lab and imaging results are released \"real time\" into My Chart.  This may mean that you see the results before I have a chance to review them. My Chart will alert you again when I review the results and enter comments.  Sometimes with imaging or labs there may be serious or unexpected results. Critical results are paged to me or the after hours on-call provider so that they can be reviewed immediately.  This is not true of non-critical abnormal results. Unfortunately, this means that it's possible you may be alerted of a serious finding before I have a chance to review it.  If you ever receive a result that you are concerned about and I have not already contacted you, please feel free to reach out to me or the care team so that you get the answers you need. You can call the care team at [phone] and say \"Care Team\".    Additionally, it is my goal that you understand the care plan discussed at your visit and that any questions you have are answered.  Please feel free to reach out if you need clarification or explanation of any information addressed at your office visit.      "

## 2024-11-07 ENCOUNTER — TELEPHONE (OUTPATIENT)
Dept: FAMILY MEDICINE | Facility: CLINIC | Age: 79
End: 2024-11-07
Payer: MEDICARE

## 2024-11-07 DIAGNOSIS — I10 BENIGN ESSENTIAL HYPERTENSION: ICD-10-CM

## 2024-11-07 RX ORDER — HYDROCHLOROTHIAZIDE 25 MG/1
25 TABLET ORAL DAILY
Qty: 30 TABLET | Refills: 1 | Status: SHIPPED | OUTPATIENT
Start: 2024-11-07

## 2024-11-07 NOTE — TELEPHONE ENCOUNTER
Brandy the nurse from [facility] (call back is [phone]), requesting the discontinuation orders for Amlodipine and Diclofenac orders to be faxed to [phone]. Also need the Hydrochlorothiazide resent to [facility] dispenses medications.  Hydrochlorothiazide resent, discontinuation orders faxed.      NIMESH Maradiaga

## 2024-11-08 ENCOUNTER — TELEPHONE (OUTPATIENT)
Dept: FAMILY MEDICINE | Facility: CLINIC | Age: 79
End: 2024-11-08

## 2024-11-09 ENCOUNTER — HEALTH MAINTENANCE LETTER (OUTPATIENT)
Age: 79
End: 2024-11-09

## 2024-11-12 ENCOUNTER — TELEPHONE (OUTPATIENT)
Dept: FAMILY MEDICINE | Facility: CLINIC | Age: 79
End: 2024-11-12
Payer: MEDICARE

## 2024-11-12 NOTE — TELEPHONE ENCOUNTER
Home Health Care    Reason for call:  PT requesting verbal orders-  PT once per week for 4 weeks   Every other week for 4 weeks     Pt Provider: Audrey Roy      Phone Number Homecare Nurse can be reached at: [phone]    LAUREANO Pandya

## 2024-11-13 NOTE — TELEPHONE ENCOUNTER
Called and left detailed message for verbal orders on confidential voice mail and to please send the orders by fax to sign.    Neda Hines RN on 11/13/2024 at 5:18 PM

## 2024-11-18 ENCOUNTER — TELEPHONE (OUTPATIENT)
Dept: FAMILY MEDICINE | Facility: CLINIC | Age: 79
End: 2024-11-18
Payer: MEDICARE

## 2024-11-18 NOTE — TELEPHONE ENCOUNTER
FYI - Status Update    Who is Calling: [facility] RN    Update: RN calling to ask when Dr. Roy will finish her documentation from 11/6/24 visit.  She states that they cannot proceed with orders until documentation is done?  No need to call RN back, unless there are questions or problems.      Does caller want a call/response back: No

## 2024-11-20 ENCOUNTER — OFFICE VISIT (OUTPATIENT)
Dept: FAMILY MEDICINE | Facility: CLINIC | Age: 79
End: 2024-11-20
Payer: MEDICARE

## 2024-11-20 VITALS
HEIGHT: 60 IN | HEART RATE: 76 BPM | BODY MASS INDEX: 34.75 KG/M2 | WEIGHT: 177 LBS | SYSTOLIC BLOOD PRESSURE: 102 MMHG | TEMPERATURE: 97.7 F | OXYGEN SATURATION: 96 % | DIASTOLIC BLOOD PRESSURE: 68 MMHG | RESPIRATION RATE: 16 BRPM

## 2024-11-20 DIAGNOSIS — I10 BENIGN ESSENTIAL HYPERTENSION: ICD-10-CM

## 2024-11-20 DIAGNOSIS — H61.23 BILATERAL IMPACTED CERUMEN: ICD-10-CM

## 2024-11-20 DIAGNOSIS — I63.9 CEREBROVASCULAR ACCIDENT (CVA), UNSPECIFIED MECHANISM (H): Primary | ICD-10-CM

## 2024-11-20 DIAGNOSIS — R26.89 IMPAIRED GAIT AND MOBILITY: ICD-10-CM

## 2024-11-20 LAB
ANION GAP SERPL CALCULATED.3IONS-SCNC: 9 MMOL/L (ref 7–15)
BUN SERPL-MCNC: 16.6 MG/DL (ref 8–23)
CALCIUM SERPL-MCNC: 9.9 MG/DL (ref 8.8–10.4)
CHLORIDE SERPL-SCNC: 100 MMOL/L (ref 98–107)
CREAT SERPL-MCNC: 0.87 MG/DL (ref 0.51–0.95)
EGFRCR SERPLBLD CKD-EPI 2021: 68 ML/MIN/1.73M2
GLUCOSE SERPL-MCNC: 104 MG/DL (ref 70–99)
HCO3 SERPL-SCNC: 30 MMOL/L (ref 22–29)
POTASSIUM SERPL-SCNC: 4.9 MMOL/L (ref 3.4–5.3)
SODIUM SERPL-SCNC: 139 MMOL/L (ref 135–145)

## 2024-11-20 PROCEDURE — 80048 BASIC METABOLIC PNL TOTAL CA: CPT | Performed by: FAMILY MEDICINE

## 2024-11-20 PROCEDURE — 36415 COLL VENOUS BLD VENIPUNCTURE: CPT | Performed by: FAMILY MEDICINE

## 2024-11-20 RX ORDER — LISINOPRIL AND HYDROCHLOROTHIAZIDE 20; 25 MG/1; MG/1
1 TABLET ORAL DAILY
Qty: 90 TABLET | Refills: 1 | Status: SHIPPED | OUTPATIENT
Start: 2024-11-20

## 2024-11-20 ASSESSMENT — PAIN SCALES - GENERAL: PAINLEVEL_OUTOF10: NO PAIN (0)

## 2024-11-20 NOTE — PROGRESS NOTES
Assessment & Plan     Cerebrovascular accident (CVA), unspecified mechanism (H)  Continuing to have improvements in gait and mobility but still has impairment.  She is doing physical therapy.  They recommended a 4 wheeled walker with a seat.  Prescription printed.  - Walker Order    Impaired gait and mobility  See above.  - Walker Order    Benign essential hypertension  Improved edema with the addition of hydrochlorothiazide.  Blood pressure low normal today.  Given recent CVA would not want to see her hypotensive.  Will switch from lisinopril 40 mg and hydrochlorothiazide 25 mg to Zestoretic combination pill of lisinopril/hydrochlorothiazide 20/25.  Recheck BMP today.  Will have her message in 2 weeks with updated home blood pressures.  Blood pressure range provided in AVS.  - lisinopril-hydrochlorothiazide (ZESTORETIC) 20-25 MG tablet; Take 1 tablet by mouth daily.  - Basic metabolic panel; Future    Bilateral impacted cerumen  - WI REMOVAL IMPACTED CERUMEN IRRIGATION/LVG UNILAT      The longitudinal plan of care for the diagnosis(es)/condition(s) as documented were addressed during this visit. Due to the added complexity in care, I will continue to support Karma in the subsequent management and with ongoing continuity of care.     BMI  Estimated body mass index is 34.57 kg/m  as calculated from the following:    Height as of this encounter: 1.524 m (5').    Weight as of this encounter: 80.3 kg (177 lb).             Subjective   Karma is a 79 year old, presenting for the following health issues:  Hypertension    History of Present Illness       Hypertension: She presents for follow up of hypertension.  She does check blood pressure  regularly outside of the clinic. Outpatient blood pressures have not been over 140/90. She does not follow a low salt diet.     She eats 2-3 servings of fruits and vegetables daily.She consumes 2 sweetened beverage(s) daily.She exercises with enough effort to increase her heart rate  10 to 19 minutes per day.  She exercises with enough effort to increase her heart rate 3 or less days per week.   She is taking medications regularly.               ROS:   Constitutional, neuro, ENT, endocrine, pulmonary, cardiac, gastrointestinal, genitourinary, musculoskeletal, integument and psychiatric systems are negative, except as otherwise noted.       Objective    /68   Pulse 76   Temp 97.7  F (36.5  C) (Tympanic)   Resp 16   Ht 1.524 m (5')   Wt 80.3 kg (177 lb)   LMP  (LMP Unknown)   SpO2 96%   BMI 34.57 kg/m    Body mass index is 34.57 kg/m .  Physical Exam   GENERAL: Pleasant, well appearing female.  HEENT: Impacted cerumen bilaterally.  Ear lavage performed.  EXTREMITIES: trace bilateral pedal edema.            Signed Electronically by: Audrey Roy MD

## 2024-11-20 NOTE — NURSING NOTE
Initial /68   Pulse 76   Temp 97.7  F (36.5  C) (Tympanic)   Resp 16   Ht 1.524 m (5')   Wt 80.3 kg (177 lb)   LMP  (LMP Unknown)   SpO2 96%   BMI 34.57 kg/m   Estimated body mass index is 34.57 kg/m  as calculated from the following:    Height as of this encounter: 1.524 m (5').    Weight as of this encounter: 80.3 kg (177 lb). .

## 2024-11-20 NOTE — PATIENT INSTRUCTIONS
"Stop lisinopril and hydrochlorothiazide and replace with combo pill - lisinopril/hydrochlorothiazide 20/25.  This should help bring up your blood pressure closer to 120/80.      Notify me if blood pressure > 140/90 or < 90/60    * * *  If you have a MyChart account results will appear in your MyChart.  If you do not have a MyChart account results will be mailed or called to you.    Lab and imaging results are released \"real time\" into My Chart.  This may mean that you see the results before I have a chance to review them. My Chart will alert you again when I review the results and enter comments.  Sometimes with imaging or labs there may be serious or unexpected results. Critical results are paged to me or the after hours on-call provider so that they can be reviewed immediately.  This is not true of non-critical abnormal results. Unfortunately, this means that it's possible you may be alerted of a serious finding before I have a chance to review it.  If you ever receive a result that you are concerned about and I have not already contacted you, please feel free to reach out to me or the care team so that you get the answers you need. You can call the care team at [phone] and say \"Care Team\".    Additionally, it is my goal that you understand the care plan discussed at your visit and that any questions you have are answered.  Please feel free to reach out if you need clarification or explanation of any information addressed at your office visit.      "

## 2024-11-20 NOTE — PROGRESS NOTES
DME (Durable Medical Equipment) Orders and Documentation  Orders Placed This Encounter   Procedures    Walker Order        The patient was assessed and it was determined the patient is in need of the following listed DME Supplies/Equipment. Please complete supporting documentation below to demonstrate medical necessity.         Answers submitted by the patient for this visit:  Hypertension Visit (Submitted on 11/20/2024)  Chief Complaint: Chronic problems general questions HPI Form  Do you check your blood pressure regularly outside of the clinic?: Yes  Are your blood pressures ever more than 140 on the top number (systolic) OR more than 90 on the bottom number (diastolic)? (For example, greater than 140/90): No  Are you following a low salt diet?: No  General Questionnaire (Submitted on 11/20/2024)  Chief Complaint: Chronic problems general questions HPI Form  How many servings of fruits and vegetables do you eat daily?: 2-3  On average, how many sweetened beverages do you drink each day (Examples: soda, juice, sweet tea, etc.  Do NOT count diet or artificially sweetened beverages)?: 2  How many minutes a day do you exercise enough to make your heart beat faster?: 10 to 19  How many days a week do you exercise enough to make your heart beat faster?: 3 or less  How many days per week do you miss taking your medication?: 0  Questionnaire about: Chronic problems general questions HPI Form (Submitted on 11/20/2024)  Chief Complaint: Chronic problems general questions HPI Form

## 2024-12-03 DIAGNOSIS — Z53.9 DIAGNOSIS NOT YET DEFINED: Primary | ICD-10-CM

## 2024-12-03 PROCEDURE — G0180 MD CERTIFICATION HHA PATIENT: HCPCS | Performed by: FAMILY MEDICINE

## 2024-12-19 LAB — CV ZIO PRELIM RESULTS: NORMAL

## 2024-12-23 NOTE — TELEPHONE ENCOUNTER
Message about ZIO results as below.  Offer appointment to discuss SVT treatment options.     ----- Message from Ana Liu sent at 12/20/2024  9:51 AM CST -----  Zio patch done as part of post stroke evaluation.  Routing to PCP for consideration of medical mgmt of SVT.  Per clinic note 11/20/24 pt was changed from lisinopril/hydrochlorothiazide 40/25mg daily to 20/25mg daily for low normal BPs.

## 2024-12-24 NOTE — TELEPHONE ENCOUNTER
Unsure why this was routed back to Dr. Roy.  Her response was to offer patient appointment to discuss SVT treatment options.    Ivana Adame M.D.  (I am covering for Dr. Roy who is away from the office today.)

## 2024-12-24 NOTE — TELEPHONE ENCOUNTER
Discussed follow-up with patients daughter. First available with Dr. Roy scheduled 1/10. She is wondering if it is okay to wait that long. Is this something she should be concerned about in the meantime? She states patient has been having some increased dizziness and wondering if this could be the cause.   Please advise.    LAUREANO Pandya

## 2024-12-26 NOTE — TELEPHONE ENCOUNTER
Okay to wait as long as symptoms are stable/ consistent and not worsening. She should be seen more urgently if symptoms are acutely worsening or associated with chest pain, sob, or difficulty breathing. She should continue to stay hydrated, monitor BP when dizzy and bring in record of symptoms to appointment if able.   IDALIA Kothari CNP

## 2025-01-03 ENCOUNTER — TELEPHONE (OUTPATIENT)
Dept: FAMILY MEDICINE | Facility: CLINIC | Age: 80
End: 2025-01-03

## 2025-01-03 NOTE — TELEPHONE ENCOUNTER
Contacted [facility] and left voice mail, okay for additional PT visit in two weeks.     Forwarding for eval orders for OT and speech therapy    Kaye Bar RN on 1/3/2025 at 1:24 PM

## 2025-01-03 NOTE — TELEPHONE ENCOUNTER
I approve of requested home care orders.    Covering for your clinician.  Thank you,  Shane Uriarte MD  [facility]

## 2025-01-03 NOTE — TELEPHONE ENCOUNTER
Home Health Care    Reason for call:  Requesting additional PT visit in 2 weeks for discharge   OT eval  Speech eval       Pt Provider: Audrey Roy      Phone Number Homecare Nurse can be reached at: [phone]    LAUREANO Pandya

## 2025-01-06 NOTE — TELEPHONE ENCOUNTER
Left voice mail for [facility] with verbal order okay as requested below.     Kaye Bar RN on 1/6/2025 at 2:40 PM

## 2025-01-08 ENCOUNTER — MEDICAL CORRESPONDENCE (OUTPATIENT)
Dept: HEALTH INFORMATION MANAGEMENT | Facility: CLINIC | Age: 80
End: 2025-01-08
Payer: MEDICARE

## 2025-01-09 ENCOUNTER — TELEPHONE (OUTPATIENT)
Dept: FAMILY MEDICINE | Facility: CLINIC | Age: 80
End: 2025-01-09
Payer: MEDICARE

## 2025-01-09 NOTE — TELEPHONE ENCOUNTER
Leslie speech therapist calling from [facility]    Completed first eval today    Requesting orders for:    1X week for 3 weeks  Every other week X2    [phone] Leslie Bar RN on 1/9/2025 at 1:36 PM

## 2025-01-10 PROBLEM — I69.361: Status: ACTIVE | Noted: 2025-01-10

## 2025-01-14 DIAGNOSIS — Z53.9 DIAGNOSIS NOT YET DEFINED: Primary | ICD-10-CM

## 2025-01-14 PROCEDURE — G0179 MD RECERTIFICATION HHA PT: HCPCS | Performed by: FAMILY MEDICINE

## 2025-02-20 ENCOUNTER — TELEPHONE (OUTPATIENT)
Dept: FAMILY MEDICINE | Facility: CLINIC | Age: 80
End: 2025-02-20
Payer: MEDICARE

## 2025-02-20 NOTE — TELEPHONE ENCOUNTER
Home Care is calling regarding an established patient with [facility].  Requesting orders from: Audrey RoyI: RN able to provide verbal orders.  Sending as FYI only.  Is this a request for a temporary pause in the home care episode?  No    Orders Requested    Occupational Therapy  Request for initial certification (first set of orders)   Frequency: 1x/week x2  RN gave verbal order: Yes        Caller: Vanessa BROWNE  Home Care Agency: [facility]   Phone number Home Care can be reached at: [phone]   Okay to leave a detailed message?: Yes    Maru Oedn RN  [facility]

## 2025-02-27 ENCOUNTER — PATIENT OUTREACH (OUTPATIENT)
Dept: CARE COORDINATION | Facility: CLINIC | Age: 80
End: 2025-02-27
Payer: MEDICARE

## 2025-03-06 ENCOUNTER — TELEPHONE (OUTPATIENT)
Dept: FAMILY MEDICINE | Facility: CLINIC | Age: 80
End: 2025-03-06
Payer: MEDICARE

## 2025-03-06 NOTE — TELEPHONE ENCOUNTER
Home Care is calling regarding an established patient with [facility].  Requesting orders from: Audrey Roy  RN APPROVED: RN able to provide verbal orders.  Home Care will send orders for signature.  RN will close encounter.  Is this a request for a temporary pause in the home care episode?  No    Orders Requested    Occupational Therapy  Request for continuation of care with no increase or decrease in frequency  Frequency: 1 x per week for one week then every other week for two weeks  RN gave verbal order: Yes          Caller: [place]  Home Care Agency: [facility]    Key Butt RN

## 2025-03-27 ENCOUNTER — PATIENT OUTREACH (OUTPATIENT)
Dept: CARE COORDINATION | Facility: CLINIC | Age: 80
End: 2025-03-27
Payer: MEDICARE

## 2025-03-30 DIAGNOSIS — Z53.9 DIAGNOSIS NOT YET DEFINED: Primary | ICD-10-CM

## 2025-04-02 DIAGNOSIS — I10 BENIGN ESSENTIAL HYPERTENSION: ICD-10-CM

## 2025-04-03 RX ORDER — LISINOPRIL AND HYDROCHLOROTHIAZIDE 20; 25 MG/1; MG/1
1 TABLET ORAL
Qty: 30 TABLET | Refills: 11 | Status: SHIPPED | OUTPATIENT
Start: 2025-04-03

## 2025-04-03 NOTE — TELEPHONE ENCOUNTER
Requested Prescriptions   Pending Prescriptions Disp Refills    lisinopril-hydrochlorothiazide (ZESTORETIC) 20-25 MG tablet [Pharmacy Med Name: LISIN-HCTZ 20-25MG TAB] 30 tablet 11     Sig: TAKE 1 TABLET BY MOUTH ONCE DAILY       Diuretics (Including Combos) Protocol Failed - 4/3/2025 11:02 AM        Failed - Medication is active on med list and the sig matches. RN to manually verify dose and sig if red X/fail.     If the protocol passes (green check), you do not need to verify med dose and sig.    A prescription matches if they are the same clinical intention.    For Example: once daily and every morning are the same.    The protocol can not identify upper and lower case letters as matching and will fail.     For Example: Take 1 tablet (50 mg) by mouth daily     TAKE 1 TABLET (50 MG) BY MOUTH DAILY    For all fails (red x), verify dose and sig.    If the refill does match what is on file, the RN can still proceed to approve the refill request.       If they do not match, route to the appropriate provider.             Passed - Most recent blood pressure under 140/90 in past 12 months     BP Readings from Last 3 Encounters:   01/10/25 118/64   12/06/24 101/60   11/20/24 102/68       No data recorded            Passed - Potassium level on file in past 12 months        Passed - Medication indicated for associated diagnosis     Medication is associated with one or more of the following diagnoses:     Edema   Hypertension   Heart Failure   Meniere's Disease   Bilateral localized swelling of lower limbs   Pulmonary Hypertension          Passed - Has GFR on file in past 12 months and most recent value is normal        Passed - Recent (12 mo) or future (90 days) visit within the authorizing provider's specialty     The patient must have completed an in-person or virtual visit within the past 12 months or has a future visit scheduled within the next 90 days with the authorizing provider s specialty.  Urgent care and e-visits  do not qualify as an office visit for this protocol.          Passed - Patient is age 18 or older        Passed - No active pregancy on record        Passed - No positive pregnancy test in past 12 months       ACE Inhibitors (Including Combos) Protocol Failed - 4/3/2025 11:02 AM        Failed - Medication is active on med list and the sig matches. RN to manually verify dose and sig if red X/fail.     If the protocol passes (green check), you do not need to verify med dose and sig.    A prescription matches if they are the same clinical intention.    For Example: once daily and every morning are the same.    The protocol can not identify upper and lower case letters as matching and will fail.     For Example: Take 1 tablet (50 mg) by mouth daily     TAKE 1 TABLET (50 MG) BY MOUTH DAILY    For all fails (red x), verify dose and sig.    If the refill does match what is on file, the RN can still proceed to approve the refill request.       If they do not match, route to the appropriate provider.             Passed - Most recent blood pressure under 140/90 in past 12 months- Clinicial or Patient Reported     BP Readings from Last 3 Encounters:   01/10/25 118/64   12/06/24 101/60   11/20/24 102/68       No data recorded            Passed - Medication indicated for associated diagnosis     Medication is associated with one or more of the following diagnoses:     Chronic Kidney Disease (CKD)   Coronary Artery Disease (CAD)   Diabetes   Heart Failure (HF)   Hypertension (HTN)   Nephropathy   History of myocarditis   Tachycardia induced cardiomyopathy   STEMI (ST elevation myocardial infarction)   Spontaneous dissection of coronary artery   Status post percutaneous transluminal coronary angioplasty            Passed - Recent (12 mo) or future (90 days) visit within the authorizing provider's specialty     The patient must have completed an in-person or virtual visit within the past 12 months or has a future visit scheduled  within the next 90 days with the authorizing provider s specialty.  Urgent care and e-visits do not qualify as an office visit for this protocol.          Passed - Most recent GFR on file in the past 12 months >30        Passed - Patient is age 18 or older        Passed - No active pregnancy on record        Passed - Normal serum potassium on file in past 12 months     Recent Labs   Lab Test 11/20/24  1004   POTASSIUM 4.9             Passed - No positive pregnancy test within past 12 months

## 2025-04-10 ENCOUNTER — PATIENT OUTREACH (OUTPATIENT)
Dept: CARE COORDINATION | Facility: CLINIC | Age: 80
End: 2025-04-10
Payer: MEDICARE

## 2025-05-19 ENCOUNTER — MYC MEDICAL ADVICE (OUTPATIENT)
Dept: FAMILY MEDICINE | Facility: CLINIC | Age: 80
End: 2025-05-19
Payer: MEDICARE

## 2025-05-19 NOTE — LETTER
May 27, 2025        Chika Hurd  [address]  [place] [address]        Shyann Farr,     Your care team at [facility] values your health and well-being. After reviewing your chart, we have identified recommendation(s) to help you better manage your health.    It's time for your Medicare AWV. During your visit, we'll discuss your health, well-being, and any questions you may have related to preventive care. We'll also review any recommended tests, exams, or screenings you might need. To schedule please call [phone]) or use your [employer] account.     If you recently had or are having any of these services soon, please contact the clinic via phone or [employer] so that your care team can update your records.    We look forward to seeing you at your upcoming visit.    If you have any questions or concerns, please contact our clinic. Thank you for continuing to trust us with your care.    Sincerely,    Your [facility] Care Team

## 2025-05-19 NOTE — TELEPHONE ENCOUNTER
Patient Quality Outreach    Patient is due for the following:   Physical Annual Wellness Visit    Action(s) Taken:   Schedule a Annual Wellness Visit    Type of outreach:    Sent [employer] message.    Questions for provider review:    None         Ivana Santiago MA  Chart routed to Care Team.

## 2025-05-27 NOTE — TELEPHONE ENCOUNTER
Patient Quality Outreach    Patient is due for the following:   Depression  -  PHQ-9 needed  Physical Annual Wellness Visit    Action(s) Taken:   Schedule a Annual Wellness Visit    Type of outreach:    Sent letter.    Questions for provider review:    None         Victoria Villa  Chart routed to None.

## 2025-06-10 ENCOUNTER — OFFICE VISIT (OUTPATIENT)
Dept: NEUROLOGY | Facility: CLINIC | Age: 80
End: 2025-06-10
Payer: MEDICARE

## 2025-06-10 VITALS
DIASTOLIC BLOOD PRESSURE: 61 MMHG | HEART RATE: 66 BPM | BODY MASS INDEX: 35.58 KG/M2 | SYSTOLIC BLOOD PRESSURE: 95 MMHG | WEIGHT: 182.2 LBS

## 2025-06-10 DIAGNOSIS — I61.9 HEMORRHAGIC STROKE (H): ICD-10-CM

## 2025-06-10 DIAGNOSIS — I61.9 INTRAPARENCHYMAL HEMORRHAGE OF BRAIN (H): Primary | ICD-10-CM

## 2025-06-10 DIAGNOSIS — I67.1 SACCULAR ANEURYSM: ICD-10-CM

## 2025-06-10 DIAGNOSIS — Z79.899 ENCOUNTER FOR LONG-TERM (CURRENT) USE OF MEDICATIONS: ICD-10-CM

## 2025-06-10 PROCEDURE — 99417 PROLNG OP E/M EACH 15 MIN: CPT | Performed by: PSYCHIATRY & NEUROLOGY

## 2025-06-10 PROCEDURE — 99215 OFFICE O/P EST HI 40 MIN: CPT | Performed by: PSYCHIATRY & NEUROLOGY

## 2025-06-10 PROCEDURE — 3074F SYST BP LT 130 MM HG: CPT | Performed by: PSYCHIATRY & NEUROLOGY

## 2025-06-10 PROCEDURE — 3078F DIAST BP <80 MM HG: CPT | Performed by: PSYCHIATRY & NEUROLOGY

## 2025-06-10 ASSESSMENT — MONTREAL COGNITIVE ASSESSMENT (MOCA)
6. READ LIST OF DIGITS [FORWARD/BACKWARD]: 2
7. [VIGILENCE] TAP WHEN HEARING DESIGNATED LETTER: 1
4. NAME EACH OF THE THREE ANIMALS SHOWN: 2
11. FOR EACH PAIR OF WORDS, WHAT CATEGORY DO THEY BELONG TO (OUT OF 2): 1
VISUOSPATIAL/EXECUTIVE SUBSCORE: 5
10. [FLUENCY] NAME WORDS STARTING WITH DESIGNATED LETTER: 1
WHAT IS THE TOTAL SCORE (OUT OF 30): 21
9. REPEAT EACH SENTENCE: 2
13. ORIENTATION SUBSCORE: 6
8. SERIAL SUBTRACTION OF 7S: 1
12. MEMORY INDEX SCORE: 0
WHAT LEVEL OF EDUCATION WAS ATTAINED: 0

## 2025-06-10 NOTE — LETTER
6/10/2025      Chika Hurd  [id number]  [facility] [address]      Dear Colleague,    Thank you for referring your patient, Chika Hurd, to the [facility] [place]. Please see a copy of my visit note below.    INITIAL NEUROLOGY CONSULTATION    DATE OF VISIT: 6/10/2025  MRN: [id number]  PATIENT NAME: Chika Hurd  YOB: 1945    REFERRING PROVIDER: No ref. provider found    Chief Complaint   Patient presents with     Neurologic Problem     Patient feels like her strength are getting better. Patient forgot to schedule with Neurosurgery.   Having memory issues; can't recall names/issues with finding words. Does have family history with Dementia/Alzheimer.      SUBJECTIVE:                                                      HPI:   Chika Hurd is a 80 year old female whom I have been asked by Maritza Sethi CNP, to see in consultation for stroke.  Chika was hospitalized at [facility] in September 2024, at which time she had a hemorrhagic left basal ganglia stroke, felt to be hypertensive in etiology.  Additional history of ischemic stroke in 2023, hypertension, dyslipidemia, GERD and breast cancer.  When she presented to [facility] last year it was in the setting of unsteady gait.    Upon follow up here in clinic, she reported some depression symptoms.  This in the setting of taking care of her  with dementia.  She reported good family support.  Previous tobacco use.  Previous plan was to continue lisinopril/hydrochlorothiazide, atorvastatin and aspirin.  She was also referred to neurosurgery.  I do not see any neurosurgery notes.  Mild weakness and poor dexterity in the right hand noted as well as to the dysarthria with fatigue is residual stroke symptoms.    One fall in the setting of illness due to norovirus a few months ago.     She still has some trouble with fine motor movements of the Right hand, but it has improved since the  stroke.  She is in assisted living with good support from family.  Daughter takes her to appointments and also cares for the home that she still owns.  Meals are provided but she does do some cooking.    Still has some difficulties with speech since the stroke.    She has trouble with names. She needs to associate names with something else.  She has trouble thinking on her feet, but things usually come to her later.  Daughter, Ramila who is with her, agrees.    Family history of dementia in her father in his late 70's, lived into his 80's.     Poor sleep, comes and goes.  Not interested in medications. Has to get up during the night to go to the bathroom.    Past Medical History:   Diagnosis Date     Breast cancer (H)      Central retinal artery occlusion, right      DCIS (ductal carcinoma in situ) of breast      GERD (gastroesophageal reflux disease)      Hyperlipidemia      Past Surgical History:   Procedure Laterality Date     COLONOSCOPY N/A 12/8/2021    Procedure: COLONOSCOPY, WITH POLYPECTOMY AND BIOPSY;  Surgeon: Lawrence Ortega DO;  Location: [facility]     ENT SURGERY      dental implants 03/12 started     FLEXIBLE SIGMOIDOSCOPY  04/03     LUMPECTOMY BREAST WITH SEED LOCALIZATION Left 9/21/2016    Procedure: LUMPECTOMY BREAST WITH SEED LOCALIZATION;  Surgeon: Russel Murry MD;  Location:  OR     PHACOEMULSIFICATION WITH STANDARD INTRAOCULAR LENS IMPLANT Right 11/6/2017    Procedure: PHACOEMULSIFICATION WITH STANDARD INTRAOCULAR LENS IMPLANT;  Right cataract removal with implant;  Surgeon: Michael Zuleta MD;  Location: [facility]     PHACOEMULSIFICATION WITH STANDARD INTRAOCULAR LENS IMPLANT Left 11/27/2017    Procedure: PHACOEMULSIFICATION WITH STANDARD INTRAOCULAR LENS IMPLANT;  Left cataract removal with implant;  Surgeon: Michael Zuleta MD;  Location: [facility]     SURGICAL HISTORY OF -   1959    Right Hip Pinning     SURGICAL HISTORY OF -   1983    Tubal Ligation       Current  Outpatient Medications   Medication Sig Dispense Refill     acetaminophen (TYLENOL) 325 MG tablet Take 2 tablets (650 mg) by mouth every 4 hours as needed for mild pain or fever.       Ascorbic Acid (VITAMIN C PO) Take 500 mg by mouth daily       aspirin (ASA) 81 MG chewable tablet Take 1 tablet (81 mg) by mouth daily. 30 tablet 0     atorvastatin (LIPITOR) 40 MG tablet Take 1 tablet (40 mg) by mouth daily. 30 tablet 0     coenzyme Q-10 200 MG CAPS capsule Take 1 capsule by mouth at bedtime       famotidine (PEPCID) 20 MG tablet TAKE 1 TABLET (20 MG) BY MOUTH 2 TIMES DAILY 60 tablet 0     Glycerin-Polysorbate 80 (REFRESH DRY EYE THERAPY OP) Place 1 drop into both eyes as needed       lisinopril-hydrochlorothiazide (ZESTORETIC) 20-25 MG tablet TAKE 1 TABLET BY MOUTH ONCE DAILY 30 tablet 11     MULTIVITAMIN OR 1 daily       Omega-3 Fatty Acids (OMEGA-3 FISH OIL PO) Take 1 g by mouth daily        senna-docusate (SENOKOT-S/PERICOLACE) 8.6-50 MG tablet Take 1 tablet by mouth 2 times daily as needed for constipation.       No current facility-administered medications for this visit.     Allergies   Allergen Reactions     Cortisone Swelling     Cortisone Cream        Problem (# of Occurrences) Relation (Name,Age of Onset)    Circulatory (1) Father: main artery aneursym    Heart Disease (3) Mother, Father, Sister (temi): sleep problems    Breast Cancer (3) Maternal Aunt: diagnosed in 60's, surviving in 90's, Cousin, Cousin           Negative family history of: Skin Cancer          Social History     Tobacco Use     Smoking status: Former     Current packs/day: 0.00     Types: Cigarettes     Start date: 1970     Quit date: 2005     Years since quittin.4     Smokeless tobacco: Never   Vaping Use     Vaping status: Never Used   Substance Use Topics     Alcohol use: Yes     Comment: rarely     Drug use: No       REVIEW OF SYSTEMS:                                                      10-point review of systems is  negative except as mentioned above in HPI.     EXAM:                                                      Physical Exam:   Vitals: BP 95/61   Pulse 66   Wt 82.6 kg (182 lb 3.2 oz)   LMP  (LMP Unknown)   BMI 35.58 kg/m    BMI= Body mass index is 35.58 kg/m .  GENERAL: NAD.  HEENT: NC/AT.   PULM: Non-labored breathing.   Neurologic:  MENTAL STATUS: Alert, attentive. Speech is a little slow but no dysarthria. Normal comprehension. MoCA: 21/30 (normal is 26 and above). Normal concentration. Adequate fund of knowledge.   CRANIAL NERVES: Visual fields intact to confrontation. Pupils equally, round and reactive to light. Facial sensation and movement normal. EOM full. Hearing intact to conversation. Trapezius strength intact. Palate moves symmetrically. Tongue midline.  MOTOR: 5/5 in proximal and distal muscle groups of upper and lower extremities. Tone and bulk normal.   DTRs: Intact and symmetric in biceps, BR, patellae.  Babinski down-going bilaterally.   SENSATION: Normal light touch throughout.  COORDINATION: Slightly slow finger tapping on the Right compared to left.  STATION AND GAIT: Stance is slightly wide-based. Cautious gait.   Right hand-dominant.    Relevant Data:  Head CT (9.27.24):  IMPRESSION:  1.  No significant change in a now subacute intraparenchymal hemorrhage in the lateral aspect of the left basal ganglia. Surrounding edema and mild localized mass effect are stable.  2.  No convincing evidence of interval acute intracranial hemorrhage, infarct or hydrocephalus.    MRI Brain (9.25.24):  IMPRESSION:  1.  As demonstrated on the recent head CT, there is a 2.9 x 1.3 x 1.6 cm area of acute intraparenchymal hemorrhage centered in the left lentiform nucleus and extending into the corona radiata. No significant associated mass effect. No obvious underlying   enhancement although signal characteristics of the hemorrhage could obscure an underlying lesion and follow-up following resolution of the hemorrhage  is advised.  2.  Within the left periatrial white matter, there is a 6 mm focus of probable subacute infarction.  3.  Underlying volume loss and presumed chronic small vessel ischemic changes.    CTA Head/Neck (9.24.24):  IMPRESSION:   CTA Head:   1. No evidence of active extravasation into the left basal ganglia  acute parenchymal hematoma.  2. Incidental 4 mm saccular aneurysm at the anterior communicating  artery.  3. No evidence of large vessel occlusion or high-grade stenosis.   CTA Neck:   1. 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  2. Moderate stenosis at the right vertebral artery origin.  3. Background of scattered atherosclerotic plaque and mild stenoses.   4. Incidental findings as detailed.    Imaging reviewed independently by me.  Agree with radiology read.    ASSESSMENT and PLAN:                                                      Assessment:     ICD-10-CM    1. Intraparenchymal hemorrhage of brain (H)  I61.9       2. Hemorrhagic stroke (H)  I61.9       3. Saccular aneurysm  I67.1 CT Head Neck Angio w/o & w Contrast      4. Encounter for long-term (current) use of medications  Z79.899 CT Head Neck Angio w/o & w Contrast          Ms. Hurd is a pleasant 80-year-old woman here for follow-up after hemorrhagic stroke, which occurred about 9 months ago.  I did explain that I think we should follow-up on vessel imaging giving the history of small OWEN aneurysm and stenosis.  No changes in medications recommended at this time.  MoCA score was 21/30 indicating at least some mild impairment. We talked about how it can be common for people to notice some cognitive changes after stroke.  Will continue to monitor. Follow up in 6 months.     Plan:  Updated CT scan of your blood vessels.  We will notify you of the results.  You should be able to set this up to be completed in [place] at your convenience.  No medication changes at this time.  Follow-up in neurology clinic in 6  months.  Please let us know if any concerns arise in the meantime.    Total Time: 60 minutes were spent with the patient and in chart review/documentation (as described above in Assessment and Plan) /coordinating the care on date of service.    Liana Cantu MD  Neurology    CC: ZACKERY Hernández software used in the dictation of this note.      Again, thank you for allowing me to participate in the care of your patient.        Sincerely,        Liana Cantu MD    Electronically signed

## 2025-06-10 NOTE — PROGRESS NOTES
INITIAL NEUROLOGY CONSULTATION    DATE OF VISIT: 6/10/2025  MRN: [id number]  PATIENT NAME: Chika Hurd  YOB: 1945    REFERRING PROVIDER: No ref. provider found    Chief Complaint   Patient presents with    Neurologic Problem     Patient feels like her strength are getting better. Patient forgot to schedule with Neurosurgery.   Having memory issues; can't recall names/issues with finding words. Does have family history with Dementia/Alzheimer.      SUBJECTIVE:                                                      HPI:   Chika Hurd is a 80 year old female whom I have been asked by Maritza Sethi CNP, to see in consultation for stroke.  Chika was hospitalized at [facility] in September 2024, at which time she had a hemorrhagic left basal ganglia stroke, felt to be hypertensive in etiology.  Additional history of ischemic stroke in 2023, hypertension, dyslipidemia, GERD and breast cancer.  When she presented to [facility] last year it was in the setting of unsteady gait.    Upon follow up here in clinic, she reported some depression symptoms.  This in the setting of taking care of her  with dementia.  She reported good family support.  Previous tobacco use.  Previous plan was to continue lisinopril/hydrochlorothiazide, atorvastatin and aspirin.  She was also referred to neurosurgery.  I do not see any neurosurgery notes.  Mild weakness and poor dexterity in the right hand noted as well as to the dysarthria with fatigue is residual stroke symptoms.    One fall in the setting of illness due to norovirus a few months ago.     She still has some trouble with fine motor movements of the Right hand, but it has improved since the stroke.  She is in assisted living with good support from family.  Daughter takes her to appointments and also cares for the home that she still owns.  Meals are provided but she does do some cooking.    Still has some difficulties with speech since the  stroke.    She has trouble with names. She needs to associate names with something else.  She has trouble thinking on her feet, but things usually come to her later.  Daughter, Ramila who is with her, agrees.    Family history of dementia in her father in his late 70's, lived into his 80's.     Poor sleep, comes and goes.  Not interested in medications. Has to get up during the night to go to the bathroom.    Past Medical History:   Diagnosis Date    Breast cancer (H)     Central retinal artery occlusion, right     DCIS (ductal carcinoma in situ) of breast     GERD (gastroesophageal reflux disease)     Hyperlipidemia      Past Surgical History:   Procedure Laterality Date    COLONOSCOPY N/A 12/8/2021    Procedure: COLONOSCOPY, WITH POLYPECTOMY AND BIOPSY;  Surgeon: Lawrence Ortega DO;  Location: [facility]    ENT SURGERY      dental implants 03/12 started    FLEXIBLE SIGMOIDOSCOPY  04/03    LUMPECTOMY BREAST WITH SEED LOCALIZATION Left 9/21/2016    Procedure: LUMPECTOMY BREAST WITH SEED LOCALIZATION;  Surgeon: Russel Murry MD;  Location:  OR    PHACOEMULSIFICATION WITH STANDARD INTRAOCULAR LENS IMPLANT Right 11/6/2017    Procedure: PHACOEMULSIFICATION WITH STANDARD INTRAOCULAR LENS IMPLANT;  Right cataract removal with implant;  Surgeon: Michael Zuleta MD;  Location: [facility] OR    PHACOEMULSIFICATION WITH STANDARD INTRAOCULAR LENS IMPLANT Left 11/27/2017    Procedure: PHACOEMULSIFICATION WITH STANDARD INTRAOCULAR LENS IMPLANT;  Left cataract removal with implant;  Surgeon: Michael Zuleta MD;  Location: [facility]    SURGICAL HISTORY OF -   1959    Right Hip Pinning    SURGICAL HISTORY OF -   1983    Tubal Ligation       Current Outpatient Medications   Medication Sig Dispense Refill    acetaminophen (TYLENOL) 325 MG tablet Take 2 tablets (650 mg) by mouth every 4 hours as needed for mild pain or fever.      Ascorbic Acid (VITAMIN C PO) Take 500 mg by mouth daily      aspirin (ASA) 81 MG chewable  tablet Take 1 tablet (81 mg) by mouth daily. 30 tablet 0    atorvastatin (LIPITOR) 40 MG tablet Take 1 tablet (40 mg) by mouth daily. 30 tablet 0    coenzyme Q-10 200 MG CAPS capsule Take 1 capsule by mouth at bedtime      famotidine (PEPCID) 20 MG tablet TAKE 1 TABLET (20 MG) BY MOUTH 2 TIMES DAILY 60 tablet 0    Glycerin-Polysorbate 80 (REFRESH DRY EYE THERAPY OP) Place 1 drop into both eyes as needed      lisinopril-hydrochlorothiazide (ZESTORETIC) 20-25 MG tablet TAKE 1 TABLET BY MOUTH ONCE DAILY 30 tablet 11    MULTIVITAMIN OR 1 daily      Omega-3 Fatty Acids (OMEGA-3 FISH OIL PO) Take 1 g by mouth daily       senna-docusate (SENOKOT-S/PERICOLACE) 8.6-50 MG tablet Take 1 tablet by mouth 2 times daily as needed for constipation.       No current facility-administered medications for this visit.     Allergies   Allergen Reactions    Cortisone Swelling     Cortisone Cream        Problem (# of Occurrences) Relation (Name,Age of Onset)    Circulatory (1) Father: main artery aneursym    Heart Disease (3) Mother, Father, Sister (temi): sleep problems    Breast Cancer (3) Maternal Aunt: diagnosed in 60's, surviving in 90's, Cousin, Cousin           Negative family history of: Skin Cancer          Social History     Tobacco Use    Smoking status: Former     Current packs/day: 0.00     Types: Cigarettes     Start date: 1970     Quit date: 2005     Years since quittin.4    Smokeless tobacco: Never   Vaping Use    Vaping status: Never Used   Substance Use Topics    Alcohol use: Yes     Comment: rarely    Drug use: No       REVIEW OF SYSTEMS:                                                      10-point review of systems is negative except as mentioned above in HPI.     EXAM:                                                      Physical Exam:   Vitals: BP 95/61   Pulse 66   Wt 82.6 kg (182 lb 3.2 oz)   LMP  (LMP Unknown)   BMI 35.58 kg/m    BMI= Body mass index is 35.58 kg/m .  GENERAL: NAD.  HEENT:  NC/AT.   PULM: Non-labored breathing.   Neurologic:  MENTAL STATUS: Alert, attentive. Speech is a little slow but no dysarthria. Normal comprehension. MoCA: 21/30 (normal is 26 and above). Normal concentration. Adequate fund of knowledge.   CRANIAL NERVES: Visual fields intact to confrontation. Pupils equally, round and reactive to light. Facial sensation and movement normal. EOM full. Hearing intact to conversation. Trapezius strength intact. Palate moves symmetrically. Tongue midline.  MOTOR: 5/5 in proximal and distal muscle groups of upper and lower extremities. Tone and bulk normal.   DTRs: Intact and symmetric in biceps, BR, patellae.  Babinski down-going bilaterally.   SENSATION: Normal light touch throughout.  COORDINATION: Slightly slow finger tapping on the Right compared to left.  STATION AND GAIT: Stance is slightly wide-based. Cautious gait.   Right hand-dominant.    Relevant Data:  Head CT (9.27.24):  IMPRESSION:  1.  No significant change in a now subacute intraparenchymal hemorrhage in the lateral aspect of the left basal ganglia. Surrounding edema and mild localized mass effect are stable.  2.  No convincing evidence of interval acute intracranial hemorrhage, infarct or hydrocephalus.    MRI Brain (9.25.24):  IMPRESSION:  1.  As demonstrated on the recent head CT, there is a 2.9 x 1.3 x 1.6 cm area of acute intraparenchymal hemorrhage centered in the left lentiform nucleus and extending into the corona radiata. No significant associated mass effect. No obvious underlying   enhancement although signal characteristics of the hemorrhage could obscure an underlying lesion and follow-up following resolution of the hemorrhage is advised.  2.  Within the left periatrial white matter, there is a 6 mm focus of probable subacute infarction.  3.  Underlying volume loss and presumed chronic small vessel ischemic changes.    CTA Head/Neck (9.24.24):  IMPRESSION:   CTA Head:   1. No evidence of active  extravasation into the left basal ganglia  acute parenchymal hematoma.  2. Incidental 4 mm saccular aneurysm at the anterior communicating  artery.  3. No evidence of large vessel occlusion or high-grade stenosis.   CTA Neck:   1. 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  2. Moderate stenosis at the right vertebral artery origin.  3. Background of scattered atherosclerotic plaque and mild stenoses.   4. Incidental findings as detailed.    Imaging reviewed independently by me.  Agree with radiology read.    ASSESSMENT and PLAN:                                                      Assessment:     ICD-10-CM    1. Intraparenchymal hemorrhage of brain (H)  I61.9       2. Hemorrhagic stroke (H)  I61.9       3. Saccular aneurysm  I67.1 CT Head Neck Angio w/o & w Contrast      4. Encounter for long-term (current) use of medications  Z79.899 CT Head Neck Angio w/o & w Contrast          Ms. Hurd is a pleasant 80-year-old woman here for follow-up after hemorrhagic stroke, which occurred about 9 months ago.  I did explain that I think we should follow-up on vessel imaging giving the history of small OWEN aneurysm and stenosis.  No changes in medications recommended at this time.  MoCA score was 21/30 indicating at least some mild impairment. We talked about how it can be common for people to notice some cognitive changes after stroke.  Will continue to monitor. Follow up in 6 months.     Plan:  Updated CT scan of your blood vessels.  We will notify you of the results.  You should be able to set this up to be completed in [place] at your convenience.  No medication changes at this time.  Follow-up in neurology clinic in 6 months.  Please let us know if any concerns arise in the meantime.    Total Time: 60 minutes were spent with the patient and in chart review/documentation (as described above in Assessment and Plan) /coordinating the care on date of service.    Liana Diego  MD [facility]  Neurology    CC: Maritza Sethi CNP    Dragon software used in the dictation of this note.

## 2025-06-10 NOTE — NURSING NOTE
Chika Hurd is a 80 year old female who presents for:  Chief Complaint   Patient presents with    Neurologic Problem     Patient feels like her strength are getting better. Patient forgot to schedule with Neurosurgery.   Having memory issues; can't recall names/issues with finding words. Does have family history with Dementia/Alzheimer.         Initial Vitals:  BP 95/61   Pulse 66   Wt 82.6 kg (182 lb 3.2 oz)   LMP  (LMP Unknown)   BMI 35.58 kg/m   Estimated body mass index is 35.58 kg/m  as calculated from the following:    Height as of 1/10/25: 1.524 m (5').    Weight as of this encounter: 82.6 kg (182 lb 3.2 oz).. Body surface area is 1.87 meters squared. BP completed using cuff size: wrist cuff    Landry Gomes

## 2025-06-10 NOTE — NURSING NOTE
SYED COGNITIVE ASSESSMENT (MOCA)  Version 7.1 Original Version  VISUOSPATIAL/EXECUTIVE               COPY CUBE      [   1 ]                                [   1 ] DRAW CLOCK (Ten past eleven)  (3 points)    [   1 ]                    [  1  ]               [    1]       Contour            Numbers     Hands POINTS                 5  / 5   NAMING    [ 1  ]                                                                        [    ]                                             [  1  ]  Licarloz Gaytan                                Camel                 2    / 3   MEMORY Read list of words, subject must repeat them. Do 2 trials, even if 1st trial is successful. Do a recall after 5 minutes  FACE VELVET Temple NORA RED No Points    1st          2nd         ATTENTION Read list of digits (1 digit/sec) Subject has to repeat in the forward order       [   1 ]   2  1  8  5  4                                [  1  ] 7 4 2                       2   /2   Read list of letters. The subject must tap with his hand at each letter A. No points if > 2 errors.  [ 1   ] F B A C M N A A J K L B A F A K D E A A A J A M O F A A B           1   /1   Serial 7 subtraction starting at 100          [1    ] 93         [    ] 86          [    ] 79          [    ] 72         [    ] 65   4 or 5 correct subtractions: 3 points,  2 or 3 correct: 2 points,  1correct: 1 point,   0 correct: 0 points         1   /3   LANGUAGE Repeat: I only know that Hill is the one to help today. [ 1  ]                                      The cat always hid under the couch when dogs were in the room. [ 1  ]              2 /2   Fluency: Name maximum number of words in one minute that begin with the letter F                                                                                                                    [  1  ] ___ (N > 11 words)               1/1   ABSTRACTION Similarity between e.g.  banana-orange=fruit                                                                   [   1 ] train-bicycle                      [    ] watch-ruler           1   /2   DELAYED  RECALL Has to recall words  WITH NO CUE FACE  [    ] VELVET  [    ] Hoahaoism  [    ]  NORA  [    ] RED  [    ] Points for UNCUED recall only         0   /5           OPTIONAL Category cue           Multiple choice cue          ORIENTATION  [ 1   ] Date     [  1  ] Month       [  1  ] Year      [    1] Day      [ 1   ] Place        [  1  ] City          6/6   TOTAL  Normal > 26/30 Add 1 point if < 12 years education      21 /30

## 2025-06-10 NOTE — PATIENT INSTRUCTIONS
PLAN:  Updated CT scan of your blood vessels.  We will notify you of the results.  You should be able to set this up to be completed in [place] at your convenience.    No medication changes at this time.  Follow-up in neurology clinic in 6 months.  Please let us know if any concerns arise in the meantime.

## 2025-07-18 ENCOUNTER — OFFICE VISIT (OUTPATIENT)
Dept: FAMILY MEDICINE | Facility: CLINIC | Age: 80
End: 2025-07-18
Attending: FAMILY MEDICINE
Payer: MEDICARE

## 2025-07-18 VITALS
HEIGHT: 60 IN | WEIGHT: 186 LBS | HEART RATE: 66 BPM | OXYGEN SATURATION: 94 % | DIASTOLIC BLOOD PRESSURE: 62 MMHG | BODY MASS INDEX: 36.52 KG/M2 | RESPIRATION RATE: 16 BRPM | SYSTOLIC BLOOD PRESSURE: 118 MMHG | TEMPERATURE: 97.9 F

## 2025-07-18 DIAGNOSIS — C50.912 CARCINOMA OF LEFT BREAST IN FEMALE, ESTROGEN RECEPTOR POSITIVE, UNSPECIFIED SITE OF BREAST (H): ICD-10-CM

## 2025-07-18 DIAGNOSIS — H34.11 CENTRAL RETINAL ARTERY OCCLUSION OF RIGHT EYE: ICD-10-CM

## 2025-07-18 DIAGNOSIS — K21.9 GASTROESOPHAGEAL REFLUX DISEASE WITHOUT ESOPHAGITIS: ICD-10-CM

## 2025-07-18 DIAGNOSIS — Z00.00 ENCOUNTER FOR MEDICARE ANNUAL WELLNESS EXAM: Primary | ICD-10-CM

## 2025-07-18 DIAGNOSIS — I10 BENIGN ESSENTIAL HYPERTENSION: ICD-10-CM

## 2025-07-18 DIAGNOSIS — Z17.0 CARCINOMA OF LEFT BREAST IN FEMALE, ESTROGEN RECEPTOR POSITIVE, UNSPECIFIED SITE OF BREAST (H): ICD-10-CM

## 2025-07-18 RX ORDER — FAMOTIDINE 20 MG/1
TABLET, FILM COATED ORAL
Qty: 56 TABLET | Refills: 11 | Status: SHIPPED | OUTPATIENT
Start: 2025-07-18

## 2025-07-18 RX ORDER — ATORVASTATIN CALCIUM 40 MG/1
40 TABLET, FILM COATED ORAL DAILY
Qty: 28 TABLET | Refills: 11 | Status: SHIPPED | OUTPATIENT
Start: 2025-07-18

## 2025-07-18 SDOH — HEALTH STABILITY: PHYSICAL HEALTH: ON AVERAGE, HOW MANY DAYS PER WEEK DO YOU ENGAGE IN MODERATE TO STRENUOUS EXERCISE (LIKE A BRISK WALK)?: 0 DAYS

## 2025-07-18 ASSESSMENT — PATIENT HEALTH QUESTIONNAIRE - PHQ9
10. IF YOU CHECKED OFF ANY PROBLEMS, HOW DIFFICULT HAVE THESE PROBLEMS MADE IT FOR YOU TO DO YOUR WORK, TAKE CARE OF THINGS AT HOME, OR GET ALONG WITH OTHER PEOPLE: NOT DIFFICULT AT ALL
SUM OF ALL RESPONSES TO PHQ QUESTIONS 1-9: 2
SUM OF ALL RESPONSES TO PHQ QUESTIONS 1-9: 2

## 2025-07-18 ASSESSMENT — SOCIAL DETERMINANTS OF HEALTH (SDOH): HOW OFTEN DO YOU GET TOGETHER WITH FRIENDS OR RELATIVES?: MORE THAN THREE TIMES A WEEK

## 2025-07-18 ASSESSMENT — PAIN SCALES - GENERAL: PAINLEVEL_OUTOF10: NO PAIN (0)

## 2025-07-18 NOTE — PROGRESS NOTES
Preventive Care Visit  [facility]  Audrey Roy MD, Family Medicine  Jul 18, 2025      Assessment & Plan     Encounter for Medicare annual wellness exam    Central retinal artery occlusion of right eye  Stable. Continue aspirin and statin per cerebrovascular risk reduction.  - atorvastatin (LIPITOR) 40 MG tablet; Take 1 tablet (40 mg) by mouth daily.    Gastroesophageal reflux disease without esophagitis  Well controlled. Refilled medication.     - famotidine (PEPCID) 20 MG tablet; TAKE 1 TABLET (20 MG) BY MOUTH 2 TIMES DAILY    Benign essential hypertension  Well-controlled on Zestoretic.  Continue with this.  Refills up-to-date.    Carcinoma of left breast in female, estrogen receptor positive, unspecified site of breast (H)  Diagnosed in 2016.  Status-post lumpectomy, radiation therapy and Arimidex.  In remission.  Continue with annual mammography.  Patient has been advised of split billing requirements and indicates understanding: Yes    BMI  Estimated body mass index is 36.33 kg/m  as calculated from the following:    Height as of this encounter: 1.524 m (5').    Weight as of this encounter: 84.4 kg (186 lb).       Counseling  Appropriate preventive services were addressed with this patient via screening, questionnaire, or discussion as appropriate for fall prevention, nutrition, physical activity, Tobacco-use cessation, social engagement, weight loss and cognition.  Checklist reviewing preventive services available has been given to the patient.  Reviewed patient's diet, addressing concerns and/or questions.   Reviewed preventive health counseling, as reflected in patient instructions        Anatoliy Parada is a 80 year old, presenting for the following:  Wellness Visit        7/18/2025     2:25 PM   Additional Questions   Roomed by Itzel DE ANDA CMA         [facility]           Advance Care Planning            7/18/2025   General Health   How would you rate your overall physical  health? (!) FAIR   Feel stress (tense, anxious, or unable to sleep) Not at all         7/18/2025   Nutrition   Diet: Low salt         7/18/2025   Exercise   Days per week of moderate/strenous exercise 0 days   (!) EXERCISE CONCERN      7/18/2025   Social Factors   Frequency of gathering with friends or relatives More than three times a week   Worry food won't last until get money to buy more No   Food not last or not have enough money for food? No   Do you have housing? (Housing is defined as stable permanent housing and does not include staying outside in a car, in a tent, in an abandoned building, in an overnight shelter, or couch-surfing.) Yes   Are you worried about losing your housing? No   Lack of transportation? No   Unable to get utilities (heat,electricity)? No         7/18/2025   Fall Risk   Fallen 2 or more times in the past year? Yes   Trouble with walking or balance? No   Gait Speed Test Interpretation Greater than 5.01 seconds - ABNORMAL           7/18/2025   Activities of Daily Living- Home Safety   Needs help with the following daily activites None of the above   Safety concerns in the home None of the above         7/18/2025   Dental   Dentist two times every year? Yes         7/18/2025   Hearing Screening   Hearing concerns? None of the above         7/18/2025   Driving Risk Screening   Patient/family members have concerns about driving (!) DECLINE         7/18/2025   General Alertness/Fatigue Screening   Have you been more tired than usual lately? No         7/18/2025   Urinary Incontinence Screening   Bothered by leaking urine in past 6 months No       Today's PHQ-9 Score:       7/18/2025     2:09 PM   PHQ-9 SCORE   PHQ-9 Total Score MyChart 2 (Minimal depression)   PHQ-9 Total Score 2        Patient-reported         7/18/2025   Substance Use   Alcohol more than 3/day or more than 7/wk No   Do you have a current opioid prescription? No   How severe/bad is pain from 1 to 10? 0/10 (No Pain)   Do  you use any other substances recreationally? No    (!) DECLINE       Multiple values from one day are sorted in reverse-chronological order     Social History     Tobacco Use    Smoking status: Former     Current packs/day: 0.00     Types: Cigarettes     Start date: 1970     Quit date: 2005     Years since quittin.5    Smokeless tobacco: Never   Vaping Use    Vaping status: Never Used   Substance Use Topics    Alcohol use: Yes     Comment: rarely    Drug use: No           2025   LAST FHS-7 RESULTS   1st degree relative breast or ovarian cancer No   Any relative bilateral breast cancer No   Any male have breast cancer No   Any ONE woman have BOTH breast AND ovarian cancer No   Any woman with breast cancer before 50yrs No   2 or more relatives with breast AND/OR ovarian cancer No   2 or more relatives with breast AND/OR bowel cancer No        Mammogram Screening - After age 74- determine frequency with patient based on health status, life expectancy and patient goals    ASCVD Risk   The ASCVD Risk score (Rakesh LUIS, et al., 2019) failed to calculate for the following reasons:    Risk score cannot be calculated because patient has a medical history suggesting prior/existing ASCVD            Reviewed and updated as needed this visit by Provider   Tobacco  Allergies  Meds  Problems  Med Hx  Surg Hx  Fam Hx            Past Medical History:   Diagnosis Date    Breast cancer (H)     Central retinal artery occlusion, right     DCIS (ductal carcinoma in situ) of breast     GERD (gastroesophageal reflux disease)     Hyperlipidemia      Past Surgical History:   Procedure Laterality Date    COLONOSCOPY N/A 2021    Procedure: COLONOSCOPY, WITH POLYPECTOMY AND BIOPSY;  Surgeon: Lawrence Ortega DO;  Location: [facility]    ENT SURGERY      dental implants  started    FLEXIBLE SIGMOIDOSCOPY      LUMPECTOMY BREAST WITH SEED LOCALIZATION Left 2016    Procedure: LUMPECTOMY  BREAST WITH SEED LOCALIZATION;  Surgeon: Russel Murry MD;  Location:  OR    PHACOEMULSIFICATION WITH STANDARD INTRAOCULAR LENS IMPLANT Right 11/6/2017    Procedure: PHACOEMULSIFICATION WITH STANDARD INTRAOCULAR LENS IMPLANT;  Right cataract removal with implant;  Surgeon: Michael Zuleta MD;  Location: [facility]    PHACOEMULSIFICATION WITH STANDARD INTRAOCULAR LENS IMPLANT Left 11/27/2017    Procedure: PHACOEMULSIFICATION WITH STANDARD INTRAOCULAR LENS IMPLANT;  Left cataract removal with implant;  Surgeon: Michael Zuleta MD;  Location: [facility]    SURGICAL HISTORY OF -   1959    Right Hip Pinning    SURGICAL HISTORY OF -   1983    Tubal Ligation     Labs reviewed in EPIC  Patient Active Problem List   Diagnosis    Hyperlipidemia LDL goal <130    SENSONRL HEAR LOSS,BILAT    GERD (gastroesophageal reflux disease)    Cataract right eye    Ductal carcinoma in situ (DCIS) of left breast    Central retinal artery occlusion of right eye    Carcinoma of left breast in female, estrogen receptor positive, unspecified site of breast (H)    Benign essential hypertension    Class 2 severe obesity due to excess calories with serious comorbidity in adult (H)    Stroke (H)    Cerebrovascular accident (CVA), unspecified mechanism (H)    Generalized muscle weakness    Hemorrhagic stroke (H)    Intraparenchymal hemorrhage of brain (H)    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)     Past Surgical History:   Procedure Laterality Date    COLONOSCOPY N/A 12/8/2021    Procedure: COLONOSCOPY, WITH POLYPECTOMY AND BIOPSY;  Surgeon: Lawrence Ortega DO;  Location: [facility]    ENT SURGERY      dental implants 03/12 started    FLEXIBLE SIGMOIDOSCOPY  04/03    LUMPECTOMY BREAST WITH SEED LOCALIZATION Left 9/21/2016    Procedure: LUMPECTOMY BREAST WITH SEED LOCALIZATION;  Surgeon: Russel Murry MD;  Location:  OR    PHACOEMULSIFICATION WITH STANDARD INTRAOCULAR LENS  IMPLANT Right 2017    Procedure: PHACOEMULSIFICATION WITH STANDARD INTRAOCULAR LENS IMPLANT;  Right cataract removal with implant;  Surgeon: Michael Zuleta MD;  Location: [facility]    PHACOEMULSIFICATION WITH STANDARD INTRAOCULAR LENS IMPLANT Left 2017    Procedure: PHACOEMULSIFICATION WITH STANDARD INTRAOCULAR LENS IMPLANT;  Left cataract removal with implant;  Surgeon: Michael Zuleta MD;  Location: [facility]    SURGICAL HISTORY OF -       Right Hip Pinning    SURGICAL HISTORY OF -       Tubal Ligation       Social History     Tobacco Use    Smoking status: Former     Current packs/day: 0.00     Types: Cigarettes     Start date: 1970     Quit date: 2005     Years since quittin.5    Smokeless tobacco: Never   Substance Use Topics    Alcohol use: Yes     Comment: rarely     Family History   Problem Relation Age of Onset    Heart Disease Mother     Heart Disease Father     Circulatory Father         main artery aneursym    Heart Disease Sister         sleep problems    Breast Cancer Maternal Aunt         diagnosed in 60's, surviving in 90's    Breast Cancer Cousin     Breast Cancer Cousin     Skin Cancer No family hx of          Current Outpatient Medications   Medication Sig Dispense Refill    acetaminophen (TYLENOL) 325 MG tablet Take 2 tablets (650 mg) by mouth every 4 hours as needed for mild pain or fever.      Ascorbic Acid (VITAMIN C PO) Take 500 mg by mouth daily      aspirin (ASA) 81 MG chewable tablet Take 1 tablet (81 mg) by mouth daily. 30 tablet 0    atorvastatin (LIPITOR) 40 MG tablet Take 1 tablet (40 mg) by mouth daily. 28 tablet 11    coenzyme Q-10 200 MG CAPS capsule Take 1 capsule by mouth at bedtime      famotidine (PEPCID) 20 MG tablet TAKE 1 TABLET (20 MG) BY MOUTH 2 TIMES DAILY 56 tablet 11    Glycerin-Polysorbate 80 (REFRESH DRY EYE THERAPY OP) Place 1 drop into both eyes as needed      lisinopril-hydrochlorothiazide (ZESTORETIC) 20-25 MG tablet  TAKE 1 TABLET BY MOUTH ONCE DAILY 30 tablet 11    MULTIVITAMIN OR 1 daily      Omega-3 Fatty Acids (OMEGA-3 FISH OIL PO) Take 1 g by mouth daily       senna-docusate (SENOKOT-S/PERICOLACE) 8.6-50 MG tablet Take 1 tablet by mouth 2 times daily as needed for constipation.       Allergies   Allergen Reactions    Cortisone Swelling     Cortisone Cream     Current providers sharing in care for this patient include:  Patient Care Team:  Audrey Roy MD as PCP - General (Family Practice)  Benjamin Chen MD as MD (Radiation Oncology)  Leeroy Pascal MD as MD (Vascular Surgery)  Michael Zuleta MD as MD (Ophthalmology)  Victoria Wakefield PA-C as Physician Assistant (Dermatology)  Audrey Roy MD as Referring Physician (Family Medicine)  Audrey Roy MD as Assigned PCP  Maritza Sethi APRN CNP as Nurse Practitioner (Neurology)  Liana Winn MD as Assigned Neuroscience Provider    The following health maintenance items are reviewed in Epic and correct as of today:  Health Maintenance   Topic Date Due    RSV VACCINE (1 - 1-dose 75+ series) Never done    COVID-19 VACCINE (4 - 2024-25 season) 09/01/2024    DTAP/TDAP/TD VACCINE (2 - Td or Tdap) 06/04/2025    ZOSTER VACCINE (3 of 3) 11/14/2024    INFLUENZA VACCINE (1) 09/01/2025    LIPID  09/26/2025    BMP  11/20/2025    PHQ-9  01/18/2026    MEDICARE ANNUAL WELLNESS VISIT  07/18/2026    ANNUAL REVIEW OF HM ORDERS  07/18/2026    FALL RISK ASSESSMENT  07/18/2026    DIABETES SCREENING  11/20/2027    ADVANCE CARE PLANNING  10/17/2029    DEXA  12/14/2031    HEPATITIS C SCREENING  Completed    PHQ-2 (once per calendar year)  Completed    PNEUMOCOCCAL VACCINE 50+ YEARS  Completed    HPV VACCINE (No Doses Required) Completed    MENINGITIS VACCINE  Aged Out    MAMMO SCREENING  Discontinued    COLORECTAL CANCER SCREENING  Discontinued         Review of Systems  Constitutional, neuro, ENT, endocrine, pulmonary, cardiac,  gastrointestinal, genitourinary, musculoskeletal, integument and psychiatric systems are negative, except as otherwise noted.     Objective    Exam  /62   Pulse 66   Temp 97.9  F (36.6  C) (Tympanic)   Resp 16   Ht 1.524 m (5')   Wt 84.4 kg (186 lb)   LMP  (LMP Unknown)   SpO2 94%   BMI 36.33 kg/m     Estimated body mass index is 36.33 kg/m  as calculated from the following:    Height as of this encounter: 1.524 m (5').    Weight as of this encounter: 84.4 kg (186 lb).    Physical Exam  GENERAL: alert and no distress  EYES: Eyes grossly normal to inspection, PERRL and conjunctivae and sclerae normal  HENT: normal cephalic/atraumatic and ear canals and TM's normal  NECK: no adenopathy, no asymmetry, masses, or scars  RESP: lungs clear to auscultation - no rales, rhonchi or wheezes  BREAST: normal without masses, tenderness or nipple discharge and no palpable axillary masses or adenopathy  CV: regular rate and rhythm, normal S1 S2, no S3 or S4, no murmur, click or rub, no peripheral edema  ABDOMEN: soft, nontender, no hepatosplenomegaly, no masses and bowel sounds normal  MS: no gross musculoskeletal defects noted, no edema  SKIN: no suspicious lesions or rashes  NEURO: Normal strength and tone, mentation intact and speech normal  PSYCH: mentation appears normal, affect normal/bright         7/18/2025   Mini Cog   Clock Draw Score 2 Normal   3 Item Recall 3 objects recalled   Mini Cog Total Score 5              Signed Electronically by: Audrey Roy MD    Answers submitted by the patient for this visit:  Patient Health Questionnaire (Submitted on 7/18/2025)  If you checked off any problems, how difficult have these problems made it for you to do your work, take care of things at home, or get along with other people?: Not difficult at all  PHQ9 TOTAL SCORE: 2

## 2025-07-25 NOTE — PATIENT INSTRUCTIONS
Patient Education   Preventive Care Advice   This is general advice given by our system to help you stay healthy. However, your care team may have specific advice just for you. Please talk to your care team about your preventive care needs.  Nutrition  Eat 5 or more servings of fruits and vegetables each day.  Try wheat bread, brown rice and whole grain pasta (instead of white bread, rice, and pasta).  Get enough calcium and vitamin D. Check the label on foods and aim for 100% of the RDA (recommended daily allowance).  Lifestyle  Exercise at least 150 minutes each week  (30 minutes a day, 5 days a week).  Do muscle strengthening activities 2 days a week. These help control your weight and prevent disease.  No smoking.  Wear sunscreen to prevent skin cancer.  Have a dental exam and cleaning every 6 months.  Yearly exams  See your health care team every year to talk about:  Any changes in your health.  Any medicines your care team has prescribed.  Preventive care, family planning, and ways to prevent chronic diseases.  Shots (vaccines)   HPV shots (up to age 26), if you've never had them before.  Hepatitis B shots (up to age 59), if you've never had them before.  COVID-19 shot: Get this shot when it's due.  Flu shot: Get a flu shot every year.  Tetanus shot: Get a tetanus shot every 10 years.  Pneumococcal, hepatitis A, and RSV shots: Ask your care team if you need these based on your risk.  Shingles shot (for age 50 and up)  General health tests  Diabetes screening:  Starting at age 35, Get screened for diabetes at least every 3 years.  If you are younger than age 35, ask your care team if you should be screened for diabetes.  Cholesterol test: At age 39, start having a cholesterol test every 5 years, or more often if advised.  Bone density scan (DEXA): At age 50, ask your care team if you should have this scan for osteoporosis (brittle bones).  Hepatitis C: Get tested at least once in your life.  STIs (sexually  transmitted infections)  Before age 24: Ask your care team if you should be screened for STIs.  After age 24: Get screened for STIs if you're at risk. You are at risk for STIs (including HIV) if:  You are sexually active with more than one person.  You don't use condoms every time.  You or a partner was diagnosed with a sexually transmitted infection.  If you are at risk for HIV, ask about PrEP medicine to prevent HIV.  Get tested for HIV at least once in your life, whether you are at risk for HIV or not.  Cancer screening tests  Cervical cancer screening: If you have a cervix, begin getting regular cervical cancer screening tests starting at age 21.  Breast cancer scan (mammogram): If you've ever had breasts, begin having regular mammograms starting at age 40. This is a scan to check for breast cancer.  Colon cancer screening: It is important to start screening for colon cancer at age 45.  Have a colonoscopy test every 10 years (or more often if you're at risk) Or, ask your provider about stool tests like a FIT test every year or Cologuard test every 3 years.  To learn more about your testing options, visit:   .  For help making a decision, visit:   https://bit.ly/qp15398.  Prostate cancer screening test: If you have a prostate, ask your care team if a prostate cancer screening test (PSA) at age 55 is right for you.  Lung cancer screening: If you are a current or former smoker ages 50 to 80, ask your care team if ongoing lung cancer screenings are right for you.  For informational purposes only. Not to replace the advice of your health care provider. Copyright   2023 [facility]. All rights reserved. Clinically reviewed by the [facility]. [employer] 852639 - REV 01/24.  Preventing Falls: Care Instructions  Injuries and health problems such as trouble walking or poor eyesight can increase your risk of falling. So can some medicines. But there are things you can do to help  "prevent falls. You can exercise to get stronger. You can also arrange your home to make it safer.    Talk to your doctor about the medicines you take. Ask if any of them increase the risk of falls and whether they can be changed or stopped.   Try to exercise regularly. It can help improve your strength and balance. This can help lower your risk of falling.         Practice fall safety and prevention.   Wear low-heeled shoes that fit well and give your feet good support. Talk to your doctor if you have foot problems that make this hard.  Carry a cellphone or wear a medical alert device that you can use to call for help.  Use stepladders instead of chairs to reach high objects. Don't climb if you're at risk for falls. Ask for help, if needed.  Wear the correct eyeglasses, if you need them.        Make your home safer.   Remove rugs, cords, clutter, and furniture from walkways.  Keep your house well lit. Use night-lights in hallways and bathrooms.  Install and use sturdy handrails on stairways.  Wear nonskid footwear, even inside. Don't walk barefoot or in socks without shoes.        Be safe outside.   Use handrails, curb cuts, and ramps whenever possible.  Keep your hands free by using a shoulder bag or backpack.  Try to walk in well-lit areas. Watch out for uneven ground, changes in pavement, and debris.  Be careful in the winter. Walk on the grass or gravel when sidewalks are slippery. Use de-icer on steps and walkways. Add non-slip devices to shoes.    Put grab bars and nonskid mats in your shower or tub and near the toilet. Try to use a shower chair or bath bench when bathing.   Get into a tub or shower by putting in your weaker leg first. Get out with your strong side first. Have a phone or medical alert device in the bathroom with you.   Where can you learn more?  Go to https://www.[employer].net/patiented  Enter G117 in the search box to learn more about \"Preventing Falls: Care Instructions.\"  Current as of: " July 31, 2024  Content Version: 14.5    5983-4028 [employer].   Care instructions adapted under license by your healthcare professional. If you have questions about a medical condition or this instruction, always ask your healthcare professional. [employer] disclaims any warranty or liability for your use of this information.

## (undated) DEVICE — PREP PAD ALCOHOL 6818

## (undated) DEVICE — SOL WATER IRRIG 1000ML BOTTLE 07139-09

## (undated) DEVICE — SOL NACL 0.9% IRRIG 1000ML BOTTLE 07138-09

## (undated) RX ORDER — TROPICAMIDE 10 MG/ML
SOLUTION/ DROPS OPHTHALMIC
Status: DISPENSED
Start: 2017-11-06

## (undated) RX ORDER — CYCLOPENTOLATE HYDROCHLORIDE 10 MG/ML
SOLUTION/ DROPS OPHTHALMIC
Status: DISPENSED
Start: 2017-11-06

## (undated) RX ORDER — PHENYLEPHRINE HYDROCHLORIDE 25 MG/ML
SOLUTION/ DROPS OPHTHALMIC
Status: DISPENSED
Start: 2017-11-27

## (undated) RX ORDER — CYCLOPENTOLATE HYDROCHLORIDE 10 MG/ML
SOLUTION/ DROPS OPHTHALMIC
Status: DISPENSED
Start: 2017-11-27

## (undated) RX ORDER — PHENYLEPHRINE HYDROCHLORIDE 25 MG/ML
SOLUTION/ DROPS OPHTHALMIC
Status: DISPENSED
Start: 2017-11-06

## (undated) RX ORDER — TROPICAMIDE 10 MG/ML
SOLUTION/ DROPS OPHTHALMIC
Status: DISPENSED
Start: 2017-11-27

## (undated) RX ORDER — PROPOFOL 10 MG/ML
INJECTION, EMULSION INTRAVENOUS
Status: DISPENSED
Start: 2021-12-08